# Patient Record
Sex: FEMALE | Race: OTHER | HISPANIC OR LATINO
[De-identification: names, ages, dates, MRNs, and addresses within clinical notes are randomized per-mention and may not be internally consistent; named-entity substitution may affect disease eponyms.]

---

## 2016-12-29 NOTE — ED PROVIDER NOTE - ENMT, MLM
Airway patent, Nasal mucosa clear. Mouth with normal mucosa. + enlarged parotids without erythema, induration nor TTP

## 2016-12-29 NOTE — H&P ADULT. - LYMPHATICS COMMENTS
multiple lymph nodes palpated on R preauricular area, L occipital area, B/L supraclavicular, and B/L ant/post cervical lymph nodes

## 2016-12-29 NOTE — H&P ADULT. - PROBLEM SELECTOR PLAN 2
Possibly pre-renal and related to hypercalcemic state. Will likely resolve with fluid hydration.  - Continue NS as above  - Appreciate renal consult. F/up UA, urine lytes with osm, P/C ratio  - If P/C ratio is out of proportion to UA dipstick protein, will consider UPEP and UIFE

## 2016-12-29 NOTE — H&P ADULT. - PROBLEM SELECTOR PLAN 4
Due to giving IV fluids, watching closely for development of pulmonary edema. Gave 20mg IV Lasix around 6pm due to bibasilar crackles on exam.  - Appreciate renal consult. Tomorrow, consider starting Lasix PO 20mg BID. Will diurese PRN for now.  - Strict ins/outs (goal net neutral), daily standing weights  - Consider cardiology consult for continuity given recent admission for CHF exacerbation, hold off for now Due to giving IV fluids, watching closely for development of pulmonary edema. Gave 20mg IV Lasix around 6pm due to bibasilar crackles on exam.  - Appreciate renal consult. Tomorrow, consider starting Lasix PO 20mg BID. Will diurese PRN for now.  - Strict ins/outs (goal net neutral), daily standing weights  - F/up CXR to r/o pulmonary edema and use as baseline  - Will consult cardiology for hx of CHF/CAD and for cardiac clearance for tomorrow's planned LN bx with ENT

## 2016-12-29 NOTE — ED ADULT NURSE NOTE - OBJECTIVE STATEMENT
Patient to ED complaining of abnormal lab values prior to Chemotherapy infusion for follicular lymphoma. Patient's  reports "she is very tired; we went to her hematologist where they were about to infuse her chemo, but they noticed that he calcium levels were high." Patient denies chest pain, SOB, dizziness, headache, blurred vision, nausea at the time. Placed on continuous cardiac monitoring.

## 2016-12-29 NOTE — H&P ADULT. - PROBLEM SELECTOR PLAN 5
w/ PPM, on Eliquis. Stable, regular rate/rhythm currently.  - Continue Eliquis 2.5mg BID w/ PPM, on Eliquis. Stable, regular rate/rhythm currently.  - Continue Eliquis 2.5mg BID    Attending addendum: hold AM dose pending LN bx by ENT

## 2016-12-29 NOTE — CONSULT NOTE ADULT - PROBLEM SELECTOR RECOMMENDATION 9
-ddx includes both lymphoma and gammopathy; agree with renal to eval for secondary causes other than heme/malignancy related  -appreciate renal reccs: would follow them with serial chem panel; IVF (be cautious regarding cardiac status; would investigate regarding her EF status)  -Zometa, possibly calcitonin - pls d/w renal  -already on decadron as outpt so may not be useful at this time

## 2016-12-29 NOTE — CONSULT NOTE ADULT - PROBLEM SELECTOR RECOMMENDATION 2
-awaiting bone marrow bx results  -if above inconclusive, would attempt repeat biopsy of LN - this MUST be an excisional bx or at minimum core bx  -will d/w Dr. Hopkins regarding chemo, but pt does not want to start tx as inpatient and we cannot start chemo without a finalized pathologic diagnosis  -pls check LDH, fibrinogen, uric acid, K, phos, and hemolysis labs daily given hypercalcemia

## 2016-12-29 NOTE — PATIENT PROFILE ADULT. - VISION (WITH CORRECTIVE LENSES IF THE PATIENT USUALLY WEARS THEM):
Normal vision: sees adequately in most situations; can see medication labels, newsprint/H/o bilateral corneal implant

## 2016-12-29 NOTE — H&P ADULT. - PROBLEM SELECTOR PLAN 7
Currently normotensive. Hold home Toprol in setting of ROMEO. Losartan not on pt's personal list of home meds.  - Consider restarting Toprol if ROMEO resolves Hold home metformin. HbA1c 6.5 last month.  - Start MISS w/ FS TID and at bedtime

## 2016-12-29 NOTE — CONSULT NOTE ADULT - SUBJECTIVE AND OBJECTIVE BOX
Hematology Oncology Consult Note (Dr. Hopkins)  Discussed with Dr. Hopkins and recommendations reviewed with the primary team.    The patient was seen and examined    TANYA FRAIRE is a 85y Female with history of IgM gammopathy admitted with HYPERCALCEMIA.  Pt also has recent FNA LN concerning for B-cell neoplasm not fully confirmed and has BM bx pending pathology report to confirm both above diagnoses.  Pt was tentatively planned for chemotherapy with Bendamustine/Rituximab per Dr. Hopkins depending on path findings.  Pt has been informed of all possibilities of diagnoses and is interested in continuing with any offered chemo.  Pt presents to ER for outpt abnl lab of Ca 13 despite outpt treatment with Decadron by Dr. Hopkins.  Pt currently only reports fatigue, lightheadedness, constipation, with chronic known nights sweats.  No n/v.  No change in appetite or weight.  No chest pain/sob/abd pain.  No headaches.      Pt has been seen by Nephrology  who recc eval for secondary hypercalcemia, treatment with IVF/zometa and maybe calcitonin.  She will require frequent chem panel q8 for close monitoring.  Nephrologist does feel hypercalcemia is most likely related to likely dx of lymphoma.      No diarrhea.  No melena or hematochezia.    No dysuria/hematuria.  No history of easy bruising/bleeding.  No gingival bleeding or epistaxis.  No leg pain or leg swelling.    ROS is otherwise negative.    PAST MEDICAL & SURGICAL HISTORY:  Follicular lymphoma  Neck mass  Afib  Other hyperlipidemia  Breast cancer  CAD (coronary artery disease)  Diabetes  HTN (hypertension)  No pertinent past medical history  H/O abdominal hysterectomy  H/O thyroidectomy  S/P CABG x 3      Allergies:  IV contrast    Medications:  aspirin  chewable 81milliGRAM(s) Oral daily  apixaban 2.5milliGRAM(s) Oral once    Social History:    FAMILY HISTORY:  No pertinent family history in first degree relatives      PHYSICAL EXAM:    T(F): 99, Max: 99 (12-29 @ 16:05)  HR: 79 (75 - 90)  BP: 143/65 (120/69 - 143/65)  RR: 18 (18 - 18)  SpO2: 100% (95% - 100%)  Wt(kg): --    Daily     Daily     Gen: well developed, well nourished, comfortable  HEENT: normocephalic/atraumatic, no conjunctival pallor, no scleral icterus, no oral thrush/mucosal bleeding/mucositis  Neck: supple, large b/l lymphadenpathy vs parotid enlargement with shoddy cervical LAD, no JVD  Cardiovascular: RR, nl S1S2, no murmurs/rubs/gallops  Respiratory: clear air entry b/l  Gastrointestinal: BS+, soft, NT/ND, no masses, no splenomegaly, no hepatomegaly, no evidence for ascites  Extremities: no clubbing/cyanosis, trace edema to shins b/l, no calf tenderness  Vascular:  DP/PT 2+ b/l  Neurological: CN 2-12 grossly intact, no focal deficits  Skin: no rash on visible skin  Lymph Nodes:  no axillary/groin LAD  Musculoskeletal:  full ROM  Psychiatric:  mood stable            Labs:               11.7   2.6   )-----------( 177      ( 29 Dec 2016 11:35 )             37.1     CBC Full  -  ( 29 Dec 2016 11:35 )  WBC Count : 2.6 K/uL  Hemoglobin : 11.7 g/dL  Hematocrit : 37.1 %  Platelet Count - Automated : 177 K/uL  Mean Cell Volume : 87.3 fL  Mean Cell Hemoglobin : 27.5 pg  Mean Cell Hemoglobin Concentration : 31.5 g/dL  Auto Neutrophil # : x  Auto Lymphocyte # : x  Auto Monocyte # : x  Auto Eosinophil # : x  Auto Basophil # : x  Auto Neutrophil % : 55.4 %  Auto Lymphocyte % : 16.9 %  Auto Monocyte % : 27.3 %  Auto Eosinophil % : 0.0 %  Auto Basophil % : 0.4 %        29 Dec 2016 11:35    140    |  102    |  16     ----------------------------<  113    3.7     |  28     |  1.11     Ca    12.3       29 Dec 2016 11:35  Corrected calcium 13.0  Phos  3.3       29 Dec 2016 11:35  Mg     1.8       29 Dec 2016 11:35    TPro  6.9    /  Alb  3.1    /  TBili  0.5    /  DBili  x      /  AST  68     /  ALT  53     /  AlkPhos  93     29 Dec 2016 11:35

## 2016-12-29 NOTE — CONSULT NOTE ADULT - NEGATIVE CARDIOVASCULAR SYMPTOMS
no chest pain/no paroxysmal nocturnal dyspnea/no palpitations/no peripheral edema/no orthopnea/no dyspnea on exertion

## 2016-12-29 NOTE — H&P ADULT. - PROBLEM SELECTOR PLAN 9
FEN: IV fluids, replete lytes PRN, DASH/diabetic diet.  VTE ppx: therapeutic on Eliquis.    Dispo: Admit to regional floor for further management.  FULL CODE

## 2016-12-29 NOTE — CONSULT NOTE ADULT - SUBJECTIVE AND OBJECTIVE BOX
Patient is a 85y old  Female who presents with a chief complaint of hypercalcemia        HPI:  86 y/o F w/ hx of IgM gammopathy recently admitted (11/7 to 11/14) for HFpEF exacerbation (and found to have CT neck showing lymphadenopathy and parotid mass) with recent FNA bx of LN concerning for B-cell lymphoma and pending BM bx, with other co-morbidities including HTN, DM2, right sided breast CA in remission (s/p mastectomy/chemotx/radiation), a-fib s/p PPM on Eliquis, s/p left hemithyroidectomy, CAD s/p CABG, severe MR/TR (seen on recent Echo 11/2016), presents c/o 3-4 weeks of gradual onset generalized weakness/low energy/lightheadedness, progressively worsening, in the setting of persistent new-onset hypercalcemia (not seen on previous admissions) as outpatient. She received Zometa in office on 12/24 without change in Na (last value was 13 yesterday 12/28 in Dr. Hopkins’s office). She endorses worsening of constipation, mild increased thirst with no significant increase in PO intake, chronic nighttime polyuria with no recent increase in urinary frequency, and denies change in mental status, abd pain, nausea/vomiting, recent bone fracture or skin necrosis. Has not taken any calcium supplements or new medications containing calcium. States last chemotherapy was 18 years ago. 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  Initial vitals in ED: 98.4F, HR 90, 120/69, RR 18, 96% on RA. Started on IV fluids. (29 Dec 2016 14:41)    PAST MEDICAL & SURGICAL HISTORY:  Follicular lymphoma  Neck mass  Afib  Other hyperlipidemia  Breast cancer  CAD (coronary artery disease)  Diabetes  HTN (hypertension)  No pertinent past medical history  H/O abdominal hysterectomy  H/O thyroidectomy  S/P CABG x 3     FAMILY HISTORY:  No pertinent family history in first degree relatives    Social:  Allergies    IV CONtRAST (Flushing (Skin); Rash)  No Known Drug Allergies    Intolerances    	  MEDICATIONS:  metoprolol succinate ER 50milliGRAM(s) Oral daily            atorvastatin 80milliGRAM(s) Oral at bedtime  insulin lispro (HumaLOG) corrective regimen sliding scale  SubCutaneous Before meals and at bedtime    sodium chloride 0.9%. 1000milliLiter(s) IV Continuous <Continuous>  aspirin  chewable 81milliGRAM(s) Oral daily            PHYSICAL EXAM:  T(C): 36.7, Max: 37.4 (12-29 @ 19:29)  HR: 82 (75 - 90)  BP: 128/68 (120/69 - 147/63)  RR: 18 (18 - 18)  SpO2: 94% (94% - 100%)  Wt(kg): --  I&O's Summary    Height (cm): 152.4 (12-29 @ 18:42)  Weight (kg): 45.5 (12-29 @ 10:51)  BMI (kg/m2): 19.6 (12-29 @ 18:42)  BSA (m2): 1.39 (12-29 @ 18:42)    Gen: NAD, AAOx3  Neck: no JVD  Cardiovascular: Normal S1 S2, No murmurs,    Respiratory: Lungs clear to auscultation	  Gastrointestinal:  Soft, Non-tender, + BS	   Ext: no edema  Neuro: no focal deficits.  Vascular: Peripheral pulses palpable 2+ bilaterally    TELEMETRY: 	    ECG:  	  RADIOLOGY:   DIAGNOSTIC TESTING:  [ ] Echocardiogram:  [ ]  Catheterization:  [ ] Stress Test:    OTHER: 	    LABS:	 	    CARDIAC MARKERS:                                  11.7   2.6   )-----------( 177      ( 29 Dec 2016 11:35 )             37.1     29 Dec 2016 11:35    140    |  102    |  16     ----------------------------<  113    3.7     |  28     |  1.11     Ca    12.3       29 Dec 2016 11:35  Phos  3.3       29 Dec 2016 11:35  Mg     1.8       29 Dec 2016 11:35    TPro  6.9    /  Alb  3.1    /  TBili  0.5    /  DBili  x      /  AST  68     /  ALT  53     /  AlkPhos  93     29 Dec 2016 11:35       ASSESSMENT/PLAN: Patient is a 85y old  Female who presents with a chief complaint of hypercalcemia        HPI:  85 F w/ hx CAD s/p CABG x3 20 years ago with recent LHC 10/16 (pLCX 50%. pLAD 20% ISR. SVG-RPDA patent, SVG-OM1 patent. LM nl. mid OM1 100%. D2 100%  left-left collateral. pRCA 100%. EDP 16mmHg)  afib with pauses s/p Medtronic Dual chamber PPM in november,  HFpEF, severe MR/TR being followed by Structural heart team, NIDDM, breast CA, MGUS,  admitted for generalized weakness and significant Hypercalcemia, patient with known Lymphadenopathy, cardiology called for optimization prior to LN bx.    patient currently denies any CP, no SOB, she walks with a cane can walk one block but limited by joint pain and has not been very mobile since last admission due to generalized fatigue, she denies orthopnea or PND, admits to LE swelling.          PAST MEDICAL & SURGICAL HISTORY:  Follicular lymphoma  Neck mass  Afib  Other hyperlipidemia  Breast cancer  CAD (coronary artery disease)  Diabetes  HTN (hypertension)  No pertinent past medical history  H/O abdominal hysterectomy  H/O thyroidectomy  S/P CABG x 3     FAMILY HISTORY:  No pertinent family history in first degree relatives    Social:  Allergies    IV CONtRAST (Flushing (Skin); Rash)  No Known Drug Allergies    Intolerances    	  MEDICATIONS:  metoprolol succinate ER 50milliGRAM(s) Oral daily            atorvastatin 80milliGRAM(s) Oral at bedtime  insulin lispro (HumaLOG) corrective regimen sliding scale  SubCutaneous Before meals and at bedtime    sodium chloride 0.9%. 1000milliLiter(s) IV Continuous <Continuous>  aspirin  chewable 81milliGRAM(s) Oral daily            PHYSICAL EXAM:  T(C): 36.7, Max: 37.4 (12-29 @ 19:29)  HR: 82 (75 - 90)  BP: 128/68 (120/69 - 147/63)  RR: 18 (18 - 18)  SpO2: 94% (94% - 100%)  Wt(kg): --  I&O's Summary    Height (cm): 152.4 (12-29 @ 18:42)  Weight (kg): 45.5 (12-29 @ 10:51)  BMI (kg/m2): 19.6 (12-29 @ 18:42)  BSA (m2): 1.39 (12-29 @ 18:42)    Gen: NAD,lethargic  Neck: no JVD  MM: dry mucus membranes  Cardiovascular: s1 s2, 3/6 systolic murmur at apex radiating axilla   Respiratory: Lungs clear to auscultation	  Gastrointestinal:  Soft, Non-tender, + BS	   Ext: +1 pitting edema of LLE  Neuro: no focal deficits.  Vascular: Peripheral pulses palpable 2+ bilaterally       ECG:  	Atrial paced vs LAE, 1st deg HB, LVH, TWI in anterolateral leads  RADIOLOGY:   CXR: Left chest wall cardiac conduction device with leads in the   right atrium and right ventricle is seen. Patient's chest with median   sternotomy. Numerous mediastinal clips are noted. Persistent   opacification of the right lung base, which may represent atelectasis   versus scarring. Mediastinal silhouette is stable. No pneumothorax is   seen.    DIAGNOSTIC TESTING:  [ ] Echocardiogram: LVEF 55-60%, Severe MR/TR, severe PHTN pulmonary artery systolic pressure is estimated to be over 75 mmHg  [ ]  Catheterization:(pLCX 50%. pLAD 20% ISR. SVG-RPDA patent, SVG-OM1 patent. LM nl. mid OM1 100%. D2 100%  left-left collateral. pRCA 100%. EDP 16mmHg      	     	                                     11.7   2.6   )-----------( 177      ( 29 Dec 2016 11:35 )             37.1     29 Dec 2016 11:35    140    |  102    |  16     ----------------------------<  113    3.7     |  28     |  1.11     Ca    12.3       29 Dec 2016 11:35  Phos  3.3       29 Dec 2016 11:35  Mg     1.8       29 Dec 2016 11:35    TPro  6.9    /  Alb  3.1    /  TBili  0.5    /  DBili  x      /  AST  68     /  ALT  53     /  AlkPhos  93     29 Dec 2016 11:35       ASSESSMENT/PLAN: 	    85 F w/ hx CAD s/p CABG x3 20 years ago with recent C 10/16 (pLCX 50%. pLAD 20% ISR. SVG-RPDA patent, SVG-OM1 patent. LM nl. mid OM1 100%. D2 100%  left-left collateral. pRCA 100%. EDP 16mmHg)  afib with pauses s/p Medtronic Dual chamber PPM in november,  HFpEF, severe MR/TR being followed by Structural heart team, NIDDM, breast CA, MGUS,  admitted for generalized weakness and significant Hypercalcemia, patient with known Lymphadenopathy, cardiology called for optimization prior to LN bx.    Restart Toprol XL please do not hold unless severely hypotensive, (patient has a PPM), continue Aspirin 81mg, may hold Eliquis tonight per ENT request but   restart ASAP given her [id number] is 7 with 11% yearly risk of CVA,    patient is cardiovascularly optimized for low risk surgery would prefer to be performed under Local anesthesia if possible as patient is elevated risk for general anesthesia, no further testing is required, OK with IVF for now monitor respiratory status, give Lasix 20mg IV PRN, after procedure recommend after load reduction given her severe MR may start captopril 12.5mg TID and titrate to tolerable BP approx 90s-100s.     Please Call EP or have anesthesia call cardiology regarding PPM settings when patient is in OR.     Will Discuss with attending. Patient is a 85y old  Female who presents with a chief complaint of hypercalcemia        HPI:  85 F w/ hx CAD s/p CABG x3 20 years ago with recent LHC 10/16 (pLCX 50%. pLAD 20% ISR. SVG-RPDA patent, SVG-OM1 patent. LM nl. mid OM1 100%. D2 100%  left-left collateral. pRCA 100%. EDP 16mmHg)  afib with pauses s/p Medtronic Dual chamber PPM in november,  HFpEF, severe MR/TR being followed by Structural heart team, NIDDM, breast CA, MGUS,  admitted for generalized weakness and significant Hypercalcemia, patient with known Lymphadenopathy, cardiology called for optimization prior to LN bx.    patient currently denies any CP, no SOB, she walks with a cane can walk one block but limited by joint pain and has not been very mobile since last admission due to generalized fatigue, she denies orthopnea or PND, admits to LE swelling.          PAST MEDICAL & SURGICAL HISTORY:  Follicular lymphoma  Neck mass  Afib  Other hyperlipidemia  Breast cancer  CAD (coronary artery disease)  Diabetes  HTN (hypertension)  No pertinent past medical history  H/O abdominal hysterectomy  H/O thyroidectomy  S/P CABG x 3     FAMILY HISTORY:  No pertinent family history in first degree relatives    Social:  Allergies    IV CONtRAST (Flushing (Skin); Rash)  No Known Drug Allergies    Intolerances    	  MEDICATIONS:  metoprolol succinate ER 50milliGRAM(s) Oral daily            atorvastatin 80milliGRAM(s) Oral at bedtime  insulin lispro (HumaLOG) corrective regimen sliding scale  SubCutaneous Before meals and at bedtime    sodium chloride 0.9%. 1000milliLiter(s) IV Continuous <Continuous>  aspirin  chewable 81milliGRAM(s) Oral daily            PHYSICAL EXAM:  T(C): 36.7, Max: 37.4 (12-29 @ 19:29)  HR: 82 (75 - 90)  BP: 128/68 (120/69 - 147/63)  RR: 18 (18 - 18)  SpO2: 94% (94% - 100%)  Wt(kg): --  I&O's Summary    Height (cm): 152.4 (12-29 @ 18:42)  Weight (kg): 45.5 (12-29 @ 10:51)  BMI (kg/m2): 19.6 (12-29 @ 18:42)  BSA (m2): 1.39 (12-29 @ 18:42)    Gen: NAD,lethargic  Neck: no JVD  MM: dry mucus membranes  Cardiovascular: s1 s2, 3/6 systolic murmur at apex radiating axilla   Respiratory: Lungs clear to auscultation	  Gastrointestinal:  Soft, Non-tender, + BS	   Ext: +1 pitting edema of LLE  Neuro: no focal deficits.  Vascular: Peripheral pulses palpable 2+ bilaterally       ECG:  	Atrial paced vs LAE, 1st deg HB, LVH, TWI in anterolateral leads  RADIOLOGY:   CXR: Left chest wall cardiac conduction device with leads in the   right atrium and right ventricle is seen. Patient's chest with median   sternotomy. Numerous mediastinal clips are noted. Persistent   opacification of the right lung base, which may represent atelectasis   versus scarring. Mediastinal silhouette is stable. No pneumothorax is   seen.    DIAGNOSTIC TESTING:  [x ] Echocardiogram: LVEF 55-60%, Severe MR/TR, severe PHTN pulmonary artery systolic pressure is estimated to be over 75 mmHg  [x ]  Catheterization:(pLCX 50%. pLAD 20% ISR. SVG-RPDA patent, SVG-OM1 patent. LM nl. mid OM1 100%. D2 100%  left-left collateral. pRCA 100%. EDP 16mmHg      	     	                                     11.7   2.6   )-----------( 177      ( 29 Dec 2016 11:35 )             37.1     29 Dec 2016 11:35    140    |  102    |  16     ----------------------------<  113    3.7     |  28     |  1.11     Ca    12.3       29 Dec 2016 11:35  Phos  3.3       29 Dec 2016 11:35  Mg     1.8       29 Dec 2016 11:35    TPro  6.9    /  Alb  3.1    /  TBili  0.5    /  DBili  x      /  AST  68     /  ALT  53     /  AlkPhos  93     29 Dec 2016 11:35       ASSESSMENT/PLAN: 	    85 F w/ hx CAD s/p CABG x3 20 years ago with recent C 10/16 (pLCX 50%. pLAD 20% ISR. SVG-RPDA patent, SVG-OM1 patent. LM nl. mid OM1 100%. D2 100%  left-left collateral. pRCA 100%. EDP 16mmHg)  afib with pauses s/p Medtronic Dual chamber PPM in november,  HFpEF, severe MR/TR being followed by Structural heart team, NIDDM, breast CA, MGUS,  admitted for generalized weakness and significant Hypercalcemia, patient with known Lymphadenopathy, cardiology called for optimization prior to LN bx.    Restart Toprol XL please do not hold unless severely hypotensive, (patient has a PPM), continue Aspirin 81mg, may hold Eliquis morning dose per ENT request but   restart ASAP given her [id number] is 7 with 11% yearly risk of CVA,    patient is cardiovascularly optimized for low risk surgery would prefer to be performed under Local anesthesia if possible as patient is elevated risk for general anesthesia, no further testing is required, OK with IVF for now monitor respiratory status, give Lasix 20mg IV PRN, after procedure recommend after load reduction given her severe MR may resume losartan if ok with nephro otherwise can start low dose hydralazine 10mg TID and titrate up to a tolerable BP.     Please Call EP or have anesthesia call cardiology regarding PPM settings when patient is in OR.     Will Discuss with attending. Patient is a 85y old  Female who presents with a chief complaint of hypercalcemia        HPI:  85 F w/ hx CAD s/p CABG x3 20 years ago with recent LHC 10/16 (pLCX 50%. pLAD 20% ISR. SVG-RPDA patent, SVG-OM1 patent. LM nl. mid OM1 100%. D2 100%  left-left collateral. pRCA 100%. EDP 16mmHg)  afib with pauses s/p Medtronic Dual chamber PPM in november,  HFpEF, severe MR/TR being followed by Structural heart team, NIDDM, breast CA, MGUS,  admitted for generalized weakness and significant Hypercalcemia, patient with known Lymphadenopathy, cardiology called for optimization prior to LN bx.    patient currently denies any CP, no SOB, she walks with a cane can walk one block but limited by joint pain and has not been very mobile since last admission due to generalized fatigue, she denies orthopnea or PND, admits to LE swelling.          PAST MEDICAL & SURGICAL HISTORY:  Follicular lymphoma  Neck mass  Afib  Other hyperlipidemia  Breast cancer  CAD (coronary artery disease)  Diabetes  HTN (hypertension)  No pertinent past medical history  H/O abdominal hysterectomy  H/O thyroidectomy  S/P CABG x 3     FAMILY HISTORY:  No pertinent family history in first degree relatives    Social:  Allergies    IV CONtRAST (Flushing (Skin); Rash)  No Known Drug Allergies    Intolerances    	  MEDICATIONS:  metoprolol succinate ER 50milliGRAM(s) Oral daily            atorvastatin 80milliGRAM(s) Oral at bedtime  insulin lispro (HumaLOG) corrective regimen sliding scale  SubCutaneous Before meals and at bedtime    sodium chloride 0.9%. 1000milliLiter(s) IV Continuous <Continuous>  aspirin  chewable 81milliGRAM(s) Oral daily            PHYSICAL EXAM:  T(C): 36.7, Max: 37.4 (12-29 @ 19:29)  HR: 82 (75 - 90)  BP: 128/68 (120/69 - 147/63)  RR: 18 (18 - 18)  SpO2: 94% (94% - 100%)  Wt(kg): --  I&O's Summary    Height (cm): 152.4 (12-29 @ 18:42)  Weight (kg): 45.5 (12-29 @ 10:51)  BMI (kg/m2): 19.6 (12-29 @ 18:42)  BSA (m2): 1.39 (12-29 @ 18:42)    Gen: NAD,lethargic  Neck: no JVD  MM: dry mucus membranes  Cardiovascular: s1 s2, 3/6 systolic murmur at apex radiating axilla   Respiratory: Lungs clear to auscultation	  Gastrointestinal:  Soft, Non-tender, + BS	   Ext: +1 pitting edema of LLE  Neuro: no focal deficits.  Vascular: Peripheral pulses palpable 2+ bilaterally       ECG:  	Atrial paced vs LAE, 1st deg HB, LVH, TWI in anterolateral leads  RADIOLOGY:   CXR: Left chest wall cardiac conduction device with leads in the   right atrium and right ventricle is seen. Patient's chest with median   sternotomy. Numerous mediastinal clips are noted. Persistent   opacification of the right lung base, which may represent atelectasis   versus scarring. Mediastinal silhouette is stable. No pneumothorax is   seen.    DIAGNOSTIC TESTING:  [x ] Echocardiogram: LVEF 55-60%, Severe MR/TR, severe PHTN pulmonary artery systolic pressure is estimated to be over 75 mmHg  [x ]  Catheterization:(pLCX 50%. pLAD 20% ISR. SVG-RPDA patent, SVG-OM1 patent. LM nl. mid OM1 100%. D2 100%  left-left collateral. pRCA 100%. EDP 16mmHg      	     	                                     11.7   2.6   )-----------( 177      ( 29 Dec 2016 11:35 )             37.1     29 Dec 2016 11:35    140    |  102    |  16     ----------------------------<  113    3.7     |  28     |  1.11     Ca    12.3       29 Dec 2016 11:35  Phos  3.3       29 Dec 2016 11:35  Mg     1.8       29 Dec 2016 11:35    TPro  6.9    /  Alb  3.1    /  TBili  0.5    /  DBili  x      /  AST  68     /  ALT  53     /  AlkPhos  93     29 Dec 2016 11:35       ASSESSMENT/PLAN: 	    85 F w/ hx CAD s/p CABG x3 20 years ago with recent C 10/16 (pLCX 50%. pLAD 20% ISR. SVG-RPDA patent, SVG-OM1 patent. LM nl. mid OM1 100%. D2 100%  left-left collateral. pRCA 100%. EDP 16mmHg)  afib with pauses s/p Medtronic Dual chamber PPM in november,  HFpEF, severe MR/TR being followed by Structural heart team, NIDDM, breast CA, MGUS,  admitted for generalized weakness and significant Hypercalcemia, patient with known Lymphadenopathy, cardiology called for optimization prior to LN bx.    Restart Toprol XL please do not hold unless severely hypotensive, (patient has a PPM), continue Aspirin 81mg, may hold Eliquis morning dose per ENT request but   restart ASAP given her [id number] is 7 with 11% yearly risk of CVA,    patient is cardiovascularly optimized for low risk surgery would prefer to be performed under Local anesthesia if possible as patient is elevated risk for general anesthesia,  no further testing is required, however needs correction of hypercalcemia prior to going to OR, replete K+ >4.0 and Mag >2.0, OK with IVF for now monitor respiratory status, give Lasix 20mg IV PRN, after procedure recommend after load reduction given her severe MR may resume losartan if ok with nephro otherwise can start low dose hydralazine 10mg TID and titrate up to a tolerable BP.     Please Call EP or have anesthesia call cardiology regarding PPM settings when patient is in OR.     Will Discuss with attending.

## 2016-12-29 NOTE — H&P ADULT. - ASSESSMENT
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

## 2016-12-29 NOTE — H&P ADULT. - PROBLEM SELECTOR PLAN 8
FEN: IV fluids, replete lytes PRN, DASH/diabetic diet.  VTE ppx: therapeutic on Eliquis.    Dispo: Admit to regional floor for further management.  FULL CODE Currently normotensive. Hold home Toprol in setting of ROMEO. Losartan not on pt's personal list of home meds.  - Consider restarting Toprol if ROMEO resolves Currently normotensive. Hold home Toprol in setting of ROMEO. Losartan not on pt's personal list of home meds.  - Consider restarting Toprol if ROMEO resolves    Attending Addendum: c/w toprol per cardiology recs; pt rxd losartan in 10/2016; will start in AM in of captopril TID recommended by cardiology (d/w cardiology fellow, was unaware that pt was rxd losartan) Currently normotensive. Hold home Toprol in setting of ROMEO. Losartan not on pt's personal list of home meds.  - Consider restarting Toprol if ROMEO resolves    Attending Addendum: c/w toprol per cardiology recs; pt rxd losartan in 10/2016; will start in AM instead of captopril TID recommended by cardiology (d/w cardiology fellow, was unaware that pt was rxd losartan)

## 2016-12-29 NOTE — H&P ADULT. - ATTENDING COMMENTS
pt seen and examined on 12/29/2016  reviewed h&p, ekg, cardiology, ent, heme/onc, nephro consults, labs, vs; discussed case w/ cardiology fellow  pt with above medical comorbidities including significant cardiac hx, a/f worsening hypercalcemia, currently on IVFs and prn IV lasix; pt w/ R parotid mass, recently underwent FNA bx of LN along w/ BM bx; pending  LN biopsy on 12/30/2016 by ENT.    PE  gen: thin, frail, elderly female, NAD  heent: dry tongue but no JVD; surgical pupils b/l ; +palpable nontender R parotid mass  cvs: s1s2   lungs: mild R basilar crackle, otherwise CTA   abd: soft/ nontender / nondistended  ext: +2 pitting lower ext edema b/l, no tenderness b/l     1. hypercalcemia: appreciated consult recs; on IVFs, IV lasix prn; hold off on starting PO lasix bid until assessing pt's fluid status after biopsy; start calcitonin (per renal recs) after biopsy  2. R parotid mass/ neck LAD: LN biopsy pending by ENT; pt received PM dose of eliquis on 12/29; will hold 6am dose , restart post procedure; cardiology and/or EP to be called to adjust PPM settings pt seen and examined on 12/29/2016  reviewed h&p, ekg, cardiology, ent, heme/onc, nephro consults, labs, vs; discussed case w/ cardiology fellow  pt with above medical comorbidities including significant cardiac hx, a/f worsening hypercalcemia, currently on IVFs and prn IV lasix; pt w/ R parotid mass, recently underwent FNA bx of LN along w/ BM bx; pending  LN biopsy on 12/30/2016 by ENT.    PE  gen: thin, frail, elderly female, NAD  heent: dry tongue but no JVD; surgical pupils b/l ; +palpable nontender R parotid mass  cvs: s1s2   lungs: mild R basilar crackle, otherwise CTA   abd: soft/ nontender / nondistended  ext: +2 pitting lower ext edema b/l, no tenderness b/l     1. hypercalcemia: appreciated consult recs; on IVFs, IV lasix prn; hold off on starting PO lasix bid until assessing pt's fluid status after biopsy; start calcitonin (per renal recs) after biopsy  2. R parotid mass/ neck LAD: LN biopsy pending by ENT; pt received PM dose of eliquis on 12/29; will hold 6am dose , restart post procedure; cardiology and/or EP to be called to adjust PPM settings  3. severe MR/ TR: monitor fluid status; c/w toprol xl; pt rxd losaratan per outside med sources in 10/2016, though per above list pt apparently not taking medication; discussed case w/ cardiology fellow, will start in AM in place of captopril recommended ; start at lower dose, titrate based on bp response. pt seen and examined on 12/29/2016  reviewed h&p, ekg, cardiology, ent, heme/onc, nephro consults, labs, vs; discussed case w/ cardiology fellow  pt with above medical comorbidities including significant cardiac hx, a/f worsening hypercalcemia, currently on IVFs and prn IV lasix; pt w/ R parotid mass, recently underwent FNA bx of LN along w/ BM bx; pending  LN biopsy on 12/30/2016 by ENT.    PE  gen: thin, frail, elderly female, NAD  heent: dry tongue but no JVD; surgical pupils b/l ; +palpable nontender R parotid mass  cvs: s1s2   lungs: mild R basilar crackle, otherwise CTA   abd: soft/ nontender / nondistended  ext: +2 pitting lower ext edema b/l, no tenderness b/l     1. hypercalcemia: appreciated consult recs; on IVFs, IV lasix prn; hold off on starting PO lasix bid until assessing pt's fluid status after biopsy; start calcitonin (per renal recs) after biopsy  2. R parotid mass/ neck LAD: LN biopsy pending by ENT; pt received PM dose of eliquis on 12/29; will hold 6am dose , restart post procedure; cardiology and/or EP to be called to adjust PPM settings  3. severe MR: monitor fluid status; c/w toprol xl; pt rxd losartan 100mg qd per outside med sources in 10/2016, though per above note pt apparently not taking medication; discussed case w/ cardiology fellow, will start in AM in place of captopril recommended ; start at lower dose, titrate based on bp response.  4. ROMEO: per renal; will monitor renal function; plan as above pt seen and examined on 12/29/2016  reviewed h&p, ekg, cardiology, ent, heme/onc, nephro consults, labs, vs; discussed case w/ cardiology fellow  pt with above medical comorbidities including significant cardiac hx, a/f worsening hypercalcemia, currently on IVFs and prn IV lasix; pt w/ R parotid mass, recently underwent FNA bx of LN along w/ BM bx; pending  LN biopsy on 12/30/2016 by ENT.    PE  gen: thin, frail, elderly female, NAD  heent: dry tongue but no JVD; surgical pupils b/l ; +palpable nontender R parotid mass  cvs: LUSB/LLSB murmur; RRR  lungs: mild R basilar crackle, otherwise CTA   abd: soft/ nontender / nondistended  ext: +2 pitting lower ext edema b/l, no tenderness b/l     1. hypercalcemia: appreciated consult recs; on IVFs, IV lasix prn; hold off on starting PO lasix bid until assessing pt's fluid status after biopsy; start calcitonin (per renal recs) after biopsy  2. R parotid mass/ neck LAD: LN biopsy pending by ENT; pt received PM dose of eliquis on 12/29; will hold 6am dose , restart post procedure; cardiology and/or EP to be called to adjust PPM settings  3. severe MR: monitor fluid status; c/w toprol xl; pt rxd losartan 100mg qd per outside med sources in 10/2016, though per above note pt apparently not taking medication; discussed case w/ cardiology fellow, will start in AM in place of captopril recommended ; start at lower dose, titrate based on bp response.  4. ROMEO: per renal; will monitor renal function; plan as above pt seen and examined on 12/29/2016  reviewed h&p, ekg, cardiology, ent, heme/onc, nephro consults, labs, vs; discussed case w/ cardiology fellow  pt with above medical comorbidities including significant cardiac hx, a/f worsening hypercalcemia, currently on IVFs and prn IV lasix; pt w/ R parotid mass, recently underwent FNA bx of LN along w/ BM bx; pending  LN biopsy on 12/30/2016 by ENT.    PE  gen: thin, frail, elderly female, NAD  heent: dry tongue but no JVD; surgical pupils b/l ; +palpable nontender R parotid mass  cvs: LUSB/LLSB murmur; RRR  lungs: mild R basilar crackle, otherwise CTA   abd: soft/ nontender / nondistended  ext: +2 pitting lower ext edema b/l, no tenderness b/l     1. hypercalcemia: appreciated consult recs; on IVFs, IV lasix prn; hold off on starting PO lasix bid until assessing pt's fluid status after biopsy; start calcitonin (per renal recs) after biopsy  2. R parotid mass/ neck LAD: LN biopsy pending by ENT; pt received PM dose of eliquis on 12/29; will hold 6am dose , restart post procedure; cardiology and/or EP to be called to adjust PPM settings  3. severe MR: monitor fluid status; c/w toprol xl; pt rxd losartan 100mg qd per outside med sources in 10/2016, though per above note pt apparently not taking medication; discussed case w/ cardiology fellow, will start in AM in place of captopril recommended ; start at lower dose, titrate based on bp response, hold if worsening renal function  4. ROMEO: per renal, though cr improved from 11/2016 admission; will monitor renal function; plan as above

## 2016-12-29 NOTE — ED ADULT TRIAGE NOTE - ARRIVAL INFO ADDITIONAL COMMENTS
Patient has lymphoma and was sent in by Dr. Vega. Denies any ELENA, visual changes, numbness or tingling, CP, SOB, dizziness, syncopal episodes, N/V/D, fever.

## 2016-12-29 NOTE — ED PROVIDER NOTE - PMH
Afib    Breast cancer    CAD (coronary artery disease)    Diabetes    HTN (hypertension)    Neck mass    Other hyperlipidemia

## 2016-12-29 NOTE — CONSULT NOTE ADULT - PROBLEM SELECTOR RECOMMENDATION 4
Patient currently is not in overt pulmonary edema  However, has evidence of elevated JVD on bedside evaluation   Would check a CXR    In light of avid NS administration, would suggest stepping up her home dose to Lasix PO 20mg BID to maintain a sustained diuresis. Can further step up subsequently based on serial clinical examination regarding her pulmonary/cardiac status.     Aim is to keep patient net neutral for now in terms of ins and outs and to facilitate a diuresis and calciuresis.     Regardless, need STRICT I&OS  Daily STANDING WEIGHTS on the floors     Consider cardiology consult for continuity follow up for her CHF history Patient currently is not in overt pulmonary edema  However, has evidence of elevated JVD on bedside evaluation   Would check a CXR PA/lateral to gauge for subclinical pulmonary edema     In light of avid NS administration, would suggest stepping up her home dose to Lasix PO 20mg BID to maintain a sustained diuresis. Can further step up subsequently based on serial clinical examination regarding her pulmonary/cardiac status.     Aim is to keep patient net neutral for now in terms of ins and outs and to facilitate a diuresis and calciuresis.     Regardless, need STRICT I&OS  Daily STANDING WEIGHTS on the floors (a known baseline weight prior to admission is 45.9kg; please attain daily weights to track this trend for volume status for ROMEO and CHF management)     Consider cardiology consult for continuity follow up for her CHF history Patient currently is not in overt pulmonary edema  However, has evidence of elevated JVD on bedside evaluation   Would check a CXR PA/lateral to gauge for subclinical pulmonary edema     In light of avid NS administration,   By tomorrow, would suggest starting a stepped up home dose to Lasix PO 20mg BID to maintain a sustained diuresis to ameliorate potential pulmonary edema     Can further step up subsequently based on serial clinical examination regarding her pulmonary/cardiac status.     Aim is to keep patient net neutral for now in terms of ins and outs and to facilitate a diuresis and calciuresis.     Regardless, need STRICT I&OS  Daily STANDING WEIGHTS on the floors (a known baseline weight prior to admission is 45.9kg; please attain daily weights to track this trend for volume status for ROMEO and CHF management)     Consider cardiology consult for continuity follow up for her CHF history

## 2016-12-29 NOTE — CONSULT NOTE ADULT - SUBJECTIVE AND OBJECTIVE BOX
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 ENT consulted for lymph node biopsy. Patient with minimal complaints at this time. Does endorses some neck masses.     PE:  NAD. comfortable  no increased WOB.   scattered LAD.      A/P: 85F w/ concern for new malignancy and neck lymphadenopathy with plans for excisional biopsy with Dr. Westfall   - pre op labs  - will require cardiac clearance  - EKG  - CXR  - NPO@midnight  - IVF@ midnight

## 2016-12-29 NOTE — H&P ADULT. - PROBLEM SELECTOR PLAN 3
Has IgM gammopathy, and on last admission, found to have CT neck showing lymphadenopathy and parotid mass. Had recent FNA bx of LN concerning for B-cell lymphoma and pending BM bx results.  - Appreciate heme/onc consult (Dr. Hopkins). Pt does not want to start tx as inpatient. Will hold off on chemo pending final pathologic diagnosis.  - Will check LDH, fibrinogen, uric acid, K, phos, and hemolysis labs daily given hypercalcemia  - F/up BM bx results Has IgM gammopathy, and on last admission, found to have CT neck showing lymphadenopathy and parotid mass. Had recent FNA bx of LN concerning for B-cell lymphoma and pending BM bx results.  - Appreciate heme/onc consult (Dr. Hopkins). Pt does not want to start tx as inpatient. Will hold off on chemo pending final pathologic diagnosis.  - Appreciate ENT consult. NPO after midnight for LN bx in AM. Will need cardiac clearance  - Will check LDH, fibrinogen, uric acid, K, phos, and hemolysis labs daily given hypercalcemia  - F/up BM bx results

## 2016-12-29 NOTE — H&P ADULT. - PROBLEM SELECTOR PLAN 6
Hold home metformin. HbA1c 6.5 last month.  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  - Continue ASA 81mg daily

## 2016-12-29 NOTE — CONSULT NOTE ADULT - PROBLEM SELECTOR RECOMMENDATION 9
Corrected calcium 13.0mg/dL  Based on history likely due to lymphoma and endogenous 1,25 Vit D production  Do not suspect exogenous ingestion of calcium on her history  Medications are reviewed at present    To verify, please check:  iPTH, 25 Vit D, 1,25 Vit D, phosphorus      If iPTH is low, check PTHrP subsequently     As for therapy, as patient reportedly received Zometa earlier last week, no role for other bisphosphonates at present  In light of her HFpEF, she may not tolerate such aggressive IV fluids. The IV bag was just hung up around 12:45PM at time of this evaluation.   In light of this, would opt to give calcitonin at 4units/kg (weigh the patient) q 12 hours intranasal for 2 days then discontinue (to prevent tachyphylaxis). The additional ability to control calcium may limit total normal saline administration volume    For now, c/w IV NS @ 200cc/hr   But will need frequent lung checks, see below in CHF problem    Follow up hematology for whether any other chemotherapy is indicated at present Corrected calcium 13.0mg/dL  Based on history likely due to lymphoma and endogenous 1,25 Vit D production  Do not suspect exogenous ingestion of calcium on her history  Medications are reviewed at present    To verify, please check:  iPTH, 25 Vit D, 1,25 Vit D, phosphorus      If iPTH is low, check PTHrP subsequently     As for therapy, as patient reportedly received Zometa earlier last week, no role for other bisphosphonates at present  In light of her HFpEF, she may not tolerate such aggressive IV fluids. The IV bag was just hung up around 12:45PM at time of this evaluation.   In light of this, would opt to give calcitonin at 4units/kg (weigh the patient) q 12 hours intranasal for 2 days then discontinue (to prevent tachyphylaxis). The additional ability to control calcium may limit total normal saline administration volume    For now, c/w IV NS @ 200cc/hr for the first liter  Then switch to IV NS @ 100cc/hr subsequently because of CHF history  But will need frequent lung checks, see below in CHF problem  May need additional IV diuretics such as IV Lasix 40mg at a time based on symptoms of pulmonary edema.     Follow up hematology for whether any chemotherapy or further steroids are needed at present.

## 2016-12-29 NOTE — ED ADULT NURSE NOTE - PMH
Afib    Breast cancer    CAD (coronary artery disease)    Diabetes    Follicular lymphoma    HTN (hypertension)    Neck mass    Other hyperlipidemia

## 2016-12-29 NOTE — ED PROVIDER NOTE - MEDICAL DECISION MAKING DETAILS
Pt afVSS, continues to feel weak, likely related to underlying Lymphoma and hypercalcemia. No acute ECG  changes. Ca remains elevated despite previous outpatient treatment. Discussed with Dr. Hopkins who requests admission to hospitalist and will consult Dr. Hartmann.

## 2016-12-29 NOTE — ED PROVIDER NOTE - OBJECTIVE STATEMENT
84yo woman with h/o monoclonal IgM, B-Cell lymphoma s/p chemotherapy 1 month prior noted to have persistent hypercalcemia. Pt given Zameta 1 wk ago, however level found to be 13 in the office yesterday. Pt notes feeling generalized weakness and dizziness, no Ha, CP, SOB nor abdominal pain. No recent F/C.

## 2016-12-29 NOTE — H&P ADULT. - PROBLEM SELECTOR PLAN 1
Corrected serum Ca of 13 despite receiving bisphosphonate and Decadron as outpatient. Not seen on previous admission. Possibly related to new, unconfirmed dx of possible B Cell Lymphoma.  - Appreciate renal consult. Pt now s/p 1L NS (at 200cc/hr), receiving 2nd liter now at 100cc/hr. Will trend BMP q8h  - F/up iPTH, 25-Vit D, 1-25 Vit D, phosphorus  - If iPTH is low, will check PTHrP Corrected serum Ca of 13 despite receiving bisphosphonate and Decadron as outpatient. Not seen on previous admission. Possibly related to new, unconfirmed dx of possible B Cell Lymphoma.  - Appreciate renal consult. Pt now s/p 1L NS (at 200cc/hr), receiving 2nd liter now at 100cc/hr. Will trend BMP q8h  - F/up iPTH, 25-Vit D, 1-25 Vit D, phosphorus  - If iPTH is low, will check PTHrP  - Check AM EKG daily to monitor for shortened QT in setting of hypercalcemia

## 2016-12-29 NOTE — H&P ADULT. - HISTORY OF PRESENT ILLNESS
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 Hopkins’s office). She She received Zometa in office on 12/24 without change in Na. Other co-morbidities include HTN, DM2, right sided breast CA in remission (s/p mastectomy/chemotx/radiation), a-fib s/p PPM on Eliquis, left hemithyroidectomy, severe MR/TR (seen on recent Echo 11/2016).  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 She received Zometa in office on 12/24 without change in Na (last value was 13 yesterday 12/28 in Dr. Hopkins’s office). She endorses worsening of constipation, mild increased thirst with no significant increase in PO intake, chronic nighttime polyuria with no recent increase in urinary frequency, and denies change in mental status, abd pain, nausea/vomiting, recent bone fracture or skin necrosis. Has not taken any calcium supplements or new medications containing calcium. States last chemotherapy was 18 years ago. 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 86 y/o F w/ hx of IgM gammopathy recently admitted (11/7 to 11/14) for HFpEF exacerbation (and found to have CT neck showing lymphadenopathy and parotid mass) with recent FNA bx of LN concerning for B-cell lymphoma and pending BM bx, with other co-morbidities including HTN, DM2, right sided breast CA in remission (s/p mastectomy/chemotx/radiation), a-fib s/p PPM on Eliquis, left hemithyroidectomy, severe MR/TR (seen on recent Echo 11/2016), presents today w/ complaint of to 3-4 weeks of gradual onset generalized weakness/low energy, progressively worsening, in the setting of persistent new-onset hypercalcemia (not seen on previous admissions) as outpatient. She received Zometa in office on 12/24 without change in Na (last value was 13 yesterday 12/28 in Dr. Hopkins’s office). She endorses worsening of constipation, mild increased thirst with no significant increase in PO intake, chronic nighttime polyuria with no recent increase in urinary frequency, and denies change in mental status, abd pain, nausea/vomiting, recent bone fracture or skin necrosis. Has not taken any calcium supplements or new medications containing calcium. States last chemotherapy was 18 years ago. 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 86 y/o F w/ hx of IgM gammopathy recently admitted (11/7 to 11/14) for HFpEF exacerbation (and found to have CT neck showing lymphadenopathy and parotid mass) with recent FNA bx of LN concerning for B-cell lymphoma and pending BM bx, with other co-morbidities including HTN, DM2, right sided breast CA in remission (s/p mastectomy/chemotx/radiation), a-fib s/p PPM on Eliquis, left hemithyroidectomy, severe MR/TR (seen on recent Echo 11/2016), presents today w/ complaint of to 3-4 weeks of gradual onset generalized weakness/low energy/lightheadedness, progressively worsening, in the setting of persistent new-onset hypercalcemia (not seen on previous admissions) as outpatient. She received Zometa in office on 12/24 without change in Na (last value was 13 yesterday 12/28 in Dr. Hopkins’s office). She endorses worsening of constipation, mild increased thirst with no significant increase in PO intake, chronic nighttime polyuria with no recent increase in urinary frequency, and denies change in mental status, abd pain, nausea/vomiting, recent bone fracture or skin necrosis. Has not taken any calcium supplements or new medications containing calcium. States last chemotherapy was 18 years ago. 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 84 y/o F w/ hx of IgM gammopathy recently admitted (11/7 to 11/14) for HFpEF exacerbation (and found to have CT neck showing lymphadenopathy and parotid mass) with recent FNA bx of LN concerning for B-cell lymphoma and pending BM bx, with other co-morbidities including HTN, DM2, right sided breast CA in remission (s/p mastectomy/chemotx/radiation), a-fib s/p PPM on Eliquis, left hemithyroidectomy, severe MR/TR (seen on recent Echo 11/2016), presents c/o 3-4 weeks of gradual onset generalized weakness/low energy/lightheadedness, progressively worsening, in the setting of persistent new-onset hypercalcemia (not seen on previous admissions) as outpatient. She received Zometa in office on 12/24 without change in Na (last value was 13 yesterday 12/28 in Dr. Hopkins’s office). She endorses worsening of constipation, mild increased thirst with no significant increase in PO intake, chronic nighttime polyuria with no recent increase in urinary frequency, and denies change in mental status, abd pain, nausea/vomiting, recent bone fracture or skin necrosis. Has not taken any calcium supplements or new medications containing calcium. States last chemotherapy was 18 years ago. 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 86 y/o F w/ hx of IgM gammopathy recently admitted (11/7 to 11/14) for HFpEF exacerbation (and found to have CT neck showing lymphadenopathy and parotid mass) with recent FNA bx of LN concerning for B-cell lymphoma and pending BM bx, with other co-morbidities including HTN, DM2, right sided breast CA in remission (s/p mastectomy/chemotx/radiation), a-fib s/p PPM on Eliquis, left hemithyroidectomy, severe MR/TR (seen on recent Echo 11/2016), presents c/o 3-4 weeks of gradual onset generalized weakness/low energy/lightheadedness, progressively worsening, in the setting of persistent new-onset hypercalcemia (not seen on previous admissions) as outpatient. She received Zometa in office on 12/24 without change in Na (last value was 13 yesterday 12/28 in Dr. Hopkins’s office). She endorses worsening of constipation, mild increased thirst with no significant increase in PO intake, chronic nighttime polyuria with no recent increase in urinary frequency, and denies change in mental status, abd pain, nausea/vomiting, recent bone fracture or skin necrosis. Has not taken any calcium supplements or new medications containing calcium. States last chemotherapy was 18 years ago. 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  Initial vitals in ED: 98.4F, HR 90, 120/69, RR 18, 96% on RA. Started on IV fluids. 84 y/o F w/ hx of IgM gammopathy recently admitted (11/7 to 11/14) for HFpEF exacerbation (and found to have CT neck showing lymphadenopathy and parotid mass) with recent FNA bx of LN concerning for B-cell lymphoma and pending BM bx, with other co-morbidities including HTN, DM2, right sided breast CA in remission (s/p mastectomy/chemotx/radiation), a-fib s/p PPM on Eliquis, s/p left hemithyroidectomy, CAD s/p CABG, severe MR/TR (seen on recent Echo 11/2016), presents c/o 3-4 weeks of gradual onset generalized weakness/low energy/lightheadedness, progressively worsening, in the setting of persistent new-onset hypercalcemia (not seen on previous admissions) as outpatient. She received Zometa in office on 12/24 without change in Na (last value was 13 yesterday 12/28 in Dr. Hopkins’s office). She endorses worsening of constipation, mild increased thirst with no significant increase in PO intake, chronic nighttime polyuria with no recent increase in urinary frequency, and denies change in mental status, abd pain, nausea/vomiting, recent bone fracture or skin necrosis. Has not taken any calcium supplements or new medications containing calcium. States last chemotherapy was 18 years ago. 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  Initial vitals in ED: 98.4F, HR 90, 120/69, RR 18, 96% on RA. Started on IV fluids.

## 2016-12-29 NOTE — PATIENT PROFILE ADULT. - ABILITY TO HEAR (WITH HEARING AID OR HEARING APPLIANCE IF NORMALLY USED):
Mildly to Moderately Impaired: difficulty hearing in some environments or speaker may need to increase volume or speak distinctly/Deaf in left ear as per pt

## 2016-12-29 NOTE — CONSULT NOTE ADULT - SUBJECTIVE AND OBJECTIVE BOX
Consult for nephrologist Dr. Hartmann    Patient is a 85F without previous known renal disease last baseline creatinine in 0.5 to 0.7 range, recent evaluation for monoclonal gammopathy with hematology in 10/2016 which has found preliminarilyy a B cell lymphoma and a monoclonal IgM paraprotein not yet on cytotoxic or immunotherapy, HTN,   DM2, Afib s/p PPM on eliquis, CAD s/p CABG 20 years prior, HFpEF (recent admission for CHF exacerbation on 11/7/2016), mitral regurgitation, HLD , breast CA s/p right partial mastectomy, left hemithyroidectomy.       She received Zometa in the office last week on 12/24 and Decadron for elevating calcium.     Patient reports having constipation and mild abdominal cramps. No flank pain, no hematuria, no change in her mood.  However, she admits to thirst and frequent urination at present and an overall malaise and intermittent headaches.   Does not report taking any medications other than what is prescribed such as over the counter Tums or Baking soda or any other OTC medications     Presently, reports no leg edema no dyspnea, no orthopnea, no PND, no chest tightness, no palpitations.     Currently sent to the hospital for hypercalcemia for admission and further therapies.     Discussed with patient, , and daughter that she will need aggressive IV fluids at present but will need monitoring inpatient right now in light of her cardiac history.       PAST MEDICAL & SURGICAL HISTORY:  Follicular lymphoma  Neck mass  Afib  Other hyperlipidemia  Breast cancer  CAD (coronary artery disease)  Diabetes  HTN (hypertension)  No pertinent past medical history  H/O abdominal hysterectomy  H/O thyroidectomy  S/P CABG x 3      MEDICATIONS  (STANDING):  sodium chloride 0.9%. 1000milliLiter(s) IV Continuous <Continuous>    HOme medications:  Amiodarone 200mg POQD, lipitor 80, digoxin 0.125mg PQoQD. eliquis 2.5mg POBID, Lasix 20mg POQD losartan 100mg POQD, metformin 500mg POBID, Toprol 50mg POQD, Protonix 40mg POQD, zoloft 25mg POQD  List is with the patient's      MEDICATIONS  (PRN):      Allergies    IV CONtRAST (Flushing (Skin); Rash)  No Known Drug Allergies    Intolerances        SOCIAL HISTORY:    FAMILY HISTORY:  No pertinent family history in first degree relatives      T(C): , Max: 36.9 (12-29 @ 10:51)  T(F): , Max: 98.4 (12-29 @ 10:51)  HR: 76  BP: 120/69  BP(mean): 123  RR: 18  SpO2: 95%  Wt(kg): --      Weight (kg): 45.5 (12-29 @ 10:51)      LABS:                        11.7   2.6   )-----------( 177      ( 29 Dec 2016 11:35 )             37.1     29 Dec 2016 11:35    140    |  102    |  16     ----------------------------<  113    3.7     |  28     |  1.11     Ca    12.3       29 Dec 2016 11:35  Phos  3.3       29 Dec 2016 11:35  Mg     1.8       29 Dec 2016 11:35    TPro  6.9    /  Alb  3.1    /  TBili  0.5    /  DBili  x      /  AST  68     /  ALT  53     /  AlkPhos  93     29 Dec 2016 11:35                RADIOLOGY & ADDITIONAL STUDIES: Consult for nephrologist Dr. Hartmann    Patient is a 85F without previous known renal disease last baseline creatinine in 0.5 to 0.7 range, recent evaluation for monoclonal gammopathy with hematology in 10/2016 which has found preliminarilyy a B cell lymphoma and a monoclonal IgM paraprotein not yet on cytotoxic or immunotherapy, HTN,   DM2, Afib s/p PPM on eliquis, CAD s/p CABG 20 years prior, HFpEF (recent admission for CHF exacerbation on 11/7/2016), mitral regurgitation, HLD , breast CA s/p right partial mastectomy, left hemithyroidectomy.     In outpatient records on 12/28/2016, her measured weight was 45.9kg       She received Zometa in the office last week on 12/24 and Decadron for elevating calcium.     Patient reports having constipation and mild abdominal cramps. No flank pain, no hematuria, no change in her mood.  However, she admits to thirst and frequent urination at present and an overall malaise and intermittent headaches.   Does not report taking any medications other than what is prescribed such as over the counter Tums or Baking soda or any other OTC medications     Presently, reports no leg edema no dyspnea, no orthopnea, no PND, no chest tightness, no palpitations.     Currently sent to the hospital for hypercalcemia for admission and further therapies.     Discussed with patient, , and daughter that she will need aggressive IV fluids at present but will need monitoring inpatient right now in light of her cardiac history.       PAST MEDICAL & SURGICAL HISTORY:  Follicular lymphoma  Neck mass  Afib  Other hyperlipidemia  Breast cancer  CAD (coronary artery disease)  Diabetes  HTN (hypertension)  No pertinent past medical history  H/O abdominal hysterectomy  H/O thyroidectomy  S/P CABG x 3      MEDICATIONS  (STANDING):  sodium chloride 0.9%. 1000milliLiter(s) IV Continuous <Continuous>    HOme medications:  Amiodarone 200mg POQD, lipitor 80, digoxin 0.125mg PQoQD. eliquis 2.5mg POBID, Lasix 20mg POQD losartan 100mg POQD, metformin 500mg POBID, Toprol 50mg POQD, Protonix 40mg POQD, zoloft 25mg POQD  List is with the patient's      MEDICATIONS  (PRN):      Allergies    IV CONtRAST (Flushing (Skin); Rash)  No Known Drug Allergies    Intolerances        SOCIAL HISTORY:    FAMILY HISTORY:  No pertinent family history in first degree relatives      T(C): , Max: 36.9 (12-29 @ 10:51)  T(F): , Max: 98.4 (12-29 @ 10:51)  HR: 76  BP: 120/69  BP(mean): 123  RR: 18  SpO2: 95%  Wt(kg): --      Weight (kg): 45.5 (12-29 @ 10:51)      LABS:                        11.7   2.6   )-----------( 177      ( 29 Dec 2016 11:35 )             37.1     29 Dec 2016 11:35    140    |  102    |  16     ----------------------------<  113    3.7     |  28     |  1.11     Ca    12.3       29 Dec 2016 11:35  Phos  3.3       29 Dec 2016 11:35  Mg     1.8       29 Dec 2016 11:35    TPro  6.9    /  Alb  3.1    /  TBili  0.5    /  DBili  x      /  AST  68     /  ALT  53     /  AlkPhos  93     29 Dec 2016 11:35                RADIOLOGY & ADDITIONAL STUDIES:

## 2016-12-30 NOTE — PROGRESS NOTE ADULT - PROBLEM SELECTOR PLAN 9
Has been normotensive in the hospital  - c/w cardiac medications as above, no addition antiHTN at this time

## 2016-12-30 NOTE — PROGRESS NOTE ADULT - SUBJECTIVE AND OBJECTIVE BOX
Transfer Note:  Hospital Course:  84 y/o F w/ hx of IgM gammopathy recently admitted ( to ) for HFpEF exacerbation (and found to have CT neck showing lymphadenopathy and parotid mass) with recent FNA bx of LN concerning for B-cell lymphoma and pending BM bx, with other co-morbidities including HTN, DM2, right sided breast CA in remission (s/p mastectomy/chemotx/radiation), a-fib s/p PPM on Eliquis, s/p left hemithyroidectomy, CAD s/p CABG, severe MR/TR (seen on recent Echo 2016), presents c/o 3-4 weeks of gradual onset generalized weakness/low energy/lightheadedness, progressively worsening, in the setting of persistent new-onset hypercalcemia (not seen on previous admissions) as outpatient. On admission corrected Ca was 13 despite receiving bisphosphanate and decadron as outpatient. Hypercalcemia thought to be due to underlying malignancy. Renal consulted and patient started on IVF. ENT consulted for biopsy of cervical lymph nodes which was done this morning. Biopsy was uncomplicated, however on extubation patient was taking shallow breaths and was started on BiPAP. Concern for pulmonary edema confirmed on CXR and ultrasound in the setting of known heart failure and IVF. Patient given lasix while repleting low potassium levels. ICU consulted for dyspnea and monitoring of her respiratory status. Plan continue diuresis and transition off of BiPAP so that patient can take PO potassium. Renal and Heme/Onc to continue to follow.    VITAL SIGNS:  T(F): 98.9  HR: 82  BP: 147/65  RR: 16  SpO2: 100%  Wt(kg): --    PHYSICAL EXAM:    Constitutional: tachypneic on BiPAP  Eyes: PERRL, EOMI  ENMT: on BiPAP  Neck: JVD to mid neck, supple  Respiratory: diffuse rales, tachypnea, increased WOB  Cardiovascular: RRR, +systolic murmur  Gastrointestinal: soft, NTND, normal BS  Extremities: 1+ LE edema  Vascular: pulses palpable in all four extremities  Neurological: A&Ox3, moves all extremities    MEDICATIONS  (STANDING):  atorvastatin 80milliGRAM(s) Oral at bedtime  aspirin  chewable 81milliGRAM(s) Oral daily  amiodarone    Tablet 200milliGRAM(s) Oral daily  pantoprazole    Tablet 40milliGRAM(s) Oral before breakfast  insulin lispro (HumaLOG) corrective regimen sliding scale  SubCutaneous Before meals and at bedtime  metoprolol succinate ER 50milliGRAM(s) Oral daily  losartan 25milliGRAM(s) Oral daily  furosemide   Injectable 20milliGRAM(s) IV Push daily  magnesium oxide 800milliGRAM(s) Oral once  potassium chloride    Tablet ER 40milliEquivalent(s) Oral once  calcitonin Injectable 190International Unit(s) IntraMuscular every 12 hours  potassium chloride  10 mEq/100 mL IVPB 10milliEquivalent(s) IV Intermittent every 1 hour  potassium chloride    Tablet ER 40milliEquivalent(s) Oral every 4 hours  spironolactone 25milliGRAM(s) Oral once    MEDICATIONS  (PRN):      Allergies    IV CONtRAST (Flushing (Skin); Rash)  No Known Drug Allergies    Intolerances        LABS:                        10.6   2.1   )-----------( 167      ( 30 Dec 2016 07:06 )             33.5     30 Dec 2016 11:22    141    |  103    |  13     ----------------------------<  116    2.8     |  26     |  0.98     Ca    10.1       30 Dec 2016 11:22  Phos  2.5       30 Dec 2016 11:22  Mg     1.3       30 Dec 2016 11:22    TPro  x      /  Alb  2.9    /  TBili  x      /  DBili  x      /  AST  x      /  ALT  x      /  AlkPhos  x      30 Dec 2016 11:22    PT/INR - ( 30 Dec 2016 07:10 )   PT: 13.3 sec;   INR: 1.20          PTT - ( 30 Dec 2016 07:10 )  PTT:27.2 sec  Urinalysis Basic - ( 30 Dec 2016 08:12 )    Color: Yellow / Appearance: Clear / S.020 / pH: x  Gluc: x / Ketone: NEGATIVE  / Bili: NEGATIVE / Urobili: 0.2 E.U./dL   Blood: x / Protein: 100 mg/dL / Nitrite: NEGATIVE   Leuk Esterase: NEGATIVE / RBC: < 5 /HPF / WBC < 5 /HPF   Sq Epi: x / Non Sq Epi: Few /HPF / Bacteria: Present /HPF        RADIOLOGY & ADDITIONAL TESTS:

## 2016-12-30 NOTE — PROGRESS NOTE ADULT - PROBLEM SELECTOR PROBLEM 6
Coronary artery disease involving native heart, angina presence unspecified, unspecified vessel or lesion type Chronic atrial fibrillation

## 2016-12-30 NOTE — PROGRESS NOTE ADULT - PROBLEM SELECTOR PLAN 3
Concern for B-cell lymphoma on outpatient FNA.   - s/p excisional biopsy by ENT Dr. Westfall today. She will f/u with Dr. Westfall  - f/u biopsy results

## 2016-12-30 NOTE — PROGRESS NOTE ADULT - PROBLEM SELECTOR PLAN 2
replete aggressively IV and PO -- make sure improved prior to redosing lasix  replete magnesium as well

## 2016-12-30 NOTE — PROGRESS NOTE ADULT - SUBJECTIVE AND OBJECTIVE BOX
ACCEPTANCE OF TRANSFER TO ICU STEPDOWN UNIT  Ms. Carrillo is a 85F with an extensive medical history* who presented to [facility] for evaluation of 3-4 weeks of gradual onset generalized weakness/low energy/lightheadedness in the setting of persistent newly diagnosed hypercalcemia. On admission, the patient's corrected Ca was 13; hypercalcemia thought to be due to underlying malignancy (recent FNA concerning for B cell lymphoma, CT showing cervical lymphadenopathy and parotid mass). Renal was consulted; patient started on IVF. ENT consulted for biopsy of cervical lymph nodes, which patient went to OR for this morning. Extubation complicated by patient inadequately maintaining oxygenation; required BiPAP for respiratory assistance. CXR demonstrated pulmonary edema; diuresis initiated with furosemide and single dose of aldactone, as patient's potassium level was 2.7 this morning. Upon arrival on the floor, she was successfully transitioned to 2L O2 via NC. She is accepted for observation on the ICU stepdown unit.     *: patient's medical history includes the following: IgM gammopathy, HFpEF with recent admission for CHF exacerbation, recent CT neck showing LAD and parotid mass concerning for B-cell lymphoma, as well as other co-morbidities including HTN, DM2, right breast CA in remission (s/p mastectomy/chemotx/radiation), atrial fibrillation (s/p PPM, on Eliquis), s/p left hemithyroidectomy, CAD (s/p CABG), and severe MR/TR (seen on recent Echo 2016)    INTERVAL HPI/OVERNIGHT EVENTS:  TEJINDER since transition to the floor. At present, the patient denies any shortness of breath, cough, chest pain, N/V/D, and/or urinary symptoms.    VITAL SIGNS:  T(F): 98.9  HR: 82  BP: 147/65  RR: 16  SpO2: 100%  Wt(kg): --    PHYSICAL EXAM:  GENERAL: Well-developed and well-nourished, resting comfortably in no apparent distress.  HEENT: Head is normocephalic and atraumatic, with extraocular muscle movements intact bilaterally. Pupils are equal, round, and reactive to light and accommodation. Oral mucosa moist and without lesions. Neck is supple, without appreciable lymphadenopathy or thyromegaly.  PULMONARY/THORAX: Inspiratory crackles at the L lung base.  CARDIOVASCULAR: Regular rate and rhythm without murmurs, rubs, or gallops  ABDOMEN: Soft, nontender, and nondistended.  Normoactive bowel sounds.  No hepatosplenomegaly was noted.  EXTREMITIES: Without cyanosis, clubbing, overt lesions, or edema.  NEUROLOGIC: A&Ox3; CNs II-XII are grossly intact.  DERMATOLOGIC: No cutaneous lesions noted on exposed skin.    MEDICATIONS  (STANDING):  atorvastatin 80milliGRAM(s) Oral at bedtime  aspirin  chewable 81milliGRAM(s) Oral daily  amiodarone    Tablet 200milliGRAM(s) Oral daily  pantoprazole    Tablet 40milliGRAM(s) Oral before breakfast  insulin lispro (HumaLOG) corrective regimen sliding scale  SubCutaneous Before meals and at bedtime  metoprolol succinate ER 50milliGRAM(s) Oral daily  losartan 25milliGRAM(s) Oral daily  potassium chloride    Tablet ER 40milliEquivalent(s) Oral once  calcitonin Injectable 190International Unit(s) IntraMuscular every 12 hours  potassium chloride    Tablet ER 40milliEquivalent(s) Oral every 4 hours    MEDICATIONS  (PRN):      Allergies    IV CONtRAST (Flushing (Skin); Rash)  No Known Drug Allergies    Intolerances        LABS:                        10.6   2.1   )-----------( 167      ( 30 Dec 2016 07:06 )             33.5     30 Dec 2016 11:22    141    |  103    |  13     ----------------------------<  116    2.8     |  26     |  0.98     Ca    10.1       30 Dec 2016 11:22  Phos  2.5       30 Dec 2016 11:22  Mg     1.3       30 Dec 2016 11:22    TPro  x      /  Alb  2.9    /  TBili  x      /  DBili  x      /  AST  x      /  ALT  x      /  AlkPhos  x      30 Dec 2016 11:22    PT/INR - ( 30 Dec 2016 07:10 )   PT: 13.3 sec;   INR: 1.20          PTT - ( 30 Dec 2016 07:10 )  PTT:27.2 sec  Urinalysis Basic - ( 30 Dec 2016 08:12 )    Color: Yellow / Appearance: Clear / S.020 / pH: x  Gluc: x / Ketone: NEGATIVE  / Bili: NEGATIVE / Urobili: 0.2 E.U./dL   Blood: x / Protein: 100 mg/dL / Nitrite: NEGATIVE   Leuk Esterase: NEGATIVE / RBC: < 5 /HPF / WBC < 5 /HPF   Sq Epi: x / Non Sq Epi: Few /HPF / Bacteria: Present /HPF

## 2016-12-30 NOTE — CONSULT NOTE ADULT - ATTENDING COMMENTS
monitor  f/u labs
hypercalcemia and ROMEO -  likely related to lymphoma -- no severe sx (CNS,cardiac)   ROMEO may be due to volume depletion  agree with IVF -- cautiously with cardiac hx (change to 100 cc/hr after initial liter)-- may need diuretics as well  s/p Zometa, can add calcitonin   follow uo, chem q 8  renal sono

## 2016-12-30 NOTE — PROGRESS NOTE ADULT - PROBLEM SELECTOR PLAN 2
Likely secondary to malignancy. Currently asymptomatic with normal mentation and no complaint of pain anywhere.  - recheck BMP at 8PM to trend K and Ca  - Hypokalemia likely secondary to diuresis: giving 40mEq KCl PO q3hr for three doses, with 10mEq KCl IV for two runs. BMP as written above.  - follow up nephrology recommendations re: calcitonin

## 2016-12-30 NOTE — PROGRESS NOTE ADULT - PROBLEM SELECTOR PLAN 1
Patient became SOB and tachypneic after extubation; in setting of aggressive IVF for hypercalcemia, plus history of CHF and diagnostic exam/physical exam findings, likely due to pulmonary fluid overload plus decreased respiratory drive from operative sedation.  - continue diuresis with spironolactone 25mg; if repeat BMP at 8PM shows appropriate repletion of K+, give furosemide at this time.  - maintain strict I+Os  - monitor respiratory status; HFNC at bedside if needed

## 2016-12-30 NOTE — PROGRESS NOTE ADULT - PROBLEM SELECTOR PROBLEM 7
Coronary artery disease involving native heart, angina presence unspecified, unspecified vessel or lesion type

## 2016-12-30 NOTE — PROGRESS NOTE ADULT - PROBLEM SELECTOR PROBLEM 8
Essential hypertension Type 2 diabetes mellitus with complication, unspecified long term insulin use status

## 2016-12-30 NOTE — PROGRESS NOTE ADULT - SUBJECTIVE AND OBJECTIVE BOX
Heme/Onc Progress Note (Dr. Hopkins)  Discussed with Dr. Hopkins and plan reviewed with the primary team.    The patient was seen and examined.      The patient is a 85y Female with history of breast ca possibly treated by Dr. 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  Pt is s/p right neck LN bx and post-op developed respiratory distress and is being evaluated by the ICU team at this time.  She is currently on Bi-Pap and will be continuously monitored for need with intubation.      Pt is on Bi-Pap and unable to provide her own info.    Allergies    IV CONtRAST (Flushing (Skin); Rash)  No Known Drug Allergies    Intolerances    Medications:  MEDICATIONS  (STANDING):  atorvastatin 80milliGRAM(s) Oral at bedtime  aspirin  chewable 81milliGRAM(s) Oral daily  amiodarone    Tablet 200milliGRAM(s) Oral daily  pantoprazole    Tablet 40milliGRAM(s) Oral before breakfast  insulin lispro (HumaLOG) corrective regimen sliding scale  SubCutaneous Before meals and at bedtime  metoprolol succinate ER 50milliGRAM(s) Oral daily  losartan 25milliGRAM(s) Oral daily  furosemide   Injectable 20milliGRAM(s) IV Push daily  magnesium oxide 800milliGRAM(s) Oral once  potassium chloride    Tablet ER 40milliEquivalent(s) Oral once  calcitonin Injectable 190International Unit(s) IntraMuscular every 12 hours  potassium chloride  10 mEq/100 mL IVPB 10milliEquivalent(s) IV Intermittent every 1 hour  potassium chloride    Tablet ER 40milliEquivalent(s) Oral every 4 hours  spironolactone 25milliGRAM(s) Oral once    MEDICATIONS  (PRN):    aspirin  chewable 81milliGRAM(s) Oral daily    PHYSICAL EXAM:    T(F): 97.6, Max: 99.4 (12-29 @ 19:29)  HR: 72 (72 - 82)  BP: 134/56 (119/57 - 176/68)  RR: 22 (16 - 28)  SpO2: 99% (93% - 100%)  Wt(kg): --    Daily Height in cm: 152.4 (30 Dec 2016 07:02)    Daily Weight in k.9 (30 Dec 2016 11:16)    Gen: well developed, well nourished, comfortable  HEENT: normocephalic/atraumatic, no conjunctival pallor, no scleral icterus, no oral thrush/mucosal bleeding/mucositis  Neck: supple, large b/l lymphadenpathy vs parotid enlargement with shoddy cervical LAD, no JVD  Cardiovascular: RR, nl S1S2, no murmurs/rubs/gallops  Respiratory: clear air entry b/l  Gastrointestinal: BS+, soft, NT/ND, no masses, no splenomegaly, no hepatomegaly, no evidence for ascites  Extremities: no clubbing/cyanosis, trace edema to shins b/l, no calf tenderness  Vascular:  DP/PT 2+ b/l  Neurological: CN 2-12 grossly intact, no focal deficits  Skin: no rash on visible skin  Lymph Nodes:  no axillary/groin LAD  Musculoskeletal:  full ROM  Psychiatric:  mood stable    Labs:                          10.6   2.1   )-----------( 167      ( 30 Dec 2016 07:06 )             33.5     CBC Full  -  ( 30 Dec 2016 07:06 )  WBC Count : 2.1 K/uL  Hemoglobin : 10.6 g/dL  Hematocrit : 33.5 %  Platelet Count - Automated : 167 K/uL  Mean Cell Volume : 88.2 fL  Mean Cell Hemoglobin : 27.9 pg  Mean Cell Hemoglobin Concentration : 31.6 g/dL  Auto Neutrophil # : x  Auto Lymphocyte # : x  Auto Monocyte # : x  Auto Eosinophil # : x  Auto Basophil # : x  Auto Neutrophil % : 55.0 %  Auto Lymphocyte % : 14.0 %  Auto Monocyte % : 18.0 %  Auto Eosinophil % : 1.0 %  Auto Basophil % : 1.0 %    PT/INR - ( 30 Dec 2016 07:10 )   PT: 13.3 sec;   INR: 1.20          PTT - ( 30 Dec 2016 07:10 )  PTT:27.2 sec    30 Dec 2016 11:22    141    |  103    |  13     ----------------------------<  116    2.8     |  26     |  0.98     Ca    10.1       30 Dec 2016 11:22  Phos  2.1       30 Dec 2016 07:09  Mg     1.5       30 Dec 2016 07:09    TPro  x      /  Alb  2.9    /  TBili  x      /  DBili  x      /  AST  x      /  ALT  x      /  AlkPhos  x      30 Dec 2016 11:22      Urinalysis Basic - ( 30 Dec 2016 08:12 )    Color: Yellow / Appearance: Clear / S.020 / pH: x  Gluc: x / Ketone: NEGATIVE  / Bili: NEGATIVE / Urobili: 0.2 E.U./dL   Blood: x / Protein: 100 mg/dL / Nitrite: NEGATIVE   Leuk Esterase: NEGATIVE / RBC: < 5 /HPF / WBC < 5 /HPF   Sq Epi: x / Non Sq Epi: Few /HPF / Bacteria: Present /HPF        Other Labs:    Cultures:    Pathology:    Imaging Studies:

## 2016-12-30 NOTE — CONSULT NOTE ADULT - SUBJECTIVE AND OBJECTIVE BOX
TANYA FRAIRE      Patient is a 85y old  Female who presents with a chief complaint of hypercalcemia (29 Dec 2016 14:41)      HPI:  84 y/o F w/ hx of IgM gammopathy recently admitted ( to ) for HFpEF exacerbation (and found to have CT neck showing lymphadenopathy and parotid mass) with recent FNA bx of LN concerning for B-cell lymphoma and pending BM bx, with other co-morbidities including HTN, DM2, right sided breast CA in remission (s/p mastectomy/chemotx/radiation), a-fib s/p PPM on Eliquis, s/p left hemithyroidectomy, CAD s/p CABG, severe MR/TR (seen on recent Echo 2016), presents c/o 3-4 weeks of gradual onset generalized weakness/low energy/lightheadedness, progressively worsening, in the setting of persistent new-onset hypercalcemia (not seen on previous admissions) as outpatient. She received Zometa in office on  without change in Na (last value was 13 yesterday  in Dr. Hopkins’s office). She endorses worsening of constipation, mild increased thirst with no significant increase in PO intake, chronic nighttime polyuria with no recent increase in urinary frequency, and denies change in mental status, abd pain, nausea/vomiting, recent bone fracture or skin necrosis. Has not taken any calcium supplements or new medications containing calcium. States last chemotherapy was 18 years ago. 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  Asked by cardiolgy to evaluate and follow.  Patient comfortable      HEALTH ISSUES - PROBLEM Dx:  Dyspnea, unspecified type: Dyspnea, unspecified type  Essential hypertension: Essential hypertension  Type 2 diabetes mellitus with complication, unspecified long term insulin use status: Type 2 diabetes mellitus with complication, unspecified long term insulin use status  Coronary artery disease involving native heart, angina presence unspecified, unspecified vessel or lesion type: Coronary artery disease involving native heart, angina presence unspecified, unspecified vessel or lesion type  Chronic atrial fibrillation: Chronic atrial fibrillation  Lymphadenopathy: Lymphadenopathy  Acute pulmonary edema: Acute pulmonary edema  Hypokalemia: Hypokalemia  Respiratory distress: Respiratory distress  CAD (coronary artery disease): CAD (coronary artery disease)  Need for prophylactic measure: Need for prophylactic measure  Diabetes: Diabetes  Afib: Afib  IgM monoclonal gammopathy of uncertain significance: IgM monoclonal gammopathy of uncertain significance  Gammopathy: Gammopathy  B-cell lymphoma, unspecified B-cell lymphoma type, unspecified body region: B-cell lymphoma, unspecified B-cell lymphoma type, unspecified body region  Heart failure with preserved ejection fraction: Heart failure with preserved ejection fraction  Hypertension: Hypertension  Acute kidney injury: Acute kidney injury  Hypercalcemia: Hypercalcemia            MEDICATIONS  (STANDING):  atorvastatin 80milliGRAM(s) Oral at bedtime  aspirin  chewable 81milliGRAM(s) Oral daily  amiodarone    Tablet 200milliGRAM(s) Oral daily  pantoprazole    Tablet 40milliGRAM(s) Oral before breakfast  insulin lispro (HumaLOG) corrective regimen sliding scale  SubCutaneous Before meals and at bedtime  metoprolol succinate ER 50milliGRAM(s) Oral daily  losartan 25milliGRAM(s) Oral daily  calcitonin Injectable 190International Unit(s) IntraMuscular every 12 hours  potassium chloride    Tablet ER 20milliEquivalent(s) Oral once    MEDICATIONS  (PRN):          PAST MEDICAL & SURGICAL HISTORY:  Follicular lymphoma  Neck mass  Afib  Other hyperlipidemia  Breast cancer  CAD (coronary artery disease)  Diabetes  HTN (hypertension)  No pertinent past medical history  H/O abdominal hysterectomy  H/O thyroidectomy  S/P CABG x 3      REVIEW OF SYSTEMS:  [x] As per HPI  CONSTITUTIONAL: fatigue  RESPIRATORY: No cough, wheezing, chills or hemoptysis; No Shortness of Breath  CARDIOVASCULAR: CAD, CHF  GASTROINTESTINAL: No abdominal or epigastric pain. No nausea, vomiting, or hematemesis; No diarrhea or constipation. No melena or hematochezia.  MUSCULOSKELETAL: No joint pain or swelling; No muscle, back, or extremity pain weakness  Psych        Awake  alert  [x] All others negative	  [ ] Unable to obtain      Vital Signs Last 24 Hrs  T(C): 38.3, Max: 38.3 (12-30 @ 22:00)  T(F): 100.9, Max: 100.9 (12-30 @ 22:00)  HR: 77 (72 - 82)  BP: 141/65 (119/57 - 176/68)  BP(mean): 94 (94 - 94)  RR: 20 (15 - 29)  SpO2: 97% (93% - 100%)    PHYSICAL EXAM:      Constitutional:weak    Eyes:    ENMT:    Neck:as per ENT    Breasts:s/p r breast surgery    Back:    Respiratory: clear    Cardiovascular:s1s2 2/6 mu    Gastrointestinal:soft BS present    Genitourinary:    Rectal:    Extremities:WNL    Vascular:    Neurological:awake    Skin:    Lymph Nodes:s/p biopsy    Musculoskeletal:weakness    Psychiatric:                              10.6   2.1   )-----------( 167      ( 30 Dec 2016 07:06 )             33.5     30 Dec 2016 20:25    138    |  100    |  14     ----------------------------<  116    3.9     |  30     |  1.06     Ca    10.4       30 Dec 2016 20:25  Phos  2.5       30 Dec 2016 11:22  Mg     1.3       30 Dec 2016 11:22    TPro  x      /  Alb  2.9    /  TBili  x      /  DBili  x      /  AST  x      /  ALT  x      /  AlkPhos  x      30 Dec 2016 11:22    CARDIAC MARKERS ( 30 Dec 2016 11:22 )  0.052 ng/mL / x     / 66 U/L / x     / 2.8 ng/mL      PT/INR - ( 30 Dec 2016 07:10 )   PT: 13.3 sec;   INR: 1.20          PTT - ( 30 Dec 2016 07:10 )  PTT:27.2 sec  CAPILLARY BLOOD GLUCOSE  77 (30 Dec 2016 16:35)  106 (30 Dec 2016 10:58)  88 (30 Dec 2016 07:10)  88 (30 Dec 2016 07:02)    Urinalysis Basic - ( 30 Dec 2016 08:12 )    Color: Yellow / Appearance: Clear / S.020 / pH: x  Gluc: x / Ketone: NEGATIVE  / Bili: NEGATIVE / Urobili: 0.2 E.U./dL   Blood: x / Protein: 100 mg/dL / Nitrite: NEGATIVE   Leuk Esterase: NEGATIVE / RBC: < 5 /HPF / WBC < 5 /HPF   Sq Epi: x / Non Sq Epi: Few /HPF / Bacteria: Present /HPF    ast Wasted in ml: 0       INTERPRETATION:  A thin section axial acquisition was performed from the   skull base to the thoracic inlet following intravenous contrast   administration.  The data was reformatted in the sagittal, coronal and   axial planes.    Clinical information: Right neck mass, history of breast cancer, A. fib,   hypertension, coronary artery disease.    The current study is interpreted in conjunction with noncontrast exam of   2016.    A marker was placed over the site of clinical concern overlies the right   parotid tail. There is a dominant enhancing focus measuring approximately   2 cm in maximal dimension within the right parotid tail, present in   association with multiple smaller nodules throughout the parotid glands   bilaterally. There are also multiple mildly enlarged, prominently   enhancing lymph nodes inferior to the right parotid tail and along the   lateral margin of the right submandibular gland. As on the noncontrast   CT, a very small focus of soft tissue is identified in the expected   location of the left submandibular gland. This demonstrates a similar   pattern of multinodular enhancement. Findings are again felt to likely   reflect diffuse sialoadenitis, though fine-needle aspiration biopsy of   the right parotid tail lesion is recommended to exclude complicating   malignancy.    There are again noted multiple mildly to moderately enlarged lymph nodes   throughout the cervical ryder chains. The largest are in the right   axilla, supraclavicular fossa and level 4 measuring up to 2 cm in maximal   dimension. These nodes demonstrate homogeneous contrast enhancement that   is less intense than the periparotid and level 1 nodes described above.   None demonstrate central necrosis or evidence for pericapsular extension.    Again noted are findings compatible with thyroid goiter with the enlarged   left lobe measuring 5 cm in maximal dimension and demonstrating   heterogeneous contrast enhancement without invasive features. Please   refer to report of chest CT dated 2016 for description of   intrathoracic findings.    Evaluation of the orbits demonstrates prominence and tortuosity of the   superior ophthalmic veins bilaterally. There is homogeneous enhancement   of the soft tissue nodule in the inferior anterior right orbit. This   lesion is well-defined and may reflect an orbital venous malformation.    Limited evaluation of the intracranial contents demonstrates no large   mass. Right posterior temporal/parietal craniotomy is again evident.    IMPRESSION:    Findings are again felt to be compatible with diffuse multinodular   sialoadenitis, possibly representing Sjogren's disease, with additional   considerations including granulomatous or lymphoproliferative processes.   There is a dominant mass in the right parotid tail which appears to   correspond to the clinically palpable lesion. Correlation with tissue   sampling is recommended for further characterization of this lesion.   Enlargement of homogeneous appearing cervical lymph nodes is again   evident, most prominently on the right side in levels 4, 5 and within the   axilla.     CHEST 1 VIEW PORT IMMEDIATE                           PROCEDURE DATE:  2016                 INTERPRETATION:    CLINICAL INDICATION: Shortness of breath    EXAM: Portable AP radiograph of the chest.    COMPARISON: 2016    FINDINGS:      There is persistent right basilar subsegmental atelectasis with elevation   of the right hemidiaphragm. There is mild persistent pulmonary edema. No   large pleural effusion or evidence of pneumothorax. Left chest wall dual   lead cardiac pacemaker, sternotomy wires and CABG clips are again seen.   The bones are osteopenic without acute finding. There is pronounced   dextrocurvature of the thoracolumbar spine.    IMPRESSION:     No interval change compared to 2016   CHEST 1 VIEW PORT IMMEDIATE                           PROCEDURE DATE:  2016                 INTERPRETATION:    CLINICAL INDICATION: Shortness of breath    EXAM: Portable AP radiograph of the chest.    COMPARISON: 2016    FINDINGS:      There is persistent right basilar subsegmental atelectasis with elevation   of the right hemidiaphragm. There is mild persistent pulmonary edema. No   large pleural effusion or evidence of pneumothorax. Left chest wall dual   lead cardiac pacemaker, sternotomy wires and CABG clips are again seen.   The bones are osteopenic without acute finding. There is pronounced   dextrocurvature of the thoracolumbar spine.    IMPRESSION:     No interval change compared to 2016    78 BPM    Atrial Rate 78 BPM    P-R Interval 270 ms    QRS Duration 96 ms    Q-T Interval 354 ms    QTC Calculation(Bezet) 403 ms    P Axis 90 degrees    R Axis -26 degrees    T Axis -10 degrees    Diagnosis Line Sinus rhythm with 1st degree AV block  Possible Left atrial enlargement  Left ventricular hypertrophy  T wave abnormality, consider anterolateral ischemia  Abnormal ECG    Weight: 50.0 kg  Systolic Pressure: 152 mmHg  Diastolic Pressure: 68 mmHg  BSA: 1.4 m^2  Interpretation Summary  There is mild concentric left ventricular hypertrophy. Abnormal   (paradoxical)   septal motion consistent with elevated RV end-diastolic pressure  The   left   ventricular ejection fraction is normal. The left ventricular ejection   fraction is 55%. The left atrium is mildly dilated. The right atrium is   dilated.The right ventricle is dilated. There is a pacemaker wire in the   right   heart.There is moderate aortic valve thickening. There is mild aortic   regurgitation.  There is mild mitral annular calcification. There is   moderate   to severe mitral regurgitation.  There is severe tricuspid   regurgitation. No   pulmonic regurgitation noted.There is severe pulmonary hypertension. The   pulmonary artery systolic pressure is estimated bandar over 75 mmHg.  The   IVC   is dilated (>2.1 cm) with an abnormal inspiratory collapse (<50%)   consistent   with elevated right atrial pressure.  No aortic root dilatation.Right   pleural   effusion noted. There is no pericardial effusion.  Procedure Details  A complete two-dimensional transthoracic echocardiogram was performed (2D,  M-mode, spectral and color flow doppler).  Study Quality: Good.  Left Ventricle  There is mild concentric left ventricular hypertrophy.  Abnormal (paradoxical) septal motion consistent with elevated RV  end-diastolic pressure  The left ventricular ejection fraction is normal.  The left ventricular ejection fraction is 55%.  Left Atrium  The left atrium is mildly dilated.  The left atrial volume index is 38 cc/m2 (normal <34cc/m2)  Right Atrium  The right atrium is dilated.  Right Ventricle  The right ventricle is dilated.  There is a pacemaker wire in the right heart.  Aortic Valve  There is moderate aortic valve thickening.  There is mild aortic regurgitation.  Mitral Valve  There is mild mitral annular calcification.  There is moderate to severe mitral regurgitation.  Tricuspid Valve  There is severe tricuspid regurgitation.  There is severe pulmonary hypertension.  The pulmonary artery systolic pressure is estimated to be over 75 mmHg.  Pulmonic Valve  No pulmonic regurgitation noted.  Arteries and Venous System  No aortic root dilatation.  The IVC is dilated (>2.1 cm) with an abnormal inspiratory collapse (<50%)  consistent with elevated right atrial pressure.  Pericardium / Pleura  There is no pericardial effusion.  Right pleural effusion noted.  Doppler Measurements & Calculations  MV E point: 126.0 cm/sec  MV A point: 40.8 cm/sec  MV E/A: 3.1  MV area (1 diam): 7.5 cm^2  MV Flow area(1diam): 7.5 cm^2  MV dec time: 0.1 sec  Ao V2 max: 152 cm/sec  Ao max P.2 mmHg  Ao max PG (full): 5.2 mmHg  Ao V2 mean: 117.1 cm/sec  Ao mean P.9 mmHg  Ao mean PG (full): 3.6 mmHg  Ao V2 VTI: 30.6 cm  AIMEE(I,A): 2.3 cm^2  AIMEE(I,D): 2.3 cm^2  AIMEE(V,A): 2.1 cm^2  AIMEE(V,D): 2.1 cm^2  LV max P.1 mmHg  LV mean P.3 mmHg  LV V1 max: 101 cm/sec  LV V1 mean: 71.7 cm/sec  LV V1 VTI: 21.6 cm  MR max justen: 523 cm/sec  MR max P.4 mmHg  MR mean justen: 439.3 cm/sec  MR mean P.7 mmHg  MR VTI: 179.9 cm  SV(Ao): 203.4 ml  SI(Ao): 140.6 ml/m^2  SV(LVOT): 69.0 ml  SI(LVOT): 47.7 ml/m^2  TR Max justen: 393.1 cm/sec  MMode 2D Measurements & Calculations  IVSd: 1.3 cm  LVIDd: 4.6 cm  LVIDs: 2.9 cm  LVPWd: 1.3 cm  IVS/LVPW: 1.0  FS: 36.5 %  EDV(Teich): 98.8 ml  ESV(Teich): 33.3 ml  LV mass(C)d: 240.8 grams  LV mass(C)dI: 166.5 grams/m^2  SI(cubed): 51.1 ml/m^2  MV Diam: 3.1 cm  Ao root diam: 2.9 cm  Ao root area: 6.7 cm^2  LA dimension: 4.6 cm  LA/Ao: 1.6  LVOT diam: 2.0 cm  LVOT area: 3.2 cm^2  LVOT area (M): 3.2 cm^2  EDV(MOD-sp2): 66 ml  Interpreting Physician:Denisse Muniz MD electronically signed on 2016   12:58:32                "Thank you for the opportunity to participate in the care of this   patient."            ASSESSMENT/PLAN/RECOMMENDATIONS

## 2016-12-30 NOTE — DIETITIAN INITIAL EVALUATION ADULT. - PROBLEM SELECTOR PLAN 1
Corrected serum Ca of 13 despite receiving bisphosphonate and Decadron as outpatient. Not seen on previous admission. Possibly related to new, unconfirmed dx of possible B Cell Lymphoma.  - Appreciate renal consult. Pt now s/p 1L NS (at 200cc/hr), receiving 2nd liter now at 100cc/hr. Will trend BMP q8h  - F/up iPTH, 25-Vit D, 1-25 Vit D, phosphorus  - If iPTH is low, will check PTHrP  - Check AM EKG daily to monitor for shortened QT in setting of hypercalcemia

## 2016-12-30 NOTE — PROGRESS NOTE ADULT - PROBLEM SELECTOR PLAN 3
Concern for B-cell lymphoma on outpatient FNA.   - s/p excisional biopsy by ENT Dr. Westfall today. She will f/u with Dr. Westfall  - f/u biopsy results  - f

## 2016-12-30 NOTE — PROGRESS NOTE ADULT - ASSESSMENT
85F PMH of IgM gammopathy (FNA concerning for B-cell Lymphoma), HTN, breast Ca in remission, a fib w/ PPM on Eliquis, CAD s/p CABG (admitted last month for CHF exacerbation) with severe MR/TR, s/p thyroidectomy presenting with persistent hypercalcemia in the outpatient setting, likely secondary to malignancy.

## 2016-12-30 NOTE — CONSULT NOTE ADULT - PROBLEM SELECTOR RECOMMENDATION 9
Pt in respiratory distress s/p extubation from Rt neck LN excisional biopsy. S/P extubation, patient found to be tachypneic to the 30's with ABG consistent with a respiratory acidosis, chest xray consistent with bilateral pulmonary vascular congestion. Pt s/p 20mg Lasix IV with corresponding 600-700 cc UO. With significant symptomatic improvement after having been placed on Bipap/Lasix administration.  -Given improvement in respiratory status, will recommend HFNC for increased work in breathing for close monitoring of respiratory status  -Will administer Aldactone 25mg once (given hypokalemia)  -Pt found to have a K of 2.8 likely 2/2 diuresis, will administer Potassium 40 meq PO x3 (Q4h) as well as KCL 10/100 x3 following which will check repeat BMP to assess for Potassium repletion  -Monitor respiratory status. Pt can be administered additional Lasix IV on a PRN basis (following Potassium repletion)  -Given history of Diastolic CHF, continue Toprol XL/Aspirin/Losartan at current dosage  -Transfer to [facility] for further management

## 2016-12-30 NOTE — PROGRESS NOTE ADULT - PROBLEM SELECTOR PLAN 4
Recent admission last month for CHF exacerbation. HFpEF (55%), severe MR and TR  - continue with diuresis as above  - c/w home Metoprolol ER 50mg qd, patient with PPM  - c/w Losartan 25mg qd

## 2016-12-30 NOTE — PROGRESS NOTE ADULT - PROBLEM SELECTOR PROBLEM 7
Type 2 diabetes mellitus with complication, unspecified long term insulin use status Coronary artery disease involving native heart, angina presence unspecified, unspecified vessel or lesion type

## 2016-12-30 NOTE — DIETITIAN INITIAL EVALUATION ADULT. - PROBLEM SELECTOR PLAN 8
Currently normotensive. Hold home Toprol in setting of ROMEO. Losartan not on pt's personal list of home meds.  - Consider restarting Toprol if ROMEO resolves    Attending Addendum: c/w toprol per cardiology recs; pt rxd losartan in 10/2016; will start in AM instead of captopril TID recommended by cardiology (d/w cardiology fellow, was unaware that pt was rxd losartan)

## 2016-12-30 NOTE — PROGRESS NOTE ADULT - SUBJECTIVE AND OBJECTIVE BOX
ENT Post Op Check    Interval: tolerating liquids. has been OOB. mild soreness over incision site. no difficulty breathing. Tolerated liquids currently.     PE:  NAD. comfortable. no increased WOB. no stridor  neck soft and flat. no ecchymosis. no e/o hematoma. incision c/d/i    A/P: 85F s/p right lymph node excisional biopsy  - pain control prn  - can use cepacol q3h prn for sore throats  - anti emetics prn  - may resume regular diet   - care per Medicine  - care per oncology  - page with questions. [phone]

## 2016-12-30 NOTE — CONSULT NOTE ADULT - PROBLEM SELECTOR RECOMMENDATION 6
Pt currently being worked up for possible B-cell lymphoma, still awaiting cytopathology. Currently s/p excisional LN biopsy.  -Will follow up Heme/Onc recs

## 2016-12-30 NOTE — PROGRESS NOTE ADULT - PROBLEM SELECTOR PLAN 4
Recent admission last month for CHF exacerbation. HFpEF (55%), severe MR&TR  - continue with diuresis as above  - c/w home Metoprolol ER 50mg qd, patient with PPM  - c/w Losartan 25mg qd

## 2016-12-30 NOTE — PROGRESS NOTE ADULT - PROBLEM SELECTOR PLAN 2
Hypercalcemia is likely secondary to malignancy. Has been persistently elevated in the outpatient setting. Patient is asymptomatic with unchanged EKG.  - D/c IV fluids as patient is in pulmonary edema with CHF. Restart as tolerated and as needed for continued hypercalcemia.  - recheck BMP later in day to trend K and Ca  - Hypokalemia likely secondary to diuresis, replete K PO and IV as needed.  - c/w Calcitonin IM per renal recs, may no longer need it  - f/u renal recs

## 2016-12-30 NOTE — PROGRESS NOTE ADULT - PROBLEM SELECTOR PLAN 1
-corrected Ca 10.98, currently on IVF and lasix (for volume control), K now 2.9 (pls replete)  -possibly related to lymphoma, but with tumor markers CA 27.29 64 and CEA 4 and hx of breast ca cannot exclude breast ca related hypercalcemia - pt was previously seen by Dr. Galaviz who will be covering on weekend and possibly assume care

## 2016-12-30 NOTE — DIETITIAN INITIAL EVALUATION ADULT. - OTHER INFO
pt with hx of DM, HTN, CHF, IgM gammopathy now with concern for lymphoma is now admitted for weakness in the setting of hypercalcemia. per RN pt currently NPO for the OR today. Per RN pt had been eating prior to being NPO however is unsure exactly how much (noted prior order for DASH,CNSCHO). RN denies issues chewing/swallowing or GI distress @ this time. NKFA per EMR. Skin: pt admitted with trace pedal edema. High Nutrition Risk.

## 2016-12-30 NOTE — PROGRESS NOTE ADULT - PROBLEM SELECTOR PLAN 10
SCDs, on AC for a fib  F: will adjust fluids based on calcium levels and respiratory status  E: replete lytes as needed, repeat BMP in evening to monitor K  N: DASH diet

## 2016-12-30 NOTE — CONSULT NOTE ADULT - PROBLEM SELECTOR RECOMMENDATION 2
Pt found to have hypercalcemia on an outpatient basis likely 2/2 underlying malignant process, s/p Zometa administration on an outpatient basis. Initial work up for hypercalcemia sent. Pt currently s/p 2.8L Fluid resuscitation with improvement in Corrected Ca from 13.2 to 11.2.  -Will hold off on additional fluid for now given patient's respiratory status  -Will continue to follow renal recs  -Transfer to [facility] for additional management

## 2016-12-30 NOTE — CONSULT NOTE ADULT - PROBLEM SELECTOR PROBLEM 2
B-cell lymphoma, unspecified B-cell lymphoma type, unspecified body region
Hypercalcemia
Acute kidney injury

## 2016-12-30 NOTE — PROGRESS NOTE ADULT - PROBLEM SELECTOR PLAN 10
SCDs, on apixaban for atrial fibrillatio  F: will adjust fluids based on calcium levels and respiratory status - caution with overdiuresis given history of CHF  E: replete lytes as needed, repeat BMP in evening to monitor K  N: DASH diet    Dispo: pending clinical improvement  FULL CODE

## 2016-12-30 NOTE — PROGRESS NOTE ADULT - PROBLEM SELECTOR PLAN 6
Known atrial fibrillation on apixaban 2.5mg BID at home  - AC held for procedure this morning, restart ASAP as pt has CHADSVASC of 7  - c/w amiodarone 200mg qd

## 2016-12-30 NOTE — CONSULT NOTE ADULT - SUBJECTIVE AND OBJECTIVE BOX
ICU CONSULT    85 year old Female with a PMH of IgM gammopathy with recent admission for dCHF exacerbation (and found to have CT neck showing lymphadenopathy and parotid mass) with recent FNA bx of LN concerning for B-cell lymphoma and pending BM bx, with other co-morbidities including HTN, DM2, right sided breast CA in remission (s/p mastectomy/chemotx/radiation), a-fib s/p PPM on Eliquis, s/p left hemithyroidectomy, CAD s/p CABG, severe MR/TR (seen on recent Echo 2016), presents c/o 3-4 weeks of gradual onset generalized weakness/low energy/lightheadedness, progressively worsening, in the setting of persistent new-onset hypercalcemia (not seen on previous admissions) as outpatient. She received Zometa in office on  without change in Na (last value was 13 yesterday  in Dr. Hopkins’s office). She endorses worsening of constipation, mild increased thirst with no significant increase in PO intake, chronic nighttime polyuria with no recent increase in urinary frequency, and denies change in mental status, abd pain, nausea/vomiting, recent bone fracture or skin necrosis. Has not taken any calcium supplements or new medications containing calcium. States last chemotherapy was 18 years ago. 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  Initial vitals in ED: 98.4F, HR 90, 120/69, RR 18, 96% on RA. Started on IV fluids.        PMHx -   PSx -   Meds -   Allergies -   FHx -   Sx -       PHYSICAL EXAM   Vital Signs Last 24 Hrs  T(C): 36.4, Max: 37.4 (12- @ 19:29)  T(F): 97.6, Max: 99.4 (12- @ 19:29)  HR: 76 (75 - 82)  BP: 145/58 (119/57 - 176/68)  BP(mean): --  RR: 24 (16 - 28)  SpO2: 99% (93% - 100%)      General -   HEENT -   CV -   Resp -   Abdomen -   Extremities -   Skin -       LABS                        10.6   2.1   )-----------( 167      ( 30 Dec 2016 07:06 )             33.5     30 Dec 2016 11:22    141    |  103    |  13     ----------------------------<  116    2.8     |  26     |  0.98     Ca    10.1       30 Dec 2016 11:22  Phos  2.1       30 Dec 2016 07:09  Mg     1.5       30 Dec 2016 07:09    TPro  x      /  Alb  2.7    /  TBili  x      /  DBili  x      /  AST  x      /  ALT  x      /  AlkPhos  x      30 Dec 2016 07:09    PT/INR - ( 30 Dec 2016 07:10 )   PT: 13.3 sec;   INR: 1.20          PTT - ( 30 Dec 2016 07:10 )  PTT:27.2 sec    Urinalysis Basic - ( 30 Dec 2016 08:12 )    Color: Yellow / Appearance: Clear / S.020 / pH: x  Gluc: x / Ketone: NEGATIVE  / Bili: NEGATIVE / Urobili: 0.2 E.U./dL   Blood: x / Protein: 100 mg/dL / Nitrite: NEGATIVE   Leuk Esterase: NEGATIVE / RBC: < 5 /HPF / WBC < 5 /HPF   Sq Epi: x / Non Sq Epi: Few /HPF / Bacteria: Present /HPF            IMAGING   CXR -   EKG - ICU CONSULT    85 year old Female with a PMH of IgM gammopathy with recent admission for dCHF (EF 55% with pHTN/Severe MR/TR) exacerbation, 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 on 2016 with complaints of gradual onset progressive generalized weakness/decreased energy for 3-4 weeks, found to have Hypercalcemia as an outpatient and was s/p Zometa on . Pt was subsequently admitted for work-up/management of hypercalcemia. During the course of admission patient was started on IVF for management of Hypercalcemia with plans for Lasix use on a PRN basis. Pt received Lasix 20mg IV overnight. Subsequently patient was planned for an excisional LN biopsy this AM (post cardiac clearance). Pt was intubated during the procedure and received Fentanyl, NS 800cc (Pt has received about 2.8L of fluid since admission). S/p extubation, patient was found to have increased work of breathing with a RR in mid 30's and patient was subsequently placed on Bipap. ABG/Stat chest Xray was performed/Lasix 20mg IV was administered following which ICU was consulted for further evaluation. Pt currently has complaints of a dry mouth. Pertinent ROS is negative.  PMHx - IGM gammopathy, dCHF, Severe MR/TR, Rt parotid mass, Afib on eliquis, CAD s/p CABG  PSx - Hemithyroidectomy/CABG/Appendectomy  Meds - Amiodarone, Aspirin, Toprol XL, Lasix, Losartan, Protonix, Lipitor, Calcitonin  Allergies - IV contrast      PHYSICAL EXAM   Vital Signs Last 24 Hrs  T(C): 36.4, Max: 37.4 (12-29 @ 19:29)  T(F): 97.6, Max: 99.4 (12- @ 19:29)  HR: 76 (75 - 82)  BP: 145/58 (119/57 - 176/68)  BP(mean): --  RR: 24 (16 - 28) on Bipap, 34 off of Bipap  SpO2: 99% (93% - 100%)      General - Appears to be in respiratory distress  HEENT - Dry MM, PERRLA, no appreciable thyromegaly, excisional LN biopsy scar in right anterior cervical LN  CV - RRR, S1/S2, pan-systolic murmur in mitral region  Resp - Appreciable bilateral lower lobe crackles  Abdomen - Soft, non-tender, non-distended, BS +  Extremities - WWP, 1+ LE edema bilaterally, no clubbing, cyanosis  Skin - Moist, no appreciable skin lesions      LABS                        10.6   2.1   )-----------( 167      ( 30 Dec 2016 07:06 )             33.5     30 Dec 2016 11:22    141    |  103    |  13     ----------------------------<  116    2.8     |  26     |  0.98     Ca    10.1       30 Dec 2016 11:22  Phos  2.1       30 Dec 2016 07:09  Mg     1.5       30 Dec 2016 07:09    TPro  x      /  Alb  2.7    /  TBili  x      /  DBili  x      /  AST  x      /  ALT  x      /  AlkPhos  x      30 Dec 2016 07:09    PT/INR - ( 30 Dec 2016 07:10 )   PT: 13.3 sec;   INR: 1.20          PTT - ( 30 Dec 2016 07:10 )  PTT:27.2 sec    Urinalysis Basic - ( 30 Dec 2016 08:12 )    Color: Yellow / Appearance: Clear / S.020 / pH: x  Gluc: x / Ketone: NEGATIVE  / Bili: NEGATIVE / Urobili: 0.2 E.U./dL   Blood: x / Protein: 100 mg/dL / Nitrite: NEGATIVE   Leuk Esterase: NEGATIVE / RBC: < 5 /HPF / WBC < 5 /HPF   Sq Epi: x / Non Sq Epi: Few /HPF / Bacteria: Present /HPF            IMAGING   CXR - Bilateral pulmonary vascular congestion  EKG - ICU CONSULT    85 year old Female with a PMH of IgM gammopathy with recent admission for dCHF (EF 55% with pHTN/Severe MR/TR) exacerbation, 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 Pt was subsequently admitted for work-up/management of hypercalcemia. During the course of admission patient was started on IVF for management of Hypercalcemia with plans for Lasix use on a PRN basis. Pt received Lasix 20mg IV overnight. Subsequently patient was planned for an excisional LN biopsy this AM (post cardiac clearance). Pt was intubated during the procedure and received Fentanyl, NS 800cc (Pt has received about 2.8L of fluid since admission). S/p extubation, patient was found to have increased work of breathing with a RR in mid 30's and patient was subsequently placed on Bipap. ABG/Stat chest Xray was performed/Lasix 20mg IV was administered following which ICU was consulted for further evaluation. Pt currently has complaints of a dry mouth. Pertinent ROS is negative. No HA, dizzyness, CP, N/V/D/ abd pain. No joint pain. No chills or dysuria. No sore throat, no rash, no anxiety.  PMHx - IGM gammopathy, dCHF, Severe MR/TR, Rt parotid mass, Afib on eliquis, CAD s/p CABG  PSx - Hemithyroidectomy/CABG/Appendectomy  Meds - Amiodarone, Aspirin, Toprol XL, Lasix, Losartan, Protonix, Lipitor, Calcitonin  Allergies - IV contrast      PHYSICAL EXAM   Vital Signs Last 24 Hrs  T(C): 36.4, Max: 37.4 (12- @ 19:29)  T(F): 97.6, Max: 99.4 (12- @ 19:29)  HR: 76 (75 - 82)  BP: 145/58 (119/57 - 176/68)  BP(mean): --  RR: 24 (16 - 28) on Bipap, 34 off of Bipap  SpO2: 99% (93% - 100%)      General - Appears to be in respiratory distress  HEENT - Dry MM, PERRLA, no appreciable thyromegaly, excisional LN biopsy scar in right anterior cervical LN  CV - RRR, S1/S2, pan-systolic murmur in mitral region  Resp - Appreciable bilateral lower lobe crackles  Abdomen - Soft, non-tender, non-distended, BS +  Extremities - WWP, 1+ LE edema bilaterally, no clubbing, cyanosis  Skin - Moist, no appreciable skin lesions      LABS                        10.6   2.1   )-----------( 167      ( 30 Dec 2016 07:06 )             33.5     30 Dec 2016 11:22    141    |  103    |  13     ----------------------------<  116    2.8     |  26     |  0.98     Ca    10.1       30 Dec 2016 11:22  Phos  2.1       30 Dec 2016 07:09  Mg     1.5       30 Dec 2016 07:09    TPro  x      /  Alb  2.7    /  TBili  x      /  DBili  x      /  AST  x      /  ALT  x      /  AlkPhos  x      30 Dec 2016 07:09    PT/INR - ( 30 Dec 2016 07:10 )   PT: 13.3 sec;   INR: 1.20          PTT - ( 30 Dec 2016 07:10 )  PTT:27.2 sec    Urinalysis Basic - ( 30 Dec 2016 08:12 )    Color: Yellow / Appearance: Clear / S.020 / pH: x  Gluc: x / Ketone: NEGATIVE  / Bili: NEGATIVE / Urobili: 0.2 E.U./dL   Blood: x / Protein: 100 mg/dL / Nitrite: NEGATIVE   Leuk Esterase: NEGATIVE / RBC: < 5 /HPF / WBC < 5 /HPF   Sq Epi: x / Non Sq Epi: Few /HPF / Bacteria: Present /HPF            IMAGING   CXR - Bilateral pulmonary vascular congestion  EKG -

## 2016-12-30 NOTE — PROGRESS NOTE ADULT - PROBLEM SELECTOR PLAN 1
Patient became SOB and tachypneic after extubation. Has been getting substantial amount of fluids for hypercalcemia with hx of heart failure. B-lines on chest u/s and fluffy infiltrate on CXR, crackles on exam.  - d/c lasix as patient is hypokalemic  - continue diuresis with Aldactone 25mg   - strict I+Os  - alexander in place  - monitor respiratory status --> off BiPAP now, will likely transition to High Flow NC

## 2016-12-30 NOTE — PROGRESS NOTE ADULT - PROBLEM SELECTOR PLAN 6
Known atrial fibrillation on apixaban 2.5mg BID at home  - AC held for procedure this morning, restart ASAP as pt has CHADSVASC of 7  - continue amiodarone 200mg qd

## 2016-12-30 NOTE — CONSULT NOTE ADULT - PROBLEM SELECTOR RECOMMENDATION 3
-will d/w renal; f/u bone marrow bx
Continue Amiodarone. Will recommend restarting Eliquis (after touching base with ENT)
Currently below previous known baseline BP and patient is encountering ROMEO  Hold her antihypertensive Losartan  Would opt to hold Toprol if cardiology has no objections  (the Toprol may require cardiology follow up in light of her AFib history)

## 2016-12-30 NOTE — DIETITIAN INITIAL EVALUATION ADULT. - PROBLEM SELECTOR PLAN 3
Has IgM gammopathy, and on last admission, found to have CT neck showing lymphadenopathy and parotid mass. Had recent FNA bx of LN concerning for B-cell lymphoma and pending BM bx results.  - Appreciate heme/onc consult (Dr. Hopkins). Pt does not want to start tx as inpatient. Will hold off on chemo pending final pathologic diagnosis.  - Appreciate ENT consult. NPO after midnight for LN bx in AM. Will need cardiac clearance  - Will check LDH, fibrinogen, uric acid, K, phos, and hemolysis labs daily given hypercalcemia  - F/up BM bx results

## 2016-12-30 NOTE — PROGRESS NOTE ADULT - PROBLEM SELECTOR PLAN 1
likely due to lymphoma   improved with IVF -- corrected calcium still slt high   would dc IVF in light of CHF, lasix may help calcium  can add calcitonin if calcium rises again  s/p Zometa prior to admit and should start having effect soon  check PTH and 25 and 1,25 vitamin D to check for other causes high calcium

## 2016-12-30 NOTE — DIETITIAN INITIAL EVALUATION ADULT. - PROBLEM SELECTOR PLAN 4
Due to giving IV fluids, watching closely for development of pulmonary edema. Gave 20mg IV Lasix around 6pm due to bibasilar crackles on exam.  - Appreciate renal consult. Tomorrow, consider starting Lasix PO 20mg BID. Will diurese PRN for now.  - Strict ins/outs (goal net neutral), daily standing weights  - F/up CXR to r/o pulmonary edema and use as baseline  - Will consult cardiology for hx of CHF/CAD and for cardiac clearance for tomorrow's planned LN bx with ENT

## 2016-12-30 NOTE — DIETITIAN INITIAL EVALUATION ADULT. - ENERGY NEEDS
Height: 5 feet 0 inches, Weight (Current): 103.6 pounds,  pounds +/-10%, %%, BMI 20.2    current body wt used for energy calculations as pt falls within % IBW  EER based on CA hx, fluids per team 2/2 CHF.

## 2016-12-31 NOTE — PROGRESS NOTE ADULT - SUBJECTIVE AND OBJECTIVE BOX
HOSPITAL COURSE  Ms. Carrillo is a 85F with an extensive medical history* who presented to [facility] for evaluation of 3-4 weeks of gradual onset generalized weakness/low energy/lightheadedness in the setting of persistent newly diagnosed hypercalcemia. On admission, the patient's corrected Ca was 13; hypercalcemia thought to be due to underlying malignancy (recent FNA concerning for B cell lymphoma, CT showing cervical lymphadenopathy and parotid mass). Renal was consulted; patient started on IVF. ENT consulted for biopsy of cervical lymph nodes, which patient went to OR for this morning. Extubation complicated by patient inadequately maintaining oxygenation; required BiPAP for respiratory assistance. CXR demonstrated pulmonary edema; diuresis initiated with furosemide and single dose of aldactone, as patient's potassium level was 2.7 this morning. Upon arrival on the floor, she was successfully transitioned to 2L O2 via NC. Overnight, she did well and had no acute complaints: At present, the patient denies any shortness of breath, cough, chest pain, N/V/D, and/or urinary symptoms    *: patient's medical history includes the following: IgM gammopathy, HFpEF with recent admission for CHF exacerbation, recent CT neck showing LAD and parotid mass concerning for B-cell lymphoma, as well as other co-morbidities including HTN, DM2, right breast CA in remission (s/p mastectomy/chemotx/radiation), atrial fibrillation (s/p PPM, on Eliquis), s/p left hemithyroidectomy, CAD (s/p CABG), and severe MR/TR (seen on recent Echo 2016)    INTERVAL HPI/OVERNIGHT EVENTS:  TEJINDER overnight, POD #1 for cervical LN biopsy. At present, the patient denies any shortness of breath, cough, chest pain, N/V/D, and/or urinary symptoms.    VITAL SIGNS:  T(F): 99  HR: 79  BP: 130/59  RR: 17  SpO2: 97%  Wt(kg): --    PHYSICAL EXAM:  GENERAL: Well-developed and well-nourished, resting comfortably in no apparent distress.  HEENT: Head is normocephalic and atraumatic, with extraocular muscle movements intact bilaterally. Pupils are equal, round, and reactive to light and accommodation. Oral mucosa moist and without lesions. Neck is supple, without appreciable lymphadenopathy or thyromegaly. Bandaging over cervical incision is clean, dry, and intact.  PULMONARY/THORAX: Inspiratory crackles at the L lung base.  CARDIOVASCULAR: Regular rate and rhythm without murmurs, rubs, or gallops  ABDOMEN: Soft, nontender, and nondistended.  Normoactive bowel sounds.  No hepatosplenomegaly was noted.  EXTREMITIES: Without cyanosis, clubbing, overt lesions, or edema.  NEUROLOGIC: A&Ox3; CNs II-XII are grossly intact.  DERMATOLOGIC: No cutaneous lesions noted on exposed skin.    MEDICATIONS  (STANDING):  atorvastatin 80milliGRAM(s) Oral at bedtime  aspirin  chewable 81milliGRAM(s) Oral daily  amiodarone    Tablet 200milliGRAM(s) Oral daily  pantoprazole    Tablet 40milliGRAM(s) Oral before breakfast  insulin lispro (HumaLOG) corrective regimen sliding scale  SubCutaneous Before meals and at bedtime  metoprolol succinate ER 50milliGRAM(s) Oral daily  losartan 25milliGRAM(s) Oral daily  calcitonin Injectable 190International Unit(s) IntraMuscular every 12 hours    MEDICATIONS  (PRN):  acetaminophen   Tablet. 650milliGRAM(s) Oral every 6 hours PRN Moderate Pain (4 - 6)      Allergies    IV CONtRAST (Flushing (Skin); Rash)  No Known Drug Allergies    Intolerances        LABS:                        11.7   3.4   )-----------( 178      ( 31 Dec 2016 07:16 )             37.1     31 Dec 2016 07:16    136    |  98     |  14     ----------------------------<  85     3.9     |  28     |  1.13     Ca    10.7       31 Dec 2016 07:16  Phos  2.5       31 Dec 2016 07:16  Mg     1.8       31 Dec 2016 07:16    TPro  6.6    /  Alb  3.0    /  TBili  0.7    /  DBili  x      /  AST  88     /  ALT  51     /  AlkPhos  90     31 Dec 2016 07:16    PT/INR - ( 31 Dec 2016 07:16 )   PT: 11.4 sec;   INR: 1.03          PTT - ( 31 Dec 2016 07:16 )  PTT:27.1 sec  Urinalysis Basic - ( 30 Dec 2016 08:12 )    Color: Yellow / Appearance: Clear / S.020 / pH: x  Gluc: x / Ketone: NEGATIVE  / Bili: NEGATIVE / Urobili: 0.2 E.U./dL   Blood: x / Protein: 100 mg/dL / Nitrite: NEGATIVE   Leuk Esterase: NEGATIVE / RBC: < 5 /HPF / WBC < 5 /HPF   Sq Epi: x / Non Sq Epi: Few /HPF / Bacteria: Present /HPF TRANSFER OF CARE TO REGIONAL MEDICAL FLOOR  Ms. Carrillo is a 85F with an extensive medical history* who presented to [facility] for evaluation of 3-4 weeks of gradual onset generalized weakness/low energy/lightheadedness in the setting of persistent newly diagnosed hypercalcemia. On admission, the patient's corrected Ca was 13; hypercalcemia thought to be due to underlying malignancy (recent FNA concerning for B cell lymphoma, CT showing cervical lymphadenopathy and parotid mass). Renal was consulted; patient started on IVF. ENT consulted for biopsy of cervical lymph nodes, which patient went to OR for yesterday. Extubation complicated by patient inadequately maintaining oxygenation; required BiPAP for respiratory assistance. CXR demonstrated pulmonary edema; diuresis initiated with furosemide and single dose of aldactone, as patient's potassium level was 2.8. Upon arrival on the floor, she was successfully transitioned to 2L O2 via NC. Overnight, she did well and had no acute complaints; UOP 1500cc overnight with serum K+ normal and Ca+2 only marginally elevated. Patient is stable for continuing care on regional medical floors.    *: patient's medical history includes the following: IgM gammopathy, HFpEF with recent admission for CHF exacerbation, recent CT neck showing LAD and parotid mass concerning for B-cell lymphoma, as well as other co-morbidities including HTN, DM2, right breast CA in remission (s/p mastectomy/chemotx/radiation), atrial fibrillation (s/p PPM, on Eliquis), s/p left hemithyroidectomy, CAD (s/p CABG), and severe MR/TR (seen on recent Echo 2016)    INTERVAL HPI/OVERNIGHT EVENTS:  TEJINDER overnight, POD #1 for cervical LN biopsy. At present, the patient denies any shortness of breath, cough, chest pain, N/V/D, and/or urinary symptoms.    VITAL SIGNS:  T(F): 99  HR: 79  BP: 130/59  RR: 17  SpO2: 97%  Wt(kg): --    PHYSICAL EXAM:  GENERAL: Well-developed and well-nourished, resting comfortably in no apparent distress.  HEENT: Head is normocephalic and atraumatic, with extraocular muscle movements intact bilaterally. Pupils are equal, round, and reactive to light and accommodation. Oral mucosa moist and without lesions. Neck is supple, without appreciable lymphadenopathy or thyromegaly. Bandaging over cervical incision is clean, dry, and intact.  PULMONARY/THORAX: Inspiratory crackles at the L lung base.  CARDIOVASCULAR: Regular rate and rhythm without murmurs, rubs, or gallops  ABDOMEN: Soft, nontender, and nondistended.  Normoactive bowel sounds.  No hepatosplenomegaly was noted.  EXTREMITIES: Without cyanosis, clubbing, overt lesions, or edema.  NEUROLOGIC: A&Ox3; CNs II-XII are grossly intact.  DERMATOLOGIC: No cutaneous lesions noted on exposed skin.    MEDICATIONS  (STANDING):  atorvastatin 80milliGRAM(s) Oral at bedtime  aspirin  chewable 81milliGRAM(s) Oral daily  amiodarone    Tablet 200milliGRAM(s) Oral daily  pantoprazole    Tablet 40milliGRAM(s) Oral before breakfast  insulin lispro (HumaLOG) corrective regimen sliding scale  SubCutaneous Before meals and at bedtime  metoprolol succinate ER 50milliGRAM(s) Oral daily  losartan 25milliGRAM(s) Oral daily  calcitonin Injectable 190International Unit(s) IntraMuscular every 12 hours    MEDICATIONS  (PRN):  acetaminophen   Tablet. 650milliGRAM(s) Oral every 6 hours PRN Moderate Pain (4 - 6)      Allergies    IV CONtRAST (Flushing (Skin); Rash)  No Known Drug Allergies    Intolerances        LABS:                        11.7   3.4   )-----------( 178      ( 31 Dec 2016 07:16 )             37.1     31 Dec 2016 07:16    136    |  98     |  14     ----------------------------<  85     3.9     |  28     |  1.13     Ca    10.7       31 Dec 2016 07:16  Phos  2.5       31 Dec 2016 07:16  Mg     1.8       31 Dec 2016 07:16    TPro  6.6    /  Alb  3.0    /  TBili  0.7    /  DBili  x      /  AST  88     /  ALT  51     /  AlkPhos  90     31 Dec 2016 07:16    PT/INR - ( 31 Dec 2016 07:16 )   PT: 11.4 sec;   INR: 1.03          PTT - ( 31 Dec 2016 07:16 )  PTT:27.1 sec  Urinalysis Basic - ( 30 Dec 2016 08:12 )    Color: Yellow / Appearance: Clear / S.020 / pH: x  Gluc: x / Ketone: NEGATIVE  / Bili: NEGATIVE / Urobili: 0.2 E.U./dL   Blood: x / Protein: 100 mg/dL / Nitrite: NEGATIVE   Leuk Esterase: NEGATIVE / RBC: < 5 /HPF / WBC < 5 /HPF   Sq Epi: x / Non Sq Epi: Few /HPF / Bacteria: Present /HPF

## 2016-12-31 NOTE — PROGRESS NOTE ADULT - PROBLEM SELECTOR PLAN 9
Has been normotensive in the hospital  - c/w cardiac medications as above, no addition antiHTN at this time Has been normotensive in the hospital  - c/w cardiac medications as above, no addition antiHTN at this time    #MILD TROPONINEMIA. detected mild troponinemia following procedure; patient asymptomatic without SOB, diaphoresis, palpitations, and/or chest pain. in setting of tachypnea/difficult extubation yesterday, likely demand ischemia.  - trend to peak

## 2016-12-31 NOTE — PROGRESS NOTE ADULT - SUBJECTIVE AND OBJECTIVE BOX
INTERVAL HPI: no cp sob palp  	  MEDICATIONS:  amiodarone    Tablet 200milliGRAM(s) Oral daily  metoprolol succinate ER 50milliGRAM(s) Oral daily  losartan 25milliGRAM(s) Oral daily        acetaminophen   Tablet. 650milliGRAM(s) Oral every 6 hours PRN    pantoprazole    Tablet 40milliGRAM(s) Oral before breakfast    atorvastatin 80milliGRAM(s) Oral at bedtime  insulin lispro (HumaLOG) corrective regimen sliding scale  SubCutaneous Before meals and at bedtime  calcitonin Injectable 190International Unit(s) IntraMuscular every 12 hours    aspirin  chewable 81milliGRAM(s) Oral daily      REVIEW OF SYSTEMS:  [x] As per HPI  CONSTITUTIONAL: No fever, weight loss, or fatigue  RESPIRATORY: No cough, wheezing, chills or hemoptysis; No Shortness of Breath  CARDIOVASCULAR: No chest pain, palpitations, dizziness, or leg swelling  GASTROINTESTINAL: No abdominal or epigastric pain. No nausea, vomiting, or hematemesis; No diarrhea or constipation. No melena or hematochezia.  MUSCULOSKELETAL: No joint pain or swelling; No muscle, back, or extremity pain  [x] All others negative	  [ ] Unable to obtain    PHYSICAL EXAM:  T(C): 37.2, Max: 38.3 (12-30 @ 22:00)  HR: 80 (72 - 82)  BP: 98/47 (98/47 - 150/62)  RR: 16 (15 - 29)  SpO2: 96% (95% - 100%)  Wt(kg): --  I&O's Summary        Appearance: Normal	  HEENT:   Normal oral mucosa  Cardiovascular: Normal S1 S2, No JVD, No murmurs, No edema  Respiratory: Lungs clear to auscultation	  Gastrointestinal:  Soft, Non-tender, + BS	  Extremities: Normal range of motion, No clubbing, cyanosis or edema  Vascular: Peripheral pulses palpable 2+ bilaterally    TELEMETRY: 	  no events   ECG:    	  RADIOLOGY:   CXR:  CT:  US:    CARDIAC TESTING:  Echocardiogram:  Catheterization:  Stress Test:      LABS:	 	    CARDIAC MARKERS:  Troponin I, Serum: 0.067 ng/mL (12-31 @ 07:16)  Troponin I, Serum: 0.061 ng/mL (12-30 @ 20:25)  Troponin I, Serum: 0.052 ng/mL (12-30 @ 11:22)                                  11.7   3.4   )-----------( 178      ( 31 Dec 2016 07:16 )             37.1     31 Dec 2016 07:16    136    |  98     |  14     ----------------------------<  85     3.9     |  28     |  1.13     Ca    10.7       31 Dec 2016 07:16  Phos  2.5       31 Dec 2016 07:16  Mg     1.8       31 Dec 2016 07:16    TPro  6.6    /  Alb  3.0    /  TBili  0.7    /  DBili  x      /  AST  88     /  ALT  51     /  AlkPhos  90     31 Dec 2016 07:16    proBNP:   Lipid Profile:   HgA1c:   TSH:     ASSESSMENT/PLAN: 	    ·  Problem: Heart failure with preserved ejection fraction.  Plan: Recent admission last month for CHF exacerbation. HFpEF (55%), severe MR&TR  - continue with diuresis as above  - c/w home Metoprolol ER 50mg qd, patient with PPM  - c/w Losartan 25mg qd.     Problem/Plan  Problem: Chronic atrial fibrillation. Plan: Known atrial fibrillation on apixaban 2.5mg BID at home  - AC held for procedure this morning, restart ASAP as pt has CHADSVASC of 7  - continue amiodarone 200mg qd.    Problem/Plan   ·  Problem: Coronary artery disease involving native heart, angina presence unspecified, unspecified vessel or lesion type.  Plan: Known CAD s/p CABG, no chest pain at this time  - continue ASA 81mg, Lipitor 80mg qhs.     Problem/Plan   ·  Problem: Essential hypertension.  Plan: Has been normotensive in the hospital  - c/w cardiac medications as above, no addition antiHTN at this time.

## 2016-12-31 NOTE — PROGRESS NOTE ADULT - PROBLEM SELECTOR PLAN 1
Corrected calcium this morning is 11.5mg/dL  The workup does highlight the etiology is from 1,25 Vit D overproduction.   As PTH is low, would complete workup with PTHrP levels     f/u on the 24h our urine calcium; anticipate high urine calcium level    As for management:  - Would resume NS @ 100cc/hr   This is for restoring intravascular volume as well as for hypercalcemia  The key to proper fluid management would be to allow her to be net positive by 500cc to 750cc only throughout the 24 hours.  This would entail checking urine outputs as frequently as allowed by nursing protocol and then dosing IV Lasix 20mg x 1 at a time based on the urine outputs and toggling up or down on the IV NS rate to achieve the net goal.     For now, start the IV NS @ 100cc/hr and housestaff to coordinate with renal fellow throughout the day    c/w calcitonin for the four doses listed

## 2016-12-31 NOTE — PROGRESS NOTE ADULT - PROBLEM SELECTOR PLAN 3
Concern for B-cell lymphoma on outpatient FNA.   - POD#1 from cervical lymph node excisional biopsy by Dr. Westfall, doing well.  - f/u biopsy results

## 2016-12-31 NOTE — PROGRESS NOTE ADULT - PROBLEM SELECTOR PLAN 2
Downtrending, with Ca+2 of 10.7 this morning. Likely secondary to malignancy. Currently asymptomatic with normal mentation and no complaint of pain anywhere.  - recheck BMP at 8PM to trend K and Ca  - follow up nephrology recommendations re: calcitonin

## 2016-12-31 NOTE — PROGRESS NOTE ADULT - PROBLEM SELECTOR PLAN 6
Known atrial fibrillation on apixaban 2.5mg BID at home. CHADSVASC of 7  - AC held for procedure; restarted without incident  - continue amiodarone 200mg qd

## 2016-12-31 NOTE — PROGRESS NOTE ADULT - PROBLEM SELECTOR PLAN 2
Patient still has persistent elevated ROMEO.  Based on reviewing her craetinine trends and proteinuria, may actually reflect CKD from an underlying paraprotein disease, whether amyloid, lymphoma infiltrative disease, immunoglobulin deposition disease, or other related etiology     As hematology plans for treatment of primary disease, will defer any immediate need for renal biopsy     Would resume IV fluids as above

## 2016-12-31 NOTE — PROGRESS NOTE ADULT - ASSESSMENT
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

## 2016-12-31 NOTE — PROGRESS NOTE ADULT - SUBJECTIVE AND OBJECTIVE BOX
Patient seen and examined at bedside.   Patient reports feeling slightly lightheaded since yesterday but not severe as she has had chronic symptoms similar to this.   Furthermore, the patient has maintained a vigorous diuresis of 3000cc in the past 24 hours.     NO dyspnea, no orthopnea, no leg edema at present.     No nausea/vomiting noted  Still has constipation but remarks this is also chronic.     atorvastatin 80milliGRAM(s) at bedtime  aspirin  chewable 81milliGRAM(s) daily  amiodarone    Tablet 200milliGRAM(s) daily  pantoprazole    Tablet 40milliGRAM(s) before breakfast  insulin lispro (HumaLOG) corrective regimen sliding scale  Before meals and at bedtime  metoprolol succinate ER 50milliGRAM(s) daily  losartan 25milliGRAM(s) daily  calcitonin Injectable 190International Unit(s) every 12 hours  acetaminophen   Tablet. 650milliGRAM(s) every 6 hours PRN      Allergies    IV CONtRAST (Flushing (Skin); Rash)  No Known Drug Allergies    Intolerances        T(C): , Max: 38.3 ( @ 22:00)  T(F): , Max: 100.9 ( @ 22:00)  HR: 80  BP: 98/47  BP(mean): 85  RR: 16  SpO2: 96%  Wt(kg): --  I & Os for 24h ending  @ 07:00  =============================================  IN:    Solution: 200 ml    Total IN: 200 ml  ---------------------------------------------  OUT:    Intermittent Catheterization - Urethral: 3000 ml    Total OUT: 3000 ml  ---------------------------------------------  Total NET: -2800 ml    I & Os for current day (as of  @ 10:31)  =============================================  IN:    Oral Fluid: 120 ml    Total IN: 120 ml  ---------------------------------------------  OUT:    Total OUT: 0 ml  ---------------------------------------------  Total NET: 120 ml          LABS:                        11.7   3.4   )-----------( 178      ( 31 Dec 2016 07:16 )             37.1     31 Dec 2016 07:16    136    |  98     |  14     ----------------------------<  85     3.9     |  28     |  1.13     Ca    10.7       31 Dec 2016 07:16  Phos  2.5       31 Dec 2016 07:16  Mg     1.8       31 Dec 2016 07:16    TPro  6.6    /  Alb  3.0    /  TBili  0.7    /  DBili  x      /  AST  88     /  ALT  51     /  AlkPhos  90     31 Dec 2016 07:16      PT/INR - ( 31 Dec 2016 07:16 )   PT: 11.4 sec;   INR: 1.03          PTT - ( 31 Dec 2016 07:16 )  PTT:27.1 sec  Urinalysis Basic - ( 30 Dec 2016 08:12 )    Color: Yellow / Appearance: Clear / S.020 / pH: x  Gluc: x / Ketone: NEGATIVE  / Bili: NEGATIVE / Urobili: 0.2 E.U./dL   Blood: x / Protein: 100 mg/dL / Nitrite: NEGATIVE   Leuk Esterase: NEGATIVE / RBC: < 5 /HPF / WBC < 5 /HPF   Sq Epi: x / Non Sq Epi: Few /HPF / Bacteria: Present /HPF      Creatinine, Random Urine: 48.0 mg/dL ( @ 08:12)  Protein/Creatinine Ratio Calculation: 3.5 Ratio ( @ 08:12)        RADIOLOGY & ADDITIONAL STUDIES:

## 2016-12-31 NOTE — PROGRESS NOTE ADULT - SUBJECTIVE AND OBJECTIVE BOX
ENT [facility] DAILY PROGRESS NOTE    Overnight events/Interval HPI: 85y Female s/p R neck lymphadectomy. Post op course complicated somewhat by requiring CPAP due to pulmonary edema. Pt breathing comfortably this AM, neck pain controlled, taking PO.           Allergies    IV CONtRAST (Flushing (Skin); Rash)  No Known Drug Allergies    Intolerances        MEDICATIONS:  Antiinfectives:     IV fluids:    Hematologic/Anticoagulation:  aspirin  chewable 81milliGRAM(s) Oral daily    Pain medications/Neuro:  acetaminophen   Tablet. 650milliGRAM(s) Oral every 6 hours PRN    Endocrine Medications:   atorvastatin 80milliGRAM(s) Oral at bedtime  insulin lispro (HumaLOG) corrective regimen sliding scale  SubCutaneous Before meals and at bedtime  calcitonin Injectable 190International Unit(s) IntraMuscular every 12 hours    All other standing medications:   amiodarone    Tablet 200milliGRAM(s) Oral daily  pantoprazole    Tablet 40milliGRAM(s) Oral before breakfast  metoprolol succinate ER 50milliGRAM(s) Oral daily  losartan 25milliGRAM(s) Oral daily    All other PRN medications:      Vital Signs Last 24 Hrs  T(C): 37.1, Max: 38.3 (12-30 @ 22:00)  T(F): 98.7, Max: 100.9 (12-30 @ 22:00)  HR: 80 (75 - 82)  BP: 98/47 (98/47 - 147/65)  BP(mean): 85 (80 - 94)  RR: 16 (15 - 29)  SpO2: 96% (95% - 100%)    I & Os for 24h ending 12-31 @ 07:00  =============================================  IN:    Solution: 200 ml    Total IN: 200 ml  ---------------------------------------------  OUT:    Intermittent Catheterization - Urethral: 3000 ml    Total OUT: 3000 ml  ---------------------------------------------  Total NET: -2800 ml    I & Os for current day (as of 12-31 @ 12:40)  =============================================  IN:    Oral Fluid: 270 ml    Total IN: 270 ml  ---------------------------------------------  OUT:    Total OUT: 0 ml  ---------------------------------------------  Total NET: 270 ml        PE:  NAD. comfortable. no increased WOB. no stridor  neck soft and flat. no ecchymosis. no e/o hematoma. incision c/d/i  LABS:  CBC-      Coagulation Studies-  PT/INR - ( 31 Dec 2016 07:16 )   PT: 11.4 sec;   INR: 1.03          PTT - ( 31 Dec 2016 07:16 )  PTT:27.1 sec  Endocrine Panel-  Calcium, Total Serum: 10.7 mg/dL (12-31 @ 07:16)  Calcium, Total Serum: 10.4 mg/dL (12-30 @ 20:25)              RADIOLOGY & ADDITIONAL STUDIES:      Assessment/Plan:  A/P: 85F s/p right lymph node excisional biopsy  - pain control prn  - can use cepacol q3h prn for sore throats  - anti emetics prn  - may resume regular diet   - care per Medicine  - care per oncology  - page with questions. [phone]          PPX: SCDs, DVT ppx with SUBQ hep.    Dispo planning:   -Home care is/is not needed

## 2016-12-31 NOTE — PROGRESS NOTE ADULT - SUBJECTIVE AND OBJECTIVE BOX
Patient is a 85y old  Female who presents with a chief complaint of hypercalcemia (29 Dec 2016 14:41)      HPI:  84 y/o F w/ hx of IgM gammopathy recently admitted ( to ) for HFpEF exacerbation (and found to have CT neck showing lymphadenopathy and parotid mass) with recent FNA bx of LN concerning for B-cell lymphoma and pending BM bx, with other co-morbidities including HTN, DM2, right sided breast CA in remission (s/p mastectomy/chemotx/radiation), a-fib s/p PPM on Eliquis, s/p left hemithyroidectomy, CAD s/p CABG, severe MR/TR (seen on recent Echo 2016), presents c/o 3-4 weeks of gradual onset generalized weakness/low energy/lightheadedness, progressively worsening, in the setting of persistent new-onset hypercalcemia (not seen on previous admissions) as outpatient. She received Zometa in office on  without change in Na (last value was 13 yesterday  in Dr. Hopkins’s office). She endorses worsening of constipation, mild increased thirst with no significant increase in PO intake, chronic nighttime polyuria with no recent increase in urinary frequency, and denies change in mental status, abd pain, nausea/vomiting, recent bone fracture or skin necrosis. Has not taken any calcium supplements or new medications containing calcium. States last chemotherapy was 18 years ago. No hx of smoking, pt is retired    INTERVAL HPI/OVERNIGHT EVENTS:::cv stable, comfortable    HEALTH ISSUES - PROBLEM Dx:  Dyspnea, unspecified type: Dyspnea, unspecified type  Essential hypertension: Essential hypertension  Type 2 diabetes mellitus with complication, unspecified long term insulin use status: Type 2 diabetes mellitus with complication, unspecified long term insulin use status  Coronary artery disease involving native heart, angina presence unspecified, unspecified vessel or lesion type: Coronary artery disease involving native heart, angina presence unspecified, unspecified vessel or lesion type  Chronic atrial fibrillation: Chronic atrial fibrillation  Lymphadenopathy: Lymphadenopathy  Acute pulmonary edema: Acute pulmonary edema  Hypokalemia: Hypokalemia  Respiratory distress: Respiratory distress  CAD (coronary artery disease): CAD (coronary artery disease)  Need for prophylactic measure: Need for prophylactic measure  Diabetes: Diabetes  Afib: Afib  IgM monoclonal gammopathy of uncertain significance: IgM monoclonal gammopathy of uncertain significance  Gammopathy: Gammopathy  B-cell lymphoma, unspecified B-cell lymphoma type, unspecified body region: B-cell lymphoma, unspecified B-cell lymphoma type, unspecified body region  Heart failure with preserved ejection fraction: Heart failure with preserved ejection fraction  Hypertension: Hypertension  Acute kidney injury: Acute kidney injury  Hypercalcemia: Hypercalcemia          PAST MEDICAL & SURGICAL HISTORY:  Follicular lymphoma  Neck mass  Afib  Other hyperlipidemia  Breast cancer  CAD (coronary artery disease)  Diabetes  HTN (hypertension)  No pertinent past medical history  H/O abdominal hysterectomy  H/O thyroidectomy  S/P CABG x 3          Consultant NOTE  REVIEWED  ( ++++  )    REVIEW OF SYSTEMS:  [x] As per HPI  CONSTITUTIONAL: weak  RESPIRATORY: No cough, wheezing, chills or hemoptysis; No Shortness of Breath  CARDIOVASCULAR: No chest pain, palpitations, dizziness,   GASTROINTESTINAL: No abdominal or epigastric pain. No nausea, vomiting, or hematemesis; No diarrhea or constipation. No melena or hematochezia.  MUSCULOSKELETAL: No joint pain or swelling; No muscle, back, or extremity pain  weakness  PSYCH    awake      [x] All others negative	  [ ] Unable to obtain          Vital Signs Last 24 Hrs  T(C): 37.2, Max: 38.3 ( @ 22:00)  T(F): 99, Max: 100.9 ( @ 22:00)  HR: 80 (72 - 82)  BP: 98/47 (98/47 - 147/65)  BP(mean): 85 (80 - 94)  RR: 16 (15 - 29)  SpO2: 96% (95% - 100%)      I & Os for 24h ending  @ 07:00  =============================================  IN: 200 ml / OUT: 3000 ml / NET: -2800 ml    I & Os for current day (as of  @ 11:19)  =============================================  IN: 120 ml / OUT: 0 ml / NET: 120 ml    PHYSICAL EXAMINATION:                                    (   +++ )  NO CHANGE  Appearance: Normal	  HEENT:   Normal oral mucosa, PERRL, EOMI	  Neck: Supple,  Cardiovascular: Normal S1 S2,,   Respiratory: Lungs clear to auscultation/Decreased Breath Sounds/No Rales, Rhonchi, Wheezing	  Gastrointestinal:  Soft, Non-tender, + BS	  Skin: No rashes, No ecchymoses, No cyanosis  Extremities: Normal range of motion, No clubbing,   Vascular: Peripheral pulses   Neurologic: Non-focal  Psychiatry: A & O x 3, Mood & affect appropriate    atorvastatin 80milliGRAM(s) Oral at bedtime  aspirin  chewable 81milliGRAM(s) Oral daily  amiodarone    Tablet 200milliGRAM(s) Oral daily  pantoprazole    Tablet 40milliGRAM(s) Oral before breakfast  insulin lispro (HumaLOG) corrective regimen sliding scale  SubCutaneous Before meals and at bedtime  metoprolol succinate ER 50milliGRAM(s) Oral daily  losartan 25milliGRAM(s) Oral daily  calcitonin Injectable 190International Unit(s) IntraMuscular every 12 hours  acetaminophen   Tablet. 650milliGRAM(s) Oral every 6 hours PRN        PT/INR - ( 31 Dec 2016 07:16 )   PT: 11.4 sec;   INR: 1.03          PTT - ( 31 Dec 2016 07:16 )  PTT:27.1 sec    CARDIAC MARKERS ( 31 Dec 2016 07:16 )  0.067 ng/mL / x     / 68 U/L / x     / 2.2 ng/mL  CARDIAC MARKERS ( 30 Dec 2016 20:25 )  0.061 ng/mL / x     / 86 U/L / x     / 3.0 ng/mL  CARDIAC MARKERS ( 30 Dec 2016 11:22 )  0.052 ng/mL / x     / 66 U/L / x     / 2.8 ng/mL                            11.7   3.4   )-----------( 178      ( 31 Dec 2016 07:16 )             37.1     31 Dec 2016 07:16    136    |  98     |  14     ----------------------------<  85     3.9     |  28     |  1.13     Ca    10.7       31 Dec 2016 07:16  Phos  2.5       31 Dec 2016 07:16  Mg     1.8       31 Dec 2016 07:16    TPro  6.6    /  Alb  3.0    /  TBili  0.7    /  DBili  x      /  AST  88     /  ALT  51     /  AlkPhos  90     31 Dec 2016 07:16      CAPILLARY BLOOD GLUCOSE  84 (31 Dec 2016 06:39)  77 (30 Dec 2016 16:35)    Urinalysis Basic - ( 30 Dec 2016 08:12 )    Color: Yellow / Appearance: Clear / S.020 / pH: x  Gluc: x / Ketone: NEGATIVE  / Bili: NEGATIVE / Urobili: 0.2 E.U./dL   Blood: x / Protein: 100 mg/dL / Nitrite: NEGATIVE   Leuk Esterase: NEGATIVE / RBC: < 5 /HPF / WBC < 5 /HPF   Sq Epi: x / Non Sq Epi: Few /HPF / Bacteria: Present /HPF

## 2016-12-31 NOTE — PROGRESS NOTE ADULT - SUBJECTIVE AND OBJECTIVE BOX
HEALTH ISSUES - PROBLEM Dx:  Dyspnea, unspecified type: Dyspnea, unspecified type  Essential hypertension: Essential hypertension  Type 2 diabetes mellitus with complication, unspecified long term insulin use status: Type 2 diabetes mellitus with complication, unspecified long term insulin use status  Coronary artery disease involving native heart, angina presence unspecified, unspecified vessel or lesion type: Coronary artery disease involving native heart, angina presence unspecified, unspecified vessel or lesion type  Chronic atrial fibrillation: Chronic atrial fibrillation  Lymphadenopathy: Lymphadenopathy  Acute pulmonary edema: Acute pulmonary edema  Hypokalemia: Hypokalemia  Respiratory distress: Respiratory distress  CAD (coronary artery disease): CAD (coronary artery disease)  Need for prophylactic measure: Need for prophylactic measure  Diabetes: Diabetes  Afib: Afib  IgM monoclonal gammopathy of uncertain significance: IgM monoclonal gammopathy of uncertain significance  Gammopathy: Gammopathy  B-cell lymphoma, unspecified B-cell lymphoma type, unspecified body region: B-cell lymphoma, unspecified B-cell lymphoma type, unspecified body region  Heart failure with preserved ejection fraction: Heart failure with preserved ejection fraction  Hypertension: Hypertension  Acute kidney injury: Acute kidney injury  Hypercalcemia: Hypercalcemia          CHEMOTHERAPY REGIMEN:        Day:                          Diet:  Protocol:                                    IVF:      MEDICATIONS  (STANDING):  atorvastatin 80milliGRAM(s) Oral at bedtime  aspirin  chewable 81milliGRAM(s) Oral daily  amiodarone    Tablet 200milliGRAM(s) Oral daily  pantoprazole    Tablet 40milliGRAM(s) Oral before breakfast  insulin lispro (HumaLOG) corrective regimen sliding scale  SubCutaneous Before meals and at bedtime  metoprolol succinate ER 50milliGRAM(s) Oral daily  losartan 25milliGRAM(s) Oral daily  calcitonin Injectable 190International Unit(s) IntraMuscular every 12 hours    MEDICATIONS  (PRN):  acetaminophen   Tablet. 650milliGRAM(s) Oral every 6 hours PRN Moderate Pain (4 - 6)      Allergies    IV CONtRAST (Flushing (Skin); Rash)  No Known Drug Allergies    Intolerances        DVT Prophylaxis: [ ] YES [ ] NO      Antibiotics: [ ] YES [ ] NO    Pain Scale (1-10):       Location:    Vital Signs Last 24 Hrs  T(C): 37.1, Max: 38.3 (12-30 @ 22:00)  T(F): 98.7, Max: 100.9 (12-30 @ 22:00)  HR: 80 (72 - 82)  BP: 98/47 (98/47 - 147/65)  BP(mean): 85 (80 - 94)  RR: 16 (15 - 29)  SpO2: 96% (95% - 100%)    Drug Dosing Weight  Height (cm): 152.4 (30 Dec 2016 07:02)  Weight (kg): 47 (30 Dec 2016 07:02)  BMI (kg/m2): 20.2 (30 Dec 2016 07:02)  BSA (m2): 1.41 (30 Dec 2016 07:02)     Physical Exam:  · Constitutional	detailed exam  · Constitutional Details	well-developed; well-groomed  · Eyes	EOMI; PERRL; no drainage or redness  · ENMT Comments	dry mucous membranes  · Respiratory	detailed exam  · Respiratory Comments	normal breath sounds at the lung bases bilaterally  · Cardiovascular	Regular rate & rhythm, normal S1, S2; no murmurs, gallops or rubs; no S3, S4  · Abd-Soft non tender  ·Ext-no edema, clubbing or cyanosis    URINARY CATHETER: [ ] YES [ ] NO     LABS:  CBC Full  -  ( 31 Dec 2016 07:16 )  WBC Count : 3.4 K/uL  Hemoglobin : 11.7 g/dL  Hematocrit : 37.1 %  Platelet Count - Automated : 178 K/uL  Mean Cell Volume : 87.9 fL  Mean Cell Hemoglobin : 27.7 pg  Mean Cell Hemoglobin Concentration : 31.5 g/dL  Auto Neutrophil # : x  Auto Lymphocyte # : x  Auto Monocyte # : x  Auto Eosinophil # : x  Auto Basophil # : x  Auto Neutrophil % : 59.0 %  Auto Lymphocyte % : 13.0 %  Auto Monocyte % : 18.0 %  Auto Eosinophil % : 1.0 %  Auto Basophil % : x    31 Dec 2016 07:16    136    |  98     |  14     ----------------------------<  85     3.9     |  28     |  1.13     Ca    10.7       31 Dec 2016 07:16  Phos  2.5       31 Dec 2016 07:16  Mg     1.8       31 Dec 2016 07:16    TPro  6.6    /  Alb  3.0    /  TBili  0.7    /  DBili  x      /  AST  88     /  ALT  51     /  AlkPhos  90     31 Dec 2016 07:16    PT/INR - ( 31 Dec 2016 07:16 )   PT: 11.4 sec;   INR: 1.03          PTT - ( 31 Dec 2016 07:16 )  PTT:27.1 sec  Urinalysis Basic - ( 30 Dec 2016 08:12 )    Color: Yellow / Appearance: Clear / S.020 / pH: x  Gluc: x / Ketone: NEGATIVE  / Bili: NEGATIVE / Urobili: 0.2 E.U./dL   Blood: x / Protein: 100 mg/dL / Nitrite: NEGATIVE   Leuk Esterase: NEGATIVE / RBC: < 5 /HPF / WBC < 5 /HPF   Sq Epi: x / Non Sq Epi: Few /HPF / Bacteria: Present /HPF        CULTURES:    RADIOLOGY & ADDITIONAL STUDIES:

## 2016-12-31 NOTE — PROGRESS NOTE ADULT - PROBLEM SELECTOR PLAN 4
Currently appears hypovolemic on examination.   JVD likely a manifestation of her TR/PHT/MR and not a true manifestation of volume status.

## 2016-12-31 NOTE — PROGRESS NOTE ADULT - PROBLEM SELECTOR PLAN 10
SCDs, on apixaban for atrial fibrillation  F: will adjust fluids based on calcium levels and respiratory status - caution with overdiuresis given history of CHF  E: replete lytes as needed  N: DASH diet    Dispo: pending clinical improvement  FULL CODE

## 2016-12-31 NOTE — PROGRESS NOTE ADULT - PROBLEM SELECTOR PLAN 3
Currently not hypertensive and has lower blood pressures concurrent with the patient's diuresis suggests decreased intravascular volumes    Currently off of Lasix.     Would DISCONTINUE the losartan for now. Risk of hypotension and kidney injury in the short term outweigh any long term cardiac/renal benefits at present

## 2016-12-31 NOTE — PROGRESS NOTE ADULT - PROBLEM SELECTOR PLAN 1
Resolved. Patient became SOB and tachypneic after extubation; in setting of aggressive IVF for hypercalcemia, plus history of CHF and diagnostic exam/physical exam findings, likely due to pulmonary fluid overload plus decreased respiratory drive from operative sedation. At present, patient breathing comfortably and in NAD.  - continue diuresis with furosemide, as K+ normal on laboratory studies this morning.  - maintain strict I+Os  - monitor respiratory status; HFNC at bedside if needed

## 2017-01-01 NOTE — PROGRESS NOTE ADULT - ASSESSMENT
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 Admitted to [facility] for monitoring after respiratory distress post-LN biopsy 2/2 general anesthesia/ difficulty weaning after intubation.

## 2017-01-01 NOTE — PROGRESS NOTE ADULT - ASSESSMENT
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 Admitted to [facility] for monitoring after respiratory distress post-LN biopsy 2/2 general anesthesia/ difficulty weaning after intubation.

## 2017-01-01 NOTE — PROGRESS NOTE ADULT - SUBJECTIVE AND OBJECTIVE BOX
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 She received Zometa in office on 12/24 without change in Na (last value was 13 yesterday 12/28 in Dr. Hopkins’s office). She endorses worsening of constipation, mild increased thirst with no significant increase in PO intake, chronic nighttime polyuria with no recent increase in urinary frequency, and denies change in mental status, abd pain, nausea/vomiting, recent bone fracture or skin necrosis. Has not taken any calcium supplements or new medications containing calcium. States last chemotherapy was 18 years ago. No hx of smoking, pt is retired     INTERVAL HPI/OVERNIGHT EVENTS:::transfer to [facility]    HEALTH ISSUES - PROBLEM Dx:  Dyspnea, unspecified type: Dyspnea, unspecified type  Essential hypertension: Essential hypertension  Type 2 diabetes mellitus with complication, unspecified long term insulin use status: Type 2 diabetes mellitus with complication, unspecified long term insulin use status  Coronary artery disease involving native heart, angina presence unspecified, unspecified vessel or lesion type: Coronary artery disease involving native heart, angina presence unspecified, unspecified vessel or lesion type  Chronic atrial fibrillation: Chronic atrial fibrillation  Lymphadenopathy: Lymphadenopathy  Acute pulmonary edema: Acute pulmonary edema  Hypokalemia: Hypokalemia  Respiratory distress: Respiratory distress  CAD (coronary artery disease): CAD (coronary artery disease)  Need for prophylactic measure: Need for prophylactic measure  Diabetes: Diabetes  Afib: Afib  IgM monoclonal gammopathy of uncertain significance: IgM monoclonal gammopathy of uncertain significance  Gammopathy: Gammopathy  B-cell lymphoma, unspecified B-cell lymphoma type, unspecified body region: B-cell lymphoma, unspecified B-cell lymphoma type, unspecified body region  Heart failure with preserved ejection fraction: Heart failure with preserved ejection fraction  Hypertension: Hypertension  Acute kidney injury: Acute kidney injury  Hypercalcemia: Hypercalcemia          PAST MEDICAL & SURGICAL HISTORY:  Follicular lymphoma  Neck mass  Afib  Other hyperlipidemia  Breast cancer  CAD (coronary artery disease)  Diabetes  HTN (hypertension)  No pertinent past medical history  H/O abdominal hysterectomy  H/O thyroidectomy  S/P CABG x 3          Consultant NOTE  REVIEWED  (  +++ )    REVIEW OF SYSTEMS:  [x] As per HPI  CONSTITUTIONAL weakness  RESPIRATORY: No cough, wheezing, chills or hemoptysis; No Shortness of Breath  CARDIOVASCULAR: No chest pain, palpitations,  GASTROINTESTINAL: No abdominal or epigastric pain. No nausea, vomiting, or hematemesis; No diarrhea or constipation. No melena or hematochezia.  MUSCULOSKELETAL: No joint pain or swelling; No muscle, back, or extremity pain weakness  PSYCH    awake, alert       [x] All others negative	  [ ] Unable to obtain          Vital Signs Last 24 Hrs  T(C): 36.6, Max: 37.8 (01-01 @ 14:19)  T(F): 97.9, Max: 100.1 (01-01 @ 14:19)  HR: 80 (75 - 88)  BP: 110/60 (101/50 - 134/74)  BP(mean): --  RR: 16 (14 - 20)  SpO2: 96% (93% - 98%)      I & Os for 24h ending 01-01 @ 07:00  =============================================  IN: 660 ml / OUT: 2080 ml / NET: -1420 ml    I & Os for current day (as of 01-01 @ 17:28)  =============================================  IN: 270 ml / OUT: 750 ml / NET: -480 ml    PHYSICAL EXAMINATION:                                    (  +++  )  NO CHANGE  Appearance: Normal	  HEENT:   Normal oral mucosa, PERRL, EOMI	  Neck: Supple, + JVD/ - JVD; Carotid Bruit   Cardiovascular: Normal S1 S2, No  s/p CABG  Respiratory: Lungs clear to auscultation/Decreased Breath Sounds/No Rales, Rhonchi, Wheezing	  Gastrointestinal:  Soft, Non-tender, + BS	  Skin: No rashes, No ecchymoses, No cyanosis  Extremities: Normal range of motion,  Vascular: Peripheral pulses palpable   Neurologic: Non-focal  Psychiatry: A & O x 3, Mood & affect appropriate    atorvastatin 80milliGRAM(s) Oral at bedtime  aspirin  chewable 81milliGRAM(s) Oral daily  amiodarone    Tablet 200milliGRAM(s) Oral daily  pantoprazole    Tablet 40milliGRAM(s) Oral before breakfast  insulin lispro (HumaLOG) corrective regimen sliding scale  SubCutaneous Before meals and at bedtime  metoprolol succinate ER 50milliGRAM(s) Oral daily  acetaminophen   Tablet. 650milliGRAM(s) Oral every 6 hours PRN  bisacodyl Suppository 10milliGRAM(s) Rectal daily PRN  apixaban 2.5milliGRAM(s) Oral two times a day        PT/INR - ( 31 Dec 2016 07:16 )   PT: 11.4 sec;   INR: 1.03          PTT - ( 31 Dec 2016 07:16 )  PTT:27.1 sec    CARDIAC MARKERS ( 31 Dec 2016 16:51 )  0.050 ng/mL / x     / x     / x     / x      CARDIAC MARKERS ( 31 Dec 2016 07:16 )  0.067 ng/mL / x     / 68 U/L / x     / 2.2 ng/mL  CARDIAC MARKERS ( 30 Dec 2016 20:25 )  0.061 ng/mL / x     / 86 U/L / x     / 3.0 ng/mL                            11.9   3.5   )-----------( 164      ( 01 Jan 2017 10:13 )             37.5     01 Jan 2017 06:44    136    |  100    |  18     ----------------------------<  97     4.1     |  26     |  1.11     Ca    10.2       01 Jan 2017 06:44  Phos  2.5       31 Dec 2016 07:16  Mg     1.9       01 Jan 2017 06:44    TPro  x      /  Alb  2.8    /  TBili  x      /  DBili  x      /  AST  x      /  ALT  x      /  AlkPhos  x      01 Jan 2017 06:44      CAPILLARY BLOOD GLUCOSE  114 (01 Jan 2017 16:30)  124 (01 Jan 2017 11:00)  120 (31 Dec 2016 21:00)

## 2017-01-01 NOTE — PROGRESS NOTE ADULT - PROBLEM SELECTOR PLAN 4
Recent admission last month for CHF exacerbation. HFpEF (55%), severe MR&TR  - continue with diuresis as above  - c/w home Metoprolol ER 50mg qd, patient with PPM  - hold Losartan 25mg qd 2/2 ROMEO and hypotension

## 2017-01-01 NOTE — PROGRESS NOTE ADULT - PROBLEM SELECTOR PLAN 1
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   As PTH is low, would complete workup with PTHrP levels     As for management:  Keep off IV fluids at present  Discontinue IM calcitonin    Expectant monitoring  Will need chemotherapy by heme once final diagnosis is known to treat underlying lymphoma.    If hypercalcemia rises again, would resume her home dose of Lasix

## 2017-01-01 NOTE — PROGRESS NOTE ADULT - PROBLEM SELECTOR PLAN 2
Dyspnea now resolved, Resolved. Most likely 2/2 aggressive hydration along with sedation during procedure. Now patient much more comfortable, will continue to monitor respiratory status.

## 2017-01-01 NOTE — PROGRESS NOTE ADULT - PROBLEM SELECTOR PLAN 1
Downtrending, with Ca+2 of 10.7 this morning. Likely secondary to malignancy. Currently asymptomatic with normal mentation and no complaint of pain anywhere.  - f/u BMP trend K and Ca  - f/u [facility]  - no IV fluids for now  - d/c calcitonin  - if hypercalcemic again, would resume her home dose of Lasix.   - follow up nephrology recommendations Currently downtrending, likely 2/2 malignancy. patient is asymptomatic, no bone pain,no muscle spasms, will continue to monitor for symptoms. No need for calcitonin at this time. No IV fluids now in setting of normalizing Ca and now resolved pulmonary edema.  Daily BMPs, f/u [facility]    - if hypercalcemic again, would resume her home dose of Lasix.   - follow up nephrology recommendations

## 2017-01-01 NOTE — PROGRESS NOTE ADULT - PROBLEM SELECTOR PLAN 10
SCDs, on apixaban for atrial fibrillation  F: will adjust fluids based on calcium levels and respiratory status - caution with overdiuresis given history of CHF  E: replete lytes as needed  N: DASH diet    Dispo: step down to [facility]  FULL CODE

## 2017-01-01 NOTE — PROGRESS NOTE ADULT - SUBJECTIVE AND OBJECTIVE BOX
ENT [facility] DAILY PROGRESS NOTE    Overnight events/Interval HPI: TEJINDER. pain controlled. no voice changes or dyspnea.        Allergies    IV CONtRAST (Flushing (Skin); Rash)  No Known Drug Allergies    Intolerances        MEDICATIONS:  Antiinfectives:     IV fluids:    Hematologic/Anticoagulation:  aspirin  chewable 81milliGRAM(s) Oral daily    Pain medications/Neuro:  acetaminophen   Tablet. 650milliGRAM(s) Oral every 6 hours PRN    Endocrine Medications:   atorvastatin 80milliGRAM(s) Oral at bedtime  insulin lispro (HumaLOG) corrective regimen sliding scale  SubCutaneous Before meals and at bedtime    All other standing medications:   amiodarone    Tablet 200milliGRAM(s) Oral daily  pantoprazole    Tablet 40milliGRAM(s) Oral before breakfast  metoprolol succinate ER 50milliGRAM(s) Oral daily  losartan 25milliGRAM(s) Oral daily    All other PRN medications:  bisacodyl Suppository 10milliGRAM(s) Rectal daily PRN      Vital Signs Last 24 Hrs  T(C): 37.7, Max: 37.7 (12-31 @ 21:00)  T(F): 99.8, Max: 99.8 (12-31 @ 21:00)  HR: 79 (75 - 80)  BP: 108/56 (108/56 - 134/74)  BP(mean): --  RR: 14 (14 - 20)  SpO2: 97% (93% - 99%)    I & Os for 24h ending 01-01 @ 07:00  =============================================  IN:    Oral Fluid: 660 ml    Total IN: 660 ml  ---------------------------------------------  OUT:    Intermittent Catheterization - Urethral: 1100 ml    Voided: 980 ml    Total OUT: 2080 ml  ---------------------------------------------  Total NET: -1420 ml    I & Os for current day (as of 01-01 @ 09:53)  =============================================  IN:    Oral Fluid: 150 ml    Total IN: 150 ml  ---------------------------------------------  OUT:    Intermittent Catheterization - Urethral: 550 ml    Total OUT: 550 ml  ---------------------------------------------  Total NET: -400 ml        PHYSICAL EXAM:    PE:  NAD. comfortable  . no increased WOB. no stridor  neck soft and flat. no ecchymosis. no e/o hematoma. incision c/d/i  LABS:  CBC-      Coagulation Studies-  PT/INR - ( 31 Dec 2016 07:16 )   PT: 11.4 sec;   INR: 1.03          PTT - ( 31 Dec 2016 07:16 )  PTT:27.1 sec  Endocrine Panel-  Calcium, Total Serum: 10.2 mg/dL (01-01 @ 06:44)  Calcium, Total Serum: 10.4 mg/dL (12-31 @ 16:51)              RADIOLOGY & ADDITIONAL STUDIES:       Assessment/Plan:  A/P: 85F s/p right lymph node excisional biopsy  - pain control prn  - can use cepacol q3h prn for sore throats  - anti emetics prn  - may resume regular diet   - NO SURGICAL CONTRAINDICATION TO RESTARTING ELIQUIS  - care per Medicine  - care per oncology  - will sign off.  please reconsult PRN. [phone]

## 2017-01-01 NOTE — PROGRESS NOTE ADULT - PROBLEM SELECTOR PLAN 4
Currently appears not overtly hypervolemic on examination.   JVD likely a manifestation of her TR/PHT/MR and may not be a true manifestation of increased total body volume.

## 2017-01-01 NOTE — PROGRESS NOTE ADULT - PROBLEM SELECTOR PLAN 3
Concern for B-cell lymphoma on outpatient FNA.   - POD#2 from cervical lymph node excisional biopsy by Dr. Westfall, doing well.  - f/u biopsy results, pathology pending

## 2017-01-01 NOTE — PROGRESS NOTE ADULT - PROBLEM SELECTOR PLAN 10
SCDs, on apixaban for atrial fibrillation  F: will adjust fluids based on calcium levels and respiratory status - caution with overdiuresis given history of CHF  E: replete lytes as needed  N: DASH diet    Dispo: step down to [facility]  FULL CODE SCDs, on apixaban for atrial fibrillation  F: will adjust fluids based on calcium levels and respiratory status - caution with overdiuresis given history of CHF  E: replete lytes as needed  N: DASH diet    Dispo: now on RMF.   FULL CODE

## 2017-01-01 NOTE — PROVIDER CONTACT NOTE (FALL NOTIFICATION) - BACKGROUND
Mrs. Carrillo was admitted for hypercalcemia on December 29, 2016. She is alert and oriented X3. walks with slow steady gait and able to make her needs known.

## 2017-01-01 NOTE — PROGRESS NOTE ADULT - ASSESSMENT
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 Admitted to [facility] for monitoring after respiratory distress post-LN biopsy 2/2 general anesthesia/ difficulty weaning after intubation.

## 2017-01-01 NOTE — PROGRESS NOTE ADULT - PROBLEM SELECTOR PLAN 9
Has been normotensive in the hospital  - c/w cardiac medications as above, no addition antiHTN at this time    #MILD TROPONINEMIA. detected mild troponinemia following procedure; patient asymptomatic without SOB, diaphoresis, palpitations, and/or chest pain. in setting of tachypnea/difficult extubation yesterday, likely demand ischemia.  - trend to peak

## 2017-01-01 NOTE — PROGRESS NOTE ADULT - PROBLEM SELECTOR PLAN 4
Recent admission last month for CHF exacerbation. HFpEF (55%), severe MR&TR  - continue with diuresis as above  - c/w home Metoprolol ER 50mg qd, patient with PPM  - c/w Losartan 25mg qd Recent admission last month for CHF exacerbation. HFpEF (55%), severe MR&TR  - continue with diuresis as above  - c/w home Metoprolol ER 50mg qd, patient with PPM  - hold Losartan 25mg qd 2/2 ROMEO and hypotension

## 2017-01-01 NOTE — PROGRESS NOTE ADULT - SUBJECTIVE AND OBJECTIVE BOX
INTERVAL HPI: post procedure s complications no cp sob palp  	  MEDICATIONS:  amiodarone    Tablet 200milliGRAM(s) Oral daily  metoprolol succinate ER 50milliGRAM(s) Oral daily        acetaminophen   Tablet. 650milliGRAM(s) Oral every 6 hours PRN    pantoprazole    Tablet 40milliGRAM(s) Oral before breakfast  bisacodyl Suppository 10milliGRAM(s) Rectal daily PRN    atorvastatin 80milliGRAM(s) Oral at bedtime  insulin lispro (HumaLOG) corrective regimen sliding scale  SubCutaneous Before meals and at bedtime    aspirin  chewable 81milliGRAM(s) Oral daily      REVIEW OF SYSTEMS:  [x] As per HPI  CONSTITUTIONAL: No fever, weight loss, or fatigue  RESPIRATORY: No cough, wheezing, chills or hemoptysis; No Shortness of Breath  CARDIOVASCULAR: No chest pain, palpitations, dizziness, or leg swelling  GASTROINTESTINAL: No abdominal or epigastric pain. No nausea, vomiting, or hematemesis; No diarrhea or constipation. No melena or hematochezia.  MUSCULOSKELETAL: No joint pain or swelling; No muscle, back, or extremity pain  [x] All others negative	  [ ] Unable to obtain    PHYSICAL EXAM:  T(C): 37.3, Max: 37.7 (12-31 @ 21:00)  HR: 79 (75 - 80)  BP: 108/56 (108/56 - 134/74)  RR: 14 (14 - 20)  SpO2: 97% (93% - 99%)  Wt(kg): --  I&O's Summary        Appearance: Normal	  HEENT:   Normal oral mucosa  Cardiovascular: Normal S1 S2, No JVD, No murmurs, No edema  Respiratory: Lungs clear to auscultation	  Gastrointestinal:  Soft, Non-tender, + BS	  Extremities: Normal range of motion, No clubbing, cyanosis or edema  Vascular: Peripheral pulses palpable 2+ bilaterally    TELEMETRY: 	  no events  ECG:  Diagnosis Line Electronic atrial pacemaker  T wave abnormality, consider anterior ischemia  	  RADIOLOGY:   CXR:No interval change compared to 12/29/2016  CT:  US:    CARDIAC TESTING:  Echocardiogram:  Catheterization:  Stress Test:      LABS:	 	    CARDIAC MARKERS:  Troponin I, Serum: 0.050 ng/mL (12-31 @ 16:51)                                  11.9   3.5   )-----------( 164      ( 01 Jan 2017 10:13 )             37.5     01 Jan 2017 06:44    136    |  100    |  18     ----------------------------<  97     4.1     |  26     |  1.11     Ca    10.2       01 Jan 2017 06:44  Phos  2.5       31 Dec 2016 07:16  Mg     1.9       01 Jan 2017 06:44    TPro  x      /  Alb  2.8    /  TBili  x      /  DBili  x      /  AST  x      /  ALT  x      /  AlkPhos  x      01 Jan 2017 06:44    proBNP:   Lipid Profile:   HgA1c:   TSH:     ASSESSMENT/PLAN: 	  ·  Problem: Heart failure with preserved ejection fraction.  Plan: Recent admission last month for CHF exacerbation. HFpEF (55%), severe MR&TR  - continue with diuresis as above  - c/w home Metoprolol ER 50mg qd, patient with PPM  - c/w Losartan 25mg qd.     Problem/Plan  Problem: Chronic atrial fibrillation. Plan: Known atrial fibrillation on apixaban 2.5mg BID at home  - AC resumed  pt has CHADSVASC of 7  - continue amiodarone 200mg qd.    Problem/Plan   ·  Problem: Coronary artery disease involving native heart, angina presence unspecified, unspecified vessel or lesion type.  Plan: Known CAD s/p CABG, no chest pain at this time  - continue ASA 81mg, Lipitor 80mg qhs.   -troponin leak post procedure, no symproms, no needto trend.     Problem/Plan   ·  Problem: Essential hypertension.  Plan: Has been normotensive in the hospital  - c/w cardiac medications as above, no addition antiHTN at this time.

## 2017-01-01 NOTE — PROGRESS NOTE ADULT - PROBLEM SELECTOR PLAN 1
Resolved. Patient became SOB and tachypneic after extubation; in setting of aggressive IVF for hypercalcemia, plus history of CHF and diagnostic exam/physical exam findings, likely due to pulmonary fluid overload plus decreased respiratory drive from operative sedation. At present, patient breathing comfortably and in NAD.

## 2017-01-01 NOTE — PROGRESS NOTE ADULT - SUBJECTIVE AND OBJECTIVE BOX
TRANSFER TO [facility]: SEE PREVIOUS NOTE for hospital course    INTERVAL HPI/OVERNIGHT EVENTS: POD #2 for cervical LN biopsy. Patient seen and examined at bedside this morning denies shortness of breath, cough, chest pain, N/V/D, and/or urinary symptoms.    VITAL SIGNS:  T(F): 99.8  HR: 79  BP: 108/56  RR: 14  SpO2: 97%  Wt(kg): --    PHYSICAL EXAM:    Constitutional: well-appearing, well-developed, NAD  Eyes: PERRL  ENMT: MMM  Neck: supple  Respiratory: mild crackles L base  Cardiovascular: RRR, normal S1/S2  Gastrointestinal: soft, NTND, BS+  Extremities: WWP, no edema, cyanosis  Vascular: DP pulses 2+ b/l  Neurological: AOx3, responds to all commands, CNII-XII grossly intact  Musculoskeletal: moving all extremities    MEDICATIONS  (STANDING):  atorvastatin 80milliGRAM(s) Oral at bedtime  aspirin  chewable 81milliGRAM(s) Oral daily  amiodarone    Tablet 200milliGRAM(s) Oral daily  pantoprazole    Tablet 40milliGRAM(s) Oral before breakfast  insulin lispro (HumaLOG) corrective regimen sliding scale  SubCutaneous Before meals and at bedtime  metoprolol succinate ER 50milliGRAM(s) Oral daily  losartan 25milliGRAM(s) Oral daily    MEDICATIONS  (PRN):  acetaminophen   Tablet. 650milliGRAM(s) Oral every 6 hours PRN Moderate Pain (4 - 6)  bisacodyl Suppository 10milliGRAM(s) Rectal daily PRN Constipation      Allergies    IV CONtRAST (Flushing (Skin); Rash)  No Known Drug Allergies    Intolerances        LABS:                        11.7   3.4   )-----------( 167      ( 01 Jan 2017 06:44 )             36.7     01 Jan 2017 06:44    136    |  100    |  18     ----------------------------<  97     4.1     |  26     |  1.11     Ca    10.2       01 Jan 2017 06:44  Phos  2.5       31 Dec 2016 07:16  Mg     1.9       01 Jan 2017 06:44    TPro  x      /  Alb  2.8    /  TBili  x      /  DBili  x      /  AST  x      /  ALT  x      /  AlkPhos  x      01 Jan 2017 06:44    PT/INR - ( 31 Dec 2016 07:16 )   PT: 11.4 sec;   INR: 1.03          PTT - ( 31 Dec 2016 07:16 )  PTT:27.1 sec      RADIOLOGY & ADDITIONAL TESTS: TRANSFER TO [facility]: SEE PREVIOUS NOTE for hospital course    INTERVAL HPI/OVERNIGHT EVENTS: POD #2 for cervical LN biopsy. Patient seen and examined at bedside this morning denies shortness of breath, cough, chest pain, N/V/D, and/or urinary symptoms, denies myalgia, numbness/tingling in extremities.    VITAL SIGNS:  T(F): 99.8  HR: 79  BP: 108/56  RR: 14  SpO2: 97%  Wt(kg): --    PHYSICAL EXAM:    Constitutional: well-appearing, well-developed, NAD  Eyes: PERRL  ENMT: MMM  Neck: supple  Respiratory: mild crackles L base  Cardiovascular: RRR, normal S1/S2  Gastrointestinal: soft, NTND, BS+  Extremities: WWP, no edema, cyanosis  Vascular: DP pulses 2+ b/l  Neurological: AOx3, responds to all commands, CNII-XII grossly intact  Musculoskeletal: moving all extremities    MEDICATIONS  (STANDING):  atorvastatin 80milliGRAM(s) Oral at bedtime  aspirin  chewable 81milliGRAM(s) Oral daily  amiodarone    Tablet 200milliGRAM(s) Oral daily  pantoprazole    Tablet 40milliGRAM(s) Oral before breakfast  insulin lispro (HumaLOG) corrective regimen sliding scale  SubCutaneous Before meals and at bedtime  metoprolol succinate ER 50milliGRAM(s) Oral daily  losartan 25milliGRAM(s) Oral daily    MEDICATIONS  (PRN):  acetaminophen   Tablet. 650milliGRAM(s) Oral every 6 hours PRN Moderate Pain (4 - 6)  bisacodyl Suppository 10milliGRAM(s) Rectal daily PRN Constipation      Allergies    IV CONtRAST (Flushing (Skin); Rash)  No Known Drug Allergies    Intolerances        LABS:                        11.7   3.4   )-----------( 167      ( 01 Jan 2017 06:44 )             36.7     01 Jan 2017 06:44    136    |  100    |  18     ----------------------------<  97     4.1     |  26     |  1.11     Ca    10.2       01 Jan 2017 06:44  Phos  2.5       31 Dec 2016 07:16  Mg     1.9       01 Jan 2017 06:44    TPro  x      /  Alb  2.8    /  TBili  x      /  DBili  x      /  AST  x      /  ALT  x      /  AlkPhos  x      01 Jan 2017 06:44    PT/INR - ( 31 Dec 2016 07:16 )   PT: 11.4 sec;   INR: 1.03          PTT - ( 31 Dec 2016 07:16 )  PTT:27.1 sec      RADIOLOGY & ADDITIONAL TESTS: TRANSFER TO [facility]: SEE PREVIOUS NOTE for hospital course    INTERVAL HPI/OVERNIGHT EVENTS: POD #2 for cervical LN biopsy. Patient seen and examined at bedside this morning denies shortness of breath, cough, chest pain, N/V/D, and/or urinary symptoms, denies myalgia, numbness/tingling in extremities.    VITAL SIGNS:  T(F): 99.8  HR: 79  BP: 108/56  RR: 14  SpO2: 97%  Wt(kg): --    PHYSICAL EXAM:    Constitutional: well-appearing, well-developed, NAD  Eyes: PERRL  ENMT: MMM  Neck: supple, JVD  Respiratory: mild crackles L base  Cardiovascular: RRR, normal S1/S2  Gastrointestinal: soft, NTND, BS+  Extremities: WWP, no edema, cyanosis  Vascular: DP pulses 2+ b/l  Neurological: AOx3, responds to all commands, CNII-XII grossly intact  Musculoskeletal: moving all extremities    MEDICATIONS  (STANDING):  atorvastatin 80milliGRAM(s) Oral at bedtime  aspirin  chewable 81milliGRAM(s) Oral daily  amiodarone    Tablet 200milliGRAM(s) Oral daily  pantoprazole    Tablet 40milliGRAM(s) Oral before breakfast  insulin lispro (HumaLOG) corrective regimen sliding scale  SubCutaneous Before meals and at bedtime  metoprolol succinate ER 50milliGRAM(s) Oral daily  losartan 25milliGRAM(s) Oral daily    MEDICATIONS  (PRN):  acetaminophen   Tablet. 650milliGRAM(s) Oral every 6 hours PRN Moderate Pain (4 - 6)  bisacodyl Suppository 10milliGRAM(s) Rectal daily PRN Constipation      Allergies    IV CONtRAST (Flushing (Skin); Rash)  No Known Drug Allergies    Intolerances        LABS:                        11.7   3.4   )-----------( 167      ( 01 Jan 2017 06:44 )             36.7     01 Jan 2017 06:44    136    |  100    |  18     ----------------------------<  97     4.1     |  26     |  1.11     Ca    10.2       01 Jan 2017 06:44  Phos  2.5       31 Dec 2016 07:16  Mg     1.9       01 Jan 2017 06:44    TPro  x      /  Alb  2.8    /  TBili  x      /  DBili  x      /  AST  x      /  ALT  x      /  AlkPhos  x      01 Jan 2017 06:44    PT/INR - ( 31 Dec 2016 07:16 )   PT: 11.4 sec;   INR: 1.03          PTT - ( 31 Dec 2016 07:16 )  PTT:27.1 sec      RADIOLOGY & ADDITIONAL TESTS:

## 2017-01-01 NOTE — PROVIDER CONTACT NOTE (FALL NOTIFICATION) - ACTION/TREATMENT ORDERED:
No treatment at this time. Nurse will continue to monitor patient for any signs or symptoms of pain, bruising or distress.

## 2017-01-01 NOTE — PROGRESS NOTE ADULT - SUBJECTIVE AND OBJECTIVE BOX
Transfer Accept Note to [facility]  The patient is a 85F with a PMHx which includes IgM gammopathy, HFpEF with recent admission for CHF exacerbation, recent CT neck showing LAD and parotid mass concerning for B-cell lymphoma, as well as other co-morbidities including HTN, DM2, right breast CA in remission (s/p mastectomy/chemotx/radiation), atrial fibrillation (s/p PPM, on Eliquis), s/p left hemithyroidectomy, CAD (s/p CABG), and severe MR/TR (seen on recent Echo 11/2016) who presented to [facility] for weakness, found to have hypercalcemia of 13, likely 2/2 to malignancy in setting of lymphadenopathy. She underwent a cervical lymph node biopsy which  itself was uncomplicated, except that she became very short of breath post-procedure and was transferred to the ICU stepdown unit for further monitoring. CXR showed pulmonary edema. She was found to be hypokalemia to 2.8, which was corrected with aldactone. Her course in the stepdown unit was without complication. She is now stable for continuing care on regional medical floors.      INTERVAL HPI/OVERNIGHT EVENTS:    VITAL SIGNS:  T(F): 97.9  HR: 80  BP: 110/60  RR: 16  SpO2: 96%  Wt(kg): --    PHYSICAL EXAM:    Constitutional: Frail, NAD,   Eyes: PERRLA, EOMI  ENMT: MMM, no sinus tenderness, but rhinorrhea present  Neck: diffuse cervical lymphadenopathy, large mass on R side 4x4   Respiratory: decreased breath sounds bilaterally, but good air movement otherwise, without wheezes, rhonci  Cardiovascular: 2/6 early systolic murmur that worsens with inspiration over the ULSB   Gastrointestinal: NTND NBSx4  Extremities: WWP  Vascular: 2+ distal pulses  Neurological: AAOx3    MEDICATIONS  (STANDING):  atorvastatin 80milliGRAM(s) Oral at bedtime  aspirin  chewable 81milliGRAM(s) Oral daily  amiodarone    Tablet 200milliGRAM(s) Oral daily  pantoprazole    Tablet 40milliGRAM(s) Oral before breakfast  insulin lispro (HumaLOG) corrective regimen sliding scale  SubCutaneous Before meals and at bedtime  metoprolol succinate ER 50milliGRAM(s) Oral daily  apixaban 2.5milliGRAM(s) Oral two times a day    MEDICATIONS  (PRN):  acetaminophen   Tablet. 650milliGRAM(s) Oral every 6 hours PRN Moderate Pain (4 - 6)  bisacodyl Suppository 10milliGRAM(s) Rectal daily PRN Constipation      Allergies    IV CONtRAST (Flushing (Skin); Rash)  No Known Drug Allergies    Intolerances        LABS:                        11.9   3.5   )-----------( 164      ( 01 Jan 2017 10:13 )             37.5     01 Jan 2017 06:44    136    |  100    |  18     ----------------------------<  97     4.1     |  26     |  1.11     Ca    10.2       01 Jan 2017 06:44  Phos  2.5       31 Dec 2016 07:16  Mg     1.9       01 Jan 2017 06:44    TPro  x      /  Alb  2.8    /  TBili  x      /  DBili  x      /  AST  x      /  ALT  x      /  AlkPhos  x      01 Jan 2017 06:44    PT/INR - ( 31 Dec 2016 07:16 )   PT: 11.4 sec;   INR: 1.03          PTT - ( 31 Dec 2016 07:16 )  PTT:27.1 sec      RADIOLOGY & ADDITIONAL TESTS:

## 2017-01-01 NOTE — PROVIDER CONTACT NOTE (FALL NOTIFICATION) - ASSESSMENT
Upon enter the room Mrs. Carrillo  was found assisting her off the bathroom floor. She was then escorted back to her bed. On physical assessment Mrs. Carrillo stated that she hit her left shoulder on the floor, notable injuries found.

## 2017-01-01 NOTE — PROGRESS NOTE ADULT - SUBJECTIVE AND OBJECTIVE BOX
Patient seen with resident team, agree with resident notation.  Status post right neck lymph node biopsy with concern for lymphoma.  Doing well from post surgical standpoint. Pathology pending.

## 2017-01-01 NOTE — PROGRESS NOTE ADULT - PROBLEM SELECTOR PLAN 2
Downtrending, with Ca+2 of 10.7 this morning. Likely secondary to malignancy. Currently asymptomatic with normal mentation and no complaint of pain anywhere.  - f/u BMP trend K and Ca  - follow up nephrology recommendations re: calcitonin Downtrending, with Ca+2 of 10.7 this morning. Likely secondary to malignancy. Currently asymptomatic with normal mentation and no complaint of pain anywhere.  - f/u BMP trend K and Ca  - f/u PTrH  - no IV fluids for now  - d/c calcitonin  - if hypercalcemic again, would resume her home dose of Lasix.   - follow up nephrology recommendations

## 2017-01-01 NOTE — PROGRESS NOTE ADULT - SUBJECTIVE AND OBJECTIVE BOX
Patient seen and examined at bedside.   Ambulating to restroom  No IV fluids started yesterday (which at time of this assessment is appropriate)  No dyspnea, no orhtopnea, no leg swelling  urinating plentiful urine at present     No cramping at persent noted.     atorvastatin 80milliGRAM(s) at bedtime  aspirin  chewable 81milliGRAM(s) daily  amiodarone    Tablet 200milliGRAM(s) daily  pantoprazole    Tablet 40milliGRAM(s) before breakfast  insulin lispro (HumaLOG) corrective regimen sliding scale  Before meals and at bedtime  metoprolol succinate ER 50milliGRAM(s) daily  losartan 25milliGRAM(s) daily  acetaminophen   Tablet. 650milliGRAM(s) every 6 hours PRN  bisacodyl Suppository 10milliGRAM(s) daily PRN      Allergies    IV CONtRAST (Flushing (Skin); Rash)  No Known Drug Allergies    Intolerances        T(C): , Max: 37.7 (12-31 @ 21:00)  T(F): , Max: 99.8 (12-31 @ 21:00)  HR: 79  BP: 108/56  BP(mean): --  RR: 14  SpO2: 97%  Wt(kg): --  I & Os for 24h ending 01-01 @ 07:00  =============================================  IN:    Oral Fluid: 660 ml    Total IN: 660 ml  ---------------------------------------------  OUT:    Intermittent Catheterization - Urethral: 1100 ml    Voided: 980 ml    Total OUT: 2080 ml  ---------------------------------------------  Total NET: -1420 ml    I & Os for current day (as of 01-01 @ 11:26)  =============================================  IN:    Oral Fluid: 150 ml    Total IN: 150 ml  ---------------------------------------------  OUT:    Intermittent Catheterization - Urethral: 550 ml    Total OUT: 550 ml  ---------------------------------------------  Total NET: -400 ml          LABS:                        11.9   3.5   )-----------( 164      ( 01 Jan 2017 10:13 )             37.5     01 Jan 2017 06:44    136    |  100    |  18     ----------------------------<  97     4.1     |  26     |  1.11     Ca    10.2       01 Jan 2017 06:44  Phos  2.5       31 Dec 2016 07:16  Mg     1.9       01 Jan 2017 06:44    TPro  x      /  Alb  2.8    /  TBili  x      /  DBili  x      /  AST  x      /  ALT  x      /  AlkPhos  x      01 Jan 2017 06:44      PT/INR - ( 31 Dec 2016 07:16 )   PT: 11.4 sec;   INR: 1.03          PTT - ( 31 Dec 2016 07:16 )  PTT:27.1 sec          RADIOLOGY & ADDITIONAL STUDIES:

## 2017-01-01 NOTE — PROVIDER CONTACT NOTE (FALL NOTIFICATION) - SITUATION
Mrs. Carrillo was escorted to the bathroom and instructed to call for help, she failed to call and when asked she stated "I was trying to go wash my hands".

## 2017-01-02 NOTE — PHYSICAL THERAPY INITIAL EVALUATION ADULT - PERTINENT HX OF CURRENT PROBLEM, REHAB EVAL
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

## 2017-01-02 NOTE — PROGRESS NOTE ADULT - PROBLEM SELECTOR PLAN 3
Blood pressure is borderline low. Patient is off losartan and lasix.   continue with metoprolol 50mg qdaily. Monitor blood pressure closely and avoid hypotension.

## 2017-01-02 NOTE — PROGRESS NOTE ADULT - PROBLEM SELECTOR PLAN 3
Concern for B-cell lymphoma on outpatient FNA.   - POD#2 from cervical lymph node excisional biopsy by Dr. Westfall, doing well.  - f/u biopsy results, pathology pending Concern for B-cell lymphoma on outpatient FNA.   - POD#3 from cervical lymph node excisional biopsy by Dr. Westfall, doing well.  - f/u biopsy results, pathology pending

## 2017-01-02 NOTE — PROGRESS NOTE ADULT - PROBLEM SELECTOR PLAN 2
Dyspnea now resolved, Resolved. Most likely 2/2 aggressive hydration along with sedation during procedure. Now patient much more comfortable, will continue to monitor respiratory status. Most likely 2/2 aggressive hydration along with sedation during procedure. Now patient much more comfortable, will continue to monitor respiratory status.  - d/c nasal cannula

## 2017-01-02 NOTE — CONSULT NOTE ADULT - SUBJECTIVE AND OBJECTIVE BOX
HPI:  86 y/o F w/ hx of IgM gammopathy recently admitted (11/7 to 11/14) for HFpEF exacerbation (and found to have CT neck showing lymphadenopathy and parotid mass) with recent FNA bx of LN concerning for B-cell lymphoma and pending BM bx, with other co-morbidities including HTN, DM2, right sided breast CA in remission (s/p mastectomy/chemotx/radiation), a-fib s/p PPM on Eliquis, s/p left hemithyroidectomy, CAD s/p CABG, severe MR/TR (seen on recent Echo 11/2016), presents c/o 3-4 weeks of gradual onset generalized weakness/low energy/lightheadedness, progressively worsening, in the setting of persistent new-onset hypercalcemia (not seen on previous admissions) as outpatient. She received Zometa in office on 12/24 without change in Na (last value was 13 yesterday 12/28 in Dr. Hopkins’s office). She endorses worsening of constipation, mild increased thirst with no significant increase in PO intake, chronic nighttime polyuria with no recent increase in urinary frequency, and denies change in mental status, abd pain, nausea/vomiting, recent bone fracture or skin necrosis. Has not taken any calcium supplements or new medications containing calcium. States last chemotherapy was 18 years ago. 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  Initial vitals in ED: 98.4F, HR 90, 120/69, RR 18, 96% on RA. Started on IV fluids. (29 Dec 2016 14:41)    FAMILY HISTORY:  No pertinent family history in first degree relatives    MEDICATIONS  (STANDING):  atorvastatin 80milliGRAM(s) Oral at bedtime  aspirin  chewable 81milliGRAM(s) Oral daily  amiodarone    Tablet 200milliGRAM(s) Oral daily  pantoprazole    Tablet 40milliGRAM(s) Oral before breakfast  insulin lispro (HumaLOG) corrective regimen sliding scale  SubCutaneous Before meals and at bedtime  metoprolol succinate ER 50milliGRAM(s) Oral daily  apixaban 2.5milliGRAM(s) Oral two times a day    MEDICATIONS  (PRN):  acetaminophen   Tablet. 650milliGRAM(s) Oral every 6 hours PRN Moderate Pain (4 - 6)  bisacodyl Suppository 10milliGRAM(s) Rectal daily PRN Constipation    Vital Signs Last 24 Hrs  T(C): 37, Max: 37.8 (01-01 @ 14:19)  T(F): 98.6, Max: 100.1 (01-01 @ 14:19)  HR: 86 (75 - 88)  BP: 110/67 (101/50 - 134/74)  BP(mean): --  RR: 18 (14 - 20)  SpO2: 98% (93% - 98%)PHYSICAL EXAM:    Constitutional:    Neck:    Breasts:    Respiratory:    Cardiovascular:    Gastrointestinal:    Extremities:    Neurological:    Skin:    Lymph Nodes:      Labs:  CBC Full  -  ( 01 Jan 2017 10:13 )  WBC Count : 3.5 K/uL  Hemoglobin : 11.9 g/dL  Hematocrit : 37.5 %  Platelet Count - Automated : 164 K/uL  Mean Cell Volume : 87.4 fL  Mean Cell Hemoglobin : 27.7 pg  Mean Cell Hemoglobin Concentration : 31.7 g/dL  Auto Neutrophil # : x  Auto Lymphocyte # : x  Auto Monocyte # : x  Auto Eosinophil # : x  Auto Basophil # : x  Auto Neutrophil % : x  Auto Lymphocyte % : x  Auto Monocyte % : x  Auto Eosinophil % : x  Auto Basophil % : x    01 Jan 2017 06:44    136    |  100    |  18     ----------------------------<  97     4.1     |  26     |  1.11     Ca    10.2       01 Jan 2017 06:44  Phos  2.5       31 Dec 2016 07:16  Mg     1.9       01 Jan 2017 06:44    TPro  x      /  Alb  2.8    /  TBili  x      /  DBili  x      /  AST  x      /  ALT  x      /  AlkPhos  x      01 Jan 2017 06:44      Radiology:  HEALTH ISSUES - R/O PROBLEM Dx:    Assessmant:  1) patient known to me from previous admission with right cervical adenopathy lymphoma exact type not known  2)h/o breast ca right side appears to be in remission  3)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

## 2017-01-02 NOTE — PROGRESS NOTE ADULT - PROBLEM SELECTOR PLAN 1
Currently downtrending, likely 2/2 malignancy. patient is asymptomatic, no bone pain,no muscle spasms, will continue to monitor for symptoms. No need for calcitonin at this time. No IV fluids now in setting of normalizing Ca and now resolved pulmonary edema.  Daily BMPs, f/u [facility]    - if hypercalcemic again, would resume her home dose of Lasix.   - follow up nephrology recommendations Currently downtrending, likely 2/2 malignancy. patient is asymptomatic, no bone pain,no muscle spasms, will continue to monitor for symptoms.  - Daily BMPs, Correct Ca today is 11.2 (mildly elevated)  - f/u PTHrP  - holding home lasix as she is dry on exam.   - if hypercalcemic again, would resume her home dose of Lasix.   - follow up nephrology recommendations

## 2017-01-02 NOTE — PROGRESS NOTE ADULT - ASSESSMENT
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 Admitted to [facility] for monitoring after respiratory distress post-LN biopsy 2/2 general anesthesia/ difficulty weaning after intubation. 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

## 2017-01-02 NOTE — PROGRESS NOTE ADULT - SUBJECTIVE AND OBJECTIVE BOX
INTERVAL HPI/OVERNIGHT EVENTS:    VITAL SIGNS:  T(F): 99.1  HR: 80  BP: 101/58  RR: 16  SpO2: 98%  Wt(kg): --    PHYSICAL EXAM:    Constitutional:  Eyes:  ENMT:  Neck:  Respiratory:  Cardiovascular:  Gastrointestinal:  Extremities:  Vascular:  Neurological:  Musculoskeletal:    MEDICATIONS  (STANDING):  atorvastatin 80milliGRAM(s) Oral at bedtime  aspirin  chewable 81milliGRAM(s) Oral daily  amiodarone    Tablet 200milliGRAM(s) Oral daily  pantoprazole    Tablet 40milliGRAM(s) Oral before breakfast  insulin lispro (HumaLOG) corrective regimen sliding scale  SubCutaneous Before meals and at bedtime  metoprolol succinate ER 50milliGRAM(s) Oral daily  apixaban 2.5milliGRAM(s) Oral two times a day  polyethylene glycol 3350 17Gram(s) Oral daily    MEDICATIONS  (PRN):  acetaminophen   Tablet. 650milliGRAM(s) Oral every 6 hours PRN Moderate Pain (4 - 6)  bisacodyl Suppository 10milliGRAM(s) Rectal daily PRN Constipation      Allergies    IV CONtRAST (Flushing (Skin); Rash)  No Known Drug Allergies    Intolerances        LABS:                        11.0   3.1   )-----------( 164      ( 02 Jan 2017 06:35 )             34.5     02 Jan 2017 06:35    134    |  100    |  21     ----------------------------<  91     3.7     |  25     |  1.17     Ca    10.2       02 Jan 2017 06:35  Mg     1.5       02 Jan 2017 06:35    TPro  6.0    /  Alb  2.7    /  TBili  0.5    /  DBili  x      /  AST  80     /  ALT  50     /  AlkPhos  81     02 Jan 2017 06:35          RADIOLOGY & ADDITIONAL TESTS: INTERVAL HPI/OVERNIGHT EVENTS:  TEJINDER overnight. Patient continues to feel weak and tired, but improving. Complaining of constipation. Neck biopsy site without pain.     VITAL SIGNS:  T(F): 99.1  HR: 80  BP: 101/58  RR: 16  SpO2: 98%  Wt(kg): --    PHYSICAL EXAM:    Constitutional: appears tired, NAD  Eyes: PERRL, EOMI  ENMT: dry MM  Neck: biopsy site is clean, dry, intact. Supple.  Respiratory: CTAB  Cardiovascular: RRR, systolic murmur  Gastrointestinal: soft, non-tender, distended  Extremities: no LE edema  Vascular: pulses palpable in all 4 extremities  Neurological: A&Ox3, moves all extremities      MEDICATIONS  (STANDING):  atorvastatin 80milliGRAM(s) Oral at bedtime  aspirin  chewable 81milliGRAM(s) Oral daily  amiodarone    Tablet 200milliGRAM(s) Oral daily  pantoprazole    Tablet 40milliGRAM(s) Oral before breakfast  insulin lispro (HumaLOG) corrective regimen sliding scale  SubCutaneous Before meals and at bedtime  metoprolol succinate ER 50milliGRAM(s) Oral daily  apixaban 2.5milliGRAM(s) Oral two times a day  polyethylene glycol 3350 17Gram(s) Oral daily    MEDICATIONS  (PRN):  acetaminophen   Tablet. 650milliGRAM(s) Oral every 6 hours PRN Moderate Pain (4 - 6)  bisacodyl Suppository 10milliGRAM(s) Rectal daily PRN Constipation      Allergies    IV CONtRAST (Flushing (Skin); Rash)  No Known Drug Allergies    Intolerances        LABS:                        11.0   3.1   )-----------( 164      ( 02 Jan 2017 06:35 )             34.5     02 Jan 2017 06:35    134    |  100    |  21     ----------------------------<  91     3.7     |  25     |  1.17     Ca    10.2       02 Jan 2017 06:35  Mg     1.5       02 Jan 2017 06:35    TPro  6.0    /  Alb  2.7    /  TBili  0.5    /  DBili  x      /  AST  80     /  ALT  50     /  AlkPhos  81     02 Jan 2017 06:35          RADIOLOGY & ADDITIONAL TESTS:

## 2017-01-02 NOTE — PROGRESS NOTE ADULT - PROBLEM SELECTOR PLAN 2
Patient has acute on chronic kidney disease possibly from underlying paraprotein disease which could be due to amyloid vs lymphoma infiltrative disease vs immunoglobulin deposition disease or other related etiology   As hematology plans for treatment of primary disease, will defer any immediate need for renal biopsy   Monitor renal function   Avoid nephrotoxic medications, NSAIDs or IV contrast

## 2017-01-02 NOTE — PROGRESS NOTE ADULT - SUBJECTIVE AND OBJECTIVE BOX
INTERVAL HPI:  	  MEDICATIONS:  amiodarone    Tablet 200milliGRAM(s) Oral daily  metoprolol succinate ER 50milliGRAM(s) Oral daily        acetaminophen   Tablet. 650milliGRAM(s) Oral every 6 hours PRN    pantoprazole    Tablet 40milliGRAM(s) Oral before breakfast  bisacodyl Suppository 10milliGRAM(s) Rectal daily PRN  polyethylene glycol 3350 17Gram(s) Oral daily    atorvastatin 80milliGRAM(s) Oral at bedtime  insulin lispro (HumaLOG) corrective regimen sliding scale  SubCutaneous Before meals and at bedtime    aspirin  chewable 81milliGRAM(s) Oral daily  apixaban 2.5milliGRAM(s) Oral two times a day      REVIEW OF SYSTEMS:  [x] As per HPI  CONSTITUTIONAL: No fever, weight loss, or fatigue  RESPIRATORY: No cough, wheezing, chills or hemoptysis; No Shortness of Breath  CARDIOVASCULAR: No chest pain, palpitations, dizziness, or leg swelling  GASTROINTESTINAL: No abdominal or epigastric pain. No nausea, vomiting, or hematemesis; No diarrhea or constipation. No melena or hematochezia.  MUSCULOSKELETAL: No joint pain or swelling; No muscle, back, or extremity pain  [x] All others negative	  [ ] Unable to obtain    PHYSICAL EXAM:  T(C): 37.3, Max: 37.8 (01-01 @ 14:19)  HR: 80 (75 - 88)  BP: 101/58 (101/50 - 121/59)  RR: 16 (14 - 18)  SpO2: 98% (95% - 98%)  Wt(kg): --  I&O's Summary        Appearance: Normal	  HEENT:   Normal oral mucosa  Cardiovascular: Normal S1 S2, No JVD, No murmurs, No edema  Respiratory: Lungs clear to auscultation	  Gastrointestinal:  Soft, Non-tender, + BS	  Extremities: Normal range of motion, No clubbing, cyanosis or edema  Vascular: Peripheral pulses palpable 2+ bilaterally    TELEMETRY: 	    ECG:    	  RADIOLOGY:   CXR:  CT:  US:    CARDIAC TESTING:  Echocardiogram:  Catheterization:  Stress Test:      LABS:	 	    CARDIAC MARKERS:                                  11.0   3.1   )-----------( 164      ( 02 Jan 2017 06:35 )             34.5     02 Jan 2017 06:35    134    |  100    |  21     ----------------------------<  91     3.7     |  25     |  1.17     Ca    10.2       02 Jan 2017 06:35  Mg     1.5       02 Jan 2017 06:35    TPro  6.0    /  Alb  2.7    /  TBili  0.5    /  DBili  x      /  AST  80     /  ALT  50     /  AlkPhos  81     02 Jan 2017 06:35    proBNP:   Lipid Profile:   HgA1c:   TSH:     ASSESSMENT/PLAN: 	  ·  Problem: Heart failure with preserved ejection fraction.  Plan: Recent admission last month for CHF exacerbation. HFpEF (55%), severe MR&TR  - continue with diuresis as above  - c/w home Metoprolol ER 50mg qd, patient with PPM  - c/w Losartan 25mg qd.     Problem/Plan  Problem: Chronic atrial fibrillation. Plan: Known atrial fibrillation on apixaban 2.5mg BID at home  - AC resumed  pt has CHADSVASC of 7  - continue amiodarone 200mg qd.    Problem/Plan   ·  Problem: Coronary artery disease involving native heart, angina presence unspecified, unspecified vessel or lesion type.  Plan: Known CAD s/p CABG, no chest pain at this time  - continue ASA 81mg, Lipitor 80mg qhs.   -troponin leak post procedure, no symproms, no needto trend.     Problem/Plan   ·  Problem: Essential hypertension.  Plan: Has been normotensive in the hospital  - c/w cardiac medications as above, no addition antiHTN at this time.

## 2017-01-02 NOTE — PROGRESS NOTE ADULT - SUBJECTIVE AND OBJECTIVE BOX
Patient seen and examined at bedside. Patient states she continues to have generalized fatigue and lack of energy. She denies any shortness of breath, nausea, vomiting, or changes in appetite.     atorvastatin 80milliGRAM(s) at bedtime  aspirin  chewable 81milliGRAM(s) daily  amiodarone    Tablet 200milliGRAM(s) daily  pantoprazole    Tablet 40milliGRAM(s) before breakfast  insulin lispro (HumaLOG) corrective regimen sliding scale  Before meals and at bedtime  metoprolol succinate ER 50milliGRAM(s) daily  acetaminophen   Tablet. 650milliGRAM(s) every 6 hours PRN  bisacodyl Suppository 10milliGRAM(s) daily PRN  apixaban 2.5milliGRAM(s) two times a day    Allergies    IV CONtRAST (Flushing (Skin); Rash)  No Known Drug Allergies    Intolerances    T(C): , Max: 37.8 (01-01 @ 14:19)  T(F): , Max: 100.1 (01-01 @ 14:19)  HR: 80  BP: 101/58  RR: 16  SpO2: 98%    I & Os for current day (as of 01-02 @ 09:48)  =============================================  IN:    Oral Fluid: 270 ml    Total IN: 270 ml  ---------------------------------------------  OUT:    Intermittent Catheterization - Urethral: 1900 ml    Total OUT: 1900 ml  ---------------------------------------------  Total NET: -1630 ml    LABS:                        11.0   3.1   )-----------( 164      ( 02 Jan 2017 06:35 )             34.5     02 Jan 2017 06:35    134    |  100    |  21     ----------------------------<  91     3.7     |  25     |  1.17     Ca    10.2       02 Jan 2017 06:35  Mg     1.5       02 Jan 2017 06:35    TPro  6.0    /  Alb  2.7    /  TBili  0.5    /  DBili  x      /  AST  80     /  ALT  50     /  AlkPhos  81     02 Jan 2017 06:35

## 2017-01-02 NOTE — PROGRESS NOTE ADULT - PROBLEM SELECTOR PLAN 10
SCDs, on apixaban for atrial fibrillation  F: will adjust fluids based on calcium levels and respiratory status - caution with overdiuresis given history of CHF  E: replete lytes as needed  N: DASH diet    Dispo: now on RMF.   FULL CODE SCDs, on apixaban for atrial fibrillation  F: no fluids at this time  E: replete lytes as needed  N: DASH diet    Dispo: home w/ home PT  FULL CODE

## 2017-01-02 NOTE — PHYSICAL THERAPY INITIAL EVALUATION ADULT - CRITERIA FOR SKILLED THERAPEUTIC INTERVENTIONS
impairments found/functional limitations in following categories/anticipated discharge recommendation

## 2017-01-02 NOTE — PROGRESS NOTE ADULT - PROBLEM SELECTOR PLAN 1
Calcium level stable at 10.2. 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   As PTH is low, would complete workup with PTHrP levels     P - off IV fluids at present  Discontinue IM calcitonin  Monitor electrolytes  Hematology/oncology following and patient will need chemotherapy once final diagnosis is known   If hypercalcemia rises again, would resume her home dose of Lasix

## 2017-01-02 NOTE — PROGRESS NOTE ADULT - PROBLEM SELECTOR PLAN 4
Recent admission last month for CHF exacerbation. HFpEF (55%), severe MR&TR  - continue with diuresis as above  - c/w home Metoprolol ER 50mg qd, patient with PPM  - hold Losartan 25mg qd 2/2 ROMEO and hypotension Recent admission last month for CHF exacerbation. HFpEF (55%), severe MR&TR  - holding home Lasix in setting of hypokalemia and clinically dry  - c/w home Metoprolol ER 50mg qd, patient with PPM  - hold Losartan 25mg qd 2/2 ROMEO and hypotension

## 2017-01-02 NOTE — PROGRESS NOTE ADULT - SUBJECTIVE AND OBJECTIVE BOX
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 She received Zometa in office on 12/24 without change in Na (last value was 13 yesterday 12/28 in Dr. Hopkins’s office). She endorses worsening of constipation, mild increased thirst with no significant increase in PO intake, chronic nighttime polyuria with no recent increase in urinary frequency, and denies change in mental status, abd pain, nausea/vomiting, recent bone fracture or skin necrosis. Has not taken any calcium supplements or new medications containing calcium. States last chemotherapy was 18 years ago.     INTERVAL HPI/OVERNIGHT EVENTS:::on floor, comfortable    HEALTH ISSUES - PROBLEM Dx:  Dyspnea, unspecified type: Dyspnea, unspecified type  Essential hypertension: Essential hypertension  Type 2 diabetes mellitus with complication, unspecified long term insulin use status: Type 2 diabetes mellitus with complication, unspecified long term insulin use status  Coronary artery disease involving native heart, angina presence unspecified, unspecified vessel or lesion type: Coronary artery disease involving native heart, angina presence unspecified, unspecified vessel or lesion type  Chronic atrial fibrillation: Chronic atrial fibrillation  Lymphadenopathy: Lymphadenopathy  Acute pulmonary edema: Acute pulmonary edema  Hypokalemia: Hypokalemia  Respiratory distress: Respiratory distress  CAD (coronary artery disease): CAD (coronary artery disease)  Need for prophylactic measure: Need for prophylactic measure  Diabetes: Diabetes  Afib: Afib  IgM monoclonal gammopathy of uncertain significance: IgM monoclonal gammopathy of uncertain significance  Gammopathy: Gammopathy  B-cell lymphoma, unspecified B-cell lymphoma type, unspecified body region: B-cell lymphoma, unspecified B-cell lymphoma type, unspecified body region  Heart failure with preserved ejection fraction: Heart failure with preserved ejection fraction  Hypertension: Hypertension  Acute kidney injury: Acute kidney injury  Hypercalcemia: Hypercalcemia          PAST MEDICAL & SURGICAL HISTORY:  Follicular lymphoma  Neck mass  Afib  Other hyperlipidemia  Breast cancer  CAD (coronary artery disease)  Diabetes  HTN (hypertension)  No pertinent past medical history  H/O abdominal hysterectomy  H/O thyroidectomy  S/P CABG x 3          Consultant NOTE  REVIEWED  ( ++++  )    REVIEW OF SYSTEMS:  [x] As per HPI  CONSTITUTIONAL: weak  RESPIRATORY: comfortable  CARDIOVASCULAR: No chest pain, palpitations, dizziness  GASTROINTESTINAL: No abdominal or epigastric pain. No nausea, vomiting, or hematemesis; No diarrhea or constipation. No melena or hematochezia.  MUSCULOSKELETAL: No joint pain or swelling;  PSYCH    awake, alert       [x] All others negative	  [ ] Unable to obtain          Vital Signs Last 24 Hrs  T(C): 37.3, Max: 37.8 (01-01 @ 14:19)  T(F): 99.1, Max: 100.1 (01-01 @ 14:19)  HR: 80 (78 - 88)  BP: 101/58 (101/58 - 121/59)  BP(mean): --  RR: 16 (14 - 18)  SpO2: 98% (95% - 98%)        I & Os for current day (as of 01-02 @ 13:12)  =============================================  IN: 270 ml / OUT: 1900 ml / NET: -1630 ml    PHYSICAL EXAMINATION:                                    (  ++++  )  NO CHANGE  Appearance: Normal	  HEENT:   Normal oral mucosa, PERRL, EOMI	  Neck: Supple,  Cardiovascular:ireg s1 s2   mur  Respiratory: Lungs clear to auscultation  Gastrointestinal:  Soft, Non-tender, + BS	  Skin: No rashes, No ecchymoses, No cyanosis  Extremities: Normal range of motion, No clubbing, cyanosis or edema  Vascular: Peripheral pulses   Neurologic: Non-focal  Psychiatry: A & O x 3, Mood & affect appropriate    atorvastatin 80milliGRAM(s) Oral at bedtime  aspirin  chewable 81milliGRAM(s) Oral daily  amiodarone    Tablet 200milliGRAM(s) Oral daily  pantoprazole    Tablet 40milliGRAM(s) Oral before breakfast  insulin lispro (HumaLOG) corrective regimen sliding scale  SubCutaneous Before meals and at bedtime  metoprolol succinate ER 50milliGRAM(s) Oral daily  acetaminophen   Tablet. 650milliGRAM(s) Oral every 6 hours PRN  bisacodyl Suppository 10milliGRAM(s) Rectal daily PRN  apixaban 2.5milliGRAM(s) Oral two times a day  polyethylene glycol 3350 17Gram(s) Oral daily            CARDIAC MARKERS ( 31 Dec 2016 16:51 )  0.050 ng/mL / x     / x     / x     / x                                11.0   3.1   )-----------( 164      ( 02 Jan 2017 06:35 )             34.5     02 Jan 2017 06:35    134    |  100    |  21     ----------------------------<  91     3.7     |  25     |  1.17     Ca    10.2       02 Jan 2017 06:35  Mg     1.5       02 Jan 2017 06:35    TPro  6.0    /  Alb  2.7    /  TBili  0.5    /  DBili  x      /  AST  80     /  ALT  50     /  AlkPhos  81     02 Jan 2017 06:35      CAPILLARY BLOOD GLUCOSE  103 (02 Jan 2017 12:32)  89 (02 Jan 2017 08:26)  108 (01 Jan 2017 21:09)  144 (01 Jan 2017 18:07)  114 (01 Jan 2017 16:30)  Hematopathology Report (11.25.16 @ 10:27)    Hematopathology Report:   ACCESSION No:  [id number]    TANYA FRAIRE                      1        Cytopathology Addendum Report          Addendum  Interpretation: The low cell viability hinders accurate  interpretation; however in the viable cell population the  analysis reveals an atypical B-cell population with possible  lambda expression.  The T cells show no loss of pan T cell  antigens.    The findings are atypical and although not definitively  diagnostic, are concerning for a lymphoproliferative disorder.    Further workup is recommended.    B-Cell Associated:  FMC7                 2 %  CD19                 9 %  CD20                           5 %  CD10                           1 %  CD11c                     10 %  CD23                 1 %  KAPPA                 1 %  LAMBDA               4 %    Activation:  CD38                      52 %    [place]:  CD45                 98 %    T- Cell Associated:  CD2                  55 %  CD3                  56 %  CD4                  40 %  CD5       53 %  CD7                  44 %  CD8                  15 %  CD56                 3 %  CD57                 10 %    NOTE:  The technical component was performed at [facility], a  [facility], [place], [place].    Trinidad York M.D.  (Electronic Signature)  Reported on: 12/02/16

## 2017-01-02 NOTE — PROGRESS NOTE ADULT - PROBLEM SELECTOR PLAN 6
Known atrial fibrillation on apixaban 2.5mg BID at home. CHADSVASC of 7  - AC held for procedure; restarted without incident  - continue amiodarone 200mg qd Known atrial fibrillation on apixaban 2.5mg BID at home. CHADSVASC of 7  - c/w home Eliquis  - continue amiodarone 200mg qd

## 2017-01-03 NOTE — PROGRESS NOTE ADULT - PROBLEM SELECTOR PLAN 1
Calcium level elevated at 10.8. 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 As PTH is low, would complete workup with PTHrP levels     P - off IV fluids at present  please give pamidronate 50mg once stat   Monitor electrolytes  Hematology/oncology following and patient will need chemotherapy once final diagnosis is known Calcium level elevated at 10.8. 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 As PTH is low, would complete workup with PTHrP levels     P - off IV fluids at present  please give pamidronate 60mg once stat   Monitor electrolytes  Hematology/oncology following and patient will need chemotherapy once final diagnosis is known

## 2017-01-03 NOTE — CONSULT NOTE ADULT - CONSULT REQUESTED DATE/TIME
03-Jan-2017 07:50
29-Dec-2016 12:39
29-Dec-2016 16:49
29-Dec-2016 19:41
29-Dec-2016 22:49
30-Dec-2016 11:53
30-Dec-2016

## 2017-01-03 NOTE — CONSULT NOTE ADULT - SUBJECTIVE AND OBJECTIVE BOX
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 She received Zometa in office on 12/24 without change in Na (last value was 13 yesterday 12/28 in Dr. Hopkins’s office). She endorses worsening of constipation, mild increased thirst with no significant increase in PO intake, chronic nighttime polyuria with no recent increase in urinary frequency, and denies change in mental status, abd pain, nausea/vomiting, recent bone fracture or skin necrosis. Has not taken any calcium supplements or new medications containing calcium. States last chemotherapy was 18 years ago. 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  Initial vitals in ED: 98.4F, HR 90, 120/69, RR 18, 96% on RA. Started on IV fluids. (29 Dec 2016 14:41)      PAST MEDICAL & SURGICAL HISTORY:  Follicular lymphoma  Neck mass  Afib  Other hyperlipidemia  Breast cancer  CAD (coronary artery disease)  Diabetes  HTN (hypertension)  No pertinent past medical history  H/O abdominal hysterectomy  H/O thyroidectomy  S/P CABG x 3      MEDICATIONS  (STANDING):  atorvastatin 80milliGRAM(s) Oral at bedtime  aspirin  chewable 81milliGRAM(s) Oral daily  amiodarone    Tablet 200milliGRAM(s) Oral daily  pantoprazole    Tablet 40milliGRAM(s) Oral before breakfast  insulin lispro (HumaLOG) corrective regimen sliding scale  SubCutaneous Before meals and at bedtime  metoprolol succinate ER 50milliGRAM(s) Oral daily  apixaban 2.5milliGRAM(s) Oral two times a day  polyethylene glycol 3350 17Gram(s) Oral daily    MEDICATIONS  (PRN):  acetaminophen   Tablet. 650milliGRAM(s) Oral every 6 hours PRN Moderate Pain (4 - 6)  bisacodyl Suppository 10milliGRAM(s) Rectal daily PRN Constipation      Social History: lives with spouse in an elevator accessible apartment building, no home care services    Functional Level Prior to Admission: reports ADL independent, walks with a cane    FAMILY HISTORY:  No pertinent family history in first degree relatives      CBC Full  -  ( 03 Jan 2017 06:58 )  WBC Count : 2.4 K/uL  Hemoglobin : 11.0 g/dL  Hematocrit : 34.3 %  Platelet Count - Automated : 161 K/uL  Mean Cell Volume : 86.8 fL  Mean Cell Hemoglobin : 27.8 pg  Mean Cell Hemoglobin Concentration : 32.1 g/dL  Auto Neutrophil # : x  Auto Lymphocyte # : x  Auto Monocyte # : x  Auto Eosinophil # : x  Auto Basophil # : x  Auto Neutrophil % : x  Auto Lymphocyte % : x  Auto Monocyte % : x  Auto Eosinophil % : x  Auto Basophil % : x      03 Jan 2017 06:58    134    |  102    |  20     ----------------------------<  106    3.8     |  24     |  1.08     Ca    10.8       03 Jan 2017 06:58  Phos  1.9       03 Jan 2017 06:58  Mg     1.6       03 Jan 2017 06:58    TPro  6.0    /  Alb  2.7    /  TBili  0.5    /  DBili  x      /  AST  80     /  ALT  50     /  AlkPhos  81     02 Jan 2017 06:35          Radiology:    EXAM:  XR CHEST 1 VIEW PORT IMMEDIATE                           PROCEDURE DATE:  12/30/2016                 INTERPRETATION:    CLINICAL INDICATION: Shortness of breath    EXAM: Portable AP radiograph of the chest.    COMPARISON: 12/29/2016    FINDINGS:      There is persistent right basilar subsegmental atelectasis with elevation   of the right hemidiaphragm. There is mild persistent pulmonary edema. No   large pleural effusion or evidence of pneumothorax. Left chest wall dual   lead cardiac pacemaker, sternotomy wires and CABG clips are again seen.   The bones are osteopenic without acute finding. There is pronounced   dextrocurvature of the thoracolumbar spine.    IMPRESSION:     No interval change compared to 12/29/2016      Vital Signs Last 24 Hrs  T(C): 37.1, Max: 37.3 (01-02 @ 09:05)  T(F): 98.7, Max: 99.1 (01-02 @ 09:05)  HR: 81 (80 - 84)  BP: 110/57 (101/58 - 125/55)  BP(mean): --  RR: 17 (16 - 17)  SpO2: 95% (93% - 98%)    REVIEW OF SYSTEMS:    CONSTITUTIONAL:  fatigue  EYES: No eye pain, visual disturbances, or discharge  ENMT:  No difficulty hearing, tinnitus, vertigo; No sinus or throat pain  NECK: No pain or stiffness  BREASTS: No pain, masses, or nipple discharge  RESPIRATORY: No cough, wheezing, chills or hemoptysis; No shortness of breath  CARDIOVASCULAR: No chest pain, palpitations, dizziness, or leg swelling  GASTROINTESTINAL: No abdominal or epigastric pain. No nausea, vomiting, or hematemesis; No diarrhea or constipation. No melena or hematochezia.  GENITOURINARY: No dysuria, frequency, hematuria, or incontinence  NEUROLOGICAL: No headaches, memory loss, loss of strength, numbness, or tremors  SKIN: No itching, burning, rashes, or lesions   LYMPH NODES: No enlarged glands  ENDOCRINE: No heat or cold intolerance; No hair loss  MUSCULOSKELETAL: No joint pain or swelling; No muscle, back, or extremity pain  PSYCHIATRIC: No depression, anxiety, mood swings, or difficulty sleeping  HEME/LYMPH: No easy bruising, or bleeding gums  ALLERGY AND IMMUNOLOGIC: No hives or eczema      Physical Exam: cachectic  woman lying in bed c/o fatigue    HEENT: normocephalic/ atraumatic, anicteric    Neck: supple, negative JVD, negative carotid bruits, palpable right preauricular/bilateral suprclavicular/ nodes    Chest: CTA bilaterally, neg wheeze, rhonchi, rales, egophany    Cardiovascular: regular rate and rhythm, III/VI holosystolic  murmur    Abdomen: soft, non tender, negative rebound/guarding, non distended, normal bowel sounds, neg hepatosplenomegaly    Extremities: WWP, neg cyanosis/clubbing/edema, negative calf tenderness to palpation, negative Kunal's sign    Neurologic Exam:    Alert and oriented to person, place, date/year, speech fluent w/o dysarthria, repetition intact, comprehension intact,     Cranial Nerves:      II:                pupils equal, round and reactive to light, visual fields intact    III/ IV/VI:      extraocular movements intact, neg nystagmus   V:               facial sensation intact, V1-3 normal   VII:             face symmetric, no droop, normal eye closure and smile   VIII:            hearing intact to finger rub bilaterally   IX/ X:          soft palate rise symmetrical   XI:              head turning, shoulder shrug normal   XII:             tongue midline     Motor Exam:     Bilateral UE: non focal                      negative pronator drift      Bilateral LE:   non focal proximally and distally     Sensory: intact to LT/PP in all UE/LE dermatomes     DTR:     = biceps/     triceps/     brachioradialis                = patella/   medial hamstring/    ankle                neg clonus                neg Babinski               Finger to Nose: wnl    Heel to Shin:      wnl    Rapid Alternating movements:  wnl    Joint Position Sense:  intact    Gait:  NT        PM&R Impression:    1) deconditioned  2) no focal neuro deficits      Recommendations:    1) Physical therapy focusing on therapeutic exercises, bed mobility/transfer out of bed evaluation, progressive ambulation with assistive devices.    2) Disposition Plan: anticipate d/c home

## 2017-01-03 NOTE — PROGRESS NOTE ADULT - PROBLEM SELECTOR PROBLEM 10
Need for prophylactic measure

## 2017-01-03 NOTE — PROGRESS NOTE ADULT - PROBLEM SELECTOR PLAN 4
Currently appears not overtly hypervolemic on examination. however was started back on furosemide 20mg qdaily by primary team

## 2017-01-03 NOTE — ADVANCED PRACTICE NURSE CONSULT - ASSESSMENT
Resolving DTIs noted to sacrum, 4cm x 5 cm, right lower buttock, 1 cm x 1 cm, left lower buttock, 2 cm x 1 cm and left upper buttock 1 cm x 0.5 cm. Patient able to turn in bed independently and get out of bed to chair with assist. Patient stated she has not been getting out of bed because she feel weak. Patient's spouse at bedside.  Instructed patient/spouse on importance of turning and repositioning while in bed and getting out of bed to chair with assistance to offload pressure from sacrum and buttocks. . Assisted patient to chair and placed call bell within patient's reach with instructions to use call bell for assistance back to bed. Patient/spouse verbalized understanding.   RNMara, present during assessment.

## 2017-01-03 NOTE — PROGRESS NOTE ADULT - PROBLEM SELECTOR PLAN 5
Likely secondary to hypercalcemia per renal. Expected to resolve s/p IVF  - f/u repeat BMP later in day  - continue to monitor and f/u renal recs
Likely secondary to hypercalcemia per renal. Expected to resolve s/p IVF  - f/u serial BMPs  - continue to monitor and f/u renal recs
ROMEO continues to improve  - f/u serial BMPs  - continue to monitor and f/u renal recs
Likely secondary to hypercalcemia per renal. Expected to resolve s/p IVF  - f/u repeat BMP later in day  - continue to monitor and f/u renal recs

## 2017-01-03 NOTE — PROGRESS NOTE ADULT - PROBLEM SELECTOR PLAN 10
SCDs, on apixaban for atrial fibrillation  F: no fluids at this time  E: replete lytes as needed  N: DASH diet    Dispo: Will discharge to [facility]  FULL CODE

## 2017-01-03 NOTE — PROGRESS NOTE ADULT - RS GEN PE MLT RESP DETAILS PC
breath sounds equal/clear to auscultation bilaterally/airway patent/respirations non-labored/normal/no chest wall tenderness diminished breath sounds, R/no chest wall tenderness/clear to auscultation bilaterally/airway patent/breath sounds equal/respirations non-labored/normal

## 2017-01-03 NOTE — PROGRESS NOTE ADULT - SUBJECTIVE AND OBJECTIVE BOX
INTERVAL HPI: no complaints   	  MEDICATIONS:  amiodarone    Tablet 200milliGRAM(s) Oral daily  metoprolol succinate ER 50milliGRAM(s) Oral daily        acetaminophen   Tablet. 650milliGRAM(s) Oral every 6 hours PRN    pantoprazole    Tablet 40milliGRAM(s) Oral before breakfast  bisacodyl Suppository 10milliGRAM(s) Rectal daily PRN  polyethylene glycol 3350 17Gram(s) Oral daily    atorvastatin 80milliGRAM(s) Oral at bedtime  insulin lispro (HumaLOG) corrective regimen sliding scale  SubCutaneous Before meals and at bedtime    aspirin  chewable 81milliGRAM(s) Oral daily  apixaban 2.5milliGRAM(s) Oral two times a day      REVIEW OF SYSTEMS:  [x] As per HPI  CONSTITUTIONAL: No fever, weight loss, or fatigue  RESPIRATORY: No cough, wheezing, chills or hemoptysis; No Shortness of Breath  CARDIOVASCULAR: No chest pain, palpitations, dizziness, or leg swelling  GASTROINTESTINAL: No abdominal or epigastric pain. No nausea, vomiting, or hematemesis; No diarrhea or constipation. No melena or hematochezia.  MUSCULOSKELETAL: No joint pain or swelling; No muscle, back, or extremity pain  [x] All others negative	  [ ] Unable to obtain    PHYSICAL EXAM:  T(C): 37.1, Max: 37.1 (01-02 @ 22:35)  HR: 75 (75 - 84)  BP: 106/62 (106/61 - 125/55)  RR: 18 (17 - 18)  SpO2: 94% (93% - 98%)  Wt(kg): --  I&O's Summary        Appearance: Normal	  HEENT:   Normal oral mucosa  Cardiovascular: Normal S1 S2, No JVD, No murmurs, No edema  Respiratory: Lungs clear to auscultation	  Gastrointestinal:  Soft, Non-tender, + BS	  Extremities: Normal range of motion, No clubbing, cyanosis or edema  Vascular: Peripheral pulses palpable 2+ bilaterally    TELEMETRY: 	    ECG:    	  RADIOLOGY:   CXR:  CT:  US:    CARDIAC TESTING:  Echocardiogram:  Catheterization:  Stress Test:      LABS:	 	    CARDIAC MARKERS:                                  11.0   2.4   )-----------( 161      ( 03 Jan 2017 06:58 )             34.3     03 Jan 2017 06:58    134    |  102    |  20     ----------------------------<  106    3.8     |  24     |  1.08     Ca    10.8       03 Jan 2017 06:58  Phos  1.9       03 Jan 2017 06:58  Mg     1.6       03 Jan 2017 06:58    TPro  6.0    /  Alb  2.7    /  TBili  0.5    /  DBili  x      /  AST  80     /  ALT  50     /  AlkPhos  81     02 Jan 2017 06:35    proBNP:   Lipid Profile:   HgA1c:   TSH:     ASSESSMENT/PLAN: 	    ·  Problem: Heart failure with preserved ejection fraction.  Plan: Recent admission last month for CHF exacerbation. HFpEF (55%), severe MR&TR  - continue with diuresis as above  - c/w home Metoprolol ER 50mg qd, patient with PPM  - c/w Losartan 25mg qd.     Problem/Plan  Problem: Chronic atrial fibrillation. Plan: Known atrial fibrillation on apixaban 2.5mg BID at home  - AC resumed  pt has CHADSVASC of 7  - continue amiodarone 200mg qd.    Problem/Plan   ·  Problem: Coronary artery disease involving native heart, angina presence unspecified, unspecified vessel or lesion type.  Plan: Known CAD s/p CABG, no chest pain at this time  - continue ASA 81mg, Lipitor 80mg qhs.   -troponin leak post procedure, no symproms, no needto trend.     Problem/Plan   ·  Problem: Essential hypertension.  Plan: Has been normotensive in the hospital  - c/w cardiac medications as above, no addition antiHTN at this time.

## 2017-01-03 NOTE — CONSULT NOTE ADULT - ASSESSMENT
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
CV stable  AF  Hem-stable  Lymphoma?? LN biopsy p  monitor  CKD  Hypercalcemia--monitor
Admitted for:  85F PMH of IgM gammopathy (FNA concerning for B-cell Lymphoma), HTN, breast Ca in remission, a fib w/ PPM on Eliquis, CAD s/p CABG (admitted last month for CHF exacerbation) with severe MR/TR, s/p thyroidectomy presenting with persistent hypercalcemia in the outpatient setting, likely 2/2 malignancy.   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 Admitted to [facility] for monitoring after respiratory distress post-LN biopsy 2/2 general anesthesia/ difficulty weaning after intubation.    Problem/Plan - 1:  ·  Problem: Hypercalcemia.  Plan: Currently downtrending, likely 2/2 malignancy. patient is asymptomatic, no bone pain,no muscle spasms, will continue to monitor for symptoms.  - Daily BMPs, Correct Ca today is 11.2 (mildly elevated)  - f/u PTHrP  - holding home lasix as she is dry on exam.   - if hypercalcemic again, would resume her home dose of Lasix.   - follow up nephrology recommendationsCurrently downtrending, likely 2/2 malignancy. patient is asymptomatic, no bone pain,no muscle spasms, will continue to monitor for symptoms. No need for calcitonin at this time. No IV fluids now in setting of normalizing Ca and now resolved pulmonary edema.  Daily BMPs, f/u [facility]    - if hypercalcemic again, would resume her home dose of Lasix.   - follow up nephrology recommendations.     Problem/Plan - 2:  ·  Problem: Dyspnea, unspecified type.  Plan: Most likely 2/2 aggressive hydration along with sedation during procedure. Now patient much more comfortable, will continue to monitor respiratory status.  - d/c nasal cannulaDyspnea now resolved, Resolved. Most likely 2/2 aggressive hydration along with sedation during procedure. Now patient much more comfortable, will continue to monitor respiratory status..     Problem/Plan - 3:  ·  Problem: Lymphadenopathy.  Plan: Concern for B-cell lymphoma on outpatient FNA.   - POD#3 from cervical lymph node excisional biopsy by Dr. Westfall, doing well.  - f/u biopsy results, pathology pendingConcern for B-cell lymphoma on outpatient FNA.   - POD#2 from cervical lymph node excisional biopsy by Dr. Westfall, doing well.  - f/u biopsy results, pathology pending  Problem/Plan - 4:  ·  Problem: Heart failure with preserved ejection fraction.  Plan: Recent admission last month for CHF exacerbation. HFpEF (55%), severe MR&TR  - holding home Lasix in setting of hypokalemia and clinically dry  - c/w home Metoprolol ER 50mg qd, patient with PPM  - hold Losartan 25mg qd 2/2 ROMEO and hypotensionRecent admission last month for CHF exacerbation. HFpEF (55%), severe MR&TR  - continue with diuresis as above  - c/w home Metoprolol ER 50mg qd, patient with PPM  - hold Losartan 25mg qd 2/2 ROMEO and hypotension.     Problem/Plan - 5:  ·  Problem: Acute kidney injury.  Plan: Likely secondary to hypercalcemia per renal. Expected to resolve s/p IVF  - f/u serial BMPs  - continue to monitor and f/u renal recs.
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
Patient is a 85F without previous known renal disease last baseline creatinine in 0.5 to 0.7 range, recent evaluation for monoclonal gammopathy with hematology in 10/2016 which has found preliminarilyy a B cell lymphoma and a monoclonal IgM paraprotein not yet on cytotoxic or immunotherapy, HTN,   DM2, Afib s/p PPM on eliquis, CAD s/p CABG 20 years prior, HFpEF (recent admission for CHF exacerbation on 11/7/2016), mitral regurgitation, HLD , breast CA s/p right partial mastectomy, left hemithyroidectomy who presents with hypercalcemia on outpatient labs.

## 2017-01-03 NOTE — ADVANCED PRACTICE NURSE CONSULT - RECOMMEDATIONS
Apply foam dressing every 3 days and prn if soiled or wet.   Discussed assessment and recommendations with Mara and house staff.

## 2017-01-03 NOTE — ADVANCED PRACTICE NURSE CONSULT - REASON FOR CONSULT
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 Patient presented c/o 3-4 weeks of gradual onset generalized weakness/low energy/lightheadedness, progressively worsening, in the setting of persistent new-onset hypercalcemia (not seen on previous admissions) as outpatient.

## 2017-01-03 NOTE — PROGRESS NOTE ADULT - PROBLEM SELECTOR PLAN 4
Recent admission last month for CHF exacerbation. HFpEF (55%), severe MR&TR  - Lasix 20mg qd  - c/w home Metoprolol ER 50mg qd, patient with PPM  - hold Losartan 25mg qd 2/2 ROMEO and hypotension

## 2017-01-03 NOTE — PROGRESS NOTE ADULT - SUBJECTIVE AND OBJECTIVE BOX
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 She received Zometa in office on 12/24 without change in Na (last value was 13 yesterday 12/28 in Dr. Hopkins’s office). She endorses worsening of constipation, mild increased thirst with no significant increase in PO intake, chronic nighttime polyuria with no recent increase in urinary frequency, and denies change in mental status, abd pain, nausea/vomiting, recent bone fracture or skin necrosis. Has not taken any calcium supplements or new medications containing calcium. States last chemotherapy was 18 years ago. 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  Initial vitals in ED: 98.4F, HR 90, 120/69, RR 18, 96% on RA. Started on IV fluids. (29 Dec 2016 14:41)    INTERVAL HPI/OVERNIGHT EVENTS:::    HEALTH ISSUES - PROBLEM Dx:  Dyspnea, unspecified type: Dyspnea, unspecified type  Essential hypertension: Essential hypertension  Type 2 diabetes mellitus with complication, unspecified long term insulin use status: Type 2 diabetes mellitus with complication, unspecified long term insulin use status  Coronary artery disease involving native heart, angina presence unspecified, unspecified vessel or lesion type: Coronary artery disease involving native heart, angina presence unspecified, unspecified vessel or lesion type  Chronic atrial fibrillation: Chronic atrial fibrillation  Lymphadenopathy: Lymphadenopathy  Acute pulmonary edema: Acute pulmonary edema  Hypokalemia: Hypokalemia  Respiratory distress: Respiratory distress  CAD (coronary artery disease): CAD (coronary artery disease)  Need for prophylactic measure: Need for prophylactic measure  Diabetes: Diabetes  Afib: Afib  IgM monoclonal gammopathy of uncertain significance: IgM monoclonal gammopathy of uncertain significance  Gammopathy: Gammopathy  B-cell lymphoma, unspecified B-cell lymphoma type, unspecified body region: B-cell lymphoma, unspecified B-cell lymphoma type, unspecified body region  Heart failure with preserved ejection fraction: Heart failure with preserved ejection fraction  Hypertension: Hypertension  Acute kidney injury: Acute kidney injury  Hypercalcemia: Hypercalcemia          PAST MEDICAL & SURGICAL HISTORY:  Follicular lymphoma  Neck mass  Afib  Other hyperlipidemia  Breast cancer  CAD (coronary artery disease)  Diabetes  HTN (hypertension)  No pertinent past medical history  H/O abdominal hysterectomy  H/O thyroidectomy  S/P CABG x 3          Consultant NOTE  REVIEWED  (+++ )    REVIEW OF SYSTEMS:  [x] As per HPI  CONSTITUTIONAL: No fever,  RESPIRATORY: No cough, wheezing, chills or hemoptysis; No Shortness of Breath  CARDIOVASCULAR: No chest pain, palpitations, dizziness, or leg swelling  GASTROINTESTINAL: No abdominal or epigastric pain. No nausea, vomiting, or hematemesis; No diarrhea or constipation. No melena or hematochezia.  MUSCULOSKELETAL: No joint pain or swelling; No muscle, back, or extremity pain  PSYCH    awake, alert       [x] All others negative	  [ ] Unable to obtain          Vital Signs Last 24 Hrs  T(C): 37.1, Max: 37.1 (01-02 @ 22:35)  T(F): 98.7, Max: 98.7 (01-02 @ 22:35)  HR: 75 (75 - 84)  BP: 106/62 (106/61 - 125/55)  BP(mean): --  RR: 18 (17 - 18)  SpO2: 94% (93% - 98%)        PHYSICAL EXAMINATION:                                    (  +++  )  NO CHANGE  Appearance: Normal	  HEENT:   Normal oral mucosa, PERRL, EOMI	  Neck: Supple,   Cardiovascular: Normal S1 S2, No JVD,    Respiratory: Lungs clear to auscultation    Gastrointestinal:  Soft, Non-tender, + BS	  Skin: No rashes, No ecchymoses, No cyanosis  Extremities: negative  Vascular:   Neurologic: Non-focal  Psychiatry: A & O x 3,    atorvastatin 80milliGRAM(s) Oral at bedtime  aspirin  chewable 81milliGRAM(s) Oral daily  amiodarone    Tablet 200milliGRAM(s) Oral daily  pantoprazole    Tablet 40milliGRAM(s) Oral before breakfast  insulin lispro (HumaLOG) corrective regimen sliding scale  SubCutaneous Before meals and at bedtime  metoprolol succinate ER 50milliGRAM(s) Oral daily  acetaminophen   Tablet. 650milliGRAM(s) Oral every 6 hours PRN  bisacodyl Suppository 10milliGRAM(s) Rectal daily PRN  apixaban 2.5milliGRAM(s) Oral two times a day  polyethylene glycol 3350 17Gram(s) Oral daily                                      11.0   2.4   )-----------( 161      ( 03 Jan 2017 06:58 )             34.3     03 Jan 2017 06:58    134    |  102    |  20     ----------------------------<  106    3.8     |  24     |  1.08     Ca    10.8       03 Jan 2017 06:58  Phos  1.9       03 Jan 2017 06:58  Mg     1.6       03 Jan 2017 06:58    TPro  6.0    /  Alb  2.7    /  TBili  0.5    /  DBili  x      /  AST  80     /  ALT  50     /  AlkPhos  81     02 Jan 2017 06:35      CAPILLARY BLOOD GLUCOSE  103 (03 Jan 2017 07:45)  111 (02 Jan 2017 21:18)  105 (02 Jan 2017 16:17)  103 (02 Jan 2017 12:32)  Phosphorus Level, Serum (01.03.17 @ 06:58)    Phosphorus Level, Serum: 1.9 mg/dL

## 2017-01-03 NOTE — CONSULT NOTE ADULT - CONSULT REASON
Med evaluation
Hypercalcemia; ROMEO
Lymph node biopsy
PM&R evaluation
Respiratory distress
preoperative evaluation, hx of CHF
recent dx IgM monoclonal gammopathy now with hypercalcemia

## 2017-01-03 NOTE — PROGRESS NOTE ADULT - PROBLEM SELECTOR PLAN 7
Known CAD s/p CABG, no chest pain at this time  - continue ASA 81mg, Lipitor 80mg qhs
Known CAD s/p CABG, no chest pain at this time  - c/w ASA 81mg  - c/w lipitor 80mg qhs

## 2017-01-03 NOTE — PROGRESS NOTE ADULT - PROBLEM SELECTOR PLAN 1
11/7 today which is increased from yesterday, likely 2/2 malignancy. patient is asymptomatic, no bone pain, no muscle spasms, will continue to monitor for symptoms.  - f/u PTHrP  - Lasix 20mg PO qd  - Will give dose a pamidronate 50mg today per nephrology  - follow up nephrology recommendations

## 2017-01-03 NOTE — PROGRESS NOTE ADULT - SUBJECTIVE AND OBJECTIVE BOX
HPI:  86 y/o F w/ hx of IgM gammopathy recently admitted (11/7 to 11/14) for HFpEF exacerbation (and found to have CT neck showing lymphadenopathy and parotid mass) with recent FNA bx of LN concerning for B-cell lymphoma and pending BM bx, with other co-morbidities including HTN, DM2, right sided breast CA in remission (s/p mastectomy/chemotx/radiation), a-fib s/p PPM on Eliquis, s/p left hemithyroidectomy, CAD s/p CABG, severe MR/TR (seen on recent Echo 11/2016), presents c/o 3-4 weeks of gradual onset generalized weakness/low energy/lightheadedness, progressively worsening, in the setting of persistent new-onset hypercalcemia (not seen on previous admissions) as outpatient. She received Zometa in office on 12/24 without change in Na (last value was 13 yesterday 12/28 in Dr. Hopkins’s office). She endorses worsening of constipation, mild increased thirst with no significant increase in PO intake, chronic nighttime polyuria with no recent increase in urinary frequency, and denies change in mental status, abd pain, nausea/vomiting, recent bone fracture or skin necrosis. Has not taken any calcium supplements or new medications containing calcium. States last chemotherapy was 18 years ago. 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  Initial vitals in ED: 98.4F, HR 90, 120/69, RR 18, 96% on RA. Started on IV fluids. (29 Dec 2016 14:41)    FAMILY HISTORY:  No pertinent family history in first degree relatives    MEDICATIONS  (STANDING):  atorvastatin 80milliGRAM(s) Oral at bedtime  aspirin  chewable 81milliGRAM(s) Oral daily  amiodarone    Tablet 200milliGRAM(s) Oral daily  pantoprazole    Tablet 40milliGRAM(s) Oral before breakfast  insulin lispro (HumaLOG) corrective regimen sliding scale  SubCutaneous Before meals and at bedtime  metoprolol succinate ER 50milliGRAM(s) Oral daily  apixaban 2.5milliGRAM(s) Oral two times a day  polyethylene glycol 3350 17Gram(s) Oral daily    MEDICATIONS  (PRN):  acetaminophen   Tablet. 650milliGRAM(s) Oral every 6 hours PRN Moderate Pain (4 - 6)  bisacodyl Suppository 10milliGRAM(s) Rectal daily PRN Constipation    Vital Signs Last 24 Hrs  T(C): 37.1, Max: 37.1 (01-02 @ 22:35)  T(F): 98.7, Max: 98.7 (01-02 @ 22:35)  HR: 75 (75 - 84)  BP: 106/62 (106/61 - 125/55)  BP(mean): --  RR: 18 (17 - 18)  SpO2: 94% (93% - 98%)    Physical exam:    Overall impression  Lymphadenopathy cervical adenopathy  Liver normal  spleen nonpalpable    Labs:  CBC Full  -  ( 03 Jan 2017 06:58 )  WBC Count : 2.4 K/uL  Hemoglobin : 11.0 g/dL  Hematocrit : 34.3 %  Platelet Count - Automated : 161 K/uL  Mean Cell Volume : 86.8 fL  Mean Cell Hemoglobin : 27.8 pg  Mean Cell Hemoglobin Concentration : 32.1 g/dL  Auto Neutrophil # : x  Auto Lymphocyte # : x  Auto Monocyte # : x  Auto Eosinophil # : x  Auto Basophil # : x  Auto Neutrophil % : x  Auto Lymphocyte % : x  Auto Monocyte % : x  Auto Eosinophil % : x  Auto Basophil % : x    03 Jan 2017 06:58    134    |  102    |  20     ----------------------------<  106    3.8     |  24     |  1.08     Ca    10.8       03 Jan 2017 06:58  Phos  1.9       03 Jan 2017 06:58  Mg     1.6       03 Jan 2017 06:58    TPro  6.0    /  Alb  2.7    /  TBili  0.5    /  DBili  x      /  AST  80     /  ALT  50     /  AlkPhos  81     02 Jan 2017 06:35      Radiology:  HEALTH ISSUES - R/O PROBLEM Dx:      Assessmant / Problems  1) diffuse adenopathy ,FNA lymphoma unspecified  2) Breast ca in the past , elevated Ca 27-29  3)MGUS monoclonal gammopathy undetermined significance  4) hypercalcemia s/p Zometa Ca10.8    Plan:  1) await path report lymph node biopsy  2)repeat Ca 27-29  3)monitor Ca    Thank you  Neha Galaviz MD

## 2017-01-03 NOTE — PROGRESS NOTE ADULT - SUBJECTIVE AND OBJECTIVE BOX
INTERVAL HPI/OVERNIGHT EVENTS:  TEJINDER overnight. Patient feeling about the same this morning, but better than on admission. Feels ready to be discharged. ROS negative.    VITAL SIGNS:  T(F): 97.9  HR: 84  BP: 115/68  RR: 18  SpO2: 95%  Wt(kg): --    PHYSICAL EXAM:    Constitutional: appears well, NAD  Eyes: EOMI, PERRL  ENMT: MMM  Neck: supple, no JVD  Respiratory: Crackles at the bases b/l  Cardiovascular: RRR, systolic murmur  Gastrointestinal: soft, NTND, normal BS  Extremities: trace edema in b/l lower extremeties  Vascular: pulses intact in all 4 extremities  Neurological: A&Ox3, grossly intact, walking with walker    MEDICATIONS  (STANDING):  atorvastatin 80milliGRAM(s) Oral at bedtime  aspirin  chewable 81milliGRAM(s) Oral daily  amiodarone    Tablet 200milliGRAM(s) Oral daily  pantoprazole    Tablet 40milliGRAM(s) Oral before breakfast  insulin lispro (HumaLOG) corrective regimen sliding scale  SubCutaneous Before meals and at bedtime  metoprolol succinate ER 50milliGRAM(s) Oral daily  apixaban 2.5milliGRAM(s) Oral two times a day  polyethylene glycol 3350 17Gram(s) Oral daily  furosemide    Tablet 20milliGRAM(s) Oral daily  pamidronate IVPB 50milliGRAM(s) IV Intermittent once    MEDICATIONS  (PRN):  acetaminophen   Tablet. 650milliGRAM(s) Oral every 6 hours PRN Moderate Pain (4 - 6)  bisacodyl Suppository 10milliGRAM(s) Rectal daily PRN Constipation      Allergies    IV CONtRAST (Flushing (Skin); Rash)  No Known Drug Allergies    Intolerances        LABS:                        11.0   2.4   )-----------( 161      ( 03 Jan 2017 06:58 )             34.3     03 Jan 2017 06:58    134    |  102    |  20     ----------------------------<  106    3.8     |  24     |  1.08     Ca    10.8       03 Jan 2017 06:58  Phos  1.9       03 Jan 2017 06:58  Mg     1.6       03 Jan 2017 06:58    TPro  6.0    /  Alb  2.7    /  TBili  0.5    /  DBili  x      /  AST  80     /  ALT  50     /  AlkPhos  81     02 Jan 2017 06:35          RADIOLOGY & ADDITIONAL TESTS:

## 2017-01-03 NOTE — PROGRESS NOTE ADULT - PROBLEM SELECTOR PLAN 3
Concern for B-cell lymphoma on outpatient FNA.   - POD#4 from cervical lymph node excisional biopsy by Dr. Westfall, doing well.  - f/u biopsy results, pathology pending

## 2017-01-03 NOTE — PROGRESS NOTE ADULT - PROBLEM SELECTOR PLAN 8
Glucose well controlled.  - c/w ISS
Fingersticks have been well controlled, actually in the lower range  - c/w ISS

## 2017-01-04 ENCOUNTER — TRANSCRIPTION ENCOUNTER (OUTPATIENT)
Age: 82
End: 2017-01-04

## 2017-01-04 NOTE — DISCHARGE NOTE ADULT - NS MD DC FALL RISK RISK
For information on Fall & Injury Prevention, visit [email or web address]

## 2017-01-04 NOTE — PROGRESS NOTE ADULT - SUBJECTIVE AND OBJECTIVE BOX
Patient seen and examined at bedside. Patient complains of generalized fatigue and feeling tired. Patient denies any shortness of breath.     atorvastatin 80milliGRAM(s) at bedtime  aspirin  chewable 81milliGRAM(s) daily  amiodarone    Tablet 200milliGRAM(s) daily  pantoprazole    Tablet 40milliGRAM(s) before breakfast  insulin lispro (HumaLOG) corrective regimen sliding scale  Before meals and at bedtime  metoprolol succinate ER 50milliGRAM(s) daily  acetaminophen   Tablet. 650milliGRAM(s) every 6 hours PRN  bisacodyl Suppository 10milliGRAM(s) daily PRN  apixaban 2.5milliGRAM(s) two times a day  polyethylene glycol 3350 17Gram(s) daily  furosemide    Tablet 20milliGRAM(s) daily    Allergies    IV CONtRAST (Flushing (Skin); Rash)  No Known Drug Allergies    Intolerances    T(C): , Max: 37.2 (01-04 @ 04:54)  T(F): , Max: 98.9 (01-04 @ 04:54)  HR: 82  BP: 121/63  RR: 20  SpO2: 95%    LABS:                        10.5   2.3   )-----------( 146      ( 04 Jan 2017 07:14 )             32.8     04 Jan 2017 07:14    136    |  103    |  24     ----------------------------<  95     3.6     |  23     |  0.98     Ca    10.2       04 Jan 2017 07:14  Phos  1.9       03 Jan 2017 06:58  Mg     1.4       04 Jan 2017 07:14

## 2017-01-04 NOTE — PROGRESS NOTE ADULT - SUBJECTIVE AND OBJECTIVE BOX
HPI:  84 y/o F w/ hx of IgM gammopathy recently admitted (11/7 to 11/14) for HFpEF exacerbation (and found to have CT neck showing lymphadenopathy and parotid mass) with recent FNA bx of LN concerning for B-cell lymphoma and pending BM bx, with other co-morbidities including HTN, DM2, right sided breast CA in remission (s/p mastectomy/chemotx/radiation), a-fib s/p PPM on Eliquis, s/p left hemithyroidectomy, CAD s/p CABG, severe MR/TR (seen on recent Echo 11/2016), presents c/o 3-4 weeks of gradual onset generalized weakness/low energy/lightheadedness, progressively worsening, in the setting of persistent new-onset hypercalcemia (not seen on previous admissions) as outpatient. She received Zometa in office on 12/24 without change in Na (last value was 13 yesterday 12/28 in Dr. Hopkins’s office). She endorses worsening of constipation, mild increased thirst with no significant increase in PO intake, chronic nighttime polyuria with no recent increase in urinary frequency, and denies change in mental status, abd pain, nausea/vomiting, recent bone fracture or skin necrosis. Has not taken any calcium supplements or new medications containing calcium. States last chemotherapy was 18 years ago. 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  Initial vitals in ED: 98.4F, HR 90, 120/69, RR 18, 96% on RA. Started on IV fluids. (29 Dec 2016 14:41)    FAMILY HISTORY:  No pertinent family history in first degree relatives    MEDICATIONS  (STANDING):  atorvastatin 80milliGRAM(s) Oral at bedtime  aspirin  chewable 81milliGRAM(s) Oral daily  amiodarone    Tablet 200milliGRAM(s) Oral daily  pantoprazole    Tablet 40milliGRAM(s) Oral before breakfast  insulin lispro (HumaLOG) corrective regimen sliding scale  SubCutaneous Before meals and at bedtime  metoprolol succinate ER 50milliGRAM(s) Oral daily  apixaban 2.5milliGRAM(s) Oral two times a day  polyethylene glycol 3350 17Gram(s) Oral daily  furosemide    Tablet 20milliGRAM(s) Oral daily    MEDICATIONS  (PRN):  acetaminophen   Tablet. 650milliGRAM(s) Oral every 6 hours PRN Moderate Pain (4 - 6)  bisacodyl Suppository 10milliGRAM(s) Rectal daily PRN Constipation    Vital Signs Last 24 Hrs  T(C): 36.6, Max: 37.2 (01-04 @ 04:54)  T(F): 97.9, Max: 98.9 (01-04 @ 04:54)  HR: 82 (75 - 84)  BP: 121/63 (115/68 - 125/61)  BP(mean): --  RR: 20 (17 - 20)  SpO2: 95% (95% - 95%)    Physical exam:    Overall impression  Lymphadenopathy cervical and supraclavicular  Liver normal size  spleen nonpalpable    Labs:  CBC Full  -  ( 04 Jan 2017 07:14 )  WBC Count : 2.3 K/uL  Hemoglobin : 10.5 g/dL  Hematocrit : 32.8 %  Platelet Count - Automated : 146 K/uL  Mean Cell Volume : 85.9 fL  Mean Cell Hemoglobin : 27.5 pg  Mean Cell Hemoglobin Concentration : 32.0 g/dL  Auto Neutrophil # : x  Auto Lymphocyte # : x  Auto Monocyte # : x  Auto Eosinophil # : x  Auto Basophil # : x  Auto Neutrophil % : x  Auto Lymphocyte % : x  Auto Monocyte % : x  Auto Eosinophil % : x  Auto Basophil % : x    04 Jan 2017 07:14    136    |  103    |  24     ----------------------------<  95     3.6     |  23     |  0.98     Ca    10.2       04 Jan 2017 07:14  Phos  1.9       03 Jan 2017 06:58  Mg     1.4       04 Jan 2017 07:14        Radiology:  HEALTH ISSUES - R/O PROBLEM Dx:      Assessmant / Problems  1)right sided breast cancer with rising Ca27-29 ( was 64 in Nov 2016 is 84 now) progression   2)Lymphoma exact subtype not known at this point , as of today no final report  3) Hypercalcemia mostly controlled ( Ca 10.2 today)    Plan:  PET CT  continue fluids and Lasix    Thank you  Neha Galaviz MD

## 2017-01-04 NOTE — PROGRESS NOTE ADULT - PROBLEM SELECTOR PLAN 4
Currently appears not overtly hypervolemic on examination.   P - continue with furosemide 20mg qdaily   monitor respiratory status

## 2017-01-04 NOTE — DISCHARGE NOTE ADULT - PATIENT PORTAL LINK FT
“You can access the [facility] Patient Portal, offered by [facility], by registering with the following website: [email or web address]”

## 2017-01-04 NOTE — DISCHARGE NOTE ADULT - VISION (WITH CORRECTIVE LENSES IF THE PATIENT USUALLY WEARS THEM):
H/o bilateral corneal implant/Normal vision: sees adequately in most situations; can see medication labels, newsprint

## 2017-01-04 NOTE — PROGRESS NOTE ADULT - SUBJECTIVE AND OBJECTIVE BOX
Physical Medicine and Rehabilitation Progress Note:    Patient is a 85y old  Female who presents with a chief complaint of hypercalcemia (04 Jan 2017 14:45)      HPI:  84 y/o F w/ hx of IgM gammopathy recently admitted (11/7 to 11/14) for HFpEF exacerbation (and found to have CT neck showing lymphadenopathy and parotid mass) with recent FNA bx of LN concerning for B-cell lymphoma and pending BM bx, with other co-morbidities including HTN, DM2, right sided breast CA in remission (s/p mastectomy/chemotx/radiation), a-fib s/p PPM on Eliquis, s/p left hemithyroidectomy, CAD s/p CABG, severe MR/TR (seen on recent Echo 11/2016), presents c/o 3-4 weeks of gradual onset generalized weakness/low energy/lightheadedness, progressively worsening, in the setting of persistent new-onset hypercalcemia (not seen on previous admissions) as outpatient. She received Zometa in office on 12/24 without change in Na (last value was 13 yesterday 12/28 in Dr. Hopkins’s office). She endorses worsening of constipation, mild increased thirst with no significant increase in PO intake, chronic nighttime polyuria with no recent increase in urinary frequency, and denies change in mental status, abd pain, nausea/vomiting, recent bone fracture or skin necrosis. Has not taken any calcium supplements or new medications containing calcium. States last chemotherapy was 18 years ago. 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  Initial vitals in ED: 98.4F, HR 90, 120/69, RR 18, 96% on RA. Started on IV fluids. (29 Dec 2016 14:41)                            10.5   2.3   )-----------( 146      ( 04 Jan 2017 07:14 )             32.8       04 Jan 2017 07:14    136    |  103    |  24     ----------------------------<  95     3.6     |  23     |  0.98     Ca    10.2       04 Jan 2017 07:14  Phos  1.9       03 Jan 2017 06:58  Mg     1.4       04 Jan 2017 07:14      Vital Signs Last 24 Hrs  T(C): 36.6, Max: 37.2 (01-04 @ 04:54)  T(F): 97.9, Max: 98.9 (01-04 @ 04:54)  HR: 82 (75 - 82)  BP: 121/63 (121/63 - 125/61)  BP(mean): --  RR: 20 (17 - 20)  SpO2: 95% (95% - 95%)    MEDICATIONS  (STANDING):  atorvastatin 80milliGRAM(s) Oral at bedtime  aspirin  chewable 81milliGRAM(s) Oral daily  amiodarone    Tablet 200milliGRAM(s) Oral daily  pantoprazole    Tablet 40milliGRAM(s) Oral before breakfast  insulin lispro (HumaLOG) corrective regimen sliding scale  SubCutaneous Before meals and at bedtime  metoprolol succinate ER 50milliGRAM(s) Oral daily  apixaban 2.5milliGRAM(s) Oral two times a day  polyethylene glycol 3350 17Gram(s) Oral daily  furosemide    Tablet 20milliGRAM(s) Oral daily    MEDICATIONS  (PRN):  acetaminophen   Tablet. 650milliGRAM(s) Oral every 6 hours PRN Moderate Pain (4 - 6)  bisacodyl Suppository 10milliGRAM(s) Rectal daily PRN Constipation        Currently Undergoing Physical Therapy at bedside.    Functional Status Assessment:    Previous Level of Function:   · Ambulation Skills	needs device	  · Transfer Skills	needs device	  · ADL Skills	needs device	  · Work/Leisure Activity	needs device	  · Additional Comments	Pt reports that she was using a straight cane for all functional mobility within the home.	    Cognitive Status Examination:   · Orientation	oriented to person, place, time and situation	  · Level of Consciousness	alert	  · Follows Commands and Answers Questions	100% of the time	  · Personal Safety and Judgment	impaired	    Range of Motion Exam:   · Active Range of Motion Examination	bilateral upper extremity Active ROM was WFL (within functional limits); bilateral  lower extremity Active ROM was WFL (within functional limits)	    Manual Muscle Testing:   · Manual Muscle Testing Results	grossly assessed due to, functional mobility testing; >/=3/5 b/l UE and LE	    Bed Mobility: Rolling/Turning:   · Level of [place]	supervision	  · Assistive Device	bed rails	  Bed Mobility: Scooting/Bridging:   · Level of [place]	supervision	  · Assistive Device	bed rails	    Bed Mobility: Sit to Supine:   · Level of [place]	supervision	  · Assistive Device	bed rails	    Bed Mobility: Supine to Sit:   · Level of [place]	supervision	  · Assistive Device	bed rails	    Bed Mobility Analysis:   · Bed Mobility Limitations	decreased ability to use arms for pushing/pulling; decreased ability to use legs for bridging/pushing	  · Impairments Contributing to Impaired Bed Mobility	impaired balance; decreased strength; decreased activity tolerance/endurance	    Transfer: Sit to Stand:   · Level of [place]	supervision	  · Assistive Device	rolling walker	    Transfer: Stand to Sit:   · Level of [place]	supervision	  · Assistive Device	rolling walker	    Sit/Stand Transfer Safety Analysis:   · Transfer Safety Concerns Noted	decreased sequencing ability	  · Impairments Contributing to Impaired Transfers	impaired balance; decreased strength; decreased activity tolerance/endurance	    Gait Skills:   · Level of [place]	supervision	  · Assistive Device	rolling walker	  · Gait Distance	50 feet	  Gait Analysis:   · Gait Pattern Used	3-point gait	  · Gait Deviations Noted	decreased raimundo; crouch; decreased step length; decreased stride length	  · Impairments Contributing to Gait Deviations	impaired balance; decreased strength	    Balance Skills Assessment:   · Sitting Balance: Static	good balance	  · Sitting Balance: Dynamic	fair balance	  · Sit-to-Stand Balance	fair balance	  · Standing Balance: Static	fair balance	  · Standing Balance: Dynamic	fair balance	  Sensory Examination:  Grossly Intact:   · Gross Sensory Examination	Grossly Intact	      Clinical Impressions:   · Criteria for Skilled Therapeutic Interventions	impairments found; functional limitations in following categories; anticipated discharge recommendation	  · Impairments Found (describe specific impairments)	aerobic capacity/endurance; muscle strength; posture; gait, locomotion, and balance	  · Rehab Potential	good, to achieve stated therapy goals	  · Therapy Frequency	3-5x/week	    PM&R Impression: as above    Disposition Plan Recommendations: d/c home with home physical therapy

## 2017-01-04 NOTE — DISCHARGE NOTE ADULT - MEDICATION SUMMARY - MEDICATIONS TO TAKE
I will START or STAY ON the medications listed below when I get home from the hospital:    aspirin 81 mg oral tablet  -- 1 tab(s) by mouth once a day  -- Indication: For CAD (coronary artery disease)    amiodarone 200 mg oral tablet  -- 1 tab(s) by mouth once a day  -- Indication: For Atrial Fibrillation    apixaban 2.5 mg oral tablet  -- 1 tab(s) by mouth 2 times a day  -- Indication: For Atrial Fibrillation    metFORMIN 500 mg oral tablet  -- 1 tab(s) by mouth 2 times a day  -- Indication: For Diabetes    atorvastatin 80 mg oral tablet  -- 1 tab(s) by mouth once a day (at bedtime)  -- Indication: For CAD (coronary artery disease)    Toprol-XL 50 mg oral tablet, extended release  -- 1 tab(s) by mouth once a day  -- Indication: For CAD (coronary artery disease)    Lasix 20 mg oral tablet  -- 1 tab(s) by mouth once a day  -- Indication: For CHF    pantoprazole 40 mg oral delayed release tablet  -- 1 tab(s) by mouth once a day (before a meal)  -- Indication: For GERD

## 2017-01-04 NOTE — PROGRESS NOTE ADULT - I WAS PHYSICALLY PRESENT FOR THE KEY PORTIONS OF THE EVALUATION AND MANAGEMENT (E/M) SERVICE PROVIDED.  I AGREE WITH THE ABOVE HISTORY, PHYSICAL, AND PLAN WHICH I HAVE REVIEWED AND EDITED WHERE APPROPRIATE
Statement Selected

## 2017-01-04 NOTE — PROGRESS NOTE ADULT - SUBJECTIVE AND OBJECTIVE BOX
Patient is a 85y old  Female who presents with a chief complaint of hypercalcemia (29 Dec 2016 14:41)      HPI:  84 y/o F w/ hx of IgM gammopathy recently admitted (11/7 to 11/14) for HFpEF exacerbation (and found to have CT neck showing lymphadenopathy and parotid mass) with recent FNA bx of LN concerning for B-cell lymphoma and pending BM bx, with other co-morbidities including HTN, DM2, right sided breast CA in remission (s/p mastectomy/chemotx/radiation), a-fib s/p PPM on Eliquis, s/p left hemithyroidectomy, CAD s/p CABG, severe MR/TR (seen on recent Echo 11/2016), presents c/o 3-4 weeks of gradual onset generalized weakness/low energy/lightheadedness, progressively worsening, in the setting of persistent new-onset hypercalcemia (not seen on previous admissions) as outpatient. She received Zometa in office on 12/24 without change in Na (last value was 13 yesterday 12/28 in Dr. Hopkins’s office). She endorses worsening of constipation, mild increased thirst with no significant increase in PO intake, chronic nighttime polyuria with no recent increase in urinary frequency, and denies change in mental status, abd pain, nausea/vomiting, recent bone fracture or skin necrosis. Has not taken any calcium supplements or new medications containing calcium. States last chemotherapy was 18 years ago. 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  Initial vitals in ED: 98.4F, HR 90, 120/69, RR 18, 96% on RA. Started on IV fluids. (29 Dec 2016 14:41)    INTERVAL HPI/OVERNIGHT EVENTS:::comfortable, doing well.    HEALTH ISSUES - PROBLEM Dx:  Dyspnea, unspecified type: Dyspnea, unspecified type  Essential hypertension: Essential hypertension  Type 2 diabetes mellitus with complication, unspecified long term insulin use status: Type 2 diabetes mellitus with complication, unspecified long term insulin use status  Coronary artery disease involving native heart, angina presence unspecified, unspecified vessel or lesion type: Coronary artery disease involving native heart, angina presence unspecified, unspecified vessel or lesion type  Chronic atrial fibrillation: Chronic atrial fibrillation  Lymphadenopathy: Lymphadenopathy  Acute pulmonary edema: Acute pulmonary edema  Hypokalemia: Hypokalemia  Respiratory distress: Respiratory distress  CAD (coronary artery disease): CAD (coronary artery disease)  Need for prophylactic measure: Need for prophylactic measure  Diabetes: Diabetes  Afib: Afib  IgM monoclonal gammopathy of uncertain significance: IgM monoclonal gammopathy of uncertain significance  Gammopathy: Gammopathy  B-cell lymphoma, unspecified B-cell lymphoma type, unspecified body region: B-cell lymphoma, unspecified B-cell lymphoma type, unspecified body region  Heart failure with preserved ejection fraction: Heart failure with preserved ejection fraction  Hypertension: Hypertension  Acute kidney injury: Acute kidney injury  Hypercalcemia: Hypercalcemia          PAST MEDICAL & SURGICAL HISTORY:  Follicular lymphoma  Neck mass  Afib  Other hyperlipidemia  Breast cancer  CAD (coronary artery disease)  Diabetes  HTN (hypertension)  No pertinent past medical history  H/O abdominal hysterectomy  H/O thyroidectomy  S/P CABG x 3          Consultant NOTE  REVIEWED  (   )    REVIEW OF SYSTEMS:  [x] As per HPI  CONSTITUTIONAL: fatique  RESPIRATORY: No cough, wheezing, chills or hemoptysis; No Shortness of Breath  CARDIOVASCULAR: No chest pain, palpitations, dizziness, or leg swelling  GASTROINTESTINAL: No abdominal or epigastric pain. No nausea, vomiting, or hematemesis; No diarrhea or constipation. No melena or hematochezia.  MUSCULOSKELETAL: No joint pain or swelling; No muscle, back, or extremity pain  weakness  PSYCH    awake, alert       [x] All others negative	  [ ] Unable to obtain          Vital Signs Last 24 Hrs  T(C): 36.6, Max: 37.2 (01-04 @ 04:54)  T(F): 97.9, Max: 98.9 (01-04 @ 04:54)  HR: 82 (75 - 84)  BP: 121/63 (115/68 - 125/61)  BP(mean): --  RR: 20 (17 - 20)  SpO2: 95% (95% - 95%)        PHYSICAL EXAMINATION:                                    (  +++ )  NO CHANGE  Appearance: Normal	  HEENT:   Normal oral mucosa, PERRL, EOMI	  Neck: Supple, - JVD;- Carotid Bruit   Cardiovascular: Normal S1 S2, No JVD,   Respiratory: Lungs clear to auscultation/Decreased Breath Sounds/No Rales, Rhonchi, Wheezing	  Gastrointestinal:  Soft, Non-tender, + BS	  Skin: No rashes, No ecchymoses, No cyanosis  Extremities: Normal range of motion, No clubbing, cyanosis or edema  Vascular: Peripheral pulses palpable  Neurologic: Non-focal  Psychiatry: A & O x 3, Mood & affect appropriate    atorvastatin 80milliGRAM(s) Oral at bedtime  aspirin  chewable 81milliGRAM(s) Oral daily  amiodarone    Tablet 200milliGRAM(s) Oral daily  pantoprazole    Tablet 40milliGRAM(s) Oral before breakfast  insulin lispro (HumaLOG) corrective regimen sliding scale  SubCutaneous Before meals and at bedtime  metoprolol succinate ER 50milliGRAM(s) Oral daily  acetaminophen   Tablet. 650milliGRAM(s) Oral every 6 hours PRN  bisacodyl Suppository 10milliGRAM(s) Rectal daily PRN  apixaban 2.5milliGRAM(s) Oral two times a day  polyethylene glycol 3350 17Gram(s) Oral daily  furosemide    Tablet 20milliGRAM(s) Oral daily                                      10.5   2.3   )-----------( 146      ( 04 Jan 2017 07:14 )             32.8     04 Jan 2017 07:14    136    |  103    |  24     ----------------------------<  95     3.6     |  23     |  0.98     Ca    10.2       04 Jan 2017 07:14  Phos  1.9       03 Jan 2017 06:58  Mg     1.4       04 Jan 2017 07:14        CAPILLARY BLOOD GLUCOSE  99 (04 Jan 2017 07:48)  126 (03 Jan 2017 20:58)  117 (03 Jan 2017 17:08)  Cytopathology - Non Gyn Report (11.25.16 @ 10:27)    Cytopathology - Non Gyn Report:   ACCESSION No:  [id number]    TANYA FRAIRE                      3        Cytopathology Addendum Report          Addendum  Interpretation: The low cell viability hinders accurate  interpretation; however in the viable cell population the  analysis reveals an atypical B-cell population with possible  lambda expression.  The T cells show no loss of pan T cell  antigens.    The findings are atypical and although not definitively  diagnostic, are concerning for a lymphoproliferative disorder.    Further workup is recommended.    B-Cell Associated:  FMC7                 2 %  CD19                 9 %  CD20                           5 %  CD10                           1 %  CD11c                     10 %  CD23                 1 %  KAPPA                 1 %  LAMBDA               4 %    Activation:  CD38                      52 %    [place]:  CD45                 98 %    T- Cell Associated:  CD2                  55 %  CD3                  56 %  CD4                  40 %  CD5       53 %  CD7                  44 %  CD8                  15 %  CD56                 3 %  CD57                 10 %    NOTE:  The technical component was performed at [facility], [facility]  [facility], [place], [place].    Trinidad York M.D.  (Electronic Signature)  Reported on: 12/02/16                TANYA FRAIRE                      3        Cytopathology Report            Final Diagnosis  LYMPH NODE, RIGHT NECK, FNA:    ATYPICAL LYMPHOID-PLASMACYTIC CELL POPULATION,SUGGESTIVE OF  PLASMABLASTIC NEOPLASM (see note).    Stains for AFB, and GMS for fungus, negative.    Note: Smears show a heterogeneous lymphoid and plasmacytic cell  population, some cells multinucleated, some markedly atypical  with high n/c ratio and enlarged irregular nuclei.  Cell block shows similar material, but is limited, with focal  crush artifact. No epithelial elements are identified.  On immunohistochemistry, panKeratin is negative, and most of the  larger atypical cells are positiveonly for CD79a and MUM1 and  proliferation marker Ki67. Rare large cells stain for CD20 and  PAX5,  stains bland plasma cells and scattered larger cells,  CD3 and CD5 stain small T lymphocytes, CD30, CD10, BCL-6 are  negative, KALEY-1 stains rare cells. In situ hybridization studies  show both Kappa/Lambda in approximately equal distribution, DAINA  (ANJALI) is negative.    Findings are not diagnostic, but suggest a plasmablastic  neoplasm. For thorough elucidation, a formal lymph node biopsy is  recommended, if clinically indicated.    For QA purposes, this case was reviewed with a second senior  pathologist in the department (SARAH).    Delmis Kemp, CT([facility])  Anna Delgado M.D.  (Electronic Signature)  Reported on: 12/02/16  ________________________________________________________________      Specimen Description  LYMPH NODE,NECK,RIGHT, FNA      Statement of Adequacy  Satisfactory for interpretation.                TANYA FRAIRE                      3        Cytopathology Report            Clinical Information  Patient with history of breast cancer, right cervical  lymphadenopathy      Gross Description  Pass#1&2,CATALINO. Adeqaucy reported to Dr. Rogel by OBINNA 11/25/2016  2 DQ,2 Pap,1 cell block, flow cytometry

## 2017-01-04 NOTE — PROGRESS NOTE ADULT - PROBLEM SELECTOR PLAN 2
Patient has acute on chronic kidney disease possibly from underlying paraprotein disease which could be due to amyloid vs lymphoma infiltrative disease vs immunoglobulin deposition disease or other related etiology. Renal function is improving with creatinine now at 0.98.  As hematology plans for treatment of primary disease, will defer any immediate need for renal biopsy   Monitor renal function   Avoid nephrotoxic medications, NSAIDs or IV contrast Patient has acute on chronic kidney disease stage III possibly from underlying paraprotein disease which could be due to amyloid vs lymphoma infiltrative disease vs immunoglobulin deposition disease or other related etiology. Renal function is improving with creatinine now at 0.98.  As hematology plans for treatment of primary disease, will defer any immediate need for renal biopsy   Monitor renal function   Avoid nephrotoxic medications, NSAIDs or IV contrast

## 2017-01-04 NOTE — DISCHARGE NOTE ADULT - HOSPITAL COURSE
85F PMHx MGUS, HFpEF, CAD s/p CABG, severe MR/TR, afib s/p PPM, DM2, Breast CA presented with hypercalcemia in the setting of a recent FNA biopsy concerning for lymphoma. She was started on fluids, lasix, and bisphosphanates and the calcium level decreased. She went for excisional biopsy of the lymph nodes with preliminary reports concerning for lymphoma, but final diagnosis pending further stains and pathologic analysis. She was intubated for the biopsy and after extubation she was SOB and was found to be in pulmonary edema secondary to the IV fluids given for hypercalcemia. She was diuresed and her SOB and pulmonary edema resolved. She is being discharged in stable hemodynamic condition [facility] plans for follow up with Mary Kate Langston Shani.

## 2017-01-04 NOTE — PROGRESS NOTE ADULT - PROBLEM SELECTOR PLAN 3
blood pressure is stable     P- continue furosemide 20 mg qdaily   continue with metoprolol 50mg qdaily. '  Monitor blood pressure closely and avoid hypotension.

## 2017-01-04 NOTE — PROGRESS NOTE ADULT - ATTENDING COMMENTS
Agrees to rehab [facility]
D/C plan
d/c home
I will be away till 1/13/17 Dr. Neha Galaviz is covering 
Path p  CHF  resolved  OOB  PT  d/c paln
case discussed with the resident plan outlined
stable from renal viewpoint  If dcd needs f/u lytes within week
calcium rising again -- appears related to high 1,25 vit D - likely due to lymphoma  consider redose bisphosphonate as is >week post last dose-- favor pamidronate 60 mg IV to zometa with risk ATN from Zometa  rx of lymphoma should help as well  agree with low dose lasix for CHF

## 2017-01-04 NOTE — DISCHARGE NOTE ADULT - PLAN OF CARE
follow up You were admitted to the hospital because you were found to have high calcium levels as an outpatient. It was thought that it was likely related to the large lymph nodes in your neck. A biopsy of the lymph nodes was done and we are awaiting the results. You will follow up with Dr. Hopkins on 11/17/16 at 2PM. Dr. Hartmann follow up is 1/6/2017 at 11:20AM Your atrial fibrillation was well controlled on this admission. You should follow up with your doctor and continue taking your medications as prescribed. Your coronary artery disease is currently being well controlled on your medications. Please follow up with your doctor. Your diabetes is well controlled. Continue your medications and follow up.

## 2017-01-04 NOTE — DISCHARGE NOTE ADULT - CARE PROVIDER_API CALL
Cisco Shanks), Internal Medicine  [address] [address]  Phone: [phone]  Fax: [phone]    Luis Hartmann (MD), Internal Medicine; Nephrology  [address] [address]  Phone: [phone]  Fax: [phone]    Maida Hopkins), Hematology; Internal Medicine  [address] [address]  Phone: [phone]  Fax: [phone]

## 2017-01-04 NOTE — PROGRESS NOTE ADULT - SUBJECTIVE AND OBJECTIVE BOX
INTERVAL HPI/OVERNIGHT EVENTS:    VITAL SIGNS:  T(F): 98.9  HR: 75  BP: 125/61  RR: 17  SpO2: 95%  Wt(kg): --    PHYSICAL EXAM:    Constitutional:  Eyes:  ENMT:  Neck:  Respiratory:  Cardiovascular:  Gastrointestinal:  Extremities:  Vascular:  Neurological:  Musculoskeletal:    MEDICATIONS  (STANDING):  atorvastatin 80milliGRAM(s) Oral at bedtime  aspirin  chewable 81milliGRAM(s) Oral daily  amiodarone    Tablet 200milliGRAM(s) Oral daily  pantoprazole    Tablet 40milliGRAM(s) Oral before breakfast  insulin lispro (HumaLOG) corrective regimen sliding scale  SubCutaneous Before meals and at bedtime  metoprolol succinate ER 50milliGRAM(s) Oral daily  apixaban 2.5milliGRAM(s) Oral two times a day  polyethylene glycol 3350 17Gram(s) Oral daily  furosemide    Tablet 20milliGRAM(s) Oral daily    MEDICATIONS  (PRN):  acetaminophen   Tablet. 650milliGRAM(s) Oral every 6 hours PRN Moderate Pain (4 - 6)  bisacodyl Suppository 10milliGRAM(s) Rectal daily PRN Constipation      Allergies    IV CONtRAST (Flushing (Skin); Rash)  No Known Drug Allergies    Intolerances        LABS:                        11.0   2.4   )-----------( 161      ( 03 Jan 2017 06:58 )             34.3     03 Jan 2017 06:58    134    |  102    |  20     ----------------------------<  106    3.8     |  24     |  1.08     Ca    10.8       03 Jan 2017 06:58  Phos  1.9       03 Jan 2017 06:58  Mg     1.6       03 Jan 2017 06:58            RADIOLOGY & ADDITIONAL TESTS:

## 2017-01-04 NOTE — DISCHARGE NOTE ADULT - SECONDARY DIAGNOSIS.
Paroxysmal atrial fibrillation Coronary artery disease involving native coronary artery of native heart without angina pectoris Type 2 diabetes mellitus without complication, without long-term current use of insulin

## 2017-01-04 NOTE — DISCHARGE NOTE ADULT - CARE PROVIDERS DIRECT ADDRESSES
,DirectAddress_Unknown,[email or web address],DirectAddress_Unknown

## 2017-01-04 NOTE — PROGRESS NOTE ADULT - PROBLEM SELECTOR PLAN 1
Calcium level stable at 10.2 after receiving pamidronate. The workup does highlight the etiology is from 1,25 Vit D overproduction possibly caused by underlying lymphoma.     P - off IV fluids at present  patient received dose of pamidronate 1/3/17   Monitor electrolytes  Hematology/oncology following and patient will need chemotherapy once final diagnosis is known

## 2017-01-04 NOTE — DISCHARGE NOTE ADULT - CARE PLAN
Principal Discharge DX:	Hypercalcemia  Goal:	follow up  Instructions for follow-up, activity and diet:	You were admitted to the hospital because you were found to have high calcium levels as an outpatient. It was thought that it was likely related to the large lymph nodes in your neck. A biopsy of the lymph nodes was done and we are awaiting the results. You will follow up with Dr. Hopkins on 11/17/16 at 2PM. Dr. Hartmann follow up is 1/6/2017 at 11:20AM  Secondary Diagnosis:	Paroxysmal atrial fibrillation  Instructions for follow-up, activity and diet:	Your atrial fibrillation was well controlled on this admission. You should follow up with your doctor and continue taking your medications as prescribed.  Secondary Diagnosis:	Coronary artery disease involving native coronary artery of native heart without angina pectoris  Instructions for follow-up, activity and diet:	Your coronary artery disease is currently being well controlled on your medications. Please follow up with your doctor.  Secondary Diagnosis:	Type 2 diabetes mellitus without complication, without long-term current use of insulin  Instructions for follow-up, activity and diet:	Your diabetes is well controlled. Continue your medications and follow up.

## 2017-01-05 ENCOUNTER — APPOINTMENT (OUTPATIENT)
Dept: HEART AND VASCULAR | Facility: CLINIC | Age: 82
End: 2017-01-05

## 2017-01-05 VITALS
BODY MASS INDEX: 21 KG/M2 | SYSTOLIC BLOOD PRESSURE: 116 MMHG | DIASTOLIC BLOOD PRESSURE: 54 MMHG | WEIGHT: 96 LBS | HEART RATE: 88 BPM | HEIGHT: 56.5 IN

## 2017-01-05 VITALS
DIASTOLIC BLOOD PRESSURE: 52 MMHG | WEIGHT: 101 LBS | SYSTOLIC BLOOD PRESSURE: 112 MMHG | BODY MASS INDEX: 22.09 KG/M2 | HEIGHT: 56.5 IN | HEART RATE: 86 BPM | OXYGEN SATURATION: 95 %

## 2017-01-06 ENCOUNTER — APPOINTMENT (OUTPATIENT)
Dept: NEPHROLOGY | Facility: CLINIC | Age: 82
End: 2017-01-06

## 2017-01-06 VITALS — SYSTOLIC BLOOD PRESSURE: 110 MMHG | HEART RATE: 80 BPM | DIASTOLIC BLOOD PRESSURE: 50 MMHG

## 2017-01-06 DIAGNOSIS — I50.9 HEART FAILURE, UNSPECIFIED: ICD-10-CM

## 2017-01-06 DIAGNOSIS — E83.52 HYPERCALCEMIA: ICD-10-CM

## 2017-01-06 RX ORDER — LACTULOSE 10 G/15ML
20 SOLUTION ORAL DAILY
Qty: 1 | Refills: 5 | Status: ACTIVE | COMMUNITY
Start: 2017-01-06 | End: 1900-01-01

## 2017-01-09 PROBLEM — I50.9 CHF (CONGESTIVE HEART FAILURE): Status: ACTIVE | Noted: 2017-01-09

## 2017-01-10 ENCOUNTER — INPATIENT (INPATIENT)
Facility: HOSPITAL | Age: 82
LOS: 9 days | Discharge: HOME HEALTH | DRG: 840 | End: 2017-01-20
Attending: INTERNAL MEDICINE | Admitting: INTERNAL MEDICINE
Payer: MEDICARE

## 2017-01-10 VITALS
RESPIRATION RATE: 16 BRPM | SYSTOLIC BLOOD PRESSURE: 133 MMHG | WEIGHT: 95.02 LBS | HEIGHT: 60 IN | OXYGEN SATURATION: 98 % | DIASTOLIC BLOOD PRESSURE: 52 MMHG | TEMPERATURE: 98 F | HEART RATE: 98 BPM

## 2017-01-10 DIAGNOSIS — E11.9 TYPE 2 DIABETES MELLITUS WITHOUT COMPLICATIONS: ICD-10-CM

## 2017-01-10 DIAGNOSIS — E89.0 POSTPROCEDURAL HYPOTHYROIDISM: Chronic | ICD-10-CM

## 2017-01-10 DIAGNOSIS — I48.0 PAROXYSMAL ATRIAL FIBRILLATION: ICD-10-CM

## 2017-01-10 DIAGNOSIS — I25.10 ATHEROSCLEROTIC HEART DISEASE OF NATIVE CORONARY ARTERY WITHOUT ANGINA PECTORIS: ICD-10-CM

## 2017-01-10 DIAGNOSIS — D47.2 MONOCLONAL GAMMOPATHY: ICD-10-CM

## 2017-01-10 DIAGNOSIS — Z95.1 PRESENCE OF AORTOCORONARY BYPASS GRAFT: Chronic | ICD-10-CM

## 2017-01-10 DIAGNOSIS — E83.52 HYPERCALCEMIA: ICD-10-CM

## 2017-01-10 DIAGNOSIS — Z90.710 ACQUIRED ABSENCE OF BOTH CERVIX AND UTERUS: Chronic | ICD-10-CM

## 2017-01-10 DIAGNOSIS — I50.30 UNSPECIFIED DIASTOLIC (CONGESTIVE) HEART FAILURE: ICD-10-CM

## 2017-01-10 DIAGNOSIS — Z41.8 ENCOUNTER FOR OTHER PROCEDURES FOR PURPOSES OTHER THAN REMEDYING HEALTH STATE: ICD-10-CM

## 2017-01-10 DIAGNOSIS — M79.601 PAIN IN RIGHT ARM: ICD-10-CM

## 2017-01-10 DIAGNOSIS — I10 ESSENTIAL (PRIMARY) HYPERTENSION: ICD-10-CM

## 2017-01-10 DIAGNOSIS — C85.90 NON-HODGKIN LYMPHOMA, UNSPECIFIED, UNSPECIFIED SITE: ICD-10-CM

## 2017-01-10 DIAGNOSIS — I50.9 HEART FAILURE, UNSPECIFIED: ICD-10-CM

## 2017-01-10 LAB
24R-OH-CALCIDIOL SERPL-MCNC: 22.1 NG/ML — LOW (ref 30–100)
ALBUMIN SERPL ELPH-MCNC: 2.6 G/DL — LOW (ref 3.4–5)
ALBUMIN SERPL ELPH-MCNC: 2.7 G/DL — LOW (ref 3.4–5)
ALBUMIN SERPL ELPH-MCNC: 3.5 G/DL
ALP BLD-CCNC: 100 U/L
ALP SERPL-CCNC: 92 U/L — SIGNIFICANT CHANGE UP (ref 40–120)
ALP SERPL-CCNC: 96 U/L — SIGNIFICANT CHANGE UP (ref 40–120)
ALT FLD-CCNC: 60 U/L — HIGH (ref 12–42)
ALT FLD-CCNC: 63 U/L — HIGH (ref 12–42)
ALT SERPL-CCNC: 48 U/L
ANION GAP SERPL CALC-SCNC: 10 MMOL/L — SIGNIFICANT CHANGE UP (ref 9–16)
ANION GAP SERPL CALC-SCNC: 11 MMOL/L — SIGNIFICANT CHANGE UP (ref 9–16)
ANION GAP SERPL CALC-SCNC: 19 MMOL/L
APPEARANCE UR: CLEAR — SIGNIFICANT CHANGE UP
APTT BLD: 26.6 SEC — LOW (ref 27.5–37.4)
AST SERPL-CCNC: 90 U/L
AST SERPL-CCNC: 92 U/L — HIGH (ref 15–37)
AST SERPL-CCNC: 93 U/L — HIGH (ref 15–37)
BASOPHILS # BLD AUTO: 0.01 K/UL
BASOPHILS NFR BLD AUTO: 0.3 %
BASOPHILS NFR BLD AUTO: 0.7 % — SIGNIFICANT CHANGE UP (ref 0–2)
BILIRUB SERPL-MCNC: 0.5 MG/DL
BILIRUB SERPL-MCNC: 0.5 MG/DL — SIGNIFICANT CHANGE UP (ref 0.2–1.2)
BILIRUB SERPL-MCNC: 0.5 MG/DL — SIGNIFICANT CHANGE UP (ref 0.2–1.2)
BILIRUB UR-MCNC: NEGATIVE — SIGNIFICANT CHANGE UP
BUN SERPL-MCNC: 25 MG/DL
BUN SERPL-MCNC: 26 MG/DL — HIGH (ref 7–23)
BUN SERPL-MCNC: 28 MG/DL — HIGH (ref 7–23)
CA-I BLD-SCNC: 1.84 MMOL/L — HIGH (ref 1.05–1.34)
CALCIUM SERPL-MCNC: 12.1 MG/DL — HIGH (ref 8.5–10.5)
CALCIUM SERPL-MCNC: 12.4 MG/DL — HIGH (ref 8.5–10.5)
CALCIUM SERPL-MCNC: 13.2 MG/DL
CHLORIDE SERPL-SCNC: 100 MMOL/L — SIGNIFICANT CHANGE UP (ref 96–108)
CHLORIDE SERPL-SCNC: 101 MMOL/L — SIGNIFICANT CHANGE UP (ref 96–108)
CHLORIDE SERPL-SCNC: 96 MMOL/L
CO2 SERPL-SCNC: 21 MMOL/L
CO2 SERPL-SCNC: 25 MMOL/L — SIGNIFICANT CHANGE UP (ref 22–31)
CO2 SERPL-SCNC: 27 MMOL/L — SIGNIFICANT CHANGE UP (ref 22–31)
COLOR SPEC: YELLOW — SIGNIFICANT CHANGE UP
CREAT SERPL-MCNC: 1.06 MG/DL — SIGNIFICANT CHANGE UP (ref 0.5–1.3)
CREAT SERPL-MCNC: 1.09 MG/DL — SIGNIFICANT CHANGE UP (ref 0.5–1.3)
CREAT SERPL-MCNC: 1.13 MG/DL
DIFF PNL FLD: (no result)
EOSINOPHIL # BLD AUTO: 0 K/UL
EOSINOPHIL NFR BLD AUTO: 0 %
EOSINOPHIL NFR BLD AUTO: 0.3 % — SIGNIFICANT CHANGE UP (ref 0–6)
GLUCOSE SERPL-MCNC: 108 MG/DL
GLUCOSE SERPL-MCNC: 76 MG/DL — SIGNIFICANT CHANGE UP (ref 70–99)
GLUCOSE SERPL-MCNC: 91 MG/DL — SIGNIFICANT CHANGE UP (ref 70–99)
GLUCOSE UR QL: NEGATIVE — SIGNIFICANT CHANGE UP
HCT VFR BLD CALC: 30.4 % — LOW (ref 34.5–45)
HCT VFR BLD CALC: 31.9 % — LOW (ref 34.5–45)
HCT VFR BLD CALC: 37.4 %
HGB BLD-MCNC: 10.4 G/DL — LOW (ref 11.5–15.5)
HGB BLD-MCNC: 12.1 G/DL
HGB BLD-MCNC: 9.9 G/DL — LOW (ref 11.5–15.5)
IMM GRANULOCYTES NFR BLD AUTO: 1.2 %
INR BLD: 1.24 — HIGH (ref 0.88–1.16)
KETONES UR-MCNC: NEGATIVE — SIGNIFICANT CHANGE UP
LEUKOCYTE ESTERASE UR-ACNC: NEGATIVE — SIGNIFICANT CHANGE UP
LYMPHOCYTES # BLD AUTO: 0.94 K/UL
LYMPHOCYTES # BLD AUTO: 19.5 % — SIGNIFICANT CHANGE UP (ref 13–44)
LYMPHOCYTES NFR BLD AUTO: 29.2 %
MAGNESIUM SERPL-MCNC: 1.4 MG/DL
MAGNESIUM SERPL-MCNC: 1.5 MG/DL — LOW (ref 1.6–2.4)
MAN DIFF?: NORMAL
MCHC RBC-ENTMCNC: 27.8 PG — SIGNIFICANT CHANGE UP (ref 27–34)
MCHC RBC-ENTMCNC: 27.9 PG — SIGNIFICANT CHANGE UP (ref 27–34)
MCHC RBC-ENTMCNC: 28.2 PG
MCHC RBC-ENTMCNC: 32.4 GM/DL
MCHC RBC-ENTMCNC: 32.6 G/DL — SIGNIFICANT CHANGE UP (ref 32–36)
MCHC RBC-ENTMCNC: 32.6 G/DL — SIGNIFICANT CHANGE UP (ref 32–36)
MCV RBC AUTO: 85.3 FL — SIGNIFICANT CHANGE UP (ref 80–100)
MCV RBC AUTO: 85.6 FL — SIGNIFICANT CHANGE UP (ref 80–100)
MCV RBC AUTO: 87.2 FL
MONOCYTES # BLD AUTO: 0.45 K/UL
MONOCYTES NFR BLD AUTO: 14 %
MONOCYTES NFR BLD AUTO: 18.2 % — HIGH (ref 2–14)
NEUTROPHILS # BLD AUTO: 1.78 K/UL
NEUTROPHILS NFR BLD AUTO: 55.3 %
NEUTROPHILS NFR BLD AUTO: 61.3 % — SIGNIFICANT CHANGE UP (ref 43–77)
NITRITE UR-MCNC: NEGATIVE — SIGNIFICANT CHANGE UP
PH UR: 6 — SIGNIFICANT CHANGE UP (ref 4–8)
PHOSPHATE SERPL-MCNC: 2.9 MG/DL — SIGNIFICANT CHANGE UP (ref 2.5–4.5)
PHOSPHATE SERPL-MCNC: 3 MG/DL
PLATELET # BLD AUTO: 151 K/UL — SIGNIFICANT CHANGE UP (ref 150–400)
PLATELET # BLD AUTO: 158 K/UL — SIGNIFICANT CHANGE UP (ref 150–400)
PLATELET # BLD AUTO: 172 K/UL
POTASSIUM SERPL-MCNC: 3.5 MMOL/L — SIGNIFICANT CHANGE UP (ref 3.5–5.3)
POTASSIUM SERPL-MCNC: 3.7 MMOL/L — SIGNIFICANT CHANGE UP (ref 3.5–5.3)
POTASSIUM SERPL-SCNC: 3.5 MMOL/L — SIGNIFICANT CHANGE UP (ref 3.5–5.3)
POTASSIUM SERPL-SCNC: 3.7 MMOL/L — SIGNIFICANT CHANGE UP (ref 3.5–5.3)
POTASSIUM SERPL-SCNC: 3.9 MMOL/L
PROT SERPL-MCNC: 6 G/DL — LOW (ref 6.4–8.2)
PROT SERPL-MCNC: 6.4 G/DL — SIGNIFICANT CHANGE UP (ref 6.4–8.2)
PROT SERPL-MCNC: 6.6 G/DL
PROT UR-MCNC: 100 MG/DL
PROTHROM AB SERPL-ACNC: 13.8 SEC — HIGH (ref 10–13.1)
RBC # BLD: 3.55 M/UL — LOW (ref 3.8–5.2)
RBC # BLD: 3.74 M/UL — LOW (ref 3.8–5.2)
RBC # BLD: 4.29 M/UL
RBC # FLD: 16.1 % — SIGNIFICANT CHANGE UP (ref 10.3–16.9)
RBC # FLD: 16.1 % — SIGNIFICANT CHANGE UP (ref 10.3–16.9)
RBC # FLD: 16.6 %
SODIUM SERPL-SCNC: 136 MMOL/L
SODIUM SERPL-SCNC: 137 MMOL/L — SIGNIFICANT CHANGE UP (ref 135–145)
SODIUM SERPL-SCNC: 137 MMOL/L — SIGNIFICANT CHANGE UP (ref 135–145)
SP GR SPEC: >=1.03 — SIGNIFICANT CHANGE UP (ref 1–1.03)
URATE SERPL-MCNC: 2.9 MG/DL
UROBILINOGEN FLD QL: 0.2 E.U./DL — SIGNIFICANT CHANGE UP
VIT D25+D1,25 OH+D1,25 PNL SERPL-MCNC: 120 PG/ML — HIGH (ref 19.9–79.3)
WBC # BLD: 2.3 K/UL — LOW (ref 3.8–10.5)
WBC # BLD: 3.1 K/UL — LOW (ref 3.8–10.5)
WBC # FLD AUTO: 2.3 K/UL — LOW (ref 3.8–10.5)
WBC # FLD AUTO: 3.1 K/UL — LOW (ref 3.8–10.5)
WBC # FLD AUTO: 3.22 K/UL

## 2017-01-10 PROCEDURE — 99223 1ST HOSP IP/OBS HIGH 75: CPT

## 2017-01-10 PROCEDURE — 71010: CPT | Mod: 26

## 2017-01-10 PROCEDURE — 73030 X-RAY EXAM OF SHOULDER: CPT | Mod: 26,RT

## 2017-01-10 PROCEDURE — 99285 EMERGENCY DEPT VISIT HI MDM: CPT | Mod: 25

## 2017-01-10 PROCEDURE — 93010 ELECTROCARDIOGRAM REPORT: CPT

## 2017-01-10 PROCEDURE — 99233 SBSQ HOSP IP/OBS HIGH 50: CPT | Mod: GC

## 2017-01-10 PROCEDURE — 71010: CPT | Mod: 26,77

## 2017-01-10 RX ORDER — INSULIN LISPRO 100/ML
VIAL (ML) SUBCUTANEOUS
Qty: 0 | Refills: 0 | Status: DISCONTINUED | OUTPATIENT
Start: 2017-01-10 | End: 2017-01-20

## 2017-01-10 RX ORDER — APIXABAN 2.5 MG/1
2.5 TABLET, FILM COATED ORAL
Qty: 0 | Refills: 0 | Status: DISCONTINUED | OUTPATIENT
Start: 2017-01-10 | End: 2017-01-13

## 2017-01-10 RX ORDER — ASPIRIN/CALCIUM CARB/MAGNESIUM 324 MG
81 TABLET ORAL DAILY
Qty: 0 | Refills: 0 | Status: DISCONTINUED | OUTPATIENT
Start: 2017-01-10 | End: 2017-01-13

## 2017-01-10 RX ORDER — FUROSEMIDE 40 MG
40 TABLET ORAL ONCE
Qty: 0 | Refills: 0 | Status: COMPLETED | OUTPATIENT
Start: 2017-01-10 | End: 2017-01-10

## 2017-01-10 RX ORDER — ENOXAPARIN SODIUM 100 MG/ML
30 INJECTION SUBCUTANEOUS EVERY 24 HOURS
Qty: 0 | Refills: 0 | Status: DISCONTINUED | OUTPATIENT
Start: 2017-01-10 | End: 2017-01-10

## 2017-01-10 RX ORDER — CALCITONIN SALMON 200 [IU]/ML
170 INJECTION, SOLUTION INTRAMUSCULAR EVERY 12 HOURS
Qty: 0 | Refills: 0 | Status: DISCONTINUED | OUTPATIENT
Start: 2017-01-10 | End: 2017-01-10

## 2017-01-10 RX ORDER — METOPROLOL TARTRATE 50 MG
50 TABLET ORAL DAILY
Qty: 0 | Refills: 0 | Status: DISCONTINUED | OUTPATIENT
Start: 2017-01-11 | End: 2017-01-20

## 2017-01-10 RX ORDER — POTASSIUM CHLORIDE 20 MEQ
40 PACKET (EA) ORAL ONCE
Qty: 0 | Refills: 0 | Status: COMPLETED | OUTPATIENT
Start: 2017-01-10 | End: 2017-01-10

## 2017-01-10 RX ORDER — AMIODARONE HYDROCHLORIDE 400 MG/1
200 TABLET ORAL DAILY
Qty: 0 | Refills: 0 | Status: DISCONTINUED | OUTPATIENT
Start: 2017-01-11 | End: 2017-01-20

## 2017-01-10 RX ORDER — ALLOPURINOL 300 MG
300 TABLET ORAL DAILY
Qty: 0 | Refills: 0 | Status: DISCONTINUED | OUTPATIENT
Start: 2017-01-10 | End: 2017-01-13

## 2017-01-10 RX ORDER — MAGNESIUM SULFATE 500 MG/ML
2 VIAL (ML) INJECTION ONCE
Qty: 0 | Refills: 0 | Status: COMPLETED | OUTPATIENT
Start: 2017-01-10 | End: 2017-01-10

## 2017-01-10 RX ORDER — CALCITONIN SALMON 200 [IU]/ML
170 INJECTION, SOLUTION INTRAMUSCULAR ONCE
Qty: 0 | Refills: 0 | Status: COMPLETED | OUTPATIENT
Start: 2017-01-10 | End: 2017-01-10

## 2017-01-10 RX ORDER — SODIUM CHLORIDE 9 MG/ML
1000 INJECTION INTRAMUSCULAR; INTRAVENOUS; SUBCUTANEOUS
Qty: 0 | Refills: 0 | Status: DISCONTINUED | OUTPATIENT
Start: 2017-01-10 | End: 2017-01-10

## 2017-01-10 RX ORDER — ZOLEDRONIC ACID 5 MG/100ML
3.5 INJECTION, SOLUTION INTRAVENOUS ONCE
Qty: 0 | Refills: 0 | Status: DISCONTINUED | OUTPATIENT
Start: 2017-01-10 | End: 2017-01-10

## 2017-01-10 RX ORDER — ATORVASTATIN CALCIUM 80 MG/1
80 TABLET, FILM COATED ORAL AT BEDTIME
Qty: 0 | Refills: 0 | Status: DISCONTINUED | OUTPATIENT
Start: 2017-01-10 | End: 2017-01-11

## 2017-01-10 RX ORDER — FUROSEMIDE 40 MG
20 TABLET ORAL ONCE
Qty: 0 | Refills: 0 | Status: COMPLETED | OUTPATIENT
Start: 2017-01-10 | End: 2017-01-10

## 2017-01-10 RX ORDER — SODIUM CHLORIDE 9 MG/ML
1000 INJECTION INTRAMUSCULAR; INTRAVENOUS; SUBCUTANEOUS ONCE
Qty: 0 | Refills: 0 | Status: DISCONTINUED | OUTPATIENT
Start: 2017-01-10 | End: 2017-01-10

## 2017-01-10 RX ORDER — MAGNESIUM OXIDE 400 MG ORAL TABLET 241.3 MG
800 TABLET ORAL ONCE
Qty: 0 | Refills: 0 | Status: DISCONTINUED | OUTPATIENT
Start: 2017-01-10 | End: 2017-01-10

## 2017-01-10 RX ORDER — SODIUM CHLORIDE 9 MG/ML
250 INJECTION INTRAMUSCULAR; INTRAVENOUS; SUBCUTANEOUS ONCE
Qty: 0 | Refills: 0 | Status: COMPLETED | OUTPATIENT
Start: 2017-01-10 | End: 2017-01-10

## 2017-01-10 RX ORDER — FUROSEMIDE 40 MG
60 TABLET ORAL
Qty: 0 | Refills: 0 | Status: DISCONTINUED | OUTPATIENT
Start: 2017-01-11 | End: 2017-01-11

## 2017-01-10 RX ORDER — PANTOPRAZOLE SODIUM 20 MG/1
40 TABLET, DELAYED RELEASE ORAL
Qty: 0 | Refills: 0 | Status: DISCONTINUED | OUTPATIENT
Start: 2017-01-11 | End: 2017-01-20

## 2017-01-10 RX ORDER — FUROSEMIDE 40 MG
60 TABLET ORAL
Qty: 0 | Refills: 0 | Status: DISCONTINUED | OUTPATIENT
Start: 2017-01-10 | End: 2017-01-10

## 2017-01-10 RX ORDER — POTASSIUM CHLORIDE 20 MEQ
60 PACKET (EA) ORAL ONCE
Qty: 0 | Refills: 0 | Status: DISCONTINUED | OUTPATIENT
Start: 2017-01-10 | End: 2017-01-10

## 2017-01-10 RX ORDER — ZOLEDRONIC ACID 5 MG/100ML
4 INJECTION, SOLUTION INTRAVENOUS ONCE
Qty: 0 | Refills: 0 | Status: COMPLETED | OUTPATIENT
Start: 2017-01-10 | End: 2017-01-11

## 2017-01-10 RX ORDER — SODIUM CHLORIDE 9 MG/ML
1000 INJECTION, SOLUTION INTRAVENOUS
Qty: 0 | Refills: 0 | Status: DISCONTINUED | OUTPATIENT
Start: 2017-01-10 | End: 2017-01-11

## 2017-01-10 RX ADMIN — SODIUM CHLORIDE 50 MILLILITER(S): 9 INJECTION INTRAMUSCULAR; INTRAVENOUS; SUBCUTANEOUS at 18:41

## 2017-01-10 RX ADMIN — Medication 81 MILLIGRAM(S): at 14:55

## 2017-01-10 RX ADMIN — Medication 104 MILLIGRAM(S): at 19:40

## 2017-01-10 RX ADMIN — CALCITONIN SALMON 170 INTERNATIONAL UNIT(S): 200 INJECTION, SOLUTION INTRAMUSCULAR at 19:12

## 2017-01-10 RX ADMIN — Medication 50 GRAM(S): at 14:55

## 2017-01-10 RX ADMIN — Medication 40 MILLIGRAM(S): at 20:11

## 2017-01-10 RX ADMIN — Medication 40 MILLIEQUIVALENT(S): at 14:55

## 2017-01-10 RX ADMIN — SODIUM CHLORIDE 500 MILLILITER(S): 9 INJECTION INTRAMUSCULAR; INTRAVENOUS; SUBCUTANEOUS at 10:53

## 2017-01-10 RX ADMIN — Medication 20 MILLIGRAM(S): at 14:54

## 2017-01-10 RX ADMIN — APIXABAN 2.5 MILLIGRAM(S): 2.5 TABLET, FILM COATED ORAL at 22:16

## 2017-01-10 RX ADMIN — ATORVASTATIN CALCIUM 80 MILLIGRAM(S): 80 TABLET, FILM COATED ORAL at 21:26

## 2017-01-10 RX ADMIN — SODIUM CHLORIDE 80 MILLILITER(S): 9 INJECTION INTRAMUSCULAR; INTRAVENOUS; SUBCUTANEOUS at 15:05

## 2017-01-10 NOTE — H&P ADULT. - PROBLEM SELECTOR PLAN 2
Patient experienced what was likely a mechanical fall 4 days ago on outstretched right arm, resulting in right shoulder pain upon movement but not at rest. On last admission, she had excisional biopsy of lymph nodes (with ENT) with prelim reports concerning for lymphoma but final diagnosis pending. On last admission, she had excisional biopsy of lymph nodes (with ENT) with prelim reports concerning for lymphoma but final diagnosis pending.  - Allopurinol 300mg daily to prevent tumor lysis syndrome  - F/up heme-onc recs. Possibly to start chemotx on this admission

## 2017-01-10 NOTE — PATIENT PROFILE ADULT. - ABILITY TO HEAR (WITH HEARING AID OR HEARING APPLIANCE IF NORMALLY USED):
Mildly to Moderately Impaired: difficulty hearing in some environments or speaker may need to increase volume or speak distinctly/Decreased hearing left ear

## 2017-01-10 NOTE — PROGRESS NOTE ADULT - PROBLEM SELECTOR PLAN 9
FEN: Continue IV fluids, replete lytes PRN, DASH/diabetic diet.  VTE ppx: therapeutic on Eliquis.    FULL CODE

## 2017-01-10 NOTE — ED PROVIDER NOTE - PROGRESS NOTE DETAILS
D/w Dr. Hartmann who recommends 250cc NS bolus followed by 80cc/hr with Zometa 3.5 for hyperCa treatment for now. Slow IVFs as pt with h/o pulm edema from fluid overload at last admission.

## 2017-01-10 NOTE — PROGRESS NOTE ADULT - PROBLEM SELECTOR PLAN 1
- Recently admitted 2 weeks ago for hypercalcemia seen as outpatient as well. On that admission, hypercalcemia was tx with IV fluids, Lasix, and bisphosphonate. Calcium reportedly 13 yesterday at Dr. Hartmann's office. Patient with chronic weakness, constipation, but no abd pain, flank pain, change in mental status.  - Appreciate renal (Dr. Hartmann) consult.   - patient currently being hydrated at 1/2NS 50cc/hr with concurrent lasix 60mg IV BID and lasix prn as needed based on volume status  - 1 dose of calcitonin  IU given  - Appreciate heme/onc (Dr. Galaviz) consult.   - Monitor BMP (ordered Q6hrs given fluids and lasix to replete lytes)

## 2017-01-10 NOTE — CONSULT NOTE ADULT - SUBJECTIVE AND OBJECTIVE BOX
ICU CONSULT    85 year old female with history of MGUS, CAD s/p CABG, DM, HTN, severe MR/TR, Afib s/p PPM (on Eliquis) presented for hypercalcemia, currently requiring IV hydration with history of fluid overload on prior admission after IV hydration. Patient was recently admitted for hypercalcemia, which was treated with bisphosphonates and hydration. During that admission, patient was intubated for lymph node biopsy but developed SOB after extubation due to pulmonary edema, which resolved with diuresis. Patient states that her weakness and difficulty walking initially improved after discharge, but has worsened the past few days.       PAST MEDICAL HISTORY:  Scoliosis  Follicular lymphoma  Neck mass  Afib  Hyperlipidemia  Breast cancer  CAD (coronary artery disease)  Diabetes  HTN (hypertension)    PAST SURGICAL HISTORY  H/O abdominal hysterectomy  H/O thyroidectomy  S/P CABG x 3    PSx -   Meds -   Allergies -   FHx -   Sx -       PHYSICAL EXAM   Vital Signs Last 24 Hrs  T(C): 36.7, Max: 37.2 (01-10 @ 12:25)  T(F): 98, Max: 98.9 (01-10 @ 12:25)  HR: 77 (77 - 98)  BP: 129/56 (120/57 - 133/52)  BP(mean): --  RR: 16 (16 - 16)  SpO2: 92% (92% - 98%)      General -   HEENT -   CV -   Resp -   Abdomen -   Extremities -   Skin -       LABS                        10.4   3.1   )-----------( 158      ( 10 Ronny 2017 10:26 )             31.9     10 Ronny 2017 10:26    137    |  100    |  28     ----------------------------<  91     3.5     |  27     |  1.09     Ca    12.1       10 Ronny 2017 10:26  Phos  2.9       10 Ronny 2017 10:26  Mg     1.5       10 Ronny 2017 10:26    TPro  6.4    /  Alb  2.7    /  TBili  0.5    /  DBili  x      /  AST  93     /  ALT  63     /  AlkPhos  96     10 Ronny 2017 10:26    PT/INR - ( 10 Ronny 2017 10:26 )   PT: 13.8 sec;   INR: 1.24          PTT - ( 10 Ronny 2017 10:26 )  PTT:26.6 sec    Urinalysis Basic - ( 10 Ronny 2017 10:54 )    Color: Yellow / Appearance: Clear / SG: >=1.030 / pH: x  Gluc: x / Ketone: NEGATIVE  / Bili: NEGATIVE / Urobili: 0.2 E.U./dL   Blood: x / Protein: 100 mg/dL / Nitrite: NEGATIVE   Leuk Esterase: NEGATIVE / RBC: < 5 /HPF / WBC 5-10 /HPF   Sq Epi: x / Non Sq Epi: Few /HPF / Bacteria: Present /HPF            IMAGING   CXR -   EKG - ICU CONSULT    85 year old female with history of MGUS, CAD s/p CABG, DM, HTN, severe MR/TR, Afib s/p PPM (on Eliquis) presented for hypercalcemia, currently requiring IV hydration with history of fluid overload on prior admission after IV hydration. Patient was recently admitted for hypercalcemia, which was treated with bisphosphonates and hydration. During that admission, patient was intubated for lymph node biopsy but developed SOB after extubation due to pulmonary edema, which resolved with diuresis. Patient states that her weakness and difficulty walking initially improved after discharge, but has worsened the past few days. She fell 4 days ago on an outstretched right arm after losing her balance, denies LOC, and has had right arm/shoulder pain since. Complains of constipation, last BM 3 days ago, denies any diarrhea or BRBPR. Has decreased appetite, but states she has been drinking water at home. Denies any chest pain, SOB, headache, nausea, vomiting, fever, chills, palpitations, abdominal pain, dysuria.       PAST MEDICAL HISTORY:  Scoliosis  Follicular lymphoma  Neck mass  Afib  Hyperlipidemia  Breast cancer  CAD (coronary artery disease)  Diabetes  HTN (hypertension)    PAST SURGICAL HISTORY  H/O abdominal hysterectomy  H/O thyroidectomy  S/P CABG x 3    MEDICATIONS  (STANDING):  sodium chloride 0.9%. 1000milliLiter(s) IV Continuous <Continuous>  insulin lispro (HumaLOG) corrective regimen sliding scale  SubCutaneous Before meals and at bedtime  apixaban 2.5milliGRAM(s) Oral two times a day  atorvastatin 80milliGRAM(s) Oral at bedtime  aspirin enteric coated 81milliGRAM(s) Oral daily  allopurinol 300milliGRAM(s) Oral daily      Allergies  IV CONtRAST (Flushing (Skin); Rash)  No Known Drug Allergies    FAMILY HISTORY:  No pertinent family history in first degree relatives    ROS:   Gen: no fevers or chills, + fatigue, + weakness  HEENT: no vision changes, no rhinorrhea, no cough  Resp: no SOB, no dyspnea  Cardiac: no chest pain, no palpitations  GI: no nausea/vomiting, no diarrhea, no melena, no BRBPR, + constipation  : no flank pain, no dysuria  Neuro: no headache, no syncope, no vertigo, no LOC      PHYSICAL EXAM   Vital Signs Last 24 Hrs  T(C): 36.7, Max: 37.2 (01-10 @ 12:25)  T(F): 98, Max: 98.9 (01-10 @ 12:25)  HR: 77 (77 - 98)  BP: 129/56 (120/57 - 133/52)  BP(mean): --  RR: 16 (16 - 16)  SpO2: 92% (92% - 98%)      General - AAO x3, laying in bed in NAD  HEENT - PERRL, EOMI  CV -   Resp -   Abdomen -   Extremities -   Skin -       LABS                        10.4   3.1   )-----------( 158      ( 10 Ronny 2017 10:26 )             31.9     10 Ronny 2017 10:26    137    |  100    |  28     ----------------------------<  91     3.5     |  27     |  1.09     Ca    12.1       10 Ronny 2017 10:26  Phos  2.9       10 Ronny 2017 10:26  Mg     1.5       10 Ronny 2017 10:26    TPro  6.4    /  Alb  2.7    /  TBili  0.5    /  DBili  x      /  AST  93     /  ALT  63     /  AlkPhos  96     10 Ronny 2017 10:26    PT/INR - ( 10 Ronny 2017 10:26 )   PT: 13.8 sec;   INR: 1.24          PTT - ( 10 Ronny 2017 10:26 )  PTT:26.6 sec    Urinalysis Basic - ( 10 Ronny 2017 10:54 )    Color: Yellow / Appearance: Clear / SG: >=1.030 / pH: x  Gluc: x / Ketone: NEGATIVE  / Bili: NEGATIVE / Urobili: 0.2 E.U./dL   Blood: x / Protein: 100 mg/dL / Nitrite: NEGATIVE   Leuk Esterase: NEGATIVE / RBC: < 5 /HPF / WBC 5-10 /HPF   Sq Epi: x / Non Sq Epi: Few /HPF / Bacteria: Present /HPF            IMAGING   CXR -   EKG - ICU CONSULT    85 year old female with history of MGUS, CAD s/p CABG, DM, HTN, severe MR/TR, Afib s/p PPM (on Eliquis) presented for hypercalcemia, currently requiring IV hydration with history of fluid overload on prior admission after IV hydration. Patient was recently admitted for hypercalcemia, which was treated with bisphosphonates and hydration. During that admission, patient was intubated for lymph node biopsy but developed SOB after extubation due to pulmonary edema, which resolved with diuresis. Patient states that her weakness and difficulty walking initially improved after discharge, but has worsened the past few days. She fell 4 days ago on an outstretched right arm after losing her balance, denies LOC, and has had right arm/shoulder pain since. Complains of constipation, last BM 3 days ago, denies any diarrhea or BRBPR. Has decreased appetite, but states she has been drinking water at home. Denies any chest pain, SOB, headache, nausea, vomiting, fever, chills, palpitations, abdominal pain, dysuria.       PAST MEDICAL HISTORY:  Scoliosis  Follicular lymphoma  Neck mass  Afib  Hyperlipidemia  Breast cancer  CAD (coronary artery disease)  Diabetes  HTN (hypertension)    PAST SURGICAL HISTORY  H/O abdominal hysterectomy  H/O thyroidectomy  S/P CABG x 3    MEDICATIONS  (STANDING):  sodium chloride 0.9%. 1000milliLiter(s) IV Continuous <Continuous>  insulin lispro (HumaLOG) corrective regimen sliding scale  SubCutaneous Before meals and at bedtime  apixaban 2.5milliGRAM(s) Oral two times a day  atorvastatin 80milliGRAM(s) Oral at bedtime  aspirin enteric coated 81milliGRAM(s) Oral daily  allopurinol 300milliGRAM(s) Oral daily      Allergies  IV CONtRAST (Flushing (Skin); Rash)  No Known Drug Allergies    FAMILY HISTORY:  No pertinent family history in first degree relatives    ROS:   Gen: no fevers or chills, + fatigue, + weakness  HEENT: no vision changes, no rhinorrhea, no cough  Resp: no SOB, no dyspnea  Cardiac: no chest pain, no palpitations  GI: no nausea/vomiting, no diarrhea, no melena, no BRBPR, + constipation  : no flank pain, no dysuria  Neuro: no headache, no syncope, no vertigo, no LOC      PHYSICAL EXAM   Vital Signs Last 24 Hrs  T(C): 36.7, Max: 37.2 (01-10 @ 12:25)  T(F): 98, Max: 98.9 (01-10 @ 12:25)  HR: 77 (77 - 98)  BP: 129/56 (120/57 - 133/52)  BP(mean): --  RR: 16 (16 - 16)  SpO2: 92% (92% - 98%)      General - AAO x3, laying in bed in NAD  HEENT - PERRL, EOMI, dry mucous membranes  CV - RRR, normal S1S2, grade III/VI holosystolic murmur  Resp - good air entry, mild bilateral crackles louder at bases, no wheezes  Abdomen - soft, nontender, nondistended, + BS  Extremities - thin upper extremities, trace LE pitting edema bilaterally  Lymph nodes - no appreciable lymphadenopathy  Neuro - moving all 4 extremities, strength upper extremities equal bilaterally, CN II-XII intact      LABS                        10.4   3.1   )-----------( 158      ( 10 Ronny 2017 10:26 )             31.9     10 Ronny 2017 10:26    137    |  100    |  28     ----------------------------<  91     3.5     |  27     |  1.09     Ca    12.1       10 Ronny 2017 10:26  Phos  2.9       10 Ronny 2017 10:26  Mg     1.5       10 Ronny 2017 10:26    TPro  6.4    /  Alb  2.7    /  TBili  0.5    /  DBili  x      /  AST  93     /  ALT  63     /  AlkPhos  96     10 Ronny 2017 10:26    PT/INR - ( 10 Ronny 2017 10:26 )   PT: 13.8 sec;   INR: 1.24          PTT - ( 10 Ronny 2017 10:26 )  PTT:26.6 sec    Urinalysis Basic - ( 10 Ronny 2017 10:54 )    Color: Yellow / Appearance: Clear / SG: >=1.030 / pH: x  Gluc: x / Ketone: NEGATIVE  / Bili: NEGATIVE / Urobili: 0.2 E.U./dL   Blood: x / Protein: 100 mg/dL / Nitrite: NEGATIVE   Leuk Esterase: NEGATIVE / RBC: < 5 /HPF / WBC 5-10 /HPF   Sq Epi: x / Non Sq Epi: Few /HPF / Bacteria: Present /HPF            IMAGING   CXR - 1/10/17 - Slightly increased pulmonary vascular prominence.  X-ray R shoulder - 1/10/17 - No acute fracture

## 2017-01-10 NOTE — H&P ADULT. - PROBLEM SELECTOR PLAN 7
Hb 6.5 in 11/2016. Hold home metformin.  - MISS, FS QID, add insulin as necessary depending on 24hr reqs

## 2017-01-10 NOTE — H&P ADULT. - ASSESSMENT
86 y/o F w/ pmhx of MGUS, HFpEF, CAD s/p CABG, severe MR/TR, a-fib s/p PPM on Eliquis, DM2, right-sided breast CA (s/p mastectomy/chemotx/radiation), presents with chronic weakness/difficulty walking in setting of hypercalcemia, also with right arm pain s/p mechanical fall.

## 2017-01-10 NOTE — PROGRESS NOTE ADULT - ASSESSMENT
Patient is an 86yo F, [place] Speaking, with PMHx of MGUS, HFpEF, CAD s/p CABG (1994 @ [facility]), HTN, HLD, severe MR/TR, a-fib s/p PPM on Eliquis, DM2, right-sided breast CA (s/p mastectomy/chemotx/radiation 1997) who is presented to [facility] ED on 1/10/17 for hypercalcemia (13) on outpatient labs at Dr. Hartmann's office. On arrival to ED, patient was given 250cc bolus with NS @ 80cc/hr this AM, then decreased to 50cc/hr. Patient was initially admitted to medicine, but on re-exam, patient now found to be fluid overloaded, not SOB and O2 sat 90s on RA. Patient is now admitted to [facility] for treatment of hypercalcemia with IVF and concurrent management of fluid status.

## 2017-01-10 NOTE — PROGRESS NOTE ADULT - PROBLEM SELECTOR PLAN 3
- On last admission, she had excisional biopsy of lymph nodes (with ENT) with prelim reports concerning for lymphoma but final diagnosis pending.  - Allopurinol 300mg daily to prevent tumor lysis syndrome  - F/up heme-onc recs. Possibly to start chemotx on this admission  - consider palliative consult, discuss code status with patient and family in AM

## 2017-01-10 NOTE — CONSULT NOTE ADULT - ATTENDING COMMENTS
recurrent severe hypercalcemia -- agree with IVF but needs concomitant diuretics as s/p pulmonary edema last admit with IVF and has severe MR -- watch pulm exam carefully, I/O --consider monitored bed  bisphosphonate, calcitonin  chemo per heme-- d/w Dr Galaviz  agree with allopurinol as high risk for tumor lysis

## 2017-01-10 NOTE — H&P ADULT. - MUSCULOSKELETAL COMMENTS
right shoulder and arm pain Right UE: Full passive range of motion, active flexion of forearm limited by pain. Pain to external rotation and full flexion of arm.

## 2017-01-10 NOTE — H&P ADULT. - PROBLEM SELECTOR PLAN 9
FEN: Continue IV fluids, replete lytes PRN, DASH/diabetic diet.  VTE ppx: therapeutic on Eliquis.    Dispo: Admit to medical floor.  FULL CODE

## 2017-01-10 NOTE — ED ADULT NURSE NOTE - PMH
Afib    Breast cancer    CAD (coronary artery disease)    Diabetes    Follicular lymphoma    HTN (hypertension)    Neck mass    Other hyperlipidemia    Scoliosis

## 2017-01-10 NOTE — PROGRESS NOTE ADULT - SUBJECTIVE AND OBJECTIVE BOX
HPI:  87 F PMHx significant for HTN, HOTR, Iron Deficiency Anemia, HLD, AS, CAD with stents placed 2015 on asa/plavix presenting with 1-2 days of non productive cough with associated dyspnea. Patient denies any associated fevers chills, and when asked about related symptoms she is most bothered by her altered voice; a consequence of a stroke she had ~3 months ago. Patient otherwise denies any headache, chest pain, abdominal or extremity pain, no changes in bowel or bladder habits.     ED Vitals: 97.3 / 77 / 100/54 / 20 / 95%  ED Administration: Ceftriaxone, Azithromycin, Solumedrol 125, 1L NS, Duoneb x 2 (03 Jan 2017 21:05)    FAMILY HISTORY:  No pertinent family history in first degree relatives    MEDICATIONS  (STANDING):  aspirin enteric coated 81milliGRAM(s) Oral daily  clopidogrel Tablet 75milliGRAM(s) Oral daily  metoprolol succinate ER 25milliGRAM(s) Oral daily  levothyroxine 75MICROGram(s) Oral daily  heparin  Injectable 5000Unit(s) SubCutaneous every 12 hours  ALBUTerol/ipratropium for Nebulization 3milliLiter(s) Nebulizer every 4 hours  insulin lispro (HumaLOG) corrective regimen sliding scale  SubCutaneous Before meals and at bedtime  polyethylene glycol 3350 17Gram(s) Oral daily  predniSONE   Tablet 60milliGRAM(s) Oral daily  sodium ferric gluconate complex IVPB 25milliGRAM(s) IV Intermittent once  sodium ferric gluconate complex IVPB 100milliGRAM(s) IV Intermittent daily  cyanocobalamin Injectable 1000MICROGram(s) SubCutaneous daily  epoetin barb Injectable 96440Hupu(s) SubCutaneous every other day    MEDICATIONS  (PRN):    Vital Signs Last 24 Hrs  T(C): 36.8, Max: 36.8 (01-10 @ 11:44)  T(F): 98.2, Max: 98.2 (01-10 @ 11:44)  HR: 79 (71 - 82)  BP: 111/75 (111/75 - 132/64)  BP(mean): --  RR: 18 (16 - 20)  SpO2: 97% (96% - 98%)PHYSICAL EXAM:    Constitutional:    Neck:    Breasts:    Respiratory:    Cardiovascular:    Gastrointestinal:    Extremities:    Neurological:    Skin:    Lymph Nodes:      Labs:  CBC Full  -  ( 10 Ronny 2017 15:38 )  WBC Count : 7.6 K/uL  Hemoglobin : 8.1 g/dL  Hematocrit : 27.4 %  Platelet Count - Automated : 257 K/uL  Mean Cell Volume : 73.3 fL  Mean Cell Hemoglobin : 21.7 pg  Mean Cell Hemoglobin Concentration : 29.6 g/dL  Auto Neutrophil # : x  Auto Lymphocyte # : x  Auto Monocyte # : x  Auto Eosinophil # : x  Auto Basophil # : x  Auto Neutrophil % : x  Auto Lymphocyte % : x  Auto Monocyte % : x  Auto Eosinophil % : x  Auto Basophil % : x    10 Ronny 2017 07:22    142    |  106    |  33     ----------------------------<  90     4.0     |  28     |  1.02     Ca    8.4        10 Ronny 2017 07:22  Mg     2.5       10 Ronny 2017 07:22        Radiology:  HEALTH ISSUES - R/O PROBLEM Dx:    Assessmant:  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

## 2017-01-10 NOTE — ED PROVIDER NOTE - OBJECTIVE STATEMENT
85F PMHx MGUS, HFpEF, CAD s/p CABG, severe MR/TR, afib s/p PPM, DM2, Breast CA, recent admission for hypercalcemia treatment (during which she was dx with lymphoma- not yet on chemo) who was sent to ER by Dr. Hratmann for elevated Ca (13) on outpt labs yesterday. Pt also c/o generalized weakness, to weak to walk on her own. No SOB, no CP, no f/c. She has been compliant with her meds.

## 2017-01-10 NOTE — CONSULT NOTE ADULT - RS GEN PE MLT RESP DETAILS PC
good air movement/normal/respirations non-labored/breath sounds equal/Bibasilar crackles/airway patent

## 2017-01-10 NOTE — PROGRESS NOTE ADULT - PROBLEM SELECTOR PLAN 4
- Patient experienced what was likely a mechanical fall 4 days ago on outstretched right arm, resulting in right shoulder pain upon movement but not at rest. No signs of bleed or weakness. Possibly with fracture.  - Right shoulder x-ray with axillary views negative for fracture

## 2017-01-10 NOTE — ED PROVIDER NOTE - CONSTITUTIONAL, MLM
normal... thin, chronically ill appearing, nontoxic, afebrile, awake, alert, oriented to person, place, time/situation, appears very weak.

## 2017-01-10 NOTE — CONSULT NOTE ADULT - PROBLEM SELECTOR RECOMMENDATION 9
Patient is an 85 year old female whom presented to the hospital for management of hypercalcemia. Patient was recently admitted to the hospital for similar reason. Patient's hypercalcemia is thought to be due to patients underlying large cell lymphoma which is causing a vitamin D 1,25 overproduction.     P - Please start patient on NS at 80 cc/hr   Patient has underlying heart failure and MR/TR and can easily develop pulmonary edema.   Patient received one dose of Furosemide 20mg IV in the ER   If patient becomes short of breath please give her another dose of furosemide 20mg IV and may have to consider decreasing IV fluid rate  Please Monitor STRICT I/Os for patient   please give one dose of Zometa 3.5 mg  Monitor electrolytes closely  Please have hematology follow up on patient for initiation of chemotherapy for treatment of underlying lymphoma as it is the likely cause of hypercalcemia in this patient

## 2017-01-10 NOTE — CONSULT NOTE ADULT - SUBJECTIVE AND OBJECTIVE BOX
Patient is a 85y Female was sent to the hospital for hypercalcemia. Patient has a history of MGUS, congestive heart failure with preserved EF, coronary artery disease s/p CABG, severe MR/TR, atrial fibrillation s/p PPM and on eliquis, diabetes mellitus, right sided breast CA s/p mastectomy/chemo/radiation, and large cell lymphoma which is the likely cause of hypercalcemia.  Patient complains of generalized weakness and fatigue. Patient also complains for decreased appetite, but denies any nausea or vomiting. Patient also states she had a fall 4 days ago. She complains of right shoulder pain on active motion. Patient denies any nausea, vomiting, cough, shortness of breath, headaches, or changes in vision. Patient was recently admitted to the hospital for hypercalcemia which is thought to be due to underlying malignancy. Patient has large cell lymphoma for which staging has not yet been completed and patient has not been started on chemotherapy.     PAST MEDICAL & SURGICAL HISTORY:  Scoliosis  Follicular lymphoma  Neck mass  Afib  Other hyperlipidemia  Breast cancer  CAD (coronary artery disease)  Diabetes  HTN (hypertension)  No pertinent past medical history  H/O abdominal hysterectomy  H/O thyroidectomy  S/P CABG x 3    MEDICATIONS  (STANDING):  sodium chloride 0.9%. 1000milliLiter(s) IV Continuous <Continuous>  insulin lispro (HumaLOG) corrective regimen sliding scale  SubCutaneous Before meals and at bedtime  apixaban 2.5milliGRAM(s) Oral two times a day  atorvastatin 80milliGRAM(s) Oral at bedtime  aspirin enteric coated 81milliGRAM(s) Oral daily  calcitonin Injectable 170International Unit(s) IntraMuscular every 12 hours    MEDICATIONS  (PRN):    Allergies    IV CONtRAST (Flushing (Skin); Rash)  No Known Drug Allergies    Intolerances    SOCIAL HISTORY: denies smoking     FAMILY HISTORY:  No pertinent family history in first degree relatives    T(C): , Max: 37.2 (01-10 @ 12:25)  T(F): , Max: 98.9 (01-10 @ 12:25)  HR: 77  BP: 129/56  RR: 16  SpO2: 92%    Height (cm): 152.4 (01-10 @ 09:58)  Weight (kg): 43.1 (01-10 @ 09:58)  BMI (kg/m2): 18.6 (01-10 @ 09:58)  BSA (m2): 1.36 (01-10 @ 09:58)    LABS:                        10.4   3.1   )-----------( 158      ( 10 Rnony 2017 10:26 )             31.9     10 Ronny 2017 10:26    137    |  100    |  28     ----------------------------<  91     3.5     |  27     |  1.09     Ca    12.1       10 Ronny 2017 10:26  Phos  2.9       10 Ronny 2017 10:26  Mg     1.5       10 Ronny 2017 10:26    TPro  6.4    /  Alb  2.7    /  TBili  0.5    /  DBili  x      /  AST  93     /  ALT  63     /  AlkPhos  96     10 Ronny 2017 10:26    PT/INR - ( 10 Ronny 2017 10:26 )   PT: 13.8 sec;   INR: 1.24       PTT - ( 10 Ronny 2017 10:26 )  PTT:26.6 sec  Urinalysis Basic - ( 10 Ronny 2017 10:54 )    Color: Yellow / Appearance: Clear / SG: >=1.030 / pH: x  Gluc: x / Ketone: NEGATIVE  / Bili: NEGATIVE / Urobili: 0.2 E.U./dL   Blood: x / Protein: 100 mg/dL / Nitrite: NEGATIVE   Leuk Esterase: NEGATIVE / RBC: < 5 /HPF / WBC 5-10 /HPF   Sq Epi: x / Non Sq Epi: Few /HPF / Bacteria: Present /HPF

## 2017-01-10 NOTE — CONSULT NOTE ADULT - PROBLEM SELECTOR RECOMMENDATION 3
Patient has underlying heart failure with mitral and tricuspid valve regurgitation. Please monitor patient's volume status closely while she is on IV fluids for hypercalcemia. Patient may require additional lasix if she develops pulmonary edema.

## 2017-01-10 NOTE — H&P ADULT. - PROBLEM SELECTOR PLAN 3
Patient experienced what was likely a mechanical fall 4 days ago on outstretched right arm, resulting in right shoulder pain upon movement but not at rest. No signs of bleed or weakness. Possibly with fracture.  - F/up right shoulder x-ray with axillary views Patient experienced what was likely a mechanical fall 4 days ago on outstretched right arm, resulting in right shoulder pain upon movement but not at rest. No signs of bleed or weakness. Possibly with fracture.  - Right shoulder x-ray with axillary views negative for fracture

## 2017-01-10 NOTE — CONSULT NOTE ADULT - PROBLEM SELECTOR RECOMMENDATION 2
Patient is being followed by hematology/oncology. Patient has large cell lyphoma. Patient needs chemotherapy for underlying lymphoma.

## 2017-01-10 NOTE — H&P ADULT. - HISTORY OF PRESENT ILLNESS
86 y/o F w/ pmhx of MGUS, HFpEF, CAD s/p CABG, severe MR/TR, a-fib s/p PPM on Eliquis, DM2, right-sided breast CA (s/p mastectomy/chemotx/radiation), recently admitted 12/29/16 to 1/4/17 for hypercalcemia treated with IV fluids/Lasix/bisphosphonates, presents again today for hypercalcemia on outpatient labs. She had an outpatient Ca level of 13 at Dr. Hartmann’s office yesterday. Since discharge last week, she reports generalized weakness and difficulty walking due to lack of balance which worsened gradually over the past week. She also experienced a fall (says she lost her balance) on outstretched right arm 4 days ago, with no LOC/head impact. Since then she complains of right shoulder pain when moving her arm but denies pain at rest. Also endorses chronic constipation and lack of appetite, but denies abdominal/kidney pain, nausea, vomiting, fever/chills, chest pain/sob, recent change in meds, dysuria, headache, or vision change. On last admission, she had excisional biopsy of lymph nodes (with ENT) with prelim reports concerning for lymphoma but final diagnosis pending.  Initial vitals in ED: 98.1F, HR 98, 133/52, RR 16, 98% O2 on RA.   ED administration: 250cc bolus of NS followed by NS at 80cc/hr and Lasix 20mg IV x 1.

## 2017-01-10 NOTE — PATIENT PROFILE ADULT. - VISION (WITH CORRECTIVE LENSES IF THE PATIENT USUALLY WEARS THEM):
glasses/Partially impaired: cannot see medication labels or newsprint, but can see obstacles in path, and the surrounding layout; can count fingers at arm's length

## 2017-01-10 NOTE — H&P ADULT. - PROBLEM SELECTOR PLAN 1
Recently admitted 2 weeks ago for hypercalcemia seen as outpatient as well. On that admission, hypercalcemia was tx with IV fluids, Lasix, and bisphosphonate. Calcium reportedly 13 yesterday at Dr. Hartmann's office. Patient with chronic weakness, constipation, but no abd pain, flank pain, change in mental status.  - Appreciate renal (Dr. Hartmann) consult. Continue slow maintenance fluids (given HFpEF) with Lasix PRN and frequent lung checks.   - 1 dose of calcitonin  IU today.  - Appreciate heme/onc (Dr. Galaviz) consult. Plan to start Zometa tomorrow  - Monitor BMP Recently admitted 2 weeks ago for hypercalcemia seen as outpatient as well. On that admission, hypercalcemia was tx with IV fluids, Lasix, and bisphosphonate. Calcium reportedly 13 yesterday at Dr. Hartmann's office. Patient with chronic weakness, constipation, but no abd pain, flank pain, change in mental status.  - Appreciate renal (Dr. Hartmann) consult. Due to HFpEF, will continue slow maintenance fluids with Lasix PRN and frequent lung checks. Received 2 doses of Lasix 20mg IV so far today.  - 1 dose of calcitonin  IU today.  - Appreciate heme/onc (Dr. Galaviz) consult. Plan to start Zometa tomorrow.  - Monitor BMP

## 2017-01-10 NOTE — H&P ADULT. - PROBLEM SELECTOR PLAN 5
Currently stable, no shortness of breath or LE edema. With severe MR/TR, receiving fluids for hypercalcemia.  - Repeat CXR to r/o pulm edema  - Strict I/Os, daily weights  - Continue home Toprol  - Continue Lasix IV PRN  - Consider transfer to cardiac floor

## 2017-01-10 NOTE — H&P ADULT. - PROBLEM SELECTOR PLAN 4
Has PPM placed, and takes apixaban for anticoagulation. Currently regular rate/rhythm.  - Continue apixaban 2.5mg BID  - Continue amiodarone 200mg daily  - Continue Toprol 50mg daily for rate control

## 2017-01-10 NOTE — H&P ADULT. - PROBLEM SELECTOR PLAN 8
Currently normotensive. Takes Toprol at home. Patient does not list losartan on her list of home meds.  - Continue home Toprol

## 2017-01-10 NOTE — CONSULT NOTE ADULT - ASSESSMENT
85 year old female with history of MGUS (on chemo), CAD s/p CABG, HTN, DM, severe MR/TR, Afib s/p PPM, presenting with hypercalcemia and dehydration

## 2017-01-10 NOTE — PROGRESS NOTE ADULT - PROBLEM SELECTOR PLAN 5
- Has PPM placed, and takes apixaban for anticoagulation. Currently NSR @ 81bpm paced with Q wave in AVR  - Continue apixaban 2.5mg BID  - Continue amiodarone 200mg daily  - Continue Toprol 50mg daily for rate control

## 2017-01-10 NOTE — ED ADULT NURSE NOTE - OBJECTIVE STATEMENT
Pt c/o R arm pain status post fall 4 days ago and generalized weakness. Intermittent dizziness while getting up. Denies SOB, N/V, changes in vison. Face symmetrical.

## 2017-01-10 NOTE — ED PROVIDER NOTE - MEDICAL DECISION MAKING DETAILS
85F with above PMHX including recently dx lymphoma which is causing hyperca and weakness. pt requires admission for hyperCa treatment and also likely initiation of treatment/chemo for newly dx lymphoma. VSS, no neuro deficits.

## 2017-01-11 ENCOUNTER — APPOINTMENT (OUTPATIENT)
Dept: OTOLARYNGOLOGY | Facility: CLINIC | Age: 82
End: 2017-01-11

## 2017-01-11 DIAGNOSIS — R14.0 ABDOMINAL DISTENSION (GASEOUS): ICD-10-CM

## 2017-01-11 DIAGNOSIS — R13.10 DYSPHAGIA, UNSPECIFIED: ICD-10-CM

## 2017-01-11 DIAGNOSIS — I50.23 ACUTE ON CHRONIC SYSTOLIC (CONGESTIVE) HEART FAILURE: ICD-10-CM

## 2017-01-11 DIAGNOSIS — E11.9 TYPE 2 DIABETES MELLITUS WITHOUT COMPLICATIONS: ICD-10-CM

## 2017-01-11 DIAGNOSIS — R50.9 FEVER, UNSPECIFIED: ICD-10-CM

## 2017-01-11 LAB
ALBUMIN SERPL ELPH-MCNC: 2.8 G/DL — LOW (ref 3.4–5)
ALBUMIN SERPL ELPH-MCNC: 2.8 G/DL — LOW (ref 3.4–5)
ALP SERPL-CCNC: 105 U/L — SIGNIFICANT CHANGE UP (ref 40–120)
ALT FLD-CCNC: 66 U/L — HIGH (ref 12–42)
ANION GAP SERPL CALC-SCNC: 11 MMOL/L — SIGNIFICANT CHANGE UP (ref 9–16)
ANION GAP SERPL CALC-SCNC: 9 MMOL/L — SIGNIFICANT CHANGE UP (ref 9–16)
APPEARANCE UR: CLEAR — SIGNIFICANT CHANGE UP
AST SERPL-CCNC: 116 U/L — HIGH (ref 15–37)
BACTERIA # UR AUTO: PRESENT /HPF
BASOPHILS NFR BLD AUTO: 0.3 % — SIGNIFICANT CHANGE UP (ref 0–2)
BILIRUB SERPL-MCNC: 0.6 MG/DL — SIGNIFICANT CHANGE UP (ref 0.2–1.2)
BILIRUB UR-MCNC: NEGATIVE — SIGNIFICANT CHANGE UP
BUN SERPL-MCNC: 24 MG/DL — HIGH (ref 7–23)
BUN SERPL-MCNC: 29 MG/DL — HIGH (ref 7–23)
CALCIUM SERPL-MCNC: 10.7 MG/DL — HIGH (ref 8.5–10.5)
CALCIUM SERPL-MCNC: 11.7 MG/DL — HIGH (ref 8.5–10.5)
CEA SERPL-MCNC: 4.8 NG/ML — HIGH (ref 0–3.8)
CHLORIDE SERPL-SCNC: 93 MMOL/L — LOW (ref 96–108)
CHLORIDE SERPL-SCNC: 96 MMOL/L — SIGNIFICANT CHANGE UP (ref 96–108)
CO2 SERPL-SCNC: 31 MMOL/L — SIGNIFICANT CHANGE UP (ref 22–31)
CO2 SERPL-SCNC: 31 MMOL/L — SIGNIFICANT CHANGE UP (ref 22–31)
COLOR SPEC: YELLOW — SIGNIFICANT CHANGE UP
CREAT SERPL-MCNC: 1.09 MG/DL — SIGNIFICANT CHANGE UP (ref 0.5–1.3)
CREAT SERPL-MCNC: 1.16 MG/DL — SIGNIFICANT CHANGE UP (ref 0.5–1.3)
DIFF PNL FLD: (no result)
EOSINOPHIL NFR BLD AUTO: 0 % — SIGNIFICANT CHANGE UP (ref 0–6)
GLUCOSE SERPL-MCNC: 100 MG/DL — HIGH (ref 70–99)
GLUCOSE SERPL-MCNC: 112 MG/DL — HIGH (ref 70–99)
GLUCOSE UR QL: NEGATIVE — SIGNIFICANT CHANGE UP
HBV SURFACE AB SER-ACNC: SIGNIFICANT CHANGE UP
HCT VFR BLD CALC: 32.8 % — LOW (ref 34.5–45)
HCV AB S/CO SERPL IA: 0.16 S/CO — SIGNIFICANT CHANGE UP
HCV AB SERPL-IMP: SIGNIFICANT CHANGE UP
HGB BLD-MCNC: 10.7 G/DL — LOW (ref 11.5–15.5)
HIV 1+2 AB+HIV1 P24 AG SERPL QL IA: SIGNIFICANT CHANGE UP
KETONES UR-MCNC: NEGATIVE — SIGNIFICANT CHANGE UP
LEUKOCYTE ESTERASE UR-ACNC: (no result)
LYMPHOCYTES # BLD AUTO: 13.5 % — SIGNIFICANT CHANGE UP (ref 13–44)
MAGNESIUM SERPL-MCNC: 1.9 MG/DL — SIGNIFICANT CHANGE UP (ref 1.6–2.4)
MCHC RBC-ENTMCNC: 27.5 PG — SIGNIFICANT CHANGE UP (ref 27–34)
MCHC RBC-ENTMCNC: 32.6 G/DL — SIGNIFICANT CHANGE UP (ref 32–36)
MCV RBC AUTO: 84.3 FL — SIGNIFICANT CHANGE UP (ref 80–100)
MONOCYTES NFR BLD AUTO: 14.1 % — HIGH (ref 2–14)
NEUTROPHILS NFR BLD AUTO: 72.1 % — SIGNIFICANT CHANGE UP (ref 43–77)
NITRITE UR-MCNC: NEGATIVE — SIGNIFICANT CHANGE UP
PH UR: 7 — SIGNIFICANT CHANGE UP (ref 4–8)
PLATELET # BLD AUTO: 182 K/UL — SIGNIFICANT CHANGE UP (ref 150–400)
POTASSIUM SERPL-MCNC: 3.1 MMOL/L — LOW (ref 3.5–5.3)
POTASSIUM SERPL-MCNC: 3.7 MMOL/L — SIGNIFICANT CHANGE UP (ref 3.5–5.3)
POTASSIUM SERPL-SCNC: 3.1 MMOL/L — LOW (ref 3.5–5.3)
POTASSIUM SERPL-SCNC: 3.7 MMOL/L — SIGNIFICANT CHANGE UP (ref 3.5–5.3)
PROT SERPL-MCNC: 6.6 G/DL — SIGNIFICANT CHANGE UP (ref 6.4–8.2)
PROT UR-MCNC: NEGATIVE MG/DL — SIGNIFICANT CHANGE UP
RAPID RVP RESULT: SIGNIFICANT CHANGE UP
RBC # BLD: 3.89 M/UL — SIGNIFICANT CHANGE UP (ref 3.8–5.2)
RBC # FLD: 15.7 % — SIGNIFICANT CHANGE UP (ref 10.3–16.9)
RBC CASTS # UR COMP ASSIST: < 5 /HPF — SIGNIFICANT CHANGE UP
SODIUM SERPL-SCNC: 135 MMOL/L — SIGNIFICANT CHANGE UP (ref 135–145)
SODIUM SERPL-SCNC: 136 MMOL/L — SIGNIFICANT CHANGE UP (ref 135–145)
SP GR SPEC: 1.01 — SIGNIFICANT CHANGE UP (ref 1–1.03)
UROBILINOGEN FLD QL: 0.2 E.U./DL — SIGNIFICANT CHANGE UP
WBC # BLD: 3.1 K/UL — LOW (ref 3.8–10.5)
WBC # FLD AUTO: 3.1 K/UL — LOW (ref 3.8–10.5)
WBC UR QL: < 5 /HPF — SIGNIFICANT CHANGE UP

## 2017-01-11 PROCEDURE — 99233 SBSQ HOSP IP/OBS HIGH 50: CPT

## 2017-01-11 PROCEDURE — 93010 ELECTROCARDIOGRAM REPORT: CPT

## 2017-01-11 PROCEDURE — 71010: CPT | Mod: 26

## 2017-01-11 PROCEDURE — 74000: CPT | Mod: 26

## 2017-01-11 PROCEDURE — 70490 CT SOFT TISSUE NECK W/O DYE: CPT | Mod: 26

## 2017-01-11 PROCEDURE — 93306 TTE W/DOPPLER COMPLETE: CPT | Mod: 26

## 2017-01-11 RX ORDER — ACETAMINOPHEN 500 MG
650 TABLET ORAL ONCE
Qty: 0 | Refills: 0 | Status: COMPLETED | OUTPATIENT
Start: 2017-01-11 | End: 2017-01-11

## 2017-01-11 RX ORDER — SENNA PLUS 8.6 MG/1
2 TABLET ORAL DAILY
Qty: 0 | Refills: 0 | Status: DISCONTINUED | OUTPATIENT
Start: 2017-01-11 | End: 2017-01-11

## 2017-01-11 RX ORDER — DOCUSATE SODIUM 100 MG
100 CAPSULE ORAL THREE TIMES A DAY
Qty: 0 | Refills: 0 | Status: DISCONTINUED | OUTPATIENT
Start: 2017-01-11 | End: 2017-01-11

## 2017-01-11 RX ORDER — DOCUSATE SODIUM 100 MG
100 CAPSULE ORAL THREE TIMES A DAY
Qty: 0 | Refills: 0 | Status: DISCONTINUED | OUTPATIENT
Start: 2017-01-11 | End: 2017-01-20

## 2017-01-11 RX ORDER — POTASSIUM CHLORIDE 20 MEQ
40 PACKET (EA) ORAL EVERY 4 HOURS
Qty: 0 | Refills: 0 | Status: DISCONTINUED | OUTPATIENT
Start: 2017-01-11 | End: 2017-01-11

## 2017-01-11 RX ORDER — SENNA PLUS 8.6 MG/1
2 TABLET ORAL AT BEDTIME
Qty: 0 | Refills: 0 | Status: DISCONTINUED | OUTPATIENT
Start: 2017-01-11 | End: 2017-01-20

## 2017-01-11 RX ORDER — POTASSIUM CHLORIDE 20 MEQ
40 PACKET (EA) ORAL EVERY 4 HOURS
Qty: 0 | Refills: 0 | Status: COMPLETED | OUTPATIENT
Start: 2017-01-11 | End: 2017-01-11

## 2017-01-11 RX ORDER — MAGNESIUM OXIDE 400 MG ORAL TABLET 241.3 MG
400 TABLET ORAL ONCE
Qty: 0 | Refills: 0 | Status: COMPLETED | OUTPATIENT
Start: 2017-01-11 | End: 2017-01-11

## 2017-01-11 RX ADMIN — Medication 100 MILLIGRAM(S): at 13:14

## 2017-01-11 RX ADMIN — Medication 300 MILLIGRAM(S): at 13:14

## 2017-01-11 RX ADMIN — Medication 650 MILLIGRAM(S): at 10:32

## 2017-01-11 RX ADMIN — Medication 40 MILLIEQUIVALENT(S): at 13:14

## 2017-01-11 RX ADMIN — PANTOPRAZOLE SODIUM 40 MILLIGRAM(S): 20 TABLET, DELAYED RELEASE ORAL at 06:33

## 2017-01-11 RX ADMIN — Medication 81 MILLIGRAM(S): at 13:14

## 2017-01-11 RX ADMIN — AMIODARONE HYDROCHLORIDE 200 MILLIGRAM(S): 400 TABLET ORAL at 06:36

## 2017-01-11 RX ADMIN — MAGNESIUM OXIDE 400 MG ORAL TABLET 400 MILLIGRAM(S): 241.3 TABLET ORAL at 10:32

## 2017-01-11 RX ADMIN — Medication 100 MILLIGRAM(S): at 22:31

## 2017-01-11 RX ADMIN — APIXABAN 2.5 MILLIGRAM(S): 2.5 TABLET, FILM COATED ORAL at 17:45

## 2017-01-11 RX ADMIN — SENNA PLUS 2 TABLET(S): 8.6 TABLET ORAL at 22:30

## 2017-01-11 RX ADMIN — Medication 40 MILLIEQUIVALENT(S): at 17:45

## 2017-01-11 RX ADMIN — APIXABAN 2.5 MILLIGRAM(S): 2.5 TABLET, FILM COATED ORAL at 06:33

## 2017-01-11 RX ADMIN — Medication 50 MILLIGRAM(S): at 06:36

## 2017-01-11 RX ADMIN — ZOLEDRONIC ACID 400 MILLIGRAM(S): 5 INJECTION, SOLUTION INTRAVENOUS at 01:07

## 2017-01-11 RX ADMIN — Medication 60 MILLIGRAM(S): at 06:32

## 2017-01-11 RX ADMIN — Medication 650 MILLIGRAM(S): at 11:00

## 2017-01-11 NOTE — PROGRESS NOTE ADULT - SUBJECTIVE AND OBJECTIVE BOX
Patient seen and examined at bedside. Patient states that she is not feeling well and complains that she feels tired and weak. Patient denies any nausea or vomiting but states she has a poor appetite.     insulin lispro (HumaLOG) corrective regimen sliding scale  Before meals and at bedtime  amiodarone    Tablet 200milliGRAM(s) daily  apixaban 2.5milliGRAM(s) two times a day  atorvastatin 80milliGRAM(s) at bedtime  metoprolol succinate ER 50milliGRAM(s) daily  pantoprazole    Tablet 40milliGRAM(s) before breakfast  aspirin enteric coated 81milliGRAM(s) daily  allopurinol 300milliGRAM(s) daily  furosemide   Injectable 60milliGRAM(s) two times a day  docusate sodium 100milliGRAM(s) three times a day  senna 2Tablet(s) at bedtime  potassium chloride   Powder 40milliEquivalent(s) every 4 hours    Allergies    IV CONtRAST (Flushing (Skin); Rash)  No Known Drug Allergies    Intolerances    T(C): , Max: 38.1 (01-11 @ 09:46)  T(F): , Max: 100.6 (01-11 @ 09:46)  HR: 86  BP: 104/51  RR: 16  SpO2: 96%    I & Os for 24h ending 01-11 @ 07:00  =============================================  IN:    sodium chloride 0.45%: 350 ml    Oral Fluid: 100 ml    Total IN: 450 ml  ---------------------------------------------  OUT:    Voided: 1600 ml    Total OUT: 1600 ml  ---------------------------------------------  Total NET: -1150 ml    I & Os for current day (as of 01-11 @ 11:04)  =============================================  IN:    sodium chloride 0.45%: 50 ml    Total IN: 50 ml  ---------------------------------------------  OUT:    Total OUT: 0 ml  ---------------------------------------------  Total NET: 50 ml    LABS:                        10.7   3.1   )-----------( 182      ( 11 Jan 2017 03:30 )             32.8     11 Jan 2017 03:30    136    |  96     |  24     ----------------------------<  112    3.1     |  31     |  1.09     Ca    11.7       11 Jan 2017 03:30  Phos  2.9       10 Ronny 2017 10:26  Mg     1.9       11 Jan 2017 03:30    TPro  6.0    /  Alb  2.6    /  TBili  0.5    /  DBili  x      /  AST  92     /  ALT  60     /  AlkPhos  92     10 Ronny 2017 18:49    PT/INR - ( 10 Ronny 2017 10:26 )   PT: 13.8 sec;   INR: 1.24       PTT - ( 10 Ronny 2017 10:26 )  PTT:26.6 sec  Urinalysis Basic - ( 10 Ronny 2017 10:54 )    Color: Yellow / Appearance: Clear / SG: >=1.030 / pH: x  Gluc: x / Ketone: NEGATIVE  / Bili: NEGATIVE / Urobili: 0.2 E.U./dL   Blood: x / Protein: 100 mg/dL / Nitrite: NEGATIVE   Leuk Esterase: NEGATIVE / RBC: < 5 /HPF / WBC 5-10 /HPF   Sq Epi: x / Non Sq Epi: Few /HPF / Bacteria: Present /HPF

## 2017-01-11 NOTE — CONSULT NOTE ADULT - SUBJECTIVE AND OBJECTIVE BOX
MEDICATIONS  (STANDING):  insulin lispro (HumaLOG) corrective regimen sliding scale  SubCutaneous Before meals and at bedtime  amiodarone    Tablet 200milliGRAM(s) Oral daily  apixaban 2.5milliGRAM(s) Oral two times a day  atorvastatin 80milliGRAM(s) Oral at bedtime  metoprolol succinate ER 50milliGRAM(s) Oral daily  pantoprazole    Tablet 40milliGRAM(s) Oral before breakfast  aspirin enteric coated 81milliGRAM(s) Oral daily  allopurinol 300milliGRAM(s) Oral daily  furosemide   Injectable 60milliGRAM(s) IV Push two times a day  docusate sodium 100milliGRAM(s) Oral three times a day  senna 2Tablet(s) Oral at bedtime  potassium chloride   Powder 40milliEquivalent(s) Oral every 4 hours    MEDICATIONS  (PRN):      Vital Signs Last 24 Hrs  T(C): 38.1, Max: 38.1 (01-11 @ 09:46)  T(F): 100.6, Max: 100.6 (01-11 @ 09:46)  HR: 86 (77 - 86)  BP: 104/51 (104/51 - 146/68)  BP(mean): --  RR: 16 (16 - 16)  SpO2: 96% (92% - 98%)    PHYSICAL EXAM:      Constitutional:  Eyes:  ENMT:  Neck:  Breasts:  Back:  Respiratory:  Cardiovascular:  Gastrointestinal:  Genitourinary:  Rectal:  Extremities:  Vascular:  Neurological:  Skin:  Lymph Nodes:  Musculoskeletal:  Psychiatric:                          10.7   3.1   )-----------( 182      ( 11 Jan 2017 03:30 )             32.8   11 Jan 2017 03:30    136    |  96     |  24     ----------------------------<  112    3.1     |  31     |  1.09     Ca    11.7       11 Jan 2017 03:30  Phos  2.9       10 Ronny 2017 10:26  Mg     1.9       11 Jan 2017 03:30    TPro  6.0    /  Alb  2.6    /  TBili  0.5    /  DBili  x      /  AST  92     /  ALT  60     /  AlkPhos  92     10 Ronny 2017 18:49    HIV-1/2 Combo Result: Nonreact:     Urinalysis Basic - ( 10 Ronny 2017 10:54 )    Color: Yellow / Appearance: Clear / SG: >=1.030 / pH: x  Gluc: x / Ketone: NEGATIVE  / Bili: NEGATIVE / Urobili: 0.2 E.U./dL   Blood: x / Protein: 100 mg/dL / Nitrite: NEGATIVE   Leuk Esterase: NEGATIVE / RBC: < 5 /HPF / WBC 5-10 /HPF   Sq Epi: x / Non Sq Epi: Few /HPF / Bacteria: Present /HPF      Surgical Pathology Report (12.30.16 @ 09:52) Surgical Pathology Report:   -INTERPRETATION: Lymph node: Rare clonal (dim lambda+) cells are noted.  (B-cell lymphoma versus myeloma work-up).  -Final Diagnosis Right jugular lymph node: Diffuse large B-cell lymphoma.  The findings are most consistent with diffuse large B-cell lymphoma,non-germinal center type. Patient is an 86yo F with PMHx of MGUS, HFpEF, CAD s/p CABG, HTN, HLD, severe MR/TR, a-fib s/p PPM on Eliquis, DM2, right-sided breast CA (s/p mastectomy/chemotx/radiation 1997) recently admitted 12/29/16 to 1/4/17 for hypercalcemia treated with IV fluids/Lasix/bisphosphonates, with LN biopsy done at the time by ENT suspicious for lymphoma,  now admitted for management of hypercalcemia after outpatient Ca level of 13 at Dr. Hartmann’s office yesterday. Pt has been followed by Dr Galaviz since last admission. She is scheduled to get chemotherapy (Rituxan gomez). Today she spiked a low grade temp (100.6) initially believed to be 101.6. With concern patient might be septic ID was consulted.         MEDICATIONS  (STANDING):  insulin lispro (HumaLOG) corrective regimen sliding scale  SubCutaneous Before meals and at bedtime  amiodarone    Tablet 200milliGRAM(s) Oral daily  apixaban 2.5milliGRAM(s) Oral two times a day  atorvastatin 80milliGRAM(s) Oral at bedtime  metoprolol succinate ER 50milliGRAM(s) Oral daily  pantoprazole    Tablet 40milliGRAM(s) Oral before breakfast  aspirin enteric coated 81milliGRAM(s) Oral daily  allopurinol 300milliGRAM(s) Oral daily  furosemide   Injectable 60milliGRAM(s) IV Push two times a day  docusate sodium 100milliGRAM(s) Oral three times a day  senna 2Tablet(s) Oral at bedtime  potassium chloride   Powder 40milliEquivalent(s) Oral every 4 hours    MEDICATIONS  (PRN):      Vital Signs Last 24 Hrs  T(C): 38.1, Max: 38.1 (01-11 @ 09:46)  T(F): 100.6, Max: 100.6 (01-11 @ 09:46)  HR: 86 (77 - 86)  BP: 104/51 (104/51 - 146/68)  BP(mean): --  RR: 16 (16 - 16)  SpO2: 96% (92% - 98%)    PHYSICAL EXAM:      Constitutional: Pt is weak, tired, lethargic. She speaks with a very low voice  ENMT: Very Dry mucous membranes  Neck: No JVD  Respiratory: CTABL. Fine crackles at the R base  Cardiovascular: Irregular  Gastrointestinal: soft, mildly distended, NT, pos BS  Extremities: WWP, no edema                          10.7   3.1   )-----------( 182      ( 11 Jan 2017 03:30 )             32.8   11 Jan 2017 03:30    Differential (01.11.17 @ 03:30)    Auto Eosinophil %: 0.0 %    Auto Lymphocyte %: 13.5 %    Auto Neutrophil %: 72.1: Differential percentages must be correlated with absolute numbers for  clinical significance. %    Auto Basophil %: 0.3 %    Auto Monocyte %: 14.1 %      136    |  96     |  24     ----------------------------<  112    3.1     |  31     |  1.09     Ca    11.7       11 Jan 2017 03:30  Phos  2.9       10 Ronny 2017 10:26  Mg     1.9       11 Jan 2017 03:30    TPro  6.0    /  Alb  2.6    /  TBili  0.5    /  DBili  x      /  AST  92     /  ALT  60     /  AlkPhos  92     10 Ronny 2017 18:49    HIV-1/2 Combo Result: Nonreact:     Urinalysis Basic - ( 10 Ronny 2017 10:54 )    Color: Yellow / Appearance: Clear / SG: >=1.030 / pH: x  Gluc: x / Ketone: NEGATIVE  / Bili: NEGATIVE / Urobili: 0.2 E.U./dL   Blood: x / Protein: 100 mg/dL / Nitrite: NEGATIVE   Leuk Esterase: NEGATIVE / RBC: < 5 /HPF / WBC 5-10 /HPF   Sq Epi: x / Non Sq Epi: Few /HPF / Bacteria: Present /HPF      Surgical Pathology Report (12.30.16 @ 09:52) Surgical Pathology Report:   -INTERPRETATION: Lymph node: Rare clonal (dim lambda+) cells are noted.  (B-cell lymphoma versus myeloma work-up).  -Final Diagnosis Right jugular lymph node: Diffuse large B-cell lymphoma.  The findings are most consistent with diffuse large B-cell lymphoma,non-germinal center type.          Patient is an 86yo F with PMHx of MGUS, HFpEF, CAD s/p CABG, HTN, HLD, severe MR/TR, a-fib s/p PPM on Eliquis, DM2, right-sided breast CA (s/p mastectomy/chemotx/radiation 1997) recently admitted 12/29/16 to 1/4/17 for hypercalcemia treated with IV fluids/Lasix/bisphosphonates, with LN biopsy done at the time by ENT suspicious for lymphoma, Patient is an 84yo F with PMHx of MGUS, HFpEF, CAD s/p CABG, HTN, HLD, severe MR/TR, a-fib s/p PPM on Eliquis, DM2, right-sided breast CA (s/p mastectomy/chemotx/radiation 1997) recently admitted 12/29/16 to 1/4/17 for hypercalcemia treated with IV fluids/Lasix/bisphosphonates, with LN biopsy done at the time by ENT suspicious for lymphoma,  now admitted for management of hypercalcemia after outpatient Ca level of 13 at Dr. Hartmann’s office yesterday. Pt has been followed by Dr Galaviz since last admission. She is scheduled to get chemotherapy (Rituxan gomez) pending a PET CT. Today she spiked a low grade temp (100.6) initially believed to be 101.6. With concern patient might be septic ID was consulted.       MEDICATIONS  (STANDING):  insulin lispro (HumaLOG) corrective regimen sliding scale  SubCutaneous Before meals and at bedtime  amiodarone    Tablet 200milliGRAM(s) Oral daily  apixaban 2.5milliGRAM(s) Oral two times a day  atorvastatin 80milliGRAM(s) Oral at bedtime  metoprolol succinate ER 50milliGRAM(s) Oral daily  pantoprazole    Tablet 40milliGRAM(s) Oral before breakfast  aspirin enteric coated 81milliGRAM(s) Oral daily  allopurinol 300milliGRAM(s) Oral daily  furosemide   Injectable 60milliGRAM(s) IV Push two times a day  docusate sodium 100milliGRAM(s) Oral three times a day  senna 2Tablet(s) Oral at bedtime  potassium chloride   Powder 40milliEquivalent(s) Oral every 4 hours    MEDICATIONS  (PRN):      Vital Signs Last 24 Hrs  T(C): 38.1, Max: 38.1 (01-11 @ 09:46)  T(F): 100.6, Max: 100.6 (01-11 @ 09:46)  HR: 86 (77 - 86)  BP: 104/51 (104/51 - 146/68)  BP(mean): --  RR: 16 (16 - 16)  SpO2: 96% (92% - 98%)    PHYSICAL EXAM:      Constitutional: Pt is weak, tired, lethargic. She speaks with a very low voice  ENMT: Very Dry mucous membranes  Neck: No JVD  Respiratory: CTABL. Fine crackles at the R base  Cardiovascular: Irregular  Gastrointestinal: soft, mildly distended, NT, pos BS  Extremities: WWP, no edema                          10.7   3.1   )-----------( 182      ( 11 Jan 2017 03:30 )             32.8   11 Jan 2017 03:30    Differential (01.11.17 @ 03:30)    Auto Eosinophil %: 0.0 %    Auto Lymphocyte %: 13.5 %    Auto Neutrophil %: 72.1: Differential percentages must be correlated with absolute numbers for  clinical significance. %    Auto Basophil %: 0.3 %    Auto Monocyte %: 14.1 %      136    |  96     |  24     ----------------------------<  112    3.1     |  31     |  1.09     Ca    11.7       11 Jan 2017 03:30  Phos  2.9       10 Ronny 2017 10:26  Mg     1.9       11 Jan 2017 03:30    TPro  6.0    /  Alb  2.6    /  TBili  0.5    /  DBili  x      /  AST  92     /  ALT  60     /  AlkPhos  92     10 Ronny 2017 18:49    HIV-1/2 Combo Result: Nonreact:     Urinalysis Basic - ( 10 Ronny 2017 10:54 )    Color: Yellow / Appearance: Clear / SG: >=1.030 / pH: x  Gluc: x / Ketone: NEGATIVE  / Bili: NEGATIVE / Urobili: 0.2 E.U./dL   Blood: x / Protein: 100 mg/dL / Nitrite: NEGATIVE   Leuk Esterase: NEGATIVE / RBC: < 5 /HPF / WBC 5-10 /HPF   Sq Epi: x / Non Sq Epi: Few /HPF / Bacteria: Present /HPF      Surgical Pathology Report (12.30.16 @ 09:52) Surgical Pathology Report:   -INTERPRETATION: Lymph node: Rare clonal (dim lambda+) cells are noted.  (B-cell lymphoma versus myeloma work-up).  -Final Diagnosis Right jugular lymph node: Diffuse large B-cell lymphoma.  The findings are most consistent with diffuse large B-cell lymphoma,non-germinal center type.          A/P: 84yo F with PMHx of MGUS, HFpEF, CAD s/p CABG, HTN, HLD, severe MR/TR, a-fib s/p PPM on Eliquis, DM2, right-sided breast CA (s/p mastectomy/chemotx/radiation 1997) recently admitted 12/29/16 to 1/4/17 for hypercalcemia found to have diffuse large B cell lymphoma from R jugular lymph node biopsy now with 1 episode of low grade fever    -Currently low suspicion that patient is infected. Pt with one single low grade temperature and no obvious source. Fever could be reactive to lymphoma.  -Despite patient having low WBC (3.1), patient not neutropenic. Recommend holding off from Abx for now. Obtain daily CBC with Diff  -As patient immunocompromised, recommend 2 sets of blood cultured (different sites) and obtaining a RVP. Patient denies Urinary symptoms and UA negative  -Pt will need PET CT for eval of B cell lymphoma  -Will cont to follow. Contact if any questions

## 2017-01-11 NOTE — PROGRESS NOTE ADULT - PROBLEM SELECTOR PLAN 9
Softly distended improved after pt voided. Bladder scan post void this evening showed only 27cc in bladder  Chronic Constipation: Cont. +BS, non-tender. Minimal BM overnight. + Flatus.   Abdominal X-ray showed constipation and no obstruction as discussed w/ urology.   Colace and Senna. Tap water enema x 1.    Full Code. Dr. Mckinney may consider palliative care pending PET scan results.     Case discussed with Dr. Mckinney.     Dispo: PET scan tomorrow, f/u Heme, ENT, and Renal recs, closely monitor Calcium. Monitor for further fevers

## 2017-01-11 NOTE — PROGRESS NOTE ADULT - PROBLEM SELECTOR PLAN 3
Please monitor volume and respiratory status closely   Would recommend to give another dose of furosemide 40mg IV, after checking repeat electrolytes   Please monitor electrolytes q6-q8h Please monitor volume and respiratory status closely   Patient may require additional Lasix 20mg, Please check labs first   Please monitor electrolytes q6-q8h

## 2017-01-11 NOTE — PROGRESS NOTE ADULT - PROBLEM SELECTOR PLAN 3
Dr. Galaviz consulted for h/o Breast CA w/ Lymphoma noted on prelim recent results R neck biopsy in setting of Hypercalcemia.   *Pt for PET scan tomorrow. NPO after light breakfast. Test to be done at 1:30PM. No insulin 4 hours prior. Check FS at 12:30PM.   Serology for CA work-up ordered by Dr. Galaviz – follow-up.  On Allopurinol daily to prevent tumor lysis syndrome per prior notes.   H/o Monoclonal Gammopathy

## 2017-01-11 NOTE — PROGRESS NOTE ADULT - ASSESSMENT
84 y/o F w/ IV Contrast Allery w/ PMHx of HTN, DM, HLD, Monoclonal Gammopathy, Chronic Diastolic CHF, CAD s/p CABG, severe MR/TR, A-fib s/p PPM on Eliquis, Right-sided breast CA (s/p mastectomy, chemo therapy, radiation), h/o hypercalcemia, who presented w/ hypercalcemia, general weakness noted who subsequent was noted to go into acute diastolic CHF.

## 2017-01-11 NOTE — CHART NOTE - NSCHARTNOTEFT_GEN_A_CORE
[facility] - Head and Neck Fellow (Dr Westfall)    ATRV surgical wound by home team to rule out potential source of fevers    Pt seen by me in clinic last week where wound was healing well with no sign of infection  On review today - wound looks pristine  No erythema, no underlying collection, no discharge, pain or tenderness with palpation  Pt has full ROM of neck  Small amount of oedema above the incision line in soft tissue as expected    Imp: Surgical site is not source of patients fevers    Plan: Further Mx as per admitting unit

## 2017-01-11 NOTE — PROGRESS NOTE ADULT - SUBJECTIVE AND OBJECTIVE BOX
S: Pt seen and examined bedside.  Patient denies  C/P, SOB, N/V, dizziness, palpitations, and diaphoresis.  Pt denies fever/chills, dysuria, abdominal pain, diarrhea, and cough.    O: Vital Signs Last 24 Hrs  T(C): 36.1, Max: 38.1 ( @ 09:46)  T(F): 96.9, Max: 100.6 ( @ 09:46)  HR: 76 (76 - 86)  BP: 108/53 (103/53 - 146/68)  RR: 17 (15 - 17)  SpO2: 95% (94% - 99%)    PHYSICAL EXAM:  GEN: NAD  PULM:  Crackles to B/L Mid Lobes  CARD: + JVD, RRR, S1 and S2   ABD: +BS, NT, softly distended (improved from this AM)	  EXT: No Edema B/L LE  NEURO: A+Ox3, no focal deficit    LABS:                        10.7   3.1   )-----------( 182      ( 2017 03:30 )             32.8     2017 16:36    135    |  93     |  29     ----------------------------<  100    3.7     |  31     |  1.16     Ca    10.7       2017 16:36  Phos  2.9       10 Ronny 2017 10:26  Mg     1.9       2017 03:30    TPro  6.6    /  Alb  2.8    /  TBili  0.6    /  DBili  x      /  AST  116    /  ALT  66     /  AlkPhos  105    2017 16:36    PT/INR - ( 10 Ronny 2017 10:26 )   PT: 13.8 sec;   INR: 1.24          PTT - ( 10 Ronny 2017 10:26 )  PTT:26.6 sec  I & Os for 24h ending  @ 07:00  =============================================  IN: 450 ml / OUT: 1600 ml / NET: -1150 ml    I & Os for current day (as of  @ 18:38)  =============================================  IN: 50 ml / OUT: 1300 ml / NET: -1250 ml    Daily Weight in k.9 (2017 09:00)

## 2017-01-11 NOTE — PROGRESS NOTE ADULT - PROBLEM SELECTOR PLAN 7
Cont. ASA, BB. Hold Statin due to transaminitis. Hepatitis panel negative.  RUQ US ordered (, ALT 66 on last CMP)

## 2017-01-11 NOTE — PROGRESS NOTE ADULT - PROBLEM SELECTOR PLAN 5
Pt reported dysphagia earlier possibly secondary to prior R neck lymph node biopsy per Dr. Galaviz. R neck NT to palpation and no erythema at side.   CT neck non-contract showed there has been interval increase in right neck fullness since the prior study of 11/9/2016. A discrete abscess cannot be discriminated, however assessment is limited in the absence of intravenous contrast.  ENT Dr. Westfall consulted, f/u recs.    Speech and swallow consulted and pt on aspiration precautions with puree diet and thin liquids. Pt passed dysphagia screen by RN and tolerating diet per RN.

## 2017-01-11 NOTE — PROGRESS NOTE ADULT - RS GEN PE MLT RESP DETAILS PC
b/l rales, however appears comfortable and laying flat/good air movement/respirations non-labored/breath sounds equal

## 2017-01-11 NOTE — PROGRESS NOTE ADULT - SUBJECTIVE AND OBJECTIVE BOX
HPI:  86 y/o F w/ pmhx of MGUS, HFpEF, CAD s/p CABG, severe MR/TR, a-fib s/p PPM on Eliquis, DM2, right-sided breast CA (s/p mastectomy/chemotx/radiation), recently admitted 12/29/16 to 1/4/17 for hypercalcemia treated with IV fluids/Lasix/bisphosphonates, presents again today for hypercalcemia on outpatient labs. She had an outpatient Ca level of 13 at Dr. Hartmann’s office yesterday. Since discharge last week, she reports generalized weakness and difficulty walking due to lack of balance which worsened gradually over the past week. She also experienced a fall (says she lost her balance) on outstretched right arm 4 days ago, with no LOC/head impact. Since then she complains of right shoulder pain when moving her arm but denies pain at rest. Also endorses chronic constipation and lack of appetite, but denies abdominal/kidney pain, nausea, vomiting, fever/chills, chest pain/sob, recent change in meds, dysuria, headache, or vision change. On last admission, she had excisional biopsy of lymph nodes (with ENT) with prelim reports concerning for lymphoma but final diagnosis pending.  Initial vitals in ED: 98.1F, HR 98, 133/52, RR 16, 98% O2 on RA.   ED administration: 250cc bolus of NS followed by NS at 80cc/hr and Lasix 20mg IV x 1. (10 Ronny 2017 13:03)    FAMILY HISTORY:  No pertinent family history in first degree relatives    MEDICATIONS  (STANDING):  insulin lispro (HumaLOG) corrective regimen sliding scale  SubCutaneous Before meals and at bedtime  amiodarone    Tablet 200milliGRAM(s) Oral daily  apixaban 2.5milliGRAM(s) Oral two times a day  atorvastatin 80milliGRAM(s) Oral at bedtime  metoprolol succinate ER 50milliGRAM(s) Oral daily  pantoprazole    Tablet 40milliGRAM(s) Oral before breakfast  aspirin enteric coated 81milliGRAM(s) Oral daily  allopurinol 300milliGRAM(s) Oral daily  sodium chloride 0.45%. 1000milliLiter(s) IV Continuous <Continuous>  furosemide   Injectable 60milliGRAM(s) IV Push two times a day    MEDICATIONS  (PRN):    Vital Signs Last 24 Hrs  T(C): 37.2, Max: 37.2 (01-10 @ 12:25)  T(F): 99, Max: 99 (01-11 @ 05:11)  HR: 79 (77 - 98)  BP: 140/65 (120/57 - 146/68)  BP(mean): --  RR: 16 (16 - 16)  SpO2: 98% (92% - 98%)    Physical exam:    Overall impression  Lymphadenopathy  Liver  spleen    Labs:  CBC Full  -  ( 11 Jan 2017 03:30 )  WBC Count : 3.1 K/uL  Hemoglobin : 10.7 g/dL  Hematocrit : 32.8 %  Platelet Count - Automated : 182 K/uL  Mean Cell Volume : 84.3 fL  Mean Cell Hemoglobin : 27.5 pg  Mean Cell Hemoglobin Concentration : 32.6 g/dL  Auto Neutrophil # : x  Auto Lymphocyte # : x  Auto Monocyte # : x  Auto Eosinophil # : x  Auto Basophil # : x  Auto Neutrophil % : x  Auto Lymphocyte % : x  Auto Monocyte % : x  Auto Eosinophil % : x  Auto Basophil % : x    11 Jan 2017 03:30    136    |  96     |  24     ----------------------------<  112    3.1     |  31     |  1.09     Ca    11.7       11 Jan 2017 03:30  Phos  2.9       10 Ronyn 2017 10:26  Mg     1.9       11 Jan 2017 03:30    TPro  6.0    /  Alb  2.6    /  TBili  0.5    /  DBili  x      /  AST  92     /  ALT  60     /  AlkPhos  92     10 Ronny 2017 18:49      Radiology:  HEALTH ISSUES - R/O PROBLEM Dx:      Assessmant / Problems  1)Hypercalcemia improving currently Ca 11.7 after iv fluids and Zometa  2)Nonhodgkin lymphoma stage not yet known  3)Breast ca  rising tumor marker  4) Negative Hepatitis B, C important for Chemotherapy/Rituximab  administration    Plan  1)continue gentle iv fluids  2)po Allopurinol  3)PET CT scan ( patient has allergy to iv contrast, as discussed with radiology yesterday no problem with PET CT)  4) Chemotherapy/Rituximab tomorrow    Thank you  Neha Galaviz MD HPI:  84 y/o F w/ pmhx of MGUS, HFpEF, CAD s/p CABG, severe MR/TR, a-fib s/p PPM on Eliquis, DM2, right-sided breast CA (s/p mastectomy/chemotx/radiation), recently admitted 12/29/16 to 1/4/17 for hypercalcemia treated with IV fluids/Lasix/bisphosphonates, presents again today for hypercalcemia on outpatient labs. She had an outpatient Ca level of 13 at Dr. Hartmann’s office yesterday. Since discharge last week, she reports generalized weakness and difficulty walking due to lack of balance which worsened gradually over the past week. She also experienced a fall (says she lost her balance) on outstretched right arm 4 days ago, with no LOC/head impact. Since then she complains of right shoulder pain when moving her arm but denies pain at rest. Also endorses chronic constipation and lack of appetite, but denies abdominal/kidney pain, nausea, vomiting, fever/chills, chest pain/sob, recent change in meds, dysuria, headache, or vision change. On last admission, she had excisional biopsy of lymph nodes (with ENT) with prelim reports concerning for lymphoma but final diagnosis pending.  Initial vitals in ED: 98.1F, HR 98, 133/52, RR 16, 98% O2 on RA.   ED administration: 250cc bolus of NS followed by NS at 80cc/hr and Lasix 20mg IV x 1. (10 Ronny 2017 13:03)    FAMILY HISTORY:  No pertinent family history in first degree relatives    MEDICATIONS  (STANDING):  insulin lispro (HumaLOG) corrective regimen sliding scale  SubCutaneous Before meals and at bedtime  amiodarone    Tablet 200milliGRAM(s) Oral daily  apixaban 2.5milliGRAM(s) Oral two times a day  atorvastatin 80milliGRAM(s) Oral at bedtime  metoprolol succinate ER 50milliGRAM(s) Oral daily  pantoprazole    Tablet 40milliGRAM(s) Oral before breakfast  aspirin enteric coated 81milliGRAM(s) Oral daily  allopurinol 300milliGRAM(s) Oral daily  sodium chloride 0.45%. 1000milliLiter(s) IV Continuous <Continuous>  furosemide   Injectable 60milliGRAM(s) IV Push two times a day    MEDICATIONS  (PRN):    Vital Signs Last 24 Hrs  T(C): 37.2, Max: 37.2 (01-10 @ 12:25)  T(F): 99, Max: 99 (01-11 @ 05:11)  HR: 79 (77 - 98)  BP: 140/65 (120/57 - 146/68)  BP(mean): --  RR: 16 (16 - 16).4  Hematocrit : 32.8 %  Platelet Count - Automated : 182 K/uL  Mean Cell Volume : 84.3 fL  Mean Cell Hemoglobin : 27.5 pg  Mean Cell Hemoglobin Concentration : 32.6 g/dL  Auto Neutrophil # : x  Auto Lymphocyte # : x  Auto Monocyte # : x  Auto Eosinophil # : x  Auto Basophil # : x  Auto Neutrophil % : x  Auto Lymphocyte % : x  Auto Monocyte % : x  Auto Eosinophil % : x  Auto Basophil % : x    11 Jan 2017 03:30    136    |  96     |  24     ----------------------------<  112    3.1     |  31     |  1.09     Ca    11.7       11 Jan 2017 03:30  Phos  2.9       10 Ronny 2017 10:26  Mg     1.9       11 Jan 2017 03:30    TPro  6.0    /  Alb  2.6    /  TBili  0.5    /  DBili  x      /  AST  92     /  ALT  60     /  AlkPhos  92     10 Ronny 2017 18:49      Radiology:  HEALTH ISSUES - R/O PROBLEM Dx:      Assessmant / Problems  1)Hypercalcemia improving currently Ca 11.7 after iv fluids and Zometa  2)Nonhodgkin lymphoma stage not yet known  3)Breast ca  rising tumor marker  4) Negative Hepatitis B, C important for Chemotherapy/Rituximab  administration  5) Febrile, intense pain in Right submandibular area of biopsy, r/o  abscess    Plan  1)continue gentle iv fluids  2)po Allopurinol  3)PET CT scan ( patient has allergy to iv contrast, as discussed with radiology yesterday no problem with PET CT)  4) Chemotherapy/Rituximab tomorrow  5) blood, urine c&s  6) CT soft tissue neck no contrast patient allergic  7) Dr Mckenzie ENT office called    Thank you  Neha Galaviz MD

## 2017-01-11 NOTE — PROGRESS NOTE ADULT - PROBLEM SELECTOR PLAN 2
Raw Calcium trended 12.1 -> 12.4 -> 11.7 -> 10.7 (Corrected calcium 11.66)  Pt received IV fluids, IV Lasix, s/p 1 dose of calcitonin  IU and Zoledronic 4mg IV overnight w/ improvement of Ca.  F/u AM CMP and corrected calcium as per Dr. Hartmann.   Patient with chronic weakness, constipation. No abdomen/flank pain or change in mental status. A+O x 3.    Hypokalemia 3.1 this AM repleted w. Klor 40meq PO x 2. Repeat K 3.7 (pt just received 2nd potassium supplement), f/u AM K.

## 2017-01-11 NOTE — PROGRESS NOTE ADULT - PROBLEM SELECTOR PLAN 1
Echo today showed EF 65%, normal wall motion, mild TR.  CXR today showed mild venous congestion, improved from CXR 1/10/17.   D/C IV Lasix as per Dr. Hartmann. Pt was on Lasix 60mg IV q12hrs and ½ NS @ 50cc/hr for hypercalcemia. Fluids and Lasix were D/C’d this AM.   +JVD and crackles to mid lobe noted earlier this AM. No SOB, orthopnea/PND.   O2 sat 96% on 2L NC. Pt diuresed 1.25 liters negative in 24 hrs. Per Dr. Hartmann pt lung usually have crackles. Echo today showed EF 65%, normal wall motion, mild TR.  CXR today showed mild venous congestion, improved from CXR 1/10/17.   D/C IV Lasix as per Dr. Hartmann. Pt was on Lasix 60mg IV q12hrs and ½ NS @ 50cc/hr for hypercalcemia. Fluids and Lasix were D/C’d this AM.   +JVD and crackles to mid lobe noted earlier this AM. No SOB, orthopnea/PND.   O2 sat 96% on 2L NC. Pt diuresed 1.25 liters negative in 24 hrs. Per Dr. Hartmann pt lung usually have crackles.  Lasix tomorrow to be dosed based on Renal recommendations and volume status.

## 2017-01-11 NOTE — PROGRESS NOTE ADULT - PROBLEM SELECTOR PLAN 1
Echo today showed EF 65%, normal wall motion, mild TR.  CXR today showed mild venous congestion, improved from CXR 1/10/17.   D/C IV Lasix as per Dr. Hartmann. Pt was on Lasix 60mg IV q12hrs and ½ NS @ 50cc/hr for hypercalcemia. Fluids and Lasix were D/C’d this AM.   +JVD and crackles to mid lobe noted earlier this AM. No SOB, orthopnea/PND.   O2 sat 96% on 2L NC. Pt diuresed 1.25 liters negative in 24 hrs. Per Dr. Hartmann pt lung usually have crackles.  Lasix tomorrow to be dosed based on Renal recommendations and volume status.

## 2017-01-11 NOTE — PROGRESS NOTE ADULT - ASSESSMENT
86 y/o F w/ IV Contrast Allery w/ PMHx of HTN, DM, HLD, Monoclonal Gammopathy, Chronic Diastolic CHF, CAD s/p CABG, severe MR/TR, A-fib s/p PPM on Eliquis, Right-sided breast CA (s/p mastectomy, chemo therapy, radiation), h/o hypercalcemia, who presented w/ hypercalcemia, general weakness noted who subsequent was noted to go into acute diastolic CHF. 84 y/o F w/ IV Contrast Allery w/ PMHx of HTN, DM, HLD, Monoclonal Gammopathy, Chronic Diastolic CHF, CAD s/p CABG, severe MR/TR, A-fib s/p PPM on Eliquis, Right-sided breast CA (s/p mastectomy, chemo therapy, radiation), h/o hypercalcemia, who presented w/ hypercalcemia, general weakness noted who subsequently was noted to go into acute diastolic CHF.

## 2017-01-11 NOTE — CONSULT NOTE ADULT - SUBJECTIVE AND OBJECTIVE BOX
85F with diffuse large B-cell lymphoma diagnosed after recent ENT right neck biopsy reported to have right neck pain. Low grade temp measured today to 100.6. CT neck non-con obtained today (contrast allergy) to investigate possible infection, demonstrated interval increase in right neck lymphadenopathy, right parotid tail mass, and right submandibular gland. Patient denies right neck pain. States that she has had some trouble swallowing for months but denies odynophagia and recent progression of dysphagia, tolerating current diet without issue. Also reports two weeks of mild voice change however no dyspnea.    AFVSS, NAD  Voice normal  Breathing comfortably  OC/OP: clear  Right levels 1-2 adenopathy and enlarged submandibular gland, non-tender, no overlying skin changes.   Right neck with well healing incision  V1-V3 sensation intact b/l  Facial mobility intact b/l  FDL: nasopharynx wnl, oropharynx with small raised area in midline BOT however no mucosal irregularity, hypopharynx wnl, supraglottis wnl, atrophy of b/l VFs with small glottic gap, VFs fully mobile b/l. No pooling of secretions    1/11/17 CT neck reviewed. No fluid collection. Interval increase in right neck adenopathy and submandibular gland.    A/P:  85F with recently diagnosed DLBCL after recent right neck LN bx by ENT with months of stable mild dysphagia and recent mild voice change. Speech/ Swallow eval pending. Voice change multifactorial- age related changes to VF (atrophy), deconditioning, poor pulmonary effort.  - Speech/ Swallow to eval swallow fx  - No concern for infection at this time

## 2017-01-11 NOTE — PROGRESS NOTE ADULT - PROBLEM SELECTOR PLAN 4
Patient had one episode of fever of 100.6.   Please send septic workup blood culture and urine culture   Xray chest from 1/10 did not show any new infiltrates.

## 2017-01-11 NOTE — PROGRESS NOTE ADULT - PROBLEM SELECTOR PLAN 4
Fever 100.6 this AM. No leukocytosis. Pt Assx. Fever resolved post Tylenol 650mg.   Current Temp. 97.   ID SVC Dr. Parada consulted and suspected temperature 2nd to Lymphoma.    Blood Cx and UCx drawn. F/u RVP. UA showed trace LE, trace blood and no nitrates.  No ABX at this time as per ID and Dr. Mckinney. Monitor closely.

## 2017-01-11 NOTE — PROGRESS NOTE ADULT - SUBJECTIVE AND OBJECTIVE BOX
Patient is a 85y old  Female who presents with a chief complaint of hypercalcemia (10 Ronny 2017 13:03)      HPI:  84 y/o F w/ pmhx of MGUS, HFpEF, CAD s/p CABG, severe MR/TR, a-fib s/p PPM on Eliquis, DM2, right-sided breast CA (s/p mastectomy/chemotx/radiation), recently admitted 16 to 17 for hypercalcemia treated with IV fluids/Lasix/bisphosphonates, presents again today for hypercalcemia on outpatient labs. She had an outpatient Ca level of 13 at Dr. Hartmann’s office yesterday. Since discharge last week, she reports generalized weakness and difficulty walking due to lack of balance which worsened gradually over the past week. She also experienced a fall (says she lost her balance) on outstretched right arm 4 days ago, with no LOC/head impact. Since then she complains of right shoulder pain when moving her arm but denies pain at rest. Also endorses chronic constipation and lack of appetite, but denies abdominal/kidney pain, nausea, vomiting, fever/chills, chest pain/sob, recent change in meds, dysuria, headache, or vision change. On last admission, she had excisional biopsy of lymph nodes (with ENT) with prelim reports concerning for lymphoma but final diagnosis pending.  Initial vitals in ED: 98.1F, HR 98, 133/52, RR 16, 98% O2 on RA.   ED administration: 250cc bolus of NS followed by NS at 80cc/hr and Lasix 20mg IV x 1. (10 Ronny 2017 13:03)         OVERNIGHT EVENT/COMMENTS:     rx for hypercalcemia, comfortable           PAST MEDICAL & SURGICAL HISTORY:  Scoliosis  Follicular lymphoma  Neck mass  Afib  Other hyperlipidemia  Breast cancer  CAD (coronary artery disease)  Diabetes  HTN (hypertension)  No pertinent past medical history  H/O abdominal hysterectomy  H/O thyroidectomy  S/P CABG x 3      MEDICATIONS  (STANDING):  insulin lispro (HumaLOG) corrective regimen sliding scale  SubCutaneous Before meals and at bedtime  amiodarone    Tablet 200milliGRAM(s) Oral daily  apixaban 2.5milliGRAM(s) Oral two times a day  metoprolol succinate ER 50milliGRAM(s) Oral daily  pantoprazole    Tablet 40milliGRAM(s) Oral before breakfast  aspirin enteric coated 81milliGRAM(s) Oral daily  allopurinol 300milliGRAM(s) Oral daily  docusate sodium 100milliGRAM(s) Oral three times a day  senna 2Tablet(s) Oral at bedtime    MEDICATIONS  (PRN):    IV CONtRAST (Flushing (Skin); Rash)  No Known Drug Allergies        Vital Signs Last 24 Hrs  T(C): 36.1, Max: 38.1 ( @ 09:46)  T(F): 96.9, Max: 100.6 ( @ 09:46)  HR: 76 (76 - 86)  BP: 108/53 (103/53 - 146/68)  BP(mean): --  RR: 17 (15 - 17)  SpO2: 95% (95% - 99%)            DIZZINESS: DIZZINESS  H/o or current diagnosis of HF- Contraindication to ACEI/ARBs  H/o or current diagnosis of HF- ACEI/ARB contraindication unknown  H/o or current diagnosis of HF- Contraindication to ACEI/ARBs  H/o or current diagnosis of HF- ACEI/ARB contraindication unknown  H/o or current diagnosis of HF- Contraindication to ACEI/ARBs  H/o or current diagnosis of HF- ACEI/ARB contraindication unknown  H/o or current diagnosis of HF- Contraindication to ACEI/ARBs  H/o or current diagnosis of HF- ACEI/ARB contraindication unknown  No pertinent family history in first degree relatives  Handoff  MEWS Score: 4 (2017 17:51)  Scoliosis  Follicular lymphoma  Neck mass  Afib  Other hyperlipidemia  Breast cancer  CAD (coronary artery disease)  Diabetes  HTN (hypertension)  No pertinent past medical history  Afib  Hypercalcemia  Abdominal distension: Abdominal distension  Diabetes: Diabetes  Dysphagia: Dysphagia  Acute on chronic systolic (congestive) heart failure: Acute on chronic systolic (congestive) heart failure  Fever: Fever  Heart failure: Heart failure  Lymphoma: Lymphoma  Need for prophylactic measure: Need for prophylactic measure  HTN (hypertension): HTN (hypertension)  Type 2 diabetes mellitus: Type 2 diabetes mellitus  CAD (coronary artery disease): CAD (coronary artery disease)  Monoclonal gammopathy of undetermined significance: Monoclonal gammopathy of undetermined significance  Heart failure with preserved ejection fraction: Heart failure with preserved ejection fraction  Paroxysmal atrial fibrillation: Paroxysmal atrial fibrillation  Arm pain, right: Arm pain, right  Hypercalcemia: Hypercalcemia  H/O abdominal hysterectomy  H/O thyroidectomy  S/P CABG x 3  DIZZINESS: DIZZINESS  5  Lymphoma        PT/INR - ( 10 Ronny 2017 10:26 )   PT: 13.8 sec;   INR: 1.24          PTT - ( 10 Ronny 2017 10:26 )  PTT:26.6 sec                                10.7   3.1   )-----------( 182      ( 2017 03:30 )             32.8       2017 16:36    135    |  93     |  29     ----------------------------<  100    3.7     |  31     |  1.16     Ca    10.7       2017 16:36  Phos  2.9       10 Ronny 2017 10:26  Mg     1.9       2017 03:30    TPro  6.6    /  Alb  2.8    /  TBili  0.6    /  DBili  x      /  AST  116    /  ALT  66     /  AlkPhos  105    2017 16:36      Urinalysis Basic - ( 2017 12:21 )    Color: Yellow / Appearance: Clear / S.010 / pH: x  Gluc: x / Ketone: NEGATIVE  / Bili: NEGATIVE / Urobili: 0.2 E.U./dL   Blood: x / Protein: NEGATIVE mg/dL / Nitrite: NEGATIVE   Leuk Esterase: Trace / RBC: < 5 /HPF / WBC < 5 /HPF   Sq Epi: x / Non Sq Epi: x / Bacteria: Present /HPF            MD  consult reviewed           ROS                           review as per  H/P                      +    NO CHANGE                  CHANGE  GENERAL                                awake           alert        confused           lethargic                     Febrile  CARDIOVASC           negative       chest pain    palpitation         SOB           TOSCNAO  +CAD  PULMONARY          negative                         cough            congestion                   wheezing     ENT                          negative +               hoarseness         sore throat      GastrointestinaL    negative          nausea         vomitting         diarrhea            pain                                 negative             continent         incontinent       dysuria         frequency          retention             alexander  MS                          negative               denies         weakness      PSYCH                    negative                   awake              agitation               dementia           Neuro                    negative         +    awake              non focal    SKIN                       negative              dry              rash           decubitus     ENDO                                      DM                     THYROID                          OTHER    PHYSICAL  EXAMINATION ::     HEENT             negative  NECK               Supple                 JVD  COR                 s1s2         +   reg                 irreg             +     murmur    s/p CABG  LUNGS            clear           ++    rales               rhonchi                 +   decreased BS   ABDOMEN     soft BS  present              benign              distended  EXTR               FROM                  DECREASED rom      +  edema  NEURO           grossly intact               non focal                  unable to exam           Psych              awake                confused   SKIN                see RN note             intact            rash        decubitus  /RECTAL   deferred        ADDL  STUDIES::::      n axial acquisition was performed from the   skull base to the thoracic inlet without intravenous contrast   administration.  The data was reformatted in the sagittal, coronal and   axial planes.    Clinical information: Fever and prior right neck biopsy. Diffuse large   B-cell lymphoma. Also history of breast carcinoma.    The current study is compared with previous scan dated 2016.    On this noncontrast examination there is increased fullness in the region   of pre-existing parotid tail mass and adjacent lymphadenopathy. While   there is no obvious fluid collection, assessment for abscess is limited   in theabsence of intravenous contrast. There also appears to have been   interval increased fullness of the right submandibular gland containing   multiple punctate foci of calcification. There is no obvious site of   obstructing calculus and etiology of this appearance is uncertain.     There are again evident numerous mildly enlarged lymph nodes throughout   the cervical ryder chains bilaterally. The appearance of the upper   aerodigestive tract is unchanged. There is again noted enlargement of the   left thyroid lobe with several small nodules in the nonenlarged right   thyroid lobe. Pacemaker device is in place. The lung apices appear well   ventilated bilaterally. The paranasal sinuses and mastoids are well   ventilated bilaterally.    IMPRESSION:     There has been interval increase in right neck fullness since the prior   study of 2016. A discrete abscess cannot be discriminated, however   assessment is limited in the absence of intravenous contrast. Please see   report above for further detail.  Surgical Pathology Report (.16 @ 09:52)    Surgical Pathology Report:   ACCESSION No:  [id number]    TANYA FRAIRE                      4        Addendum Report          Addendum    Flow Cytometry Analysis for Lymphoproliferative Disorders    INTERPRETATION  Lymph node:    - Rare clonal (dim lambda+) cells are noted. See comments.    COMMENT  Due to low viability, correlation with histomorphology, prior  diagnoses and immunohistochemistry is recommended (B-cell  lymphoma versus myeloma work-up).    GROSS DESCRIPTION  Received fresh in RPMI media soft tissue measuring 1.3 x 1.0 x  0.3 cm. A touch imprint is made. The tissue is prepared for Flow  Cytometry.    IMMUNOPHENOTYPIC ANALYSIS  Percentage of Abnormal Cells: 2    Cell Size: Variable Viability  7AAD: 66%    Rare clonal cells are noted (approx. 2%); they show negative  (dim?) CD19, variable CD20 and dim lambda expression. No CD5 or  CD10 expression is noted.    T-cells (30%) show normal phenotype. Decreased viability of the  sample.    RESULTS  Antibody Description Result  B-Cell Associated  CD19 Pan B-cell antigen 1.93%  CD20 Pan B-cell antigen 2.16%  CD10 Follicle center B-cells, RAN, 0.16%  myeloid subset  CD11c Monocytes, myeloid subset, hairy 5.03%  cell leukemia  CD23 Mature B-cells, CLL 0.18%  FMC7 B-Cell associated antigen in B- 1.15%  Cell subset  Kappa Kappa Ig light chain, B-cells, 1.14%  plasma cells  Lambda Lambda Ig light chain, B-cells, 0.74%  plasma cells    Antibody Description Result            TANYA FRAIRE                      4        Addendum Report          T-Cell Associated  CD2 Thymic and peripheral T-cells, NK 34.98%  cells  CD3 Pan T-cell antigen, TCR-epsilon 30.93%  subunit  CD4 Franklin T-cells, thymocyte subset, 18.82%  monocytes  CD5 Pan T-cells, mature B-cell subset 26.71%  (B1a cells)  CD7 Thymic and peripheral T-cells, NK 29.99%  cells  CD8 Suppressor T-cells, NK cells, 10.62%  thymocyte subset  CD56 T-cell subset, NK cells 6.62%  CD57 T-cell subset, NK cells 6.2%    Diagnosis Code(s): R59.9, C90.00  Antibody Description Result  Activation  CD38 Activated T, B, myeloid cells, B- 34.63%  cell subset  [place]  CD45 Leukocyte common antigen 98.3%                        This addendum reports the results of Flow Cytometry Analysis for  Lymphoproliferative Disorders, reported on 2016 by [facility],  a specialized [facility]. Testing has been performed  at [place] [facility] [employer] [facility],  [place] [address], phone: [phone], on [facility] specimen [id number] with GenPath ID: [id number].  Please refer to the official [facility]Path report, which is on file in  the Pathology Department.    JESICA Mosley M.D., Ph.D. [employer]    Hematopathologist   [facility] [employer]                TANYA FRAIRE        Addendum Report          Electronically signed by JESICA Mosley M.D., [place] [address] [phone]    Trinidad York M.D.  (Electronic Signature)  Reported on: 17      Surgical Final Report          Final Diagnosis  Right jugular lymph node:  - Diffuse large B-cell lymphoma.        Note:  Histological sections demonstrate effacement of the lymph  node architecture by a heterogenous population consistent of  small, medium and very large atypical lymphocytes.  Immunohistochemical stains demonstrate that the large atypical  lymphocytes are of B-cell origin based on the CD20 and PAX5  expression.  These B-cells are also OCT2, BOB1, BCL6 (patchy),  MUM1 and BCL2 (patchy) positive.  The B-cells lack expression of  CD10, CD5, , CyclinD1, and CD56. CD30 highlights rare  positive large cells.  Proliferation rate based on Ki67 is  approximately 40-50%.  There are benign CD3 positive T-cells present.  In situ  hybridization for Facundo-barr virus (DAINA) is negative.  The  findings are most consistent with diffuse large B-cell lymphoma,  non-germinal center type.    The case was reviewed by another pathologist in the department  (Dr. Carrillo), in accordance with the departmental policy.    Molecular studies are pending.    Trinidad York M.D.  (Electronic Signature)  Reported on: 17    Clinical History  Lymphoma.              TANYA FRAIRE                      4        Surgical Final Report          Specimen(s) Submitted  1. Right jugular lymph node for lymphoma work up    Gross Description  The specimen is received without fixative, labeled with the  patient's identification and "right jugular lymph node biopsy".  It consists of a 2.5 x 2.2 x 1.3 cm aggregate of eight pink-tan  fragments of lymphoid tissue that range from 0.5 x 0.4 x 0.2 cm  to 1.8 x 1.2 x 1.2 cm.  A portion of the specimen is submitted  for flow cytometric analysis..  The remainder of the specimen is  submitted entirely in three cassettes, 1A-1C (remainder of  largest fragment in 1A).  LR 16 10:23

## 2017-01-11 NOTE — PROGRESS NOTE ADULT - PROBLEM SELECTOR PLAN 9
Softly distended improved after pt voided. Bladder scan post void this evening showed only 27cc in bladder  Chronic Constipation: Cont. +BS, non-tender. Minimal BM overnight. + Flatus.   Abdominal X-ray showed constipation and no obstruction as discussed w/ urology.   Colace and Senna. Tap water enema x 1.    Full Code. Dr. Mckinney may consider palliative care pending PET scan results.     Case discussed with Dr. Mckinney.     Dispo: PET scan tomorrow, f/u Heme, ENT, and Renal recs, closely monitor Calcium. Monitor for further fevers Softly distended improved after pt voided. Bladder scan post void this evening showed only 27cc in bladder  Chronic Constipation: Cont. +BS, non-tender. Minimal BM overnight. + Flatus.   Abdominal X-ray showed constipation and no obstruction as discussed w/ radiology.   Colace and Senna. Tap water enema x 1.    Full Code. Dr. Mckinney may consider palliative care pending PET scan results.     Case discussed with Dr. Mckinney.     Dispo: PET scan tomorrow, f/u Heme, ENT, and Renal recs, closely monitor Calcium. Monitor for further fevers

## 2017-01-11 NOTE — PROGRESS NOTE ADULT - PROBLEM SELECTOR PLAN 2
Patient is being followed by hematology/oncology. Patient has large cell lyphoma. Patient may start chemotherapy tomorrow. Patient is planned for PET CT and CT neck.

## 2017-01-11 NOTE — PROGRESS NOTE ADULT - PROBLEM SELECTOR PLAN 1
Patient is an 85 year old female whom presented to the hospital for management of hypercalcemia. Patient was recently admitted to the hospital for similar reason. Patient's hypercalcemia is thought to be due to patients underlying large cell lymphoma which is causing a vitamin D 1,25 overproduction. Yesterday, patient received Calcitonin 170 units IM for one dose. Patient received 4mg of zometa at approximately 1am. Patient appears to be developing pulmonary edema on examination, even though she appears to be comfortable.     P - would recommend to repeat labs as last set was at 3 am  Would hold fluids for the time being since patient appears to have b/l rales on auscultation and patient is prone to develop pulmonary edema in the setting of heart failure with significant MR/TR   please give KCl 40 mEq PO x 2   Patient will likely require another dose of furosemide 40mg IV (but would like to see repeat labs first)   Monitor Electrolytes closely   Please monitor STRICT I/Os  Heme/onc following patient and possibly planning on starting rituximab tomorrow. Patient is an 85 year old female whom presented to the hospital for management of hypercalcemia. Patient was recently admitted to the hospital for similar reason. Patient's hypercalcemia is thought to be due to patients underlying large cell lymphoma which is causing a vitamin D 1,25 overproduction. Yesterday, patient received Calcitonin 170 units IM for one dose. Patient received 4mg of zometa at approximately 1am. Patient appears to be developing pulmonary edema on examination, even though she appears to be comfortable.     P - would recommend to repeat labs as last set was at 3 am  Would hold fluids for the time being since patient appears to have b/l rales on auscultation and patient is prone to develop pulmonary edema in the setting of heart failure with significant MR/TR   please give KCl 40 mEq PO x 2   Patient will likely require another dose of furosemide 20mg IV (but would like to see repeat labs first, patient is responding to lasix since last labs show hypokalemia, increase in BUN, and contraction alkalosis)  Monitor Electrolytes closely   Please monitor STRICT I/Os  Heme/onc following patient and possibly planning on starting rituximab tomorrow.

## 2017-01-12 DIAGNOSIS — K59.00 CONSTIPATION, UNSPECIFIED: ICD-10-CM

## 2017-01-12 DIAGNOSIS — Z02.9 ENCOUNTER FOR ADMINISTRATIVE EXAMINATIONS, UNSPECIFIED: ICD-10-CM

## 2017-01-12 LAB
ALBUMIN SERPL ELPH-MCNC: 2.6 G/DL — LOW (ref 3.4–5)
ALP SERPL-CCNC: 96 U/L — SIGNIFICANT CHANGE UP (ref 40–120)
ALT FLD-CCNC: 65 U/L — HIGH (ref 12–42)
ANION GAP SERPL CALC-SCNC: 8 MMOL/L — LOW (ref 9–16)
AST SERPL-CCNC: 113 U/L — HIGH (ref 15–37)
BILIRUB SERPL-MCNC: 0.6 MG/DL — SIGNIFICANT CHANGE UP (ref 0.2–1.2)
BUN SERPL-MCNC: 28 MG/DL — HIGH (ref 7–23)
CALCIUM SERPL-MCNC: 11 MG/DL — HIGH (ref 8.5–10.5)
CANCER AG27-29 SERPL-ACNC: 100.3 U/ML — HIGH (ref 0–37.7)
CHLORIDE SERPL-SCNC: 96 MMOL/L — SIGNIFICANT CHANGE UP (ref 96–108)
CO2 SERPL-SCNC: 29 MMOL/L — SIGNIFICANT CHANGE UP (ref 22–31)
CREAT SERPL-MCNC: 1.2 MG/DL — SIGNIFICANT CHANGE UP (ref 0.5–1.3)
GLUCOSE SERPL-MCNC: 83 MG/DL — SIGNIFICANT CHANGE UP (ref 70–99)
HCT VFR BLD CALC: 32.1 % — LOW (ref 34.5–45)
HGB BLD-MCNC: 10.5 G/DL — LOW (ref 11.5–15.5)
MAGNESIUM SERPL-MCNC: 1.5 MG/DL — LOW (ref 1.6–2.4)
MCHC RBC-ENTMCNC: 27.6 PG — SIGNIFICANT CHANGE UP (ref 27–34)
MCHC RBC-ENTMCNC: 32.7 G/DL — SIGNIFICANT CHANGE UP (ref 32–36)
MCV RBC AUTO: 84.3 FL — SIGNIFICANT CHANGE UP (ref 80–100)
PLATELET # BLD AUTO: 170 K/UL — SIGNIFICANT CHANGE UP (ref 150–400)
POTASSIUM SERPL-MCNC: 3.6 MMOL/L — SIGNIFICANT CHANGE UP (ref 3.5–5.3)
POTASSIUM SERPL-SCNC: 3.6 MMOL/L — SIGNIFICANT CHANGE UP (ref 3.5–5.3)
PROT SERPL-MCNC: 6.1 G/DL — LOW (ref 6.4–8.2)
RBC # BLD: 3.81 M/UL — SIGNIFICANT CHANGE UP (ref 3.8–5.2)
RBC # FLD: 16.3 % — SIGNIFICANT CHANGE UP (ref 10.3–16.9)
SODIUM SERPL-SCNC: 133 MMOL/L — LOW (ref 135–145)
WBC # BLD: 3.9 K/UL — SIGNIFICANT CHANGE UP (ref 3.8–10.5)
WBC # FLD AUTO: 3.9 K/UL — SIGNIFICANT CHANGE UP (ref 3.8–10.5)

## 2017-01-12 PROCEDURE — 78815 PET IMAGE W/CT SKULL-THIGH: CPT | Mod: 26,PI

## 2017-01-12 PROCEDURE — 76705 ECHO EXAM OF ABDOMEN: CPT | Mod: 26

## 2017-01-12 PROCEDURE — 99232 SBSQ HOSP IP/OBS MODERATE 35: CPT

## 2017-01-12 RX ORDER — POTASSIUM CHLORIDE 20 MEQ
40 PACKET (EA) ORAL ONCE
Qty: 0 | Refills: 0 | Status: DISCONTINUED | OUTPATIENT
Start: 2017-01-12 | End: 2017-01-12

## 2017-01-12 RX ORDER — MAGNESIUM SULFATE 500 MG/ML
2 VIAL (ML) INJECTION ONCE
Qty: 0 | Refills: 0 | Status: COMPLETED | OUTPATIENT
Start: 2017-01-12 | End: 2017-01-12

## 2017-01-12 RX ORDER — POLYETHYLENE GLYCOL 3350 17 G/17G
17 POWDER, FOR SOLUTION ORAL THREE TIMES A DAY
Qty: 0 | Refills: 0 | Status: DISCONTINUED | OUTPATIENT
Start: 2017-01-12 | End: 2017-01-20

## 2017-01-12 RX ORDER — POTASSIUM CHLORIDE 20 MEQ
40 PACKET (EA) ORAL ONCE
Qty: 0 | Refills: 0 | Status: COMPLETED | OUTPATIENT
Start: 2017-01-12 | End: 2017-01-12

## 2017-01-12 RX ADMIN — Medication 100 MILLIGRAM(S): at 21:49

## 2017-01-12 RX ADMIN — Medication 81 MILLIGRAM(S): at 18:10

## 2017-01-12 RX ADMIN — Medication 50 GRAM(S): at 11:54

## 2017-01-12 RX ADMIN — APIXABAN 2.5 MILLIGRAM(S): 2.5 TABLET, FILM COATED ORAL at 18:10

## 2017-01-12 RX ADMIN — PANTOPRAZOLE SODIUM 40 MILLIGRAM(S): 20 TABLET, DELAYED RELEASE ORAL at 06:49

## 2017-01-12 RX ADMIN — AMIODARONE HYDROCHLORIDE 200 MILLIGRAM(S): 400 TABLET ORAL at 06:49

## 2017-01-12 RX ADMIN — Medication 2: at 21:49

## 2017-01-12 RX ADMIN — Medication 40 MILLIEQUIVALENT(S): at 18:09

## 2017-01-12 RX ADMIN — SENNA PLUS 2 TABLET(S): 8.6 TABLET ORAL at 21:49

## 2017-01-12 RX ADMIN — Medication 300 MILLIGRAM(S): at 18:09

## 2017-01-12 RX ADMIN — POLYETHYLENE GLYCOL 3350 17 GRAM(S): 17 POWDER, FOR SOLUTION ORAL at 22:54

## 2017-01-12 RX ADMIN — Medication 100 MILLIGRAM(S): at 18:10

## 2017-01-12 RX ADMIN — Medication 100 MILLIGRAM(S): at 06:49

## 2017-01-12 RX ADMIN — APIXABAN 2.5 MILLIGRAM(S): 2.5 TABLET, FILM COATED ORAL at 06:49

## 2017-01-12 RX ADMIN — Medication 50 MILLIGRAM(S): at 06:49

## 2017-01-12 NOTE — PROGRESS NOTE ADULT - PROBLEM SELECTOR PLAN 5
ENT consulted (dr. Westfall)  - dysphagia possibly 2/2 to prior R neck lymph node biopsy   - CT neck w/o Contrast 1/11: interval increase in right neck fullness since the prior study of 11/9/2016. A discrete abscess cannot be discriminated, however assessment is limited in the absence of intravenous contrast.  - Speech/Swallow assessment, continue mechanical soft diet, thin liquids, aspiration precuations  - f/u further Recs.

## 2017-01-12 NOTE — PROGRESS NOTE ADULT - ASSESSMENT
Follicular Lymphoma--await PET, chemo?  Ca breast f/u eval  fever-- no s/s of infection.  supportive rx  CV stable

## 2017-01-12 NOTE — PROGRESS NOTE ADULT - PROBLEM SELECTOR PLAN 1
Resolved. Dr. Mckinney following - Patient w/ exacerbation after fluids given in ED.  - Echo 1/11: EF 65%, normal wall motion  - CXR 1/11: mild venous congestion (improved from CXR 1/10/17)    - Patient volume status improved s/p IV lasix.  Lasix discontinued as per Dr. Hartmann.  Continue as needed.

## 2017-01-12 NOTE — PROGRESS NOTE ADULT - ASSESSMENT
86 y/o F w/ IV Contrast Allergy w/ PMHx of HTN, DM, HLD, Monoclonal Gammopathy, Chronic Diastolic CHF, CAD s/p CABG, severe MR/TR, A-fib s/p PPM on Eliquis, Right-sided breast CA (s/p mastectomy, chemo therapy, radiation), h/o hypercalcemia, who presented w/ hypercalcemia, general weakness noted who subsequently was noted to go into acute diastolic CHF, now resolved and stable for transfer to medicine for further work up of lymphoma and chemothearpy.

## 2017-01-12 NOTE — PROGRESS NOTE ADULT - PROBLEM SELECTOR PLAN 9
Likely poor po intake and hypercalcemia. Patient w/ c/o abdominal distention and chronic constipation  - Abdominal X-ray showed constipation and no obstruction   - continue Colace and Senna. s/p Tap water enema x 1 not effective  -start miralax TID

## 2017-01-12 NOTE — PROGRESS NOTE ADULT - PROBLEM SELECTOR PLAN 2
Dr. Hartmann following  - Calcium improved since admission, today 11 corrected 12.12.  (12.4 on 1/10)  - Pt s/p IV fluids,1 dose of calcitonin  IU and Zoledronic 4mg IV on 1/10  - No further intervention as per Dr. Hartmann, continue to follow, Hypercalcemia secondary to lymphoma.

## 2017-01-12 NOTE — H&P ADULT. - NS MD HP PULSE RADIAL
Bowel sounds present and normal/No masses or organomegaly/No distension/Abdomen soft/No tenderness
right normal/left normal

## 2017-01-12 NOTE — PROGRESS NOTE ADULT - SUBJECTIVE AND OBJECTIVE BOX
Patient seen and examined at bedside. feeling better -  still weak but no more SOB    insulin lispro (HumaLOG) corrective regimen sliding scale  Before meals and at bedtime  amiodarone    Tablet 200milliGRAM(s) daily  apixaban 2.5milliGRAM(s) two times a day  metoprolol succinate ER 50milliGRAM(s) daily  pantoprazole    Tablet 40milliGRAM(s) before breakfast  aspirin enteric coated 81milliGRAM(s) daily  allopurinol 300milliGRAM(s) daily  docusate sodium 100milliGRAM(s) three times a day  senna 2Tablet(s) at bedtime  potassium chloride   Powder 40milliEquivalent(s) once      Allergies    IV CONtRAST (Flushing (Skin); Rash)  No Known Drug Allergies    Intolerances        T(C): , Max: 37.2 ( @ 21:50)  T(F): , Max: 99 ( @ 21:50)  HR: 82  BP: 121/59  BP(mean): --  RR: 16  SpO2: 97%  Wt(kg): --  I & Os for 24h ending  @ 07:00  =============================================  IN:    Oral Fluid: 800 ml    sodium chloride 0.45%: 50 ml    Total IN: 850 ml  ---------------------------------------------  OUT:    Voided: 2000 ml    Total OUT: 2000 ml  ---------------------------------------------  Total NET: -1150 ml    I & Os for current day (as of  @ 15:48)  =============================================  IN:    Oral Fluid: 360 ml    IV PiggyBack: 50 ml    Total IN: 410 ml  ---------------------------------------------  OUT:    Total OUT: 0 ml  ---------------------------------------------  Total NET: 410 ml          PHYSICAL EXAM:  Constitutional:  No acute distress, lying flat   ENMT: Moist mucous membrane.  No cyanosis.  Neck: adenopathy.  Respiratory: Clear to auscultation anteriorly  Cardiovascular: S1, S2.  Regular rate and rhythm.  3/6 SM  Gastrointestinal: soft, non-tender, non-distended  Extremities: Warm.  No lower extremity edema.    Neurological: No focal deficits.  Skin: Warm. Dry.    Lymph Nodes: lg cervical lymph nodes.    Psychiatric: Normal affect.    ACCESS:       LABS:                        10.5   3.9   )-----------( 170      ( 2017 08:10 )             32.1     2017 08:10    133    |  96     |  28     ----------------------------<  83     3.6     |  29     |  1.20     Ca    11.0       2017 08:10  Mg     1.5       2017 08:10    TPro  6.1    /  Alb  2.6    /  TBili  0.6    /  DBili  x      /  AST  113    /  ALT  65     /  AlkPhos  96     2017 08:10        Urinalysis Basic - ( 2017 12:21 )    Color: Yellow / Appearance: Clear / S.010 / pH: x  Gluc: x / Ketone: NEGATIVE  / Bili: NEGATIVE / Urobili: 0.2 E.U./dL   Blood: x / Protein: NEGATIVE mg/dL / Nitrite: NEGATIVE   Leuk Esterase: Trace / RBC: < 5 /HPF / WBC < 5 /HPF   Sq Epi: x / Non Sq Epi: x / Bacteria: Present /HPF            RADIOLOGY & ADDITIONAL STUDIES:

## 2017-01-12 NOTE — PROGRESS NOTE ADULT - ASSESSMENT
84 y/o F w/ IV Contrast Allergy w/ PMHx of HTN, DM, HLD, Monoclonal Gammopathy, Chronic Diastolic CHF, CAD s/p CABG, severe MR/TR, A-fib s/p PPM on Eliquis, Right-sided breast CA (s/p mastectomy, chemo therapy, radiation), h/o hypercalcemia, who presented w/ hypercalcemia, general weakness noted who subsequently was noted to go into acute diastolic CHF, now resolved and stable for transfer to medicine for further work up of lymphoma and chemothearpy.

## 2017-01-12 NOTE — SWALLOW BEDSIDE ASSESSMENT ADULT - SLP GENERAL OBSERVATIONS
Pt received sleeping but easily roused to verbal stim.  (+) NC for O2 supplementation, in NAD at baseline. Pt weak-appearing during my visit but followed commands and engaged in discourse well.

## 2017-01-12 NOTE — SWALLOW BEDSIDE ASSESSMENT ADULT - SPECIFY REASON(S)
Pt admitted with hypercalcemia.  Pt's PMH also significant for diffuse large B-cell lymphoma.  Ct neck showed increased neck lymphadenopathy.

## 2017-01-12 NOTE — PROGRESS NOTE ADULT - PROBLEM SELECTOR PLAN 3
Dr. Galaviz following  - Hx Breast CA w/ diffuse large cell Lymphoma noted on prelim results of recent R neck biopsy.  - s/p PET-CT today to further assess stage  - Serology for CA work-up ordered by Dr. Galaviz – follow-up.  - Continue Allopurinol 300mg QD to prevent tumor lysis syndrome  - possibly start chemo tomorrow

## 2017-01-12 NOTE — PROGRESS NOTE ADULT - SUBJECTIVE AND OBJECTIVE BOX
Patient is a 85y old  Female who presents with a chief complaint of hypercalcemia (10 Ronny 2017 13:03)      HPI:  86 y/o F w/ pmhx of MGUS, HFpEF, CAD s/p CABG, severe MR/TR, a-fib s/p PPM on Eliquis, DM2, right-sided breast CA (s/p mastectomy/chemotx/radiation), recently admitted 16 to 17 for hypercalcemia treated with IV fluids/Lasix/bisphosphonates, presents again today for hypercalcemia on outpatient labs. She had an outpatient Ca level of 13 at Dr. Hartmann’s office yesterday. Since discharge last week, she reports generalized weakness and difficulty walking due to lack of balance which worsened gradually over the past week. She also experienced a fall (says she lost her balance) on outstretched right arm 4 days ago, with no LOC/head impact. Since then she complains of right shoulder pain when moving her arm but denies pain at rest. Also endorses chronic constipation and lack of appetite, but denies abdominal/kidney pain, nausea, vomiting, fever/chills, chest pain/sob, recent change in meds, dysuria, headache, or vision change. On last admission, she had excisional biopsy of lymph nodes (with ENT) with prelim reports concerning for lymphoma but final diagnosis pending.  Initial vitals in ED: 98.1F, HR 98, 133/52, RR 16, 98% O2 on RA.            OVERNIGHT EVENT/COMMENTS:     CV stable           PAST MEDICAL & SURGICAL HISTORY:  Scoliosis  Follicular lymphoma  Neck mass  Afib  Other hyperlipidemia  Breast cancer  CAD (coronary artery disease)  Diabetes  HTN (hypertension)  No pertinent past medical history  H/O abdominal hysterectomy  H/O thyroidectomy  S/P CABG x 3      MEDICATIONS  (STANDING):  insulin lispro (HumaLOG) corrective regimen sliding scale  SubCutaneous Before meals and at bedtime  amiodarone    Tablet 200milliGRAM(s) Oral daily  apixaban 2.5milliGRAM(s) Oral two times a day  metoprolol succinate ER 50milliGRAM(s) Oral daily  pantoprazole    Tablet 40milliGRAM(s) Oral before breakfast  aspirin enteric coated 81milliGRAM(s) Oral daily  allopurinol 300milliGRAM(s) Oral daily  docusate sodium 100milliGRAM(s) Oral three times a day  senna 2Tablet(s) Oral at bedtime    MEDICATIONS  (PRN):    IV CONtRAST (Flushing (Skin); Rash)  No Known Drug Allergies        Vital Signs Last 24 Hrs  T(C): 37, Max: 37.2 ( @ 21:50)  T(F): 98.6, Max: 99 ( @ 21:50)  HR: 82 (75 - 82)  BP: 121/59 (103/53 - 121/59)  BP(mean): --  RR: 16 (16 - 17)  SpO2: 97% (95% - 97%)            DIZZINESS: DIZZINESS  H/o or current diagnosis of HF- Contraindication to ACEI/ARBs  H/o or current diagnosis of HF- ACEI/ARB contraindication unknown  H/o or current diagnosis of HF- Contraindication to ACEI/ARBs  H/o or current diagnosis of HF- ACEI/ARB contraindication unknown  H/o or current diagnosis of HF- Contraindication to ACEI/ARBs  H/o or current diagnosis of HF- ACEI/ARB contraindication unknown  H/o or current diagnosis of HF- Contraindication to ACEI/ARBs  H/o or current diagnosis of HF- ACEI/ARB contraindication unknown  No pertinent family history in first degree relatives  Handoff  MEWS Score: 4 (2017 09:08)  Scoliosis  Follicular lymphoma  Neck mass  Afib  Other hyperlipidemia  Breast cancer  CAD (coronary artery disease)  Diabetes  HTN (hypertension)  No pertinent past medical history  Afib  Hypercalcemia  Abdominal distension: Abdominal distension  Diabetes: Diabetes  Dysphagia: Dysphagia  Acute on chronic systolic (congestive) heart failure: Acute on chronic systolic (congestive) heart failure  Fever: Fever  Heart failure: Heart failure  Lymphoma: Lymphoma  Need for prophylactic measure: Need for prophylactic measure  HTN (hypertension): HTN (hypertension)  Type 2 diabetes mellitus: Type 2 diabetes mellitus  CAD (coronary artery disease): CAD (coronary artery disease)  Monoclonal gammopathy of undetermined significance: Monoclonal gammopathy of undetermined significance  Heart failure with preserved ejection fraction: Heart failure with preserved ejection fraction  Paroxysmal atrial fibrillation: Paroxysmal atrial fibrillation  Arm pain, right: Arm pain, right  Hypercalcemia: Hypercalcemia  H/O abdominal hysterectomy  H/O thyroidectomy  S/P CABG x 3  DIZZINESS: DIZZINESS  5  Lymphoma                                        10.5   3.9   )-----------( 170      ( 2017 08:10 )             32.1       2017 08:10    133    |  96     |  28     ----------------------------<  83     3.6     |  29     |  1.20     Ca    11.0       2017 08:10  Mg     1.5       2017 08:10    TPro  6.1    /  Alb  2.6    /  TBili  0.6    /  DBili  x      /  AST  113    /  ALT  65     /  AlkPhos  96     2017 08:10      Urinalysis Basic - ( 2017 12:21 )    Color: Yellow / Appearance: Clear / S.010 / pH: x  Gluc: x / Ketone: NEGATIVE  / Bili: NEGATIVE / Urobili: 0.2 E.U./dL   Blood: x / Protein: NEGATIVE mg/dL / Nitrite: NEGATIVE   Leuk Esterase: Trace / RBC: < 5 /HPF / WBC < 5 /HPF   Sq Epi: x / Non Sq Epi: x / Bacteria: Present /HPF            MD  consult reviewed           ROS                           review as per  H/P                      +    NO CHANGE                  CHANGE  GENERAL                                awake         +  alert        confused           lethargic                     Febrile  CARDIOVASC           negative       chest pain    palpitation      +   SOB           TOSCANO  PULMONARY          negative                         cough            congestion                   wheezing     ENT                          negative +               hoarseness         sore throat      GastrointestinaL    negative          nausea         vomitting         diarrhea            pain                                 negative             continent         incontinent       dysuria         frequency          retention             alexander  MS                          negative               denies     +    weakness      PSYCH                    negative                 +  awake              agitation               dementia           Neuro                    negative            + awake              non focal    SKIN                       negative              dry              rash           decubitus     ENDO                                      DM                     THYROID                          OTHER    PHYSICAL  EXAMINATION ::     HEENT             negative  NECK               Supple                 JVD  COR                 s1s2            reg                 irreg              +    murmur  LUNGS            clear               rales            +   rhonchi                    decreased BS   ABDOMEN     soft BS  present             + benign              distended  EXTR               FROM                +  DECREASED rom        edema  NEURO           grossly intact            +   non focal                  unable to exam           Psych              awake                confused   SKIN                see RN note             intact            rash        decubitus  /RECTAL   deferred        ADDL  STUDIES::::           INDICATION: 85-year-old female with right hilar mass and history of   breast cancer. Recurrent CHF exacerbation.    TECHNIQUE: Thin axial reconstructions and multiplanar reformations were   reviewed.    PRIOR STUDIES: None    FINDINGS:    LUNGS: There is a small right and trace left pleural effusion. There is   bilateraldependent and compressive atelectasis as well as atelectasis in   the lingula and right middle lobe. Prominence of the pulmonary   vasculature and smooth interlobular septal thickening with pleural   effusions is consistent with the patient's known congestive heart failure   exacerbation. Evaluation of fine detail of the lung parenchyma is limited   by respiratory motion and metallic artifact. The right upper lobe there   is a 0.6 cm pulmonary nodule (image 125, series 11). There is a branching   opacity in the right upper lobe measuring 1.3 cm which may represent   mucous plugging, scarring or atelectasis (image 125, series 11). There is   mild bronchiectasis seen in the right upper lobe and left lower lobe.   Mosaic perfusion is also seen and with these other findings is likely the   sequela of small airways disease. In the left upper lobe there is a   ground glass and solid opacity measuring 0.4 cm in total with the solid   component measuring 0.2cm (image 79, series 11).  There is a ground glass   opacity in the right middle lobe measuring 0.8cm (image 140, series 11).   There are several peripheral pulmonary nodules in the left hemithorax the   largest measures 0.5 cm (image 113, series 11).    MEDIASTINUM: The heart is enlarged.The patient is status post coronary   artery bypass grafting and left-sided dual-chamber cardiac pacemaker.   Metallic artifact limits evaluation. There is calcification of the mitral   annulus.  No pericardial effusion is seen.  Evaluation of the vasculature   is limited without intravenous contrast, but the great vessels are   grossly normal in size. There is moderate atherosclerotic disease of the   thoracic aorta.  Evaluation for adenopathy is limited without intravenous   contrast. Mediastinal lymphadenopathy is present measuring up to 1.4cm in   short axis (image 26, series 10). The hilum are prominent due to   pulmonary artery size increase, however without contrast it is difficult   to evaluate. There is increased prominence of the bilateral axillary   lymph nodes measuring up to 0.9cm on the left. The left lobe of the   thyroid is not well seen.    UPPER ABDOMEN, SOFT TISSUES AND BONES: Limited evaluation of the upper   abdomen demonstrates in the left kidney there is a 0.7 cm hyperdense   lesion. There is partial malrotation of the left kidney. There is mild   elevation of the right hemidiaphragm. Moderate atherosclerotic disease is   present. Evaluation of the osseous structures demonstrates severe   degenerative changes ofthe spine. The patient is status post median   sternotomy. Coarse calcifications are seen in the breasts bilaterally,   right greater than left.    IMPRESSION:  1.  Prominence of the bilateral hilum may be secondary to enlarged   pulmonary arteries, however without contrast it is difficult to evaluate   for lymphadenopathy. Follow-up imaging to include a contrast-enhanced   thoracic CT may be of value.  2.  Right middle lobe consolidation may be secondary to atelectasis   however follow up to resolution is recommended.   3.  Bilateral solid pulmonary nodules measuring up to 0.6 cm. In a   patient with a known history of breast cancer periodic short interval   surveillance is recommended to confirm stability.   4.  Bilateral ground glass nodules measuring up to 0.8 cm. In a patient   with a known history of breast cancer periodic short interval   surveillance is recommended.  5.  Branching opacity measuring 1.3 cm most likely represent mucous   plugging, secondary to mild to moderate smallairways disease and   bronchiectasis.   6.  Findings consistent with congestive heart failure including   cardiomegaly, small pleural effusions, prominence of the pulmonary   vasculature and smooth interlobular septal thickening.  7.  0.7 cm hyperdense lesion in the left kidney may represent a   hyperdense cyst, however further evaluation pulmonary with an ultrasound   and/or a renal MRI.

## 2017-01-12 NOTE — PROGRESS NOTE ADULT - PROBLEM SELECTOR PLAN 7
Cont. ASA, BB. Hold Statin due to transaminitis. Hepatitis panel negative.  RUQ US ordered (, ALT 66 on last CMP) CP free, Cont. ASA/BB. Continue holding Statin due to transaminitis until improved. Hepatitis panel negative.

## 2017-01-12 NOTE — PROGRESS NOTE ADULT - PROBLEM SELECTOR PLAN 3
Dr. Galaviz following  - Hx Breast CA w/ diffuse large cell Lymphoma noted on prelim results of recent R neck biopsy.  - NPO for PET-CT today to further assess stage  - Serology for CA work-up ordered by Dr. Galaviz – follow-up.  - Continue Allopurinol 300mg QD to prevent tumor lysis syndrome  - Transfer to 7wo for further work up and to possibly start Chemo tomorrow as discussed w/ Dr. Galaviz.

## 2017-01-12 NOTE — SWALLOW BEDSIDE ASSESSMENT ADULT - SWALLOW EVAL: RECOMMENDED FEEDING/EATING TECHNIQUES
small sips/bites/maintain upright posture during/after eating for 30 mins/position upright (90 degrees)

## 2017-01-12 NOTE — PROGRESS NOTE ADULT - PROBLEM SELECTOR PLAN 7
CP free, Cont. ASA/BB. Continue holding Statin due to transaminitis until improved. Hepatitis panel negative.

## 2017-01-12 NOTE — PROGRESS NOTE ADULT - PROBLEM SELECTOR PLAN 4
Febrile 100.6 on 1/11, Tmax 99 over 24hrs.  ID consulted for sepsis vs. Tumor fever  - No leukocytosis, Blood CX negative, RVP negative, UA showed trace LE, trace blood and no nitrates, f/u Ucx.  Likely tumor fever  - ID signed off.

## 2017-01-12 NOTE — PROGRESS NOTE ADULT - SUBJECTIVE AND OBJECTIVE BOX
Interventional Cardiology PA TRANSFER NOTE    Subjective Assessment: Patient seen and examined at bedside, patient appears lethargic.  states her breathing has improved from yesterday.  	  MEDICATIONS:  amiodarone    Tablet 200milliGRAM(s) Oral daily  metoprolol succinate ER 50milliGRAM(s) Oral daily  pantoprazole    Tablet 40milliGRAM(s) Oral before breakfast  docusate sodium 100milliGRAM(s) Oral three times a day  senna 2Tablet(s) Oral at bedtime  insulin lispro (HumaLOG) corrective regimen sliding scale  SubCutaneous Before meals and at bedtime  allopurinol 300milliGRAM(s) Oral daily  apixaban 2.5milliGRAM(s) Oral two times a day  aspirin enteric coated 81milliGRAM(s) Oral daily  potassium chloride   Powder 40milliEquivalent(s) Oral once    [PHYSICAL EXAM:  TELEMETRY:  T(C): 37, Max: 37.2 (01-11 @ 21:50)  HR: 82 (75 - 82)  BP: 121/59 (108/53 - 121/59)  RR: 16 (16 - 17)  SpO2: 97% (95% - 97%)    I&O's Summary: in 850, out 2000, net negative 1150    Dennis: No  Central/PICC/Mid Line: No                                         Appearance: Normal, NAD	  HEENT:   Normal oral mucosa, PERRL, EOMI	  Neck: Supple, - JVD; Carotid Bruit   Cardiovascular: Normal S1 S2, No JVD, No murmurs,   Respiratory: mild Rales at b/l bases, no Rhonchi, Wheezing	  Gastrointestinal:  Soft, Non-tender, + BS	  Skin: No rashes, No ecchymoses, No cyanosis  Extremities: Normal range of motion, No clubbing, cyanosis or edema  Vascular: Peripheral pulses palpable 2+ bilaterally  Neurologic: Non-focal  Psychiatry: A & O x 3, Mood & affect appropriate    LABS:	 	  CARDIAC MARKERS:                       10.5   3.9   )-----------( 170      ( 12 Jan 2017 08:10 )             32.1     12 Jan 2017 08:10    133    |  96     |  28     ----------------------------<  83     3.6     |  29     |  1.20     Ca    11.0       12 Jan 2017 08:10  Mg     1.5       12 Jan 2017 08:10    TPro  6.1    /  Alb  2.6    /  TBili  0.6    /  DBili  x      /  AST  113    /  ALT  65     /  AlkPhos  96     12 Jan 2017 08:10      ASSESSMENT/PLAN: 	      DVT ppx: Eliquis

## 2017-01-12 NOTE — PROGRESS NOTE ADULT - PROBLEM SELECTOR PLAN 1
Dr. Mckinney following - Patient w/ exacerbation after fluids given in ED.  - Echo 1/11: EF 65%, normal wall motion  - CXR 1/11: mild venous congestion (improved from CXR 1/10/17)   - Patient volume status improved s/p IV lasix.  Lasix discontinued as per Dr. Hartmann.  Continue as needed.    - Stable for Transfer to medicine service.

## 2017-01-12 NOTE — PROGRESS NOTE ADULT - SUBJECTIVE AND OBJECTIVE BOX
Pt continues to be afebrile. Her WBC has improved and she is not neutropenic. Her blood cultures have been negative to date. There continues to be no role in antibiotic therapy. It is likely that her one febrile episode was related to her Lymphoma.  ID will be signing off  Please contact us if blood cultures come back positive and if/or patient deteriorates.

## 2017-01-12 NOTE — PROGRESS NOTE ADULT - PROBLEM SELECTOR PLAN 9
Softly distended improved after pt voided. Bladder scan post void this evening showed only 27cc in bladder  Chronic Constipation: Cont. +BS, non-tender. Minimal BM overnight. + Flatus.   Abdominal X-ray showed constipation and no obstruction as discussed w/ radiology.   Colace and Senna. Tap water enema x 1.    Full Code. Dr. Mckinney may consider palliative care pending PET scan results.     Case discussed with Dr. Mckinney.     Dispo: PET scan tomorrow, f/u Heme, ENT, and Renal recs, closely monitor Calcium. Monitor for further fevers Patient w/ c/o abdominal distention and chronic constipation  - Abdominal X-ray showed constipation and no obstruction   - continue Colace and Senna. s/p Tap water enema x 1.

## 2017-01-12 NOTE — SWALLOW BEDSIDE ASSESSMENT ADULT - SLP PERTINENT HISTORY OF CURRENT PROBLEM
Pt reported long-standing dysphagia characterized by sensation of solids ( e.g., meat) and large pills getting stuck in the esophagus at level of sternum.  Denied regurgitation/backflow of swallowed material.  No coughing/choking sensation with other solids and liquids.

## 2017-01-12 NOTE — PROGRESS NOTE ADULT - PROBLEM SELECTOR PLAN 2
Pt with symptoms of constipation. Likely 2/2 malignancy. Corrected Ca on admission was 13, s/p calcitonin and zoledronic acid x1.   -no IVF given recent episode of CHF exacerbation   -consider zoledronic acid and calcitonin  -will get chemo for lymphoma on 7WO Pt with symptoms of constipation. 2/2 malignancy, consistent with lymphoma from work up in past admission. Corrected Ca on admission was 13, s/p calcitonin and zoledronic acid x1.   -no IVF given recent episode of CHF exacerbation   -consider zoledronic acid and calcitonin  -will get chemo for lymphoma on 7WO

## 2017-01-12 NOTE — PROGRESS NOTE ADULT - PROBLEM SELECTOR PLAN 5
ENT consulted (dr. Westfall)  - dysphagia possibly 2/2 to prior R neck lymph node biopsy   - CT neck w/o Contrast 1/11: interval increase in right neck fullness since the prior study of 11/9/2016. A discrete abscess cannot be discriminated, however assessment is limited in the absence of intravenous contrast.  - Speech/Swallow assessment    Speech and swallow consulted and pt on aspiration precautions with puree diet and thin liquids. ENT consulted (dr. Westfall)  - dysphagia possibly 2/2 to prior R neck lymph node biopsy   - CT neck w/o Contrast 1/11: interval increase in right neck fullness since the prior study of 11/9/2016. A discrete abscess cannot be discriminated, however assessment is limited in the absence of intravenous contrast.  - Speech/Swallow assessment, continue mechanical soft diet, thin liquids, aspiration precuations  - f/u further Recs.

## 2017-01-12 NOTE — SWALLOW BEDSIDE ASSESSMENT ADULT - NS SPL SWALLOW CLINIC TRIAL FT
Patient presents with functional oral and pharyngeal stages of swallowing with no over signs & symptoms of airway protection deficits across consistencies tested.  Pt's dysphagia complaints appear to be attributed to esophageal disorder ( dysmotility vs. stricture) and may best be evaluated by GI if warranted.  Otherwise, patient safe to be on a mechanical soft diet with thin liquids with general aspiration precautions.

## 2017-01-12 NOTE — PROGRESS NOTE ADULT - SUBJECTIVE AND OBJECTIVE BOX
INTERVAL HPI/OVERNIGHT EVENTS:    VITAL SIGNS:  T(F): 98.6  HR: 82  BP: 121/59  RR: 16  SpO2: 97%  Wt(kg): --    PHYSICAL EXAM:    Constitutional:  Eyes:  ENMT:  Neck:  Respiratory:  Cardiovascular:  Gastrointestinal:  Extremities:  Vascular:  Neurological:  Musculoskeletal:    MEDICATIONS  (STANDING):  insulin lispro (HumaLOG) corrective regimen sliding scale  SubCutaneous Before meals and at bedtime  amiodarone    Tablet 200milliGRAM(s) Oral daily  apixaban 2.5milliGRAM(s) Oral two times a day  metoprolol succinate ER 50milliGRAM(s) Oral daily  pantoprazole    Tablet 40milliGRAM(s) Oral before breakfast  aspirin enteric coated 81milliGRAM(s) Oral daily  allopurinol 300milliGRAM(s) Oral daily  docusate sodium 100milliGRAM(s) Oral three times a day  senna 2Tablet(s) Oral at bedtime  potassium chloride   Powder 40milliEquivalent(s) Oral once    MEDICATIONS  (PRN):      Allergies    IV CONtRAST (Flushing (Skin); Rash)  No Known Drug Allergies    Intolerances        LABS:                        10.5   3.9   )-----------( 170      ( 2017 08:10 )             32.1     2017 08:10    133    |  96     |  28     ----------------------------<  83     3.6     |  29     |  1.20     Ca    11.0       2017 08:10  Mg     1.5       2017 08:10    TPro  6.1    /  Alb  2.6    /  TBili  0.6    /  DBili  x      /  AST  113    /  ALT  65     /  AlkPhos  96     2017 08:10      Urinalysis Basic - ( 2017 12:21 )    Color: Yellow / Appearance: Clear / S.010 / pH: x  Gluc: x / Ketone: NEGATIVE  / Bili: NEGATIVE / Urobili: 0.2 E.U./dL   Blood: x / Protein: NEGATIVE mg/dL / Nitrite: NEGATIVE   Leuk Esterase: Trace / RBC: < 5 /HPF / WBC < 5 /HPF   Sq Epi: x / Non Sq Epi: x / Bacteria: Present /HPF        RADIOLOGY & ADDITIONAL TESTS: PGY1 Transfer Accept Note:     84 y/o F PMH of MGUS, HFpEF, CAD s/p CABG, severe MR/TR, a-fib s/p PPM on Eliquis, DM2, right-sided breast CA (s/p mastectomy/chemotx/radiation), recently admitted 16 to 17 for hypercalcemia treated with IV fluids/Lasix/bisphosphonates and diagnosed with diffuse large B cell lymphoma, presented to ED for hypercalcemia found on outpatient labs at Dr. Hartmann's office. Pt has symptoms of generalized weakness and difficulty walking x1 week as well as constipation. On presentation to ED: VS 98.1F, HR 98, 133/52, RR 16, 98% O2 on RA. Labs significant for leukopenia of 3.1, Ca of 13.1. Given zoledronic acid and calcitonin x 1 as well as 250cc bolus followed by NS at 80cc/hr. Pt became SOB, CXR with increased congestion, given Lasix 60mg IV and 20mg IV for acute on chronic CHF exacerbation and admitted to [facility]. Seen by Dr. Hartmann, Dr. Galaviz, and Dr. Mckinney. Acute on chronic CHF exacerbation resolved after another 40 of lasix. Started on allopurinol to prevent tumor lysis. Ready for transfer to [facility] to initiate chemotherapy.      INTERVAL HPI/OVERNIGHT EVENTS: Patient seen at bedside. Complains of constipation, no BM for 2 days as well as low appetite. Denies SOB, CP, fever, chills, cough, dysuria.     VITAL SIGNS:  T(F): 98.6  HR: 82  BP: 121/59  RR: 16  SpO2: 97%  Wt(kg): --    PHYSICAL EXAM:    Constitutional: NAD, resting comfortably   Eyes: PERRLA, EOMI   ENMT: dry MM   Neck: supple, no JVD   Respiratory: crackles to mid and lower lung bases.   Cardiovascular: RRR, +S1/S2, no murmurs, rubs, or gallops   Gastrointestinal: soft, NTND, normal BS x4   Extremities: WWP, no edema   Vascular: distal pulses intact   Neurological: AAOx2, strength 5/5 bilaterally   Musculoskeletal: full ROM     MEDICATIONS  (STANDING):  insulin lispro (HumaLOG) corrective regimen sliding scale  SubCutaneous Before meals and at bedtime  amiodarone    Tablet 200milliGRAM(s) Oral daily  apixaban 2.5milliGRAM(s) Oral two times a day  metoprolol succinate ER 50milliGRAM(s) Oral daily  pantoprazole    Tablet 40milliGRAM(s) Oral before breakfast  aspirin enteric coated 81milliGRAM(s) Oral daily  allopurinol 300milliGRAM(s) Oral daily  docusate sodium 100milliGRAM(s) Oral three times a day  senna 2Tablet(s) Oral at bedtime  potassium chloride   Powder 40milliEquivalent(s) Oral once    MEDICATIONS  (PRN):      Allergies    IV CONtRAST (Flushing (Skin); Rash)  No Known Drug Allergies    Intolerances        LABS:                        10.5   3.9   )-----------( 170      ( 2017 08:10 )             32.1     2017 08:10    133    |  96     |  28     ----------------------------<  83     3.6     |  29     |  1.20     Ca    11.0       2017 08:10  Mg     1.5       2017 08:10    TPro  6.1    /  Alb  2.6    /  TBili  0.6    /  DBili  x      /  AST  113    /  ALT  65     /  AlkPhos  96     2017 08:10      Urinalysis Basic - ( 2017 12:21 )    Color: Yellow / Appearance: Clear / S.010 / pH: x  Gluc: x / Ketone: NEGATIVE  / Bili: NEGATIVE / Urobili: 0.2 E.U./dL   Blood: x / Protein: NEGATIVE mg/dL / Nitrite: NEGATIVE   Leuk Esterase: Trace / RBC: < 5 /HPF / WBC < 5 /HPF   Sq Epi: x / Non Sq Epi: x / Bacteria: Present /HPF        RADIOLOGY & ADDITIONAL TESTS:

## 2017-01-12 NOTE — PROGRESS NOTE ADULT - SUBJECTIVE AND OBJECTIVE BOX
HPI:  86 y/o F w/ pmhx of MGUS, HFpEF, CAD s/p CABG, severe MR/TR, a-fib s/p PPM on Eliquis, DM2, right-sided breast CA (s/p mastectomy/chemotx/radiation), recently admitted 12/29/16 to 1/4/17 for hypercalcemia treated with IV fluids/Lasix/bisphosphonates, presents again today for hypercalcemia on outpatient labs. She had an outpatient Ca level of 13 at Dr. Hartmann’s office yesterday. Since discharge last week, she reports generalized weakness and difficulty walking due to lack of balance which worsened gradually over the past week. She also experienced a fall (says she lost her balance) on outstretched right arm 4 days ago, with no LOC/head impact. Since then she complains of right shoulder pain when moving her arm but denies pain at rest. Also endorses chronic constipation and lack of appetite, but denies abdominal/kidney pain, nausea, vomiting, fever/chills, chest pain/sob, recent change in meds, dysuria, headache, or vision change. On last admission, she had excisional biopsy of lymph nodes (with ENT) with prelim reports concerning for lymphoma but final diagnosis pending.  Initial vitals in ED: 98.1F, HR 98, 133/52, RR 16, 98% O2 on RA.   ED administration: 250cc bolus of NS followed by NS at 80cc/hr and Lasix 20mg IV x 1. (10 Ronny 2017 13:03)    FAMILY HISTORY:  No pertinent family history in first degree relatives    MEDICATIONS  (STANDING):  insulin lispro (HumaLOG) corrective regimen sliding scale  SubCutaneous Before meals and at bedtime  amiodarone    Tablet 200milliGRAM(s) Oral daily  apixaban 2.5milliGRAM(s) Oral two times a day  metoprolol succinate ER 50milliGRAM(s) Oral daily  pantoprazole    Tablet 40milliGRAM(s) Oral before breakfast  aspirin enteric coated 81milliGRAM(s) Oral daily  allopurinol 300milliGRAM(s) Oral daily  docusate sodium 100milliGRAM(s) Oral three times a day  senna 2Tablet(s) Oral at bedtime    MEDICATIONS  (PRN):    Vital Signs Last 24 Hrs  T(C): 36.1, Max: 38.1 (01-11 @ 09:46)  T(F): 96.9, Max: 100.6 (01-11 @ 09:46)  HR: 76 (75 - 78)  BP: 109/55 (103/53 - 121/57)  BP(mean): --  RR: 16 (15 - 17)  SpO2: 95% (95% - 99%)    Physical exam:    Overall impression  Lymphadenopathy  Liver  spleen    Labs:  CBC Full  -  ( 11 Jan 2017 03:30 )  WBC Count : 3.1 K/uL  Hemoglobin : 10.7 g/dL  Hematocrit : 32.8 %  Platelet Count - Automated : 182 K/uL  Mean Cell Volume : 84.3 fL  Mean Cell Hemoglobin : 27.5 pg  Mean Cell Hemoglobin Concentration : 32.6 g/dL  Auto Neutrophil # : x  Auto Lymphocyte # : x  Auto Monocyte # : x  Auto Eosinophil # : x  Auto Basophil # : x  Auto Neutrophil % : 72.1 %  Auto Lymphocyte % : 13.5 %  Auto Monocyte % : 14.1 %  Auto Eosinophil % : 0.0 %  Auto Basophil % : 0.3 %    11 Jan 2017 16:36    135    |  93     |  29     ----------------------------<  100    3.7     |  31     |  1.16     Ca    10.7       11 Jan 2017 16:36  Phos  2.9       10 Ronny 2017 10:26  Mg     1.9       11 Jan 2017 03:30    TPro  6.6    /  Alb  2.8    /  TBili  0.6    /  DBili  x      /  AST  116    /  ALT  66     /  AlkPhos  105    11 Jan 2017 16:36    Hepatitis B,C and HIV negative  Radiology:  HEALTH ISSUES - R/O PROBLEM Dx:      Assessmant / Problems  1)Diffuse large cell lymphoma, stage unknown until PET CT result available  2)Low grade fever yesterday ( today not yet available)  r/o sepsis vs tumor fever: blood c&s negative x 2, CT neck enlarging right parotid gland , cannot definitively   establish abscess due to lack of iv contrast (allergy to contrast)  3)Hypercalcemia s/p Zometa and iv fluids, Ca 10.7 yesterday  4) Negative Hepatitis B,C, HIV important prior to immunosuppressive chemotherapy    Plan:  1)will observe for fever 24 hr, if afebrile iv chemotherapy tomorrow  2)PET CT  3)repeat Ca level  4) po Allopurinol  5)continue gentle hydration    Thank you  Neha Galaviz MD HPI:  84 y/o F w/ pmhx of MGUS, HFpEF, CAD s/p CABG, severe MR/TR, a-fib s/p PPM on Eliquis, DM2, right-sided breast CA (s/p mastectomy/chemotx/radiation), recently admitted 12/29/16 to 1/4/17 for hypercalcemia treated with IV fluids/Lasix/bisphosphonates, presents again today for hypercalcemia on outpatient labs. She had an outpatient Ca level of 13 at Dr. Hartmann’s office yesterday. Since discharge last week, she reports generalized weakness and difficulty walking due to lack of balance which worsened gradually over the past week. She also experienced a fall (says she lost her balance) on outstretched right arm 4 days ago, with no LOC/head impact. Since then she complains of right shoulder pain when moving her arm but denies pain at rest. Also endorses chronic constipation and lack of appetite, but denies abdominal/kidney pain, nausea, vomiting, fever/chills, chest pain/sob, recent change in meds, dysuria, headache, or vision change. On last admission, she had excisional biopsy of lymph nodes (with ENT) with prelim reports concerning for lymphoma but final diagnosis pending.  Initial vitals in ED: 98.1F, HR 98, 133/52, RR 16, 98% O2 on RA.   ED administration: 250cc bolus of NS followed by NS at 80cc/hr and Lasix 20mg IV x 1. (10 Ronny 2017 13:03)    FAMILY HISTORY:  No pertinent family history in first degree relatives    MEDICATIONS  (STANDING):  insulin lispro (HumaLOG) corrective regimen sliding scale  SubCutaneous Before meals and at bedtime  amiodarone    Tablet 200milliGRAM(s) Oral daily  apixaban 2.5milliGRAM(s) Oral two times a day  metoprolol succinate ER 50milliGRAM(s) Oral daily  pantoprazole    Tablet 40milliGRAM(s) Oral before breakfast  aspirin enteric coated 81milliGRAM(s) Oral daily  allopurinol 300milliGRAM(s) Oral daily  docusate sodium 100milliGRAM(s) Oral three times a day  senna 2Tablet(s) Oral at bedtime    MEDICATIONS  (PRN):    Vital Signs Last 24 Hrs  T(C): 36.1, Max: 38.1 (01-11 @ 09:46)  T(F): 96.9, Max: 100.6 (01-11 @ 09:46)  HR: 76 (75 - 78)  BP: 109/55 (103/53 - 121/57)  BP(mean): --  RR: 16 (15 - 17)  SpO2: 95% (95% - 99%)    Physical exam:    Overall impression  Lymphadenopathybilateral cervical  Livernl size  spleen nonpalpable    Labs:  CBC Full  -  ( 11 Jan 2017 03:30 )  WBC Count : 3.1 K/uL  Hemoglobin : 10.7 g/dL  Hematocrit : 32.8 %  Platelet Count - Automated : 182 K/uL  Mean Cell Volume : 84.3 fL  Mean Cell Hemoglobin : 27.5 pg  Mean Cell Hemoglobin Concentration : 32.6 g/dL  Auto Neutrophil # : x  Auto Lymphocyte # : x  Auto Monocyte # : x  Auto Eosinophil # : x  Auto Basophil # : x  Auto Neutrophil % : 72.1 %  Auto Lymphocyte % : 13.5 %  Auto Monocyte % : 14.1 %  Auto Eosinophil % : 0.0 %  Auto Basophil % : 0.3 %    11 Jan 2017 16:36    135    |  93     |  29     ----------------------------<  100    3.7     |  31     |  1.16     Ca    10.7       11 Jan 2017 16:36  Phos  2.9       10 Ronny 2017 10:26  Mg     1.9       11 Jan 2017 03:30    TPro  6.6    /  Alb  2.8    /  TBili  0.6    /  DBili  x      /  AST  116    /  ALT  66     /  AlkPhos  105    11 Jan 2017 16:36    Hepatitis B,C and HIV negative  Radiology:  HEALTH ISSUES - R/O PROBLEM Dx:      Assessmant / Problems  1)Diffuse large cell lymphoma, stage unknown until PET CT result available  2)Low grade fever yesterday ( today not yet available)  r/o sepsis vs tumor fever: blood c&s negative x 2, CT neck enlarging right parotid gland , cannot definitively   establish abscess due to lack of iv contrast (allergy to contrast)  3)Hypercalcemia s/p Zometa and iv fluids, Ca 10.7 yesterday  4) Negative Hepatitis B,C, HIV important prior to immunosuppressive chemotherapy  5)Breast ca rising tumor markers( CEA 4.7 was 3.7 in Nov 2016) ,CA 27-29 pending ( was rising as of Jan 2017)    Plan:  1)will observe for fever 24 hr, if afebrile iv chemotherapy tomorrow  2)PET CT  3)repeat Ca level  4) po Allopurinol  5)continue gentle hydration    Thank you  Neha Galaviz MD

## 2017-01-12 NOTE — PROGRESS NOTE ADULT - PROBLEM SELECTOR PLAN 1
due to lymphoma overproduction of 1,25 vit D-- s/o calcitonin, zometa, IVIF and lasix with some improvement but rising again-- to get rx of lymphoma ASAP-- or at least steroids hopefully if chemo is delayed

## 2017-01-13 LAB
ALBUMIN SERPL ELPH-MCNC: 2.6 G/DL — LOW (ref 3.4–5)
ANION GAP SERPL CALC-SCNC: 8 MMOL/L — LOW (ref 9–16)
APTT BLD: 26.3 SEC — LOW (ref 27.5–37.4)
BLD GP AB SCN SERPL QL: NEGATIVE — SIGNIFICANT CHANGE UP
BUN SERPL-MCNC: 31 MG/DL — HIGH (ref 7–23)
CALCIUM SERPL-MCNC: 11.2 MG/DL — HIGH (ref 8.5–10.5)
CHLORIDE SERPL-SCNC: 98 MMOL/L — SIGNIFICANT CHANGE UP (ref 96–108)
CO2 SERPL-SCNC: 29 MMOL/L — SIGNIFICANT CHANGE UP (ref 22–31)
CREAT SERPL-MCNC: 1.2 MG/DL — SIGNIFICANT CHANGE UP (ref 0.5–1.3)
GLUCOSE SERPL-MCNC: 87 MG/DL — SIGNIFICANT CHANGE UP (ref 70–99)
HCT VFR BLD CALC: 34 % — LOW (ref 34.5–45)
HGB BLD-MCNC: 11.1 G/DL — LOW (ref 11.5–15.5)
INR BLD: 1.14 — SIGNIFICANT CHANGE UP (ref 0.88–1.16)
MAGNESIUM SERPL-MCNC: 1.9 MG/DL — SIGNIFICANT CHANGE UP (ref 1.6–2.4)
MCHC RBC-ENTMCNC: 27.5 PG — SIGNIFICANT CHANGE UP (ref 27–34)
MCHC RBC-ENTMCNC: 32.6 G/DL — SIGNIFICANT CHANGE UP (ref 32–36)
MCV RBC AUTO: 84.2 FL — SIGNIFICANT CHANGE UP (ref 80–100)
PLATELET # BLD AUTO: 192 K/UL — SIGNIFICANT CHANGE UP (ref 150–400)
POTASSIUM SERPL-MCNC: 4 MMOL/L — SIGNIFICANT CHANGE UP (ref 3.5–5.3)
POTASSIUM SERPL-SCNC: 4 MMOL/L — SIGNIFICANT CHANGE UP (ref 3.5–5.3)
PROTHROM AB SERPL-ACNC: 12.7 SEC — SIGNIFICANT CHANGE UP (ref 10–13.1)
RBC # BLD: 4.04 M/UL — SIGNIFICANT CHANGE UP (ref 3.8–5.2)
RBC # FLD: 16.1 % — SIGNIFICANT CHANGE UP (ref 10.3–16.9)
RH IG SCN BLD-IMP: POSITIVE — SIGNIFICANT CHANGE UP
SODIUM SERPL-SCNC: 135 MMOL/L — SIGNIFICANT CHANGE UP (ref 135–145)
WBC # BLD: 3.2 K/UL — LOW (ref 3.8–10.5)
WBC # FLD AUTO: 3.2 K/UL — LOW (ref 3.8–10.5)

## 2017-01-13 PROCEDURE — 99232 SBSQ HOSP IP/OBS MODERATE 35: CPT

## 2017-01-13 PROCEDURE — 99232 SBSQ HOSP IP/OBS MODERATE 35: CPT | Mod: GC

## 2017-01-13 RX ORDER — FUROSEMIDE 40 MG
40 TABLET ORAL ONCE
Qty: 0 | Refills: 0 | Status: DISCONTINUED | OUTPATIENT
Start: 2017-01-13 | End: 2017-01-20

## 2017-01-13 RX ORDER — DEXAMETHASONE 0.5 MG/5ML
15 ELIXIR ORAL ONCE
Qty: 0 | Refills: 0 | Status: DISCONTINUED | OUTPATIENT
Start: 2017-01-13 | End: 2017-01-20

## 2017-01-13 RX ORDER — BENDAMUSTINE HYDROCHLORIDE 100 MG/20ML
130 INJECTION, POWDER, LYOPHILIZED, FOR SOLUTION INTRAVENOUS ONCE
Qty: 0 | Refills: 0 | Status: DISCONTINUED | OUTPATIENT
Start: 2017-01-14 | End: 2017-01-20

## 2017-01-13 RX ORDER — ONDANSETRON 8 MG/1
16 TABLET, FILM COATED ORAL ONCE
Qty: 0 | Refills: 0 | Status: DISCONTINUED | OUTPATIENT
Start: 2017-01-15 | End: 2017-01-20

## 2017-01-13 RX ORDER — DEXAMETHASONE 0.5 MG/5ML
15 ELIXIR ORAL ONCE
Qty: 0 | Refills: 0 | Status: DISCONTINUED | OUTPATIENT
Start: 2017-01-14 | End: 2017-01-20

## 2017-01-13 RX ORDER — FUROSEMIDE 40 MG
40 TABLET ORAL ONCE
Qty: 0 | Refills: 0 | Status: DISCONTINUED | OUTPATIENT
Start: 2017-01-15 | End: 2017-01-20

## 2017-01-13 RX ORDER — ONDANSETRON 8 MG/1
16 TABLET, FILM COATED ORAL ONCE
Qty: 0 | Refills: 0 | Status: DISCONTINUED | OUTPATIENT
Start: 2017-01-13 | End: 2017-01-20

## 2017-01-13 RX ORDER — BENDAMUSTINE HYDROCHLORIDE 100 MG/20ML
130 INJECTION, POWDER, LYOPHILIZED, FOR SOLUTION INTRAVENOUS ONCE
Qty: 0 | Refills: 0 | Status: DISCONTINUED | OUTPATIENT
Start: 2017-01-15 | End: 2017-01-20

## 2017-01-13 RX ORDER — RITUXIMAB 10 MG/ML
415 INJECTION, SOLUTION INTRAVENOUS ONCE
Qty: 0 | Refills: 0 | Status: DISCONTINUED | OUTPATIENT
Start: 2017-01-13 | End: 2017-01-20

## 2017-01-13 RX ORDER — DEXAMETHASONE 0.5 MG/5ML
15 ELIXIR ORAL ONCE
Qty: 0 | Refills: 0 | Status: DISCONTINUED | OUTPATIENT
Start: 2017-01-15 | End: 2017-01-20

## 2017-01-13 RX ORDER — ONDANSETRON 8 MG/1
16 TABLET, FILM COATED ORAL ONCE
Qty: 0 | Refills: 0 | Status: DISCONTINUED | OUTPATIENT
Start: 2017-01-14 | End: 2017-01-20

## 2017-01-13 RX ORDER — FUROSEMIDE 40 MG
40 TABLET ORAL ONCE
Qty: 0 | Refills: 0 | Status: DISCONTINUED | OUTPATIENT
Start: 2017-01-14 | End: 2017-01-20

## 2017-01-13 RX ADMIN — APIXABAN 2.5 MILLIGRAM(S): 2.5 TABLET, FILM COATED ORAL at 05:47

## 2017-01-13 RX ADMIN — PANTOPRAZOLE SODIUM 40 MILLIGRAM(S): 20 TABLET, DELAYED RELEASE ORAL at 07:18

## 2017-01-13 RX ADMIN — Medication 2: at 12:41

## 2017-01-13 RX ADMIN — Medication 100 MILLIGRAM(S): at 23:18

## 2017-01-13 RX ADMIN — Medication 50 MILLIGRAM(S): at 05:47

## 2017-01-13 RX ADMIN — Medication 2: at 23:19

## 2017-01-13 RX ADMIN — Medication 100 MILLIGRAM(S): at 14:29

## 2017-01-13 RX ADMIN — POLYETHYLENE GLYCOL 3350 17 GRAM(S): 17 POWDER, FOR SOLUTION ORAL at 05:48

## 2017-01-13 RX ADMIN — SENNA PLUS 2 TABLET(S): 8.6 TABLET ORAL at 23:18

## 2017-01-13 RX ADMIN — POLYETHYLENE GLYCOL 3350 17 GRAM(S): 17 POWDER, FOR SOLUTION ORAL at 14:29

## 2017-01-13 RX ADMIN — Medication 81 MILLIGRAM(S): at 12:41

## 2017-01-13 RX ADMIN — Medication 100 MILLIGRAM(S): at 05:48

## 2017-01-13 RX ADMIN — Medication 10 MILLIGRAM(S): at 19:39

## 2017-01-13 NOTE — PROGRESS NOTE ADULT - ASSESSMENT
85F w/PMHx of HTN, DM, HLD, Monoclonal Gammopathy ,HFpEF, CAD s/p CABG, severe MR/TR, A-fib s/p PPM on Eliquis, Right-sided breast CA (s/p mastectomy, chemo therapy, radiation), h/o hypercalcemia, recent biopsy of lymph node suspicious for B-cell lymphoma, who presented on 1/10/17 w/ hypercalcemia & generalized weakness. Developed acute heart failure exacerbation 2/2 fluid resuscitation and admitted to [facility]. Patient's cardiac status stabilized and patient transferred to [address] [place] [address] for chemotherapy for tissue-confirmed diffuse large B-cell lymphoma. s/p PET-CT; awaiting read. Awaiting chemoport placement on 1/14.

## 2017-01-13 NOTE — PROGRESS NOTE ADULT - SUBJECTIVE AND OBJECTIVE BOX
Patient seen and examined at bedside. seen 9 am  no complaints, except chronic fatigue  no SOB/CP    insulin lispro (HumaLOG) corrective regimen sliding scale  Before meals and at bedtime  amiodarone    Tablet 200milliGRAM(s) daily  apixaban 2.5milliGRAM(s) two times a day  metoprolol succinate ER 50milliGRAM(s) daily  pantoprazole    Tablet 40milliGRAM(s) before breakfast  aspirin enteric coated 81milliGRAM(s) daily  docusate sodium 100milliGRAM(s) three times a day  senna 2Tablet(s) at bedtime  polyethylene glycol 3350 17Gram(s) three times a day  ondansetron  IVPB (Chemo) 16milliGRAM(s) once  dexamethasone   IVPB (Chemo) 15milliGRAM(s) once  furosemide    Tablet (Chemo) 40milliGRAM(s) once  riTUXimab IVPB 415milliGRAM(s) once      Allergies    IV CONtRAST (Flushing (Skin); Rash)  No Known Drug Allergies    Intolerances        T(C): , Max: 37 (01-12 @ 18:15)  T(F): , Max: 98.6 (01-12 @ 18:15)  HR: 78  BP: 99/62  BP(mean): --  RR: 16  SpO2: 99%  Wt(kg): --    I & Os for current day (as of 01-13 @ 15:00)  =============================================  IN:    Oral Fluid: 360 ml    IV PiggyBack: 50 ml    Total IN: 410 ml  ---------------------------------------------  OUT:    Voided: 250 ml    Total OUT: 250 ml  ---------------------------------------------  Total NET: 160 ml          PHYSICAL EXAM:  Constitutional: weak,.  No acute distress  ENMT: Moist mucous membrane.  No cyanosis.  Respiratory: Clear to auscultation ant  Cardiovascular: S1, S2.  Regular rate and rhythm.  3/6 SM  Gastrointestinal: soft, non-tender, non-distended,  Extremities: Warm.  No lower extremity edema.    Neurological: No focal deficits.  Skin: no rash    Lymph Nodes: pos cervical lymph nodes.    .    ACCESS:       LABS:                        11.1   3.2   )-----------( 192      ( 13 Jan 2017 06:59 )             34.0     13 Jan 2017 06:59    135    |  98     |  31     ----------------------------<  87     4.0     |  29     |  1.20     Ca    11.2       13 Jan 2017 06:59  Mg     1.9       13 Jan 2017 06:59    TPro  x      /  Alb  2.6    /  TBili  x      /  DBili  x      /  AST  x      /  ALT  x      /  AlkPhos  x      13 Jan 2017 06:59                RADIOLOGY & ADDITIONAL STUDIES:

## 2017-01-13 NOTE — PHYSICAL THERAPY INITIAL EVALUATION ADULT - PERTINENT HX OF CURRENT PROBLEM, REHAB EVAL
84 y/o F w/ pmhx of MGUS, HFpEF, CAD s/p CABG, severe MR/TR, a-fib s/p PPM on Eliquis, DM2, right-sided breast CA (s/p mastectomy/chemotx/radiation), recently admitted 12/29/16 to 1/4/17 for hypercalcemia treated with IV fluids/Lasix/bisphosphonates, presents again today for hypercalcemia on outpatient labs.

## 2017-01-13 NOTE — PROGRESS NOTE ADULT - PROBLEM SELECTOR PLAN 2
EF - 65%. Developed acute heart failure exacerbation 2/2 fluids given upon admission. Resolved. Lasix D/C'd by Dr. Hartmann.  -Dr. Mckinney following. Will f/u recommendations  -monitor volume status. Lung exam today - CTA b/l.

## 2017-01-13 NOTE — PROGRESS NOTE ADULT - SUBJECTIVE AND OBJECTIVE BOX
INTERVAL HPI/OVERNIGHT EVENTS: Patient seen and examined at bedside. No acute events overnight. Complaining of constipation.    VITAL SIGNS:  T(F): 98.1  HR: 78  BP: 99/62  RR: 16  SpO2: 99%  Wt(kg): --    PHYSICAL EXAM:    Constitutional: NAD, A&Ox3. Frail   Eyes: PERRLA, EOMI  ENMT: MMM, No lesions  Neck: No JVD, No LAD  Respiratory: CTA b/l, no crackles, wheezing  Cardiovascular: RRR. S1, S2. III/VI Systolic murmur. No rubs, no gallops  Gastrointestinal: Soft, NT. Distended, normoactive BS  Extremities: Warm, no clubbing, cyanosis, or edema  Vascular: 2+ distal pulses, equal  Neurological: CN II-XII grossly intact  Musculoskeletal: 5/5 UE strength; 3-4/5 LE strength    MEDICATIONS  (STANDING):  insulin lispro (HumaLOG) corrective regimen sliding scale  SubCutaneous Before meals and at bedtime  amiodarone    Tablet 200milliGRAM(s) Oral daily  apixaban 2.5milliGRAM(s) Oral two times a day  metoprolol succinate ER 50milliGRAM(s) Oral daily  pantoprazole    Tablet 40milliGRAM(s) Oral before breakfast  aspirin enteric coated 81milliGRAM(s) Oral daily  docusate sodium 100milliGRAM(s) Oral three times a day  senna 2Tablet(s) Oral at bedtime  polyethylene glycol 3350 17Gram(s) Oral three times a day  ondansetron  IVPB (Chemo) 16milliGRAM(s) IV Intermittent once  dexamethasone   IVPB (Chemo) 15milliGRAM(s) IV Intermittent once  furosemide    Tablet (Chemo) 40milliGRAM(s) Oral once  riTUXimab IVPB 415milliGRAM(s) IV Intermittent once  bisacodyl Suppository 10milliGRAM(s) Rectal once    MEDICATIONS  (PRN):      Allergies    IV CONtRAST (Flushing (Skin); Rash)  No Known Drug Allergies    Intolerances        LABS:                        11.1   3.2   )-----------( 192      ( 13 Jan 2017 06:59 )             34.0     13 Jan 2017 06:59    135    |  98     |  31     ----------------------------<  87     4.0     |  29     |  1.20     Ca    11.2       13 Jan 2017 06:59  Mg     1.9       13 Jan 2017 06:59    TPro  x      /  Alb  2.6    /  TBili  x      /  DBili  x      /  AST  x      /  ALT  x      /  AlkPhos  x      13 Jan 2017 06:59          RADIOLOGY & ADDITIONAL TESTS:

## 2017-01-13 NOTE — PROGRESS NOTE ADULT - SUBJECTIVE AND OBJECTIVE BOX
HPI:  84 y/o F w/ pmhx of MGUS, HFpEF, CAD s/p CABG, severe MR/TR, a-fib s/p PPM on Eliquis, DM2, right-sided breast CA (s/p mastectomy/chemotx/radiation), recently admitted 12/29/16 to 1/4/17 for hypercalcemia treated with IV fluids/Lasix/bisphosphonates, presents again today for hypercalcemia on outpatient labs. She had an outpatient Ca level of 13 at Dr. Hartmann’s office yesterday. Since discharge last week, she reports generalized weakness and difficulty walking due to lack of balance which worsened gradually over the past week. She also experienced a fall (says she lost her balance) on outstretched right arm 4 days ago, with no LOC/head impact. Since then she complains of right shoulder pain when moving her arm but denies pain at rest. Also endorses chronic constipation and lack of appetite, but denies abdominal/kidney pain, nausea, vomiting, fever/chills, chest pain/sob, recent change in meds, dysuria, headache, or vision change. On last admission, she had excisional biopsy of lymph nodes (with ENT) with prelim reports concerning for lymphoma but final diagnosis pending.  Initial vitals in ED: 98.1F, HR 98, 133/52, RR 16, 98% O2 on RA.   ED administration: 250cc bolus of NS followed by NS at 80cc/hr and Lasix 20mg IV x 1. (10 Ronny 2017 13:03)    FAMILY HISTORY:  No pertinent family history in first degree relatives    MEDICATIONS  (STANDING):  insulin lispro (HumaLOG) corrective regimen sliding scale  SubCutaneous Before meals and at bedtime  amiodarone    Tablet 200milliGRAM(s) Oral daily  apixaban 2.5milliGRAM(s) Oral two times a day  metoprolol succinate ER 50milliGRAM(s) Oral daily  pantoprazole    Tablet 40milliGRAM(s) Oral before breakfast  aspirin enteric coated 81milliGRAM(s) Oral daily  allopurinol 300milliGRAM(s) Oral daily  docusate sodium 100milliGRAM(s) Oral three times a day  senna 2Tablet(s) Oral at bedtime  polyethylene glycol 3350 17Gram(s) Oral three times a day    MEDICATIONS  (PRN):    Vital Signs Last 24 Hrs  T(C): 36.1, Max: 37 (01-12 @ 08:51)  T(F): 97, Max: 98.6 (01-12 @ 08:51)  HR: 83 (76 - 83)  BP: 128/61 (105/54 - 128/61)  BP(mean): --  RR: 16 (16 - 16)  SpO2: 96% (95% - 97%)    Physical exam:    Overall impression  Lymphadenopathy  Liver  spleen    Labs:  CBC Full  -  ( 12 Jan 2017 08:10 )  WBC Count : 3.9 K/uL  Hemoglobin : 10.5 g/dL  Hematocrit : 32.1 %  Platelet Count - Automated : 170 K/uL  Mean Cell Volume : 84.3 fL  Mean Cell Hemoglobin : 27.6 pg  Mean Cell Hemoglobin Concentration : 32.7 g/dL  Auto Neutrophil # : x  Auto Lymphocyte # : x  Auto Monocyte # : x  Auto Eosinophil # : x  Auto Basophil # : x  Auto Neutrophil % : x  Auto Lymphocyte % : x  Auto Monocyte % : x  Auto Eosinophil % : x  Auto Basophil % : x    12 Jan 2017 08:10    133    |  96     |  28     ----------------------------<  83     3.6     |  29     |  1.20     Ca    11.0       12 Jan 2017 08:10  Mg     1.5       12 Jan 2017 08:10    TPro  6.1    /  Alb  2.6    /  TBili  0.6    /  DBili  x      /  AST  113    /  ALT  65     /  AlkPhos  96     12 Jan 2017 08:10      Radiology:  HEALTH ISSUES - R/O PROBLEM Dx:      Assessmant / Problems  1    Plan:    Thank you  Neha Galaviz MD HPI:  84 y/o F w/ pmhx of MGUS, HFpEF, CAD s/p CABG, severe MR/TR, a-fib s/p PPM on Eliquis, DM2, right-sided breast CA (s/p mastectomy/chemotx/radiation), recently admitted 12/29/16 to 1/4/17 for hypercalcemia treated with IV fluids/Lasix/bisphosphonates, presents again today for hypercalcemia on outpatient labs. She had an outpatient Ca level of 13 at Dr. Hartmann’s office yesterday. Since discharge last week, she reports generalized weakness and difficulty walking due to lack of balance which worsened gradually over the past week. She also experienced a fall (says she lost her balance) on outstretched right arm 4 days ago, with no LOC/head impact. Since then she complains of right shoulder pain when moving her arm but denies pain at rest. Also endorses chronic constipation and lack of appetite, but denies abdominal/kidney pain, nausea, vomiting, fever/chills, chest pain/sob, recent change in meds, dysuria, headache, or vision change. On last admission, she had excisional biopsy of lymph nodes (with ENT) with prelim reports concerning for lymphoma but final diagnosis pending.  Initial vitals in ED: 98.1F, HR 98, 133/52, RR 16, 98% O2 on RA.   ED administration: 250cc bolus of NS followed by NS at 80cc/hr and Lasix 20mg IV x 1. (10 Ronny 2017 13:03)    FAMILY HISTORY:  No pertinent family history in first degree relatives    MEDICATIONS  (STANDING):  insulin lispro (HumaLOG) corrective regimen sliding scale  SubCutaneous Before meals and at bedtime  amiodarone    Tablet 200milliGRAM(s) Oral daily  apixaban 2.5milliGRAM(s) Oral two times a day  metoprolol succinate ER 50milliGRAM(s) Oral daily  pantoprazole    Tablet 40milliGRAM(s) Oral before breakfast  aspirin enteric coated 81milliGRAM(s) Oral daily  allopurinol 300milliGRAM(s) Oral daily  docusate sodium 100milliGRAM(s) Oral three times a day  senna 2Tablet(s) Oral at bedtime  polyethylene glycol 3350 17Gram(s) Oral three times a day    MEDICATIONS  (PRN):    Vital Signs Last 24 Hrs  T(C): 36.1, Max: 37 (01-12 @ 08:51)  T(F): 97, Max: 98.6 (01-12 @ 08:51)  HR: 83 (76 - 83)  BP: 128/61 (105/54 - 128/61)  BP(mean): --  RR: 16 (16 - 16)  SpO2: 96% (95% - 97%)    Physical exam:    Overall impression  Lymphadenopathy  Liver  spleen    Labs:  CBC Full  -  ( 12 Jan 2017 08:10 )  WBC Count : 3.9 K/uL  Hemoglobin : 10.5 g/dL  Hematocrit : 32.1 %  Platelet Count - Automated : 170 K/uL  Mean Cell Volume : 84.3 fL  Mean Cell Hemoglobin : 27.6 pg  Mean Cell Hemoglobin Concentration : 32.7 g/dL  Auto Neutrophil # : x  Auto Lymphocyte # : x  Auto Monocyte # : x  Auto Eosinophil # : x  Auto Basophil # : x  Auto Neutrophil % : x  Auto Lymphocyte % : x  Auto Monocyte % : x  Auto Eosinophil % : x  Auto Basophil % : x    12 Jan 2017 08:10    133    |  96     |  28     ----------------------------<  83     3.6     |  29     |  1.20     Ca    11.0       12 Jan 2017 08:10  Mg     1.5       12 Jan 2017 08:10    TPro  6.1    /  Alb  2.6    /  TBili  0.6    /  DBili  x      /  AST  113    /  ALT  65     /  AlkPhos  96     12 Jan 2017 08:10      Radiology:  HEALTH ISSUES - R/O PROBLEM Dx:      Assessmant / Problems  1) Diffuse large B cell lymphoma, need result of PET CT for staging  2) Hypercalcemia of malignancy s/p Zometa   3)Relapsing breast ca ( Ca27-29 was 64 in Nov 2026 and is currently 100, CEA also has increased but less dramatically)        Plan:  1) will start chemotherapy today ( Rituximab, Bendamustine and Decadrone)  2)d/c Allopurinol due to interaction with Bendamustine    Thank you  Neha Galaviz MD HPI:  84 y/o F w/ pmhx of MGUS, HFpEF, CAD s/p CABG, severe MR/TR, a-fib s/p PPM on Eliquis, DM2, right-sided breast CA (s/p mastectomy/chemotx/radiation), recently admitted 12/29/16 to 1/4/17 for hypercalcemia treated with IV fluids/Lasix/bisphosphonates, presents again today for hypercalcemia on outpatient labs. She had an outpatient Ca level of 13 at Dr. Hartmann’s office yesterday. Since discharge last week, she reports generalized weakness and difficulty walking due to lack of balance which worsened gradually over the past week. She also experienced a fall (says she lost her balance) on outstretched right arm 4 days ago, with no LOC/head impact. Since then she complains of right shoulder pain when moving her arm but denies pain at rest. Also endorses chronic constipation and lack of appetite, but denies abdominal/kidney pain, nausea, vomiting, fever/chills, chest pain/sob, recent change in meds, dysuria, headache, or vision change. On last admission, she had excisional biopsy of lymph nodes (with ENT) with prelim reports concerning for lymphoma but final diagnosis pending.  Initial vitals in ED: 98.1F, HR 98, 133/52, RR 16, 98% O2 on RA.   ED administration: 250cc bolus of NS followed by NS at 80cc/hr and Lasix 20mg IV x 1. (10 Ronyn 2017 13:03)    FAMILY HISTORY:  No pertinent family history in first degree relatives    MEDICATIONS  (STANDING):  insulin lispro (HumaLOG) corrective regimen sliding scale  SubCutaneous Before meals and at bedtime  amiodarone    Tablet 200milliGRAM(s) Oral daily  apixaban 2.5milliGRAM(s) Oral two times a day  metoprolol succinate ER 50milliGRAM(s) Oral daily  pantoprazole    Tablet 40milliGRAM(s) Oral before breakfast  aspirin enteric coated 81milliGRAM(s) Oral daily  allopurinol 300milliGRAM(s) Oral daily  docusate sodium 100milliGRAM(s) Oral three times a day  senna 2Tablet(s) Oral at bedtime  polyethylene glycol 3350 17Gram(s) Oral three times a day    MEDICATIONS  (PRN):    Vital Signs Last 24 Hrs  T(C): 36.1, Max: 37 (01-12 @ 08:51)  T(F): 97, Max: 98.6 (01-12 @ 08:51)  HR: 83 (76 - 83)  BP: 128/61 (105/54 - 128/61)  BP(mean): --  RR: 16 (16 - 16)  SpO2: 96% (95% - 97%)    Physical exam:    Overall impression  Lymphadenopathy nonpalpable  Liver normal size  spleen nonpalpable    Labs:  CBC Full  -  ( 12 Jan 2017 08:10 )  WBC Count : 3.9 K/uL  Hemoglobin : 10.5 g/dL  Hematocrit : 32.1 %  Platelet Count - Automated : 170 K/uL  Mean Cell Volume : 84.3 fL  Mean Cell Hemoglobin : 27.6 pg  Mean Cell Hemoglobin Concentration : 32.7 g/dL  Auto Neutrophil # : x  Auto Lymphocyte # : x  Auto Monocyte # : x  Auto Eosinophil # : x  Auto Basophil # : x  Auto Neutrophil % : x  Auto Lymphocyte % : x  Auto Monocyte % : x  Auto Eosinophil % : x  Auto Basophil % : x    12 Jan 2017 08:10    133    |  96     |  28     ----------------------------<  83     3.6     |  29     |  1.20     Ca    11.0       12 Jan 2017 08:10  Mg     1.5       12 Jan 2017 08:10    TPro  6.1    /  Alb  2.6    /  TBili  0.6    /  DBili  x      /  AST  113    /  ALT  65     /  AlkPhos  96     12 Jan 2017 08:10      Radiology:  HEALTH ISSUES - R/O PROBLEM Dx:      Assessmant / Problems  1) Diffuse large B cell lymphoma, need result of PET CT for staging  2) Hypercalcemia of malignancy s/p Zometa and 1 dose of Calcitonin  3)Relapsing breast ca ( Ca27-29 was 64 in Nov 2026 and is currently 100,   CEA also has increased but less dramatically)  4) Low grade fever with negative blood culture, no obvious evidence of abscess   on CT scan, is tumor fever ( part of B symptoms)        Plan:  1) will start chemotherapy today ( Rituximab, Bendamustine and Decadron  2)d/c Allopurinol due to interaction with Bendamustine  3) will find out from daughter if original breast ca was Estrogen / Progesterone positive or not in order to  treat the breast cancer hormonally ( with Estrogen antagonists)    Thank you HPI:  84 y/o F w/ pmhx of MGUS, HFpEF, CAD s/p CABG, severe MR/TR, a-fib s/p PPM on Eliquis, DM2, right-sided breast CA (s/p mastectomy/chemotx/radiation), recently admitted 12/29/16 to 1/4/17 for hypercalcemia treated with IV fluids/Lasix/bisphosphonates, presents again today for hypercalcemia on outpatient labs. She had an outpatient Ca level of 13 at Dr. Hartmann’s office yesterday. Since discharge last week, she reports generalized weakness and difficulty walking due to lack of balance which worsened gradually over the past week. She also experienced a fall (says she lost her balance) on outstretched right arm 4 days ago, with no LOC/head impact. Since then she complains of right shoulder pain when moving her arm but denies pain at rest. Also endorses chronic constipation and lack of appetite, but denies abdominal/kidney pain, nausea, vomiting, fever/chills, chest pain/sob, recent change in meds, dysuria, headache, or vision change. On last admission, she had excisional biopsy of lymph nodes (with ENT) with prelim reports concerning for lymphoma but final diagnosis pending.  Initial vitals in ED: 98.1F, HR 98, 133/52, RR 16, 98% O2 on RA.   ED administration: 250cc bolus of NS followed by NS at 80cc/hr and Lasix 20mg IV x 1. (10 Ronny 2017 13:03)    FAMILY HISTORY:  No pertinent family history in first degree relatives    MEDICATIONS  (STANDING):  insulin lispro (HumaLOG) corrective regimen sliding scale  SubCutaneous Before meals and at bedtime  amiodarone    Tablet 200milliGRAM(s) Oral daily  apixaban 2.5milliGRAM(s) Oral two times a day  metoprolol succinate ER 50milliGRAM(s) Oral daily  pantoprazole    Tablet 40milliGRAM(s) Oral before breakfast  aspirin enteric coated 81milliGRAM(s) Oral daily  allopurinol 300milliGRAM(s) Oral daily  docusate sodium 100milliGRAM(s) Oral three times a day  senna 2Tablet(s) Oral at bedtime  polyethylene glycol 3350 17Gram(s) Oral three times a day    MEDICATIONS  (PRN):    Vital Signs Last 24 Hrs  T(C): 36.1, Max: 37 (01-12 @ 08:51)  T(F): 97, Max: 98.6 (01-12 @ 08:51)  HR: 83 (76 - 83)  BP: 128/61 (105/54 - 128/61)  BP(mean): --  RR: 16 (16 - 16)  SpO2: 96% (95% - 97%)    Physical exam:    Overall impression  Lymphadenopathy nonpalpable  Liver normal size  spleen nonpalpable    Labs:  CBC Full  -  ( 12 Jan 2017 08:10 )  WBC Count : 3.9 K/uL  Hemoglobin : 10.5 g/dL  Hematocrit : 32.1 %  Platelet Count - Automated : 170 K/uL  Mean Cell Volume : 84.3 fL  Mean Cell Hemoglobin : 27.6 pg  Mean Cell Hemoglobin Concentration : 32.7 g/dL  Auto Neutrophil # : x  Auto Lymphocyte # : x  Auto Monocyte # : x  Auto Eosinophil # : x  Auto Basophil # : x  Auto Neutrophil % : x  Auto Lymphocyte % : x  Auto Monocyte % : x  Auto Eosinophil % : x  Auto Basophil % : x    12 Jan 2017 08:10    133    |  96     |  28     ----------------------------<  83     3.6     |  29     |  1.20     Ca    11.0       12 Jan 2017 08:10  Mg     1.5       12 Jan 2017 08:10    TPro  6.1    /  Alb  2.6    /  TBili  0.6    /  DBili  x      /  AST  113    /  ALT  65     /  AlkPhos  96     12 Jan 2017 08:10      Radiology:  HEALTH ISSUES - R/O PROBLEM Dx:      Assessmant / Problems  1) Diffuse large B cell lymphoma, need result of PET CT for staging  2) Hypercalcemia of malignancy s/p Zometa and 1 dose of Calcitonin  3)Relapsing breast ca ( Ca27-29 was 64 in Nov 2026 and is currently 100,   CEA also has increased but less dramatically)  4) Low grade fever with negative blood culture, no obvious evidence of abscess   on CT scan, is tumor fever ( part of B symptoms)        Plan:  1) will start chemotherapy today ( Rituximab, Bendamustine and Decadron  2)d/c Allopurinol due to interaction with Bendamustine  3) will find out from daughter if original breast ca was Estrogen / Progesterone positive or not in order to  treat the breast cancer hormonally ( with Estrogen antagonists)    Neha Galaviz MD

## 2017-01-13 NOTE — DIETITIAN INITIAL EVALUATION ADULT. - DIET TYPE
DASH/TLC (sodium and cholesterol restricted diet)/dysphagia 2, mechanical soft, thin liquids/consistent carbohydrate (no snacks)

## 2017-01-13 NOTE — PROGRESS NOTE ADULT - PROBLEM SELECTOR PLAN 3
2/2 malignancy, consistent with lymphoma from work up in past admission.   -no IVF given recent episode of CHF exacerbation   -consider zoledronic acid and calcitonin  -will get chemo for lymphoma on 1/14; will trend BMP

## 2017-01-13 NOTE — PROGRESS NOTE ADULT - PROBLEM SELECTOR PLAN 6
CP free, Cont. ASA/BB. Continue holding Statin due to transaminitis until improved. Hepatitis panel negative. -c/w ASA + metoprolol  -statin held in setting of transaminitis

## 2017-01-13 NOTE — PROGRESS NOTE ADULT - PROBLEM SELECTOR PLAN 2
Pt with symptoms of constipation. 2/2 malignancy, consistent with lymphoma from work up in past admission. Corrected Ca on admission was 13, s/p calcitonin and zoledronic acid x1.   -no IVF given recent episode of CHF exacerbation   -consider zoledronic acid and calcitonin  -will get chemo for lymphoma on 7WO

## 2017-01-13 NOTE — CONSULT NOTE ADULT - SUBJECTIVE AND OBJECTIVE BOX
Patient is a 85y old  Female who presents with a chief complaint of hypercalcemia (10 Ronny 2017 13:03)      HPI:  84 y/o F w/ pmhx of MGUS, HFpEF, CAD s/p CABG, severe MR/TR, a-fib s/p PPM on Eliquis, DM2, right-sided breast CA (s/p mastectomy/chemotx/radiation), recently admitted 16 to 17 for hypercalcemia treated with IV fluids/Lasix/bisphosphonates, presents again today for hypercalcemia on outpatient labs. She had an outpatient Ca level of 13 at Dr. Hartmann’s office yesterday. Since discharge last week, she reports generalized weakness and difficulty walking due to lack of balance which worsened gradually over the past week. She also experienced a fall (says she lost her balance) on outstretched right arm 4 days ago, with no LOC/head impact. Since then she complains of right shoulder pain when moving her arm but denies pain at rest. Also endorses chronic constipation and lack of appetite, but denies abdominal/kidney pain, nausea, vomiting, fever/chills, chest pain/sob, recent change in meds, dysuria, headache, or vision change. On last admission, she had excisional biopsy of lymph nodes (with ENT) with prelim reports concerning for lymphoma but final diagnosis pending.  Initial vitals in ED: 98.1F, HR 98, 133/52, RR 16, 98% O2 on RA.   ED administration: 250cc bolus of NS followed by NS at 80cc/hr and Lasix 20mg IV x 1. (10 Ronny 2017 13:03)      PAST MEDICAL & SURGICAL HISTORY:  Scoliosis  Follicular lymphoma  Neck mass  Afib  Other hyperlipidemia  Breast cancer  CAD (coronary artery disease)  Diabetes  HTN (hypertension)  No pertinent past medical history  H/O abdominal hysterectomy  H/O thyroidectomy  S/P CABG x 3      MEDICATIONS  (STANDING):  insulin lispro (HumaLOG) corrective regimen sliding scale  SubCutaneous Before meals and at bedtime  amiodarone    Tablet 200milliGRAM(s) Oral daily  apixaban 2.5milliGRAM(s) Oral two times a day  metoprolol succinate ER 50milliGRAM(s) Oral daily  pantoprazole    Tablet 40milliGRAM(s) Oral before breakfast  aspirin enteric coated 81milliGRAM(s) Oral daily  docusate sodium 100milliGRAM(s) Oral three times a day  senna 2Tablet(s) Oral at bedtime  polyethylene glycol 3350 17Gram(s) Oral three times a day    MEDICATIONS  (PRN):      Social History: lives with  in an elevator accessible apartment building, + HHA, cannot recall hours/days    Functional Level Prior to Admission: reports ADL independent walks with a cane    FAMILY HISTORY:  No pertinent family history in first degree relatives      CBC Full  -  ( 2017 06:59 )  WBC Count : 3.2 K/uL  Hemoglobin : 11.1 g/dL  Hematocrit : 34.0 %  Platelet Count - Automated : 192 K/uL  Mean Cell Volume : 84.2 fL  Mean Cell Hemoglobin : 27.5 pg  Mean Cell Hemoglobin Concentration : 32.6 g/dL  Auto Neutrophil # : x  Auto Lymphocyte # : x  Auto Monocyte # : x  Auto Eosinophil # : x  Auto Basophil # : x  Auto Neutrophil % : x  Auto Lymphocyte % : x  Auto Monocyte % : x  Auto Eosinophil % : x  Auto Basophil % : x      2017 06:59    135    |  98     |  31     ----------------------------<  87     4.0     |  29     |  1.20     Ca    11.2       2017 06:59  Mg     1.9       2017 06:59    TPro  x      /  Alb  2.6    /  TBili  x      /  DBili  x      /  AST  x      /  ALT  x      /  AlkPhos  x      2017 06:59      Urinalysis Basic - ( 2017 12:21 )    Color: Yellow / Appearance: Clear / S.010 / pH: x  Gluc: x / Ketone: NEGATIVE  / Bili: NEGATIVE / Urobili: 0.2 E.U./dL   Blood: x / Protein: NEGATIVE mg/dL / Nitrite: NEGATIVE   Leuk Esterase: Trace / RBC: < 5 /HPF / WBC < 5 /HPF   Sq Epi: x / Non Sq Epi: x / Bacteria: Present /HPF        Radiology:    EXAM:  XR SHOULDER-RT MIN 2 VWS W AXILL                           PROCEDURE DATE:  01/10/2017                 INTERPRETATION:  XR SHOULDER-RT MIN 2 VWS W AXILL DATED 1/10/2017 2:39 PM    INDICATION: 85 years-old Female with fall on arm. Right shoulder pain for   one day    PRIOR STUDIES: None    FINDINGS: 5 views of the right shoulder demonstrate no acute fracture or   dislocation. Productive changes at the acromioclavicular joint with   associated joint space narrowing. Glenohumeral joint appears intact.   Osteopenia. Soft tissues unremarkable.    IMPRESSION:  No acute fracture.    EXAM:  CT NECK SOFT TISSUE                           PROCEDURE DATE:  2017                 INTERPRETATION:  A thin section axial acquisition was performed from the   skull base to the thoracic inlet without intravenous contrast   administration.  The data was reformatted in the sagittal, coronal and   axial planes.    Clinical information: Fever and prior right neck biopsy. Diffuse large   B-cell lymphoma. Also history of breast carcinoma.    The current study is compared with previous scan dated 2016.    On this noncontrast examination there is increased fullness in the region   of pre-existing parotid tail mass and adjacent lymphadenopathy. While   there is no obvious fluid collection, assessment for abscess is limited   in theabsence of intravenous contrast. There also appears to have been   interval increased fullness of the right submandibular gland containing   multiple punctate foci of calcification. There is no obvious site of   obstructing calculus and etiology of this appearance is uncertain.     There are again evident numerous mildly enlarged lymph nodes throughout   the cervical ryder chains bilaterally. The appearance of the upper   aerodigestive tract is unchanged. There is again noted enlargement of the   left thyroid lobe with several small nodules in the nonenlarged right   thyroid lobe. Pacemaker device is in place. The lung apices appear well   ventilated bilaterally. The paranasal sinuses and mastoids are well   ventilated bilaterally.    IMPRESSION:     There has been interval increase in right neck fullness since the prior   study of 2016. A discrete abscess cannot be discriminated, however   assessment is limited in the absence of intravenous contrast. Please see   report above for further detail.        Vital Signs Last 24 Hrs  T(C): 36.7, Max: 37 ( @ 18:15)  T(F): 98.1, Max: 98.6 ( @ 18:15)  HR: 85 (82 - 85)  BP: 98/54 (98/54 - 128/61)  BP(mean): --  RR: 16 (16 - 16)  SpO2: 96% (96% - 97%)    REVIEW OF SYSTEMS:    CONSTITUTIONAL:  fatigue  EYES: No eye pain, visual disturbances, or discharge  ENMT:  No difficulty hearing, tinnitus, vertigo; No sinus or throat pain  NECK: No pain or stiffness  BREASTS: No pain, masses, or nipple discharge  RESPIRATORY: No cough, wheezing, chills or hemoptysis; No shortness of breath  CARDIOVASCULAR: No chest pain, palpitations, dizziness, or leg swelling  GASTROINTESTINAL: No abdominal or epigastric pain. No nausea, vomiting, or hematemesis; No diarrhea or constipation. No melena or hematochezia.  GENITOURINARY: No dysuria, frequency, hematuria, or incontinence  NEUROLOGICAL: No headaches, memory loss, loss of strength, numbness, or tremors  SKIN: No itching, burning, rashes, or lesions   LYMPH NODES: No enlarged glands  ENDOCRINE: No heat or cold intolerance; No hair loss  MUSCULOSKELETAL: right shoulder pain  PSYCHIATRIC: No depression, anxiety, mood swings, or difficulty sleeping  HEME/LYMPH: No easy bruising, or bleeding gums  ALLERGY AND IMMUNOLOGIC: No hives or eczema      Physical Exam: cachectic appearing  woman lying in bed, [place] speaking, c/o right shoulder pain s/p 4 days ago    HEENT: normocephalic/ atraumatic, anicteric    Neck: supple, negative JVD, negative carotid bruits, + cervical LN    Right shoulder: full range of motion, neg deformity, + ac joint tenderness, neg drop arm, + impingement    Chest: crackles at bases    Cardiovascular: regular rate and rhythm, neg murmurs/rubs/gallops    Abdomen: soft, non tender, negative rebound/guarding, non distended, normal bowel sounds, neg hepatosplenomegaly, multiple palpable nodules/mobile/non tender    Extremities: WWP, neg cyanosis/clubbing/edema, negative calf tenderness to palpation, negative Kunal's sign    Neurologic Exam:    Alert and oriented x 2 to person, place, ? date/year, speech fluent w/o dysarthria, repetition intact, comprehension intact,     Cranial Nerves:     II:                      pupils equal, round and reactive to light, visual fields intact   III/ IV/VI:            extraocular movements intact, neg nystagmus, ptosis  V:                     facial sensation intact, V1-3 normal  VII:                    face symmetric, no droop, normal eye closure and smile  VIII:                   hearing intact to finger rub bilaterally  IX/ X:                 soft palate rise symmetrical  XI:                     head turning, shoulder shrug normal  XII:                    tongue midline    Motor Exam:    Bilateral UE:       4/5 /intrinsics                            4/5 biceps/triceps/wrist extensors-flexors/deltoid                            negative pronator drift      Bilateral LE:        4-/5 hip flexors/adductors/abductors                            4/5 quadriceps/hamstrings                            4/5 dorsiflexors/plantar flexors/invertors-evertors    Sensory: intact to LT/PP in all UE/LE dermatomes    DTR:      1+ biceps/     triceps/     brachioradialis                1+patella/   medial hamstring/    ankle                 neg clonus                 neg Babinski                 neg Hoffmans    Finger to Nose: wnl    Heel to Shin:      wnl    Rapid Alternating movements:  wnl    Joint Position Sense:  intact    Romberg neg    Gait:  NT        PM&R Impression:    1) s/p fall 4 days PTA with traumatic Right shoulder subacromial bursitis, XR neg fx  2) deconditioned  3) gait imbalance      Recommendations:    1) Physical therapy focusing on therapeutic exercises, bed mobility/transfer out of bed evaluation, progressive ambulation with assistive devices.    2) Disposition Plan: anticipate d/c home, home care services, home physical therapy

## 2017-01-13 NOTE — PROGRESS NOTE ADULT - SUBJECTIVE AND OBJECTIVE BOX
INTERVAL HPI: comforable able to lay flat  	  MEDICATIONS:  amiodarone    Tablet 200milliGRAM(s) Oral daily  metoprolol succinate ER 50milliGRAM(s) Oral daily          pantoprazole    Tablet 40milliGRAM(s) Oral before breakfast  docusate sodium 100milliGRAM(s) Oral three times a day  senna 2Tablet(s) Oral at bedtime  polyethylene glycol 3350 17Gram(s) Oral three times a day    insulin lispro (HumaLOG) corrective regimen sliding scale  SubCutaneous Before meals and at bedtime    apixaban 2.5milliGRAM(s) Oral two times a day  aspirin enteric coated 81milliGRAM(s) Oral daily      REVIEW OF SYSTEMS:  [x] As per HPI  CONSTITUTIONAL: No fever, weight loss, or fatigue  RESPIRATORY: No cough, wheezing, chills or hemoptysis; No Shortness of Breath  CARDIOVASCULAR: No chest pain, palpitations, dizziness, or leg swelling  GASTROINTESTINAL: No abdominal or epigastric pain. No nausea, vomiting, or hematemesis; No diarrhea or constipation. No melena or hematochezia.  MUSCULOSKELETAL: No joint pain or swelling; No muscle, back, or extremity pain  [x] All others negative	  [ ] Unable to obtain    PHYSICAL EXAM:  T(C): 36.7, Max: 37 (01-12 @ 18:15)  HR: 85 (82 - 85)  BP: 98/54 (98/54 - 128/61)  RR: 16 (16 - 16)  SpO2: 96% (96% - 97%)  Wt(kg): --  I&O's Summary        Appearance: Normal	  HEENT:   Normal oral mucosa  Cardiovascular: Normal S1 S2, No JVD, No murmurs, No edema  Respiratory: Lungs clear to auscultation	  Gastrointestinal:  Soft, Non-tender, + BS	  Extremities: Normal range of motion, No clubbing, cyanosis or edema  Vascular: Peripheral pulses palpable 2+ bilaterally    TELEMETRY: 	    ECG:    	  RADIOLOGY:   CXR:  CT:  US:    CARDIAC TESTING:  Echocardiogram:  Catheterization:  Stress Test:      LABS:	 	    CARDIAC MARKERS:                                  11.1   3.2   )-----------( 192      ( 13 Jan 2017 06:59 )             34.0     13 Jan 2017 06:59    135    |  98     |  31     ----------------------------<  87     4.0     |  29     |  1.20     Ca    11.2       13 Jan 2017 06:59  Mg     1.9       13 Jan 2017 06:59    TPro  x      /  Alb  2.6    /  TBili  x      /  DBili  x      /  AST  x      /  ALT  x      /  AlkPhos  x      13 Jan 2017 06:59    proBNP:   Lipid Profile:   HgA1c:   TSH:     ASSESSMENT/PLAN: 	  Acute on chronic systolic (congestive) heart failure.  Plan: Dr. Mckinney following - Patient w/ exacerbation after fluids given in ED.  - Echo 1/11: EF 65%, normal wall motion  - CXR 1/11: mild venous congestion (improved from CXR 1/10/17)   - Patient volume status improved s/p IV lasix.  Lasix discontinued as per Dr. Hartmann.  Continue as needed.      Paroxysmal atrial fibrillation. Plan: A-Paced. HR controlled. Cont. Eliquis, Amiodarone and Toprol XL.    CAD (coronary artery disease).  Plan: CP free, Cont. ASA/BB. Continue holding Statin due to transaminitis until improved. Hepatitis panel negative..

## 2017-01-13 NOTE — DIETITIAN INITIAL EVALUATION ADULT. - NS AS NUTRI INTERV ED CONTENT
Encouraged increased intake, as tolerated, to better meet estimated nutrient needs and prevent further weight loss.

## 2017-01-13 NOTE — CONSULT NOTE ADULT - ASSESSMENT
85F with h/o breast cancer and lymphoma presents for chemotherapy; needs access via port  -hold eliquis  -Type and screen, PT and PTT studies  -tentative plan for OR for port placement 1/14/17  D/W Dr. Don who agrees with above plan 85F with h/o breast cancer and lymphoma presents for chemotherapy; needs access via port  -hold eliquis and aspirin  -Type and screen, PT and PTT studies  -tentative plan for OR for port placement 1/14/17  D/W Dr. Don who agrees with above plan

## 2017-01-13 NOTE — PROGRESS NOTE ADULT - PROBLEM SELECTOR PLAN 4
ENT consulted (dr. Westfall)  - dysphagia possibly 2/2 to prior R neck lymph node biopsy   - CT neck w/o Contrast 1/11: interval increase in right neck fullness since the prior study of 11/9/2016. A discrete abscess cannot be discriminated, however assessment is limited in the absence of intravenous contrast.  - Speech/Swallow assessment, continue mechanical soft diet, thin liquids, aspiration precuations  - f/u further Recs. Patient complaining of dysphagia + Odynophagia for past few months. ENT consulted; not concerned for infection at this time. Felt dysphagia likey multifactorial to age + poor pulmonary effort + atrophy. Patient on mechanical soft diet. S&S recommended Dys 1 mech soft-thin liquids to promote oral intake with 1 can Ensure Plus tid   -Will initiate new diet post-procedure

## 2017-01-13 NOTE — CONSULT NOTE ADULT - CONSULT REQUESTED DATE/TIME
10-Ronny-2017 13:00
10-Ronny-2017 17:46
11-Jan-2017 11:19
13-Jan-2017 07:05
13-Jan-2017 15:25
11-Jan-2017

## 2017-01-13 NOTE — PROGRESS NOTE ADULT - PROBLEM SELECTOR PLAN 1
Dr. Galaviz following. 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 Patient to be treated with  Rituximab, Bendamustine and Decadron. Patient to have chemoport placed by Dr. Demarco' team on 1/14.  -f/u official PET-CT read  -f/u serology results  -holding ASA + Eliquis for surgery. Will result post-procedure  -Allopurinol D/C'd as per Dr. Galaviz. f/u Dr. Galaviz's recommendations for tumor lysis prevention

## 2017-01-13 NOTE — PROGRESS NOTE ADULT - SUBJECTIVE AND OBJECTIVE BOX
Patient is a 85y old  Female who presents with a chief complaint of hypercalcemia (10 Ronny 2017 13:03)      HPI:  84 y/o F w/ pmhx of MGUS, HFpEF, CAD s/p CABG, severe MR/TR, a-fib s/p PPM on Eliquis, DM2, right-sided breast CA (s/p mastectomy/chemotx/radiation), recently admitted 12/29/16 to 1/4/17 for hypercalcemia treated with IV fluids/Lasix/bisphosphonates, presents again today for hypercalcemia on outpatient labs. She had an outpatient Ca level of 13 at Dr. Hartmann’s office yesterday. Since discharge last week, she reports generalized weakness and difficulty walking due to lack of balance which worsened gradually over the past week. She also experienced a fall (says she lost her balance) on outstretched right arm 4 days ago, with no LOC/head impact. Since then she complains of right shoulder pain when moving her arm but denies pain at rest. Also endorses chronic constipation and lack of appetite, but denies abdominal/kidney pain, nausea, vomiting, fever/chills, chest pain/sob, recent change in meds, dysuria, headache, or vision change. On last admission, she had excisional biopsy of lymph nodes (with ENT) with prelim reports concerning for lymphoma but final diagnosis pending.  Initial vitals in ED: 98.1F, HR 98, 133/52, RR 16, 98% O2 on RA.   ED administration: 250cc bolus of NS followed by NS at 80cc/hr and Lasix 20mg IV x 1. (10 Ronny 2017 13:03)         OVERNIGHT EVENT/COMMENTS:     transfer to oncology floor           PAST MEDICAL & SURGICAL HISTORY:  Scoliosis  Follicular lymphoma  Neck mass  Afib  Other hyperlipidemia  Breast cancer  CAD (coronary artery disease)  Diabetes  HTN (hypertension)  No pertinent past medical history  H/O abdominal hysterectomy  H/O thyroidectomy  S/P CABG x 3      MEDICATIONS  (STANDING):  insulin lispro (HumaLOG) corrective regimen sliding scale  SubCutaneous Before meals and at bedtime  amiodarone    Tablet 200milliGRAM(s) Oral daily  metoprolol succinate ER 50milliGRAM(s) Oral daily  pantoprazole    Tablet 40milliGRAM(s) Oral before breakfast  aspirin enteric coated 81milliGRAM(s) Oral daily  docusate sodium 100milliGRAM(s) Oral three times a day  senna 2Tablet(s) Oral at bedtime  polyethylene glycol 3350 17Gram(s) Oral three times a day  ondansetron  IVPB (Chemo) 16milliGRAM(s) IV Intermittent once  dexamethasone   IVPB (Chemo) 15milliGRAM(s) IV Intermittent once  furosemide    Tablet (Chemo) 40milliGRAM(s) Oral once  riTUXimab IVPB 415milliGRAM(s) IV Intermittent once  bisacodyl Suppository 10milliGRAM(s) Rectal once    MEDICATIONS  (PRN):    IV CONtRAST (Flushing (Skin); Rash)  No Known Drug Allergies        Vital Signs Last 24 Hrs  T(C): 36.7, Max: 37 (01-12 @ 18:15)  T(F): 98.1, Max: 98.6 (01-12 @ 18:15)  HR: 78 (78 - 85)  BP: 99/62 (98/54 - 128/61)  BP(mean): --  RR: 16 (16 - 16)  SpO2: 99% (96% - 99%)            DIZZINESS: DIZZINESS  H/o or current diagnosis of HF- Contraindication to ACEI/ARBs  H/o or current diagnosis of HF- ACEI/ARB contraindication unknown  H/o or current diagnosis of HF- Contraindication to ACEI/ARBs  H/o or current diagnosis of HF- ACEI/ARB contraindication unknown  H/o or current diagnosis of HF- Contraindication to ACEI/ARBs  H/o or current diagnosis of HF- ACEI/ARB contraindication unknown  H/o or current diagnosis of HF- Contraindication to ACEI/ARBs  H/o or current diagnosis of HF- ACEI/ARB contraindication unknown  No pertinent family history in first degree relatives  Handoff  MEWS Score: 5 (13-Jan-2017 12:40)  Scoliosis  Follicular lymphoma  Neck mass  Afib  Other hyperlipidemia  Breast cancer  CAD (coronary artery disease)  Diabetes  HTN (hypertension)  No pertinent past medical history  Afib  Hypercalcemia  Discharge planning issues: Discharge planning issues  Constipation: Constipation  Abdominal distension: Abdominal distension  Diabetes: Diabetes  Dysphagia: Dysphagia  Acute on chronic systolic (congestive) heart failure: Acute on chronic systolic (congestive) heart failure  Fever: Fever  Heart failure: Heart failure  Lymphoma: Lymphoma  Need for prophylactic measure: Need for prophylactic measure  HTN (hypertension): HTN (hypertension)  Type 2 diabetes mellitus: Type 2 diabetes mellitus  CAD (coronary artery disease): CAD (coronary artery disease)  Monoclonal gammopathy of undetermined significance: Monoclonal gammopathy of undetermined significance  Heart failure with preserved ejection fraction: Heart failure with preserved ejection fraction  Paroxysmal atrial fibrillation: Paroxysmal atrial fibrillation  Arm pain, right: Arm pain, right  Hypercalcemia: Hypercalcemia  H/O abdominal hysterectomy  H/O thyroidectomy  S/P CABG x 3  DIZZINESS: DIZZINESS  5  Lymphoma                                        11.1   3.2   )-----------( 192      ( 13 Jan 2017 06:59 )             34.0       13 Jan 2017 06:59    135    |  98     |  31     ----------------------------<  87     4.0     |  29     |  1.20     Ca    11.2       13 Jan 2017 06:59  Mg     1.9       13 Jan 2017 06:59    TPro  x      /  Alb  2.6    /  TBili  x      /  DBili  x      /  AST  x      /  ALT  x      /  AlkPhos  x      13 Jan 2017 06:59              MD  consult reviewed  +         ROS                           review as per  H/P                      ++++    NO CHANGE                  CHANGE  GENERAL                                awake           alert        confused           lethargic                     Febrile  CARDIOVASC           negative       chest pain    palpitation         SOB           TOSCANO  PULMONARY          negative                         cough            congestion                   wheezing     ENT                          negative +               hoarseness         sore throat      GastrointestinaL    negative          nausea         vomitting         diarrhea            pain                                 negative             continent         incontinent       dysuria         frequency          retention             alexander  MS                          negative               denies         weakness      PSYCH                    negative                   awake              agitation               dementia           Neuro                    negative             awake              non focal    SKIN                       negative              dry              rash           decubitus     ENDO                                      DM                     THYROID                          OTHER    PHYSICAL  EXAMINATION ::     HEENT             negative  NECK               Supple                 JVD  COR                 s1s2         +   reg                 irreg                  murmur  LUNGS            clear             +  rales               rhonchi                +    decreased BS   ABDOMEN     soft BS  present              benign              distended  EXTR               FROM                 + DECREASED rom        edema  NEURO         +  grossly intact               non focal                  unable to exam           Psych              +awake                confused   SKIN                see RN note             intact            rash        decubitus  /RECTAL   deferred        ADDL  STUDIES::::    LN bioppsy report   b cell Lymphoma

## 2017-01-13 NOTE — PROGRESS NOTE ADULT - PROBLEM SELECTOR PLAN 9
-Eliquis  -FULL CODE  -Dispo: patient to have chemoport placed tomorrow and start chemotherapy afterwards

## 2017-01-13 NOTE — CHART NOTE - NSCHARTNOTEFT_GEN_A_CORE
Upon Nutritional Assessment by the Registered Dietitian your patient was determined to meet criteria / has evidence of the following diagnosis/diagnoses:          [ ]  Mild Protein Calorie Malnutrition        [ ]  Moderate Protein Calorie Malnutrition        [ ] Severe Protein Calorie Malnutrition        [ ] Unspecified Protein Calorie Malnutrition        [x ] Underweight / BMI <19        [ ] Morbid Obesity / BMI > 40      Findings as based on:  •  Comprehensive nutrition assessment and consultation  •  Calorie counts (nutrient intake analysis)  •  Food acceptance and intake status from observations by staff  •  Follow up  •  Patient education  •  Intervention secondary to interdisciplinary rounds  •   concerns      Treatment:    The following diet has been recommended:  1. Consider liberalizing diet to Dys 1 mech soft-thin liquids to promote oral intake  2. Recommend adding Ensure Plus TID (1050kcal, 39g pro)  **Diet modification pending verification    PROVIDER Section:     By signing this assessment you are acknowledging and agree with the diagnosis/diagnoses assigned by the Registered Dietitian    Comments:

## 2017-01-13 NOTE — CONSULT NOTE ADULT - CONSULT REASON
Right neck pain
Hypercalcemia
PM&R evaluation
Temp of 101.6 in setting of Malignancy
need for hydration with history of fluid overload
Access for chemotherapy

## 2017-01-13 NOTE — DIETITIAN INITIAL EVALUATION ADULT. - ENERGY NEEDS
Admission weight used as pt is currently within % ideal body weight.   Increased needs secondary to BMI <19.0, repletion needs and possible hypermetabolic state

## 2017-01-13 NOTE — PHYSICAL THERAPY INITIAL EVALUATION ADULT - CRITERIA FOR SKILLED THERAPEUTIC INTERVENTIONS
rehab potential/functional limitations in following categories/risk reduction/prevention/anticipated discharge recommendation/therapy frequency/impairments found

## 2017-01-13 NOTE — CONSULT NOTE ADULT - SUBJECTIVE AND OBJECTIVE BOX
HPI:  86 y/o F w/ pmhx of MGUS, HFpEF, CAD s/p CABG, severe MR/TR, a-fib s/p PPM on Eliquis, DM2, right-sided breast CA (s/p mastectomy/chemotx/radiation), recently admitted 12/29/16 to 1/4/17 for hypercalcemia treated with IV fluids/Lasix/bisphosphonates, presents again today for hypercalcemia on outpatient labs. She had an outpatient Ca level of 13 at Dr. Hartmann’s office yesterday. Since discharge last week, she reports generalized weakness and difficulty walking due to lack of balance which worsened gradually over the past week. She also experienced a fall (says she lost her balance) on outstretched right arm 4 days ago, with no LOC/head impact. Since then she complains of right shoulder pain when moving her arm but denies pain at rest. Also endorses chronic constipation and lack of appetite, but denies abdominal/kidney pain, nausea, vomiting, fever/chills, chest pain/sob, recent change in meds, dysuria, headache, or vision change. On last admission, she had excisional biopsy of lymph nodes (with ENT) with prelim reports concerning for lymphoma but final diagnosis pending.  Initial vitals in ED: 98.1F, HR 98, 133/52, RR 16, 98% O2 on RA.   ED administration: 250cc bolus of NS followed by NS at 80cc/hr and Lasix 20mg IV x 1. (10 Ronny 2017 13:03)      Surgery addendum:  Surgery consult for access for chemotherapy. Patient scheduled to receive chemotherapy once port in place. Patient takes eliquis; currently c/o constipation and fatigue.       PAST MEDICAL & SURGICAL HISTORY:  Scoliosis  Follicular lymphoma  Neck mass  Afib  Other hyperlipidemia  Breast cancer  CAD (coronary artery disease)  Diabetes  HTN (hypertension)  No pertinent past medical history  H/O abdominal hysterectomy  H/O thyroidectomy  S/P CABG x 3      REVIEW OF SYSTEMS per HPI        MEDICATIONS  (STANDING):  insulin lispro (HumaLOG) corrective regimen sliding scale  SubCutaneous Before meals and at bedtime  amiodarone    Tablet 200milliGRAM(s) Oral daily  apixaban 2.5milliGRAM(s) Oral two times a day  metoprolol succinate ER 50milliGRAM(s) Oral daily  pantoprazole    Tablet 40milliGRAM(s) Oral before breakfast  aspirin enteric coated 81milliGRAM(s) Oral daily  docusate sodium 100milliGRAM(s) Oral three times a day  senna 2Tablet(s) Oral at bedtime  polyethylene glycol 3350 17Gram(s) Oral three times a day  ondansetron  IVPB (Chemo) 16milliGRAM(s) IV Intermittent once  dexamethasone   IVPB (Chemo) 15milliGRAM(s) IV Intermittent once  furosemide    Tablet (Chemo) 40milliGRAM(s) Oral once  riTUXimab IVPB 415milliGRAM(s) IV Intermittent once    MEDICATIONS  (PRN):      Allergies    IV CONtRAST (Flushing (Skin); Rash)  No Known Drug Allergies    Intolerances        SOCIAL HISTORY: nc    FAMILY HISTORY:  No pertinent family history in first degree relatives      Vital Signs Last 24 Hrs  T(C): 36.7, Max: 37 (01-12 @ 18:15)  T(F): 98.1, Max: 98.6 (01-12 @ 18:15)  HR: 78 (78 - 85)  BP: 99/62 (98/54 - 128/61)  BP(mean): --  RR: 16 (16 - 16)  SpO2: 99% (96% - 99%)    PHYSICAL EXAM:      Gen: NAD  CV: +s1,s2  Pulm: CTAB  Abd: Soft, NDNT, no rebound        LABS:                        11.1   3.2   )-----------( 192      ( 13 Jan 2017 06:59 )             34.0     13 Jan 2017 06:59    135    |  98     |  31     ----------------------------<  87     4.0     |  29     |  1.20     Ca    11.2       13 Jan 2017 06:59  Mg     1.9       13 Jan 2017 06:59    TPro  x      /  Alb  2.6    /  TBili  x      /  DBili  x      /  AST  x      /  ALT  x      /  AlkPhos  x      13 Jan 2017 06:59          RADIOLOGY & ADDITIONAL STUDIES

## 2017-01-13 NOTE — CONSULT NOTE ADULT - ASSESSMENT
Admitted for:  Problem/Plan - 1:  ·  Problem: Acute on chronic systolic (congestive) heart failure.  Plan: Resolved. Dr. Mckinney following - Patient w/ exacerbation after fluids given in ED.  - Echo 1/11: EF 65%, normal wall motion  - CXR 1/11: mild venous congestion (improved from CXR 1/10/17)    - Patient volume status improved s/p IV lasix.  Lasix discontinued as per Dr. Hartmann.  Continue as needed.     Problem/Plan - 2:  ·  Problem: Hypercalcemia.  Plan: Pt with symptoms of constipation. 2/2 malignancy, consistent with lymphoma from work up in past admission. Corrected Ca on admission was 13, s/p calcitonin and zoledronic acid x1.   -no IVF given recent episode of CHF exacerbation   -consider zoledronic acid and calcitonin  -will get chemo for lymphoma on 7WOPt with symptoms of constipation. Likely 2/2 malignancy. Corrected Ca on admission was 13, s/p calcitonin and zoledronic acid x1.   -no IVF given recent episode of CHF exacerbation   -consider zoledronic acid and calcitonin  -will get chemo for lymphoma on 7WO.     Problem/Plan - 3:  ·  Problem: Lymphoma.  Plan: Dr. Galaviz following  - Hx Breast CA w/ diffuse large cell Lymphoma noted on prelim results of recent R neck biopsy.  - s/p PET-CT today to further assess stage  - Serology for CA work-up ordered by Dr. Galaviz – follow-up.  - Continue Allopurinol 300mg QD to prevent tumor lysis syndrome  - possibly start chemo tomorrow.     Problem/Plan - 4:  ·  Problem: Fever.  Plan: Febrile 100.6 on 1/11, Tmax 99 over 24hrs.  ID consulted for sepsis vs. Tumor fever  - No leukocytosis, Blood CX negative, RVP negative, UA showed trace LE, trace blood and no nitrates, f/u Ucx.  Likely tumor fever  - ID signed off.     Problem/Plan - 5:  ·  Problem: Dysphagia.  Plan: ENT consulted (dr. Westfall)  - dysphagia possibly 2/2 to prior R neck lymph node biopsy   - CT neck w/o Contrast 1/11: interval increase in right neck fullness since the prior study of 11/9/2016. A discrete abscess cannot be discriminated, however assessment is limited in the absence of intravenous contrast.  - Speech/Swallow assessment, continue mechanical soft diet, thin liquids, aspiration precuations  - f/u further Recs.

## 2017-01-13 NOTE — DIETITIAN INITIAL EVALUATION ADULT. - OTHER INFO
Pt admitted with hypercalcemia, chronic constipation and decreased appetite.  Per , suspects recent weight loss.  Pt with UBW ~95lbs.  Per bedscale today: 91lbs.  Pt endorses poor appetite at this time; having to force herself to eat.  Pt complains of constipation.  Encouraged increased fluid/fiber intake.  Bowel regimen ordered.  Pt requesting prune juice; will document.  Pt denies pain.  NKFA.  Skin: sacral stage I ulcer.

## 2017-01-13 NOTE — PROGRESS NOTE ADULT - PROBLEM SELECTOR PLAN 1
due to lymphoma overproduction of 1,25 vit D-- s/o calcitonin, zometa, IVIF and lasix with some improvement but rising again--   starting chemo and should help  follow lytes

## 2017-01-13 NOTE — PROGRESS NOTE ADULT - PROBLEM SELECTOR PLAN 8
Likely poor po intake and hypercalcemia. Patient w/ c/o abdominal distention and chronic constipation  - Abdominal X-ray showed constipation and no obstruction   - continue Colace and Senna. s/p Tap water enema x 1 not effective  -start miralax TID likely 2/2 hypercalcemia + decreased PO intake  -c/w Miralax tid  -given Dulcolax suppository today; will add Dulcolax oral + enema if neded

## 2017-01-13 NOTE — PROGRESS NOTE ADULT - PROBLEM SELECTOR PLAN 5
A-Paced. HR controlled. Cont. Eliquis, Amiodarone and Toprol XL. Patient s/p PPM. HR controlled atrial pacing  -c/w metoprolol + amiodarone 200mg QD  -Eliquis held in preparation for procedure. Will restart post-procedure

## 2017-01-13 NOTE — PROGRESS NOTE ADULT - PROBLEM SELECTOR PLAN 3
controlled now-- would keep off IVF and diuretics for now and monitor--if develops tumor lysis will IVF and diuretics concomitantly

## 2017-01-14 LAB
ANION GAP SERPL CALC-SCNC: 11 MMOL/L — SIGNIFICANT CHANGE UP (ref 9–16)
BUN SERPL-MCNC: 33 MG/DL — HIGH (ref 7–23)
CALCIUM SERPL-MCNC: 11.3 MG/DL — HIGH (ref 8.5–10.5)
CHLORIDE SERPL-SCNC: 102 MMOL/L — SIGNIFICANT CHANGE UP (ref 96–108)
CO2 SERPL-SCNC: 23 MMOL/L — SIGNIFICANT CHANGE UP (ref 22–31)
CREAT SERPL-MCNC: 1.02 MG/DL — SIGNIFICANT CHANGE UP (ref 0.5–1.3)
GLUCOSE SERPL-MCNC: 159 MG/DL — HIGH (ref 70–99)
HCT VFR BLD CALC: 31.8 % — LOW (ref 34.5–45)
HGB BLD-MCNC: 10.3 G/DL — LOW (ref 11.5–15.5)
LDH SERPL L TO P-CCNC: 618 U/L — HIGH (ref 84–246)
MCHC RBC-ENTMCNC: 27.7 PG — SIGNIFICANT CHANGE UP (ref 27–34)
MCHC RBC-ENTMCNC: 32.4 G/DL — SIGNIFICANT CHANGE UP (ref 32–36)
MCV RBC AUTO: 85.5 FL — SIGNIFICANT CHANGE UP (ref 80–100)
PLATELET # BLD AUTO: 164 K/UL — SIGNIFICANT CHANGE UP (ref 150–400)
POTASSIUM SERPL-MCNC: 4.7 MMOL/L — SIGNIFICANT CHANGE UP (ref 3.5–5.3)
POTASSIUM SERPL-SCNC: 4.7 MMOL/L — SIGNIFICANT CHANGE UP (ref 3.5–5.3)
RBC # BLD: 3.72 M/UL — LOW (ref 3.8–5.2)
RBC # FLD: 16.4 % — SIGNIFICANT CHANGE UP (ref 10.3–16.9)
SODIUM SERPL-SCNC: 136 MMOL/L — SIGNIFICANT CHANGE UP (ref 135–145)
URATE SERPL-MCNC: 2.4 MG/DL — LOW (ref 2.6–6)
WBC # BLD: 3.8 K/UL — SIGNIFICANT CHANGE UP (ref 3.8–10.5)
WBC # FLD AUTO: 3.8 K/UL — SIGNIFICANT CHANGE UP (ref 3.8–10.5)

## 2017-01-14 PROCEDURE — 99232 SBSQ HOSP IP/OBS MODERATE 35: CPT

## 2017-01-14 RX ORDER — ONDANSETRON 8 MG/1
4 TABLET, FILM COATED ORAL ONCE
Qty: 0 | Refills: 0 | Status: DISCONTINUED | OUTPATIENT
Start: 2017-01-14 | End: 2017-01-20

## 2017-01-14 RX ORDER — HYDROMORPHONE HYDROCHLORIDE 2 MG/ML
0.5 INJECTION INTRAMUSCULAR; INTRAVENOUS; SUBCUTANEOUS
Qty: 0 | Refills: 0 | Status: DISCONTINUED | OUTPATIENT
Start: 2017-01-14 | End: 2017-01-20

## 2017-01-14 RX ORDER — ANASTROZOLE 1 MG/1
1 TABLET ORAL DAILY
Qty: 0 | Refills: 0 | Status: DISCONTINUED | OUTPATIENT
Start: 2017-01-14 | End: 2017-01-20

## 2017-01-14 RX ADMIN — AMIODARONE HYDROCHLORIDE 200 MILLIGRAM(S): 400 TABLET ORAL at 07:17

## 2017-01-14 RX ADMIN — Medication 50 MILLIGRAM(S): at 07:17

## 2017-01-14 RX ADMIN — POLYETHYLENE GLYCOL 3350 17 GRAM(S): 17 POWDER, FOR SOLUTION ORAL at 07:17

## 2017-01-14 RX ADMIN — ANASTROZOLE 1 MILLIGRAM(S): 1 TABLET ORAL at 18:58

## 2017-01-14 RX ADMIN — Medication 100 MILLIGRAM(S): at 07:19

## 2017-01-14 RX ADMIN — POLYETHYLENE GLYCOL 3350 17 GRAM(S): 17 POWDER, FOR SOLUTION ORAL at 00:26

## 2017-01-14 RX ADMIN — PANTOPRAZOLE SODIUM 40 MILLIGRAM(S): 20 TABLET, DELAYED RELEASE ORAL at 07:18

## 2017-01-14 NOTE — PROGRESS NOTE ADULT - SUBJECTIVE AND OBJECTIVE BOX
HPI:  86 y/o F w/ pmhx of MGUS, HFpEF, CAD s/p CABG, severe MR/TR, a-fib s/p PPM on Eliquis, DM2, right-sided breast CA (s/p mastectomy/chemotx/radiation), recently admitted 12/29/16 to 1/4/17 for hypercalcemia treated with IV fluids/Lasix/bisphosphonates, presents again today for hypercalcemia on outpatient labs. She had an outpatient Ca level of 13 at Dr. Hartmann’s office yesterday. Since discharge last week, she reports generalized weakness and difficulty walking due to lack of balance which worsened gradually over the past week. She also experienced a fall (says she lost her balance) on outstretched right arm 4 days ago, with no LOC/head impact. Since then she complains of right shoulder pain when moving her arm but denies pain at rest. Also endorses chronic constipation and lack of appetite, but denies abdominal/kidney pain, nausea, vomiting, fever/chills, chest pain/sob, recent change in meds, dysuria, headache, or vision change. On last admission, she had excisional biopsy of lymph nodes (with ENT) with prelim reports concerning for lymphoma but final diagnosis pending.  Initial vitals in ED: 98.1F, HR 98, 133/52, RR 16, 98% O2 on RA.   ED administration: 250cc bolus of NS followed by NS at 80cc/hr and Lasix 20mg IV x 1. (10 Ronny 2017 13:03)    FAMILY HISTORY:  No pertinent family history in first degree relatives    MEDICATIONS  (STANDING):  insulin lispro (HumaLOG) corrective regimen sliding scale  SubCutaneous Before meals and at bedtime  amiodarone    Tablet 200milliGRAM(s) Oral daily  metoprolol succinate ER 50milliGRAM(s) Oral daily  pantoprazole    Tablet 40milliGRAM(s) Oral before breakfast  docusate sodium 100milliGRAM(s) Oral three times a day  senna 2Tablet(s) Oral at bedtime  polyethylene glycol 3350 17Gram(s) Oral three times a day  ondansetron  IVPB (Chemo) 16milliGRAM(s) IV Intermittent once  dexamethasone   IVPB (Chemo) 15milliGRAM(s) IV Intermittent once  furosemide    Tablet (Chemo) 40milliGRAM(s) Oral once  riTUXimab IVPB 415milliGRAM(s) IV Intermittent once  anastrozole 1milliGRAM(s) Oral daily    MEDICATIONS  (PRN):    Vital Signs Last 24 Hrs  T(C): 36.8, Max: 37.5 (01-13 @ 20:47)  T(F): 98.3, Max: 99.5 (01-13 @ 20:47)  HR: 83 (75 - 84)  BP: 116/62 (97/55 - 116/62)  BP(mean): --  RR: 16 (16 - 20)  SpO2: 97% (95% - 97%)    Physical exam:    Overall impression  Lymphadenopathy  Liver  spleen    Labs:  CBC Full  -  ( 14 Jan 2017 07:21 )  WBC Count : 3.8 K/uL  Hemoglobin : 10.3 g/dL  Hematocrit : 31.8 %  Platelet Count - Automated : 164 K/uL  Mean Cell Volume : 85.5 fL  Mean Cell Hemoglobin : 27.7 pg  Mean Cell Hemoglobin Concentration : 32.4 g/dL  Auto Neutrophil # : x  Auto Lymphocyte # : x  Auto Monocyte # : x  Auto Eosinophil # : x  Auto Basophil # : x  Auto Neutrophil % : x  Auto Lymphocyte % : x  Auto Monocyte % : x  Auto Eosinophil % : x  Auto Basophil % : x    14 Jan 2017 07:21    136    |  102    |  33     ----------------------------<  159    4.7     |  23     |  1.02     Ca    11.3       14 Jan 2017 07:21  Mg     1.9       13 Jan 2017 06:59    TPro  x      /  Alb  2.6    /  TBili  x      /  DBili  x      /  AST  x      /  ALT  x      /  AlkPhos  x      13 Jan 2017 06:59      Radiology:  HEALTH ISSUES - R/O PROBLEM Dx:      Assessmant / Problems  1) diffuse B cell large johnny lymphoma, has received Rituximab plus Decadrone, Bendamustine will  be administered over the next 2 days  2) Relapsing breast ca, originally Er positive    Plan:  infusaport placement today  Bendamustine today and tomorrow      Neha Galaviz MD HPI:  86 y/o F w/ pmhx of MGUS, HFpEF, CAD s/p CABG, severe MR/TR, a-fib s/p PPM on Eliquis, DM2, right-sided breast CA (s/p mastectomy/chemotx/radiation), recently admitted 12/29/16 to 1/4/17 for hypercalcemia treated with IV fluids/Lasix/bisphosphonates, presents again today for hypercalcemia on outpatient labs. She had an outpatient Ca level of 13 at Dr. Hartmann’s office yesterday. Since discharge last week, she reports generalized weakness and difficulty walking due to lack of balance which worsened gradually over the past week. She also experienced a fall (says she lost her balance) on outstretched right arm 4 days ago, with no LOC/head impact. Since then she complains of right shoulder pain when moving her arm but denies pain at rest. Also endorses chronic constipation and lack of appetite, but denies abdominal/kidney pain, nausea, vomiting, fever/chills, chest pain/sob, recent change in meds, dysuria, headache, or vision change. On last admission, she had excisional biopsy of lymph nodes (with ENT) with prelim reports concerning for lymphoma but final diagnosis pending.  Initial vitals in ED: 98.1F, HR 98, 133/52, RR 16, 98% O2 on RA.   ED administration: 250cc bolus of NS followed by NS at 80cc/hr and Lasix 20mg IV x 1. (10 Ronny 2017 13:03)    FAMILY HISTORY:  No pertinent family history in first degree relatives    MEDICATIONS  (STANDING):  insulin lispro (HumaLOG) corrective regimen sliding scale  SubCutaneous Before meals and at bedtime  amiodarone    Tablet 200milliGRAM(s) Oral daily  metoprolol succinate ER 50milliGRAM(s) Oral daily  pantoprazole    Tablet 40milliGRAM(s) Oral before breakfast  docusate sodium 100milliGRAM(s) Oral three times a day  senna 2Tablet(s) Oral at bedtime  polyethylene glycol 3350 17Gram(s) Oral three times a day  ondansetron  IVPB (Chemo) 16milliGRAM(s) IV Intermittent once  dexamethasone   IVPB (Chemo) 15milliGRAM(s) IV Intermittent once  furosemide    Tablet (Chemo) 40milliGRAM(s) Oral once  riTUXimab IVPB 415milliGRAM(s) IV Intermittent once  anastrozole 1milliGRAM(s) Oral daily    MEDICATIONS  (PRN):    Vital Signs Last 24 Hrs  T(C): 36.8, Max: 37.5 (01-13 @ 20:47)  T(F): 98.3, Max: 99.5 (01-13 @ 20:47)  HR: 83 (75 - 84)  BP: 116/62 (97/55 - 116/62)  BP(mean): --  RR: 16 (16 - 20)  SpO2: 97% (95% - 97%)    Physical exam:    Overall impression  Lymphadenopathy  Liver  spleen    Labs:  CBC Full  -  ( 14 Jan 2017 07:21 )  WBC Count : 3.8 K/uL  Hemoglobin : 10.3 g/dL  Hematocrit : 31.8 %  Platelet Count - Automated : 164 K/uL  Mean Cell Volume : 85.5 fL  Mean Cell Hemoglobin : 27.7 pg  Mean Cell Hemoglobin Concentration : 32.4 g/dL  Auto Neutrophil # : x  Auto Lymphocyte # : x  Auto Monocyte # : x  Auto Eosinophil # : x  Auto Basophil # : x  Auto Neutrophil % : x  Auto Lymphocyte % : x  Auto Monocyte % : x  Auto Eosinophil % : x  Auto Basophil % : x    14 Jan 2017 07:21    136    |  102    |  33     ----------------------------<  159    4.7     |  23     |  1.02     Ca    11.3       14 Jan 2017 07:21  Mg     1.9       13 Jan 2017 06:59    TPro  x      /  Alb  2.6    /  TBili  x      /  DBili  x      /  AST  x      /  ALT  x      /  AlkPhos  x      13 Jan 2017 06:59      Radiology:  HEALTH ISSUES - R/O PROBLEM Dx:      Assessmant / Problems  1) diffuse B cell large johnny lymphoma, has received Rituximab plus Decadrone, Bendamustine will  be administered over the next 2 days  2) Relapsing breast ca, originally Er positive  3) hypercalcemia of malignancy    Plan:  1)infusaport placement today  2)Bendamustine today and tomorrow  3)Arimidex 1 mg po od started      Neha Galaviz MD

## 2017-01-14 NOTE — PROGRESS NOTE ADULT - SUBJECTIVE AND OBJECTIVE BOX
POST-OPERATIVE NOTE    Procedure: Insertion of portacath    Diagnosis/Indication: breast cancer, lymphoma    Surgeon: Dr. Don    S: Pt has no complaints. Denies CP, SOB, TOSCANO, calf tenderness. Pain controlled with medication. Denies nausea, vomiting.     O:  T(C): 36, Max: 36 (01-14 @ 16:40)  T(F): 96.8, Max: 96.8 (01-14 @ 16:40)  HR: 80 (70 - 80)  BP: 102/54 (98/52 - 108/52)  RR: 18 (16 - 18)  SpO2: 97% (94% - 98%)  Wt(kg): --                        10.3   3.8   )-----------( 164      ( 14 Jan 2017 07:21 )             31.8     14 Jan 2017 07:21    136    |  102    |  33     ----------------------------<  159    4.7     |  23     |  1.02     Ca    11.3       14 Jan 2017 07:21  Mg     1.9       13 Jan 2017 06:59    TPro  x      /  Alb  2.6    /  TBili  x      /  DBili  x      /  AST  x      /  ALT  x      /  AlkPhos  x      13 Jan 2017 06:59      Gen: NAD, resting comfortably in bed, eating dinner  Pulm: Nonlabored breathing, no respiratory distress  Wound: Incision CDI with dermabond, dressing in place over, IV attached. No swelling or hematoma.

## 2017-01-14 NOTE — PROGRESS NOTE ADULT - SUBJECTIVE AND OBJECTIVE BOX
Patient is a 85y old  Female who presents with a chief complaint of hypercalcemia (10 Ronny 2017 13:03)      HPI:  86 y/o F w/ pmhx of MGUS, HFpEF, CAD s/p CABG, severe MR/TR, a-fib s/p PPM on Eliquis, DM2, right-sided breast CA (s/p mastectomy/chemotx/radiation), recently admitted 12/29/16 to 1/4/17 for hypercalcemia treated with IV fluids/Lasix/bisphosphonates, presents again today for hypercalcemia on outpatient labs. She had an outpatient Ca level of 13 at Dr. Hartmann’s office yesterday. Since discharge last week, she reports generalized weakness and difficulty walking due to lack of balance which worsened gradually over the past week. She also experienced a fall (says she lost her balance) on outstretched right arm 4 days ago, with no LOC/head impact. Since then she complains of right shoulder pain when moving her arm but denies pain at rest. Also endorses chronic constipation and lack of appetite, but denies abdominal/kidney pain, nausea, vomiting, fever/chills, chest pain/sob, recent change in meds, dysuria, headache, or vision change. On last admission, she had excisional biopsy of lymph nodes (with ENT) with prelim reports concerning for lymphoma but final diagnosis pending.  Initial vitals in ED: 98.1F, HR 98, 133/52, RR 16, 98% O2 on RA.   ED administration: 250cc bolus of NS followed by NS at 80cc/hr and Lasix 20mg IV x 1. (10 Ronny 2017 13:03)    INTERVAL HPI/OVERNIGHT EVENTS:::CV stable, receiving chemo,    HEALTH ISSUES - PROBLEM Dx:  Discharge planning issues: Discharge planning issues  Constipation: Constipation  Abdominal distension: Abdominal distension  Diabetes: Diabetes  Dysphagia: Dysphagia  Acute on chronic systolic (congestive) heart failure: Acute on chronic systolic (congestive) heart failure  Fever: Fever  Heart failure: Heart failure  Lymphoma: Lymphoma  Need for prophylactic measure: Need for prophylactic measure  HTN (hypertension): HTN (hypertension)  Type 2 diabetes mellitus: Type 2 diabetes mellitus  CAD (coronary artery disease): CAD (coronary artery disease)  Monoclonal gammopathy of undetermined significance: Monoclonal gammopathy of undetermined significance  Heart failure with preserved ejection fraction: Heart failure with preserved ejection fraction  Paroxysmal atrial fibrillation: Paroxysmal atrial fibrillation  Arm pain, right: Arm pain, right  Hypercalcemia: Hypercalcemia          PAST MEDICAL & SURGICAL HISTORY:  Scoliosis  Follicular lymphoma  Neck mass  Afib  Other hyperlipidemia  Breast cancer  CAD (coronary artery disease)  Diabetes  HTN (hypertension)  No pertinent past medical history  H/O abdominal hysterectomy  H/O thyroidectomy  S/P CABG x 3          Consultant NOTE  REVIEWED  (   )    REVIEW OF SYSTEMS:  [x] As per HPI  CONSTITUTIONAL: No fever, weight loss,weakness  RESPIRATORY: No cough, wheezing, chills or hemoptysis; No Shortness of Breath  CARDIOVASCULAR: No chest pain, palpitations, dizziness, or leg swelling  GASTROINTESTINAL: No abdominal or epigastric pain. No nausea, vomiting, or hematemesis; No diarrhea or constipation. No melena or hematochezia.  MUSCULOSKELETAL: weakness  PSYCH    awake, alert       [x] All others negative	  [ ] Unable to obtain          Vital Signs Last 24 Hrs  T(C): 36.3, Max: 37.5 (01-13 @ 20:47)  T(F): 97.3, Max: 99.5 (01-13 @ 20:47)  HR: 81 (75 - 84)  BP: 129/71 (97/55 - 129/71)  BP(mean): --  RR: 16 (16 - 20)  SpO2: 98% (95% - 98%)        PHYSICAL EXAMINATION:                                    (+    )  NO CHANGE  Appearance: Normal	  HEENT:   Normal oral mucosa, PERRL, EOMI	  Neck: Supple, + JVD/ - JVD; Carotid Bruit   Cardiovascular: Normal S1 S2, No JVD, mur  Respiratory: Lungs clear to auscultation/Decreased Breath Sounds/No Rales, Rhonchi, Wheezing	  Gastrointestinal:  Soft, Non-tender, + BS	  Skin: No rashes, No ecchymoses, No cyanosis  Extremities: Normal range of motion, No clubbing, cyanosis or edema  Vascular: Peripheral pulses palpabl  Neurologic: Non-focal  Psychiatry: A & O x 3, Mood & affect appropriate    insulin lispro (HumaLOG) corrective regimen sliding scale  SubCutaneous Before meals and at bedtime  amiodarone    Tablet 200milliGRAM(s) Oral daily  metoprolol succinate ER 50milliGRAM(s) Oral daily  pantoprazole    Tablet 40milliGRAM(s) Oral before breakfast  docusate sodium 100milliGRAM(s) Oral three times a day  senna 2Tablet(s) Oral at bedtime  polyethylene glycol 3350 17Gram(s) Oral three times a day  ondansetron  IVPB (Chemo) 16milliGRAM(s) IV Intermittent once  dexamethasone   IVPB (Chemo) 15milliGRAM(s) IV Intermittent once  furosemide    Tablet (Chemo) 40milliGRAM(s) Oral once  riTUXimab IVPB 415milliGRAM(s) IV Intermittent once  anastrozole 1milliGRAM(s) Oral daily        PT/INR - ( 13 Jan 2017 20:47 )   PT: 12.7 sec;   INR: 1.14          PTT - ( 13 Jan 2017 20:47 )  PTT:26.3 sec                              10.3   3.8   )-----------( 164      ( 14 Jan 2017 07:21 )             31.8     14 Jan 2017 07:21    136    |  102    |  33     ----------------------------<  159    4.7     |  23     |  1.02     Ca    11.3       14 Jan 2017 07:21  Mg     1.9       13 Jan 2017 06:59    TPro  x      /  Alb  2.6    /  TBili  x      /  DBili  x      /  AST  x      /  ALT  x      /  AlkPhos  x      13 Jan 2017 06:59      CAPILLARY BLOOD GLUCOSE  125 (14 Jan 2017 11:28)  149 (14 Jan 2017 07:41)  186 (13 Jan 2017 20:54)  127 (13 Jan 2017 17:00)

## 2017-01-14 NOTE — PROGRESS NOTE ADULT - SUBJECTIVE AND OBJECTIVE BOX
INTERVAL HPI: no cp no sob no palp  	  MEDICATIONS:  amiodarone    Tablet 200milliGRAM(s) Oral daily  metoprolol succinate ER 50milliGRAM(s) Oral daily  furosemide    Tablet (Chemo) 40milliGRAM(s) Oral once        ondansetron  IVPB (Chemo) 16milliGRAM(s) IV Intermittent once    pantoprazole    Tablet 40milliGRAM(s) Oral before breakfast  docusate sodium 100milliGRAM(s) Oral three times a day  senna 2Tablet(s) Oral at bedtime  polyethylene glycol 3350 17Gram(s) Oral three times a day    insulin lispro (HumaLOG) corrective regimen sliding scale  SubCutaneous Before meals and at bedtime  dexamethasone   IVPB (Chemo) 15milliGRAM(s) IV Intermittent once    riTUXimab IVPB 415milliGRAM(s) IV Intermittent once  anastrozole 1milliGRAM(s) Oral daily      REVIEW OF SYSTEMS:  [x] As per HPI  CONSTITUTIONAL: No fever, weight loss, or fatigue  RESPIRATORY: No cough, wheezing, chills or hemoptysis; No Shortness of Breath  CARDIOVASCULAR: No chest pain, palpitations, dizziness, or leg swelling  GASTROINTESTINAL: No abdominal or epigastric pain. No nausea, vomiting, or hematemesis; No diarrhea or constipation. No melena or hematochezia.  MUSCULOSKELETAL: No joint pain or swelling; No muscle, back, or extremity pain  [x] All others negative	  [ ] Unable to obtain    PHYSICAL EXAM:  T(C): 36.8, Max: 37.5 (01-13 @ 20:47)  HR: 83 (75 - 84)  BP: 116/62 (97/55 - 116/62)  RR: 16 (16 - 20)  SpO2: 97% (95% - 97%)  Wt(kg): --  I&O's Summary        Appearance: Normal	  HEENT:   Normal oral mucosa  Cardiovascular: Normal S1 S2, No JVD, No murmurs, No edema  Respiratory: Lungs clear to auscultation	  Gastrointestinal:  Soft, Non-tender, + BS	  Extremities: Normal range of motion, No clubbing, cyanosis or edema  Vascular: Peripheral pulses palpable 2+ bilaterally    TELEMETRY: 	    ECG:    	  RADIOLOGY:   CXR:  CT:  US:    CARDIAC TESTING:  Echocardiogram:  Catheterization:  Stress Test:      LABS:	 	    CARDIAC MARKERS:                                  10.3   3.8   )-----------( 164      ( 14 Jan 2017 07:21 )             31.8     14 Jan 2017 07:21    136    |  102    |  33     ----------------------------<  159    4.7     |  23     |  1.02     Ca    11.3       14 Jan 2017 07:21  Mg     1.9       13 Jan 2017 06:59    TPro  x      /  Alb  2.6    /  TBili  x      /  DBili  x      /  AST  x      /  ALT  x      /  AlkPhos  x      13 Jan 2017 06:59    proBNP:   Lipid Profile:   HgA1c:   TSH:     ASSESSMENT/PLAN: 	  Dr. Mckinney following - Patient w/ exacerbation after fluids given in ED.  - Echo 1/11: EF 65%, normal wall motion  - CXR 1/11: mild venous congestion (improved from CXR 1/10/17)   - Patient volume status improved s/p IV lasix.  Lasix discontinued as per Dr. Hartmann.  Continue as needed.      Paroxysmal atrial fibrillation. Plan: A-Paced. HR controlled. Cont. Eliquis, Amiodarone and Toprol XL.    CAD (coronary artery disease).  Plan: CP free, Cont. ASA/BB. Continue holding Statin due to transaminitis until improved. Hepatitis panel negative..

## 2017-01-14 NOTE — PROGRESS NOTE ADULT - PROBLEM SELECTOR PLAN 1
Suspect like due to lymphoma overproduction of 1,25 vit D-- patient has received calcitonin, zometa, IVIF and lasix with some improvement but gradually rising again--   - treatment of lymphoma should help   - follow lytes

## 2017-01-14 NOTE — BRIEF OPERATIVE NOTE - OPERATION/FINDINGS
Procedure: portacath placement for infusion of chemotherapy for breast cancer and lymphoma. Placement confirmed by xray.

## 2017-01-14 NOTE — PROGRESS NOTE ADULT - SUBJECTIVE AND OBJECTIVE BOX
Patient seen and examined by me at bedside.  ROS: No chest pain, no sob, no abd pain. No n/v/d    PAST MEDICAL & SURGICAL HISTORY:  Scoliosis  Follicular lymphoma  Neck mass  Afib  Other hyperlipidemia  Breast cancer  CAD (coronary artery disease)  Diabetes  HTN (hypertension)  No pertinent past medical history  H/O abdominal hysterectomy  H/O thyroidectomy  S/P CABG x 3    PHYSICAL EXAM:  T(C): 36.8, Max: 37.5 ( @ 20:47)  HR: 83  BP: 116/62 (97/55 - 116/62)  RR: 16  SpO2: 97%  Wt(kg): --    Weight 43.1 (01-10 @ 09:58)  Constitutional: AAOx3. No acute distress  ENMT: Dry mucous membrane.  No cyanosis.  Respiratory: minimal bibasilar crackles, otherwise CTA b/l  Cardiovascular: S1, S2.  Regular rate and rhythm. (+) systolic murmur  Gastrointestinal: soft, non-tender, non-distended,  Extremities: Warm.  No lower extremity edema.    Neurological: No focal deficits.  Skin: no rash      .      DATA:                        10.3<L>  3.8   )-----------( 164      ( 2017 07:21 )             31.8<L>        136    |  102    |  33<H>  ----------------------------<  159<H>  Ca:11.3<H> (2017 07:21)  4.7     |  23     |  1.02       eGFR if Non : 50 <L>  eGFR if : 58 <L>    TPro  x      /  Alb  2.6 g/dL<L>  /  TBili  x      /  DBili  x      /  AST  x      /  ALT  x      /  AlkPhos  x      2017 06:59  Lactate Dehydrogenase, Serum: 618 U/L <H> ( @ 07:21)      Vitamin D, 1, 25-Dihydroxy: 120.0 pg/mL <H> [19.9 - 79.3] (01-10 @ 14:53)  Vitamin D, 25-Hydroxy: 22.1 ng/mL <L> [30.0 - 100.0] (01-10 @ 14:53)  Vitamin D, 25-Hydroxy: 22.0 ng/mL <L> [30.0 - 100.0] ( @ 14:44)  Vitamin D, 1, 25-Dihydroxy: 100.0 pg/mL <H> [19.9 - 79.3] ( @ 14:44)    Urinalysis Basic - ( 2017 12:21 )  Color: Yellow / Appearance: Clear / S.010 / pH: x  Gluc: x / Ketone: NEGATIVE  / Bili: NEGATIVE / Urobili: 0.2 E.U./dL   Blood: x / Protein: NEGATIVE mg/dL / Nitrite: NEGATIVE   Leuk Esterase: Trace<!!> / RBC: < 5 /HPF / WBC < 5 /HPF   Sq Epi: x / Non Sq Epi: x / Bacteria: Present /HPF<!!>        Protein/Creatinine Ratio Calculation: 3.5 Ratio ( @ 08:12)          MEDICATIONS  (STANDING):  insulin lispro (HumaLOG) corrective regimen sliding scale  SubCutaneous Before meals and at bedtime  amiodarone    Tablet 200milliGRAM(s) Oral daily  metoprolol succinate ER 50milliGRAM(s) Oral daily  pantoprazole    Tablet 40milliGRAM(s) Oral before breakfast  docusate sodium 100milliGRAM(s) Oral three times a day  senna 2Tablet(s) Oral at bedtime  polyethylene glycol 3350 17Gram(s) Oral three times a day  ondansetron  IVPB (Chemo) 16milliGRAM(s) IV Intermittent once  dexamethasone   IVPB (Chemo) 15milliGRAM(s) IV Intermittent once  furosemide    Tablet (Chemo) 40milliGRAM(s) Oral once  riTUXimab IVPB 415milliGRAM(s) IV Intermittent once  anastrozole 1milliGRAM(s) Oral daily    MEDICATIONS  (PRN):

## 2017-01-15 DIAGNOSIS — Z01.89 ENCOUNTER FOR OTHER SPECIFIED SPECIAL EXAMINATIONS: ICD-10-CM

## 2017-01-15 LAB
ALBUMIN SERPL ELPH-MCNC: 2.4 G/DL — LOW (ref 3.4–5)
ALP SERPL-CCNC: 103 U/L — SIGNIFICANT CHANGE UP (ref 40–120)
ALT FLD-CCNC: 71 U/L — HIGH (ref 12–42)
ANION GAP SERPL CALC-SCNC: 9 MMOL/L — SIGNIFICANT CHANGE UP (ref 9–16)
ANION GAP SERPL CALC-SCNC: 9 MMOL/L — SIGNIFICANT CHANGE UP (ref 9–16)
AST SERPL-CCNC: 137 U/L — HIGH (ref 15–37)
BILIRUB SERPL-MCNC: 0.3 MG/DL — SIGNIFICANT CHANGE UP (ref 0.2–1.2)
BUN SERPL-MCNC: 34 MG/DL — HIGH (ref 7–23)
BUN SERPL-MCNC: 36 MG/DL — HIGH (ref 7–23)
CALCIUM SERPL-MCNC: 10 MG/DL — SIGNIFICANT CHANGE UP (ref 8.5–10.5)
CALCIUM SERPL-MCNC: 10.6 MG/DL — HIGH (ref 8.5–10.5)
CHLORIDE SERPL-SCNC: 102 MMOL/L — SIGNIFICANT CHANGE UP (ref 96–108)
CHLORIDE SERPL-SCNC: 104 MMOL/L — SIGNIFICANT CHANGE UP (ref 96–108)
CO2 SERPL-SCNC: 24 MMOL/L — SIGNIFICANT CHANGE UP (ref 22–31)
CO2 SERPL-SCNC: 26 MMOL/L — SIGNIFICANT CHANGE UP (ref 22–31)
CREAT SERPL-MCNC: 0.96 MG/DL — SIGNIFICANT CHANGE UP (ref 0.5–1.3)
CREAT SERPL-MCNC: 0.98 MG/DL — SIGNIFICANT CHANGE UP (ref 0.5–1.3)
GLUCOSE SERPL-MCNC: 142 MG/DL — HIGH (ref 70–99)
GLUCOSE SERPL-MCNC: 151 MG/DL — HIGH (ref 70–99)
HCT VFR BLD CALC: 29.3 % — LOW (ref 34.5–45)
HCT VFR BLD CALC: 29.5 % — LOW (ref 34.5–45)
HGB BLD-MCNC: 9.6 G/DL — LOW (ref 11.5–15.5)
HGB BLD-MCNC: 9.6 G/DL — LOW (ref 11.5–15.5)
LDH SERPL L TO P-CCNC: 737 U/L — HIGH (ref 84–246)
MAGNESIUM SERPL-MCNC: 1.6 MG/DL — SIGNIFICANT CHANGE UP (ref 1.6–2.4)
MCHC RBC-ENTMCNC: 27.7 PG — SIGNIFICANT CHANGE UP (ref 27–34)
MCHC RBC-ENTMCNC: 27.9 PG — SIGNIFICANT CHANGE UP (ref 27–34)
MCHC RBC-ENTMCNC: 32.5 G/DL — SIGNIFICANT CHANGE UP (ref 32–36)
MCHC RBC-ENTMCNC: 32.8 G/DL — SIGNIFICANT CHANGE UP (ref 32–36)
MCV RBC AUTO: 84.7 FL — SIGNIFICANT CHANGE UP (ref 80–100)
MCV RBC AUTO: 85.8 FL — SIGNIFICANT CHANGE UP (ref 80–100)
PLATELET # BLD AUTO: 162 K/UL — SIGNIFICANT CHANGE UP (ref 150–400)
PLATELET # BLD AUTO: 165 K/UL — SIGNIFICANT CHANGE UP (ref 150–400)
POTASSIUM SERPL-MCNC: 4 MMOL/L — SIGNIFICANT CHANGE UP (ref 3.5–5.3)
POTASSIUM SERPL-MCNC: 4.2 MMOL/L — SIGNIFICANT CHANGE UP (ref 3.5–5.3)
POTASSIUM SERPL-SCNC: 4 MMOL/L — SIGNIFICANT CHANGE UP (ref 3.5–5.3)
POTASSIUM SERPL-SCNC: 4.2 MMOL/L — SIGNIFICANT CHANGE UP (ref 3.5–5.3)
PROT SERPL-MCNC: 6 G/DL — LOW (ref 6.4–8.2)
RBC # BLD: 3.44 M/UL — LOW (ref 3.8–5.2)
RBC # BLD: 3.46 M/UL — LOW (ref 3.8–5.2)
RBC # FLD: 16.5 % — SIGNIFICANT CHANGE UP (ref 10.3–16.9)
RBC # FLD: 16.6 % — SIGNIFICANT CHANGE UP (ref 10.3–16.9)
SODIUM SERPL-SCNC: 137 MMOL/L — SIGNIFICANT CHANGE UP (ref 135–145)
SODIUM SERPL-SCNC: 137 MMOL/L — SIGNIFICANT CHANGE UP (ref 135–145)
URATE SERPL-MCNC: 3 MG/DL — SIGNIFICANT CHANGE UP (ref 2.6–6)
WBC # BLD: 3 K/UL — LOW (ref 3.8–10.5)
WBC # BLD: 4.1 K/UL — SIGNIFICANT CHANGE UP (ref 3.8–10.5)
WBC # FLD AUTO: 3 K/UL — LOW (ref 3.8–10.5)
WBC # FLD AUTO: 4.1 K/UL — SIGNIFICANT CHANGE UP (ref 3.8–10.5)

## 2017-01-15 PROCEDURE — 99232 SBSQ HOSP IP/OBS MODERATE 35: CPT

## 2017-01-15 RX ORDER — APIXABAN 2.5 MG/1
2.5 TABLET, FILM COATED ORAL
Qty: 0 | Refills: 0 | Status: DISCONTINUED | OUTPATIENT
Start: 2017-01-15 | End: 2017-01-20

## 2017-01-15 RX ADMIN — PANTOPRAZOLE SODIUM 40 MILLIGRAM(S): 20 TABLET, DELAYED RELEASE ORAL at 06:23

## 2017-01-15 RX ADMIN — ANASTROZOLE 1 MILLIGRAM(S): 1 TABLET ORAL at 13:32

## 2017-01-15 RX ADMIN — Medication 50 MILLIGRAM(S): at 06:23

## 2017-01-15 RX ADMIN — Medication 6: at 13:22

## 2017-01-15 RX ADMIN — Medication 2: at 09:12

## 2017-01-15 RX ADMIN — AMIODARONE HYDROCHLORIDE 200 MILLIGRAM(S): 400 TABLET ORAL at 06:23

## 2017-01-15 RX ADMIN — APIXABAN 2.5 MILLIGRAM(S): 2.5 TABLET, FILM COATED ORAL at 18:16

## 2017-01-15 NOTE — PROGRESS NOTE ADULT - SUBJECTIVE AND OBJECTIVE BOX
Patient is a 85y old  Female who presents with a chief complaint of hypercalcemia (10 Ronny 2017 13:03)      HPI:  86 y/o F w/ pmhx of MGUS, HFpEF, CAD s/p CABG, severe MR/TR, a-fib s/p PPM on Eliquis, DM2, right-sided breast CA (s/p mastectomy/chemotx/radiation), recently admitted 12/29/16 to 1/4/17 for hypercalcemia treated with IV fluids/Lasix/bisphosphonates, presents again today for hypercalcemia on outpatient labs. She had an outpatient Ca level of 13 at Dr. Hartmann’s office yesterday. Since discharge last week, she reports generalized weakness and difficulty walking due to lack of balance which worsened gradually over the past week. She also experienced a fall (says she lost her balance) on outstretched right arm 4 days ago, with no LOC/head impact. Since then she complains of right shoulder pain when moving her arm but denies pain at rest. Also endorses chronic constipation and lack of appetite, but denies abdominal/kidney pain, nausea, vomiting, fever/chills, chest pain/sob, recent change in meds, dysuria, headache, or vision change. On last admission, she had excisional biopsy of lymph nodes (with ENT) with prelim reports concerning for lymphoma but final diagnosis pending.  Initial vitals in ED: 98.1F, HR 98, 133/52, RR 16, 98% O2 on RA.   ED administration: 250cc bolus of NS followed by NS at 80cc/hr and Lasix 20mg IV x 1. (10 Ronny 2017 13:03)    INTERVAL HPI/OVERNIGHT EVENTS:::receiving chemo, frail    HEALTH ISSUES - PROBLEM Dx:  Physical therapy evaluation, initial: Physical therapy evaluation, initial  Discharge planning issues: Discharge planning issues  Constipation: Constipation  Abdominal distension: Abdominal distension  Diabetes: Diabetes  Dysphagia: Dysphagia  Acute on chronic systolic (congestive) heart failure: Acute on chronic systolic (congestive) heart failure  Fever: Fever  Heart failure: Heart failure  Lymphoma: Lymphoma  Need for prophylactic measure: Need for prophylactic measure  HTN (hypertension): HTN (hypertension)  Type 2 diabetes mellitus: Type 2 diabetes mellitus  CAD (coronary artery disease): CAD (coronary artery disease)  Monoclonal gammopathy of undetermined significance: Monoclonal gammopathy of undetermined significance  Heart failure with preserved ejection fraction: Heart failure with preserved ejection fraction  Paroxysmal atrial fibrillation: Paroxysmal atrial fibrillation  Arm pain, right: Arm pain, right  Hypercalcemia: Hypercalcemia          PAST MEDICAL & SURGICAL HISTORY:  Scoliosis  Follicular lymphoma  Neck mass  Afib  Other hyperlipidemia  Breast cancer  CAD (coronary artery disease)  Diabetes  HTN (hypertension)  No pertinent past medical history  H/O abdominal hysterectomy  H/O thyroidectomy  S/P CABG x 3          Consultant NOTE  REVIEWED  (   )    REVIEW OF SYSTEMS:  [x] As per HPI  CONSTITUTIONAL: weakness  RESPIRATORY: SOB  CARDIOVASCULAR: No chest pain, palpitations, dizziness, or leg swelling  GASTROINTESTINAL: No abdominal or epigastric pain. No nausea, vomiting, or hematemesis; No diarrhea or constipation. No melena or hematochezia.  MUSCULOSKELETAL: weakness  PSYCH    awake, alert       [x] All others negative	  [ ] Unable to obtain          Vital Signs Last 24 Hrs  T(C): 36.7, Max: 36.7 (01-15 @ 16:46)  T(F): 98, Max: 98 (01-15 @ 16:46)  HR: 75 (75 - 82)  BP: 125/71 (122/70 - 138/63)  BP(mean): --  RR: 16 (14 - 16)  SpO2: 97% (95% - 98%)        PHYSICAL EXAMINATION:                                    (    )  NO CHANGE  Appearance: Normal	  HEENT:   Normal oral mucosa, PERRL, EOMI	  Neck: Supple, + JVD/ - JVD; Carotid Bruit   Cardiovascular: Normal S1 S2, No JVD,   Respiratory: decr BS bilat rales  Gastrointestinal:  Soft, Non-tender, + BS	  Skin: No rashes, No ecchymoses, No cyanosis  Extremities: Normal range of motion,  Vascular: Peripheral pulses palpable 2+ bilaterally  Neurologic: Non-focal  Psychiatry: A & O x 3, Mood & affect appropriate    insulin lispro (HumaLOG) corrective regimen sliding scale  SubCutaneous Before meals and at bedtime  amiodarone    Tablet 200milliGRAM(s) Oral daily  metoprolol succinate ER 50milliGRAM(s) Oral daily  pantoprazole    Tablet 40milliGRAM(s) Oral before breakfast  docusate sodium 100milliGRAM(s) Oral three times a day  senna 2Tablet(s) Oral at bedtime  polyethylene glycol 3350 17Gram(s) Oral three times a day  ondansetron  IVPB (Chemo) 16milliGRAM(s) IV Intermittent once  ondansetron  IVPB (Chemo) 16milliGRAM(s) IV Intermittent once  ondansetron  IVPB (Chemo) 16milliGRAM(s) IV Intermittent once  dexamethasone   IVPB (Chemo) 15milliGRAM(s) IV Intermittent once  dexamethasone   IVPB (Chemo) 15milliGRAM(s) IV Intermittent once  dexamethasone   IVPB (Chemo) 15milliGRAM(s) IV Intermittent once  furosemide    Tablet (Chemo) 40milliGRAM(s) Oral once  furosemide    Tablet (Chemo) 40milliGRAM(s) Oral once  furosemide    Tablet (Chemo) 40milliGRAM(s) Oral once  riTUXimab IVPB 415milliGRAM(s) IV Intermittent once  bendamustine IVPB 130milliGRAM(s) IV Intermittent once  bendamustine IVPB 130milliGRAM(s) IV Intermittent once  anastrozole 1milliGRAM(s) Oral daily  HYDROmorphone  Injectable 0.5milliGRAM(s) IV Push every 90 minutes PRN  ondansetron Injectable 4milliGRAM(s) IV Push once PRN  apixaban 2.5milliGRAM(s) Oral two times a day        PT/INR - ( 13 Jan 2017 20:47 )   PT: 12.7 sec;   INR: 1.14          PTT - ( 13 Jan 2017 20:47 )  PTT:26.3 sec                              9.6    4.1   )-----------( 165      ( 15 Ronny 2017 12:44 )             29.3     15 Ronny 2017 12:44    137    |  102    |  36     ----------------------------<  151    4.0     |  26     |  0.96     Ca    10.6       15 Ronny 2017 12:44  Mg     1.6       15 Ronny 2017 08:14    TPro  6.0    /  Alb  2.4    /  TBili  0.3    /  DBili  x      /  AST  137    /  ALT  71     /  AlkPhos  103    15 Ronny 2017 12:44      CAPILLARY BLOOD GLUCOSE  79 (15 Ronny 2017 17:00)  292 (15 Ronny 2017 11:29)  153 (15 Ronny 2017 07:37)  144 (14 Jan 2017 21:20)

## 2017-01-15 NOTE — PROGRESS NOTE ADULT - PROBLEM SELECTOR PLAN 1
Dr. Galaviz following. 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 S/p Chemoport placement on 1/14. Pt received chemotherapy today.  -f/u official PET-CT read  -Allopurinol D/C'd as per Dr. Galaviz. f/u Dr. Galaviz's recommendations for tumor lysis prevention

## 2017-01-15 NOTE — PROGRESS NOTE ADULT - SUBJECTIVE AND OBJECTIVE BOX
INTERVAL HPI: events noted for [place] complicaitons no cp able to lay flat  	  MEDICATIONS:  amiodarone    Tablet 200milliGRAM(s) Oral daily  metoprolol succinate ER 50milliGRAM(s) Oral daily  furosemide    Tablet (Chemo) 40milliGRAM(s) Oral once  furosemide    Tablet (Chemo) 40milliGRAM(s) Oral once        ondansetron  IVPB (Chemo) 16milliGRAM(s) IV Intermittent once  ondansetron  IVPB (Chemo) 16milliGRAM(s) IV Intermittent once  HYDROmorphone  Injectable 0.5milliGRAM(s) IV Push every 90 minutes PRN  ondansetron Injectable 4milliGRAM(s) IV Push once PRN    pantoprazole    Tablet 40milliGRAM(s) Oral before breakfast  docusate sodium 100milliGRAM(s) Oral three times a day  senna 2Tablet(s) Oral at bedtime  polyethylene glycol 3350 17Gram(s) Oral three times a day    insulin lispro (HumaLOG) corrective regimen sliding scale  SubCutaneous Before meals and at bedtime  dexamethasone   IVPB (Chemo) 15milliGRAM(s) IV Intermittent once  dexamethasone   IVPB (Chemo) 15milliGRAM(s) IV Intermittent once    riTUXimab IVPB 415milliGRAM(s) IV Intermittent once  bendamustine IVPB 130milliGRAM(s) IV Intermittent once  anastrozole 1milliGRAM(s) Oral daily      REVIEW OF SYSTEMS:  [x] As per HPI  CONSTITUTIONAL: No fever, weight loss, or fatigue  RESPIRATORY: No cough, wheezing, chills or hemoptysis; No Shortness of Breath  CARDIOVASCULAR: No chest pain, palpitations, dizziness, or leg swelling  GASTROINTESTINAL: No abdominal or epigastric pain. No nausea, vomiting, or hematemesis; No diarrhea or constipation. No melena or hematochezia.  MUSCULOSKELETAL: No joint pain or swelling; No muscle, back, or extremity pain  [x] All others negative	  [ ] Unable to obtain    PHYSICAL EXAM:  T(C): 36.1, Max: 36.3 (01-14 @ 13:21)  HR: 82 (70 - 82)  BP: 126/58 (98/52 - 138/63)  RR: 14 (14 - 18)  SpO2: 98% (94% - 98%)  Wt(kg): --  I&O's Summary        Appearance: Normal	  HEENT:   Normal oral mucosa  Cardiovascular: Normal S1 S2, No JVD, No murmurs, No edema  Respiratory: Lungs clear to auscultation	  Gastrointestinal:  Soft, Non-tender, + BS	  Extremities: Normal range of motion, No clubbing, cyanosis or edema  Vascular: Peripheral pulses palpable 2+ bilaterally    TELEMETRY: 	    ECG:    	  RADIOLOGY:   CXR:  CT:  US:    CARDIAC TESTING:  Echocardiogram:  Catheterization:  Stress Test:      LABS:	 	    CARDIAC MARKERS:                                  9.6    3.0   )-----------( 162      ( 15 Ronny 2017 08:14 )             29.5     15 Ronny 2017 08:14    137    |  104    |  34     ----------------------------<  142    4.2     |  24     |  0.98     Ca    10.0       15 Ronny 2017 08:14  Mg     1.6       15 Ronny 2017 08:14      proBNP:   Lipid Profile:   HgA1c:   TSH:     ASSESSMENT/PLAN: 	  Patient w/ exacerbation after fluids given in ED.  - Echo 1/11: EF 65%, normal wall motion  - CXR 1/11: mild venous congestion (improved from CXR 1/10/17)   - Patient volume status improved s/p IV lasix.  Lasix discontinued as per Dr. Hartmann.  Continue as needed.      Paroxysmal atrial fibrillation. Plan: A-Paced. HR controlled. Cont. Eliquis, Amiodarone and Toprol XL.    CAD (coronary artery disease).  Plan: CP free, Cont. ASA/BB. Continue holding Statin due to transaminitis until improved. Hepatitis panel negative..

## 2017-01-15 NOTE — PROGRESS NOTE ADULT - SUBJECTIVE AND OBJECTIVE BOX
Patient seen and examined by me at bedside.  ROS: No chest pain, no sob, no abd pain. No n/v/d  (+) fatigue    Interval events:  Hypercalcemia - slight decrease in calcium  CHF - stable  Malignancy - s/p chemo on this admission    PAST MEDICAL & SURGICAL HISTORY:  Scoliosis  Follicular lymphoma  Neck mass  Afib  Other hyperlipidemia  Breast cancer  CAD (coronary artery disease)  Diabetes  HTN (hypertension)  No pertinent past medical history  H/O abdominal hysterectomy  H/O thyroidectomy  S/P CABG x 3    PHYSICAL EXAM:  T(C): 36.1, Max: 36.3 ( @ 13:21)  HR: 82  BP: 126/58 (98/52 - 138/63)  RR: 14  SpO2: 98%  Wt(kg): --    Weight 43.1 (01-10 @ 09:58)  Constitutional: AAOx3. No acute distress  ENMT: Dry mucous membrane.  No cyanosis.  Respiratory: minimal bibasilar crackles, otherwise CTA b/l  Cardiovascular: S1, S2.  Regular rate and rhythm. (+) systolic murmur  Gastrointestinal: soft, non-tender, non-distended,  Extremities: Warm.  No lower extremity edema.    Neurological: No focal deficits.  Skin: no rash      DATA:                        9.6<L>  3.0<L> )-----------( 162      ( 15 Ronny 2017 08:14 )             29.5<L>        137    |  104    |  34<H>  ----------------------------<  142<H>  Ca:10.0  (15 Ronny 2017 08:14)  4.2     |  24     |  0.98       eGFR if Non : 53 <L>  eGFR if : 61    TPro  x      /  Alb  2.6 g/dL<L>  /  TBili  x      /  DBili  x      /  AST  x      /  ALT  x      /  AlkPhos  x      2017 06:59  Lactate Dehydrogenase, Serum: 618 U/L <H> ( @ 07:21)      Vitamin D, 1, 25-Dihydroxy: 120.0 pg/mL <H> [19.9 - 79.3] (01-10 @ 14:53)  Vitamin D, 25-Hydroxy: 22.1 ng/mL <L> [30.0 - 100.0] (01-10 @ 14:53)  Vitamin D, 25-Hydroxy: 22.0 ng/mL <L> [30.0 - 100.0] ( @ 14:44)  Vitamin D, 1, 25-Dihydroxy: 100.0 pg/mL <H> [19.9 - 79.3] ( @ 14:44)    Urinalysis Basic - ( 2017 12:21 )  Color: Yellow / Appearance: Clear / S.010 / pH: x  Gluc: x / Ketone: NEGATIVE  / Bili: NEGATIVE / Urobili: 0.2 E.U./dL   Blood: x / Protein: NEGATIVE mg/dL / Nitrite: NEGATIVE   Leuk Esterase: Trace<!!> / RBC: < 5 /HPF / WBC < 5 /HPF   Sq Epi: x / Non Sq Epi: x / Bacteria: Present /HPF<!!>        Protein/Creatinine Ratio Calculation: 3.5 Ratio ( @ 08:12)          MEDICATIONS  (STANDING):  insulin lispro (HumaLOG) corrective regimen sliding scale  SubCutaneous Before meals and at bedtime  amiodarone    Tablet 200milliGRAM(s) Oral daily  metoprolol succinate ER 50milliGRAM(s) Oral daily  pantoprazole    Tablet 40milliGRAM(s) Oral before breakfast  docusate sodium 100milliGRAM(s) Oral three times a day  senna 2Tablet(s) Oral at bedtime  polyethylene glycol 3350 17Gram(s) Oral three times a day  ondansetron  IVPB (Chemo) 16milliGRAM(s) IV Intermittent once  ondansetron  IVPB (Chemo) 16milliGRAM(s) IV Intermittent once  dexamethasone   IVPB (Chemo) 15milliGRAM(s) IV Intermittent once  dexamethasone   IVPB (Chemo) 15milliGRAM(s) IV Intermittent once  furosemide    Tablet (Chemo) 40milliGRAM(s) Oral once  furosemide    Tablet (Chemo) 40milliGRAM(s) Oral once  riTUXimab IVPB 415milliGRAM(s) IV Intermittent once  bendamustine IVPB 130milliGRAM(s) IV Intermittent once  anastrozole 1milliGRAM(s) Oral daily    MEDICATIONS  (PRN):  HYDROmorphone  Injectable 0.5milliGRAM(s) IV Push every 90 minutes PRN PACU ONLY, severe pain  ondansetron Injectable 4milliGRAM(s) IV Push once PRN Nausea and/or Vomiting

## 2017-01-15 NOTE — PROGRESS NOTE ADULT - PROBLEM SELECTOR PLAN 2
s/p chemo on this admission - monitor for tumor lysis--could need both IVF and diuretics if develops  - check daily LDH and uric acid levels

## 2017-01-15 NOTE — PROGRESS NOTE ADULT - ASSESSMENT
85F w/PMHx of HTN, DM, HLD, Monoclonal Gammopathy ,HFpEF, CAD s/p CABG, severe MR/TR, A-fib s/p PPM on Eliquis, Right-sided breast CA (s/p mastectomy, chemo therapy, radiation), h/o hypercalcemia, recent biopsy of lymph node suspicious for B-cell lymphoma, who presented on 1/10/17 w/ hypercalcemia & generalized weakness. Developed acute heart failure exacerbation 2/2 fluid resuscitation and admitted to [email or web address] Patient's cardiac status stabilized and patient transferred to [address] [place] [address] for chemotherapy for tissue-confirmed diffuse large B-cell lymphoma. s/p PET-CT; awaiting read. s/p chemoport placement on 1/14.

## 2017-01-15 NOTE — PROGRESS NOTE ADULT - PROBLEM SELECTOR PLAN 5
Patient s/p PPM. HR controlled atrial pacing  -c/w metoprolol + amiodarone 200mg QD  -Eliquis restarted today

## 2017-01-15 NOTE — PROGRESS NOTE ADULT - PROBLEM SELECTOR PLAN 4
Patient complaining of dysphagia + Odynophagia for past few months. ENT consulted; not concerned for infection at this time. Felt dysphagia likely multifactorial to age + poor pulmonary effort + atrophy. Patient on mechanical soft diet. S&S recommended Dys 1 mech soft-thin liquids to promote oral intake with 1 can Ensure Plus tid   -Will initiate new diet post-procedure Patient complaining of dysphagia + Odynophagia for past few months. ENT consulted; not concerned for infection at this time. Felt dysphagia likely multifactorial to age + poor pulmonary effort + atrophy. Patient on mechanical soft diet. S&S recommended Dys 1 mech soft-thin liquids to promote oral intake with 1 can Ensure Plus tid

## 2017-01-15 NOTE — PROGRESS NOTE ADULT - SUBJECTIVE AND OBJECTIVE BOX
HPI:  86 y/o F w/ pmhx of MGUS, HFpEF, CAD s/p CABG, severe MR/TR, a-fib s/p PPM on Eliquis, DM2, right-sided breast CA (s/p mastectomy/chemotx/radiation), recently admitted 12/29/16 to 1/4/17 for hypercalcemia treated with IV fluids/Lasix/bisphosphonates, presents again today for hypercalcemia on outpatient labs. She had an outpatient Ca level of 13 at Dr. Hartmann’s office yesterday. Since discharge last week, she reports generalized weakness and difficulty walking due to lack of balance which worsened gradually over the past week. She also experienced a fall (says she lost her balance) on outstretched right arm 4 days ago, with no LOC/head impact. Since then she complains of right shoulder pain when moving her arm but denies pain at rest. Also endorses chronic constipation and lack of appetite, but denies abdominal/kidney pain, nausea, vomiting, fever/chills, chest pain/sob, recent change in meds, dysuria, headache, or vision change. On last admission, she had excisional biopsy of lymph nodes (with ENT) with prelim reports concerning for lymphoma but final diagnosis pending.  Initial vitals in ED: 98.1F, HR 98, 133/52, RR 16, 98% O2 on RA.   ED administration: 250cc bolus of NS followed by NS at 80cc/hr and Lasix 20mg IV x 1. (10 Ronny 2017 13:03)    FAMILY HISTORY:  No pertinent family history in first degree relatives    MEDICATIONS  (STANDING):  insulin lispro (HumaLOG) corrective regimen sliding scale  SubCutaneous Before meals and at bedtime  amiodarone    Tablet 200milliGRAM(s) Oral daily  metoprolol succinate ER 50milliGRAM(s) Oral daily  pantoprazole    Tablet 40milliGRAM(s) Oral before breakfast  docusate sodium 100milliGRAM(s) Oral three times a day  senna 2Tablet(s) Oral at bedtime  polyethylene glycol 3350 17Gram(s) Oral three times a day  ondansetron  IVPB (Chemo) 16milliGRAM(s) IV Intermittent once  ondansetron  IVPB (Chemo) 16milliGRAM(s) IV Intermittent once  dexamethasone   IVPB (Chemo) 15milliGRAM(s) IV Intermittent once  dexamethasone   IVPB (Chemo) 15milliGRAM(s) IV Intermittent once  furosemide    Tablet (Chemo) 40milliGRAM(s) Oral once  furosemide    Tablet (Chemo) 40milliGRAM(s) Oral once  riTUXimab IVPB 415milliGRAM(s) IV Intermittent once  bendamustine IVPB 130milliGRAM(s) IV Intermittent once  anastrozole 1milliGRAM(s) Oral daily    MEDICATIONS  (PRN):  HYDROmorphone  Injectable 0.5milliGRAM(s) IV Push every 90 minutes PRN PACU ONLY, severe pain  ondansetron Injectable 4milliGRAM(s) IV Push once PRN Nausea and/or Vomiting    Vital Signs Last 24 Hrs  T(C): 36.1, Max: 36.3 (01-14 @ 13:21)  T(F): 97, Max: 97.3 (01-14 @ 13:21)  HR: 82 (70 - 82)  BP: 126/58 (98/52 - 138/63)  BP(mean): --  RR: 14 (14 - 18)  SpO2: 98% (94% - 98%)    Physical exam:    Overall impression  Lymphadenopathy  Liver  spleen    Labs:  CBC Full  -  ( 15 Ronny 2017 08:14 )  WBC Count : 3.0 K/uL  Hemoglobin : 9.6 g/dL  Hematocrit : 29.5 %  Platelet Count - Automated : 162 K/uL  Mean Cell Volume : 85.8 fL  Mean Cell Hemoglobin : 27.9 pg  Mean Cell Hemoglobin Concentration : 32.5 g/dL  Auto Neutrophil # : x  Auto Lymphocyte # : x  Auto Monocyte # : x  Auto Eosinophil # : x  Auto Basophil # : x  Auto Neutrophil % : x  Auto Lymphocyte % : x  Auto Monocyte % : x  Auto Eosinophil % : x  Auto Basophil % : x    15 Ronny 2017 08:14    137    |  104    |  34     ----------------------------<  142    4.2     |  24     |  0.98     Ca    10.0       15 Ronny 2017 08:14  Mg     1.6       15 Ronny 2017 08:14        Radiology:  HEALTH ISSUES - R/O PROBLEM Dx:      Assessmant / Problems  1)Nonhodgkins lymphoma s/p Rituxan and first day Bendamustin  Patient clinically doing very well but Wbc is falling very rapidly from 3.8k to 3k in 1 day  I am considering holding second day of Bendamustine  Will have a noon time repeat cbc and cmp  2) hypercalcemia normalised to Ca 10  3) Relapsing breast ca    Plan:  1) CBC , CMP at noon  If further fall in WBC will not give 2nd dose Bendamustine  2)continue Arimidex   3) PET CT Monday    Thank you  Neha Galaviz MD

## 2017-01-15 NOTE — PROGRESS NOTE ADULT - SUBJECTIVE AND OBJECTIVE BOX
PGY-1 PROGRESS NOTE    O/N EVENTS: TEJINDER  S: PGY-1 PROGRESS NOTE    O/N EVENTS: TEJINDER  S: No complaints. R infusaport in place. Denies SOB/CP.  O: See below for vitals, PE and labs.    Vital Signs- Last 24 Hrs:  T(F): 96.9, Max: 98 (01-15 @ 16:46)  HR: 81 (75 - 82)  BP: 110/52 (110/52 - 126/58)  RR: 17 (14 - 17)  SpO2: 91% (91% - 98%)    PE: General: NAD  HEENT: Normal oral mucosa, PERRL, EOMI	  Neck: Supple, no JVD   Cardiovascular: Normal S1 S2, no murmurs  Respiratory: lungs CTA b/l  Gastrointestinal:  Soft, Non-tender, + BS	  Skin: No rashes   Extremities: Normal range of motion  Vascular: Peripheral pulses palpable 2+ bilaterally  Neurologic: No focal neuro deficits, A&Ox3    Labs:             9.6    4.1   )-----------( 165                   29.3     137    |  102    |  36     ----------------------------<  151    4.0     |  26     |  0.96     Ca    10.6         Mg     1.6            TPro  6.0    /  Alb  2.4    /  TBili  0.3    /  DBili  x      /  AST  137    /  ALT  71     /  AlkPhos  103         MEDICATIONS:  insulin lispro (HumaLOG) corrective regimen sliding scale  SubCutaneous Before meals and at bedtime  amiodarone    Tablet 200milliGRAM(s) Oral daily  metoprolol succinate ER 50milliGRAM(s) Oral daily  pantoprazole    Tablet 40milliGRAM(s) Oral before breakfast  docusate sodium 100milliGRAM(s) Oral three times a day  senna 2Tablet(s) Oral at bedtime  polyethylene glycol 3350 17Gram(s) Oral three times a day  anastrozole 1milliGRAM(s) Oral daily  apixaban 2.5milliGRAM(s) Oral two times a day  HYDROmorphone  Injectable 0.5milliGRAM(s) IV Push every 90 minutes PRN PACU ONLY, severe pain  ondansetron Injectable 4milliGRAM(s) IV Push once PRN Nausea and/or Vomiting

## 2017-01-15 NOTE — PROGRESS NOTE ADULT - PROBLEM SELECTOR PLAN 1
Suspect like due to lymphoma overproduction of 1,25 vit D-- patient has received calcitonin, zometa, IVIF and lasix with some improvement but gradually rising again--   - treatment of lymphoma should help. Today calcium is lower.     - follow lytes

## 2017-01-15 NOTE — PROGRESS NOTE ADULT - PROBLEM SELECTOR PLAN 3
2/2 malignancy, consistent with lymphoma from work up in past admission.   -no IVF given recent episode of CHF exacerbation   -consider zoledronic acid and calcitonin  -will get chemo for lymphoma on 1/14; will trend BMP 2/2 malignancy, consistent with lymphoma from work up in past admission.   -no IVF given recent episode of CHF exacerbation   -f/u renal recs

## 2017-01-16 DIAGNOSIS — R79.9 ABNORMAL FINDING OF BLOOD CHEMISTRY, UNSPECIFIED: ICD-10-CM

## 2017-01-16 LAB
ANION GAP SERPL CALC-SCNC: 9 MMOL/L — SIGNIFICANT CHANGE UP (ref 9–16)
APPEARANCE UR: CLEAR — SIGNIFICANT CHANGE UP
BILIRUB UR-MCNC: NEGATIVE — SIGNIFICANT CHANGE UP
BUN SERPL-MCNC: 34 MG/DL — HIGH (ref 7–23)
CALCIUM SERPL-MCNC: 9.8 MG/DL — SIGNIFICANT CHANGE UP (ref 8.5–10.5)
CHLORIDE SERPL-SCNC: 103 MMOL/L — SIGNIFICANT CHANGE UP (ref 96–108)
CHLORIDE UR-SCNC: 23 MMOL/L — SIGNIFICANT CHANGE UP
CO2 SERPL-SCNC: 26 MMOL/L — SIGNIFICANT CHANGE UP (ref 22–31)
COLOR SPEC: YELLOW — SIGNIFICANT CHANGE UP
CREAT ?TM UR-MCNC: 9.1 MG/DL — SIGNIFICANT CHANGE UP
CREAT SERPL-MCNC: 0.84 MG/DL — SIGNIFICANT CHANGE UP (ref 0.5–1.3)
CULTURE RESULTS: SIGNIFICANT CHANGE UP
DIFF PNL FLD: (no result)
GLUCOSE SERPL-MCNC: 140 MG/DL — HIGH (ref 70–99)
GLUCOSE UR QL: NEGATIVE — SIGNIFICANT CHANGE UP
HCT VFR BLD CALC: 28.5 % — LOW (ref 34.5–45)
HGB BLD-MCNC: 9.2 G/DL — LOW (ref 11.5–15.5)
KETONES UR-MCNC: NEGATIVE — SIGNIFICANT CHANGE UP
LDH SERPL L TO P-CCNC: 508 U/L — HIGH (ref 84–246)
LEUKOCYTE ESTERASE UR-ACNC: (no result)
MAGNESIUM SERPL-MCNC: 1.5 MG/DL — LOW (ref 1.6–2.4)
MCHC RBC-ENTMCNC: 27.4 PG — SIGNIFICANT CHANGE UP (ref 27–34)
MCHC RBC-ENTMCNC: 32.3 G/DL — SIGNIFICANT CHANGE UP (ref 32–36)
MCV RBC AUTO: 84.8 FL — SIGNIFICANT CHANGE UP (ref 80–100)
NITRITE UR-MCNC: NEGATIVE — SIGNIFICANT CHANGE UP
OSMOLALITY UR: 182 MOSMOL/KG — SIGNIFICANT CHANGE UP (ref 100–650)
PH UR: 7 — SIGNIFICANT CHANGE UP (ref 4–8)
PHOSPHATE SERPL-MCNC: 3.7 MG/DL — SIGNIFICANT CHANGE UP (ref 2.5–4.5)
PLATELET # BLD AUTO: 178 K/UL — SIGNIFICANT CHANGE UP (ref 150–400)
POTASSIUM SERPL-MCNC: 3.9 MMOL/L — SIGNIFICANT CHANGE UP (ref 3.5–5.3)
POTASSIUM SERPL-SCNC: 3.9 MMOL/L — SIGNIFICANT CHANGE UP (ref 3.5–5.3)
POTASSIUM UR-SCNC: 10 MMOL/L — SIGNIFICANT CHANGE UP
PROT UR-MCNC: NEGATIVE MG/DL — SIGNIFICANT CHANGE UP
RBC # BLD: 3.36 M/UL — LOW (ref 3.8–5.2)
RBC # FLD: 16.5 % — SIGNIFICANT CHANGE UP (ref 10.3–16.9)
SODIUM SERPL-SCNC: 138 MMOL/L — SIGNIFICANT CHANGE UP (ref 135–145)
SODIUM UR-SCNC: 34 MMOL/L — SIGNIFICANT CHANGE UP
SP GR SPEC: <=1.005 — SIGNIFICANT CHANGE UP (ref 1–1.03)
SPECIMEN SOURCE: SIGNIFICANT CHANGE UP
URATE SERPL-MCNC: 3.6 MG/DL — SIGNIFICANT CHANGE UP (ref 2.6–6)
UROBILINOGEN FLD QL: 0.2 E.U./DL — SIGNIFICANT CHANGE UP
WBC # BLD: 3.9 K/UL — SIGNIFICANT CHANGE UP (ref 3.8–10.5)
WBC # FLD AUTO: 3.9 K/UL — SIGNIFICANT CHANGE UP (ref 3.8–10.5)

## 2017-01-16 PROCEDURE — 99232 SBSQ HOSP IP/OBS MODERATE 35: CPT

## 2017-01-16 PROCEDURE — 99231 SBSQ HOSP IP/OBS SF/LOW 25: CPT | Mod: GC

## 2017-01-16 RX ORDER — ALLOPURINOL 300 MG
300 TABLET ORAL DAILY
Qty: 0 | Refills: 0 | Status: DISCONTINUED | OUTPATIENT
Start: 2017-01-16 | End: 2017-01-20

## 2017-01-16 RX ORDER — MAGNESIUM SULFATE 500 MG/ML
2 VIAL (ML) INJECTION ONCE
Qty: 0 | Refills: 0 | Status: COMPLETED | OUTPATIENT
Start: 2017-01-16 | End: 2017-01-16

## 2017-01-16 RX ADMIN — ANASTROZOLE 1 MILLIGRAM(S): 1 TABLET ORAL at 17:24

## 2017-01-16 RX ADMIN — PANTOPRAZOLE SODIUM 40 MILLIGRAM(S): 20 TABLET, DELAYED RELEASE ORAL at 06:30

## 2017-01-16 RX ADMIN — SENNA PLUS 2 TABLET(S): 8.6 TABLET ORAL at 22:21

## 2017-01-16 RX ADMIN — Medication 300 MILLIGRAM(S): at 11:41

## 2017-01-16 RX ADMIN — POLYETHYLENE GLYCOL 3350 17 GRAM(S): 17 POWDER, FOR SOLUTION ORAL at 22:28

## 2017-01-16 RX ADMIN — APIXABAN 2.5 MILLIGRAM(S): 2.5 TABLET, FILM COATED ORAL at 17:24

## 2017-01-16 RX ADMIN — Medication 100 MILLIGRAM(S): at 22:21

## 2017-01-16 RX ADMIN — Medication 100 MILLIGRAM(S): at 06:31

## 2017-01-16 RX ADMIN — AMIODARONE HYDROCHLORIDE 200 MILLIGRAM(S): 400 TABLET ORAL at 06:31

## 2017-01-16 RX ADMIN — Medication 50 GRAM(S): at 11:41

## 2017-01-16 RX ADMIN — Medication 50 MILLIGRAM(S): at 06:31

## 2017-01-16 RX ADMIN — APIXABAN 2.5 MILLIGRAM(S): 2.5 TABLET, FILM COATED ORAL at 06:34

## 2017-01-16 NOTE — PROGRESS NOTE ADULT - PROBLEM SELECTOR PLAN 4
Patient complaining of dysphagia + Odynophagia for past few months. ENT consulted; not concerned for infection at this time. Felt dysphagia likely multifactorial to age + poor pulmonary effort + atrophy. Patient on mechanical soft diet. S&S recommended Dys 1 mech soft-thin liquids to promote oral intake with 1 can Ensure Plus tid

## 2017-01-16 NOTE — PROGRESS NOTE ADULT - SUBJECTIVE AND OBJECTIVE BOX
INTERVAL HPI: comfortable no sob no palp   	  MEDICATIONS:  amiodarone    Tablet 200milliGRAM(s) Oral daily  metoprolol succinate ER 50milliGRAM(s) Oral daily  furosemide    Tablet (Chemo) 40milliGRAM(s) Oral once  furosemide    Tablet (Chemo) 40milliGRAM(s) Oral once  furosemide    Tablet (Chemo) 40milliGRAM(s) Oral once        ondansetron  IVPB (Chemo) 16milliGRAM(s) IV Intermittent once  ondansetron  IVPB (Chemo) 16milliGRAM(s) IV Intermittent once  ondansetron  IVPB (Chemo) 16milliGRAM(s) IV Intermittent once  HYDROmorphone  Injectable 0.5milliGRAM(s) IV Push every 90 minutes PRN  ondansetron Injectable 4milliGRAM(s) IV Push once PRN    pantoprazole    Tablet 40milliGRAM(s) Oral before breakfast  docusate sodium 100milliGRAM(s) Oral three times a day  senna 2Tablet(s) Oral at bedtime  polyethylene glycol 3350 17Gram(s) Oral three times a day    insulin lispro (HumaLOG) corrective regimen sliding scale  SubCutaneous Before meals and at bedtime  dexamethasone   IVPB (Chemo) 15milliGRAM(s) IV Intermittent once  dexamethasone   IVPB (Chemo) 15milliGRAM(s) IV Intermittent once  dexamethasone   IVPB (Chemo) 15milliGRAM(s) IV Intermittent once  allopurinol 300milliGRAM(s) Oral daily    riTUXimab IVPB 415milliGRAM(s) IV Intermittent once  bendamustine IVPB 130milliGRAM(s) IV Intermittent once  bendamustine IVPB 130milliGRAM(s) IV Intermittent once  anastrozole 1milliGRAM(s) Oral daily  apixaban 2.5milliGRAM(s) Oral two times a day      REVIEW OF SYSTEMS:  [x] As per HPI  CONSTITUTIONAL: No fever, weight loss, or fatigue  RESPIRATORY: No cough, wheezing, chills or hemoptysis; No Shortness of Breath  CARDIOVASCULAR: No chest pain, palpitations, dizziness, or leg swelling  GASTROINTESTINAL: No abdominal or epigastric pain. No nausea, vomiting, or hematemesis; No diarrhea or constipation. No melena or hematochezia.  MUSCULOSKELETAL: No joint pain or swelling; No muscle, back, or extremity pain  [x] All others negative	  [ ] Unable to obtain    PHYSICAL EXAM:  T(C): 36.4, Max: 36.7 (01-15 @ 16:46)  HR: 76 (75 - 81)  BP: 126/63 (110/52 - 131/62)  RR: 16 (16 - 18)  SpO2: 99% (91% - 99%)  Wt(kg): --  I&O's Summary        Appearance: Normal	  HEENT:   Normal oral mucosa  Cardiovascular: Normal S1 S2, No JVD, No murmurs, No edema  Respiratory: Lungs clear to auscultation	  Gastrointestinal:  Soft, Non-tender, + BS	  Extremities: Normal range of motion, No clubbing, cyanosis or edema  Vascular: Peripheral pulses palpable 2+ bilaterally    TELEMETRY: 	    ECG:    	  RADIOLOGY:   CXR:  CT:  US:    CARDIAC TESTING:  Echocardiogram:  Catheterization:  Stress Test:      LABS:	 	    CARDIAC MARKERS:                                  9.2    3.9   )-----------( 178      ( 16 Jan 2017 07:07 )             28.5     16 Jan 2017 07:07    138    |  103    |  34     ----------------------------<  140    3.9     |  26     |  0.84     Ca    9.8        16 Jan 2017 07:07  Phos  3.7       16 Jan 2017 07:07  Mg     1.5       16 Jan 2017 07:07    TPro  6.0    /  Alb  2.4    /  TBili  0.3    /  DBili  x      /  AST  137    /  ALT  71     /  AlkPhos  103    15 Ronny 2017 12:44    proBNP:   Lipid Profile:   HgA1c:   TSH:     ASSESSMENT/PLAN: 	    Patient w/ exacerbation after fluids given in ED.  - Echo 1/11: EF 65%, normal wall motion  - CXR 1/11: mild venous congestion (improved from CXR 1/10/17)   - Patient volume status improved s/p IV lasix.  Lasix discontinued as per Dr. Hartmann.  Continue as needed.      Paroxysmal atrial fibrillation. Plan: A-Paced. HR controlled. Cont. Eliquis, Amiodarone and Toprol XL.    CAD (coronary artery disease).  Plan: CP free, Cont. ASA/BB. Continue holding Statin due to transaminitis until improved. Hepatitis panel negative..

## 2017-01-16 NOTE — PROGRESS NOTE ADULT - PROBLEM SELECTOR PLAN 1
Dr. Galaviz following. 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 S/p Chemoport placement on 1/14. Pt received chemotherapy 11/15.  -f/u official PET-CT read  -restart allopurinol. will check daily LDH and uric acid levels for tumor lysis syndrome  -Elevated BUN- may be due to Decadron, will check ulytes and UA per renal

## 2017-01-16 NOTE — PROGRESS NOTE ADULT - PROBLEM SELECTOR PLAN 5
Patient s/p PPM. HR controlled atrial pacing  -c/w metoprolol + amiodarone 200mg QD  -Eliquis restarted

## 2017-01-16 NOTE — PROGRESS NOTE ADULT - SUBJECTIVE AND OBJECTIVE BOX
Patient seen and examined at bedside.   Patient reports no dyspnea or leg swelling at present  Reports appropriate urination per her report but urine output not reported at present     Objective labs reviewed withotu evidence of TLS at present  corrected calcium today is 11.1mg/dL this morning, but improved since yesterday     insulin lispro (HumaLOG) corrective regimen sliding scale  Before meals and at bedtime  amiodarone    Tablet 200milliGRAM(s) daily  metoprolol succinate ER 50milliGRAM(s) daily  pantoprazole    Tablet 40milliGRAM(s) before breakfast  docusate sodium 100milliGRAM(s) three times a day  senna 2Tablet(s) at bedtime  polyethylene glycol 3350 17Gram(s) three times a day  ondansetron  IVPB (Chemo) 16milliGRAM(s) once  ondansetron  IVPB (Chemo) 16milliGRAM(s) once  ondansetron  IVPB (Chemo) 16milliGRAM(s) once  dexamethasone   IVPB (Chemo) 15milliGRAM(s) once  dexamethasone   IVPB (Chemo) 15milliGRAM(s) once  dexamethasone   IVPB (Chemo) 15milliGRAM(s) once  furosemide    Tablet (Chemo) 40milliGRAM(s) once  furosemide    Tablet (Chemo) 40milliGRAM(s) once  furosemide    Tablet (Chemo) 40milliGRAM(s) once  riTUXimab IVPB 415milliGRAM(s) once  bendamustine IVPB 130milliGRAM(s) once  bendamustine IVPB 130milliGRAM(s) once  anastrozole 1milliGRAM(s) daily  HYDROmorphone  Injectable 0.5milliGRAM(s) every 90 minutes PRN  ondansetron Injectable 4milliGRAM(s) once PRN  apixaban 2.5milliGRAM(s) two times a day      Allergies    IV CONtRAST (Flushing (Skin); Rash)  No Known Drug Allergies    Intolerances        T(C): , Max: 36.7 (01-15 @ 16:46)  T(F): , Max: 98.1 (01-16 @ 01:30)  HR: 76  BP: 126/63  BP(mean): --  RR: 16  SpO2: 99%  Wt(kg): --          LABS:                        9.2    3.9   )-----------( 178      ( 16 Jan 2017 07:07 )             28.5     16 Jan 2017 07:07    138    |  103    |  34     ----------------------------<  140    3.9     |  26     |  0.84     Ca    9.8        16 Jan 2017 07:07  Phos  3.7       16 Jan 2017 07:07  Mg     1.5       16 Jan 2017 07:07    TPro  6.0    /  Alb  2.4    /  TBili  0.3    /  DBili  x      /  AST  137    /  ALT  71     /  AlkPhos  103    15 Ronny 2017 12:44    Uric Acid, Serum: 3.6 mg/dL [2.6 - 6.0] (01-16 @ 07:07)              RADIOLOGY & ADDITIONAL STUDIES: Patient seen and examined at bedside.   Patient reports no dyspnea or leg swelling at present  Reports appropriate urination per her report but urine output not reported at present   overall feels better - energy and appetite improved    Objective labs reviewed withotu evidence of TLS at present  corrected calcium today is 11.1mg/dL this morning, but improved since yesterday     insulin lispro (HumaLOG) corrective regimen sliding scale  Before meals and at bedtime  amiodarone    Tablet 200milliGRAM(s) daily  metoprolol succinate ER 50milliGRAM(s) daily  pantoprazole    Tablet 40milliGRAM(s) before breakfast  docusate sodium 100milliGRAM(s) three times a day  senna 2Tablet(s) at bedtime  polyethylene glycol 3350 17Gram(s) three times a day  ondansetron  IVPB (Chemo) 16milliGRAM(s) once  ondansetron  IVPB (Chemo) 16milliGRAM(s) once  ondansetron  IVPB (Chemo) 16milliGRAM(s) once  dexamethasone   IVPB (Chemo) 15milliGRAM(s) once  dexamethasone   IVPB (Chemo) 15milliGRAM(s) once  dexamethasone   IVPB (Chemo) 15milliGRAM(s) once  furosemide    Tablet (Chemo) 40milliGRAM(s) once  furosemide    Tablet (Chemo) 40milliGRAM(s) once  furosemide    Tablet (Chemo) 40milliGRAM(s) once  riTUXimab IVPB 415milliGRAM(s) once  bendamustine IVPB 130milliGRAM(s) once  bendamustine IVPB 130milliGRAM(s) once  anastrozole 1milliGRAM(s) daily  HYDROmorphone  Injectable 0.5milliGRAM(s) every 90 minutes PRN  ondansetron Injectable 4milliGRAM(s) once PRN  apixaban 2.5milliGRAM(s) two times a day      Allergies    IV CONtRAST (Flushing (Skin); Rash)  No Known Drug Allergies    Intolerances        T(C): , Max: 36.7 (01-15 @ 16:46)  T(F): , Max: 98.1 (01-16 @ 01:30)  HR: 76  BP: 126/63  BP(mean): --  RR: 16  SpO2: 99%  Wt(kg): --          LABS:                        9.2    3.9   )-----------( 178      ( 16 Jan 2017 07:07 )             28.5     16 Jan 2017 07:07    138    |  103    |  34     ----------------------------<  140    3.9     |  26     |  0.84     Ca    9.8        16 Jan 2017 07:07  Phos  3.7       16 Jan 2017 07:07  Mg     1.5       16 Jan 2017 07:07    TPro  6.0    /  Alb  2.4    /  TBili  0.3    /  DBili  x      /  AST  137    /  ALT  71     /  AlkPhos  103    15 Ronny 2017 12:44    Uric Acid, Serum: 3.6 mg/dL [2.6 - 6.0] (01-16 @ 07:07)              RADIOLOGY & ADDITIONAL STUDIES:

## 2017-01-16 NOTE — PROGRESS NOTE ADULT - SUBJECTIVE AND OBJECTIVE BOX
O/N Events: MICA  Subjective: No complaints +BM. No N/U, SOB CP    PMH:   PAST MEDICAL & SURGICAL HISTORY:  Scoliosis  Follicular lymphoma  Neck mass  Afib  Other hyperlipidemia  Breast cancer  CAD (coronary artery disease)  Diabetes  HTN (hypertension)  No pertinent past medical history  H/O abdominal hysterectomy  H/O thyroidectomy  S/P CABG x 3      VITALS  Vital Signs Last 24 Hrs  T(C): 36.4, Max: 36.7 (01-15 @ 16:46)  T(F): 97.6, Max: 98.1 (01-16 @ 01:30)  HR: 76 (75 - 81)  BP: 126/63 (110/52 - 131/62)  BP(mean): --  RR: 16 (16 - 18)  SpO2: 99% (91% - 99%)    I&O's Summary      CAPILLARY BLOOD GLUCOSE  149 (16 Jan 2017 08:42)  144 (15 Ronny 2017 21:30)  79 (15 Ronny 2017 17:00)  292 (15 Ronny 2017 11:29)      PHYSICAL EXAM  General: A&Ox 3; NAD  Head: NC/AT;   Eyes: PERRL; EOMI; anicteric sclera  Neck: Supple; no JVD  Respiratory: CTA B/L; no wheezes/crackles/rales auscultated w/ good air movement  Cardiovascular: Regular rhythm/rate; S1/S2; no gallops or murmurs auscultated  Gastrointestinal: Soft; NTND w/out rebound tenderness or guarding; bowel sounds normal  Extremities: WWP; no edema or cyanosis; radial/pedal pulses palpable  Neurological:  CNII-XII grossly intact; no obvious focal deficits    MEDICATIONS  (STANDING):  insulin lispro (HumaLOG) corrective regimen sliding scale  SubCutaneous Before meals and at bedtime  amiodarone    Tablet 200milliGRAM(s) Oral daily  metoprolol succinate ER 50milliGRAM(s) Oral daily  pantoprazole    Tablet 40milliGRAM(s) Oral before breakfast  docusate sodium 100milliGRAM(s) Oral three times a day  senna 2Tablet(s) Oral at bedtime  polyethylene glycol 3350 17Gram(s) Oral three times a day  ondansetron  IVPB (Chemo) 16milliGRAM(s) IV Intermittent once  ondansetron  IVPB (Chemo) 16milliGRAM(s) IV Intermittent once  ondansetron  IVPB (Chemo) 16milliGRAM(s) IV Intermittent once  dexamethasone   IVPB (Chemo) 15milliGRAM(s) IV Intermittent once  dexamethasone   IVPB (Chemo) 15milliGRAM(s) IV Intermittent once  dexamethasone   IVPB (Chemo) 15milliGRAM(s) IV Intermittent once  furosemide    Tablet (Chemo) 40milliGRAM(s) Oral once  furosemide    Tablet (Chemo) 40milliGRAM(s) Oral once  furosemide    Tablet (Chemo) 40milliGRAM(s) Oral once  riTUXimab IVPB 415milliGRAM(s) IV Intermittent once  bendamustine IVPB 130milliGRAM(s) IV Intermittent once  bendamustine IVPB 130milliGRAM(s) IV Intermittent once  anastrozole 1milliGRAM(s) Oral daily  apixaban 2.5milliGRAM(s) Oral two times a day  allopurinol 300milliGRAM(s) Oral daily    MEDICATIONS  (PRN):  HYDROmorphone  Injectable 0.5milliGRAM(s) IV Push every 90 minutes PRN PACU ONLY, severe pain  ondansetron Injectable 4milliGRAM(s) IV Push once PRN Nausea and/or Vomiting      LABS                        9.2    3.9   )-----------( 178      ( 16 Jan 2017 07:07 )             28.5     16 Jan 2017 07:07    138    |  103    |  34     ----------------------------<  140    3.9     |  26     |  0.84     Ca    9.8        16 Jan 2017 07:07  Phos  3.7       16 Jan 2017 07:07  Mg     1.5       16 Jan 2017 07:07    TPro  6.0    /  Alb  2.4    /  TBili  0.3    /  DBili  x      /  AST  137    /  ALT  71     /  AlkPhos  103    15 Ronny 2017 12:44    LIVER FUNCTIONS - ( 15 Ronny 2017 12:44 )  Alb: 2.4 g/dL / Pro: 6.0 g/dL / ALK PHOS: 103 U/L / ALT: 71 U/L / AST: 137 U/L / GGT: x

## 2017-01-16 NOTE — PROGRESS NOTE ADULT - SUBJECTIVE AND OBJECTIVE BOX
HPI:  86 y/o F w/ pmhx of MGUS, HFpEF, CAD s/p CABG, severe MR/TR, a-fib s/p PPM on Eliquis, DM2, right-sided breast CA (s/p mastectomy/chemotx/radiation), recently admitted 12/29/16 to 1/4/17 for hypercalcemia treated with IV fluids/Lasix/bisphosphonates, presents again today for hypercalcemia on outpatient labs. She had an outpatient Ca level of 13 at Dr. Hartmann’s office yesterday. Since discharge last week, she reports generalized weakness and difficulty walking due to lack of balance which worsened gradually over the past week. She also experienced a fall (says she lost her balance) on outstretched right arm 4 days ago, with no LOC/head impact. Since then she complains of right shoulder pain when moving her arm but denies pain at rest. Also endorses chronic constipation and lack of appetite, but denies abdominal/kidney pain, nausea, vomiting, fever/chills, chest pain/sob, recent change in meds, dysuria, headache, or vision change. On last admission, she had excisional biopsy of lymph nodes (with ENT) with prelim reports concerning for lymphoma but final diagnosis pending.  Initial vitals in ED: 98.1F, HR 98, 133/52, RR 16, 98% O2 on RA.   ED administration: 250cc bolus of NS followed by NS at 80cc/hr and Lasix 20mg IV x 1. (10 Ronny 2017 13:03)    FAMILY HISTORY:  No pertinent family history in first degree relatives    MEDICATIONS  (STANDING):  insulin lispro (HumaLOG) corrective regimen sliding scale  SubCutaneous Before meals and at bedtime  amiodarone    Tablet 200milliGRAM(s) Oral daily  metoprolol succinate ER 50milliGRAM(s) Oral daily  pantoprazole    Tablet 40milliGRAM(s) Oral before breakfast  docusate sodium 100milliGRAM(s) Oral three times a day  senna 2Tablet(s) Oral at bedtime  polyethylene glycol 3350 17Gram(s) Oral three times a day  ondansetron  IVPB (Chemo) 16milliGRAM(s) IV Intermittent once  ondansetron  IVPB (Chemo) 16milliGRAM(s) IV Intermittent once  ondansetron  IVPB (Chemo) 16milliGRAM(s) IV Intermittent once  dexamethasone   IVPB (Chemo) 15milliGRAM(s) IV Intermittent once  dexamethasone   IVPB (Chemo) 15milliGRAM(s) IV Intermittent once  dexamethasone   IVPB (Chemo) 15milliGRAM(s) IV Intermittent once  furosemide    Tablet (Chemo) 40milliGRAM(s) Oral once  furosemide    Tablet (Chemo) 40milliGRAM(s) Oral once  furosemide    Tablet (Chemo) 40milliGRAM(s) Oral once  riTUXimab IVPB 415milliGRAM(s) IV Intermittent once  bendamustine IVPB 130milliGRAM(s) IV Intermittent once  bendamustine IVPB 130milliGRAM(s) IV Intermittent once  anastrozole 1milliGRAM(s) Oral daily  apixaban 2.5milliGRAM(s) Oral two times a day  allopurinol 300milliGRAM(s) Oral daily    MEDICATIONS  (PRN):  HYDROmorphone  Injectable 0.5milliGRAM(s) IV Push every 90 minutes PRN PACU ONLY, severe pain  ondansetron Injectable 4milliGRAM(s) IV Push once PRN Nausea and/or Vomiting    Vital Signs Last 24 Hrs  T(C): 36.4, Max: 36.7 (01-15 @ 16:46)  T(F): 97.6, Max: 98.1 (01-16 @ 01:30)  HR: 76 (75 - 81)  BP: 126/63 (110/52 - 131/62)  BP(mean): --  RR: 16 (16 - 18)  SpO2: 99% (91% - 99%)    Physical exam:    Overall impression  Lymphadenopathy  Liver  spleen    Labs:  CBC Full  -  ( 16 Jan 2017 07:07 )  WBC Count : 3.9 K/uL  Hemoglobin : 9.2 g/dL  Hematocrit : 28.5 %  Platelet Count - Automated : 178 K/uL  Mean Cell Volume : 84.8 fL  Mean Cell Hemoglobin : 27.4 pg  Mean Cell Hemoglobin Concentration : 32.3 g/dL  Auto Neutrophil # : x  Auto Lymphocyte # : x  Auto Monocyte # : x  Auto Eosinophil # : x  Auto Basophil # : x  Auto Neutrophil % : x  Auto Lymphocyte % : x  Auto Monocyte % : x  Auto Eosinophil % : x  Auto Basophil % : x    16 Jan 2017 07:07    138    |  103    |  34     ----------------------------<  140    3.9     |  26     |  0.84     Ca    9.8        16 Jan 2017 07:07  Phos  3.7       16 Jan 2017 07:07  Mg     1.5       16 Jan 2017 07:07    TPro  6.0    /  Alb  2.4    /  TBili  0.3    /  DBili  x      /  AST  137    /  ALT  71     /  AlkPhos  103    15 Ronny 2017 12:44      Radiology:  HEALTH ISSUES - R/O PROBLEM Dx:      Assessmant / Problems  1) NHL s/p Rituximab ,Bendamustine well tolerated  2)Hypercalcemia of malignancy resolved Ca 9.8  3)Relapsing breast cancer    Plan:  1)Restart Allopurinol  2) monitor daily CBC and cmp     Thank you  Neha Galaviz MD

## 2017-01-16 NOTE — PROGRESS NOTE ADULT - ASSESSMENT
Lymphoma as ordered  Glu reviewed  diet as tolerated  PT  Breast ca-- stable.  CV stable- no s/s of CHF

## 2017-01-16 NOTE — PROGRESS NOTE ADULT - ASSESSMENT
85F w/PMHx of HTN, DM, HLD, Monoclonal Gammopathy ,HFpEF, CAD s/p CABG, severe MR/TR, A-fib s/p PPM on Eliquis, Right-sided breast CA (s/p mastectomy, chemo therapy, radiation), h/o hypercalcemia, recent biopsy of lymph node suspicious for B-cell lymphoma, who presented on 1/10/17 w/ hypercalcemia & generalized weakness. Developed acute heart failure exacerbation 2/2 fluid resuscitation and admitted to [facility]. Patient's cardiac status stabilized and patient transferred to [address] [place] [address] for chemotherapy for tissue-confirmed diffuse large B-cell lymphoma. s/p PET-CT; awaiting read. s/p chemoport placement on 1/14 and chemo 11/15

## 2017-01-16 NOTE — PROGRESS NOTE ADULT - PROBLEM SELECTOR PLAN 3
Resolved. 2/2 malignancy, consistent with lymphoma from work up in past admission.   -no IVF given recent episode of CHF exacerbation   -f/u renal recs

## 2017-01-16 NOTE — PROGRESS NOTE ADULT - PROBLEM SELECTOR PLAN 2
Rise was noted prior to Decadron administration in the hospital  Persistence can be attributed to the current Decadron dosing she received recently.  No hypotension noted to ascribe to prerenal ROMEO  No apparent evidence for UGIB noted     Please check:   Urine sodium , potassium , chloride, creatinine , urea nitrogen, osmolality  and  urinalysis    to assess if there is any evidenec of prerenal azotemia that is subclinical

## 2017-01-16 NOTE — PROGRESS NOTE ADULT - SUBJECTIVE AND OBJECTIVE BOX
Patient is a 85y old  Female who presents with a chief complaint of hypercalcemia (10 Ronny 2017 13:03)      HPI:  84 y/o F w/ pmhx of MGUS, HFpEF, CAD s/p CABG, severe MR/TR, a-fib s/p PPM on Eliquis, DM2, right-sided breast CA (s/p mastectomy/chemotx/radiation), recently admitted 12/29/16 to 1/4/17 for hypercalcemia treated with IV fluids/Lasix/bisphosphonates, presents again today for hypercalcemia on outpatient labs. She had an outpatient Ca level of 13 at Dr. Hartmann’s office yesterday. Since discharge last week, she reports generalized weakness and difficulty walking due to lack of balance which worsened gradually over the past week. She also experienced a fall (says she lost her balance) on outstretched right arm 4 days ago, with no LOC/head impact. Since then she complains of right shoulder pain when moving her arm but denies pain at rest. Also endorses chronic constipation and lack of appetite, but denies abdominal/kidney pain, nausea, vomiting, fever/chills, chest pain/sob, recent change in meds, dysuria, headache, or vision change. On last admission, she had excisional biopsy of lymph nodes (with ENT) with prelim reports concerning for lymphoma but final diagnosis pending.  Initial vitals in ED: 98.1F, HR 98, 133/52, RR 16, 98% O2 on RA.   ED administration: 250cc bolus of NS followed by NS at 80cc/hr and Lasix 20mg IV x 1. (10 Ronny 2017 13:03)    INTERVAL HPI/OVERNIGHT EVENTS:::frail, appears comfortable. On rx as per oncology    HEALTH ISSUES - PROBLEM Dx:  Physical therapy evaluation, initial: Physical therapy evaluation, initial  Discharge planning issues: Discharge planning issues  Constipation: Constipation  Abdominal distension: Abdominal distension  Diabetes: Diabetes  Dysphagia: Dysphagia  Acute on chronic systolic (congestive) heart failure: Acute on chronic systolic (congestive) heart failure  Fever: Fever  Heart failure: Heart failure  Lymphoma: Lymphoma  Need for prophylactic measure: Need for prophylactic measure  HTN (hypertension): HTN (hypertension)  Type 2 diabetes mellitus: Type 2 diabetes mellitus  CAD (coronary artery disease): CAD (coronary artery disease)  Monoclonal gammopathy of undetermined significance: Monoclonal gammopathy of undetermined significance  Heart failure with preserved ejection fraction: Heart failure with preserved ejection fraction  Paroxysmal atrial fibrillation: Paroxysmal atrial fibrillation  Arm pain, right: Arm pain, right  Hypercalcemia: Hypercalcemia          PAST MEDICAL & SURGICAL HISTORY:  Scoliosis  Follicular lymphoma  Neck mass  Afib  Other hyperlipidemia  Breast cancer  CAD (coronary artery disease)  Diabetes  HTN (hypertension)  No pertinent past medical history  H/O abdominal hysterectomy  H/O thyroidectomy  S/P CABG x 3          Consultant NOTE  REVIEWED  (+   )    REVIEW OF SYSTEMS:  [x] As per HPI  CONSTITUTIONAL: weakness  RESPIRATORY: No cough, wheezing, chills or hemoptysis; No Shortness of Breath  CARDIOVASCULAR: No chest pain, palpitations, dizziness, or leg swelling  GASTROINTESTINAL: No abdominal or epigastric pain. No nausea, vomiting, or hematemesis; No diarrhea or constipation. No melena or hematochezia.  MUSCULOSKELETAL: weakness  PSYCH    awake, alert       [x] All others negative	  [ ] Unable to obtain          Vital Signs Last 24 Hrs  T(C): 36.4, Max: 36.7 (01-15 @ 16:46)  T(F): 97.6, Max: 98.1 (01-16 @ 01:30)  HR: 76 (75 - 81)  BP: 126/63 (110/52 - 131/62)  BP(mean): --  RR: 16 (16 - 18)  SpO2: 99% (91% - 99%)        PHYSICAL EXAMINATION:                                    (    )  NO CHANGE  Appearance: Normal	  HEENT:   Normal oral mucosa, PERRL, EOMI	  Neck: Supple, + JVD/ - JVD; Carotid Bruit   Cardiovascular: Normal S1 S2, No JVD, ,   Respiratory: Lungs clear to auscultation/Decreased Breath Sounds/No Rales, Rhonchi, Wheezing	  Gastrointestinal:  Soft, Non-tender, + BS	  Skin: No rashes, No ecchymoses, No cyanosis  Extremities: Normal range of motion,   Vascular: Peripheral pulses   Neurologic: Non-focal  Psychiatry: A & O x 3, Mood & affect appropriate    insulin lispro (HumaLOG) corrective regimen sliding scale  SubCutaneous Before meals and at bedtime  amiodarone    Tablet 200milliGRAM(s) Oral daily  metoprolol succinate ER 50milliGRAM(s) Oral daily  pantoprazole    Tablet 40milliGRAM(s) Oral before breakfast  docusate sodium 100milliGRAM(s) Oral three times a day  senna 2Tablet(s) Oral at bedtime  polyethylene glycol 3350 17Gram(s) Oral three times a day  ondansetron  IVPB (Chemo) 16milliGRAM(s) IV Intermittent once  ondansetron  IVPB (Chemo) 16milliGRAM(s) IV Intermittent once  ondansetron  IVPB (Chemo) 16milliGRAM(s) IV Intermittent once  dexamethasone   IVPB (Chemo) 15milliGRAM(s) IV Intermittent once  dexamethasone   IVPB (Chemo) 15milliGRAM(s) IV Intermittent once  dexamethasone   IVPB (Chemo) 15milliGRAM(s) IV Intermittent once  furosemide    Tablet (Chemo) 40milliGRAM(s) Oral once  furosemide    Tablet (Chemo) 40milliGRAM(s) Oral once  furosemide    Tablet (Chemo) 40milliGRAM(s) Oral once  riTUXimab IVPB 415milliGRAM(s) IV Intermittent once  bendamustine IVPB 130milliGRAM(s) IV Intermittent once  bendamustine IVPB 130milliGRAM(s) IV Intermittent once  anastrozole 1milliGRAM(s) Oral daily  HYDROmorphone  Injectable 0.5milliGRAM(s) IV Push every 90 minutes PRN  ondansetron Injectable 4milliGRAM(s) IV Push once PRN  apixaban 2.5milliGRAM(s) Oral two times a day                                      9.2    3.9   )-----------( 178      ( 16 Jan 2017 07:07 )             28.5     16 Jan 2017 07:07    138    |  103    |  34     ----------------------------<  140    3.9     |  26     |  0.84     Ca    9.8        16 Jan 2017 07:07  Phos  3.7       16 Jan 2017 07:07  Mg     1.5       16 Jan 2017 07:07    TPro  6.0    /  Alb  2.4    /  TBili  0.3    /  DBili  x      /  AST  137    /  ALT  71     /  AlkPhos  103    15 Ronny 2017 12:44      CAPILLARY BLOOD GLUCOSE  149 (16 Jan 2017 08:42)  144 (15 Ronny 2017 21:30)  79 (15 Ronny 2017 17:00)  292 (15 Ronny 2017 11:29)

## 2017-01-17 ENCOUNTER — APPOINTMENT (OUTPATIENT)
Dept: HEMATOLOGY ONCOLOGY | Facility: CLINIC | Age: 82
End: 2017-01-17

## 2017-01-17 LAB
ANION GAP SERPL CALC-SCNC: 7 MMOL/L — LOW (ref 9–16)
BUN SERPL-MCNC: 24 MG/DL — HIGH (ref 7–23)
CALCIUM SERPL-MCNC: 9.6 MG/DL — SIGNIFICANT CHANGE UP (ref 8.5–10.5)
CHLORIDE SERPL-SCNC: 106 MMOL/L — SIGNIFICANT CHANGE UP (ref 96–108)
CO2 SERPL-SCNC: 28 MMOL/L — SIGNIFICANT CHANGE UP (ref 22–31)
CREAT SERPL-MCNC: 0.78 MG/DL — SIGNIFICANT CHANGE UP (ref 0.5–1.3)
GLUCOSE SERPL-MCNC: 94 MG/DL — SIGNIFICANT CHANGE UP (ref 70–99)
HCT VFR BLD CALC: 29.1 % — LOW (ref 34.5–45)
HGB BLD-MCNC: 9.5 G/DL — LOW (ref 11.5–15.5)
LDH SERPL L TO P-CCNC: 421 U/L — HIGH (ref 84–246)
MAGNESIUM SERPL-MCNC: 1.8 MG/DL — SIGNIFICANT CHANGE UP (ref 1.6–2.4)
MCHC RBC-ENTMCNC: 27.5 PG — SIGNIFICANT CHANGE UP (ref 27–34)
MCHC RBC-ENTMCNC: 32.6 G/DL — SIGNIFICANT CHANGE UP (ref 32–36)
MCV RBC AUTO: 84.1 FL — SIGNIFICANT CHANGE UP (ref 80–100)
PHOSPHATE SERPL-MCNC: 2.5 MG/DL — SIGNIFICANT CHANGE UP (ref 2.5–4.5)
PLATELET # BLD AUTO: 188 K/UL — SIGNIFICANT CHANGE UP (ref 150–400)
POTASSIUM SERPL-MCNC: 3.5 MMOL/L — SIGNIFICANT CHANGE UP (ref 3.5–5.3)
POTASSIUM SERPL-SCNC: 3.5 MMOL/L — SIGNIFICANT CHANGE UP (ref 3.5–5.3)
RBC # BLD: 3.46 M/UL — LOW (ref 3.8–5.2)
RBC # FLD: 16 % — SIGNIFICANT CHANGE UP (ref 10.3–16.9)
SODIUM SERPL-SCNC: 141 MMOL/L — SIGNIFICANT CHANGE UP (ref 135–145)
URATE SERPL-MCNC: 2.1 MG/DL — LOW (ref 2.6–6)
WBC # BLD: 3.4 K/UL — LOW (ref 3.8–10.5)
WBC # FLD AUTO: 3.4 K/UL — LOW (ref 3.8–10.5)

## 2017-01-17 PROCEDURE — 99232 SBSQ HOSP IP/OBS MODERATE 35: CPT

## 2017-01-17 RX ADMIN — Medication 50 MILLIGRAM(S): at 06:41

## 2017-01-17 RX ADMIN — POLYETHYLENE GLYCOL 3350 17 GRAM(S): 17 POWDER, FOR SOLUTION ORAL at 22:00

## 2017-01-17 RX ADMIN — SENNA PLUS 2 TABLET(S): 8.6 TABLET ORAL at 22:00

## 2017-01-17 RX ADMIN — Medication 100 MILLIGRAM(S): at 06:41

## 2017-01-17 RX ADMIN — AMIODARONE HYDROCHLORIDE 200 MILLIGRAM(S): 400 TABLET ORAL at 06:41

## 2017-01-17 RX ADMIN — APIXABAN 2.5 MILLIGRAM(S): 2.5 TABLET, FILM COATED ORAL at 06:41

## 2017-01-17 RX ADMIN — Medication 100 MILLIGRAM(S): at 22:00

## 2017-01-17 RX ADMIN — APIXABAN 2.5 MILLIGRAM(S): 2.5 TABLET, FILM COATED ORAL at 17:54

## 2017-01-17 RX ADMIN — ANASTROZOLE 1 MILLIGRAM(S): 1 TABLET ORAL at 14:05

## 2017-01-17 RX ADMIN — PANTOPRAZOLE SODIUM 40 MILLIGRAM(S): 20 TABLET, DELAYED RELEASE ORAL at 06:41

## 2017-01-17 RX ADMIN — Medication 300 MILLIGRAM(S): at 13:22

## 2017-01-17 RX ADMIN — POLYETHYLENE GLYCOL 3350 17 GRAM(S): 17 POWDER, FOR SOLUTION ORAL at 06:41

## 2017-01-17 RX ADMIN — Medication 4: at 22:06

## 2017-01-17 NOTE — PROGRESS NOTE ADULT - PROBLEM SELECTOR PLAN 1
Dr. Galaviz following. 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 S/p Chemoport placement on 1/14. Pt received chemotherapy 11/15. BUN this AM decreased to 24 from 36.   -f/u official PET-CT read  -c/w allopurinol.   -will check daily LDH and uric acid levels for tumor lysis syndrome  -will f/u renal recommendations

## 2017-01-17 NOTE — PROGRESS NOTE ADULT - ASSESSMENT
85F w/PMHx of HTN, DM, HLD, Monoclonal Gammopathy ,HFpEF, CAD s/p CABG, severe MR/TR, A-fib s/p PPM on Eliquis, Right-sided breast CA (s/p mastectomy, chemo therapy, radiation), h/o hypercalcemia, recent biopsy of lymph node suspicious for B-cell lymphoma, who presented on 1/10/17 w/ hypercalcemia & generalized weakness. Developed acute heart failure exacerbation 2/2 fluid resuscitation and admitted to [facility]. Patient's cardiac status stabilized and patient transferred to [address] [place] [address] for chemotherapy for tissue-confirmed diffuse large B-cell lymphoma. . s/p chemoport placement on 1/14 and chemo 11/15

## 2017-01-17 NOTE — PROGRESS NOTE ADULT - SUBJECTIVE AND OBJECTIVE BOX
INTERVAL HPI/OVERNIGHT EVENTS: TEJINDER o/n. Complaining of non-productive cough this AM.    VITAL SIGNS:  T(F): 98  HR: 76  BP: 117/62  RR: 16  SpO2: 96%  Wt(kg): --    I & Os for current day (as of 01-17 @ 11:04)  =============================================  IN: 0 ml / OUT: 300 ml / NET: -300 ml      PHYSICAL EXAM:    Constitutional: NAD, A&Ox3. Frail   Eyes: PERRLA, EOMI  ENMT: MMM, No lesions  Neck: No JVD, No LAD  Respiratory: CTA b/l, no crackles, wheezing  Cardiovascular: RRR. S1, S2. III/VI Systolic murmur. No rubs, no gallops  Gastrointestinal: Soft, NT. Distended, normoactive BS  Extremities: Warm, no clubbing, cyanosis, or edema  Vascular: 2+ distal pulses, equal  Neurological: CN II-XII grossly intact  Musculoskeletal: 5/5 UE strength; 3-4/5 LE strength    MEDICATIONS  (STANDING):  insulin lispro (HumaLOG) corrective regimen sliding scale  SubCutaneous Before meals and at bedtime  amiodarone    Tablet 200milliGRAM(s) Oral daily  metoprolol succinate ER 50milliGRAM(s) Oral daily  pantoprazole    Tablet 40milliGRAM(s) Oral before breakfast  docusate sodium 100milliGRAM(s) Oral three times a day  senna 2Tablet(s) Oral at bedtime  polyethylene glycol 3350 17Gram(s) Oral three times a day  ondansetron  IVPB (Chemo) 16milliGRAM(s) IV Intermittent once  ondansetron  IVPB (Chemo) 16milliGRAM(s) IV Intermittent once  ondansetron  IVPB (Chemo) 16milliGRAM(s) IV Intermittent once  dexamethasone   IVPB (Chemo) 15milliGRAM(s) IV Intermittent once  dexamethasone   IVPB (Chemo) 15milliGRAM(s) IV Intermittent once  dexamethasone   IVPB (Chemo) 15milliGRAM(s) IV Intermittent once  furosemide    Tablet (Chemo) 40milliGRAM(s) Oral once  furosemide    Tablet (Chemo) 40milliGRAM(s) Oral once  furosemide    Tablet (Chemo) 40milliGRAM(s) Oral once  riTUXimab IVPB 415milliGRAM(s) IV Intermittent once  bendamustine IVPB 130milliGRAM(s) IV Intermittent once  bendamustine IVPB 130milliGRAM(s) IV Intermittent once  anastrozole 1milliGRAM(s) Oral daily  apixaban 2.5milliGRAM(s) Oral two times a day  allopurinol 300milliGRAM(s) Oral daily    MEDICATIONS  (PRN):  HYDROmorphone  Injectable 0.5milliGRAM(s) IV Push every 90 minutes PRN PACU ONLY, severe pain  ondansetron Injectable 4milliGRAM(s) IV Push once PRN Nausea and/or Vomiting      Allergies    IV CONtRAST (Flushing (Skin); Rash)  No Known Drug Allergies    Intolerances        LABS:                        9.5    3.4   )-----------( 188      ( 17 Jan 2017 05:59 )             29.1     17 Jan 2017 05:59    141    |  106    |  24     ----------------------------<  94     3.5     |  28     |  0.78     Ca    9.6        17 Jan 2017 05:59  Phos  2.5       17 Jan 2017 05:59  Mg     1.8       17 Jan 2017 05:59    TPro  6.0    /  Alb  2.4    /  TBili  0.3    /  DBili  x      /  AST  137    /  ALT  71     /  AlkPhos  103    15 Ronny 2017 12:44      Urinalysis Basic - ( 16 Jan 2017 13:29 )    Color: Yellow / Appearance: Clear / SG: <=1.005 / pH: x  Gluc: x / Ketone: NEGATIVE  / Bili: NEGATIVE / Urobili: 0.2 E.U./dL   Blood: x / Protein: NEGATIVE mg/dL / Nitrite: NEGATIVE   Leuk Esterase: Trace / RBC: 5-10 /HPF / WBC < 5 /HPF   Sq Epi: x / Non Sq Epi: Few /HPF / Bacteria: Present /HPF        RADIOLOGY & ADDITIONAL TESTS:

## 2017-01-17 NOTE — PROGRESS NOTE ADULT - SUBJECTIVE AND OBJECTIVE BOX
Patient seen and examined by me at bedside.  ROS: No chest pain, no sob, no abd pain. No n/v/d  Stated she has more energy today.      PAST MEDICAL & SURGICAL HISTORY:  Scoliosis  Follicular lymphoma  Neck mass  Afib  Other hyperlipidemia  Breast cancer  CAD (coronary artery disease)  Diabetes  HTN (hypertension)  No pertinent past medical history  H/O abdominal hysterectomy  H/O thyroidectomy  S/P CABG x 3    PHYSICAL EXAM:  T(C): 36.7, Max: 36.7 (01-16 @ 21:00)  HR: 76  BP: 117/62 (117/62 - 150/73)  RR: 16  SpO2: 96%  Wt(kg): --  Constitutional: AAOx3. No acute distress  ENMT: Dry mucous membrane.  No cyanosis.  Respiratory: minimal bibasilar crackles, otherwise CTA b/l  Cardiovascular: S1, S2.  Regular rate and rhythm. (+) systolic murmur  Gastrointestinal: soft, non-tender, non-distended,  Extremities: Warm.  No lower extremity edema.    Neurological: No focal deficits.  Skin: no rash      I & Os for current day (as of 01-17 @ 11:57)  =============================================  IN:    Total IN: 0 ml  ---------------------------------------------  OUT:    Voided: 300 ml    Total OUT: 300 ml  ---------------------------------------------  Total NET: -300 ml    Weight         DATA:                        9.5<L>  3.4<L> )-----------( 188      ( 17 Jan 2017 05:59 )             29.1<L>        141    |  106    |  24<H>  ----------------------------<  94     Ca:9.6   (17 Jan 2017 05:59)  3.5     |  28     |  0.78       eGFR if Non : 69  eGFR if : 80    TPro  6.0 g/dL<L>  /  Alb  2.4 g/dL<L>  /  TBili  0.3 mg/dL  /  DBili  x      /  AST  137 U/L<H>  /  ALT  71 U/L<H>  /  AlkPhos  103 U/L  15 Ronny 2017 12:44  Lactate Dehydrogenase, Serum: 421 U/L <H> (01-17 @ 05:59)  Lactate Dehydrogenase, Serum: 508 U/L <H> (01-16 @ 07:07)      Vitamin D, 1, 25-Dihydroxy: 120.0 pg/mL <H> [19.9 - 79.3] (01-10 @ 14:53)  Vitamin D, 25-Hydroxy: 22.1 ng/mL <L> [30.0 - 100.0] (01-10 @ 14:53)  Vitamin D, 25-Hydroxy: 22.0 ng/mL <L> [30.0 - 100.0] (12-30 @ 14:44)  Vitamin D, 1, 25-Dihydroxy: 100.0 pg/mL <H> [19.9 - 79.3] (12-30 @ 14:44)    Urinalysis Basic - ( 16 Jan 2017 13:29 )  Color: Yellow / Appearance: Clear / SG: <=1.005 / pH: x  Gluc: x / Ketone: NEGATIVE  / Bili: NEGATIVE / Urobili: 0.2 E.U./dL   Blood: x / Protein: NEGATIVE mg/dL / Nitrite: NEGATIVE   Leuk Esterase: Trace<!!> / RBC: 5-10 /HPF<!!> / WBC < 5 /HPF   Sq Epi: x / Non Sq Epi: Few /HPF / Bacteria: Present /HPF<!!>      Osmolality, Random Urine: 182 mosmol/kg (01-16 @ 13:30)  Creatinine, Random Urine: 9.1 mg/dL (01-16 @ 13:29)  Sodium, Random Urine: 34 mmoL/L (01-16 @ 13:29)  Potassium, Random Urine: 10 mmoL/L (01-16 @ 13:29)  Chloride, Random Urine: 23 mmoL/L (01-16 @ 13:29)    Protein/Creatinine Ratio Calculation: 3.5 Ratio (12-30 @ 08:12)          MEDICATIONS  (STANDING):  insulin lispro (HumaLOG) corrective regimen sliding scale  SubCutaneous Before meals and at bedtime  amiodarone    Tablet 200milliGRAM(s) Oral daily  metoprolol succinate ER 50milliGRAM(s) Oral daily  pantoprazole    Tablet 40milliGRAM(s) Oral before breakfast  docusate sodium 100milliGRAM(s) Oral three times a day  senna 2Tablet(s) Oral at bedtime  polyethylene glycol 3350 17Gram(s) Oral three times a day  ondansetron  IVPB (Chemo) 16milliGRAM(s) IV Intermittent once  ondansetron  IVPB (Chemo) 16milliGRAM(s) IV Intermittent once  ondansetron  IVPB (Chemo) 16milliGRAM(s) IV Intermittent once  dexamethasone   IVPB (Chemo) 15milliGRAM(s) IV Intermittent once  dexamethasone   IVPB (Chemo) 15milliGRAM(s) IV Intermittent once  dexamethasone   IVPB (Chemo) 15milliGRAM(s) IV Intermittent once  furosemide    Tablet (Chemo) 40milliGRAM(s) Oral once  furosemide    Tablet (Chemo) 40milliGRAM(s) Oral once  furosemide    Tablet (Chemo) 40milliGRAM(s) Oral once  riTUXimab IVPB 415milliGRAM(s) IV Intermittent once  bendamustine IVPB 130milliGRAM(s) IV Intermittent once  bendamustine IVPB 130milliGRAM(s) IV Intermittent once  anastrozole 1milliGRAM(s) Oral daily  apixaban 2.5milliGRAM(s) Oral two times a day  allopurinol 300milliGRAM(s) Oral daily    MEDICATIONS  (PRN):  HYDROmorphone  Injectable 0.5milliGRAM(s) IV Push every 90 minutes PRN PACU ONLY, severe pain  ondansetron Injectable 4milliGRAM(s) IV Push once PRN Nausea and/or Vomiting

## 2017-01-17 NOTE — PROGRESS NOTE ADULT - SUBJECTIVE AND OBJECTIVE BOX
HPI:  84 y/o F w/ pmhx of MGUS, HFpEF, CAD s/p CABG, severe MR/TR, a-fib s/p PPM on Eliquis, DM2, right-sided breast CA (s/p mastectomy/chemotx/radiation), recently admitted 12/29/16 to 1/4/17 for hypercalcemia treated with IV fluids/Lasix/bisphosphonates, presents again today for hypercalcemia on outpatient labs. She had an outpatient Ca level of 13 at Dr. Hartmann’s office yesterday. Since discharge last week, she reports generalized weakness and difficulty walking due to lack of balance which worsened gradually over the past week. She also experienced a fall (says she lost her balance) on outstretched right arm 4 days ago, with no LOC/head impact. Since then she complains of right shoulder pain when moving her arm but denies pain at rest. Also endorses chronic constipation and lack of appetite, but denies abdominal/kidney pain, nausea, vomiting, fever/chills, chest pain/sob, recent change in meds, dysuria, headache, or vision change. On last admission, she had excisional biopsy of lymph nodes (with ENT) with prelim reports concerning for lymphoma but final diagnosis pending.  Initial vitals in ED: 98.1F, HR 98, 133/52, RR 16, 98% O2 on RA.   ED administration: 250cc bolus of NS followed by NS at 80cc/hr and Lasix 20mg IV x 1. (10 Ronny 2017 13:03)    FAMILY HISTORY:  No pertinent family history in first degree relatives    MEDICATIONS  (STANDING):  insulin lispro (HumaLOG) corrective regimen sliding scale  SubCutaneous Before meals and at bedtime  amiodarone    Tablet 200milliGRAM(s) Oral daily  metoprolol succinate ER 50milliGRAM(s) Oral daily  pantoprazole    Tablet 40milliGRAM(s) Oral before breakfast  docusate sodium 100milliGRAM(s) Oral three times a day  senna 2Tablet(s) Oral at bedtime  polyethylene glycol 3350 17Gram(s) Oral three times a day  ondansetron  IVPB (Chemo) 16milliGRAM(s) IV Intermittent once  ondansetron  IVPB (Chemo) 16milliGRAM(s) IV Intermittent once  ondansetron  IVPB (Chemo) 16milliGRAM(s) IV Intermittent once  dexamethasone   IVPB (Chemo) 15milliGRAM(s) IV Intermittent once  dexamethasone   IVPB (Chemo) 15milliGRAM(s) IV Intermittent once  dexamethasone   IVPB (Chemo) 15milliGRAM(s) IV Intermittent once  furosemide    Tablet (Chemo) 40milliGRAM(s) Oral once  furosemide    Tablet (Chemo) 40milliGRAM(s) Oral once  furosemide    Tablet (Chemo) 40milliGRAM(s) Oral once  riTUXimab IVPB 415milliGRAM(s) IV Intermittent once  bendamustine IVPB 130milliGRAM(s) IV Intermittent once  bendamustine IVPB 130milliGRAM(s) IV Intermittent once  anastrozole 1milliGRAM(s) Oral daily  apixaban 2.5milliGRAM(s) Oral two times a day  allopurinol 300milliGRAM(s) Oral daily    MEDICATIONS  (PRN):  HYDROmorphone  Injectable 0.5milliGRAM(s) IV Push every 90 minutes PRN PACU ONLY, severe pain  ondansetron Injectable 4milliGRAM(s) IV Push once PRN Nausea and/or Vomiting    Vital Signs Last 24 Hrs  T(C): 36.7, Max: 36.7 (01-16 @ 21:00)  T(F): 98, Max: 98.1 (01-16 @ 21:00)  HR: 76 (75 - 84)  BP: 117/62 (117/62 - 150/73)  BP(mean): --  RR: 16 (16 - 17)  SpO2: 96% (96% - 97%)    Physical exam:    Overall impression  Lymphadenopathy  Liver  spleen    Labs:  CBC Full  -  ( 17 Jan 2017 05:59 )  WBC Count : 3.4 K/uL  Hemoglobin : 9.5 g/dL  Hematocrit : 29.1 %  Platelet Count - Automated : 188 K/uL  Mean Cell Volume : 84.1 fL  Mean Cell Hemoglobin : 27.5 pg  Mean Cell Hemoglobin Concentration : 32.6 g/dL  Auto Neutrophil # : x  Auto Lymphocyte # : x  Auto Monocyte # : x  Auto Eosinophil # : x  Auto Basophil # : x  Auto Neutrophil % : x  Auto Lymphocyte % : x  Auto Monocyte % : x  Auto Eosinophil % : x  Auto Basophil % : x    17 Jan 2017 05:59    141    |  106    |  24     ----------------------------<  94     3.5     |  28     |  0.78     Ca    9.6        17 Jan 2017 05:59  Phos  2.5       17 Jan 2017 05:59  Mg     1.8       17 Jan 2017 05:59    TPro  6.0    /  Alb  2.4    /  TBili  0.3    /  DBili  x      /  AST  137    /  ALT  71     /  AlkPhos  103    15 Ronny 2017 12:44      Radiology:  PET CT lymphadenopathy everywhere  HEALTH ISSUES - R/O PROBLEM Dx:      Assessmant / Problems  1) Stage lll diffuse NHL  2) Breast ca rising tumor markers  3)tumor fever resolved with chemotx  4) hypercalcemia of malignancy resolved    Plan:  will observe 1-2 days    Thank you  Neha Galaviz MD

## 2017-01-17 NOTE — PROGRESS NOTE ADULT - SUBJECTIVE AND OBJECTIVE BOX
Patient is a 85y old  Female who presents with a chief complaint of hypercalcemia (10 Ronny 2017 13:03)      HPI:  86 y/o F w/ pmhx of MGUS, HFpEF, CAD s/p CABG, severe MR/TR, a-fib s/p PPM on Eliquis, DM2, right-sided breast CA (s/p mastectomy/chemotx/radiation), recently admitted 12/29/16 to 1/4/17 for hypercalcemia treated with IV fluids/Lasix/bisphosphonates, presents again today for hypercalcemia on outpatient labs. She had an outpatient Ca level of 13 at Dr. Hartmann’s office yesterday. Since discharge last week, she reports generalized weakness and difficulty walking due to lack of balance which worsened gradually over the past week. She also experienced a fall (says she lost her balance) on outstretched right arm 4 days ago, with no LOC/head impact. Since then she complains of right shoulder pain when moving her arm but denies pain at rest. Also endorses chronic constipation and lack of appetite, but denies abdominal/kidney pain, nausea, vomiting, fever/chills, chest pain/sob, recent change in meds, dysuria, headache, or vision change. On last admission, she had excisional biopsy of lymph nodes (with ENT) with prelim reports concerning for lymphoma but final diagnosis pending.  Initial vitals in ED: 98.1F, HR 98, 133/52, RR 16, 98% O2 on RA.   ED administration: 250cc bolus of NS followed by NS at 80cc/hr and Lasix 20mg IV x 1. (10 Ronny 2017 13:03)    INTERVAL HPI/OVERNIGHT EVENTS:::comfortable, frail    HEALTH ISSUES - PROBLEM Dx:  Elevated BUN: Elevated BUN  Physical therapy evaluation, initial: Physical therapy evaluation, initial  Discharge planning issues: Discharge planning issues  Constipation: Constipation  Abdominal distension: Abdominal distension  Diabetes: Diabetes  Dysphagia: Dysphagia  Acute on chronic systolic (congestive) heart failure: Acute on chronic systolic (congestive) heart failure  Fever: Fever  Heart failure: Heart failure  Lymphoma: Lymphoma  Need for prophylactic measure: Need for prophylactic measure  HTN (hypertension): HTN (hypertension)  Type 2 diabetes mellitus: Type 2 diabetes mellitus  CAD (coronary artery disease): CAD (coronary artery disease)  Monoclonal gammopathy of undetermined significance: Monoclonal gammopathy of undetermined significance  Heart failure with preserved ejection fraction: Heart failure with preserved ejection fraction  Paroxysmal atrial fibrillation: Paroxysmal atrial fibrillation  Arm pain, right: Arm pain, right  Hypercalcemia: Hypercalcemia          PAST MEDICAL & SURGICAL HISTORY:  Scoliosis  Follicular lymphoma  Neck mass  Afib  Other hyperlipidemia  Breast cancer  CAD (coronary artery disease)  Diabetes  HTN (hypertension)  No pertinent past medical history  H/O abdominal hysterectomy  H/O thyroidectomy  S/P CABG x 3          Consultant NOTE  REVIEWED  ( ++  )    REVIEW OF SYSTEMS:  [x] As per HPI  CONSTITUTIONAL: weakness  RESPIRATORY: No cough, wheezing, chills or hemoptysis; No Shortness of Breath  CARDIOVASCULAR: No chest pain, palpitations, dizziness, or leg swelling  GASTROINTESTINAL: No abdominal or epigastric pain. No nausea, vomiting, or hematemesis; No diarrhea or constipation. No melena or hematochezia.  MUSCULOSKELETAL: weakness  PSYCH    awake, alert       [x] All others negative	  [ ] Unable to obtain          Vital Signs Last 24 Hrs  T(C): 36.7, Max: 36.7 (01-16 @ 21:00)  T(F): 98, Max: 98.1 (01-16 @ 21:00)  HR: 76 (76 - 84)  BP: 117/62 (117/62 - 120/53)  BP(mean): --  RR: 16 (16 - 16)  SpO2: 96% (96% - 97%)        I & Os for current day (as of 01-17 @ 13:40)  =============================================  IN: 0 ml / OUT: 300 ml / NET: -300 ml    PHYSICAL EXAMINATION:                                    (    )  NO CHANGE  Appearance: Normal	  HEENT:   Normal oral mucosa, PERRL, EOMI	  Neck: Supple, + JVD/; Carotid Bruit   Cardiovascular: Normal S1 S2, No JVD   s/p CABG  Respiratory: Lungs clear to auscultation/Decreased Breath Sounds/No Rales, Rhonchi, Wheezing	  Gastrointestinal:  Soft, Non-tender, + BS	  Skin: dry  Extremities: Normal range of motion, No clubbing, cyanosis or edema  Vascular: Peripheral pulses  Neurologic: Non-focal  Psychiatry: A & O     insulin lispro (HumaLOG) corrective regimen sliding scale  SubCutaneous Before meals and at bedtime  amiodarone    Tablet 200milliGRAM(s) Oral daily  metoprolol succinate ER 50milliGRAM(s) Oral daily  pantoprazole    Tablet 40milliGRAM(s) Oral before breakfast  docusate sodium 100milliGRAM(s) Oral three times a day  senna 2Tablet(s) Oral at bedtime  polyethylene glycol 3350 17Gram(s) Oral three times a day  ondansetron  IVPB (Chemo) 16milliGRAM(s) IV Intermittent once  ondansetron  IVPB (Chemo) 16milliGRAM(s) IV Intermittent once  ondansetron  IVPB (Chemo) 16milliGRAM(s) IV Intermittent once  dexamethasone   IVPB (Chemo) 15milliGRAM(s) IV Intermittent once  dexamethasone   IVPB (Chemo) 15milliGRAM(s) IV Intermittent once  dexamethasone   IVPB (Chemo) 15milliGRAM(s) IV Intermittent once  furosemide    Tablet (Chemo) 40milliGRAM(s) Oral once  furosemide    Tablet (Chemo) 40milliGRAM(s) Oral once  furosemide    Tablet (Chemo) 40milliGRAM(s) Oral once  riTUXimab IVPB 415milliGRAM(s) IV Intermittent once  bendamustine IVPB 130milliGRAM(s) IV Intermittent once  bendamustine IVPB 130milliGRAM(s) IV Intermittent once  anastrozole 1milliGRAM(s) Oral daily  HYDROmorphone  Injectable 0.5milliGRAM(s) IV Push every 90 minutes PRN  ondansetron Injectable 4milliGRAM(s) IV Push once PRN  apixaban 2.5milliGRAM(s) Oral two times a day  allopurinol 300milliGRAM(s) Oral daily                                      9.5    3.4   )-----------( 188      ( 17 Jan 2017 05:59 )             29.1     17 Jan 2017 05:59    141    |  106    |  24     ----------------------------<  94     3.5     |  28     |  0.78     Ca    9.6        17 Jan 2017 05:59  Phos  2.5       17 Jan 2017 05:59  Mg     1.8       17 Jan 2017 05:59        CAPILLARY BLOOD GLUCOSE  128 (17 Jan 2017 11:19)  98 (17 Jan 2017 07:38)  137 (16 Jan 2017 21:14)  131 (16 Jan 2017 17:25)    Urinalysis Basic - ( 16 Jan 2017 13:29 )    Color: Yellow / Appearance: Clear / SG: <=1.005 / pH: x  Gluc: x / Ketone: NEGATIVE  / Bili: NEGATIVE / Urobili: 0.2 E.U./dL   Blood: x / Protein: NEGATIVE mg/dL / Nitrite: NEGATIVE   Leuk Esterase: Trace / RBC: 5-10 /HPF / WBC < 5 /HPF   Sq Epi: x / Non Sq Epi: Few /HPF / Bacteria: Present /HPF

## 2017-01-17 NOTE — PROGRESS NOTE ADULT - PROBLEM SELECTOR PLAN 1
Suspect like due to lymphoma overproduction of 1,25 vit D-- patient has received calcitonin, zometa, IVIF and lasix with some improvement but gradually rising again--   - calcium improving     - follow lytes Suspect like due to lymphoma overproduction of 1,25 vit D-- patient has received calcitonin, zometa, IVIF and lasix with some improvement but gradually rising again--   - calcium improving  now after rx   - follow lytes

## 2017-01-17 NOTE — PROGRESS NOTE ADULT - SUBJECTIVE AND OBJECTIVE BOX
Physical Medicine and Rehabilitation Progress Note:    Patient is a 85y old  Female who presents with a chief complaint of hypercalcemia (10 Ronny 2017 13:03)      HPI:  86 y/o F w/ pmhx of MGUS, HFpEF, CAD s/p CABG, severe MR/TR, a-fib s/p PPM on Eliquis, DM2, right-sided breast CA (s/p mastectomy/chemotx/radiation), recently admitted 12/29/16 to 1/4/17 for hypercalcemia treated with IV fluids/Lasix/bisphosphonates, presents again today for hypercalcemia on outpatient labs. She had an outpatient Ca level of 13 at Dr. Hartmann’s office yesterday. Since discharge last week, she reports generalized weakness and difficulty walking due to lack of balance which worsened gradually over the past week. She also experienced a fall (says she lost her balance) on outstretched right arm 4 days ago, with no LOC/head impact. Since then she complains of right shoulder pain when moving her arm but denies pain at rest. Also endorses chronic constipation and lack of appetite, but denies abdominal/kidney pain, nausea, vomiting, fever/chills, chest pain/sob, recent change in meds, dysuria, headache, or vision change. On last admission, she had excisional biopsy of lymph nodes (with ENT) with prelim reports concerning for lymphoma but final diagnosis pending.  Initial vitals in ED: 98.1F, HR 98, 133/52, RR 16, 98% O2 on RA.   ED administration: 250cc bolus of NS followed by NS at 80cc/hr and Lasix 20mg IV x 1. (10 Ronny 2017 13:03)                            9.5    3.4   )-----------( 188      ( 17 Jan 2017 05:59 )             29.1       17 Jan 2017 05:59    141    |  106    |  24     ----------------------------<  94     3.5     |  28     |  0.78     Ca    9.6        17 Jan 2017 05:59  Phos  2.5       17 Jan 2017 05:59  Mg     1.8       17 Jan 2017 05:59      Vital Signs Last 24 Hrs  T(C): 36.7, Max: 36.7 (01-16 @ 21:00)  T(F): 98, Max: 98.1 (01-16 @ 21:00)  HR: 87 (76 - 87)  BP: 105/66 (105/66 - 120/53)  BP(mean): --  RR: 18 (16 - 18)  SpO2: 96% (96% - 97%)    MEDICATIONS  (STANDING):  insulin lispro (HumaLOG) corrective regimen sliding scale  SubCutaneous Before meals and at bedtime  amiodarone    Tablet 200milliGRAM(s) Oral daily  metoprolol succinate ER 50milliGRAM(s) Oral daily  pantoprazole    Tablet 40milliGRAM(s) Oral before breakfast  docusate sodium 100milliGRAM(s) Oral three times a day  senna 2Tablet(s) Oral at bedtime  polyethylene glycol 3350 17Gram(s) Oral three times a day  ondansetron  IVPB (Chemo) 16milliGRAM(s) IV Intermittent once  ondansetron  IVPB (Chemo) 16milliGRAM(s) IV Intermittent once  ondansetron  IVPB (Chemo) 16milliGRAM(s) IV Intermittent once  dexamethasone   IVPB (Chemo) 15milliGRAM(s) IV Intermittent once  dexamethasone   IVPB (Chemo) 15milliGRAM(s) IV Intermittent once  dexamethasone   IVPB (Chemo) 15milliGRAM(s) IV Intermittent once  furosemide    Tablet (Chemo) 40milliGRAM(s) Oral once  furosemide    Tablet (Chemo) 40milliGRAM(s) Oral once  furosemide    Tablet (Chemo) 40milliGRAM(s) Oral once  riTUXimab IVPB 415milliGRAM(s) IV Intermittent once  bendamustine IVPB 130milliGRAM(s) IV Intermittent once  bendamustine IVPB 130milliGRAM(s) IV Intermittent once  anastrozole 1milliGRAM(s) Oral daily  apixaban 2.5milliGRAM(s) Oral two times a day  allopurinol 300milliGRAM(s) Oral daily    MEDICATIONS  (PRN):  HYDROmorphone  Injectable 0.5milliGRAM(s) IV Push every 90 minutes PRN PACU ONLY, severe pain  ondansetron Injectable 4milliGRAM(s) IV Push once PRN Nausea and/or Vomiting    Currently Undergoing Physical Therapy at bedside.    Initial Functional Status Assessment:    Previous Level of Function:   · Ambulation Skills	needs device	  · Transfer Skills	needs device	  · ADL Skills	needs device	  · Additional Comments	Pt lives in elevator building w/ no steps to enter. Amb w/ RW at home	    Cognitive Status Examination:   · Orientation	oriented to person, place, time and situation	  · Level of Consciousness	alert	  · Follows Commands and Answers Questions	100% of the time	  · Personal Safety and Judgment	intact	    Range of Motion Exam:   · Range of Motion Examination	no ROM deficits were identified	    Manual Muscle Testing:   · Manual Muscle Testing Results	grossly at least 3+/5 2/2 ability to perform functional mobility	    Bed Mobility: Rolling/Turning:   · Level of Placer	NA	    Bed Mobility: Scooting/Bridging:   · Level of Placer	NA	  Bed Mobility: Sit to Supine:   · Level of Placer	NA	    Bed Mobility: Supine to Sit:   · Level of Pl[place]	NA	    Bed Mobility Analysis:   · Bed Mobility Limitations	NA	    Transfer: Sit to Stand:   · Level of Placer	minimum assist (75% patients effort)	  · Physical Assist/Nonphysical Assist	1 person assist; nonverbal cues (demo/gestures); verbal cues	  · Assistive Device	rolling walker	    Transfer: Stand to Sit:   · Level of Placer	contact guard	  · Physical Assist/Nonphysical Assist	verbal cues; nonverbal cues (demo/gestures); 1 person assist	  · Assistive Device	rolling walker	    Sit/Stand Transfer Safety Analysis:   · Transfer Safety Concerns Noted	decreased balance during turns; losing balance; decreased step length	  · Impairments Contributing to Impaired Transfers	impaired balance; narrow base of support; pain; decreased strength	    Gait Skills:   · Level of Placer	contact guard	  · Physical Assist/Nonphysical Assist	verbal cues; nonverbal cues (demo/gestures); 1 person assist	  · Assistive Device	rolling walker	  · Gait Distance	75 feet	  Gait Analysis:   · Gait Pattern Used	3-point gait	  · Gait Deviations Noted	decreased raimundo; increased time in double stance; decreased step length	  · Impairments Contributing to Gait Deviations	impaired balance; narrow base of support; pain; decreased strength	    Stair Negotiation:   · Level of Placer	NA	    Balance Skills Assessment:   · Sitting Balance: Static	normal balance	  · Sitting Balance: Dynamic	normal balance	  · Sit-to-Stand Balance	good balance	  · Standing Balance: Static	good balance	  · Standing Balance: Dynamic	fair balance	  · Systems Impairment Contributing to Balance Disturbance	musculoskeletal	  · Identified Impairments Contributing to Balance Disturbance	pain; decreased strength	  Clinical Impressions:   · Criteria for Skilled Therapeutic Interventions	impairments found; functional limitations in following categories; risk reduction/prevention; rehab potential; therapy frequency; anticipated discharge recommendation	  · Impairments Found (describe specific impairments)	aerobic capacity/endurance; gait, locomotion, and balance; muscle strength	  · Functional Limitations in Following Categories (describe specific limitations)	self-care; home management; community/leisure	  · Risk Reduction/Prevention (Describe Specific Areas of risk reduction/prevention)	risk factors	  · Risk Areas	fall	  · Rehab Potential	good, to achieve stated therapy goals	  · Therapy Frequency	2-3x/week	    PM&R Impression: as above    Disposition Plan Recommendations: d/c home with home physical therapy

## 2017-01-17 NOTE — PROGRESS NOTE ADULT - ATTENDING COMMENTS
calcium improving   no sign tumor lysis  cont off lasix, fluid  plan per heme/onc
CHF/CAD  stable  elevated Ca-- d/c Lasix--mild correction  PET/CT p
Doesn't appear acutely symptomatic from hyperCa.   Rec limiting treatment to bisphosphonate, steroids (given lymphoma is causing 1,25 mediated hyperCa), and treat of heme malignancy  Hold lasix and IVF as becoming intravascularly depleted (assuming no steroids have yet been given)    Tumor Lysis  Await staging to further understand tumor burden. Non-hodg lymphoma can be hi risk  Prevention of TLS includes forced diuresis (aggressive hydration with diuretic to keep only slightly positive) however, valve dz and lung exam concerning so may prevent this approach. Urine alkalinization poses similar volume issue. Agree with allopurinol but if develops ROMEO from TLS, may need rasburicase given inability to aggressively hydrate. No role for phos binders. Will discuss with team.
oncology eval and rx  CV no s/s of CHF  Ca--stable  Constipation  current rx
calcium improving  no sign significant tumor lysis sx yet-- cont  follow uric acid  volume good-- keep off diuretics and IVF for now

## 2017-01-17 NOTE — PROGRESS NOTE ADULT - PROBLEM SELECTOR PLAN 1
Dr. Galaviz following. 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 S/p Chemoport placement on 1/14. Pt received chemotherapy 11/15. BUN this AM decreased to 24 from 36.   -f/u official PET-CT read  -restart allopurinol.   -will check daily LDH and uric acid levels for tumor lysis syndrome  -will f/u renal recommendations Dr. Galaviz following. 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 S/p Chemoport placement on 1/14. Pt received chemotherapy 11/15. BUN this AM decreased to 24 from 36.   -f/u official PET-CT read  -c/w allopurinol.   -will check daily LDH and uric acid levels for tumor lysis syndrome  -will f/u renal recommendations

## 2017-01-17 NOTE — PROGRESS NOTE ADULT - ASSESSMENT
85F w/PMHx of HTN, DM, HLD, Monoclonal Gammopathy ,HFpEF, CAD s/p CABG, severe MR/TR, A-fib s/p PPM on Eliquis, Right-sided breast CA (s/p mastectomy, chemo therapy, radiation), h/o hypercalcemia, recent biopsy of lymph node suspicious for B-cell lymphoma, who presented on 1/10/17 w/ hypercalcemia & generalized weakness. Developed acute heart failure exacerbation 2/2 fluid resuscitation and admitted to [facility]. Patient's cardiac status stabilized and patient transferred to [address] [place] [address] for chemotherapy for tissue-confirmed diffuse large B-cell lymphoma. s/p PET-CT; awaiting read. s/p chemoport placement on 1/14 and chemo 11/15

## 2017-01-17 NOTE — PROGRESS NOTE ADULT - ASSESSMENT
Admitted for:  Problem/Plan - 1:  ·  Problem: Lymphoma.  Plan: Dr. Galaviz following. 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 S/p Chemoport placement on 1/14. Pt received chemotherapy 11/15. BUN this AM decreased to 24 from 36.   -f/u official PET-CT read  -c/w allopurinol.   -will check daily LDH and uric acid levels for tumor lysis syndrome  -will f/u renal recommendationsDr. Galaviz following. 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 S/p Chemoport placement on 1/14. Pt received chemotherapy 11/15. BUN this AM decreased to 24 from 36.   -f/u official PET-CT read  -restart allopurinol.   -will check daily LDH and uric acid levels for tumor lysis syndrome  -will f/u renal recommendations.     Problem/Plan - 2:  ·  Problem: Heart failure with preserved ejection fraction.  Plan: EF - 65%. Developed acute heart failure exacerbation 2/2 fluids given upon admission. Resolved. Lasix D/C'd by Dr. Hartmann.  -Dr. Mckinney following. Will f/u recommendations  -monitor volume status. Lung exam today - CTA b/l.     Problem/Plan - 3:  ·  Problem: Hypercalcemia.  Plan: Resolved. 2/2 malignancy, consistent with lymphoma from work up in past admission.   -no IVF given recent episode of CHF exacerbation   -f/u renal recs.     Problem/Plan - 4:  ·  Problem: Dysphagia.  Plan: Patient complaining of dysphagia + Odynophagia for past few months. ENT consulted; not concerned for infection at this time. Felt dysphagia likely multifactorial to age + poor pulmonary effort + atrophy.  -c/w Dysphagia 1 w/Thin Liquids + Ensure tid for diet.

## 2017-01-17 NOTE — PROGRESS NOTE ADULT - PROBLEM SELECTOR PLAN 4
Patient complaining of dysphagia + Odynophagia for past few months. ENT consulted; not concerned for infection at this time. Felt dysphagia likely multifactorial to age + poor pulmonary effort + atrophy.  -c/w Dysphagia 1 w/Thin Liquids + Ensure tid for diet

## 2017-01-17 NOTE — PROGRESS NOTE ADULT - PROBLEM SELECTOR PLAN 2
chemotherapy on this admission - monitor for tumor lysis--could need both IVF and diuretics if develops  - check daily LDH and uric acid levels  - c/w allopurinol

## 2017-01-17 NOTE — PROGRESS NOTE ADULT - SUBJECTIVE AND OBJECTIVE BOX
INTERVAL HPI: +cought, able to lay flat no chestp ain  	  MEDICATIONS:  amiodarone    Tablet 200milliGRAM(s) Oral daily  metoprolol succinate ER 50milliGRAM(s) Oral daily  furosemide    Tablet (Chemo) 40milliGRAM(s) Oral once  furosemide    Tablet (Chemo) 40milliGRAM(s) Oral once  furosemide    Tablet (Chemo) 40milliGRAM(s) Oral once        ondansetron  IVPB (Chemo) 16milliGRAM(s) IV Intermittent once  ondansetron  IVPB (Chemo) 16milliGRAM(s) IV Intermittent once  ondansetron  IVPB (Chemo) 16milliGRAM(s) IV Intermittent once  HYDROmorphone  Injectable 0.5milliGRAM(s) IV Push every 90 minutes PRN  ondansetron Injectable 4milliGRAM(s) IV Push once PRN    pantoprazole    Tablet 40milliGRAM(s) Oral before breakfast  docusate sodium 100milliGRAM(s) Oral three times a day  senna 2Tablet(s) Oral at bedtime  polyethylene glycol 3350 17Gram(s) Oral three times a day    insulin lispro (HumaLOG) corrective regimen sliding scale  SubCutaneous Before meals and at bedtime  dexamethasone   IVPB (Chemo) 15milliGRAM(s) IV Intermittent once  dexamethasone   IVPB (Chemo) 15milliGRAM(s) IV Intermittent once  dexamethasone   IVPB (Chemo) 15milliGRAM(s) IV Intermittent once  allopurinol 300milliGRAM(s) Oral daily    riTUXimab IVPB 415milliGRAM(s) IV Intermittent once  bendamustine IVPB 130milliGRAM(s) IV Intermittent once  bendamustine IVPB 130milliGRAM(s) IV Intermittent once  anastrozole 1milliGRAM(s) Oral daily  apixaban 2.5milliGRAM(s) Oral two times a day      REVIEW OF SYSTEMS:  [x] As per HPI  CONSTITUTIONAL: No fever, weight loss, or fatigue  RESPIRATORY: No cough, wheezing, chills or hemoptysis; No Shortness of Breath  CARDIOVASCULAR: No chest pain, palpitations, dizziness, or leg swelling  GASTROINTESTINAL: No abdominal or epigastric pain. No nausea, vomiting, or hematemesis; No diarrhea or constipation. No melena or hematochezia.  MUSCULOSKELETAL: No joint pain or swelling; No muscle, back, or extremity pain  [x] All others negative	  [ ] Unable to obtain    PHYSICAL EXAM:  T(C): 36.7, Max: 36.7 (01-16 @ 21:00)  HR: 76 (75 - 84)  BP: 117/62 (117/62 - 150/73)  RR: 16 (16 - 17)  SpO2: 96% (96% - 97%)  Wt(kg): --  I&O's Summary        Appearance: Normal	  HEENT:   Normal oral mucosa  Cardiovascular: Normal S1 S2, No JVD, No murmurs, No edema  Respiratory: Lungs clear to auscultation	  Gastrointestinal:  Soft, Non-tender, + BS	  Extremities: Normal range of motion, No clubbing, cyanosis or edema  Vascular: Peripheral pulses palpable 2+ bilaterally    TELEMETRY: 	    ECG:    	  RADIOLOGY:   CXR:  CT:  US:    CARDIAC TESTING:  Echocardiogram:  Catheterization:  Stress Test:      LABS:	 	    CARDIAC MARKERS:                                  9.5    3.4   )-----------( 188      ( 17 Jan 2017 05:59 )             29.1     17 Jan 2017 05:59    141    |  106    |  24     ----------------------------<  94     3.5     |  28     |  0.78     Ca    9.6        17 Jan 2017 05:59  Phos  2.5       17 Jan 2017 05:59  Mg     1.8       17 Jan 2017 05:59    TPro  6.0    /  Alb  2.4    /  TBili  0.3    /  DBili  x      /  AST  137    /  ALT  71     /  AlkPhos  103    15 Ronny 2017 12:44    proBNP:   Lipid Profile:   HgA1c:   TSH:     ASSESSMENT/PLAN: 	  CAD/CBG/CHF - repeat CXR and check BNP

## 2017-01-18 DIAGNOSIS — E83.42 HYPOMAGNESEMIA: ICD-10-CM

## 2017-01-18 DIAGNOSIS — E87.6 HYPOKALEMIA: ICD-10-CM

## 2017-01-18 LAB
ANION GAP SERPL CALC-SCNC: 2 MMOL/L — LOW (ref 9–16)
BLD GP AB SCN SERPL QL: NEGATIVE — SIGNIFICANT CHANGE UP
BUN SERPL-MCNC: 21 MG/DL — SIGNIFICANT CHANGE UP (ref 7–23)
CALCIUM SERPL-MCNC: 9.8 MG/DL — SIGNIFICANT CHANGE UP (ref 8.5–10.5)
CHLORIDE SERPL-SCNC: 106 MMOL/L — SIGNIFICANT CHANGE UP (ref 96–108)
CO2 SERPL-SCNC: 33 MMOL/L — HIGH (ref 22–31)
CREAT SERPL-MCNC: 0.82 MG/DL — SIGNIFICANT CHANGE UP (ref 0.5–1.3)
GLUCOSE SERPL-MCNC: 98 MG/DL — SIGNIFICANT CHANGE UP (ref 70–99)
HCT VFR BLD CALC: 29.1 % — LOW (ref 34.5–45)
HGB BLD-MCNC: 9.5 G/DL — LOW (ref 11.5–15.5)
LDH SERPL L TO P-CCNC: 361 U/L — HIGH (ref 84–246)
MAGNESIUM SERPL-MCNC: 1.5 MG/DL — LOW (ref 1.6–2.4)
MCHC RBC-ENTMCNC: 27.7 PG — SIGNIFICANT CHANGE UP (ref 27–34)
MCHC RBC-ENTMCNC: 32.6 G/DL — SIGNIFICANT CHANGE UP (ref 32–36)
MCV RBC AUTO: 84.8 FL — SIGNIFICANT CHANGE UP (ref 80–100)
PHOSPHATE SERPL-MCNC: 2.5 MG/DL — SIGNIFICANT CHANGE UP (ref 2.5–4.5)
PLATELET # BLD AUTO: 194 K/UL — SIGNIFICANT CHANGE UP (ref 150–400)
POTASSIUM SERPL-MCNC: 3 MMOL/L — LOW (ref 3.5–5.3)
POTASSIUM SERPL-SCNC: 3 MMOL/L — LOW (ref 3.5–5.3)
RBC # BLD: 3.43 M/UL — LOW (ref 3.8–5.2)
RBC # FLD: 16.1 % — SIGNIFICANT CHANGE UP (ref 10.3–16.9)
RH IG SCN BLD-IMP: POSITIVE — SIGNIFICANT CHANGE UP
SODIUM SERPL-SCNC: 141 MMOL/L — SIGNIFICANT CHANGE UP (ref 135–145)
URATE SERPL-MCNC: 1.5 MG/DL — LOW (ref 2.6–6)
WBC # BLD: 2.7 K/UL — LOW (ref 3.8–10.5)
WBC # FLD AUTO: 2.7 K/UL — LOW (ref 3.8–10.5)

## 2017-01-18 PROCEDURE — 99232 SBSQ HOSP IP/OBS MODERATE 35: CPT

## 2017-01-18 PROCEDURE — 71010: CPT | Mod: 26

## 2017-01-18 RX ORDER — IPRATROPIUM/ALBUTEROL SULFATE 18-103MCG
3 AEROSOL WITH ADAPTER (GRAM) INHALATION ONCE
Qty: 0 | Refills: 0 | Status: COMPLETED | OUTPATIENT
Start: 2017-01-18 | End: 2017-01-18

## 2017-01-18 RX ORDER — MEGESTROL ACETATE 40 MG/ML
320 SUSPENSION ORAL DAILY
Qty: 0 | Refills: 0 | Status: DISCONTINUED | OUTPATIENT
Start: 2017-01-18 | End: 2017-01-20

## 2017-01-18 RX ORDER — MEGESTROL ACETATE 40 MG/ML
320 SUSPENSION ORAL DAILY
Qty: 0 | Refills: 0 | Status: DISCONTINUED | OUTPATIENT
Start: 2017-01-18 | End: 2017-01-18

## 2017-01-18 RX ORDER — FILGRASTIM 480MCG/1.6
480 VIAL (ML) INJECTION DAILY
Qty: 0 | Refills: 0 | Status: DISCONTINUED | OUTPATIENT
Start: 2017-01-18 | End: 2017-01-19

## 2017-01-18 RX ORDER — POTASSIUM CHLORIDE 20 MEQ
10 PACKET (EA) ORAL ONCE
Qty: 0 | Refills: 0 | Status: DISCONTINUED | OUTPATIENT
Start: 2017-01-18 | End: 2017-01-18

## 2017-01-18 RX ORDER — MAGNESIUM SULFATE 500 MG/ML
4 VIAL (ML) INJECTION ONCE
Qty: 0 | Refills: 0 | Status: COMPLETED | OUTPATIENT
Start: 2017-01-18 | End: 2017-01-18

## 2017-01-18 RX ORDER — POTASSIUM CHLORIDE 20 MEQ
10 PACKET (EA) ORAL
Qty: 0 | Refills: 0 | Status: COMPLETED | OUTPATIENT
Start: 2017-01-18 | End: 2017-01-18

## 2017-01-18 RX ORDER — POTASSIUM CHLORIDE 20 MEQ
20 PACKET (EA) ORAL
Qty: 0 | Refills: 0 | Status: COMPLETED | OUTPATIENT
Start: 2017-01-18 | End: 2017-01-18

## 2017-01-18 RX ADMIN — AMIODARONE HYDROCHLORIDE 200 MILLIGRAM(S): 400 TABLET ORAL at 06:08

## 2017-01-18 RX ADMIN — APIXABAN 2.5 MILLIGRAM(S): 2.5 TABLET, FILM COATED ORAL at 06:08

## 2017-01-18 RX ADMIN — Medication 100 GRAM(S): at 06:59

## 2017-01-18 RX ADMIN — MEGESTROL ACETATE 320 MILLIGRAM(S): 40 SUSPENSION ORAL at 17:39

## 2017-01-18 RX ADMIN — POLYETHYLENE GLYCOL 3350 17 GRAM(S): 17 POWDER, FOR SOLUTION ORAL at 21:45

## 2017-01-18 RX ADMIN — Medication 100 MILLIEQUIVALENT(S): at 08:47

## 2017-01-18 RX ADMIN — Medication 100 MILLIGRAM(S): at 06:08

## 2017-01-18 RX ADMIN — POLYETHYLENE GLYCOL 3350 17 GRAM(S): 17 POWDER, FOR SOLUTION ORAL at 06:08

## 2017-01-18 RX ADMIN — Medication 20 MILLIEQUIVALENT(S): at 10:58

## 2017-01-18 RX ADMIN — PANTOPRAZOLE SODIUM 40 MILLIGRAM(S): 20 TABLET, DELAYED RELEASE ORAL at 06:08

## 2017-01-18 RX ADMIN — Medication 2: at 22:00

## 2017-01-18 RX ADMIN — Medication 100 MILLIGRAM(S): at 21:45

## 2017-01-18 RX ADMIN — Medication 3 MILLILITER(S): at 09:54

## 2017-01-18 RX ADMIN — Medication 100 MILLIEQUIVALENT(S): at 09:54

## 2017-01-18 RX ADMIN — Medication 100 MILLIEQUIVALENT(S): at 10:58

## 2017-01-18 RX ADMIN — Medication 20 MILLIEQUIVALENT(S): at 08:47

## 2017-01-18 RX ADMIN — APIXABAN 2.5 MILLIGRAM(S): 2.5 TABLET, FILM COATED ORAL at 17:36

## 2017-01-18 RX ADMIN — Medication 300 MILLIGRAM(S): at 12:44

## 2017-01-18 RX ADMIN — SENNA PLUS 2 TABLET(S): 8.6 TABLET ORAL at 21:45

## 2017-01-18 RX ADMIN — ANASTROZOLE 1 MILLIGRAM(S): 1 TABLET ORAL at 17:37

## 2017-01-18 RX ADMIN — Medication 50 MILLIGRAM(S): at 06:08

## 2017-01-18 RX ADMIN — Medication 2: at 17:38

## 2017-01-18 RX ADMIN — Medication 480 MICROGRAM(S): at 12:44

## 2017-01-18 RX ADMIN — Medication 4: at 12:44

## 2017-01-18 NOTE — PROGRESS NOTE ADULT - SUBJECTIVE AND OBJECTIVE BOX
Patient seen and examined by me at bedside.  ROS: No chest pain, no sob, no abd pain. No n/v/d  but states she thinks she caught a "cold"     PAST MEDICAL & SURGICAL HISTORY:  Scoliosis  Follicular lymphoma  Neck mass  Afib  Other hyperlipidemia  Breast cancer  CAD (coronary artery disease)  Diabetes  HTN (hypertension)  No pertinent past medical history  H/O abdominal hysterectomy  H/O thyroidectomy  S/P CABG x 3    PHYSICAL EXAM:  T(C): 36.3, Max: 36.7 (01-17 @ 13:49)  HR: 83  BP: 149/68 (105/66 - 149/68)  RR: 16  SpO2: 95%  Wt(kg): --  Constitutional: AAOx3. No acute distress  ENMT: Dry mucous membrane.  No cyanosis.  Respiratory: minimal bibasilar crackles, +expiratory wheeze,  otherwise CTA b/l  Cardiovascular: S1, S2.  Regular rate and rhythm. (+) systolic murmur  Gastrointestinal: soft, non-tender, non-distended,  Extremities: Warm.  No lower extremity edema.    Neurological: No focal deficits.  Skin: no rash      I & Os for 24h ending 01-18 @ 07:00  =============================================  IN:    Oral Fluid: 240 ml    Total IN: 240 ml  ---------------------------------------------  OUT:    Voided: 300 ml    Total OUT: 300 ml  ---------------------------------------------  Total NET: -60 ml    I & Os for current day (as of 01-18 @ 09:19)  =============================================  IN:    IV PiggyBack: 300 ml    Total IN: 300 ml  ---------------------------------------------  OUT:    Total OUT: 0 ml  ---------------------------------------------  Total NET: 300 ml    Weight       DATA:                        9.5<L>  2.7<L> )-----------( 194      ( 18 Jan 2017 06:02 )             29.1<L>        141    |  106    |  21     ----------------------------<  98     Ca:9.8   (18 Jan 2017 06:02)  3.0<L>   |  33<H>  |  0.82       eGFR if Non : 66  eGFR if : 76    TPro  6.0 g/dL<L>  /  Alb  2.4 g/dL<L>  /  TBili  0.3 mg/dL  /  DBili  x      /  AST  137 U/L<H>  /  ALT  71 U/L<H>  /  AlkPhos  103 U/L  15 Ronny 2017 12:44  Lactate Dehydrogenase, Serum: 361 U/L <H> (01-18 @ 06:02)  Lactate Dehydrogenase, Serum: 421 U/L <H> (01-17 @ 05:59)      Vitamin D, 1, 25-Dihydroxy: 120.0 pg/mL <H> [19.9 - 79.3] (01-10 @ 14:53)  Vitamin D, 25-Hydroxy: 22.1 ng/mL <L> [30.0 - 100.0] (01-10 @ 14:53)  Vitamin D, 25-Hydroxy: 22.0 ng/mL <L> [30.0 - 100.0] (12-30 @ 14:44)  Vitamin D, 1, 25-Dihydroxy: 100.0 pg/mL <H> [19.9 - 79.3] (12-30 @ 14:44)    Urinalysis Basic - ( 16 Jan 2017 13:29 )  Color: Yellow / Appearance: Clear / SG: <=1.005 / pH: x  Gluc: x / Ketone: NEGATIVE  / Bili: NEGATIVE / Urobili: 0.2 E.U./dL   Blood: x / Protein: NEGATIVE mg/dL / Nitrite: NEGATIVE   Leuk Esterase: Trace<!!> / RBC: 5-10 /HPF<!!> / WBC < 5 /HPF   Sq Epi: x / Non Sq Epi: Few /HPF / Bacteria: Present /HPF<!!>      Osmolality, Random Urine: 182 mosmol/kg (01-16 @ 13:30)  Creatinine, Random Urine: 9.1 mg/dL (01-16 @ 13:29)  Sodium, Random Urine: 34 mmoL/L (01-16 @ 13:29)  Potassium, Random Urine: 10 mmoL/L (01-16 @ 13:29)  Chloride, Random Urine: 23 mmoL/L (01-16 @ 13:29)    Protein/Creatinine Ratio Calculation: 3.5 Ratio (12-30 @ 08:12)          MEDICATIONS  (STANDING):  insulin lispro (HumaLOG) corrective regimen sliding scale  SubCutaneous Before meals and at bedtime  amiodarone    Tablet 200milliGRAM(s) Oral daily  metoprolol succinate ER 50milliGRAM(s) Oral daily  pantoprazole    Tablet 40milliGRAM(s) Oral before breakfast  docusate sodium 100milliGRAM(s) Oral three times a day  senna 2Tablet(s) Oral at bedtime  polyethylene glycol 3350 17Gram(s) Oral three times a day  ondansetron  IVPB (Chemo) 16milliGRAM(s) IV Intermittent once  ondansetron  IVPB (Chemo) 16milliGRAM(s) IV Intermittent once  ondansetron  IVPB (Chemo) 16milliGRAM(s) IV Intermittent once  dexamethasone   IVPB (Chemo) 15milliGRAM(s) IV Intermittent once  dexamethasone   IVPB (Chemo) 15milliGRAM(s) IV Intermittent once  dexamethasone   IVPB (Chemo) 15milliGRAM(s) IV Intermittent once  furosemide    Tablet (Chemo) 40milliGRAM(s) Oral once  furosemide    Tablet (Chemo) 40milliGRAM(s) Oral once  furosemide    Tablet (Chemo) 40milliGRAM(s) Oral once  riTUXimab IVPB 415milliGRAM(s) IV Intermittent once  bendamustine IVPB 130milliGRAM(s) IV Intermittent once  bendamustine IVPB 130milliGRAM(s) IV Intermittent once  anastrozole 1milliGRAM(s) Oral daily  apixaban 2.5milliGRAM(s) Oral two times a day  allopurinol 300milliGRAM(s) Oral daily  potassium chloride  10 mEq/100 mL IVPB 10milliEquivalent(s) IV Intermittent every 1 hour  potassium chloride    Tablet ER 20milliEquivalent(s) Oral every 2 hours  ALBUTerol/ipratropium for Nebulization. 3milliLiter(s) Nebulizer once    MEDICATIONS  (PRN):  HYDROmorphone  Injectable 0.5milliGRAM(s) IV Push every 90 minutes PRN PACU ONLY, severe pain  ondansetron Injectable 4milliGRAM(s) IV Push once PRN Nausea and/or Vomiting

## 2017-01-18 NOTE — PROGRESS NOTE ADULT - PROBLEM SELECTOR PLAN 1
Dr. Galaviz following. 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 S/p Chemoport placement on 1/14. Pt received chemotherapy 1/15.  -f/u official PET-CT read  -c/w allopurinol.   -will check daily LDH and uric acid levels for tumor lysis syndrome  -will f/u renal recommendations

## 2017-01-18 NOTE — PROGRESS NOTE ADULT - PROBLEM SELECTOR PLAN 2
EF - 65%. Developed acute heart failure exacerbation 2/2 fluids given upon admission. Resolved. Maria T D/C'd by Dr. Hartmann.  -Dr. Mckinney following. Will f/u recommendations  -monitor volume status

## 2017-01-18 NOTE — PROGRESS NOTE ADULT - SUBJECTIVE AND OBJECTIVE BOX
Patient is a 85y old  Female who presents with a chief complaint of hypercalcemia (10 Ronny 2017 13:03)      HPI:  86 y/o F w/ pmhx of MGUS, HFpEF, CAD s/p CABG, severe MR/TR, a-fib s/p PPM on Eliquis, DM2, right-sided breast CA (s/p mastectomy/chemotx/radiation), recently admitted 12/29/16 to 1/4/17 for hypercalcemia treated with IV fluids/Lasix/bisphosphonates, presents again today for hypercalcemia on outpatient labs. She had an outpatient Ca level of 13 at Dr. Hartmann’s office yesterday. Since discharge last week, she reports generalized weakness and difficulty walking due to lack of balance which worsened gradually over the past week. She also experienced a fall (says she lost her balance) on outstretched right arm 4 days ago, with no LOC/head impact. Since then she complains of right shoulder pain when moving her arm but denies pain at rest. Also endorses chronic constipation and lack of appetite, but denies abdominal/kidney pain, nausea, vomiting, fever/chills, chest pain/sob, recent change in meds, dysuria, headache, or vision change. On last admission, she had excisional biopsy of lymph nodes (with ENT) with prelim reports concerning for lymphoma but final diagnosis pending.  Initial vitals in ED: 98.1F, HR 98, 133/52, RR 16, 98% O2 on RA.   ED administration: 250cc bolus of NS followed by NS at 80cc/hr and Lasix 20mg IV x 1. (10 Ronny 2017 13:03)    INTERVAL HPI/OVERNIGHT EVENTS:::[place] rx by oncology, CV stable   fatigue related to rx    HEALTH ISSUES - PROBLEM Dx:  Hypomagnesemia: Hypomagnesemia  Hypokalemia: Hypokalemia  Elevated BUN: Elevated BUN  Physical therapy evaluation, initial: Physical therapy evaluation, initial  Discharge planning issues: Discharge planning issues  Constipation: Constipation  Abdominal distension: Abdominal distension  Diabetes: Diabetes  Dysphagia: Dysphagia  Acute on chronic systolic (congestive) heart failure: Acute on chronic systolic (congestive) heart failure  Fever: Fever  Heart failure: Heart failure  Lymphoma: Lymphoma  Need for prophylactic measure: Need for prophylactic measure  HTN (hypertension): HTN (hypertension)  Type 2 diabetes mellitus: Type 2 diabetes mellitus  CAD (coronary artery disease): CAD (coronary artery disease)  Monoclonal gammopathy of undetermined significance: Monoclonal gammopathy of undetermined significance  Heart failure with preserved ejection fraction: Heart failure with preserved ejection fraction  Paroxysmal atrial fibrillation: Paroxysmal atrial fibrillation  Arm pain, right: Arm pain, right  Hypercalcemia: Hypercalcemia          PAST MEDICAL & SURGICAL HISTORY:  Scoliosis  Follicular lymphoma  Neck mass  Afib  Other hyperlipidemia  Breast cancer  CAD (coronary artery disease)  Diabetes  HTN (hypertension)  No pertinent past medical history  H/O abdominal hysterectomy  H/O thyroidectomy  S/P CABG x 3          Consultant NOTE  REVIEWED  (   )    REVIEW OF SYSTEMS:  [x] As per HPI      +unchanged  CONSTITUTIONAL: weakness  RESPIRATORY: No cough, wheezing, chills or hemoptysis; No Shortness of Breath  CARDIOVASCULAR: No chest pain, palpitations, dizziness, or leg swelling  GASTROINTESTINAL: nausea  MUSCULOSKELETAL: weakness  PSYCH    awake, alert       [x] All others negative	  [ ] Unable to obtain          Vital Signs Last 24 Hrs  T(C): 36.8, Max: 36.8 (01-18 @ 11:30)  T(F): 98.2, Max: 98.2 (01-18 @ 11:30)  HR: 75 (75 - 84)  BP: 115/58 (108/78 - 149/68)  BP(mean): --  RR: 16 (16 - 18)  SpO2: 96% (95% - 96%)      I & Os for 24h ending 01-18 @ 07:00  =============================================  IN: 240 ml / OUT: 300 ml / NET: -60 ml    I & Os for current day (as of 01-18 @ 20:57)  =============================================  IN: 300 ml / OUT: 0 ml / NET: 300 ml    PHYSICAL EXAMINATION:                                    (    )  NO CHANGE  Appearance: Normal	weakness  HEENT:   Normal oral mucosa, PERRL, EOMI	  Neck: Supple,   Cardiovascular: Normal S1 S2, No JVD, No murmurs,   Respiratory: Lungs clear to auscultation/Decreased Breath Sounds/No Rales, Rhonchi, Wheezing	  Gastrointestinal:  Soft, Non-tender, + BS	  Skin: No rashes, No ecchymoses, No cyanosis  Extremities: Normal range of motion, No clubbing, cyanosis  tr edema  Vascular: Peripheral pulses palpable 2+ bilaterally  Neurologic: Non-focal  Psychiatry: A & O x 3, Mood & affect appropriate    insulin lispro (HumaLOG) corrective regimen sliding scale  SubCutaneous Before meals and at bedtime  amiodarone    Tablet 200milliGRAM(s) Oral daily  metoprolol succinate ER 50milliGRAM(s) Oral daily  pantoprazole    Tablet 40milliGRAM(s) Oral before breakfast  docusate sodium 100milliGRAM(s) Oral three times a day  senna 2Tablet(s) Oral at bedtime  polyethylene glycol 3350 17Gram(s) Oral three times a day  ondansetron  IVPB (Chemo) 16milliGRAM(s) IV Intermittent once  ondansetron  IVPB (Chemo) 16milliGRAM(s) IV Intermittent once  ondansetron  IVPB (Chemo) 16milliGRAM(s) IV Intermittent once  dexamethasone   IVPB (Chemo) 15milliGRAM(s) IV Intermittent once  dexamethasone   IVPB (Chemo) 15milliGRAM(s) IV Intermittent once  dexamethasone   IVPB (Chemo) 15milliGRAM(s) IV Intermittent once  furosemide    Tablet (Chemo) 40milliGRAM(s) Oral once  furosemide    Tablet (Chemo) 40milliGRAM(s) Oral once  furosemide    Tablet (Chemo) 40milliGRAM(s) Oral once  riTUXimab IVPB 415milliGRAM(s) IV Intermittent once  bendamustine IVPB 130milliGRAM(s) IV Intermittent once  bendamustine IVPB 130milliGRAM(s) IV Intermittent once  anastrozole 1milliGRAM(s) Oral daily  HYDROmorphone  Injectable 0.5milliGRAM(s) IV Push every 90 minutes PRN  ondansetron Injectable 4milliGRAM(s) IV Push once PRN  apixaban 2.5milliGRAM(s) Oral two times a day  allopurinol 300milliGRAM(s) Oral daily  filgrastim-sndz Injectable 480MICROGram(s) SubCutaneous daily  megestrol Suspension 320milliGRAM(s) Oral daily                                      9.5    2.7   )-----------( 194      ( 18 Jan 2017 06:02 )             29.1     18 Jan 2017 06:02    141    |  106    |  21     ----------------------------<  98     3.0     |  33     |  0.82     Ca    9.8        18 Jan 2017 06:02  Phos  2.5       18 Jan 2017 06:02  Mg     1.5       18 Jan 2017 06:02        CAPILLARY BLOOD GLUCOSE  389 (18 Jan 2017 16:56)  215 (18 Jan 2017 11:35)  109 (18 Jan 2017 08:04)  208 (17 Jan 2017 21:13)

## 2017-01-18 NOTE — PROGRESS NOTE ADULT - SUBJECTIVE AND OBJECTIVE BOX
HPI:  86 y/o F w/ pmhx of MGUS, HFpEF, CAD s/p CABG, severe MR/TR, a-fib s/p PPM on Eliquis, DM2, right-sided breast CA (s/p mastectomy/chemotx/radiation), recently admitted 12/29/16 to 1/4/17 for hypercalcemia treated with IV fluids/Lasix/bisphosphonates, presents again today for hypercalcemia on outpatient labs. She had an outpatient Ca level of 13 at Dr. Hartmann’s office yesterday. Since discharge last week, she reports generalized weakness and difficulty walking due to lack of balance which worsened gradually over the past week. She also experienced a fall (says she lost her balance) on outstretched right arm 4 days ago, with no LOC/head impact. Since then she complains of right shoulder pain when moving her arm but denies pain at rest. Also endorses chronic constipation and lack of appetite, but denies abdominal/kidney pain, nausea, vomiting, fever/chills, chest pain/sob, recent change in meds, dysuria, headache, or vision change. On last admission, she had excisional biopsy of lymph nodes (with ENT) with prelim reports concerning for lymphoma but final diagnosis pending.  Initial vitals in ED: 98.1F, HR 98, 133/52, RR 16, 98% O2 on RA.   ED administration: 250cc bolus of NS followed by NS at 80cc/hr and Lasix 20mg IV x 1. (10 Ronny 2017 13:03)    FAMILY HISTORY:  No pertinent family history in first degree relatives    MEDICATIONS  (STANDING):  insulin lispro (HumaLOG) corrective regimen sliding scale  SubCutaneous Before meals and at bedtime  amiodarone    Tablet 200milliGRAM(s) Oral daily  metoprolol succinate ER 50milliGRAM(s) Oral daily  pantoprazole    Tablet 40milliGRAM(s) Oral before breakfast  docusate sodium 100milliGRAM(s) Oral three times a day  senna 2Tablet(s) Oral at bedtime  polyethylene glycol 3350 17Gram(s) Oral three times a day  ondansetron  IVPB (Chemo) 16milliGRAM(s) IV Intermittent once  ondansetron  IVPB (Chemo) 16milliGRAM(s) IV Intermittent once  ondansetron  IVPB (Chemo) 16milliGRAM(s) IV Intermittent once  dexamethasone   IVPB (Chemo) 15milliGRAM(s) IV Intermittent once  dexamethasone   IVPB (Chemo) 15milliGRAM(s) IV Intermittent once  dexamethasone   IVPB (Chemo) 15milliGRAM(s) IV Intermittent once  furosemide    Tablet (Chemo) 40milliGRAM(s) Oral once  furosemide    Tablet (Chemo) 40milliGRAM(s) Oral once  furosemide    Tablet (Chemo) 40milliGRAM(s) Oral once  riTUXimab IVPB 415milliGRAM(s) IV Intermittent once  bendamustine IVPB 130milliGRAM(s) IV Intermittent once  bendamustine IVPB 130milliGRAM(s) IV Intermittent once  anastrozole 1milliGRAM(s) Oral daily  apixaban 2.5milliGRAM(s) Oral two times a day  allopurinol 300milliGRAM(s) Oral daily  filgrastim-sndz Injectable 480MICROGram(s) SubCutaneous daily  megestrol Suspension 320milliGRAM(s) Oral daily    MEDICATIONS  (PRN):  HYDROmorphone  Injectable 0.5milliGRAM(s) IV Push every 90 minutes PRN PACU ONLY, severe pain  ondansetron Injectable 4milliGRAM(s) IV Push once PRN Nausea and/or Vomiting    Vital Signs Last 24 Hrs  T(C): 36.8, Max: 36.8 (01-18 @ 11:30)  T(F): 98.2, Max: 98.2 (01-18 @ 11:30)  HR: 75 (75 - 83)  BP: 115/58 (115/58 - 149/68)  BP(mean): --  RR: 16 (16 - 16)  SpO2: 96% (95% - 96%)    Physical exam:    Overall impression  Lymphadenopathy  Liver  spleen    Labs:  CBC Full  -  ( 18 Jan 2017 06:02 )  WBC Count : 2.7 K/uL  Hemoglobin : 9.5 g/dL  Hematocrit : 29.1 %  Platelet Count - Automated : 194 K/uL  Mean Cell Volume : 84.8 fL  Mean Cell Hemoglobin : 27.7 pg  Mean Cell Hemoglobin Concentration : 32.6 g/dL  Auto Neutrophil # : x  Auto Lymphocyte # : x  Auto Monocyte # : x  Auto Eosinophil # : x  Auto Basophil # : x  Auto Neutrophil % : x  Auto Lymphocyte % : x  Auto Monocyte % : x  Auto Eosinophil % : x  Auto Basophil % : x    18 Jan 2017 06:02    141    |  106    |  21     ----------------------------<  98     3.0     |  33     |  0.82     Ca    9.8        18 Jan 2017 06:02  Phos  2.5       18 Jan 2017 06:02  Mg     1.5       18 Jan 2017 06:02        Radiology:  HEALTH ISSUES - R/O PROBLEM Dx:      Assessmant / Problems  1) stg lll NHL s/p first round of chemotherapy  2) Relapsing breast ca   3) low K    Plan:  1) monitor cbc and cmp  2) Neupogen  3) iv K      Neha Galaviz MD

## 2017-01-18 NOTE — PROGRESS NOTE ADULT - SUBJECTIVE AND OBJECTIVE BOX
INTERVAL HPI/OVERNIGHT EVENTS: TEJINDER. Complaining of fatigue, cough this AM.    VITAL SIGNS:  T(F): 98.2  HR: 75  BP: 115/58  RR: 16  SpO2: 96%  Wt(kg): --  I & Os for 24h ending 01-18 @ 07:00  =============================================  IN: 240 ml / OUT: 300 ml / NET: -60 ml    I & Os for current day (as of 01-18 @ 18:12)  =============================================  IN: 300 ml / OUT: 0 ml / NET: 300 ml      PHYSICAL EXAM:    Constitutional: NAD, A&Ox3. Frail   Eyes: PERRLA, EOMI  ENMT: MMM, No lesions  Neck: No JVD, No LAD  Respiratory: b/l basilar crackles, no wheezing  Cardiovascular: RRR. S1, S2. III/VI Systolic murmur. No rubs, no gallops  Gastrointestinal: Soft, NT. Distended, normoactive BS  Extremities: Warm, no clubbing, cyanosis, or edema  Vascular: 2+ distal pulses, equal  Neurological: CN II-XII grossly intact  Musculoskeletal: 5/5 UE strength; 3-4/5 LE strength    MEDICATIONS  (STANDING):  insulin lispro (HumaLOG) corrective regimen sliding scale  SubCutaneous Before meals and at bedtime  amiodarone    Tablet 200milliGRAM(s) Oral daily  metoprolol succinate ER 50milliGRAM(s) Oral daily  pantoprazole    Tablet 40milliGRAM(s) Oral before breakfast  docusate sodium 100milliGRAM(s) Oral three times a day  senna 2Tablet(s) Oral at bedtime  polyethylene glycol 3350 17Gram(s) Oral three times a day  ondansetron  IVPB (Chemo) 16milliGRAM(s) IV Intermittent once  ondansetron  IVPB (Chemo) 16milliGRAM(s) IV Intermittent once  ondansetron  IVPB (Chemo) 16milliGRAM(s) IV Intermittent once  dexamethasone   IVPB (Chemo) 15milliGRAM(s) IV Intermittent once  dexamethasone   IVPB (Chemo) 15milliGRAM(s) IV Intermittent once  dexamethasone   IVPB (Chemo) 15milliGRAM(s) IV Intermittent once  furosemide    Tablet (Chemo) 40milliGRAM(s) Oral once  furosemide    Tablet (Chemo) 40milliGRAM(s) Oral once  furosemide    Tablet (Chemo) 40milliGRAM(s) Oral once  riTUXimab IVPB 415milliGRAM(s) IV Intermittent once  bendamustine IVPB 130milliGRAM(s) IV Intermittent once  bendamustine IVPB 130milliGRAM(s) IV Intermittent once  anastrozole 1milliGRAM(s) Oral daily  apixaban 2.5milliGRAM(s) Oral two times a day  allopurinol 300milliGRAM(s) Oral daily  filgrastim-sndz Injectable 480MICROGram(s) SubCutaneous daily  megestrol Suspension 320milliGRAM(s) Oral daily    MEDICATIONS  (PRN):  HYDROmorphone  Injectable 0.5milliGRAM(s) IV Push every 90 minutes PRN PACU ONLY, severe pain  ondansetron Injectable 4milliGRAM(s) IV Push once PRN Nausea and/or Vomiting      Allergies    IV CONtRAST (Flushing (Skin); Rash)  No Known Drug Allergies    Intolerances        LABS:                        9.5    2.7   )-----------( 194      ( 18 Jan 2017 06:02 )             29.1     18 Jan 2017 06:02    141    |  106    |  21     ----------------------------<  98     3.0     |  33     |  0.82     Ca    9.8        18 Jan 2017 06:02  Phos  2.5       18 Jan 2017 06:02  Mg     1.5       18 Jan 2017 06:02            RADIOLOGY & ADDITIONAL TESTS:

## 2017-01-18 NOTE — PROGRESS NOTE ADULT - ASSESSMENT
85F w/PMHx of HTN, DM, HLD, Monoclonal Gammopathy ,HFpEF, CAD s/p CABG, severe MR/TR, A-fib s/p PPM on Eliquis, Right-sided breast CA (s/p mastectomy, chemo therapy, radiation), h/o hypercalcemia, recent biopsy of lymph node suspicious for B-cell lymphoma, who presented on 1/10/17 w/ hypercalcemia & generalized weakness. Developed acute heart failure exacerbation 2/2 fluid resuscitation and admitted to [facility]. Patient's cardiac status stabilized and patient transferred to [address] [place] [address] for chemotherapy for tissue-confirmed diffuse large B-cell lymphoma. s/p PET-CT; awaiting read. s/p chemoport placement on 1/14 and chemo 11/15. Patient noted to be hypokalemic today

## 2017-01-18 NOTE — PROGRESS NOTE ADULT - PROBLEM SELECTOR PLAN 3
Resolved. 2/2 malignancy, consistent with lymphoma from work up in past admission.   -no IVF given recent episode of CHF exacerbation   -f/u renal recs.

## 2017-01-18 NOTE — PROGRESS NOTE ADULT - PROBLEM SELECTOR PLAN 4
controlled now--   however, getting electrolytes repletion via IV.  If develops signs of overload, will likely need diuretic.  Patient with expiratory wheeze this AM. Check CXR.  If suggestive of volume overload, would recommend giving lasix 40 mg po

## 2017-01-18 NOTE — PROGRESS NOTE ADULT - SUBJECTIVE AND OBJECTIVE BOX
INTERVAL HPI: no sob palp cp  	  MEDICATIONS:  amiodarone    Tablet 200milliGRAM(s) Oral daily  metoprolol succinate ER 50milliGRAM(s) Oral daily  furosemide    Tablet (Chemo) 40milliGRAM(s) Oral once  furosemide    Tablet (Chemo) 40milliGRAM(s) Oral once  furosemide    Tablet (Chemo) 40milliGRAM(s) Oral once        ondansetron  IVPB (Chemo) 16milliGRAM(s) IV Intermittent once  ondansetron  IVPB (Chemo) 16milliGRAM(s) IV Intermittent once  ondansetron  IVPB (Chemo) 16milliGRAM(s) IV Intermittent once  HYDROmorphone  Injectable 0.5milliGRAM(s) IV Push every 90 minutes PRN  ondansetron Injectable 4milliGRAM(s) IV Push once PRN    pantoprazole    Tablet 40milliGRAM(s) Oral before breakfast  docusate sodium 100milliGRAM(s) Oral three times a day  senna 2Tablet(s) Oral at bedtime  polyethylene glycol 3350 17Gram(s) Oral three times a day    insulin lispro (HumaLOG) corrective regimen sliding scale  SubCutaneous Before meals and at bedtime  dexamethasone   IVPB (Chemo) 15milliGRAM(s) IV Intermittent once  dexamethasone   IVPB (Chemo) 15milliGRAM(s) IV Intermittent once  dexamethasone   IVPB (Chemo) 15milliGRAM(s) IV Intermittent once  allopurinol 300milliGRAM(s) Oral daily  megestrol Suspension 320milliGRAM(s) Oral daily    riTUXimab IVPB 415milliGRAM(s) IV Intermittent once  bendamustine IVPB 130milliGRAM(s) IV Intermittent once  bendamustine IVPB 130milliGRAM(s) IV Intermittent once  anastrozole 1milliGRAM(s) Oral daily  apixaban 2.5milliGRAM(s) Oral two times a day  filgrastim-sndz Injectable 480MICROGram(s) SubCutaneous daily      REVIEW OF SYSTEMS:  [x] As per HPI  CONSTITUTIONAL: No fever, weight loss, or fatigue  RESPIRATORY: No cough, wheezing, chills or hemoptysis; No Shortness of Breath  CARDIOVASCULAR: No chest pain, palpitations, dizziness, or leg swelling  GASTROINTESTINAL: No abdominal or epigastric pain. No nausea, vomiting, or hematemesis; No diarrhea or constipation. No melena or hematochezia.  MUSCULOSKELETAL: No joint pain or swelling; No muscle, back, or extremity pain  [x] All others negative	  [ ] Unable to obtain    PHYSICAL EXAM:  T(C): 36.8, Max: 36.8 (01-18 @ 11:30)  HR: 75 (75 - 84)  BP: 115/58 (108/78 - 149/68)  RR: 16 (16 - 18)  SpO2: 96% (95% - 96%)  Wt(kg): --  I&O's Summary        Appearance: Normal	  HEENT:   Normal oral mucosa  Cardiovascular: Normal S1 S2, No JVD, No murmurs, No edema  Respiratory: Lungs clear to auscultation	  Gastrointestinal:  Soft, Non-tender, + BS	  Extremities: Normal range of motion, No clubbing, cyanosis or edema  Vascular: Peripheral pulses palpable 2+ bilaterally    TELEMETRY: 	    ECG:    	  RADIOLOGY:   CXR:New linear atelectasis in the left base. Mild pulmonary venous   congestion, unchanged or slightly improved.  CT:  US:    CARDIAC TESTING:  Echocardiogram:  Catheterization:  Stress Test:      LABS:	 	    CARDIAC MARKERS:                                  9.5    2.7   )-----------( 194      ( 18 Jan 2017 06:02 )             29.1     18 Jan 2017 06:02    141    |  106    |  21     ----------------------------<  98     3.0     |  33     |  0.82     Ca    9.8        18 Jan 2017 06:02  Phos  2.5       18 Jan 2017 06:02  Mg     1.5       18 Jan 2017 06:02      proBNP:   Lipid Profile:   HgA1c:   TSH:     ASSESSMENT/PLAN: 	  cllinically no signs of overload  cxr improved congestion  no need for diuresis for now.

## 2017-01-18 NOTE — PROGRESS NOTE ADULT - PROBLEM SELECTOR PLAN 1
Suspect like due to lymphoma overproduction of 1,25 vit D-- patient has received calcitonin, zometa, IVIF and lasix with some improvement but gradually rising again--   - calcium improving  now after rx   - follow lytes

## 2017-01-19 LAB
ANION GAP SERPL CALC-SCNC: 5 MMOL/L — LOW (ref 9–16)
BUN SERPL-MCNC: 15 MG/DL — SIGNIFICANT CHANGE UP (ref 7–23)
CALCIUM SERPL-MCNC: 10 MG/DL — SIGNIFICANT CHANGE UP (ref 8.5–10.5)
CHLORIDE SERPL-SCNC: 104 MMOL/L — SIGNIFICANT CHANGE UP (ref 96–108)
CO2 SERPL-SCNC: 30 MMOL/L — SIGNIFICANT CHANGE UP (ref 22–31)
CREAT SERPL-MCNC: 0.6 MG/DL — SIGNIFICANT CHANGE UP (ref 0.5–1.3)
GLUCOSE SERPL-MCNC: 72 MG/DL — SIGNIFICANT CHANGE UP (ref 70–99)
HCT VFR BLD CALC: 30.1 % — LOW (ref 34.5–45)
HGB BLD-MCNC: 9.8 G/DL — LOW (ref 11.5–15.5)
MAGNESIUM SERPL-MCNC: 1.9 MG/DL — SIGNIFICANT CHANGE UP (ref 1.6–2.4)
MCHC RBC-ENTMCNC: 28.1 PG — SIGNIFICANT CHANGE UP (ref 27–34)
MCHC RBC-ENTMCNC: 32.6 G/DL — SIGNIFICANT CHANGE UP (ref 32–36)
MCV RBC AUTO: 86.2 FL — SIGNIFICANT CHANGE UP (ref 80–100)
PLATELET # BLD AUTO: 194 K/UL — SIGNIFICANT CHANGE UP (ref 150–400)
POTASSIUM SERPL-MCNC: 4 MMOL/L — SIGNIFICANT CHANGE UP (ref 3.5–5.3)
POTASSIUM SERPL-SCNC: 4 MMOL/L — SIGNIFICANT CHANGE UP (ref 3.5–5.3)
RBC # BLD: 3.49 M/UL — LOW (ref 3.8–5.2)
RBC # FLD: 16.6 % — SIGNIFICANT CHANGE UP (ref 10.3–16.9)
SODIUM SERPL-SCNC: 139 MMOL/L — SIGNIFICANT CHANGE UP (ref 135–145)
WBC # BLD: 24.1 K/UL — HIGH (ref 3.8–10.5)
WBC # FLD AUTO: 24.1 K/UL — HIGH (ref 3.8–10.5)

## 2017-01-19 PROCEDURE — 99232 SBSQ HOSP IP/OBS MODERATE 35: CPT

## 2017-01-19 RX ADMIN — Medication 100 MILLIGRAM(S): at 14:45

## 2017-01-19 RX ADMIN — APIXABAN 2.5 MILLIGRAM(S): 2.5 TABLET, FILM COATED ORAL at 06:41

## 2017-01-19 RX ADMIN — Medication 300 MILLIGRAM(S): at 11:38

## 2017-01-19 RX ADMIN — AMIODARONE HYDROCHLORIDE 200 MILLIGRAM(S): 400 TABLET ORAL at 06:40

## 2017-01-19 RX ADMIN — Medication 4: at 22:36

## 2017-01-19 RX ADMIN — PANTOPRAZOLE SODIUM 40 MILLIGRAM(S): 20 TABLET, DELAYED RELEASE ORAL at 06:40

## 2017-01-19 RX ADMIN — ANASTROZOLE 1 MILLIGRAM(S): 1 TABLET ORAL at 11:38

## 2017-01-19 RX ADMIN — Medication 100 MILLIGRAM(S): at 22:36

## 2017-01-19 RX ADMIN — SENNA PLUS 2 TABLET(S): 8.6 TABLET ORAL at 22:36

## 2017-01-19 RX ADMIN — Medication 50 MILLIGRAM(S): at 06:41

## 2017-01-19 RX ADMIN — Medication 100 MILLIGRAM(S): at 06:41

## 2017-01-19 RX ADMIN — Medication 2: at 13:17

## 2017-01-19 RX ADMIN — POLYETHYLENE GLYCOL 3350 17 GRAM(S): 17 POWDER, FOR SOLUTION ORAL at 06:41

## 2017-01-19 RX ADMIN — APIXABAN 2.5 MILLIGRAM(S): 2.5 TABLET, FILM COATED ORAL at 17:49

## 2017-01-19 RX ADMIN — MEGESTROL ACETATE 320 MILLIGRAM(S): 40 SUSPENSION ORAL at 11:38

## 2017-01-19 NOTE — PROGRESS NOTE ADULT - ASSESSMENT
85F w/PMHx of HTN, DM, HLD, Monoclonal Gammopathy ,HFpEF, CAD s/p CABG, severe MR/TR, A-fib s/p PPM on Eliquis, Right-sided breast CA (s/p mastectomy, chemo therapy, radiation), h/o hypercalcemia, recent biopsy of lymph node suspicious for B-cell lymphoma, who presented on 1/10/17 w/ hypercalcemia & generalized weakness. Developed acute heart failure exacerbation 2/2 fluid resuscitation and admitted to [facility]. Patient's cardiac status stabilized and patient transferred to [address] [place] [address] for chemotherapy for tissue-confirmed diffuse large B-cell lymphoma. s/p PET-CT; awaiting read. s/p chemoport placement on 1/14 and chemo 11/15. Patient stable and ready for discharge.

## 2017-01-19 NOTE — PROGRESS NOTE ADULT - PROBLEM SELECTOR PROBLEM 3
Heart failure
Hypercalcemia
Heart failure
Hypercalcemia
Hypomagnesemia
Lymphoma
Monoclonal gammopathy of undetermined significance
Hypercalcemia
Lymphoma
Heart failure
Heart failure
Lymphoma

## 2017-01-19 NOTE — PROGRESS NOTE ADULT - PROBLEM SELECTOR PROBLEM 7
Diabetes
CAD (coronary artery disease)
Diabetes
Type 2 diabetes mellitus
Diabetes
CAD (coronary artery disease)

## 2017-01-19 NOTE — PROGRESS NOTE ADULT - SUBJECTIVE AND OBJECTIVE BOX
HPI:  84 y/o F w/ pmhx of MGUS, HFpEF, CAD s/p CABG, severe MR/TR, a-fib s/p PPM on Eliquis, DM2, right-sided breast CA (s/p mastectomy/chemotx/radiation), recently admitted 12/29/16 to 1/4/17 for hypercalcemia treated with IV fluids/Lasix/bisphosphonates, presents again today for hypercalcemia on outpatient labs. She had an outpatient Ca level of 13 at Dr. Hartmann’s office yesterday. Since discharge last week, she reports generalized weakness and difficulty walking due to lack of balance which worsened gradually over the past week. She also experienced a fall (says she lost her balance) on outstretched right arm 4 days ago, with no LOC/head impact. Since then she complains of right shoulder pain when moving her arm but denies pain at rest. Also endorses chronic constipation and lack of appetite, but denies abdominal/kidney pain, nausea, vomiting, fever/chills, chest pain/sob, recent change in meds, dysuria, headache, or vision change. On last admission, she had excisional biopsy of lymph nodes (with ENT) with prelim reports concerning for lymphoma but final diagnosis pending.  Initial vitals in ED: 98.1F, HR 98, 133/52, RR 16, 98% O2 on RA.   ED administration: 250cc bolus of NS followed by NS at 80cc/hr and Lasix 20mg IV x 1. (10 Ronny 2017 13:03)    FAMILY HISTORY:  No pertinent family history in first degree relatives    MEDICATIONS  (STANDING):  insulin lispro (HumaLOG) corrective regimen sliding scale  SubCutaneous Before meals and at bedtime  amiodarone    Tablet 200milliGRAM(s) Oral daily  metoprolol succinate ER 50milliGRAM(s) Oral daily  pantoprazole    Tablet 40milliGRAM(s) Oral before breakfast  docusate sodium 100milliGRAM(s) Oral three times a day  senna 2Tablet(s) Oral at bedtime  polyethylene glycol 3350 17Gram(s) Oral three times a day  ondansetron  IVPB (Chemo) 16milliGRAM(s) IV Intermittent once  ondansetron  IVPB (Chemo) 16milliGRAM(s) IV Intermittent once  ondansetron  IVPB (Chemo) 16milliGRAM(s) IV Intermittent once  dexamethasone   IVPB (Chemo) 15milliGRAM(s) IV Intermittent once  dexamethasone   IVPB (Chemo) 15milliGRAM(s) IV Intermittent once  dexamethasone   IVPB (Chemo) 15milliGRAM(s) IV Intermittent once  furosemide    Tablet (Chemo) 40milliGRAM(s) Oral once  furosemide    Tablet (Chemo) 40milliGRAM(s) Oral once  furosemide    Tablet (Chemo) 40milliGRAM(s) Oral once  riTUXimab IVPB 415milliGRAM(s) IV Intermittent once  bendamustine IVPB 130milliGRAM(s) IV Intermittent once  bendamustine IVPB 130milliGRAM(s) IV Intermittent once  anastrozole 1milliGRAM(s) Oral daily  apixaban 2.5milliGRAM(s) Oral two times a day  allopurinol 300milliGRAM(s) Oral daily  megestrol Suspension 320milliGRAM(s) Oral daily    MEDICATIONS  (PRN):  HYDROmorphone  Injectable 0.5milliGRAM(s) IV Push every 90 minutes PRN PACU ONLY, severe pain  ondansetron Injectable 4milliGRAM(s) IV Push once PRN Nausea and/or Vomiting    Vital Signs Last 24 Hrs  T(C): 36.6, Max: 37.1 (01-18 @ 21:22)  T(F): 97.9, Max: 98.8 (01-18 @ 21:22)  HR: 76 (75 - 81)  BP: 111/55 (94/52 - 125/72)  BP(mean): --  RR: 16 (14 - 20)  SpO2: 97% (95% - 97%)    Physical exam:    Overall impression  Lymphadenopathy  Liver  spleen    Labs:  CBC Full  -  ( 19 Jan 2017 08:40 )  WBC Count : 24.1 K/uL  Hemoglobin : 9.8 g/dL  Hematocrit : 30.1 %  Platelet Count - Automated : 194 K/uL  Mean Cell Volume : 86.2 fL  Mean Cell Hemoglobin : 28.1 pg  Mean Cell Hemoglobin Concentration : 32.6 g/dL  Auto Neutrophil # : x  Auto Lymphocyte # : x  Auto Monocyte # : x  Auto Eosinophil # : x  Auto Basophil # : x  Auto Neutrophil % : x  Auto Lymphocyte % : x  Auto Monocyte % : x  Auto Eosinophil % : x  Auto Basophil % : x    19 Jan 2017 08:40    139    |  104    |  15     ----------------------------<  72     4.0     |  30     |  0.60     Ca    10.0       19 Jan 2017 08:40  Phos  2.5       18 Jan 2017 06:02  Mg     1.9       19 Jan 2017 08:40        Radiology:  HEALTH ISSUES - R/O PROBLEM Dx:      Assessmant / Problems  1) Stage lll NHL s/p chemotherapy  2)Hypercalcemia of malignancy resolved  3) Leukopenia 2/2 chemotherapy  4) hypopotasemia    Plan:  hold K supplementation and check repeat SOUMYA Galaviz MD

## 2017-01-19 NOTE — PROGRESS NOTE ADULT - SUBJECTIVE AND OBJECTIVE BOX
Physical Medicine and Rehabilitation Progress Note:    Patient is a 85y old  Female who presents with a chief complaint of hypercalcemia (10 Ronny 2017 13:03)      HPI:  86 y/o F w/ pmhx of MGUS, HFpEF, CAD s/p CABG, severe MR/TR, a-fib s/p PPM on Eliquis, DM2, right-sided breast CA (s/p mastectomy/chemotx/radiation), recently admitted 12/29/16 to 1/4/17 for hypercalcemia treated with IV fluids/Lasix/bisphosphonates, presents again today for hypercalcemia on outpatient labs. She had an outpatient Ca level of 13 at Dr. Hartmann’s office yesterday. Since discharge last week, she reports generalized weakness and difficulty walking due to lack of balance which worsened gradually over the past week. She also experienced a fall (says she lost her balance) on outstretched right arm 4 days ago, with no LOC/head impact. Since then she complains of right shoulder pain when moving her arm but denies pain at rest. Also endorses chronic constipation and lack of appetite, but denies abdominal/kidney pain, nausea, vomiting, fever/chills, chest pain/sob, recent change in meds, dysuria, headache, or vision change. On last admission, she had excisional biopsy of lymph nodes (with ENT) with prelim reports concerning for lymphoma but final diagnosis pending.  Initial vitals in ED: 98.1F, HR 98, 133/52, RR 16, 98% O2 on RA.   ED administration: 250cc bolus of NS followed by NS at 80cc/hr and Lasix 20mg IV x 1. (10 Ronny 2017 13:03)                            9.8    24.1  )-----------( 194      ( 19 Jan 2017 08:40 )             30.1       19 Jan 2017 08:40    139    |  104    |  15     ----------------------------<  72     4.0     |  30     |  0.60     Ca    10.0       19 Jan 2017 08:40  Phos  2.5       18 Jan 2017 06:02  Mg     1.9       19 Jan 2017 08:40      Vital Signs Last 24 Hrs  T(C): 36.7, Max: 37.1 (01-18 @ 21:22)  T(F): 98, Max: 98.8 (01-18 @ 21:22)  HR: 80 (75 - 81)  BP: 109/61 (94/52 - 125/72)  BP(mean): --  RR: 18 (14 - 20)  SpO2: 96% (95% - 96%)    MEDICATIONS  (STANDING):  insulin lispro (HumaLOG) corrective regimen sliding scale  SubCutaneous Before meals and at bedtime  amiodarone    Tablet 200milliGRAM(s) Oral daily  metoprolol succinate ER 50milliGRAM(s) Oral daily  pantoprazole    Tablet 40milliGRAM(s) Oral before breakfast  docusate sodium 100milliGRAM(s) Oral three times a day  senna 2Tablet(s) Oral at bedtime  polyethylene glycol 3350 17Gram(s) Oral three times a day  ondansetron  IVPB (Chemo) 16milliGRAM(s) IV Intermittent once  ondansetron  IVPB (Chemo) 16milliGRAM(s) IV Intermittent once  ondansetron  IVPB (Chemo) 16milliGRAM(s) IV Intermittent once  dexamethasone   IVPB (Chemo) 15milliGRAM(s) IV Intermittent once  dexamethasone   IVPB (Chemo) 15milliGRAM(s) IV Intermittent once  dexamethasone   IVPB (Chemo) 15milliGRAM(s) IV Intermittent once  furosemide    Tablet (Chemo) 40milliGRAM(s) Oral once  furosemide    Tablet (Chemo) 40milliGRAM(s) Oral once  furosemide    Tablet (Chemo) 40milliGRAM(s) Oral once  riTUXimab IVPB 415milliGRAM(s) IV Intermittent once  bendamustine IVPB 130milliGRAM(s) IV Intermittent once  bendamustine IVPB 130milliGRAM(s) IV Intermittent once  anastrozole 1milliGRAM(s) Oral daily  apixaban 2.5milliGRAM(s) Oral two times a day  allopurinol 300milliGRAM(s) Oral daily  megestrol Suspension 320milliGRAM(s) Oral daily    MEDICATIONS  (PRN):  HYDROmorphone  Injectable 0.5milliGRAM(s) IV Push every 90 minutes PRN PACU ONLY, severe pain  ondansetron Injectable 4milliGRAM(s) IV Push once PRN Nausea and/or Vomiting    Currently Undergoing Physical Therapy at bedside.    Functional Status Assessment:    Bed Mobility Training:                                                                                                                                                  - Rolling/Turning:        independent   - Scooting:                 independent                                                                        - Sit to Supine:           independent                                                                      - Supine to Sit:           independent    Transfer Training:  -  Sit to Stand:           with supervision                                                                          - Stand to Sit:            with supervision    Gait Training:             ambulating  ~ 100' with a rolling walker and contact guard assistance        PM&R Impression: as above    Disposition Plan Recommendations: d/c home, home physical therapy

## 2017-01-19 NOTE — PROGRESS NOTE ADULT - SUBJECTIVE AND OBJECTIVE BOX
INTERVAL HPI: +cough no sob palp cp  	  MEDICATIONS:  amiodarone    Tablet 200milliGRAM(s) Oral daily  metoprolol succinate ER 50milliGRAM(s) Oral daily  furosemide    Tablet (Chemo) 40milliGRAM(s) Oral once  furosemide    Tablet (Chemo) 40milliGRAM(s) Oral once  furosemide    Tablet (Chemo) 40milliGRAM(s) Oral once        ondansetron  IVPB (Chemo) 16milliGRAM(s) IV Intermittent once  ondansetron  IVPB (Chemo) 16milliGRAM(s) IV Intermittent once  ondansetron  IVPB (Chemo) 16milliGRAM(s) IV Intermittent once  HYDROmorphone  Injectable 0.5milliGRAM(s) IV Push every 90 minutes PRN  ondansetron Injectable 4milliGRAM(s) IV Push once PRN    pantoprazole    Tablet 40milliGRAM(s) Oral before breakfast  docusate sodium 100milliGRAM(s) Oral three times a day  senna 2Tablet(s) Oral at bedtime  polyethylene glycol 3350 17Gram(s) Oral three times a day    insulin lispro (HumaLOG) corrective regimen sliding scale  SubCutaneous Before meals and at bedtime  dexamethasone   IVPB (Chemo) 15milliGRAM(s) IV Intermittent once  dexamethasone   IVPB (Chemo) 15milliGRAM(s) IV Intermittent once  dexamethasone   IVPB (Chemo) 15milliGRAM(s) IV Intermittent once  allopurinol 300milliGRAM(s) Oral daily  megestrol Suspension 320milliGRAM(s) Oral daily    riTUXimab IVPB 415milliGRAM(s) IV Intermittent once  bendamustine IVPB 130milliGRAM(s) IV Intermittent once  bendamustine IVPB 130milliGRAM(s) IV Intermittent once  anastrozole 1milliGRAM(s) Oral daily  apixaban 2.5milliGRAM(s) Oral two times a day  [id number] Injectable 480MICROGram(s) SubCutaneous daily      REVIEW OF SYSTEMS:  [x] As per HPI  CONSTITUTIONAL: No fever, weight loss, or fatigue  RESPIRATORY: No cough, wheezing, chills or hemoptysis; No Shortness of Breath  CARDIOVASCULAR: No chest pain, palpitations, dizziness, or leg swelling  GASTROINTESTINAL: No abdominal or epigastric pain. No nausea, vomiting, or hematemesis; No diarrhea or constipation. No melena or hematochezia.  MUSCULOSKELETAL: No joint pain or swelling; No muscle, back, or extremity pain  [x] All others negative	  [ ] Unable to obtain    PHYSICAL EXAM:  T(C): 37.1, Max: 37.1 (01-18 @ 21:22)  HR: 76 (75 - 76)  BP: 125/72 (94/52 - 125/72)  RR: 16 (16 - 20)  SpO2: 96% (96% - 96%)  Wt(kg): --  I&O's Summary        Appearance: Normal	  HEENT:   Normal oral mucosa  Cardiovascular: Normal S1 S2, No JVD, No murmurs, No edema  Respiratory: Lungs clear to auscultation	  Gastrointestinal:  Soft, Non-tender, + BS	  Extremities: Normal range of motion, No clubbing, cyanosis or edema  Vascular: Peripheral pulses palpable 2+ bilaterally    TELEMETRY: 	    ECG:    	  RADIOLOGY:   CXR:  CT:  US:    CARDIAC TESTING:  Echocardiogram:  Catheterization:  Stress Test:      LABS:	 	    CARDIAC MARKERS:                                  9.5    2.7   )-----------( 194      ( 18 Jan 2017 06:02 )             29.1     18 Jan 2017 06:02    141    |  106    |  21     ----------------------------<  98     3.0     |  33     |  0.82     Ca    9.8        18 Jan 2017 06:02  Phos  2.5       18 Jan 2017 06:02  Mg     1.5       18 Jan 2017 06:02      proBNP:   Lipid Profile:   HgA1c:   TSH:     ASSESSMENT/PLAN: 	  cllinically no signs of overload  cxr improved congestion  no need for diuresis for now.  +cough - URI symptoms consider nebs and robitussin

## 2017-01-19 NOTE — PROGRESS NOTE ADULT - PROBLEM SELECTOR PROBLEM 9
Need for prophylactic measure
Abdominal distension
Constipation
Need for prophylactic measure
Abdominal distension

## 2017-01-19 NOTE — PROGRESS NOTE ADULT - PROBLEM SELECTOR PROBLEM 2
Lymphoma
Heart failure with preserved ejection fraction
Hypercalcemia
Hypokalemia
Lymphoma
Heart failure with preserved ejection fraction
Hypercalcemia
Lymphoma
Lymphoma
Elevated BUN

## 2017-01-19 NOTE — PROGRESS NOTE ADULT - SUBJECTIVE AND OBJECTIVE BOX
Patient is a 85y old  Female who presents with a chief complaint of hypercalcemia (10 Ronny 2017 13:03)      HPI:  86 y/o F w/ pmhx of MGUS, HFpEF, CAD s/p CABG, severe MR/TR, a-fib s/p PPM on Eliquis, DM2, right-sided breast CA (s/p mastectomy/chemotx/radiation), recently admitted 12/29/16 to 1/4/17 for hypercalcemia treated with IV fluids/Lasix/bisphosphonates, presents again today for hypercalcemia on outpatient labs. She had an outpatient Ca level of 13 at Dr. Hartmann’s office yesterday. Since discharge last week, she reports generalized weakness and difficulty walking due to lack of balance which worsened gradually over the past week. She also experienced a fall (says she lost her balance) on outstretched right arm 4 days ago, with no LOC/head impact. Since then she complains of right shoulder pain when moving her arm but denies pain at rest. Also endorses chronic constipation and lack of appetite, but denies abdominal/kidney pain, nausea, vomiting, fever/chills, chest pain/sob, recent change in meds, dysuria, headache, or vision change. On last admission, she had excisional biopsy of lymph nodes (with ENT) with prelim reports concerning for lymphoma but final diagnosis pending.  Initial vitals in ED: 98.1F, HR 98, 133/52, RR 16, 98% O2 on RA.   ED administration: 250cc bolus of NS followed by NS at 80cc/hr and Lasix 20mg IV x 1. (10 Ronny 2017 13:03)    INTERVAL HPI/OVERNIGHT EVENTS:::no s/s of CHF , oncology stable    HEALTH ISSUES - PROBLEM Dx:  Hypomagnesemia: Hypomagnesemia  Hypokalemia: Hypokalemia  Elevated BUN: Elevated BUN  Physical therapy evaluation, initial: Physical therapy evaluation, initial  Discharge planning issues: Discharge planning issues  Constipation: Constipation  Abdominal distension: Abdominal distension  Diabetes: Diabetes  Dysphagia: Dysphagia  Acute on chronic systolic (congestive) heart failure: Acute on chronic systolic (congestive) heart failure  Fever: Fever  Heart failure: Heart failure  Lymphoma: Lymphoma  Need for prophylactic measure: Need for prophylactic measure  HTN (hypertension): HTN (hypertension)  Type 2 diabetes mellitus: Type 2 diabetes mellitus  CAD (coronary artery disease): CAD (coronary artery disease)  Monoclonal gammopathy of undetermined significance: Monoclonal gammopathy of undetermined significance  Heart failure with preserved ejection fraction: Heart failure with preserved ejection fraction  Paroxysmal atrial fibrillation: Paroxysmal atrial fibrillation  Arm pain, right: Arm pain, right  Hypercalcemia: Hypercalcemia          PAST MEDICAL & SURGICAL HISTORY:  Scoliosis  Follicular lymphoma  Neck mass  Afib  Other hyperlipidemia  Breast cancer  CAD (coronary artery disease)  Diabetes  HTN (hypertension)  No pertinent past medical history  H/O abdominal hysterectomy  H/O thyroidectomy  S/P CABG x 3          Consultant NOTE  REVIEWED  (   )    REVIEW OF SYSTEMS:  [x] As per HPI    NO change  CONSTITUTIONAL: weak, fatigue RESPIRATORY: No cough, wheezing, chills or hemoptysis; No Shortness of Breath  CARDIOVASCULAR: No chest pain, palpitations, dizziness, or leg swelling  GASTROINTESTINAL: No abdominal or epigastric pain. No nausea, vomiting, or hematemesis; No diarrhea or constipation. No melena or hematochezia.  MUSCULOSKELETAL: No joint pain or swelling; No muscle, back, or extremity pain  PSYCH    awake, alert       [x] All others negative	  [ ] Unable to obtain          Vital Signs Last 24 Hrs  T(C): 36.6, Max: 37.1 (01-19 @ 05:36)  T(F): 97.9, Max: 98.8 (01-19 @ 05:36)  HR: 76 (76 - 81)  BP: 111/55 (106/64 - 125/72)  BP(mean): --  RR: 16 (14 - 18)  SpO2: 97% (95% - 97%)      I & Os for 24h ending 01-19 @ 07:00  =============================================  IN: 300 ml / OUT: 0 ml / NET: 300 ml    I & Os for current day (as of 01-19 @ 21:36)  =============================================  IN: 0 ml / OUT: 75 ml / NET: -75 ml    PHYSICAL EXAMINATION:                                    (    )  NO CHANGE  Appearance: Normal	  HEENT:   Normal oral mucosa,	  Neck: Supple,  Cardiovascular: Normal S1 S2   mur  Respiratory: Lungs clear to auscultation/Decreased Breath Sounds/No Rales, Rhonchi, Wheezing	  Gastrointestinal:  Soft, Non-tender, + BS	  Skin: No rashes, No ecchymoses, No cyanosis  dry  Extremities: Normal range of motion, No clubbing, cyanosis or edema  Vascular: Peripheral pulses dimished  Neurologic: Non-focal  Psychiatry: Awake    insulin lispro (HumaLOG) corrective regimen sliding scale  SubCutaneous Before meals and at bedtime  amiodarone    Tablet 200milliGRAM(s) Oral daily  metoprolol succinate ER 50milliGRAM(s) Oral daily  pantoprazole    Tablet 40milliGRAM(s) Oral before breakfast  docusate sodium 100milliGRAM(s) Oral three times a day  senna 2Tablet(s) Oral at bedtime  polyethylene glycol 3350 17Gram(s) Oral three times a day  ondansetron  IVPB (Chemo) 16milliGRAM(s) IV Intermittent once  ondansetron  IVPB (Chemo) 16milliGRAM(s) IV Intermittent once  ondansetron  IVPB (Chemo) 16milliGRAM(s) IV Intermittent once  dexamethasone   IVPB (Chemo) 15milliGRAM(s) IV Intermittent once  dexamethasone   IVPB (Chemo) 15milliGRAM(s) IV Intermittent once  dexamethasone   IVPB (Chemo) 15milliGRAM(s) IV Intermittent once  furosemide    Tablet (Chemo) 40milliGRAM(s) Oral once  furosemide    Tablet (Chemo) 40milliGRAM(s) Oral once  furosemide    Tablet (Chemo) 40milliGRAM(s) Oral once  riTUXimab IVPB 415milliGRAM(s) IV Intermittent once  bendamustine IVPB 130milliGRAM(s) IV Intermittent once  bendamustine IVPB 130milliGRAM(s) IV Intermittent once  anastrozole 1milliGRAM(s) Oral daily  HYDROmorphone  Injectable 0.5milliGRAM(s) IV Push every 90 minutes PRN  ondansetron Injectable 4milliGRAM(s) IV Push once PRN  apixaban 2.5milliGRAM(s) Oral two times a day  allopurinol 300milliGRAM(s) Oral daily  megestrol Suspension 320milliGRAM(s) Oral daily                                      9.8    24.1  )-----------( 194      ( 19 Jan 2017 08:40 )             30.1     19 Jan 2017 08:40    139    |  104    |  15     ----------------------------<  72     4.0     |  30     |  0.60     Ca    10.0       19 Jan 2017 08:40  Phos  2.5       18 Jan 2017 06:02  Mg     1.9       19 Jan 2017 08:40        CAPILLARY BLOOD GLUCOSE  201 (19 Jan 2017 21:00)  115 (19 Jan 2017 16:57)  169 (19 Jan 2017 13:17)  103 (19 Jan 2017 08:08)    Patient is a 85y old  Female who presents with a chief complaint of hypercalcemia (10 Ronny 2017 13:03)      HPI:  86 y/o F w/ pmhx of MGUS, HFpEF, CAD s/p CABG, severe MR/TR, a-fib s/p PPM on Eliquis, DM2, right-sided breast CA (s/p mastectomy/chemotx/radiation), recently admitted 12/29/16 to 1/4/17 for hypercalcemia treated with IV fluids/Lasix/bisphosphonates, presents again today for hypercalcemia on outpatient labs. She had an outpatient Ca level of 13 at Dr. Hartmann’s office yesterday. Since discharge last week, she reports generalized weakness and difficulty walking due to lack of balance which worsened gradually over the past week. She also experienced a fall (says she lost her balance) on outstretched right arm 4 days ago, with no LOC/head impact. Since then she complains of right shoulder pain when moving her arm but denies pain at rest. Also endorses chronic constipation and lack of appetite, but denies abdominal/kidney pain, nausea, vomiting, fever/chills, chest pain/sob, recent change in meds, dysuria, headache, or vision change. On last admission, she had excisional biopsy of lymph nodes (with ENT) with prelim reports concerning for lymphoma but final diagnosis pending.  Initial vitals in ED: 98.1F, HR 98, 133/52, RR 16, 98% O2 on RA.   ED administration: 250cc bolus of NS followed by NS at 80cc/hr and Lasix 20mg IV x 1. (10 Ronny 2017 13:03)    INTERVAL HPI/OVERNIGHT EVENTS:::    HEALTH ISSUES - PROBLEM Dx:  Hypomagnesemia: Hypomagnesemia  Hypokalemia: Hypokalemia  Elevated BUN: Elevated BUN  Physical therapy evaluation, initial: Physical therapy evaluation, initial  Discharge planning issues: Discharge planning issues  Constipation: Constipation  Abdominal distension: Abdominal distension  Diabetes: Diabetes  Dysphagia: Dysphagia  Acute on chronic systolic (congestive) heart failure: Acute on chronic systolic (congestive) heart failure  Fever: Fever  Heart failure: Heart failure  Lymphoma: Lymphoma  Need for prophylactic measure: Need for prophylactic measure  HTN (hypertension): HTN (hypertension)  Type 2 diabetes mellitus: Type 2 diabetes mellitus  CAD (coronary artery disease): CAD (coronary artery disease)  Monoclonal gammopathy of undetermined significance: Monoclonal gammopathy of undetermined significance  Heart failure with preserved ejection fraction: Heart failure with preserved ejection fraction  Paroxysmal atrial fibrillation: Paroxysmal atrial fibrillation  Arm pain, right: Arm pain, right  Hypercalcemia: Hypercalcemia          PAST MEDICAL & SURGICAL HISTORY:  Scoliosis  Follicular lymphoma  Neck mass  Afib  Other hyperlipidemia  Breast cancer  CAD (coronary artery disease)  Diabetes  HTN (hypertension)  No pertinent past medical history  H/O abdominal hysterectomy  H/O thyroidectomy  S/P CABG x 3          Consultant NOTE  REVIEWED  (   )    REVIEW OF SYSTEMS:  [x] As per HPI  CONSTITUTIONAL: No fever, weight loss, or fatigue  RESPIRATORY: No cough, wheezing, chills or hemoptysis; No Shortness of Breath  CARDIOVASCULAR: No chest pain, palpitations, dizziness, or leg swelling  GASTROINTESTINAL: No abdominal or epigastric pain. No nausea, vomiting, or hematemesis; No diarrhea or constipation. No melena or hematochezia.  MUSCULOSKELETAL: No joint pain or swelling; No muscle, back, or extremity pain  PSYCH    awake, alert       [x] All others negative	  [ ] Unable to obtain          Vital Signs Last 24 Hrs  T(C): 36.6, Max: 37.1 (01-19 @ 05:36)  T(F): 97.9, Max: 98.8 (01-19 @ 05:36)  HR: 76 (76 - 81)  BP: 111/55 (106/64 - 125/72)  BP(mean): --  RR: 16 (14 - 18)  SpO2: 97% (95% - 97%)      I & Os for 24h ending 01-19 @ 07:00  =============================================  IN: 300 ml / OUT: 0 ml / NET: 300 ml    I & Os for current day (as of 01-19 @ 21:36)  =============================================  IN: 0 ml / OUT: 75 ml / NET: -75 ml    PHYSICAL EXAMINATION:                                    (    )  NO CHANGE  Appearance: Normal	  HEENT:   Normal oral mucosa, PERRL, EOMI	  Neck: Supple, + JVD/ - JVD; Carotid Bruit   Cardiovascular: Normal S1 S2, No JVD, No murmurs,   Respiratory: Lungs clear to auscultation/Decreased Breath Sounds/No Rales, Rhonchi, Wheezing	  Gastrointestinal:  Soft, Non-tender, + BS	  Skin: No rashes, No ecchymoses, No cyanosis  Extremities: Normal range of motion, No clubbing, cyanosis or edema  Vascular: Peripheral pulses palpable 2+ bilaterally  Neurologic: Non-focal  Psychiatry: A & O x 3, Mood & affect appropriate    insulin lispro (HumaLOG) corrective regimen sliding scale  SubCutaneous Before meals and at bedtime  amiodarone    Tablet 200milliGRAM(s) Oral daily  metoprolol succinate ER 50milliGRAM(s) Oral daily  pantoprazole    Tablet 40milliGRAM(s) Oral before breakfast  docusate sodium 100milliGRAM(s) Oral three times a day  senna 2Tablet(s) Oral at bedtime  polyethylene glycol 3350 17Gram(s) Oral three times a day  ondansetron  IVPB (Chemo) 16milliGRAM(s) IV Intermittent once  ondansetron  IVPB (Chemo) 16milliGRAM(s) IV Intermittent once  ondansetron  IVPB (Chemo) 16milliGRAM(s) IV Intermittent once  dexamethasone   IVPB (Chemo) 15milliGRAM(s) IV Intermittent once  dexamethasone   IVPB (Chemo) 15milliGRAM(s) IV Intermittent once  dexamethasone   IVPB (Chemo) 15milliGRAM(s) IV Intermittent once  furosemide    Tablet (Chemo) 40milliGRAM(s) Oral once  furosemide    Tablet (Chemo) 40milliGRAM(s) Oral once  furosemide    Tablet (Chemo) 40milliGRAM(s) Oral once  riTUXimab IVPB 415milliGRAM(s) IV Intermittent once  bendamustine IVPB 130milliGRAM(s) IV Intermittent once  bendamustine IVPB 130milliGRAM(s) IV Intermittent once  anastrozole 1milliGRAM(s) Oral daily  HYDROmorphone  Injectable 0.5milliGRAM(s) IV Push every 90 minutes PRN  ondansetron Injectable 4milliGRAM(s) IV Push once PRN  apixaban 2.5milliGRAM(s) Oral two times a day  allopurinol 300milliGRAM(s) Oral daily  megestrol Suspension 320milliGRAM(s) Oral daily                                      9.8    24.1  )-----------( 194      ( 19 Jan 2017 08:40 )             30.1     19 Jan 2017 08:40    139    |  104    |  15     ----------------------------<  72     4.0     |  30     |  0.60     Ca    10.0       19 Jan 2017 08:40  Phos  2.5       18 Jan 2017 06:02  Mg     1.9       19 Jan 2017 08:40        CAPILLARY BLOOD GLUCOSE  201 (19 Jan 2017 21:00)  115 (19 Jan 2017 16:57)  169 (19 Jan 2017 13:17)  103 (19 Jan 2017 08:08)              INTERPRETATION:  Portable Chest X-Ray dated 1/18/2017 7:48 AM    Indication: new wheeze    An AP portable view of the chest is compared to 1/11/2017. Poor   inspiratory volume. Prominence of the pulmonary vascularity compatible   with mild pulmonary venous congestion, unchanged or slightly improved.   Linear atelectasis in the left lower lobe has developed. Right   hemidiaphragm is elevated.Sternal wires, mediastinal clips, left   pacemaker are noted. There is a new Port-A-Cath noted on the right side   with its tip in the right atrium. No pneumothorax is seen. The bones are   osteopenic. Degenerative changes in the glenohumeral joints and spine.    Thoracolumbar scoliosis.    IMPRESSION:  New linear atelectasis in the left base. Mild pulmonary venous   congestion, unchanged or slightly improved.

## 2017-01-19 NOTE — PROGRESS NOTE ADULT - SUBJECTIVE AND OBJECTIVE BOX
INTERVAL HPI/OVERNIGHT EVENTS: TEJINDER. Patient with no complaints in AM. No SOB or difficulty breathing.     VITAL SIGNS:  T(F): 98  HR: 80  BP: 109/61  RR: 18  SpO2: 96%  Wt(kg): --  I & Os for 24h ending 01-19 @ 07:00  =============================================  IN: 300 ml / OUT: 0 ml / NET: 300 ml    I & Os for current day (as of 01-19 @ 15:19)  =============================================  IN: 0 ml / OUT: 75 ml / NET: -75 ml      PHYSICAL EXAM:    Constitutional: NAD, A&Ox3. Frail   Eyes: PERRLA, EOMI  ENMT: MMM, No lesions  Neck: No JVD, No LAD  Respiratory: b/l basilar crackles, no wheezing  Cardiovascular: RRR. S1, S2. III/VI Systolic murmur. No rubs, no gallops  Gastrointestinal: Soft, NT. Distended, normoactive BS  Extremities: Warm, no clubbing, cyanosis, or edema  Vascular: 2+ distal pulses, equal  Neurological: CN II-XII grossly intact  Musculoskeletal: 5/5 UE strength; 3-4/5 LE strength    MEDICATIONS  (STANDING):  insulin lispro (HumaLOG) corrective regimen sliding scale  SubCutaneous Before meals and at bedtime  amiodarone    Tablet 200milliGRAM(s) Oral daily  metoprolol succinate ER 50milliGRAM(s) Oral daily  pantoprazole    Tablet 40milliGRAM(s) Oral before breakfast  docusate sodium 100milliGRAM(s) Oral three times a day  senna 2Tablet(s) Oral at bedtime  polyethylene glycol 3350 17Gram(s) Oral three times a day  ondansetron  IVPB (Chemo) 16milliGRAM(s) IV Intermittent once  ondansetron  IVPB (Chemo) 16milliGRAM(s) IV Intermittent once  ondansetron  IVPB (Chemo) 16milliGRAM(s) IV Intermittent once  dexamethasone   IVPB (Chemo) 15milliGRAM(s) IV Intermittent once  dexamethasone   IVPB (Chemo) 15milliGRAM(s) IV Intermittent once  dexamethasone   IVPB (Chemo) 15milliGRAM(s) IV Intermittent once  furosemide    Tablet (Chemo) 40milliGRAM(s) Oral once  furosemide    Tablet (Chemo) 40milliGRAM(s) Oral once  furosemide    Tablet (Chemo) 40milliGRAM(s) Oral once  riTUXimab IVPB 415milliGRAM(s) IV Intermittent once  bendamustine IVPB 130milliGRAM(s) IV Intermittent once  bendamustine IVPB 130milliGRAM(s) IV Intermittent once  anastrozole 1milliGRAM(s) Oral daily  apixaban 2.5milliGRAM(s) Oral two times a day  allopurinol 300milliGRAM(s) Oral daily  megestrol Suspension 320milliGRAM(s) Oral daily    MEDICATIONS  (PRN):  HYDROmorphone  Injectable 0.5milliGRAM(s) IV Push every 90 minutes PRN PACU ONLY, severe pain  ondansetron Injectable 4milliGRAM(s) IV Push once PRN Nausea and/or Vomiting      Allergies    IV CONtRAST (Flushing (Skin); Rash)  No Known Drug Allergies    Intolerances        LABS:                        9.8    24.1  )-----------( 194      ( 19 Jan 2017 08:40 )             30.1     19 Jan 2017 08:40    139    |  104    |  15     ----------------------------<  72     4.0     |  30     |  0.60     Ca    10.0       19 Jan 2017 08:40  Phos  2.5       18 Jan 2017 06:02  Mg     1.9       19 Jan 2017 08:40            RADIOLOGY & ADDITIONAL TESTS:

## 2017-01-19 NOTE — PROGRESS NOTE ADULT - PROBLEM SELECTOR PROBLEM 5
Paroxysmal atrial fibrillation
Dysphagia
Paroxysmal atrial fibrillation
Dysphagia
Lymphoma
Physical therapy evaluation, initial

## 2017-01-19 NOTE — PROGRESS NOTE ADULT - PROBLEM SELECTOR PROBLEM 8
Constipation
Diabetes
HTN (hypertension)
Constipation
Diabetes

## 2017-01-19 NOTE — PROGRESS NOTE ADULT - PROBLEM SELECTOR PROBLEM 1
Hypercalcemia
Acute on chronic systolic (congestive) heart failure
Hypercalcemia
Lymphoma
Acute on chronic systolic (congestive) heart failure
Hypercalcemia

## 2017-01-19 NOTE — PROGRESS NOTE ADULT - ASSESSMENT
Admitted fro:  Problem/Plan - 1:  ·  Problem: Lymphoma.  Plan: Dr. Galaviz following. 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 S/p Chemoport placement on 1/14. Pt received chemotherapy 1/15.  -f/u official PET-CT read  -c/w allopurinol.   -will check daily LDH and uric acid levels for tumor lysis syndrome  -will f/u renal recommendations.     Problem/Plan - 2:  ·  Problem: Heart failure with preserved ejection fraction.  Plan: EF - 65%. Developed acute heart failure exacerbation 2/2 fluids given upon admission. Resolved. Lasix D/C'd by Dr. Hartmann.  -Dr. Mckinney following. Will f/u recommendations  -monitor volume status.     Problem/Plan - 3:  ·  Problem: Hypercalcemia.  Plan: Resolved. 2/2 malignancy, consistent with lymphoma from work up in past admission.   -no IVF given recent episode of CHF exacerbation   -f/u renal recs.     Problem/Plan - 4:  ·  Problem: Dysphagia.  Plan: -c/w Dysphagia 1 w/Thin Liquids + Ensure tid for diet.

## 2017-01-19 NOTE — PROGRESS NOTE ADULT - PROBLEM SELECTOR PROBLEM 6
CAD (coronary artery disease)
Paroxysmal atrial fibrillation
CAD (coronary artery disease)
Paroxysmal atrial fibrillation

## 2017-01-20 ENCOUNTER — TRANSCRIPTION ENCOUNTER (OUTPATIENT)
Age: 82
End: 2017-01-20

## 2017-01-20 VITALS — WEIGHT: 85.76 LBS

## 2017-01-20 LAB
ANION GAP SERPL CALC-SCNC: 5 MMOL/L — LOW (ref 9–16)
BUN SERPL-MCNC: 20 MG/DL — SIGNIFICANT CHANGE UP (ref 7–23)
CALCIUM SERPL-MCNC: 9.4 MG/DL — SIGNIFICANT CHANGE UP (ref 8.5–10.5)
CHLORIDE SERPL-SCNC: 106 MMOL/L — SIGNIFICANT CHANGE UP (ref 96–108)
CO2 SERPL-SCNC: 32 MMOL/L — HIGH (ref 22–31)
CREAT SERPL-MCNC: 0.63 MG/DL — SIGNIFICANT CHANGE UP (ref 0.5–1.3)
GLUCOSE SERPL-MCNC: 98 MG/DL — SIGNIFICANT CHANGE UP (ref 70–99)
HCT VFR BLD CALC: 29.5 % — LOW (ref 34.5–45)
HGB BLD-MCNC: 9.4 G/DL — LOW (ref 11.5–15.5)
MAGNESIUM SERPL-MCNC: 1.5 MG/DL — LOW (ref 1.6–2.4)
MCHC RBC-ENTMCNC: 27.5 PG — SIGNIFICANT CHANGE UP (ref 27–34)
MCHC RBC-ENTMCNC: 31.9 G/DL — LOW (ref 32–36)
MCV RBC AUTO: 86.3 FL — SIGNIFICANT CHANGE UP (ref 80–100)
PLATELET # BLD AUTO: 184 K/UL — SIGNIFICANT CHANGE UP (ref 150–400)
POTASSIUM SERPL-MCNC: 3.6 MMOL/L — SIGNIFICANT CHANGE UP (ref 3.5–5.3)
POTASSIUM SERPL-SCNC: 3.6 MMOL/L — SIGNIFICANT CHANGE UP (ref 3.5–5.3)
RBC # BLD: 3.42 M/UL — LOW (ref 3.8–5.2)
RBC # FLD: 16.5 % — SIGNIFICANT CHANGE UP (ref 10.3–16.9)
SODIUM SERPL-SCNC: 143 MMOL/L — SIGNIFICANT CHANGE UP (ref 135–145)
WBC # BLD: 13.8 K/UL — HIGH (ref 3.8–10.5)
WBC # FLD AUTO: 13.8 K/UL — HIGH (ref 3.8–10.5)

## 2017-01-20 PROCEDURE — 76705 ECHO EXAM OF ABDOMEN: CPT

## 2017-01-20 PROCEDURE — 81001 URINALYSIS AUTO W/SCOPE: CPT

## 2017-01-20 PROCEDURE — C1788: CPT

## 2017-01-20 PROCEDURE — 97001: CPT

## 2017-01-20 PROCEDURE — 86850 RBC ANTIBODY SCREEN: CPT

## 2017-01-20 PROCEDURE — 71010: CPT | Mod: 26

## 2017-01-20 PROCEDURE — 82652 VIT D 1 25-DIHYDROXY: CPT

## 2017-01-20 PROCEDURE — 74018 RADEX ABDOMEN 1 VIEW: CPT

## 2017-01-20 PROCEDURE — 86901 BLOOD TYPING SEROLOGIC RH(D): CPT

## 2017-01-20 PROCEDURE — 83735 ASSAY OF MAGNESIUM: CPT

## 2017-01-20 PROCEDURE — 85027 COMPLETE CBC AUTOMATED: CPT

## 2017-01-20 PROCEDURE — 76000 FLUOROSCOPY <1 HR PHYS/QHP: CPT

## 2017-01-20 PROCEDURE — 83935 ASSAY OF URINE OSMOLALITY: CPT

## 2017-01-20 PROCEDURE — 93306 TTE W/DOPPLER COMPLETE: CPT

## 2017-01-20 PROCEDURE — 86706 HEP B SURFACE ANTIBODY: CPT

## 2017-01-20 PROCEDURE — 86803 HEPATITIS C AB TEST: CPT

## 2017-01-20 PROCEDURE — 81003 URINALYSIS AUTO W/O SCOPE: CPT

## 2017-01-20 PROCEDURE — 87633 RESP VIRUS 12-25 TARGETS: CPT

## 2017-01-20 PROCEDURE — 85610 PROTHROMBIN TIME: CPT

## 2017-01-20 PROCEDURE — 87581 M.PNEUMON DNA AMP PROBE: CPT

## 2017-01-20 PROCEDURE — 86900 BLOOD TYPING SEROLOGIC ABO: CPT

## 2017-01-20 PROCEDURE — 80053 COMPREHEN METABOLIC PANEL: CPT

## 2017-01-20 PROCEDURE — 99232 SBSQ HOSP IP/OBS MODERATE 35: CPT

## 2017-01-20 PROCEDURE — 82330 ASSAY OF CALCIUM: CPT

## 2017-01-20 PROCEDURE — 70490 CT SOFT TISSUE NECK W/O DYE: CPT

## 2017-01-20 PROCEDURE — 80048 BASIC METABOLIC PNL TOTAL CA: CPT

## 2017-01-20 PROCEDURE — 82306 VITAMIN D 25 HYDROXY: CPT

## 2017-01-20 PROCEDURE — 84300 ASSAY OF URINE SODIUM: CPT

## 2017-01-20 PROCEDURE — 87486 CHLMYD PNEUM DNA AMP PROBE: CPT

## 2017-01-20 PROCEDURE — 93005 ELECTROCARDIOGRAM TRACING: CPT

## 2017-01-20 PROCEDURE — 86300 IMMUNOASSAY TUMOR CA 15-3: CPT

## 2017-01-20 PROCEDURE — 82040 ASSAY OF SERUM ALBUMIN: CPT

## 2017-01-20 PROCEDURE — 97116 GAIT TRAINING THERAPY: CPT

## 2017-01-20 PROCEDURE — 87389 HIV-1 AG W/HIV-1&-2 AB AG IA: CPT

## 2017-01-20 PROCEDURE — A9552: CPT

## 2017-01-20 PROCEDURE — 87040 BLOOD CULTURE FOR BACTERIA: CPT

## 2017-01-20 PROCEDURE — 83615 LACTATE (LD) (LDH) ENZYME: CPT

## 2017-01-20 PROCEDURE — 99285 EMERGENCY DEPT VISIT HI MDM: CPT | Mod: 25

## 2017-01-20 PROCEDURE — 73030 X-RAY EXAM OF SHOULDER: CPT

## 2017-01-20 PROCEDURE — 92610 EVALUATE SWALLOWING FUNCTION: CPT | Mod: GN

## 2017-01-20 PROCEDURE — 82570 ASSAY OF URINE CREATININE: CPT

## 2017-01-20 PROCEDURE — 85025 COMPLETE CBC W/AUTO DIFF WBC: CPT

## 2017-01-20 PROCEDURE — 87798 DETECT AGENT NOS DNA AMP: CPT

## 2017-01-20 PROCEDURE — 84100 ASSAY OF PHOSPHORUS: CPT

## 2017-01-20 PROCEDURE — 94640 AIRWAY INHALATION TREATMENT: CPT

## 2017-01-20 PROCEDURE — 71045 X-RAY EXAM CHEST 1 VIEW: CPT

## 2017-01-20 PROCEDURE — 84133 ASSAY OF URINE POTASSIUM: CPT

## 2017-01-20 PROCEDURE — 84550 ASSAY OF BLOOD/URIC ACID: CPT

## 2017-01-20 PROCEDURE — 82378 CARCINOEMBRYONIC ANTIGEN: CPT

## 2017-01-20 PROCEDURE — 82436 ASSAY OF URINE CHLORIDE: CPT

## 2017-01-20 PROCEDURE — 85730 THROMBOPLASTIN TIME PARTIAL: CPT

## 2017-01-20 PROCEDURE — 36415 COLL VENOUS BLD VENIPUNCTURE: CPT

## 2017-01-20 PROCEDURE — 78815 PET IMAGE W/CT SKULL-THIGH: CPT

## 2017-01-20 RX ORDER — METOPROLOL TARTRATE 50 MG
1 TABLET ORAL
Qty: 30 | Refills: 0 | OUTPATIENT
Start: 2017-01-20 | End: 2017-02-19

## 2017-01-20 RX ORDER — PANTOPRAZOLE SODIUM 20 MG/1
1 TABLET, DELAYED RELEASE ORAL
Qty: 30 | Refills: 0 | OUTPATIENT
Start: 2017-01-20 | End: 2017-02-19

## 2017-01-20 RX ORDER — AMIODARONE HYDROCHLORIDE 400 MG/1
1 TABLET ORAL
Qty: 30 | Refills: 0 | OUTPATIENT
Start: 2017-01-20 | End: 2017-02-19

## 2017-01-20 RX ORDER — ATORVASTATIN CALCIUM 80 MG/1
1 TABLET, FILM COATED ORAL
Qty: 30 | Refills: 0 | OUTPATIENT
Start: 2017-01-20 | End: 2017-02-19

## 2017-01-20 RX ORDER — METFORMIN HYDROCHLORIDE 850 MG/1
1 TABLET ORAL
Qty: 60 | Refills: 0 | OUTPATIENT
Start: 2017-01-20 | End: 2017-02-19

## 2017-01-20 RX ORDER — ASPIRIN/CALCIUM CARB/MAGNESIUM 324 MG
1 TABLET ORAL
Qty: 30 | Refills: 0 | OUTPATIENT
Start: 2017-01-20 | End: 2017-02-19

## 2017-01-20 RX ORDER — POTASSIUM CHLORIDE 20 MEQ
1 PACKET (EA) ORAL
Qty: 30 | Refills: 0 | OUTPATIENT
Start: 2017-01-20 | End: 2017-02-19

## 2017-01-20 RX ORDER — FUROSEMIDE 40 MG
1 TABLET ORAL
Qty: 30 | Refills: 0 | OUTPATIENT
Start: 2017-01-20 | End: 2017-02-19

## 2017-01-20 RX ORDER — APIXABAN 2.5 MG/1
1 TABLET, FILM COATED ORAL
Qty: 60 | Refills: 0 | OUTPATIENT
Start: 2017-01-20 | End: 2017-02-19

## 2017-01-20 RX ORDER — ANASTROZOLE 1 MG/1
1 TABLET ORAL
Qty: 30 | Refills: 0 | OUTPATIENT
Start: 2017-01-20 | End: 2017-02-19

## 2017-01-20 RX ORDER — MAGNESIUM SULFATE 500 MG/ML
4 VIAL (ML) INJECTION ONCE
Qty: 0 | Refills: 0 | Status: COMPLETED | OUTPATIENT
Start: 2017-01-20 | End: 2017-01-20

## 2017-01-20 RX ORDER — DIPHENHYDRAMINE HCL 50 MG
1 CAPSULE ORAL
Qty: 30 | Refills: 0 | OUTPATIENT
Start: 2017-01-20 | End: 2017-02-19

## 2017-01-20 RX ADMIN — Medication 300 MILLIGRAM(S): at 12:04

## 2017-01-20 RX ADMIN — Medication 50 MILLIGRAM(S): at 05:59

## 2017-01-20 RX ADMIN — PANTOPRAZOLE SODIUM 40 MILLIGRAM(S): 20 TABLET, DELAYED RELEASE ORAL at 05:59

## 2017-01-20 RX ADMIN — AMIODARONE HYDROCHLORIDE 200 MILLIGRAM(S): 400 TABLET ORAL at 05:59

## 2017-01-20 RX ADMIN — ANASTROZOLE 1 MILLIGRAM(S): 1 TABLET ORAL at 12:04

## 2017-01-20 RX ADMIN — Medication 100 MILLIGRAM(S): at 05:59

## 2017-01-20 RX ADMIN — Medication 100 GRAM(S): at 13:14

## 2017-01-20 RX ADMIN — APIXABAN 2.5 MILLIGRAM(S): 2.5 TABLET, FILM COATED ORAL at 05:59

## 2017-01-20 RX ADMIN — Medication 4: at 13:12

## 2017-01-20 NOTE — DISCHARGE NOTE ADULT - PLAN OF CARE
Treatment with appropriate chemotherapy You have been found to have a cancer of the immune system known as diffuse large-Bcell lymphoma. You were started on a chemotherapy regimen by Dr. Galaviz which you tolerated well. You had a chemoport placed to help administer your chemotherapy. You will continue to receive chemotherapy as an outpatient under the care of Dr. Galaviz. You will have phlebotomists come to your home every Monday & Thursday to draw blood which Dr. Galaviz will use to monitor your response to treatment. You will follow up with Dr. Galaviz in her [place] clinic on February 8, 2017. You have previously been diagnosed with breast cancer, which has relapsed and is active again. Dr. Galaviz is managing your chemotherapy for this cancer. Continue to follow with her in her clinic for continued chemotherapy. Continue to take your metoprolol and xarelto as prescribed. Continue to follow up with your cardiologist for continued management of this condition. Continue to take your Continuue to take your metformin as prescribed. Continue to follow up with your PMD for continued monitoring and management of this condition. You were found to have low potassium during this admission. Dr. Galaviz is prescribing you a potassium chloride pill to help replenish your potassium. Take one pill every day as directed. Follow up with Dr. Galaviz in her clinic for continued management of this condition. You have previously been diagnosed with heart failure with preserved ejection fraction. Please continue to follow with Dr. Hartmann for monitoring and management of this condition. You have been found to have a cancer of the immune system known as diffuse large-Bcell lymphoma. You were started on a chemotherapy regimen by Dr. Galaviz which you tolerated well. You had a chemoport placed to help administer your chemotherapy. You will continue to receive chemotherapy as an outpatient under the care of Dr. Galaviz. You will have phlebotomists come to your home every Monday & Thursday to draw blood which Dr. Galaviz will use to monitor your response to treatment. You will follow up with Dr. Galaviz in her [place] clinic on February 1, 2017. You have been prescribed with hypertension. Continue to follow with your cardiologist, Dr. Mckinney, for continued management of this condition. Continue to take your metformin as prescribed. Continue to follow up with your PMD for continued monitoring and management of this condition. You have previously been diagnosed with heart failure with preserved ejection fraction. Please continue to follow with Dr. Mckinney for monitoring and management of this condition. Do not restart your furosemide until Dr. Mckinney says to do so. You have previously been diagnosed with breast cancer, which has relapsed and is active again. Dr. Galaviz is managing your chemotherapy for this cancer. Continue to follow with her in her clinic for continued chemotherapy. Please continue your arimidex daily until instructed to stop by Dr Galaviz

## 2017-01-20 NOTE — DISCHARGE NOTE ADULT - PATIENT PORTAL LINK FT
“You can access the [facility] Patient Portal, offered by [facility], by registering with the following website: [email or web address]”

## 2017-01-20 NOTE — PROGRESS NOTE ADULT - SUBJECTIVE AND OBJECTIVE BOX
HPI:  84 y/o F w/ pmhx of MGUS, HFpEF, CAD s/p CABG, severe MR/TR, a-fib s/p PPM on Eliquis, DM2, right-sided breast CA (s/p mastectomy/chemotx/radiation), recently admitted 12/29/16 to 1/4/17 for hypercalcemia treated with IV fluids/Lasix/bisphosphonates, presents again today for hypercalcemia on outpatient labs. She had an outpatient Ca level of 13 at Dr. Hartmann’s office yesterday. Since discharge last week, she reports generalized weakness and difficulty walking due to lack of balance which worsened gradually over the past week. She also experienced a fall (says she lost her balance) on outstretched right arm 4 days ago, with no LOC/head impact. Since then she complains of right shoulder pain when moving her arm but denies pain at rest. Also endorses chronic constipation and lack of appetite, but denies abdominal/kidney pain, nausea, vomiting, fever/chills, chest pain/sob, recent change in meds, dysuria, headache, or vision change. On last admission, she had excisional biopsy of lymph nodes (with ENT) with prelim reports concerning for lymphoma but final diagnosis pending.  Initial vitals in ED: 98.1F, HR 98, 133/52, RR 16, 98% O2 on RA.   ED administration: 250cc bolus of NS followed by NS at 80cc/hr and Lasix 20mg IV x 1. (10 Ronny 2017 13:03)    FAMILY HISTORY:  No pertinent family history in first degree relatives    MEDICATIONS  (STANDING):  insulin lispro (HumaLOG) corrective regimen sliding scale  SubCutaneous Before meals and at bedtime  amiodarone    Tablet 200milliGRAM(s) Oral daily  metoprolol succinate ER 50milliGRAM(s) Oral daily  pantoprazole    Tablet 40milliGRAM(s) Oral before breakfast  docusate sodium 100milliGRAM(s) Oral three times a day  senna 2Tablet(s) Oral at bedtime  polyethylene glycol 3350 17Gram(s) Oral three times a day  ondansetron  IVPB (Chemo) 16milliGRAM(s) IV Intermittent once  ondansetron  IVPB (Chemo) 16milliGRAM(s) IV Intermittent once  ondansetron  IVPB (Chemo) 16milliGRAM(s) IV Intermittent once  dexamethasone   IVPB (Chemo) 15milliGRAM(s) IV Intermittent once  dexamethasone   IVPB (Chemo) 15milliGRAM(s) IV Intermittent once  dexamethasone   IVPB (Chemo) 15milliGRAM(s) IV Intermittent once  furosemide    Tablet (Chemo) 40milliGRAM(s) Oral once  furosemide    Tablet (Chemo) 40milliGRAM(s) Oral once  furosemide    Tablet (Chemo) 40milliGRAM(s) Oral once  riTUXimab IVPB 415milliGRAM(s) IV Intermittent once  bendamustine IVPB 130milliGRAM(s) IV Intermittent once  bendamustine IVPB 130milliGRAM(s) IV Intermittent once  anastrozole 1milliGRAM(s) Oral daily  apixaban 2.5milliGRAM(s) Oral two times a day  allopurinol 300milliGRAM(s) Oral daily  megestrol Suspension 320milliGRAM(s) Oral daily    MEDICATIONS  (PRN):  HYDROmorphone  Injectable 0.5milliGRAM(s) IV Push every 90 minutes PRN PACU ONLY, severe pain  ondansetron Injectable 4milliGRAM(s) IV Push once PRN Nausea and/or Vomiting    Vital Signs Last 24 Hrs  T(C): 36.3, Max: 36.7 (01-19 @ 14:43)  T(F): 97.4, Max: 98 (01-19 @ 14:43)  HR: 78 (76 - 81)  BP: 114/75 (106/64 - 114/75)  BP(mean): --  RR: 14 (14 - 18)  SpO2: 98% (95% - 98%)    Physical exam:    Overall impression  Lymphadenopathy  Liver  spleen    Labs:  CBC Full  -  ( 20 Jan 2017 07:41 )  WBC Count : 13.8 K/uL  Hemoglobin : 9.4 g/dL  Hematocrit : 29.5 %  Platelet Count - Automated : 184 K/uL  Mean Cell Volume : 86.3 fL  Mean Cell Hemoglobin : 27.5 pg  Mean Cell Hemoglobin Concentration : 31.9 g/dL  Auto Neutrophil # : x  Auto Lymphocyte # : x  Auto Monocyte # : x  Auto Eosinophil # : x  Auto Basophil # : x  Auto Neutrophil % : x  Auto Lymphocyte % : x  Auto Monocyte % : x  Auto Eosinophil % : x  Auto Basophil % : x    20 Jan 2017 07:41    143    |  106    |  20     ----------------------------<  98     3.6     |  32     |  0.63     Ca    9.4        20 Jan 2017 07:41  Mg     1.5       20 Jan 2017 07:41        Radiology:  HEALTH ISSUES - R/O PROBLEM Dx:      Assessmant / Problems  1) NHL s/p chemotherapy will d/c home today with appointment   in my office  2) Breast ca    Plan:  d/c home today    Thank you  Neha Galaviz MD

## 2017-01-20 NOTE — DISCHARGE NOTE ADULT - SECONDARY DIAGNOSIS.
Malignant neoplasm of female breast, unspecified laterality, unspecified site of breast Atrial fibrillation, unspecified type Essential hypertension Type 2 diabetes mellitus without complication, without long-term current use of insulin Hypokalemia Heart failure with preserved ejection fraction

## 2017-01-20 NOTE — DISCHARGE NOTE ADULT - CARE PROVIDER_API CALL
Cody Vang  [address] [place] [address]  Phone: [phone]  Fax: (   )    -    Mehdi Mckinney), Internal Medicine  [address] [place] [address]  Phone: [phone]  Fax: [phone]    Neha Galaviz  [address] [place] [address]  Phone: [phone]  Fax: (   )    -

## 2017-01-20 NOTE — DISCHARGE NOTE ADULT - ABILITY TO HEAR (WITH HEARING AID OR HEARING APPLIANCE IF NORMALLY USED):
Decreased hearing left ear/Mildly to Moderately Impaired: difficulty hearing in some environments or speaker may need to increase volume or speak distinctly

## 2017-01-20 NOTE — DISCHARGE NOTE ADULT - CARE PROVIDERS DIRECT ADDRESSES
,DirectAddress_Unknown,[email or web address],DirectAddress_Unknown,DirectAddress_Unknown

## 2017-01-20 NOTE — DISCHARGE NOTE ADULT - MEDICATION SUMMARY - MEDICATIONS TO STOP TAKING
I will STOP taking the medications listed below when I get home from the hospital:  None I will STOP taking the medications listed below when I get home from the hospital:    Lasix 20 mg oral tablet  -- 1 tab(s) by mouth once a day

## 2017-01-20 NOTE — DISCHARGE NOTE ADULT - ADDITIONAL INSTRUCTIONS
1. Follow up with Dr. Galaviz in her [facility] clinic on 2/1/17.  2. Follow up with your PMD within 1-2 weeks of discharge  3. Follow up with Dr. Mckinney within 1-2 weeks of discharge. 1. Follow up with Dr. Galaviz in her [place] clinic on 2/1/17.  2. Follow up with your PMD within 1-2 weeks of discharge  3. Follow up with Dr. Mckinney 1/26 at 140 PM at [address] [place] [address] in [facility].

## 2017-01-20 NOTE — PROGRESS NOTE ADULT - PROVIDER SPECIALTY LIST ADULT
Cardiology
Infectious Disease
Internal Medicine
Intervent Cardiology
Nephrology
Rehab Medicine
Rehab Medicine
Surgery
Heme/Onc
Nephrology

## 2017-01-20 NOTE — DISCHARGE NOTE ADULT - HOSPITAL COURSE
85F w/PMHx of HTN, DM, HLD, Monoclonal Gammopathy ,HFpEF, CAD s/p CABG, severe MR/TR, A-fib s/p PPM on Eliquis, Right-sided breast CA (s/p mastectomy, chemo therapy, radiation; now relapsing), h/o hypercalcemia, recent biopsy of lymph node suspicious for B-cell lymphoma, who presented on 1/10/17 w/ hypercalcemia & generalized weakness. Developed acute heart failure exacerbation 2/2 fluid resuscitation and admitted to [facility]. Patient's cardiac status stabilized and patient transferred to [address] [place] [address] for chemotherapy for tissue-confirmed diffuse large B-cell lymphoma. s/p PET-CT; awaiting read. s/p chemoport placement on 1/14 and chemo 11/15. Patient stable and ready for discharge. Discharged on 1/20 with appointments with Dr. Galaviz, Dr. Mckinney, and Dr. Vang

## 2017-01-20 NOTE — DISCHARGE NOTE ADULT - MEDICATION SUMMARY - MEDICATIONS TO TAKE
I will START or STAY ON the medications listed below when I get home from the hospital:    aspirin 81 mg oral tablet  -- 1 tab(s) by mouth once a day  -- Indication: For CAD (coronary artery disease)    amiodarone 200 mg oral tablet  -- 1 tab(s) by mouth once a day  -- Indication: For Paroxysmal atrial fibrillation    apixaban 2.5 mg oral tablet  -- 1 tab(s) by mouth 2 times a day  -- Indication: For Paroxysmal atrial fibrillation    metFORMIN 500 mg oral tablet  -- 1 tab(s) by mouth 2 times a day  -- Indication: For Diabetes    atorvastatin 80 mg oral tablet  -- 1 tab(s) by mouth once a day (at bedtime)  -- Indication: For CAD (coronary artery disease)    Toprol-XL 50 mg oral tablet, extended release  -- 1 tab(s) by mouth once a day  -- Indication: For Paroxysmal atrial fibrillation    Lasix 20 mg oral tablet  -- 1 tab(s) by mouth once a day  -- Indication: For Heart failure with preserved ejection fraction    pantoprazole 40 mg oral delayed release tablet  -- 1 tab(s) by mouth once a day (before a meal)  -- Indication: For Dysphagia I will START or STAY ON the medications listed below when I get home from the hospital:    aspirin 81 mg oral tablet  -- 1 tab(s) by mouth once a day  -- Indication: For CAD (coronary artery disease)    amiodarone 200 mg oral tablet  -- 1 tab(s) by mouth once a day  -- Indication: For Paroxysmal atrial fibrillation    apixaban 2.5 mg oral tablet  -- 1 tab(s) by mouth 2 times a day  -- Indication: For Paroxysmal atrial fibrillation    metFORMIN 500 mg oral tablet  -- 1 tab(s) by mouth 2 times a day  -- Indication: For Diabetes    atorvastatin 80 mg oral tablet  -- 1 tab(s) by mouth once a day (at bedtime)  -- Indication: For CAD (coronary artery disease)    Toprol-XL 50 mg oral tablet, extended release  -- 1 tab(s) by mouth once a day  -- Indication: For Paroxysmal atrial fibrillation    Lasix 20 mg oral tablet  -- 1 tab(s) by mouth once a day  -- Indication: For Heart failure with preserved ejection fraction    potassium chloride 10 mEq oral tablet, extended release  -- 1 tab(s) by mouth once a day  -- It is very important that you take or use this exactly as directed.  Do not skip doses or discontinue unless directed by your doctor.  Medication should be taken with plenty of water.  Take with food or milk.    -- Indication: For Hypokalemia    pantoprazole 40 mg oral delayed release tablet  -- 1 tab(s) by mouth once a day (before a meal)  -- Indication: For Dysphagia I will START or STAY ON the medications listed below when I get home from the hospital:    aspirin 81 mg oral tablet  -- 1 tab(s) by mouth once a day  -- Indication: For CAD (coronary artery disease)    amiodarone 200 mg oral tablet  -- 1 tab(s) by mouth once a day  -- Indication: For Paroxysmal atrial fibrillation    apixaban 2.5 mg oral tablet  -- 1 tab(s) by mouth 2 times a day  -- Indication: For Paroxysmal atrial fibrillation    metFORMIN 500 mg oral tablet  -- 1 tab(s) by mouth 2 times a day  -- Indication: For Diabetes    atorvastatin 80 mg oral tablet  -- 1 tab(s) by mouth once a day (at bedtime)  -- Indication: For CAD (coronary artery disease)    Arimidex 1 mg oral tablet  -- 1 tab(s) by mouth once a day  -- Do not take this drug if you are pregnant.  It is very important that you take or use this exactly as directed.  Do not skip doses or discontinue unless directed by your doctor.    -- Indication: For breast cancer    Toprol-XL 50 mg oral tablet, extended release  -- 1 tab(s) by mouth once a day  -- Indication: For Paroxysmal atrial fibrillation    Lasix 20 mg oral tablet  -- 1 tab(s) by mouth once a day  -- Indication: For Heart failure with preserved ejection fraction    potassium chloride 10 mEq oral tablet, extended release  -- 1 tab(s) by mouth once a day  -- It is very important that you take or use this exactly as directed.  Do not skip doses or discontinue unless directed by your doctor.  Medication should be taken with plenty of water.  Take with food or milk.    -- Indication: For Hypokalemia    pantoprazole 40 mg oral delayed release tablet  -- 1 tab(s) by mouth once a day (before a meal)  -- Indication: For Dysphagia

## 2017-01-20 NOTE — DISCHARGE NOTE ADULT - PROVIDER TOKENS
FREE:[LAST:[Taj],FIRST:[Cody],PHONE:[phone]],FAX:[(   )    -],ADDRESS:[[address] [place] [address]]],TOKEN:'[id number]',FREE:[LAST:[Anastacia],FIRST:[Neha],PHONE:[phone]],FAX:[(   )    -],ADDRESS:[[address] [place] [address]]]

## 2017-01-20 NOTE — PROGRESS NOTE ADULT - SUBJECTIVE AND OBJECTIVE BOX
INTERVAL HPI: no sob palp cp  	  MEDICATIONS:  amiodarone    Tablet 200milliGRAM(s) Oral daily  metoprolol succinate ER 50milliGRAM(s) Oral daily  furosemide    Tablet (Chemo) 40milliGRAM(s) Oral once  furosemide    Tablet (Chemo) 40milliGRAM(s) Oral once  furosemide    Tablet (Chemo) 40milliGRAM(s) Oral once        ondansetron  IVPB (Chemo) 16milliGRAM(s) IV Intermittent once  ondansetron  IVPB (Chemo) 16milliGRAM(s) IV Intermittent once  ondansetron  IVPB (Chemo) 16milliGRAM(s) IV Intermittent once  HYDROmorphone  Injectable 0.5milliGRAM(s) IV Push every 90 minutes PRN  ondansetron Injectable 4milliGRAM(s) IV Push once PRN    pantoprazole    Tablet 40milliGRAM(s) Oral before breakfast  docusate sodium 100milliGRAM(s) Oral three times a day  senna 2Tablet(s) Oral at bedtime  polyethylene glycol 3350 17Gram(s) Oral three times a day    insulin lispro (HumaLOG) corrective regimen sliding scale  SubCutaneous Before meals and at bedtime  dexamethasone   IVPB (Chemo) 15milliGRAM(s) IV Intermittent once  dexamethasone   IVPB (Chemo) 15milliGRAM(s) IV Intermittent once  dexamethasone   IVPB (Chemo) 15milliGRAM(s) IV Intermittent once  allopurinol 300milliGRAM(s) Oral daily  megestrol Suspension 320milliGRAM(s) Oral daily    riTUXimab IVPB 415milliGRAM(s) IV Intermittent once  bendamustine IVPB 130milliGRAM(s) IV Intermittent once  bendamustine IVPB 130milliGRAM(s) IV Intermittent once  anastrozole 1milliGRAM(s) Oral daily  apixaban 2.5milliGRAM(s) Oral two times a day  magnesium sulfate  IVPB 4Gram(s) IV Intermittent once      REVIEW OF SYSTEMS:  [x] As per HPI  CONSTITUTIONAL: No fever, weight loss, or fatigue  RESPIRATORY: No cough, wheezing, chills or hemoptysis; No Shortness of Breath  CARDIOVASCULAR: No chest pain, palpitations, dizziness, or leg swelling  GASTROINTESTINAL: No abdominal or epigastric pain. No nausea, vomiting, or hematemesis; No diarrhea or constipation. No melena or hematochezia.  MUSCULOSKELETAL: No joint pain or swelling; No muscle, back, or extremity pain  [x] All others negative	  [ ] Unable to obtain    PHYSICAL EXAM:  T(C): 36.3, Max: 36.7 (01-19 @ 14:43)  HR: 78 (76 - 80)  BP: 114/75 (109/61 - 114/75)  RR: 14 (14 - 18)  SpO2: 98% (96% - 98%)  Wt(kg): --  I&O's Summary        Appearance: Normal	  HEENT:   Normal oral mucosa  Cardiovascular: Normal S1 S2, No JVD, No murmurs, No edema  Respiratory: Lungs clear to auscultation	  Gastrointestinal:  Soft, Non-tender, + BS	  Extremities: Normal range of motion, No clubbing, cyanosis or edema  Vascular: Peripheral pulses palpable 2+ bilaterally    TELEMETRY: 	    ECG:    	  RADIOLOGY:   CXR:  CT:  US:    CARDIAC TESTING:  Echocardiogram:  Catheterization:  Stress Test:      LABS:	 	    CARDIAC MARKERS:                                  9.4    13.8  )-----------( 184      ( 20 Jan 2017 07:41 )             29.5     20 Jan 2017 07:41    143    |  106    |  20     ----------------------------<  98     3.6     |  32     |  0.63     Ca    9.4        20 Jan 2017 07:41  Mg     1.5       20 Jan 2017 07:41      proBNP:   Lipid Profile:   HgA1c:   TSH:     ASSESSMENT/PLAN: 	  cllinically no signs of overload  cxr improved congestion  no need for diuresis for now.

## 2017-01-23 DIAGNOSIS — Z95.0 PRESENCE OF CARDIAC PACEMAKER: ICD-10-CM

## 2017-01-23 DIAGNOSIS — I11.0 HYPERTENSIVE HEART DISEASE WITH HEART FAILURE: ICD-10-CM

## 2017-01-23 DIAGNOSIS — Z85.3 PERSONAL HISTORY OF MALIGNANT NEOPLASM OF BREAST: ICD-10-CM

## 2017-01-23 DIAGNOSIS — Z91.041 RADIOGRAPHIC DYE ALLERGY STATUS: ICD-10-CM

## 2017-01-23 DIAGNOSIS — E11.9 TYPE 2 DIABETES MELLITUS WITHOUT COMPLICATIONS: ICD-10-CM

## 2017-01-23 DIAGNOSIS — R13.10 DYSPHAGIA, UNSPECIFIED: ICD-10-CM

## 2017-01-23 DIAGNOSIS — E86.0 DEHYDRATION: ICD-10-CM

## 2017-01-23 DIAGNOSIS — Z92.3 PERSONAL HISTORY OF IRRADIATION: ICD-10-CM

## 2017-01-23 DIAGNOSIS — D70.1 AGRANULOCYTOSIS SECONDARY TO CANCER CHEMOTHERAPY: ICD-10-CM

## 2017-01-23 DIAGNOSIS — M41.9 SCOLIOSIS, UNSPECIFIED: ICD-10-CM

## 2017-01-23 DIAGNOSIS — E87.6 HYPOKALEMIA: ICD-10-CM

## 2017-01-23 DIAGNOSIS — E87.3 ALKALOSIS: ICD-10-CM

## 2017-01-23 DIAGNOSIS — R50.9 FEVER, UNSPECIFIED: ICD-10-CM

## 2017-01-23 DIAGNOSIS — E83.42 HYPOMAGNESEMIA: ICD-10-CM

## 2017-01-23 DIAGNOSIS — Z90.710 ACQUIRED ABSENCE OF BOTH CERVIX AND UTERUS: ICD-10-CM

## 2017-01-23 DIAGNOSIS — R14.0 ABDOMINAL DISTENSION (GASEOUS): ICD-10-CM

## 2017-01-23 DIAGNOSIS — M25.511 PAIN IN RIGHT SHOULDER: ICD-10-CM

## 2017-01-23 DIAGNOSIS — R53.1 WEAKNESS: ICD-10-CM

## 2017-01-23 DIAGNOSIS — C83.30 DIFFUSE LARGE B-CELL LYMPHOMA, UNSPECIFIED SITE: ICD-10-CM

## 2017-01-23 DIAGNOSIS — I34.0 NONRHEUMATIC MITRAL (VALVE) INSUFFICIENCY: ICD-10-CM

## 2017-01-23 DIAGNOSIS — I69.998 OTHER SEQUELAE FOLLOWING UNSPECIFIED CEREBROVASCULAR DISEASE: ICD-10-CM

## 2017-01-23 DIAGNOSIS — R79.9 ABNORMAL FINDING OF BLOOD CHEMISTRY, UNSPECIFIED: ICD-10-CM

## 2017-01-23 DIAGNOSIS — I25.10 ATHEROSCLEROTIC HEART DISEASE OF NATIVE CORONARY ARTERY WITHOUT ANGINA PECTORIS: ICD-10-CM

## 2017-01-23 DIAGNOSIS — I48.0 PAROXYSMAL ATRIAL FIBRILLATION: ICD-10-CM

## 2017-01-23 DIAGNOSIS — Z91.81 HISTORY OF FALLING: ICD-10-CM
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
## 2017-01-23 DIAGNOSIS — Z92.21 PERSONAL HISTORY OF ANTINEOPLASTIC CHEMOTHERAPY: ICD-10-CM

## 2017-01-23 DIAGNOSIS — C50.919 MALIGNANT NEOPLASM OF UNSPECIFIED SITE OF UNSPECIFIED FEMALE BREAST: ICD-10-CM

## 2017-01-23 DIAGNOSIS — R74.0 NONSPECIFIC ELEVATION OF LEVELS OF TRANSAMINASE AND LACTIC ACID DEHYDROGENASE [LDH]: ICD-10-CM

## 2017-01-23 DIAGNOSIS — T45.1X5A ADVERSE EFFECT OF ANTINEOPLASTIC AND IMMUNOSUPPRESSIVE DRUGS, INITIAL ENCOUNTER: ICD-10-CM

## 2017-01-23 DIAGNOSIS — I50.23 ACUTE ON CHRONIC SYSTOLIC (CONGESTIVE) HEART FAILURE: ICD-10-CM

## 2017-01-23 DIAGNOSIS — E89.0 POSTPROCEDURAL HYPOTHYROIDISM: ICD-10-CM

## 2017-01-23 DIAGNOSIS — C82.90 FOLLICULAR LYMPHOMA, UNSPECIFIED, UNSPECIFIED SITE: ICD-10-CM

## 2017-01-23 DIAGNOSIS — Z90.11 ACQUIRED ABSENCE OF RIGHT BREAST AND NIPPLE: ICD-10-CM

## 2017-01-23 DIAGNOSIS — K59.00 CONSTIPATION, UNSPECIFIED: ICD-10-CM

## 2017-01-23 DIAGNOSIS — R26.89 OTHER ABNORMALITIES OF GAIT AND MOBILITY: ICD-10-CM

## 2017-01-23 DIAGNOSIS — Z95.1 PRESENCE OF AORTOCORONARY BYPASS GRAFT: ICD-10-CM

## 2017-01-23 DIAGNOSIS — D47.2 MONOCLONAL GAMMOPATHY: ICD-10-CM

## 2017-01-23 DIAGNOSIS — I07.1 RHEUMATIC TRICUSPID INSUFFICIENCY: ICD-10-CM

## 2017-01-23 DIAGNOSIS — Z79.01 LONG TERM (CURRENT) USE OF ANTICOAGULANTS: ICD-10-CM

## 2017-01-23 DIAGNOSIS — E83.52 HYPERCALCEMIA: ICD-10-CM

## 2017-01-23 DIAGNOSIS — R22.1 LOCALIZED SWELLING, MASS AND LUMP, NECK: ICD-10-CM

## 2017-01-23 DIAGNOSIS — Z79.82 LONG TERM (CURRENT) USE OF ASPIRIN: ICD-10-CM

## 2017-01-23 DIAGNOSIS — E78.5 HYPERLIPIDEMIA, UNSPECIFIED: ICD-10-CM

## 2017-01-26 ENCOUNTER — APPOINTMENT (OUTPATIENT)
Dept: HEART AND VASCULAR | Facility: CLINIC | Age: 82
End: 2017-01-26

## 2017-01-26 ENCOUNTER — APPOINTMENT (OUTPATIENT)
Dept: HEMATOLOGY ONCOLOGY | Facility: CLINIC | Age: 82
End: 2017-01-26

## 2017-01-26 VITALS
WEIGHT: 96 LBS | BODY MASS INDEX: 21 KG/M2 | SYSTOLIC BLOOD PRESSURE: 154 MMHG | DIASTOLIC BLOOD PRESSURE: 83 MMHG | HEIGHT: 56.5 IN | OXYGEN SATURATION: 97 % | HEART RATE: 92 BPM

## 2017-01-26 RX ORDER — ANASTROZOLE 1 MG/1
1 TABLET ORAL
Refills: 0 | Status: ACTIVE | COMMUNITY

## 2017-01-26 RX ORDER — POTASSIUM CHLORIDE 10 MEQ
10 CAPSULE, EXTENDED RELEASE ORAL
Refills: 0 | Status: ACTIVE | COMMUNITY

## 2017-02-10 PROBLEM — M41.9 SCOLIOSIS, UNSPECIFIED: Chronic | Status: ACTIVE | Noted: 2017-01-10

## 2017-02-28 ENCOUNTER — EMERGENCY (EMERGENCY)
Facility: HOSPITAL | Age: 82
LOS: 1 days | Discharge: HOME | End: 2017-02-28
Attending: EMERGENCY MEDICINE | Admitting: EMERGENCY MEDICINE
Payer: MEDICARE

## 2017-02-28 VITALS
DIASTOLIC BLOOD PRESSURE: 78 MMHG | RESPIRATION RATE: 95 BRPM | OXYGEN SATURATION: 96 % | SYSTOLIC BLOOD PRESSURE: 159 MMHG | TEMPERATURE: 98 F | HEART RATE: 95 BPM

## 2017-02-28 VITALS
RESPIRATION RATE: 18 BRPM | OXYGEN SATURATION: 97 % | SYSTOLIC BLOOD PRESSURE: 143 MMHG | HEART RATE: 87 BPM | TEMPERATURE: 98 F | DIASTOLIC BLOOD PRESSURE: 62 MMHG

## 2017-02-28 DIAGNOSIS — Z91.041 RADIOGRAPHIC DYE ALLERGY STATUS: ICD-10-CM

## 2017-02-28 DIAGNOSIS — R09.89 OTHER SPECIFIED SYMPTOMS AND SIGNS INVOLVING THE CIRCULATORY AND RESPIRATORY SYSTEMS: ICD-10-CM

## 2017-02-28 DIAGNOSIS — R13.10 DYSPHAGIA, UNSPECIFIED: ICD-10-CM

## 2017-02-28 DIAGNOSIS — Z79.899 OTHER LONG TERM (CURRENT) DRUG THERAPY: ICD-10-CM

## 2017-02-28 DIAGNOSIS — E89.0 POSTPROCEDURAL HYPOTHYROIDISM: Chronic | ICD-10-CM

## 2017-02-28 DIAGNOSIS — Z95.1 PRESENCE OF AORTOCORONARY BYPASS GRAFT: Chronic | ICD-10-CM

## 2017-02-28 DIAGNOSIS — Z79.82 LONG TERM (CURRENT) USE OF ASPIRIN: ICD-10-CM

## 2017-02-28 DIAGNOSIS — Z90.710 ACQUIRED ABSENCE OF BOTH CERVIX AND UTERUS: Chronic | ICD-10-CM

## 2017-02-28 LAB
ALBUMIN SERPL ELPH-MCNC: 3.6 G/DL — SIGNIFICANT CHANGE UP (ref 3.4–5)
ALP SERPL-CCNC: 51 U/L — SIGNIFICANT CHANGE UP (ref 40–120)
ALT FLD-CCNC: 49 U/L — HIGH (ref 12–42)
ANION GAP SERPL CALC-SCNC: 9 MMOL/L — SIGNIFICANT CHANGE UP (ref 9–16)
AST SERPL-CCNC: 39 U/L — HIGH (ref 15–37)
BASOPHILS NFR BLD AUTO: 0.8 % — SIGNIFICANT CHANGE UP (ref 0–2)
BILIRUB SERPL-MCNC: 0.3 MG/DL — SIGNIFICANT CHANGE UP (ref 0.2–1.2)
BUN SERPL-MCNC: 27 MG/DL — HIGH (ref 7–23)
CALCIUM SERPL-MCNC: 10.1 MG/DL — SIGNIFICANT CHANGE UP (ref 8.5–10.5)
CHLORIDE SERPL-SCNC: 102 MMOL/L — SIGNIFICANT CHANGE UP (ref 96–108)
CO2 SERPL-SCNC: 23 MMOL/L — SIGNIFICANT CHANGE UP (ref 22–31)
CREAT SERPL-MCNC: 0.97 MG/DL — SIGNIFICANT CHANGE UP (ref 0.5–1.3)
EOSINOPHIL NFR BLD AUTO: 0.3 % — SIGNIFICANT CHANGE UP (ref 0–6)
GLUCOSE SERPL-MCNC: 157 MG/DL — HIGH (ref 70–99)
HCT VFR BLD CALC: 31.6 % — LOW (ref 34.5–45)
HGB BLD-MCNC: 10.7 G/DL — LOW (ref 11.5–15.5)
IMM GRANULOCYTES NFR BLD AUTO: 6.5 % — HIGH (ref 0–1.5)
LYMPHOCYTES # BLD AUTO: 15.9 % — SIGNIFICANT CHANGE UP (ref 13–44)
MCHC RBC-ENTMCNC: 30.6 PG — SIGNIFICANT CHANGE UP (ref 27–34)
MCHC RBC-ENTMCNC: 33.9 G/DL — SIGNIFICANT CHANGE UP (ref 32–36)
MCV RBC AUTO: 90.3 FL — SIGNIFICANT CHANGE UP (ref 80–100)
MONOCYTES NFR BLD AUTO: 4.5 % — SIGNIFICANT CHANGE UP (ref 2–14)
NEUTROPHILS NFR BLD AUTO: 72 % — SIGNIFICANT CHANGE UP (ref 43–77)
PLATELET # BLD AUTO: 194 K/UL — SIGNIFICANT CHANGE UP (ref 150–400)
POTASSIUM SERPL-MCNC: 5 MMOL/L — SIGNIFICANT CHANGE UP (ref 3.5–5.3)
POTASSIUM SERPL-SCNC: 5 MMOL/L — SIGNIFICANT CHANGE UP (ref 3.5–5.3)
PROT SERPL-MCNC: 9.2 G/DL — HIGH (ref 6.4–8.2)
RBC # BLD: 3.5 M/UL — LOW (ref 3.8–5.2)
RBC # FLD: 19.4 % — HIGH (ref 10.3–16.9)
SODIUM SERPL-SCNC: 134 MMOL/L — SIGNIFICANT CHANGE UP (ref 132–145)
WBC # BLD: 4 K/UL — SIGNIFICANT CHANGE UP (ref 3.8–10.5)
WBC # FLD AUTO: 4 K/UL — SIGNIFICANT CHANGE UP (ref 3.8–10.5)

## 2017-02-28 PROCEDURE — 70490 CT SOFT TISSUE NECK W/O DYE: CPT | Mod: 26

## 2017-02-28 PROCEDURE — 99284 EMERGENCY DEPT VISIT MOD MDM: CPT

## 2017-02-28 NOTE — ED PROVIDER NOTE - OBJECTIVE STATEMENT
85F history of lymphoma and breast CA (on chemo) presents after choking episode on chicken for which she subsequently vomited. She denies pain in the throat/chest, respiratory difficulty, but states that when she swallows now it feels like there is something stuck there. Denies recent illness, bowel/bladder changes.

## 2017-02-28 NOTE — ED PROVIDER NOTE - ATTENDING CONTRIBUTION TO CARE
choking episode about 1-2 hours prior to arrival with many episodes of vomiting and with some throat pain with swallowing.  Tolerated liquids prior to arrival.  Hungry and wanting to eat.  Lungs clear, no hypoxia, speaking un full sentences, no drooling.  Labs - mild pre-renal azotemia.  On chemotherapy.  CT neck without evidence of obstruction and low pretest probability of obstruction.  ENT follow up given.  Conservative management discussed with the patient in detail.  Close PMD follow up encouraged.  Strict ED return instructions discussed in detail and patient given the opportunity to ask any questions about their discharge diagnosis and instructions

## 2017-02-28 NOTE — ED ADULT NURSE NOTE - CHIEF COMPLAINT QUOTE
Patient choked on piece of chicken while in doctors office, able to expel it on her own .  H/o esophogeal lymphoma. No respiratory  compromise noted

## 2017-02-28 NOTE — ED ADULT TRIAGE NOTE - CHIEF COMPLAINT QUOTE
Patient choked on piece of chicken while in doctors office, able to expel it on her own .  H/o esophogeal lymphoma Patient choked on piece of chicken while in doctors office, able to expel it on her own .  H/o esophogeal lymphoma. No respiratory  compromise noted

## 2017-02-28 NOTE — ED PROVIDER NOTE - MEDICAL DECISION MAKING DETAILS
85F presents after choking episode with persistent feeling of FB in the throat when she swallows. Had vomiting x1 after the choking, but denies shortness of breath or respiratory difficulty. Patient swallowing liquids without difficulty. Rip Santiago, Resident.

## 2017-04-06 ENCOUNTER — APPOINTMENT (OUTPATIENT)
Dept: HEART AND VASCULAR | Facility: CLINIC | Age: 82
End: 2017-04-06

## 2017-04-06 VITALS
HEART RATE: 89 BPM | DIASTOLIC BLOOD PRESSURE: 57 MMHG | WEIGHT: 101 LBS | HEIGHT: 56.5 IN | BODY MASS INDEX: 22.09 KG/M2 | SYSTOLIC BLOOD PRESSURE: 124 MMHG

## 2017-04-06 VITALS
DIASTOLIC BLOOD PRESSURE: 72 MMHG | HEIGHT: 56.5 IN | WEIGHT: 101 LBS | OXYGEN SATURATION: 96 % | BODY MASS INDEX: 22.09 KG/M2 | SYSTOLIC BLOOD PRESSURE: 136 MMHG | HEART RATE: 84 BPM

## 2017-04-06 RX ORDER — METOPROLOL SUCCINATE 25 MG/1
25 TABLET, EXTENDED RELEASE ORAL
Qty: 90 | Refills: 3 | Status: ACTIVE | COMMUNITY
Start: 2017-04-06 | End: 1900-01-01

## 2017-05-18 ENCOUNTER — FORM ENCOUNTER (OUTPATIENT)
Age: 82
End: 2017-05-18

## 2017-05-19 ENCOUNTER — OUTPATIENT (OUTPATIENT)
Dept: OUTPATIENT SERVICES | Facility: HOSPITAL | Age: 82
LOS: 1 days | End: 2017-05-19
Payer: MEDICARE

## 2017-05-19 DIAGNOSIS — E89.0 POSTPROCEDURAL HYPOTHYROIDISM: Chronic | ICD-10-CM

## 2017-05-19 DIAGNOSIS — I25.10 ATHEROSCLEROTIC HEART DISEASE OF NATIVE CORONARY ARTERY WITHOUT ANGINA PECTORIS: ICD-10-CM

## 2017-05-19 DIAGNOSIS — Z95.1 PRESENCE OF AORTOCORONARY BYPASS GRAFT: Chronic | ICD-10-CM

## 2017-05-19 DIAGNOSIS — Z90.710 ACQUIRED ABSENCE OF BOTH CERVIX AND UTERUS: Chronic | ICD-10-CM

## 2017-05-19 PROCEDURE — 93306 TTE W/DOPPLER COMPLETE: CPT | Mod: 26

## 2017-05-19 PROCEDURE — 93306 TTE W/DOPPLER COMPLETE: CPT

## 2017-05-31 ENCOUNTER — APPOINTMENT (OUTPATIENT)
Dept: HEART AND VASCULAR | Facility: CLINIC | Age: 82
End: 2017-05-31

## 2017-05-31 VITALS
DIASTOLIC BLOOD PRESSURE: 69 MMHG | HEIGHT: 56.5 IN | HEART RATE: 89 BPM | SYSTOLIC BLOOD PRESSURE: 132 MMHG | WEIGHT: 109 LBS | BODY MASS INDEX: 23.84 KG/M2

## 2017-06-10 ENCOUNTER — INPATIENT (INPATIENT)
Facility: HOSPITAL | Age: 82
LOS: 25 days | Discharge: HOME HEALTH | DRG: 871 | End: 2017-07-06
Attending: INTERNAL MEDICINE | Admitting: INTERNAL MEDICINE
Payer: MEDICARE

## 2017-06-10 VITALS
TEMPERATURE: 101 F | HEART RATE: 77 BPM | SYSTOLIC BLOOD PRESSURE: 131 MMHG | DIASTOLIC BLOOD PRESSURE: 65 MMHG | RESPIRATION RATE: 30 BRPM | OXYGEN SATURATION: 93 %

## 2017-06-10 DIAGNOSIS — N17.9 ACUTE KIDNEY FAILURE, UNSPECIFIED: ICD-10-CM

## 2017-06-10 DIAGNOSIS — Z90.710 ACQUIRED ABSENCE OF BOTH CERVIX AND UTERUS: Chronic | ICD-10-CM

## 2017-06-10 DIAGNOSIS — R06.02 SHORTNESS OF BREATH: ICD-10-CM

## 2017-06-10 DIAGNOSIS — E89.0 POSTPROCEDURAL HYPOTHYROIDISM: Chronic | ICD-10-CM

## 2017-06-10 DIAGNOSIS — Z95.1 PRESENCE OF AORTOCORONARY BYPASS GRAFT: Chronic | ICD-10-CM

## 2017-06-10 DIAGNOSIS — E87.5 HYPERKALEMIA: ICD-10-CM

## 2017-06-10 LAB
ALBUMIN SERPL ELPH-MCNC: 2.4 G/DL — LOW (ref 3.3–5)
ALBUMIN SERPL ELPH-MCNC: 3 G/DL — LOW (ref 3.3–5)
ALP SERPL-CCNC: 194 U/L — HIGH (ref 40–120)
ALP SERPL-CCNC: 273 U/L — HIGH (ref 40–120)
ALT FLD-CCNC: 23 U/L — SIGNIFICANT CHANGE UP (ref 10–45)
ALT FLD-CCNC: 29 U/L — SIGNIFICANT CHANGE UP (ref 10–45)
ANION GAP SERPL CALC-SCNC: 13 MMOL/L — SIGNIFICANT CHANGE UP (ref 5–17)
ANION GAP SERPL CALC-SCNC: 16 MMOL/L — SIGNIFICANT CHANGE UP (ref 5–17)
ANISOCYTOSIS BLD QL: SLIGHT — SIGNIFICANT CHANGE UP
APPEARANCE UR: (no result)
APTT BLD: 26.1 SEC — LOW (ref 27.5–37.4)
APTT BLD: 26.4 SEC — LOW (ref 27.5–37.4)
AST SERPL-CCNC: 27 U/L — SIGNIFICANT CHANGE UP (ref 10–40)
AST SERPL-CCNC: 32 U/L — SIGNIFICANT CHANGE UP (ref 10–40)
BACTERIA # UR AUTO: PRESENT /HPF
BASOPHILS NFR BLD AUTO: 0.1 % — SIGNIFICANT CHANGE UP (ref 0–2)
BILIRUB SERPL-MCNC: 0.4 MG/DL — SIGNIFICANT CHANGE UP (ref 0.2–1.2)
BILIRUB SERPL-MCNC: 0.5 MG/DL — SIGNIFICANT CHANGE UP (ref 0.2–1.2)
BILIRUB UR-MCNC: NEGATIVE — SIGNIFICANT CHANGE UP
BUN SERPL-MCNC: 58 MG/DL — HIGH (ref 7–23)
BUN SERPL-MCNC: 59 MG/DL — HIGH (ref 7–23)
BURR CELLS BLD QL SMEAR: SIGNIFICANT CHANGE UP
CALCIUM SERPL-MCNC: 10.3 MG/DL — SIGNIFICANT CHANGE UP (ref 8.4–10.5)
CALCIUM SERPL-MCNC: 9.8 MG/DL — SIGNIFICANT CHANGE UP (ref 8.4–10.5)
CHLORIDE SERPL-SCNC: 92 MMOL/L — LOW (ref 96–108)
CHLORIDE SERPL-SCNC: 94 MMOL/L — LOW (ref 96–108)
CHLORIDE UR-SCNC: <20 MMOL/L — SIGNIFICANT CHANGE UP
CK MB CFR SERPL CALC: 2.2 NG/ML — SIGNIFICANT CHANGE UP (ref 0–6.7)
CK MB CFR SERPL CALC: 2.4 NG/ML — SIGNIFICANT CHANGE UP (ref 0–6.7)
CK SERPL-CCNC: 40 U/L — SIGNIFICANT CHANGE UP (ref 25–170)
CK SERPL-CCNC: 48 U/L — SIGNIFICANT CHANGE UP (ref 25–170)
CO2 SERPL-SCNC: 19 MMOL/L — LOW (ref 22–31)
CO2 SERPL-SCNC: 19 MMOL/L — LOW (ref 22–31)
COLOR SPEC: YELLOW — SIGNIFICANT CHANGE UP
CREAT ?TM UR-MCNC: 45 MG/DL — SIGNIFICANT CHANGE UP
CREAT SERPL-MCNC: 2 MG/DL — HIGH (ref 0.5–1.3)
CREAT SERPL-MCNC: 2 MG/DL — HIGH (ref 0.5–1.3)
DIFF PNL FLD: (no result)
EOSINOPHIL NFR BLD AUTO: 0.1 % — SIGNIFICANT CHANGE UP (ref 0–6)
EPI CELLS # UR: SIGNIFICANT CHANGE UP /HPF
GAS PNL BLDV: SIGNIFICANT CHANGE UP
GAS PNL BLDV: SIGNIFICANT CHANGE UP
GLUCOSE SERPL-MCNC: 110 MG/DL — HIGH (ref 70–99)
GLUCOSE SERPL-MCNC: 164 MG/DL — HIGH (ref 70–99)
GLUCOSE UR QL: NEGATIVE — SIGNIFICANT CHANGE UP
GRAN CASTS # UR COMP ASSIST: (no result) /LPF
HAPTOGLOB SERPL-MCNC: 391 MG/DL — HIGH (ref 34–200)
HCT VFR BLD CALC: 22 % — LOW (ref 34.5–45)
HCT VFR BLD CALC: 28.5 % — LOW (ref 34.5–45)
HGB BLD-MCNC: 7.1 G/DL — LOW (ref 11.5–15.5)
HGB BLD-MCNC: 9.5 G/DL — LOW (ref 11.5–15.5)
HYPOCHROMIA BLD QL: SLIGHT — SIGNIFICANT CHANGE UP
INR BLD: 1.8 — HIGH (ref 0.88–1.16)
INR BLD: 1.84 — HIGH (ref 0.88–1.16)
KETONES UR-MCNC: NEGATIVE — SIGNIFICANT CHANGE UP
LACTATE SERPL-SCNC: 1.6 MMOL/L — SIGNIFICANT CHANGE UP (ref 0.5–2)
LACTATE SERPL-SCNC: 2.8 MMOL/L — HIGH (ref 0.5–2)
LACTATE SERPL-SCNC: 2.8 MMOL/L — HIGH (ref 0.5–2)
LDH SERPL L TO P-CCNC: 232 U/L — SIGNIFICANT CHANGE UP (ref 50–242)
LEUKOCYTE ESTERASE UR-ACNC: (no result)
LYMPHOCYTES # BLD AUTO: 1 % — LOW (ref 13–44)
LYMPHOCYTES # BLD AUTO: 4.2 % — LOW (ref 13–44)
MAGNESIUM SERPL-MCNC: 1.7 MG/DL — SIGNIFICANT CHANGE UP (ref 1.6–2.6)
MANUAL SMEAR VERIFICATION: SIGNIFICANT CHANGE UP
MCHC RBC-ENTMCNC: 29.8 PG — SIGNIFICANT CHANGE UP (ref 27–34)
MCHC RBC-ENTMCNC: 30.2 PG — SIGNIFICANT CHANGE UP (ref 27–34)
MCHC RBC-ENTMCNC: 32.3 G/DL — SIGNIFICANT CHANGE UP (ref 32–36)
MCHC RBC-ENTMCNC: 33.3 G/DL — SIGNIFICANT CHANGE UP (ref 32–36)
MCV RBC AUTO: 90.5 FL — SIGNIFICANT CHANGE UP (ref 80–100)
MCV RBC AUTO: 92.4 FL — SIGNIFICANT CHANGE UP (ref 80–100)
MICROCYTES BLD QL: SLIGHT — SIGNIFICANT CHANGE UP
MONOCYTES NFR BLD AUTO: 4 % — SIGNIFICANT CHANGE UP (ref 2–14)
MONOCYTES NFR BLD AUTO: 7.9 % — SIGNIFICANT CHANGE UP (ref 2–14)
NEUTROPHILS NFR BLD AUTO: 83 % — HIGH (ref 43–77)
NEUTROPHILS NFR BLD AUTO: 87.7 % — HIGH (ref 43–77)
NEUTS BAND # BLD: 12 % — SIGNIFICANT CHANGE UP
NITRITE UR-MCNC: NEGATIVE — SIGNIFICANT CHANGE UP
OSMOLALITY UR: 309 MOSMOL/KG — SIGNIFICANT CHANGE UP (ref 100–650)
PH UR: 5.5 — SIGNIFICANT CHANGE UP (ref 5–8)
PHOSPHATE 24H UR-MCNC: <3.4 MG/DL — SIGNIFICANT CHANGE UP
PHOSPHATE SERPL-MCNC: 3.6 MG/DL — SIGNIFICANT CHANGE UP (ref 2.5–4.5)
PLAT MORPH BLD: NORMAL — SIGNIFICANT CHANGE UP
PLATELET # BLD AUTO: 78 K/UL — LOW (ref 150–400)
PLATELET # BLD AUTO: 85 K/UL — LOW (ref 150–400)
POTASSIUM SERPL-MCNC: 6.3 MMOL/L — CRITICAL HIGH (ref 3.5–5.3)
POTASSIUM SERPL-MCNC: 7 MMOL/L — CRITICAL HIGH (ref 3.5–5.3)
POTASSIUM SERPL-SCNC: 6.3 MMOL/L — CRITICAL HIGH (ref 3.5–5.3)
POTASSIUM SERPL-SCNC: 7 MMOL/L — CRITICAL HIGH (ref 3.5–5.3)
POTASSIUM UR-SCNC: 43 MMOL/L — SIGNIFICANT CHANGE UP
PROT SERPL-MCNC: 5.1 G/DL — LOW (ref 6–8.3)
PROT SERPL-MCNC: 6.6 G/DL — SIGNIFICANT CHANGE UP (ref 6–8.3)
PROT UR-MCNC: 30 MG/DL
PROTHROM AB SERPL-ACNC: 20.2 SEC — HIGH (ref 9.8–12.7)
PROTHROM AB SERPL-ACNC: 20.7 SEC — HIGH (ref 9.8–12.7)
RAPID RVP RESULT: SIGNIFICANT CHANGE UP
RBC # BLD: 2.38 M/UL — LOW (ref 3.8–5.2)
RBC # BLD: 3.15 M/UL — LOW (ref 3.8–5.2)
RBC # FLD: 16.6 % — SIGNIFICANT CHANGE UP (ref 10.3–16.9)
RBC # FLD: 16.9 % — SIGNIFICANT CHANGE UP (ref 10.3–16.9)
RBC BLD AUTO: (no result)
RBC CASTS # UR COMP ASSIST: (no result) /HPF
SODIUM SERPL-SCNC: 126 MMOL/L — LOW (ref 135–145)
SODIUM SERPL-SCNC: 127 MMOL/L — LOW (ref 135–145)
SODIUM UR-SCNC: <20 MMOL/L — SIGNIFICANT CHANGE UP
SP GR SPEC: <=1.005 — SIGNIFICANT CHANGE UP (ref 1–1.03)
SPHEROCYTES BLD QL SMEAR: SLIGHT — SIGNIFICANT CHANGE UP
TOXIC GRANULES BLD QL SMEAR: SLIGHT — SIGNIFICANT CHANGE UP
TROPONIN T SERPL-MCNC: 0.05 NG/ML — CRITICAL HIGH (ref 0–0.01)
TROPONIN T SERPL-MCNC: 0.07 NG/ML — CRITICAL HIGH (ref 0–0.01)
URATE UR-MCNC: 50.9 MG/DL — SIGNIFICANT CHANGE UP
UROBILINOGEN FLD QL: 0.2 E.U./DL — SIGNIFICANT CHANGE UP
WBC # BLD: 7.1 K/UL — SIGNIFICANT CHANGE UP (ref 3.8–10.5)
WBC # BLD: 8.7 K/UL — SIGNIFICANT CHANGE UP (ref 3.8–10.5)
WBC # FLD AUTO: 7.1 K/UL — SIGNIFICANT CHANGE UP (ref 3.8–10.5)
WBC # FLD AUTO: 8.7 K/UL — SIGNIFICANT CHANGE UP (ref 3.8–10.5)
WBC UR QL: (no result) /HPF

## 2017-06-10 PROCEDURE — 93010 ELECTROCARDIOGRAM REPORT: CPT

## 2017-06-10 PROCEDURE — 99223 1ST HOSP IP/OBS HIGH 75: CPT | Mod: GC

## 2017-06-10 PROCEDURE — 71250 CT THORAX DX C-: CPT | Mod: 26

## 2017-06-10 PROCEDURE — 99291 CRITICAL CARE FIRST HOUR: CPT | Mod: 25

## 2017-06-10 PROCEDURE — 99232 SBSQ HOSP IP/OBS MODERATE 35: CPT

## 2017-06-10 PROCEDURE — 71010: CPT | Mod: 26

## 2017-06-10 RX ORDER — HEPARIN SODIUM 5000 [USP'U]/ML
1000 INJECTION INTRAVENOUS; SUBCUTANEOUS
Qty: 25000 | Refills: 0 | Status: DISCONTINUED | OUTPATIENT
Start: 2017-06-10 | End: 2017-06-11

## 2017-06-10 RX ORDER — INSULIN HUMAN 100 [IU]/ML
5 INJECTION, SOLUTION SUBCUTANEOUS ONCE
Qty: 0 | Refills: 0 | Status: COMPLETED | OUTPATIENT
Start: 2017-06-10 | End: 2017-06-10

## 2017-06-10 RX ORDER — DEXTROSE 50 % IN WATER 50 %
50 SYRINGE (ML) INTRAVENOUS ONCE
Qty: 0 | Refills: 0 | Status: COMPLETED | OUTPATIENT
Start: 2017-06-10 | End: 2017-06-10

## 2017-06-10 RX ORDER — ALBUTEROL 90 UG/1
5 AEROSOL, METERED ORAL ONCE
Qty: 0 | Refills: 0 | Status: COMPLETED | OUTPATIENT
Start: 2017-06-10 | End: 2017-06-10

## 2017-06-10 RX ORDER — VANCOMYCIN HCL 1 G
1000 VIAL (EA) INTRAVENOUS ONCE
Qty: 0 | Refills: 0 | Status: COMPLETED | OUTPATIENT
Start: 2017-06-10 | End: 2017-06-10

## 2017-06-10 RX ORDER — PANTOPRAZOLE SODIUM 20 MG/1
40 TABLET, DELAYED RELEASE ORAL ONCE
Qty: 0 | Refills: 0 | Status: COMPLETED | OUTPATIENT
Start: 2017-06-10 | End: 2017-06-10

## 2017-06-10 RX ORDER — HEPARIN SODIUM 5000 [USP'U]/ML
1000 INJECTION INTRAVENOUS; SUBCUTANEOUS
Qty: 25000 | Refills: 0 | Status: DISCONTINUED | OUTPATIENT
Start: 2017-06-10 | End: 2017-06-10

## 2017-06-10 RX ORDER — AMIODARONE HYDROCHLORIDE 400 MG/1
200 TABLET ORAL DAILY
Qty: 0 | Refills: 0 | Status: DISCONTINUED | OUTPATIENT
Start: 2017-06-10 | End: 2017-07-06

## 2017-06-10 RX ORDER — PIPERACILLIN AND TAZOBACTAM 4; .5 G/20ML; G/20ML
3.38 INJECTION, POWDER, LYOPHILIZED, FOR SOLUTION INTRAVENOUS ONCE
Qty: 0 | Refills: 0 | Status: COMPLETED | OUTPATIENT
Start: 2017-06-10 | End: 2017-06-10

## 2017-06-10 RX ORDER — HEPARIN SODIUM 5000 [USP'U]/ML
5000 INJECTION INTRAVENOUS; SUBCUTANEOUS EVERY 8 HOURS
Qty: 0 | Refills: 0 | Status: DISCONTINUED | OUTPATIENT
Start: 2017-06-10 | End: 2017-06-10

## 2017-06-10 RX ORDER — PIPERACILLIN AND TAZOBACTAM 4; .5 G/20ML; G/20ML
2.25 INJECTION, POWDER, LYOPHILIZED, FOR SOLUTION INTRAVENOUS EVERY 6 HOURS
Qty: 0 | Refills: 0 | Status: DISCONTINUED | OUTPATIENT
Start: 2017-06-10 | End: 2017-06-14

## 2017-06-10 RX ORDER — CALCIUM GLUCONATE 100 MG/ML
2 VIAL (ML) INTRAVENOUS ONCE
Qty: 0 | Refills: 0 | Status: COMPLETED | OUTPATIENT
Start: 2017-06-10 | End: 2017-06-10

## 2017-06-10 RX ORDER — ACETAMINOPHEN 500 MG
975 TABLET ORAL ONCE
Qty: 0 | Refills: 0 | Status: COMPLETED | OUTPATIENT
Start: 2017-06-10 | End: 2017-06-10

## 2017-06-10 RX ORDER — ATORVASTATIN CALCIUM 80 MG/1
80 TABLET, FILM COATED ORAL AT BEDTIME
Qty: 0 | Refills: 0 | Status: DISCONTINUED | OUTPATIENT
Start: 2017-06-10 | End: 2017-06-12

## 2017-06-10 RX ADMIN — ALBUTEROL 5 MILLIGRAM(S): 90 AEROSOL, METERED ORAL at 18:25

## 2017-06-10 RX ADMIN — PANTOPRAZOLE SODIUM 40 MILLIGRAM(S): 20 TABLET, DELAYED RELEASE ORAL at 22:49

## 2017-06-10 RX ADMIN — Medication 50 MILLILITER(S): at 18:12

## 2017-06-10 RX ADMIN — Medication 400 GRAM(S): at 18:20

## 2017-06-10 RX ADMIN — Medication 975 MILLIGRAM(S): at 16:19

## 2017-06-10 RX ADMIN — PIPERACILLIN AND TAZOBACTAM 200 GRAM(S): 4; .5 INJECTION, POWDER, LYOPHILIZED, FOR SOLUTION INTRAVENOUS at 18:40

## 2017-06-10 RX ADMIN — Medication 250 MILLIGRAM(S): at 19:14

## 2017-06-10 RX ADMIN — INSULIN HUMAN 5 UNIT(S): 100 INJECTION, SOLUTION SUBCUTANEOUS at 18:15

## 2017-06-10 RX ADMIN — HEPARIN SODIUM 10 UNIT(S)/HR: 5000 INJECTION INTRAVENOUS; SUBCUTANEOUS at 22:42

## 2017-06-10 RX ADMIN — ALBUTEROL 5 MILLIGRAM(S): 90 AEROSOL, METERED ORAL at 18:47

## 2017-06-10 NOTE — H&P ADULT - NSHPPHYSICALEXAM_GEN_ALL_CORE
.  VITAL SIGNS:  T(C): 38.3, Max: 38.3 (06-10 @ 15:34)  T(F): 101, Max: 101 (06-10 @ 15:34)  HR: 76 (75 - 77)  BP: 126/62 (116/57 - 131/65)  BP(mean): --  RR: 24 (24 - 30)  SpO2: 99% (93% - 99%)      PHYSICAL EXAM:    Constitutional: moderate respiratory distress  Head: NC/AT  Eyes: PERRL, EOMI   ENT: moderately dry   Neck: supple; + JVD  Respiratory: diffusely wheezing; use of accessory muscles, bibasilar crackles   Cardiac: +S1/S2; RRR; no M/R/G; PMI non-displaced  Gastrointestinal: soft, NT, +distended, + BS   Extremities: 2+ LE edema. right leg red, warm   Vascular: 2+ radial, femoral, DP/PT pulses B/L  Dermatologic: skin warm, dry and intact; no rashes, wounds, or scars  Lymphatic: no submandibular or cervical LAD  Neurologic: AAOx3;  no focal deficits

## 2017-06-10 NOTE — ED PROVIDER NOTE - CRITICAL CARE PROVIDED
consultation with other physicians/consult w/ pt's family directly relating to pts condition/conducted a detailed discussion of DNR status/direct patient care (not related to procedure)/additional history taking/interpretation of diagnostic studies/documentation

## 2017-06-10 NOTE — ED PROVIDER NOTE - CONSTITUTIONAL, MLM
normal... tired appearing,  awake, alert, oriented to person, place, time/situation and in no apparent distress.

## 2017-06-10 NOTE — ED ADULT NURSE REASSESSMENT NOTE - NS ED NURSE REASSESS COMMENT FT1
1815 ICU consult and pt's private MD at bedside. Pt with increased work of breathing, bilateral wheezes, RR 24-30, O2 sat maintained 99% on 4L. BiPAP called and initiated,, setting 10/5 rate 8 itime 1.0s, fiO2 40%. Pt markedly improved on bipap, RR 24 O2 sat 100% no further sternal retractions. Pt to be admitted to ICU, pt and family aware and amenable. To place alexander as ordered for monitoring of I&O. VSS. Will continue to monitor.

## 2017-06-10 NOTE — CONSULT NOTE ADULT - SUBJECTIVE AND OBJECTIVE BOX
HPI:    FAMILY HISTORY:  No pertinent family history in first degree relatives    MEDICATIONS  (STANDING):    MEDICATIONS  (PRN):    Vital Signs Last 24 Hrs  T(C): 38.3, Max: 38.3 (06-10 @ 15:34)  T(F): 101, Max: 101 (06-10 @ 15:34)  HR: 75 (75 - 77)  BP: 116/57 (116/57 - 131/65)  BP(mean): --  RR: 25 (25 - 30)  SpO2: 99% (93% - 99%)    Physical exam:    Overall impression  Lymphadenopathy  Liver  spleen    Labs:  CBC Full  -  ( 10 Ramon 2017 16:06 )  WBC Count : 8.7 K/uL  Hemoglobin : 9.5 g/dL  Hematocrit : 28.5 %  Platelet Count - Automated : 85 K/uL  Mean Cell Volume : 90.5 fL  Mean Cell Hemoglobin : 30.2 pg  Mean Cell Hemoglobin Concentration : 33.3 g/dL  Auto Neutrophil # : x  Auto Lymphocyte # : x  Auto Monocyte # : x  Auto Eosinophil # : x  Auto Basophil # : x  Auto Neutrophil % : 87.7 %  Auto Lymphocyte % : 4.2 %  Auto Monocyte % : 7.9 %  Auto Eosinophil % : 0.1 %  Auto Basophil % : 0.1 %    06-10    127<L>  |  92<L>  |  58<H>  ----------------------------<  110<H>  7.0<HH>   |  19<L>  |  2.00<H>    Ca    10.3      10 Ramon 2017 16:06    TPro  6.6  /  Alb  3.0<L>  /  TBili  0.5  /  DBili  x   /  AST  32  /  ALT  29  /  AlkPhos  273<H>  06-10      Radiology:  HEALTH ISSUES - R/O PROBLEM Dx:      Assessmant / Problems  1_ Sepsis with no     Plan:    Thank you  Neha Galaviz MD HPI:    FAMILY HISTORY:  No pertinent family history in first degree relatives    MEDICATIONS  (STANDING):    MEDICATIONS  (PRN):    Vital Signs Last 24 Hrs  T(C): 38.3, Max: 38.3 (06-10 @ 15:34)  T(F): 101, Max: 101 (06-10 @ 15:34)  HR: 75 (75 - 77)  BP: 116/57 (116/57 - 131/65)  BP(mean): --  RR: 25 (25 - 30)  SpO2: 99% (93% - 99%)    Physical exam:    Overall impression  Lymphadenopathy none palpable  Liver nl  spleen nl    Labs:  CBC Full  -  ( 10 Ramon 2017 16:06 )  WBC Count : 8.7 K/uL  Hemoglobin : 9.5 g/dL  Hematocrit : 28.5 %  Platelet Count - Automated : 85 K/uL  Mean Cell Volume : 90.5 fL  Mean Cell Hemoglobin : 30.2 pg  Mean Cell Hemoglobin Concentration : 33.3 g/dL  Auto Neutrophil # : x  Auto Lymphocyte # : x  Auto Monocyte # : x  Auto Eosinophil # : x  Auto Basophil # : x  Auto Neutrophil % : 87.7 %  Auto Lymphocyte % : 4.2 %  Auto Monocyte % : 7.9 %  Auto Eosinophil % : 0.1 %  Auto Basophil % : 0.1 %    06-10    127<L>  |  92<L>  |  58<H>  ----------------------------<  110<H>  7.0<HH>   |  19<L>  |  2.00<H>    Ca    10.3      10 Ramon 2017 16:06    TPro  6.6  /  Alb  3.0<L>  /  TBili  0.5  /  DBili  x   /  AST  32  /  ALT  29  /  AlkPhos  273<H>  06-10      Radiology:  HEALTH ISSUES - R/O PROBLEM Dx:      Assessmant / Problems  1) Sepsis with no localizing symptoms ( Temp 101), SOB  CXR shows possibleCHF  PNA on the right side  2) CHF ( CXR vascular congestion, LE 4 plus edema)  3)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

## 2017-06-10 NOTE — CONSULT NOTE ADULT - PROBLEM SELECTOR RECOMMENDATION 2
- need to increase urine flow to lower potassium  - can consider giving IV lasix  - furthermore, acidosis may be contributing to hyperkalemia

## 2017-06-10 NOTE — H&P ADULT - NSHPREVIEWOFSYSTEMS_GEN_ALL_CORE
ROS:   CV: no chest pain   Pulm: SOB  GI: abdominal distension   : no dysuria   Extrem: lower extremity swelling

## 2017-06-10 NOTE — ED ADULT NURSE NOTE - OBJECTIVE STATEMENT
Pt presents to ED c/o weakness. Pt with current lymphoma diagnosis last chemo 5/23, port to R upper chest wall, presents today with family reporting pt with x2 weeks weakness and lethargy. Per  who lives at home alone with pt pt in decline over the last 2 weeks, per daughters "she's fallen at home" but  is unable to articulate details of falls or if pt has hit her head. In ambulatory triage pt unsteady sitting in wheelchair. Pt brought to bed 16, pt placed on continuous cardiac monitor and pulse ox on arrival, septic workup initiated. Pt O2 sat 93% on RA RR 30, pt placed on 4L NC with improvement to O2 sat 100% RR 24, pt denies SOB at this time. Pt denies pain anywhere, HA, dizziness, lightheadedness, CP, SOB, N/V/D. Pt noted to have firm distended abdomen, no pain on palpation. Pt also with bilateral LE edema, new for pt per family.

## 2017-06-10 NOTE — ED PROVIDER NOTE - MUSCULOSKELETAL, MLM
Spine appears normal, range of motion is not limited, no muscle or joint tenderness. +bilateral pedal edema

## 2017-06-10 NOTE — ED PROVIDER NOTE - MEDICAL DECISION MAKING DETAILS
here with complex pmh including recent chemo, here with sepsis, possible pna vs chf (possibly both). Given broad spectrum abx, but had to be careful with fluids as pt DNR/DNI, but with CHF, did not feel that 30cc/kg fluids quickly as per sepsis protocol was indicated here given her goals of care. went more slowly with fluids, and despite that pts respiratory status did worsen mandating BiPAP, but pt did okay on this. found to have new renal failure with hyperkalemia. discussed case with dr. douglas, dr. olmstead who will come see pt, and renal fellow. given hyper k meds. MICU consulted, and admitted to their service.

## 2017-06-10 NOTE — CONSULT NOTE ADULT - SUBJECTIVE AND OBJECTIVE BOX
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  Family, including , at bedside provided history, as pt was too ill to answer.  Report that pt had a fever one week ago and has been weak for the past week to the point where she is unable to ambulate or able to converse.  Has not been eating or drinking much.  Had diarrhea for 3 days in the past week and once today.  Did not report SOB, cough, sore throat, rhinorrhea.  Report that her legs have became more swollen recently and cold.  Abdomen has been more distended in the past 5 months.  No dysuria or change in urinary frequency.  Urine has been clear.  Had a fever to 102 last night and became progressively weaker, so was brought to ED.     In the ED, VS significant for temp 101, RR 30, O2 sat 93% on RA.  Labs remarkable for K to 7, Na 127, Cre 2.00.  CXR with large R infiltrate.  ICU consulted in setting of multiple electrolyte derangements and hypoxia on RA.  Given duonebs x 2, 1 L NS, hyperkalemia cocktail, vanc, zosyn.    PAST MEDICAL & SURGICAL HISTORY:  Scoliosis  Follicular lymphoma  Neck mass  Afib  Other hyperlipidemia  Breast cancer  CAD (coronary artery disease)  Diabetes  HTN (hypertension)  No pertinent past medical history  H/O abdominal hysterectomy  H/O thyroidectomy  S/P CABG x 3    HOME MEDICATIONS:  Eliquis 2.5 bid  metformin 500 mg bid  amiodarone 200 mg (unknown frequency)  metoprolol 25 mg (unknown frequency or tartrate vs. succinate)  Arimidex 1 mg   potassium 10 mg (unknown frequency)  lipitor 80 mg qhs  aspirin 81 mg qd  super B complex  pantoprazole 20 mg in the am      Vital Signs Last 24 Hrs  T(C): 38.3, Max: 38.3 (06-10 @ 15:34)  T(F): 101, Max: 101 (06-10 @ 15:34)  HR: 76 (75 - 77)  BP: 126/62 (116/57 - 131/65)  BP(mean): --  RR: 24 (24 - 30)  SpO2: 99% (93% - 99%)  	  Constitutional:  appears ill, fragile  HEENT: PERRLA, EOMI, MM dry  Neck: JVD  Back: kyphosis  Respiratory: using accessory mm before bipap placement-improving on bipap; diffuse wheezing b/l improved after 2 duoneb tx; crackles b/l lateral bases  Cardiovascular: Regular rate & rhythm, normal S1, S2; no murmurs, gallops or rubs  Gastrointestinal: Soft, non-tender, distended; normal bowel sounds  Extremities: 2+ pitting edema to distal tibia b/l  Vascular: Equal and normal pulses: 2+ peripheral pulses throughout; slightly cool; varicose vv b/l feet  Neurological: GCS:    A&O x 2 (does not know year)  Skin: skin over R distal medial tibia warm compared to L and mildly erythematous; no drainage or fluctuance or tenderness    CBC Full  -  ( 10 Ramon 2017 16:06 )  WBC Count : 8.7 K/uL  Hemoglobin : 9.5 g/dL  Hematocrit : 28.5 %  Platelet Count - Automated : 85 K/uL  Mean Cell Volume : 90.5 fL  Mean Cell Hemoglobin : 30.2 pg  Mean Cell Hemoglobin Concentration : 33.3 g/dL  Auto Neutrophil # : x  Auto Lymphocyte # : x  Auto Monocyte # : x  Auto Eosinophil # : x  Auto Basophil # : x  Auto Neutrophil % : 87.7 %  Auto Lymphocyte % : 4.2 %  Auto Monocyte % : 7.9 %  Auto Eosinophil % : 0.1 %  Auto Basophil % : 0.1 %    PT/INR - ( 10 Ramon 2017 16:06 )   PT: 20.2 sec;   INR: 1.80          PTT - ( 10 Ramon 2017 16:06 )  PTT:26.1 sec  06-10    127<L>  |  92<L>  |  58<H>  ----------------------------<  110<H>  7.0<HH>   |  19<L>  |  2.00<H>    Ca    10.3      10 Ramon 2017 16:06  Phos  3.7     06-10    TPro  6.6  /  Alb  3.0<L>  /  TBili  0.5  /  DBili  x   /  AST  32  /  ALT  29  /  AlkPhos  273<H>  06-10    Troponin T, Serum (06.10.17 @ 16:06)    Troponin T, Serum: 0.07: TYPE:(C=Critical, N=Notification, A=Abnormal) C  TESTS: TROP T_  DATE/TIME CALLED: _06/10/17 18:55  CALLED TO: ROSANA ARITA  READ BACK (2 Patient Identifiers)(Y/N): _Y  READ BACK VALUES (Y/N): Y_  CALLED BY: CO_  Reference interval for troponin T i0.07: s </= 0.01 ng/mL which includes the  99th percentile of a healthy population. Troponin T results are not  interchangeable with troponin I results. ng/mL    ABG - ( 10 Ramon 2017 18:37 )  pH: 7.28  /  pCO2: 44    /  pO2: 55    / HCO3: 20    / Base Excess: x     /  SaO2: x         Rapid Respiratory Viral Panel (06.10.17 @ 16:43)    Rapid RVP Result: NotDetec: The [employer] RVP Rapid uses polymerase chain reaction (PCR) and melt  curve analysis to screen for adenovirus; coronavirus HKU1, NL63, 229E,  OC43; human metapneumovirus (hMPV); human enterovirus/rhinovirus  (Entero/RV); influenza A; influenza A/H1;NotDetec: influenza A/H3; influenza  A/H1-2009; influenza B; parainfluenza viruses 1, 2, 3, 4; respiratory  syncytial virus; Bordetella pertussis; Mycoplasma pneumoniae; and  Chlamydophila pneumoniae.      CXR:  R middle to lower lobe opacity, cardiomegaly, mild pulm vascular congestion  ]  EKG:  no peaked T waves, junctional rhythm?    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  Some improvement after duonebs x 2 and bipap.  Mixed hypovolemia and hypervolemia on exam.  Tenuous fluid status, concerning in setting of severe sepsis, need for IVF, and pulm congestion.  -admit to ICU, as pt may need intubation (family expressed that pt would want to be full code)  -c/w standing hussein  -vanc, zosyn for empiric HAP tx  -f/u trop, lactate, electrolytes, VBG  -renal, cardio, and heme to follow

## 2017-06-10 NOTE — ED PROVIDER NOTE - OBJECTIVE STATEMENT
87 y/o F w/ pmhx of CAD s/p CABG, atrial fibrillation s/p PPM (on Eliquis), Breast CA (on Arimidex), Large cell lymphoma on chemotherapy ( non-cardiotoxic Bendamustin plus Rituximab), HFpEF, HTN, HLD, DM, follows with Dr. Galaviz for heme/onc and Dr. Mckinney for cards, here with complaints of a fever,  lethargy, with associated lower extremity swelling, SOB, decreased appetite and diarrhea. Family feels that she looks very unwell. Last chemo May 23rd.

## 2017-06-10 NOTE — ED PROVIDER NOTE - SECONDARY DIAGNOSIS.
Acute renal failure, unspecified acute renal failure type Pneumonia of right lower lobe due to infectious organism

## 2017-06-10 NOTE — H&P ADULT - NSHPLABSRESULTS_GEN_ALL_CORE
85 y/o F w/ pmhx of CAD s/p CABG, atrial fibrillation s/p PPM, Breast CA, Large cell lymphoma on chemotherapy, HFpEF, HTN, HLD, DM, p/w fever, found to have severe sepsis 2/2 HAP w/ CXR findings of RLL infiltrate. Admitted to MICU for further management of severe sepsis and respiratory monitoring     ID  #Severe Sepsis 2/2 HAP: pt with temp of 101 in acute respiratory distress w/ CXR finding of RLL infiltrate. 2/4 SIRS with Lactate of 2.8. Treating for HAP in setting of recent hospital admissions. Patient in NSR, without leukocytosis. Patient diffusely wheezy with bibasilar crackles, placed on BIPAP in ED, given Duonebs x 2 with moderate improvement. s/p Vancomycin and Zosyn. Patient with AG metabolic acidosis likely 2/2 lactatemia vs uremia given ROMEO   - LOW threshold for intubation; monitor respiratory status   - c/w Vancomycin (dose by level in setting of ROMEO) and Zosyn 2.25 q6h. (CrCl = 6), Tylenol PRN for fever   - f/u lactate     #Right lower extremity redness and swelling: possibly cellulitis. MRSA risk factors given recent hospitalizations will c/w antibiotic regimen as above     Pulm  #Acute Hypoxic Respiratory failure: requiring BIPAP 2/2 hospital acquired pneumonia and pulmonary vascular congestion.   - low threshold for intubation; monitor respiratory status   - c/w abx as above   - NPO while on BIPAP    Electrolytes  #Hyperkalemia: no EKG changes. given calcium gluconate 2g in ED w/ Insulin 5u and Albuterol x 2. Renal Consulted   - trend BMP  - trend EKG   - monitor on tele   - if worsening renal failure and hyperkalemia, will contact renal for emergent dialysis     CV  #HFpEF: last echo done in May showing Ef of 60-65%. CXR w/ pulmonary vascular congestion. possibly in acute exacerbation. Will hold off on home Lasix in setting of sever sepsis  - alexander catheter  - strict I/O's   - daily CXR's   - monitor respiratory status  - limit fluid intake     #Atrial fibrillation: s/p PPM, on eliquis at home.   - c/w heparin gtt  - c/w Amiodarone 200 daily   - monitor on tele     #CAD s/p CABG: troponin 0.07. EKG w/o ischemic changes. patient denies CP  -c/w ASA 81, Lipitor   - trend troponin to peak     Renal  #ROMEO: BUN 58, Cr 2.0. baseline < 0.9 in January. likely pre-renal. AG metabolic acidosis likely 2/2 lactate and uremia   - alexander catheter  - strict I/O  - f/u urine lytes   - f/u renal rec's     Hem-Onc  #Large Diffuse B-Cell Lymphoma: followed by Dr. Galaviz. last chemo tx on May 23rd w/ Bendamustin (non-cardiotoxic) plus Rituximab.   - f/u with Dr. Galaviz rec's     #Breast CA: on Arimidex 1mg qd  - f/u Dr. Galaviz rec's     PPX: protonix, SCD's .    FEN: monitor lytes, NPO, no IVF     Dispo: MICU   FULL CODE

## 2017-06-10 NOTE — H&P ADULT - HISTORY OF PRESENT ILLNESS
85 y/o F w/ pmhx of CAD s/p CABG, atrial fibrillation s/p PPM (on Eliquis), Breast CA (on Arimidex), 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 Patients family at bedside states she's been more lethargic with associated lower extremity swelling, SOB, decreased appetite and diarrhea. Patient with previous admissions for symptomatic hyperglycemia in January. Patient currently seeing Dr. Galaviz on a regular basis for chemotherapy treatment. Last treatment on May 23rd for which she received Bendamustine and Rituximab. Patient denies any headaches, chest pain, abdominal pain, N/V, dysuria, or bowel changes.     Upon arrival to the ED, patient in moderate respiratory distress using accessory muscles, breathing w/ a RR of 30, O2 93% on room air. Patient placed on BIPAP with slight improvement in respiratory status. Patient febrile to 101 with a HR of 77. Labs significant for AG metabolic acidosis, K 7.0, Na 127, BUN 58, Cr. 2.0 (baseline < 0.9 January), Lactate 2.8. CXR done showing RLL infiltrate with pulmonary vascular congestion. In the ED, patient received Vancomycin, Zosyn, Tylenol, Calcium Gluconate 2g, Insulin 5u, Albuterol neb 5mg x 2. ICU consulted, will be transferred to MICU for management of severe sepsis 2/2 HAP and pulmonary congestion.

## 2017-06-10 NOTE — ED PROVIDER NOTE - CARE PLAN
Principal Discharge DX:	Hyperkalemia  Secondary Diagnosis:	Acute renal failure, unspecified acute renal failure type  Secondary Diagnosis:	Pneumonia of right lower lobe due to infectious organism

## 2017-06-10 NOTE — ED ADULT TRIAGE NOTE - CHIEF COMPLAINT QUOTE
Pt brought in by family, reports 1 week of fever and weakness.  hx of breast CA and lymphoma, last chemo 5/23/17

## 2017-06-10 NOTE — ED PROVIDER NOTE - PROGRESS NOTE DETAILS
seen by dr douglas. both of us think pt has both sepsis and chf. icu consulted. hyperkalemic - meds started. will talk to renal.   dr olmstead contacted as well, knows pt very well. will come see today but thinks pt too complicated medically for ccu, better in micu

## 2017-06-10 NOTE — CONSULT NOTE ADULT - PROBLEM SELECTOR RECOMMENDATION 9
- exact etiology unclear at this time but suspect likely 2/2 hypoperfusion  - patient with mixed CHF and sepsis picture  - patient with hypervolemic status as evidenced by JVD, edema, lung exam, and cxr  - fever + infiltrate on cxr present as well indicating possible infectious component as well  - please check UA, urine electrolytes (sodium, chloride, osm, cr, potassium)  - check renal ultrasound  - other differentials could be renal injury associated with chemotherapy agent  - furthermore, given thrombocytopenia and renal failure - r/o TMA - unlikely though  - check peripheral smear and hemolysis labs (LDH, haptoglobin)    - given volume overload, patient will need diuresis but recommend doing this in an ICU setting as patient at risk for hypotension (may need vasopressors).  Furthermore, if resp distress does not improve then may require mechanical ventilation.  Diuresis should help with resp status, volume status, and hyperkalemia.    - no indication for RRT at this moment, but will re-evaluate pending clinical status  - dose all meds for GFR of < 20

## 2017-06-11 LAB
ANION GAP SERPL CALC-SCNC: 16 MMOL/L — SIGNIFICANT CHANGE UP (ref 5–17)
BASE EXCESS BLDA CALC-SCNC: -1.9 MMOL/L — SIGNIFICANT CHANGE UP (ref -2–3)
BLD GP AB SCN SERPL QL: NEGATIVE — SIGNIFICANT CHANGE UP
BUN SERPL-MCNC: 51 MG/DL — HIGH (ref 7–23)
CALCIUM SERPL-MCNC: 10.8 MG/DL — HIGH (ref 8.4–10.5)
CHLORIDE SERPL-SCNC: 96 MMOL/L — SIGNIFICANT CHANGE UP (ref 96–108)
CO2 SERPL-SCNC: 19 MMOL/L — LOW (ref 22–31)
CREAT SERPL-MCNC: 1.9 MG/DL — HIGH (ref 0.5–1.3)
GAS PNL BLDA: SIGNIFICANT CHANGE UP
GLUCOSE SERPL-MCNC: 102 MG/DL — HIGH (ref 70–99)
GRAM STN FLD: SIGNIFICANT CHANGE UP
HCO3 BLDA-SCNC: 20 MMOL/L — LOW (ref 21–28)
HCT VFR BLD CALC: 21.4 % — LOW (ref 34.5–45)
HCT VFR BLD CALC: 26.9 % — LOW (ref 34.5–45)
HGB BLD-MCNC: 7.1 G/DL — LOW (ref 11.5–15.5)
HGB BLD-MCNC: 9.3 G/DL — LOW (ref 11.5–15.5)
LEGIONELLA AG UR QL: NEGATIVE — SIGNIFICANT CHANGE UP
MAGNESIUM SERPL-MCNC: 1.7 MG/DL — SIGNIFICANT CHANGE UP (ref 1.6–2.6)
MCHC RBC-ENTMCNC: 30.3 PG — SIGNIFICANT CHANGE UP (ref 27–34)
MCHC RBC-ENTMCNC: 30.5 PG — SIGNIFICANT CHANGE UP (ref 27–34)
MCHC RBC-ENTMCNC: 33.2 G/DL — SIGNIFICANT CHANGE UP (ref 32–36)
MCHC RBC-ENTMCNC: 34.6 G/DL — SIGNIFICANT CHANGE UP (ref 32–36)
MCV RBC AUTO: 87.6 FL — SIGNIFICANT CHANGE UP (ref 80–100)
MCV RBC AUTO: 91.8 FL — SIGNIFICANT CHANGE UP (ref 80–100)
NT-PROBNP SERPL-SCNC: 6339 PG/ML — HIGH (ref 0–300)
PCO2 BLDA: 27 MMHG — LOW (ref 32–45)
PH BLDA: 7.49 — HIGH (ref 7.35–7.45)
PHOSPHATE SERPL-MCNC: 4.8 MG/DL — HIGH (ref 2.5–4.5)
PLATELET # BLD AUTO: 74 K/UL — LOW (ref 150–400)
PLATELET # BLD AUTO: 75 K/UL — LOW (ref 150–400)
PO2 BLDA: 141 MMHG — HIGH (ref 83–108)
POTASSIUM SERPL-MCNC: 5.4 MMOL/L — HIGH (ref 3.5–5.3)
POTASSIUM SERPL-SCNC: 5.4 MMOL/L — HIGH (ref 3.5–5.3)
RBC # BLD: 2.33 M/UL — LOW (ref 3.8–5.2)
RBC # BLD: 3.07 M/UL — LOW (ref 3.8–5.2)
RBC # FLD: 16.3 % — SIGNIFICANT CHANGE UP (ref 10.3–16.9)
RBC # FLD: 16.5 % — SIGNIFICANT CHANGE UP (ref 10.3–16.9)
RH IG SCN BLD-IMP: POSITIVE — SIGNIFICANT CHANGE UP
SAO2 % BLDA: 99 % — SIGNIFICANT CHANGE UP (ref 95–100)
SODIUM SERPL-SCNC: 131 MMOL/L — LOW (ref 135–145)
SPECIMEN SOURCE: SIGNIFICANT CHANGE UP
SPECIMEN SOURCE: SIGNIFICANT CHANGE UP
VANCOMYCIN FLD-MCNC: 12 UG/ML — SIGNIFICANT CHANGE UP
WBC # BLD: 6.5 K/UL — SIGNIFICANT CHANGE UP (ref 3.8–10.5)
WBC # BLD: 6.9 K/UL — SIGNIFICANT CHANGE UP (ref 3.8–10.5)
WBC # FLD AUTO: 6.5 K/UL — SIGNIFICANT CHANGE UP (ref 3.8–10.5)
WBC # FLD AUTO: 6.9 K/UL — SIGNIFICANT CHANGE UP (ref 3.8–10.5)

## 2017-06-11 PROCEDURE — 76775 US EXAM ABDO BACK WALL LIM: CPT | Mod: 26

## 2017-06-11 PROCEDURE — 99232 SBSQ HOSP IP/OBS MODERATE 35: CPT

## 2017-06-11 PROCEDURE — 74176 CT ABD & PELVIS W/O CONTRAST: CPT | Mod: 26

## 2017-06-11 PROCEDURE — 99291 CRITICAL CARE FIRST HOUR: CPT

## 2017-06-11 PROCEDURE — 99223 1ST HOSP IP/OBS HIGH 75: CPT

## 2017-06-11 PROCEDURE — 71010: CPT | Mod: 26

## 2017-06-11 RX ORDER — INSULIN LISPRO 100/ML
VIAL (ML) SUBCUTANEOUS
Qty: 0 | Refills: 0 | Status: DISCONTINUED | OUTPATIENT
Start: 2017-06-11 | End: 2017-07-06

## 2017-06-11 RX ORDER — GLUCAGON INJECTION, SOLUTION 0.5 MG/.1ML
1 INJECTION, SOLUTION SUBCUTANEOUS ONCE
Qty: 0 | Refills: 0 | Status: DISCONTINUED | OUTPATIENT
Start: 2017-06-11 | End: 2017-06-17

## 2017-06-11 RX ORDER — CALCIUM GLUCONATE 100 MG/ML
2 VIAL (ML) INTRAVENOUS ONCE
Qty: 0 | Refills: 0 | Status: COMPLETED | OUTPATIENT
Start: 2017-06-11 | End: 2017-06-11

## 2017-06-11 RX ORDER — ACETAMINOPHEN 500 MG
650 TABLET ORAL EVERY 6 HOURS
Qty: 0 | Refills: 0 | Status: DISCONTINUED | OUTPATIENT
Start: 2017-06-11 | End: 2017-06-11

## 2017-06-11 RX ORDER — DEXTROSE 50 % IN WATER 50 %
25 SYRINGE (ML) INTRAVENOUS ONCE
Qty: 0 | Refills: 0 | Status: DISCONTINUED | OUTPATIENT
Start: 2017-06-11 | End: 2017-06-17

## 2017-06-11 RX ORDER — DEXTROSE 50 % IN WATER 50 %
12.5 SYRINGE (ML) INTRAVENOUS ONCE
Qty: 0 | Refills: 0 | Status: DISCONTINUED | OUTPATIENT
Start: 2017-06-11 | End: 2017-06-17

## 2017-06-11 RX ORDER — SODIUM CHLORIDE 9 MG/ML
1000 INJECTION, SOLUTION INTRAVENOUS
Qty: 0 | Refills: 0 | Status: DISCONTINUED | OUTPATIENT
Start: 2017-06-11 | End: 2017-06-17

## 2017-06-11 RX ORDER — ALBUMIN HUMAN 25 %
50 VIAL (ML) INTRAVENOUS ONCE
Qty: 0 | Refills: 0 | Status: COMPLETED | OUTPATIENT
Start: 2017-06-11 | End: 2017-06-11

## 2017-06-11 RX ORDER — DEXTROSE 50 % IN WATER 50 %
1 SYRINGE (ML) INTRAVENOUS ONCE
Qty: 0 | Refills: 0 | Status: DISCONTINUED | OUTPATIENT
Start: 2017-06-11 | End: 2017-06-17

## 2017-06-11 RX ORDER — ACETAMINOPHEN 500 MG
650 TABLET ORAL EVERY 6 HOURS
Qty: 0 | Refills: 0 | Status: DISCONTINUED | OUTPATIENT
Start: 2017-06-11 | End: 2017-06-12

## 2017-06-11 RX ORDER — FUROSEMIDE 40 MG
40 TABLET ORAL ONCE
Qty: 0 | Refills: 0 | Status: COMPLETED | OUTPATIENT
Start: 2017-06-11 | End: 2017-06-11

## 2017-06-11 RX ORDER — ENOXAPARIN SODIUM 100 MG/ML
30 INJECTION SUBCUTANEOUS EVERY 24 HOURS
Qty: 0 | Refills: 0 | Status: DISCONTINUED | OUTPATIENT
Start: 2017-06-11 | End: 2017-06-12

## 2017-06-11 RX ADMIN — PIPERACILLIN AND TAZOBACTAM 200 GRAM(S): 4; .5 INJECTION, POWDER, LYOPHILIZED, FOR SOLUTION INTRAVENOUS at 01:06

## 2017-06-11 RX ADMIN — ENOXAPARIN SODIUM 30 MILLIGRAM(S): 100 INJECTION SUBCUTANEOUS at 15:08

## 2017-06-11 RX ADMIN — PIPERACILLIN AND TAZOBACTAM 200 GRAM(S): 4; .5 INJECTION, POWDER, LYOPHILIZED, FOR SOLUTION INTRAVENOUS at 12:14

## 2017-06-11 RX ADMIN — AMIODARONE HYDROCHLORIDE 200 MILLIGRAM(S): 400 TABLET ORAL at 13:27

## 2017-06-11 RX ADMIN — Medication 50 MILLILITER(S): at 21:00

## 2017-06-11 RX ADMIN — Medication 650 MILLIGRAM(S): at 13:07

## 2017-06-11 RX ADMIN — PIPERACILLIN AND TAZOBACTAM 200 GRAM(S): 4; .5 INJECTION, POWDER, LYOPHILIZED, FOR SOLUTION INTRAVENOUS at 18:25

## 2017-06-11 RX ADMIN — Medication 400 GRAM(S): at 02:07

## 2017-06-11 RX ADMIN — PIPERACILLIN AND TAZOBACTAM 200 GRAM(S): 4; .5 INJECTION, POWDER, LYOPHILIZED, FOR SOLUTION INTRAVENOUS at 07:07

## 2017-06-11 RX ADMIN — ATORVASTATIN CALCIUM 80 MILLIGRAM(S): 80 TABLET, FILM COATED ORAL at 22:23

## 2017-06-11 RX ADMIN — Medication 40 MILLIGRAM(S): at 05:29

## 2017-06-11 RX ADMIN — PIPERACILLIN AND TAZOBACTAM 200 GRAM(S): 4; .5 INJECTION, POWDER, LYOPHILIZED, FOR SOLUTION INTRAVENOUS at 23:15

## 2017-06-11 NOTE — DIETITIAN INITIAL EVALUATION ADULT. - ENERGY NEEDS
45 kg IBW; 55 kg ABW; 122% IBW; BMI 24. IBW used due to pt > 120% of IBW. Needs increased due to catabolic state

## 2017-06-11 NOTE — PROGRESS NOTE ADULT - SUBJECTIVE AND OBJECTIVE BOX
Patient seen and examined by me at bedside.  ROS: unable to obtain from patient      PAST MEDICAL & SURGICAL HISTORY:  Scoliosis  Follicular lymphoma  Neck mass  Afib  Other hyperlipidemia  Breast cancer  CAD (coronary artery disease)  Diabetes  HTN (hypertension)  No pertinent past medical history  H/O abdominal hysterectomy  H/O thyroidectomy  S/P CABG x 3    PHYSICAL EXAM:  T(C): 37, Max: 38.3 (06-10 @ 15:34)  HR: 74  BP: 125/53 (107/42 - 151/44)  RR: 18  SpO2: 100%  Wt(kg): --  I & Os for 24h ending 06-11 @ 07:00  =============================================  IN:    IV PiggyBack: 300 ml    Packed Red Blood Cells: 300 ml    Total IN: 600 ml  ---------------------------------------------  OUT:    Indwelling Catheter - Urethral: 1110 ml    Total OUT: 1110 ml  ---------------------------------------------  Total NET: -510 ml    I & Os for current day (as of 06-11 @ 10:02)  =============================================  IN:    Total IN: 0 ml  ---------------------------------------------  OUT:    Indwelling Catheter - Urethral: 350 ml    Total OUT: 350 ml  ---------------------------------------------  Total NET: -350 ml    Weight 54.6 (06-10 @ 21:46)  General: tachypenic, on BiPAP, responsive to painful stimuli  HEENT: dry mucous membranes, no pallor/cyanosis.  Cardiac: S1, S2. RRR. + systolic murmur  Respratory: +tachypnea, +crackles b/l   Abdomen: soft. nontender. nondistended  Skin: no rashes.  Extremities: +LE edema b/l        DATA:                        7.1<L>  6.9   )-----------( 74<L>    ( 10 Ramon 2017 23:48 )             21.4<L>        126<L>  |  94<L>  |  59<H>  ----------------------------<  164<H>  Ca:9.8   (10 Ramon 2017 22:28)  6.3<HH>   |  19<L>  |  2.00<H>      eGFR if Non : 22 <L>  eGFR if : 26 <L>    TPro  5.1 g/dL<L>  /  Alb  2.4 g/dL<L>  /  TBili  0.4 mg/dL  /  DBili  x      /  AST  27 U/L  /  ALT  23 U/L  /  AlkPhos  194 U/L<H>  10 Ramon 2017 22:28  Lactate Dehydrogenase, Serum: 232 U/L (06-10 @ 16:06)        Urinalysis Basic - ( 10 Ramon 2017 22:50 )  Color: Yellow / Appearance: Cloudy<!!> / SG: <=1.005 / pH: x  Gluc: x / Ketone: NEGATIVE  / Bili: NEGATIVE / Urobili: 0.2 E.U./dL   Blood: x / Protein: 30 mg/dL<!!> / Nitrite: NEGATIVE   Leuk Esterase: Moderate<!!> / RBC: 5-10 /HPF<!!> / WBC Many /HPF<!!>   Sq Epi: x / Non Sq Epi: Rare /HPF / Bacteria: Present /HPF<!!>      Chloride, Random Urine: <20 mmol/L (06-10 @ 22:50)  Creatinine, Random Urine: 45 mg/dL (06-10 @ 22:50)  Osmolality, Random Urine: 309 mosmol/kg (06-10 @ 22:50)  Potassium, Random Urine: 43 mmol/L (06-10 @ 22:50)  Sodium, Random Urine: <20 mmol/L (06-10 @ 22:50)            MEDICATIONS  (STANDING):  amiodarone    Tablet 200milliGRAM(s) Oral daily  atorvastatin 80milliGRAM(s) Oral at bedtime  piperacillin/tazobactam IVPB. 2.25Gram(s) IV Intermittent every 6 hours  insulin lispro (HumaLOG) corrective regimen sliding scale  SubCutaneous Before meals and at bedtime  dextrose 5%. 1000milliLiter(s) IV Continuous <Continuous>  dextrose 50% Injectable 12.5Gram(s) IV Push once  dextrose 50% Injectable 25Gram(s) IV Push once  dextrose 50% Injectable 25Gram(s) IV Push once    MEDICATIONS  (PRN):  dextrose Gel 1Dose(s) Oral once PRN Blood Glucose LESS THAN 70 milliGRAM(s)/deciliter  glucagon  Injectable 1milliGRAM(s) IntraMuscular once PRN Glucose LESS THAN 70 milligrams/deciliter

## 2017-06-11 NOTE — PROGRESS NOTE ADULT - ATTENDING COMMENTS
Patient seen and examined with house-staff during bedside rounds  Resident note read, including vitals, physical findings, laboratory data, and radiological reports.   Revisions included below.  Case discussed with House staff  Direct personal management at bedside  and extensive interpretation of data. Decision making of high complexity.

## 2017-06-11 NOTE — DIETITIAN INITIAL EVALUATION ADULT. - OTHER INFO
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  Pt NPO on BiPAP unable to participate in RD interview this visit. Per H&P PO intake has been poor PTA. Suspect moderate malnutrition due to poor PO intake & wt loss PTA.

## 2017-06-11 NOTE — PROGRESS NOTE ADULT - SUBJECTIVE AND OBJECTIVE BOX
INTERVAL HPI/OVERNIGHT EVENTS:    SUBJECTIVE: Patient seen and examined at bedside.     ROS:  CV: Denies chest pain  Resp: Denies SOB  GI: Denies abdominal pain, constipation, diarrhea, nausea, vomiting  : Denies dysuria  ID: Denies fevers, chills  MSK: Denies joint pain     OBJECTIVE:    VITAL SIGNS:  ICU Vital Signs Last 24 Hrs  T(C): 37, Max: 38.3 (06-10 @ 15:34)  T(F): 98.6, Max: 101 (06-10 @ 15:34)  HR: 74 (66 - 81)  BP: 125/53 (107/42 - 151/44)  BP(mean): 87 (64 - 87)  ABP: --  ABP(mean): --  RR: 18 (12 - 32)  SpO2: 100% (93% - 100%)      I & Os for 24h ending 06-11 @ 07:00  =============================================  IN: 600 ml / OUT: 1110 ml / NET: -510 ml    I & Os for current day (as of 06-11 @ 08:22)  =============================================  IN: 0 ml / OUT: 350 ml / NET: -350 ml    CAPILLARY BLOOD GLUCOSE  116 (11 Jun 2017 07:00)      PHYSICAL EXAM:    General: NAD  HEENT: NC/AT; PERRL, clear conjunctiva  Neck: supple  Respiratory: CTA b/l  Cardiovascular: +S1/S2; RRR  Abdomen: soft, NT/ND; +BS x4  Extremities: WWP, 2+ peripheral pulses b/l; no LE edema  Skin: normal color and turgor; no rash  MSK: no joint swelling  Neurological:     MEDICATIONS:  MEDICATIONS  (STANDING):  amiodarone    Tablet 200milliGRAM(s) Oral daily  atorvastatin 80milliGRAM(s) Oral at bedtime  piperacillin/tazobactam IVPB. 2.25Gram(s) IV Intermittent every 6 hours  insulin lispro (HumaLOG) corrective regimen sliding scale  SubCutaneous Before meals and at bedtime  dextrose 5%. 1000milliLiter(s) IV Continuous <Continuous>  dextrose 50% Injectable 12.5Gram(s) IV Push once  dextrose 50% Injectable 25Gram(s) IV Push once  dextrose 50% Injectable 25Gram(s) IV Push once    MEDICATIONS  (PRN):  dextrose Gel 1Dose(s) Oral once PRN Blood Glucose LESS THAN 70 milliGRAM(s)/deciliter  glucagon  Injectable 1milliGRAM(s) IntraMuscular once PRN Glucose LESS THAN 70 milligrams/deciliter      ALLERGIES:  Allergies    IV CONtRAST (Flushing (Skin); Rash)  No Known Drug Allergies    Intolerances        LABS:                        7.1    6.9   )-----------( 74       ( 10 Ramon 2017 23:48 )             21.4     06-10    126<L>  |  94<L>  |  59<H>  ----------------------------<  164<H>  6.3<HH>   |  19<L>  |  2.00<H>    Ca    9.8      10 Ramon 2017 22:28  Phos  3.6     06-10  Mg     1.7     06-10    TPro  5.1<L>  /  Alb  2.4<L>  /  TBili  0.4  /  DBili  x   /  AST  27  /  ALT  23  /  AlkPhos  194<H>  06-10    PT/INR - ( 10 Ramon 2017 22:30 )   PT: 20.7 sec;   INR: 1.84          PTT - ( 10 Ramon 2017 22:30 )  PTT:26.4 sec  Urinalysis Basic - ( 10 Ramon 2017 22:50 )    Color: Yellow / Appearance: Cloudy / SG: <=1.005 / pH: x  Gluc: x / Ketone: NEGATIVE  / Bili: NEGATIVE / Urobili: 0.2 E.U./dL   Blood: x / Protein: 30 mg/dL / Nitrite: NEGATIVE   Leuk Esterase: Moderate / RBC: 5-10 /HPF / WBC Many /HPF   Sq Epi: x / Non Sq Epi: Rare /HPF / Bacteria: Present /HPF        RADIOLOGY & ADDITIONAL TESTS: Reviewed. INTERVAL HPI/OVERNIGHT EVENTS: Repeat labs in ICU showed hemoglobin of drop of Hgb 9.5 to 7.1, K of 6.3, trop T peaked at 0.05, and lactate cleared from 2.8 to 1.6 w/o intervention. UA positive. Urine lytes ordered. Rectal exam revealed brown stools, no melena or hematochezia. CT A/P performed, negative for RP bleeding. Pt is HD stable. Was slowly transfused one unit of pRBC and was given Lasix 40mg IV to prevent respiratory distress 2/2 fluid overload. Unlikely that that pt has RLL PNA as CT chest showed elevated diaphragm but no obvious infiltrates.  Pt continued on BIPAP and respiratory status remained stable.     SUBJECTIVE: Patient seen and examined at bedside. Patient on BIPAP in mild distress due to pain, but cannot qualify type of pain and location.     VITAL SIGNS:  ICU Vital Signs Last 24 Hrs  T(C): 37, Max: 38.3 (06-10 @ 15:34)  T(F): 98.6, Max: 101 (06-10 @ 15:34)  HR: 74 (66 - 81)  BP: 125/53 (107/42 - 151/44)  BP(mean): 87 (64 - 87)  RR: 18 (12 - 32)  SpO2: 100% (93% - 100%)    I & Os for 24h ending 06-11 @ 07:00  =============================================  IN: 600 ml / OUT: 1110 ml / NET: -510 ml    I & Os for current day (as of 06-11 @ 08:22)  =============================================  IN: 0 ml / OUT: 350 ml / NET: -350 ml    CAPILLARY BLOOD GLUCOSE  116 (11 Jun 2017 07:00)    PHYSICAL EXAM:  Constitutional: elderly, frail, cachectic female, in mild respiratory distress due to pain, on BIPAP  HEENT: NCAT, PERRL, sclera non-icteric, dry mucous membranes   Neck: supple, no JVD   Pulm: clear to auscultation bilaterally, no wheezing/rales/rhonchi  CV: regular rate, irregular, +S1S2, no murmurs appreciated  GI: soft, nondistended, +BS, no guarding   Ext: WWP, trace pitting edema  Vasc: DP pulses 2+ bilaterally   Derm: R chest chemo port. L chest PPM. Both intact without surrounding erythema or edema. No visible rashes on skin.  Neuro: awake, responds to some questions, no focal deficits     MEDICATIONS:  MEDICATIONS  (STANDING):  amiodarone    Tablet 200milliGRAM(s) Oral daily  atorvastatin 80milliGRAM(s) Oral at bedtime  piperacillin/tazobactam IVPB. 2.25Gram(s) IV Intermittent every 6 hours  insulin lispro (HumaLOG) corrective regimen sliding scale  SubCutaneous Before meals and at bedtime  dextrose 5%. 1000milliLiter(s) IV Continuous <Continuous>  dextrose 50% Injectable 12.5Gram(s) IV Push once  dextrose 50% Injectable 25Gram(s) IV Push once  dextrose 50% Injectable 25Gram(s) IV Push once    MEDICATIONS  (PRN):  dextrose Gel 1Dose(s) Oral once PRN Blood Glucose LESS THAN 70 milliGRAM(s)/deciliter  glucagon  Injectable 1milliGRAM(s) IntraMuscular once PRN Glucose LESS THAN 70 milligrams/deciliter    LABS:                        9.3    6.5   )-----------( 75       ( 11 Jun 2017 10:07 )             26.9     131<L>  |  96  |  51<H>  ----------------------------<  102<H>  5.4<H>   |  19<L>  |  1.90<H>    Ca    10.8<H>      Phos  4.8    Mg     1.7         RADIOLOGY & ADDITIONAL TESTS: Reviewed.    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     CV:  #HFpEF: last echo in 5/2017 showing EF 60-65%. On admission, patient in respiratory distress with exam significant for crackles and LE edema. CXR significant for pulmonary congestion. Likely acute CHF exacerbation due to sepsis. Initially not diuresed given sepsis, but overnight received Lasix 40mg IVP x1 with transfusion. BPs stable. Currently net negative 1.5L. Lung exam improved.   - continue to monitor I/Os   - will consider add'l diuresis as needed (at home, patient takes Lasix 20mg QD)  - will continue to monitor daily CXRs    #Afib: s/p PPM, on Eliquis at home. Initially started on hep gtt, but overnight concern for bleeding due to 2U Hgb drop so AC discontinued    - c/w home Amiodarone 200mg QD   - will consider AC when Hgb stabilized and patient tolerating PO    #CAD: s/p CABG. Mild troponemia on presentation, peaked at 0.07. Patient without chest pain. EKG without ischemic changes. Likely demand ischemia due to CHF exacerbation.   - c/w home Lipitor 80mg QHS  - holding ASA in the setting of possible bleed     ID:  #Severe sepsis: On presentation, patient febrile to 101F, lactate 2.8 (cleared without intervention). CXR with pulmonary congestion and possible RLL infiltrate (though less likely after CT chest did not reveal any gross infiltrate). UA positive. BCx growing GNR. Likely urosepsis.   - c/w Zosyn 2.25g Q6hrs (renally dosed)  - will discontinue Vancomycin as low concern for pneumonia at this time  - c/w Tylenol PRN  - f/u surveillance blood cultures    #UTI: UA positive for LE, bacteria. BCx growing GNR in all bottles. Likely urosepsis.   - c/w Zosyn 2.25g Q6hrs (renally dosed)    PULM:  #Acute hypoxic respiratory failure: requiring BIPAP 2/2 pulmonary congestion/edema likely due to CHF exacerbation in the setting of urosepsis. Repeat ABG this AM revealing of good oxygenation on FiO2 40%.   - c/w BIPAP, wean as tolerated  - c/w antibiotics as above   - low threshold for intubation    #Pulmonary edema: Present on admission as seen on CXR and CT chest. Likely CHF exacerbation in the setting of urosepsis. Diuresed overnight with appropriate response. Lung exam improved this AM; patient tolerating BIPAP well.   - c/w BIPAP, wean as tolerated  - c/w antibiotics as above   - low threshold for intubation    RENAL:  #Hyperkalemia: no EKG changes. Given Calcium gluconate 2g, Insulin 5U and Albuterol in the ED. Etiology includes ROMEO due to urosepsis. Potassium downtrending on AM labs.   - renal following, appreciate recs   - f/u renal ultrasound     #ROMEO: Creatinine elevated above baseline. Urine sodium low suggesting pre-renal etiology. Possible that patient is intravascularly depleted, third spacing causing congestion and appearance of overload on exam. Patient also possibly with some hypoperfusion in the setting of acute CHF exacerbation.   - renal following, appreciate recs  - f/u serum osmolality   - f/u renal ultrasound    HEME/ONC:  #Large Diffuse B Cell Lymphoma: last chemo with Bendamustin on 5/23/17. Followed outpatient by Dr. Galaviz.   - Dr. Galaviz following, appreciate recs    #Breast cancer: on Arimidex 1mg QD at home   - Dr. Galaviz following, appreciate recs     #Anemia: Patient with acute drop in hemoglobin overnight. No exam findings suggestive of bleeding. Uric acid wnl (does not suggest tumor lysis). CT A/P negative for RP bleed. Transfused 1U pRBC with appropriate response.   - will continue to monitor     PPx: SCDs   FEN: monitor lytes, NPO, no IVF  Dispo: MICU    FULL CODE

## 2017-06-11 NOTE — PROGRESS NOTE ADULT - PROBLEM SELECTOR PLAN 1
- etiology unclear at the moment  - however, suspect mix CHF + Sepsis component  - see consult note for other differentials  - Urine sodium is low, suggesting possibly impaired renal perfusion  - Currently BP is acceptable  - It is possible that even though CXR may appear congested (per pulm interstitial changes possible as well) and she has edema in lower extremities- that she may be intravascularly "dry," in which case trial of colloids with diuretics may be beneficial.  - Patient responded well to IV lasix and is diuresing currently without drop in BP.  - recheck labs to evaluated cr and potassium  - check renal u/s  - recommend checking ABG as well given patient's resp and mental status  - avoid nephrotoxic agents  - dose all meds for GFR < 20  - will need re-evaluation after set of new labs

## 2017-06-11 NOTE — CHART NOTE - NSCHARTNOTEFT_GEN_A_CORE
Upon Nutritional Assessment by the Registered Dietitian your patient was determined to meet criteria / has evidence of the following diagnosis/diagnoses:          [ ]  Mild Protein Calorie Malnutrition        [X ]  Moderate Protein Calorie Malnutrition        [ ] Severe Protein Calorie Malnutrition        [ ] Unspecified Protein Calorie Malnutrition        [ ] Underweight / BMI <19        [ ] Morbid Obesity / BMI > 40      Findings as based on:  •  Comprehensive nutrition assessment and consultation  •  Calorie counts (nutrient intake analysis)  •  Food acceptance and intake status from observations by staff  •  Follow up  •  Patient education  •  Intervention secondary to interdisciplinary rounds  •   concerns      Treatment:    The following diet has been recommended:      PROVIDER Section:     By signing this assessment you are acknowledging and agree with the diagnosis/diagnoses assigned by the Registered Dietitian    Comments:

## 2017-06-11 NOTE — CONSULT NOTE ADULT - SUBJECTIVE AND OBJECTIVE BOX
REASON FOR CONSULT: well known to me    HISTORY OF PRESENT ILLNESS: 87 y/o F w/ pmhx of CAD s/p CABG, atrial fibrillation s/p PPM (on Eliquis), Breast CA (on Arimidex), 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 Patients family at bedside states she's been more lethargic with associated lower extremity swelling, SOB, decreased appetite and diarrhea. Patient with previous admissions for symptomatic hyperglycemia in January. Patient currently seeing Dr. Galaviz on a regular basis for chemotherapy treatment. Last treatment on May 23rd for which she received Bendamustine and Rituximab. Patient denies any headaches, chest pain, abdominal pain, N/V, dysuria, or bowel changes.     Upon arrival to the ED, patient in moderate respiratory distress using accessory muscles, breathing w/ a RR of 30, O2 93% on room air. Patient placed on BIPAP with slight improvement in respiratory status. Patient febrile to 101 with a HR of 77. Labs significant for AG metabolic acidosis, K 7.0, Na 127, BUN 58, Cr. 2.0 (baseline < 0.9 January), Lactate 2.8. CXR done showing RLL infiltrate with pulmonary vascular congestion. In the ED, patient received Vancomycin, Zosyn, Tylenol, Calcium Gluconate 2g, Insulin 5u, Albuterol neb 5mg x 2. ICU consulted, will be transferred to MICU for management of severe sepsis 2/2 HAP and pulmonary congestion.     PAST MEDICAL & SURGICAL HISTORY:  Scoliosis  Follicular lymphoma  Neck mass  Afib  Other hyperlipidemia  Breast cancer  CAD (coronary artery disease)  Diabetes  HTN (hypertension)  No pertinent past medical history  H/O abdominal hysterectomy  H/O thyroidectomy  S/P CABG x 3      [ ] Diabetes   [ ] Hypertension  [ ] Hyperlipidemia  [ ] CAD  [ ] PCI  [ ] CABG    PREVIOUS DIAGNOSTIC TESTING:    [ ] Echocardiogram:  [ ]  Catheterization:  [ ] Stress Test:  	    MEDICATIONS:  amiodarone    Tablet 200milliGRAM(s) Oral daily    piperacillin/tazobactam IVPB. 2.25Gram(s) IV Intermittent every 6 hours      acetaminophen  Suppository 650milliGRAM(s) Rectal every 6 hours PRN      atorvastatin 80milliGRAM(s) Oral at bedtime  insulin lispro (HumaLOG) corrective regimen sliding scale  SubCutaneous Before meals and at bedtime  dextrose Gel 1Dose(s) Oral once PRN  dextrose 50% Injectable 12.5Gram(s) IV Push once  dextrose 50% Injectable 25Gram(s) IV Push once  dextrose 50% Injectable 25Gram(s) IV Push once  glucagon  Injectable 1milliGRAM(s) IntraMuscular once PRN    dextrose 5%. 1000milliLiter(s) IV Continuous <Continuous>  enoxaparin Injectable 30milliGRAM(s) SubCutaneous every 24 hours      FAMILY HISTORY:  No pertinent family history in first degree relatives      SOCIAL HISTORY:    [ ] Non-smoker  [ ] Smoker  [ ] Alcohol    Allergies    IV CONtRAST (Flushing (Skin); Rash)  No Known Drug Allergies    Intolerances    	    REVIEW OF SYSTEMS:    [x] as per HPI  CONSTITUTIONAL: No fever, weight loss, or fatigue  ENT:  No difficulty hearing, tinnitus, vertigo; No sinus or throat pain  RESPIRATORY: No cough, wheezing, chills or hemoptysis; No Shortness of Breath  CARDIOVASCULAR: No chest pain, palpitations, dizziness, or leg swelling  GASTROINTESTINAL: No abdominal or epigastric pain. No nausea, vomiting, or hematemesis; No diarrhea or constipation. No melena or hematochezia.  GENITOURINARY: No dysuria, frequency, hematuria, or incontinence  NEUROLOGICAL: No headaches, memory loss, loss of strength, numbness, or tremors  MUSCULOSKELETAL: No joint pain or swelling; No muscle, back, or extremity pain  [x] All others negative	  [ ] Unable to obtain    PHYSICAL EXAM:  T(C): 38.9, Max: 38.9 (06-11 @ 10:00)  HR: 82 (66 - 82)  BP: 147/57 (107/42 - 152/60)  RR: 22 (12 - 32)  SpO2: 98% (93% - 100%)  Wt(kg): --  I&O's Summary  I & Os for 24h ending 11 Jun 2017 07:00  =============================================  IN: 600 ml / OUT: 1110 ml / NET: -510 ml    I & Os for current day (as of 11 Jun 2017 13:04)  =============================================  IN: 100 ml / OUT: 1195 ml / NET: -1095 ml      Appearance: Normal	  HEENT:   Normal oral mucosa, PERRL, EOMI	  Lymphatic: No lymphadenopathy  Cardiovascular: Normal S1 S2, No JVD, No murmurs, No edema  Respiratory: Lungs clear to auscultation	  Psychiatry: A & O x 3, Mood & affect appropriate  Gastrointestinal:  Soft, Non-tender, + BS	  Skin: No rashes, No ecchymoses, No cyanosis	  Neurologic: Non-focal  Extremities: Normal range of motion, No clubbing, cyanosis or edema  Vascular: Peripheral pulses palpable 2+ bilaterally    TELEMETRY: 	    ECG:  Accelerated Junctional rhythm  Moderate voltage criteria for LVH, may be normal variant  ST abnormality, possible digitalis effect  Abnormal ECG  ECHO:A complete two-dimensional transthoracic echocardiogram was performed (2D,   M-mode, spectral and color flow doppler).  Study Quality: Good.There is   mild   concentric left ventricular hypertrophy.The left ventricular wall motion   is   normal.The left ventricular ejection fraction is estimated to be   60-65%The   left atrium is moderately dilated.Right atrial size is normal.The right   ventricle is normal in size and function.There is mild to moderate aortic   valve thickening.There is mild aortic regurgitation.No hemodynamically   significant valvular aortic stenosis.Mitral annular calcification   noted.There   is mild to moderate mitral regurgitation.Structurally normal tricuspid   valve.There ismild tricuspid regurgitation.There is borderline pulmonary   hypertension.No aortic root dilatation.There is a pacemaker wire in the   right   heart.There is no pericardial effusion.  STRESS:  CATH:  	  RADIOLOGY:  CXR:Impression: Congestion and/or infiltrates. Right effusion  CT:  US:   	  	  LABS:	 	    CARDIAC MARKERS:                                  9.3    6.5   )-----------( 75       ( 11 Jun 2017 10:07 )             26.9     06-11    131<L>  |  96  |  51<H>  ----------------------------<  102<H>  5.4<H>   |  19<L>  |  1.90<H>    Ca    10.8<H>      11 Jun 2017 10:07  Phos  4.8     06-11  Mg     1.7     06-11    TPro  5.1<L>  /  Alb  2.4<L>  /  TBili  0.4  /  DBili  x   /  AST  27  /  ALT  23  /  AlkPhos  194<H>  06-10    proBNP: Serum Pro-Brain Natriuretic Peptide: 6339 pg/mL (06-11 @ 10:07)    Lipid Profile:   HgA1c:   TSH:     ASSESSMENT/PLAN: 	  #HFpEF: last echo in 5/2017 showing EF 60-65%. On admission, patient in respiratory distress with exam significant for crackles and LE edema. CXR significant for pulmonary congestion. Likely acute CHF exacerbation due to sepsis. Initially not diuresed given sepsis, but overnight received Lasix 40mg IVP x1 with transfusion. BPs stable. Currently net negative 1.5L. Lung exam improved.   - restart home Lasix 20mg QD  - keep euvolemic to net negative    #Afib: s/p PPM, on Eliquis at home. Initially started on hep gtt, but overnight concern for bleeding due to 2U Hgb drop so AC discontinued    - c/w home Amiodarone 200mg QD   - will consider AC when Hgb stabilized and patient tolerating PO    #CAD: s/p CABG. Mild troponemia on presentation, peaked at 0.07. Patient without chest pain. EKG without ischemic changes. Likely demand ischemia due to CHF exacerbation.   - c/w home Lipitor 80mg QHS  - holding ASA in the setting of possible bleed

## 2017-06-12 DIAGNOSIS — Z51.5 ENCOUNTER FOR PALLIATIVE CARE: ICD-10-CM

## 2017-06-12 DIAGNOSIS — R78.81 BACTEREMIA: ICD-10-CM

## 2017-06-12 DIAGNOSIS — Z78.9 OTHER SPECIFIED HEALTH STATUS: ICD-10-CM

## 2017-06-12 DIAGNOSIS — C50.919 MALIGNANT NEOPLASM OF UNSPECIFIED SITE OF UNSPECIFIED FEMALE BREAST: ICD-10-CM

## 2017-06-12 DIAGNOSIS — C83.30 DIFFUSE LARGE B-CELL LYMPHOMA, UNSPECIFIED SITE: ICD-10-CM

## 2017-06-12 LAB
ANION GAP SERPL CALC-SCNC: 15 MMOL/L — SIGNIFICANT CHANGE UP (ref 5–17)
APTT BLD: 26.5 SEC — LOW (ref 27.5–37.4)
BUN SERPL-MCNC: 44 MG/DL — HIGH (ref 7–23)
CALCIUM SERPL-MCNC: 9.7 MG/DL — SIGNIFICANT CHANGE UP (ref 8.4–10.5)
CANCER AG27-29 SERPL-ACNC: 50.1 U/ML — HIGH (ref 0–37.7)
CEA SERPL-MCNC: 5.5 NG/ML — HIGH (ref 0–3.8)
CHLORIDE SERPL-SCNC: 99 MMOL/L — SIGNIFICANT CHANGE UP (ref 96–108)
CO2 SERPL-SCNC: 21 MMOL/L — LOW (ref 22–31)
CREAT SERPL-MCNC: 1.7 MG/DL — HIGH (ref 0.5–1.3)
GLUCOSE SERPL-MCNC: 82 MG/DL — SIGNIFICANT CHANGE UP (ref 70–99)
GRAM STN FLD: SIGNIFICANT CHANGE UP
GRAM STN FLD: SIGNIFICANT CHANGE UP
HCT VFR BLD CALC: 26.4 % — LOW (ref 34.5–45)
HGB BLD-MCNC: 9.2 G/DL — LOW (ref 11.5–15.5)
INR BLD: 1.59 — HIGH (ref 0.88–1.16)
MAGNESIUM SERPL-MCNC: 1.8 MG/DL — SIGNIFICANT CHANGE UP (ref 1.6–2.6)
MCHC RBC-ENTMCNC: 30.5 PG — SIGNIFICANT CHANGE UP (ref 27–34)
MCHC RBC-ENTMCNC: 34.8 G/DL — SIGNIFICANT CHANGE UP (ref 32–36)
MCV RBC AUTO: 87.4 FL — SIGNIFICANT CHANGE UP (ref 80–100)
PHOSPHATE SERPL-MCNC: 5 MG/DL — HIGH (ref 2.5–4.5)
PLATELET # BLD AUTO: 71 K/UL — LOW (ref 150–400)
POTASSIUM SERPL-MCNC: 4.3 MMOL/L — SIGNIFICANT CHANGE UP (ref 3.5–5.3)
POTASSIUM SERPL-SCNC: 4.3 MMOL/L — SIGNIFICANT CHANGE UP (ref 3.5–5.3)
PROTHROM AB SERPL-ACNC: 17.8 SEC — HIGH (ref 9.8–12.7)
RBC # BLD: 3.02 M/UL — LOW (ref 3.8–5.2)
RBC # FLD: 16.6 % — SIGNIFICANT CHANGE UP (ref 10.3–16.9)
SODIUM SERPL-SCNC: 135 MMOL/L — SIGNIFICANT CHANGE UP (ref 135–145)
SPECIMEN SOURCE: SIGNIFICANT CHANGE UP
WBC # BLD: 6.5 K/UL — SIGNIFICANT CHANGE UP (ref 3.8–10.5)
WBC # FLD AUTO: 6.5 K/UL — SIGNIFICANT CHANGE UP (ref 3.8–10.5)

## 2017-06-12 PROCEDURE — 99233 SBSQ HOSP IP/OBS HIGH 50: CPT

## 2017-06-12 PROCEDURE — 93306 TTE W/DOPPLER COMPLETE: CPT | Mod: 26

## 2017-06-12 PROCEDURE — 99233 SBSQ HOSP IP/OBS HIGH 50: CPT | Mod: GC

## 2017-06-12 PROCEDURE — 99232 SBSQ HOSP IP/OBS MODERATE 35: CPT | Mod: GC

## 2017-06-12 PROCEDURE — 36590 REMOVAL TUNNELED CV CATH: CPT

## 2017-06-12 PROCEDURE — 71010: CPT | Mod: 26

## 2017-06-12 RX ORDER — ATORVASTATIN CALCIUM 80 MG/1
40 TABLET, FILM COATED ORAL AT BEDTIME
Qty: 0 | Refills: 0 | Status: DISCONTINUED | OUTPATIENT
Start: 2017-06-12 | End: 2017-07-06

## 2017-06-12 RX ORDER — ACETAMINOPHEN 500 MG
650 TABLET ORAL EVERY 6 HOURS
Qty: 0 | Refills: 0 | Status: DISCONTINUED | OUTPATIENT
Start: 2017-06-12 | End: 2017-07-06

## 2017-06-12 RX ORDER — FUROSEMIDE 40 MG
20 TABLET ORAL ONCE
Qty: 0 | Refills: 0 | Status: COMPLETED | OUTPATIENT
Start: 2017-06-12 | End: 2017-06-12

## 2017-06-12 RX ORDER — HEPARIN SODIUM 5000 [USP'U]/ML
600 INJECTION INTRAVENOUS; SUBCUTANEOUS
Qty: 25000 | Refills: 0 | Status: DISCONTINUED | OUTPATIENT
Start: 2017-06-12 | End: 2017-06-12

## 2017-06-12 RX ORDER — MEPERIDINE HYDROCHLORIDE 50 MG/ML
25 INJECTION INTRAMUSCULAR; INTRAVENOUS; SUBCUTANEOUS ONCE
Qty: 0 | Refills: 0 | Status: DISCONTINUED | OUTPATIENT
Start: 2017-06-12 | End: 2017-06-12

## 2017-06-12 RX ORDER — ACETAMINOPHEN 500 MG
650 TABLET ORAL EVERY 6 HOURS
Qty: 0 | Refills: 0 | Status: DISCONTINUED | OUTPATIENT
Start: 2017-06-12 | End: 2017-06-12

## 2017-06-12 RX ADMIN — Medication 20 MILLIGRAM(S): at 17:21

## 2017-06-12 RX ADMIN — MEPERIDINE HYDROCHLORIDE 25 MILLIGRAM(S): 50 INJECTION INTRAMUSCULAR; INTRAVENOUS; SUBCUTANEOUS at 10:03

## 2017-06-12 RX ADMIN — PIPERACILLIN AND TAZOBACTAM 200 GRAM(S): 4; .5 INJECTION, POWDER, LYOPHILIZED, FOR SOLUTION INTRAVENOUS at 18:30

## 2017-06-12 RX ADMIN — Medication 10 MILLIGRAM(S): at 12:16

## 2017-06-12 RX ADMIN — PIPERACILLIN AND TAZOBACTAM 200 GRAM(S): 4; .5 INJECTION, POWDER, LYOPHILIZED, FOR SOLUTION INTRAVENOUS at 12:10

## 2017-06-12 RX ADMIN — AMIODARONE HYDROCHLORIDE 200 MILLIGRAM(S): 400 TABLET ORAL at 05:55

## 2017-06-12 RX ADMIN — PIPERACILLIN AND TAZOBACTAM 200 GRAM(S): 4; .5 INJECTION, POWDER, LYOPHILIZED, FOR SOLUTION INTRAVENOUS at 05:55

## 2017-06-12 RX ADMIN — Medication 2: at 22:37

## 2017-06-12 RX ADMIN — MEPERIDINE HYDROCHLORIDE 25 MILLIGRAM(S): 50 INJECTION INTRAMUSCULAR; INTRAVENOUS; SUBCUTANEOUS at 09:48

## 2017-06-12 RX ADMIN — Medication 650 MILLIGRAM(S): at 09:48

## 2017-06-12 NOTE — CONSULT NOTE ADULT - ATTENDING COMMENTS
Met with patient's  and brother of  at the bedside.  They are aware of the severity of illness, and we discussed our concern that the infection is not clearing.  Discussed that although cancer is in "remission" as evaluated by Dr. Galaviz, patients immune system and overall health remains severely compromised.  Prognosis guarded.  Further discussions to be held reagrding code status

## 2017-06-12 NOTE — PROGRESS NOTE ADULT - PROBLEM SELECTOR PLAN 1
- etiology unclear at the moment  - however, suspect mix CHF + Sepsis component  - Currently BP is acceptable  creatinine trending down  cxr is wet appearing    - Patient responded well to IV lasix and is diuresing currently without drop in BP.  recommend 40mg iv lasix for pulmonary congestion    -   - avoid nephrotoxic agents  - dose all meds for GFR < 20

## 2017-06-12 NOTE — PROVIDER CONTACT NOTE (CRITICAL VALUE NOTIFICATION) - ACTION/TREATMENT ORDERED:
none at this time feels patient is already on appropriate antibiotics and does not require isolation

## 2017-06-12 NOTE — PROGRESS NOTE ADULT - ATTENDING COMMENTS
Patient seen and examined with house-staff during bedside rounds.  Resident note read, including vitals, physical findings, laboratory data, and radiological reports.   Revisions included below.  Direct personal management at bed side and extensive interpretation of the data.  Plan was outlined and discussed in details with the housestaff.  Decision making of high complexity  respiratory failure and gram-negative bacteremia. Continue BiPAP but the patient might need mechanical ventilation. Continue antibiotic and repeat the blood culture. Avoid fluid overload. Continue with palliative medicine

## 2017-06-12 NOTE — PROVIDER CONTACT NOTE (CRITICAL VALUE NOTIFICATION) - SITUATION
Positive blood culture Aerobic- gram  negative rods- Primary nurse Mallika also informed of positive blood result
patient here for PNA and bacteremia
pt with known hyperkalemia from chem, treated

## 2017-06-12 NOTE — PROGRESS NOTE ADULT - SUBJECTIVE AND OBJECTIVE BOX
Chief Complaint/Reason for Consult: CV mgmt  INTERVAL HPI: Respiratory status stable overnight on BIPAP  	  MEDICATIONS:  amiodarone    Tablet 200milliGRAM(s) Oral daily    piperacillin/tazobactam IVPB. 2.25Gram(s) IV Intermittent every 6 hours      acetaminophen  Suppository 650milliGRAM(s) Rectal every 6 hours PRN    bisacodyl Suppository 10milliGRAM(s) Rectal daily    insulin lispro (HumaLOG) corrective regimen sliding scale  SubCutaneous Before meals and at bedtime  dextrose Gel 1Dose(s) Oral once PRN  dextrose 50% Injectable 12.5Gram(s) IV Push once  dextrose 50% Injectable 25Gram(s) IV Push once  dextrose 50% Injectable 25Gram(s) IV Push once  glucagon  Injectable 1milliGRAM(s) IntraMuscular once PRN  atorvastatin 40milliGRAM(s) Oral at bedtime    dextrose 5%. 1000milliLiter(s) IV Continuous <Continuous>      REVIEW OF SYSTEMS:  [x] As per HPI  CONSTITUTIONAL: No fever, weight loss, or fatigue  RESPIRATORY: No cough, wheezing, chills or hemoptysis; No Shortness of Breath  CARDIOVASCULAR: No chest pain, palpitations, dizziness, or leg swelling  GASTROINTESTINAL: No abdominal or epigastric pain. No nausea, vomiting, or hematemesis; No diarrhea or constipation. No melena or hematochezia.  MUSCULOSKELETAL: No joint pain or swelling; No muscle, back, or extremity pain  [x] All others negative	  [ ] Unable to obtain    PHYSICAL EXAM:  T(C): 38.3, Max: 39.6 (06-12 @ 10:00)  HR: 94 (82 - 121)  BP: 115/52 (79/48 - 125/67)  RR: 18 (15 - 33)  SpO2: 97% (91% - 100%)  Wt(kg): --  I&O's Summary  I & Os for 24h ending 12 Jun 2017 07:00  =============================================  IN: 385 ml / OUT: 2205 ml / NET: -1820 ml    I & Os for current day (as of 12 Jun 2017 13:40)  =============================================  IN: 320 ml / OUT: 120 ml / NET: 200 ml        Appearance: Normal	  HEENT:   Normal oral mucosa  Cardiovascular: Normal S1 S2, No JVD, No murmurs, No edema  Respiratory: Lungs clear to auscultation	  Gastrointestinal:  Soft, Non-tender, + BS	  Extremities: Normal range of motion, No clubbing, cyanosis or edema  Vascular: Peripheral pulses palpable 2+ bilaterally    TELEMETRY: 	    ECG:    	  RADIOLOGY:   CXR:  CT:  US:    CARDIAC TESTING:  Echocardiogram:  Catheterization:  Stress Test:      LABS:	 	    CARDIAC MARKERS:                                  9.2    6.5   )-----------( 71       ( 12 Jun 2017 05:39 )             26.4     06-12    135  |  99  |  44<H>  ----------------------------<  82  4.3   |  21<L>  |  1.70<H>    Ca    9.7      12 Jun 2017 05:41  Phos  5.0     06-12  Mg     1.8     06-12    TPro  5.1<L>  /  Alb  2.4<L>  /  TBili  0.4  /  DBili  x   /  AST  27  /  ALT  23  /  AlkPhos  194<H>  06-10    proBNP:   Lipid Profile:   HgA1c:   TSH:     ASSESSMENT/PLAN: 	  #HFpEF: last echo in 5/2017 showing EF 60-65%. On admission, patient in respiratory distress with exam significant for crackles and LE edema. CXR significant for pulmonary congestion. Likely acute CHF exacerbation due to sepsis. Initially not diuresed given sepsis, but overnight received Lasix 40mg IVP x1 with transfusion. BPs stable.   - continue IV lasix per Renal recs  - keep euvolemic to net negative    #Afib: s/p PPM, on Eliquis at home. Initially started on hep gtt, but overnight concern for bleeding due to 2U Hgb drop so AC discontinued    - c/w home Amiodarone 200mg QD   - will consider AC when Hgb stabilized and patient tolerating PO    #CAD: s/p CABG. Mild troponemia on presentation, peaked at 0.07. Patient without chest pain. EKG without ischemic changes. Likely demand ischemia due to CHF exacerbation.   - c/w home Lipitor 80mg QHS  - holding ASA in the setting of possible bleed

## 2017-06-12 NOTE — CONSULT NOTE ADULT - PROBLEM SELECTOR RECOMMENDATION 3
- Per discussion with Dr. Galaviz, pt is considered to be in remission as CT scans do not show evidence of lymphadenopathy  - As pt in clinical remission, consider removing chemoport in the setting of persistent bacteremia

## 2017-06-12 NOTE — CONSULT NOTE ADULT - PROBLEM SELECTOR RECOMMENDATION 9
- Shortness of breath likely multifactorial in the setting of febrile severe infection and CHF exacerbation  - Pt currently on BiPAP, however, somnolent. Per RN at bedside, pts mental status has been waxing and waning over the last 24 hours  - Care per primary team - Shortness of breath likely multifactorial in the setting of febrile severe infection and CHF exacerbation  - Pt currently on BiPAP, however, somnolent. Per RN at bedside, pts mental status has been waxing and waning over the last 24 hours  - Care per primary team  Presently not requiring intubation. Team has discussed with , for the moment wishes to continue all support

## 2017-06-12 NOTE — CONSULT NOTE ADULT - SUBJECTIVE AND OBJECTIVE BOX
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 Pt was also noted to be hyperkalemic 2/2 acute renal failure and with GNR bacteremia.       PAST MEDICAL & SURGICAL HISTORY:  Scoliosis  Follicular lymphoma  Neck mass  Afib  Other hyperlipidemia  Breast cancer  CAD (coronary artery disease)  Diabetes  HTN (hypertension)  No pertinent past medical history  H/O abdominal hysterectomy  H/O thyroidectomy  S/P CABG x 3      FAMILY HISTORY:  No pertinent family history in first degree relatives    Reviewed and     ROS:    Unable to attain due to:                      Dyspnea (Matthew 0-10):                        N/V (Y/N):                              Secretions (Y/N) :                Agitation(Y/N):   Pain (Y/N):       -Provocation/Palliation:  -Quality/Quantity:  -Radiating:  -Severity:  -Timing/Frequency:  -Impact on ADLs:    General:  Denied  HEENT:    Denied  Neck:  Denied  CVS:  Denied  Resp:  Denied  GI:  Denied  :  Denied  Musc:  Denied  Neuro:  Denied  Psych:  Denied  Skin:  Denied  Lymph:  Denied    Allergies    IV CONtRAST (Flushing (Skin); Rash)  No Known Drug Allergies    Intolerances        Opiate Naive (Y/N):   -iStop reviewed (Y/N):    Medications:      MEDICATIONS  (STANDING):  amiodarone    Tablet 200milliGRAM(s) Oral daily  piperacillin/tazobactam IVPB. 2.25Gram(s) IV Intermittent every 6 hours  insulin lispro (HumaLOG) corrective regimen sliding scale  SubCutaneous Before meals and at bedtime  dextrose 5%. 1000milliLiter(s) IV Continuous <Continuous>  dextrose 50% Injectable 12.5Gram(s) IV Push once  dextrose 50% Injectable 25Gram(s) IV Push once  dextrose 50% Injectable 25Gram(s) IV Push once  heparin  Infusion 600Unit(s)/Hr IV Continuous <Continuous>  atorvastatin 40milliGRAM(s) Oral at bedtime  bisacodyl Suppository 10milliGRAM(s) Rectal daily    MEDICATIONS  (PRN):  dextrose Gel 1Dose(s) Oral once PRN Blood Glucose LESS THAN 70 milliGRAM(s)/deciliter  glucagon  Injectable 1milliGRAM(s) IntraMuscular once PRN Glucose LESS THAN 70 milligrams/deciliter  acetaminophen  Suppository 650milliGRAM(s) Rectal every 6 hours PRN For Temp greater than 38 C (100.4 F)      Labs:    CBC:                        9.2    6.5   )-----------( 71       ( 2017 05:39 )             26.4     CMP:        135  |  99  |  44<H>  ----------------------------<  82  4.3   |  21<L>  |  1.70<H>    Ca    9.7      2017 05:41  Phos  5.0       Mg     1.8         TPro  5.1<L>  /  Alb  2.4<L>  /  TBili  0.4  /  DBili  x   /  AST  27  /  ALT  23  /  AlkPhos  194<H>  06-10    PT/INR - ( 10 Ramon 2017 22:30 )   PT: 20.7 sec;   INR: 1.84          PTT - ( 2017 08:05 )  PTT:26.5 sec  Urinalysis Basic - ( 10 Ramon 2017 22:50 )    Color: Yellow / Appearance: Cloudy / SG: <=1.005 / pH: x  Gluc: x / Ketone: NEGATIVE  / Bili: NEGATIVE / Urobili: 0.2 E.U./dL   Blood: x / Protein: 30 mg/dL / Nitrite: NEGATIVE   Leuk Esterase: Moderate / RBC: 5-10 /HPF / WBC Many /HPF   Sq Epi: x / Non Sq Epi: Rare /HPF / Bacteria: Present /HPF        Imaging:  Reviewed    PEx:  T(C): 39.6, Max: 39.6 ( @ 10:00)  HR: 82 (82 - 121)  BP: 119/46 (79/48 - 149/78)  RR: 24 (15 - 33)  SpO2: 99% (91% - 100%)  Wt(kg): --  Daily     Daily Weight in k.2 (2017 07:00)  CAPILLARY BLOOD GLUCOSE  96 (2017 06:00)    I&O's Summary  I & Os for 24h ending 2017 07:00  =============================================  IN: 385 ml / OUT: 2205 ml / NET: -1820 ml    I & Os for current day (as of 2017 11:00)  =============================================  IN: 210 ml / OUT: 100 ml / NET: 110 ml      General:   HEENT:    Neck:   CVS:   Resp:   GI:    :    Musc:     Neuro:   Psych:     Skin:  Lymph:  Preadmit Karnofsky:  %           Current Karnofsky:     %  Cachexia (Y/N):   BMI:    Advanced Directives:     Full Code     DNR/DNI     MOLST     DPOA     Living Will     Decision maker:  Legal surrogate:    Social History:     GOALS OF CARE DISCUSSION       Palliative care info/counseling provided	           Family meeting       Advanced Directives addressed please see Advance Care Planning Note	           See previous Palliative Medicine Note       Documentation of GOC: 	          REFERRALS	        Palliative Med        Unit SW/Case Mgmt              Speech/Swallow       Patient/Family Support       Massage Therapy       Music Therapy       Hospice       Nutrition       Ethics       PT/OT TANYA CARRILLO   MRN-[id number]     (05/15/1931)    HPI:  87 YO F h/o CAD s/p CABG, atrial fibrillation s/p PPM (on Eliquis), Breast CA dx in  with possible recurrence 5 months ago (now on Arimidex x 5 months), DLBCL dx 2016 on chemotherapy (non-cardiotoxic Bendamustin plus Rituximab - last tx ), HFpEF (EF 60%), HTN, HLD, and DM p/w fever for 1 week, increased lethargy, LE swelling, SOB, decreased appetite, and diarrhea and was admitted to the MICU for severe sepsis 2/2 HAP vs UTI vs colitis and respiratory failure 2/2 acute CHF exacerbation vs HAP now on BiPAP. Pt was also noted to be hyperkalemic 2/2 acute renal failure and PO potassium supplementation and found to have GNR bacteremia.     Pt is currently somnolent and only arousable to noxious stimuli, otherwise unable to cooperate with further history or exam. Pts  at bedside states that she was doing poorly over the last week and appears to have improved since admission.     PAST MEDICAL & SURGICAL HISTORY:  Scoliosis  Follicular lymphoma  Neck mass  Afib  Other hyperlipidemia  Breast cancer  CAD (coronary artery disease)  Diabetes  HTN (hypertension)  No pertinent past medical history  H/O abdominal hysterectomy  H/O thyroidectomy  S/P CABG x 3    FAMILY HISTORY:  No pertinent family history in first degree relatives    Reviewed and     ROS:    Unable to attain due to: altered mental status                    Dyspnea (Matthew 0-10): 7                        N/V (Y/N): No                              Secretions (Y/N): No                Agitation(Y/N): No  Pain (Y/N): No      -Provocation/Palliation: N/A  -Quality/Quantity: N/A  -Radiating: N/A  -Severity: N/A  -Timing/Frequency: N/A  -Impact on ADLs: N/A    General:  Unable to assess  HEENT:    Unable to assess  Neck:  Unable to assess  CVS:  Unable to assess  Resp:  Unable to assess  GI:  Unable to assess  :  Unable to assess  Musc:  Unable to assess  Neuro:  Unable to assess  Psych:  Unable to assess  Skin:  Unable to assess  Lymph:  Unable to assess    Allergies    IV CONTRAST (Flushing (Skin); Rash)  No Known Drug Allergies    Intolerances    Opiate Naive (Y/N):   -iStop reviewed (Y/N):    Medications:      MEDICATIONS  (STANDING):  amiodarone    Tablet 200milliGRAM(s) Oral daily  piperacillin/tazobactam IVPB. 2.25Gram(s) IV Intermittent every 6 hours  insulin lispro (HumaLOG) corrective regimen sliding scale  SubCutaneous Before meals and at bedtime  dextrose 5%. 1000milliLiter(s) IV Continuous <Continuous>  dextrose 50% Injectable 12.5Gram(s) IV Push once  dextrose 50% Injectable 25Gram(s) IV Push once  dextrose 50% Injectable 25Gram(s) IV Push once  heparin  Infusion 600Unit(s)/Hr IV Continuous <Continuous>  atorvastatin 40milliGRAM(s) Oral at bedtime  bisacodyl Suppository 10milliGRAM(s) Rectal daily    MEDICATIONS  (PRN):  dextrose Gel 1Dose(s) Oral once PRN Blood Glucose LESS THAN 70 milliGRAM(s)/deciliter  glucagon  Injectable 1milliGRAM(s) IntraMuscular once PRN Glucose LESS THAN 70 milligrams/deciliter  acetaminophen  Suppository 650milliGRAM(s) Rectal every 6 hours PRN For Temp greater than 38 C (100.4 F)      Labs:    CBC:                        9.2    6.5   )-----------( 71       ( 2017 05:39 )             26.4     CMP:        135  |  99  |  44<H>  ----------------------------<  82  4.3   |  21<L>  |  1.70<H>    Ca    9.7      2017 05:41  Phos  5.0       Mg     1.8     12    TPro  5.1<L>  /  Alb  2.4<L>  /  TBili  0.4  /  DBili  x   /  AST  27  /  ALT  23  /  AlkPhos  194<H>  06-10    PT/INR - ( 10 Ramon 2017 22:30 )   PT: 20.7 sec;   INR: 1.84          PTT - ( 2017 08:05 )  PTT:26.5 sec  Urinalysis Basic - ( 10 Ramon 2017 22:50 )    Color: Yellow / Appearance: Cloudy / SG: <=1.005 / pH: x  Gluc: x / Ketone: NEGATIVE  / Bili: NEGATIVE / Urobili: 0.2 E.U./dL   Blood: x / Protein: 30 mg/dL / Nitrite: NEGATIVE   Leuk Esterase: Moderate / RBC: 5-10 /HPF / WBC Many /HPF   Sq Epi: x / Non Sq Epi: Rare /HPF / Bacteria: Present /HPF    Blood Cx x2 17: GNR x2  Blood Cx x2  6/10/17: GNR x2  Urine Cx 17: 1000 CFU/mL GNR    Imaging:  CT Abdomen/Pelvis  1. Left femoral hernia containing loops of small bowelwithout definite   bowel dilatation to suggest obstruction. Correlate clinically for any   evidence of bowel incarceration.  2. Submucosal thickening of the descending and sigmoid colon which may be   due to underdistention; however, colitis cannot be excluded. Clinical   correlation recommended.  3. Abundant fecal material in the ascending and transverse colon which   may represent constipation.  4. Colonic diverticulosis without radiographic evidence of acute   diverticulitis.  5. Small amount of ascites.  6. Trace bilateral pleural effusions.  7. Incidental/non-acute findings are described above.    CT Chest  Mild cardiomegaly and pulmonary vascular congestion, most consistent with   CHF.    U/S Retroperitoneal  Increased renal echotexture bilaterally suggestive of medical renal   disease.    Bilateral renal cysts.    No hydronephrosis.    CXR  No significant change from 2017.    Surgical Pathology Report:   ACCESSION No:  [id number]  TANYA CARRILLO                      1  Surgical Final Report  Final Diagnosis  Breast, right, 12:00, area of scar; core biopsy:  - Breast parenchyma showing dense fibrosis, elastosis and  calcifications.  Right breast 12 oclock at area of scar    PEx:  T(C): 39.6, Max: 39.6 ( @ 10:00)  HR: 82 (82 - 121)  BP: 119/46 (79/48 - 149/78)  RR: 24 (15 - 33)  SpO2: 99% (91% - 100%)  Wt(kg): --  Daily     Daily Weight in k.2 (2017 07:00)  CAPILLARY BLOOD GLUCOSE  96 (2017 06:00)    I&O's Summary  I & Os for 24h ending 2017 07:00  =============================================  IN: 385 ml / OUT: 2205 ml / NET: -1820 ml    I & Os for current day (as of 2017 11:00)  =============================================  IN: 210 ml / OUT: 100 ml / NET: 110 ml    General: Somnolent on BiPAP, arousable to noxious stimuli, does not follow command  HEENT:  NCAT, PERRL, MMM  Neck: Supple, Shoddy anterior cervical LAD  CVS: Irregular, (+) S1/S2, No M/R/G  Resp: Diffuse rhonchi on anterior auscultation  GI: Soft, NT, ND, Normoactive BS  : External genitalia WNL  Musc: No joint swelling or edema  Neuro: GCS 9  Skin: Warm, dry, sacral and buttock pressure ulcer present  Lymph: Shoddy anterior cervical LAD, no supraclavicular or submandibular LAD  Preadmit Karnofsky: 20 %           Current Karnofsky: 30 %  Cachexia (Y/N): No  BMI: 23.5    Advanced Directives:     Full Code    Decision maker: Seth Carrillo  Legal surrogate: Seth Carrillo    Social History: Pt was previously , however, spouse passed approx. 30 years ago. Has since been re-. Children with prior spouse, no children with current spouse.     GOALS OF CARE DISCUSSION       Palliative care info/counseling provided	           Documentation of GOC: Full Code pending further discussion and medical treatment 	          REFERRALS	        Unit SW/Case Mgmt       Patient/Family Support TANYA CARRILLO   MRN-[id number]     (05/15/1931)    HPI:  85 YO F h/o CAD s/p CABG, atrial fibrillation s/p PPM (on Eliquis), Breast CA dx in  with possible recurrence 5 months ago (now on Arimidex x 5 months), DLBCL dx 2016 on chemotherapy (non-cardiotoxic Bendamustin plus Rituximab - last tx ), HFpEF (EF 60%), HTN, HLD, and DM p/w fever for 1 week, increased lethargy, LE swelling, SOB, decreased appetite, and diarrhea and was admitted to the MICU for severe sepsis 2/2 HAP vs UTI vs colitis and respiratory failure 2/2 acute CHF exacerbation vs HAP now on BiPAP. Pt was also noted to be hyperkalemic 2/2 acute renal failure and PO potassium supplementation and found to have GNR bacteremia.     Pt is currently somnolent and only arousable to noxious stimuli, otherwise unable to cooperate with further history or exam. Pts  at bedside states that she was doing poorly over the last week and appears to have improved since admission.     PAST MEDICAL & SURGICAL HISTORY:  Scoliosis  Follicular lymphoma  Neck mass  Afib  Other hyperlipidemia  Breast cancer  CAD (coronary artery disease)  Diabetes  HTN (hypertension)  No pertinent past medical history  H/O abdominal hysterectomy  H/O thyroidectomy  S/P CABG x 3    FAMILY HISTORY:  No pertinent family history in first degree relatives        ROS:    Unable to attain due to: altered mental status                    Dyspnea (Matthew 0-10): 7  on BiPAP                     N/V (Y/N): No                              Secretions (Y/N): No                Agitation(Y/N): No  Pain (Y/N): No   nonverbal signs of pain  -Provocation/Palliation: N/A  -Quality/Quantity: N/A  -Radiating: N/A  -Severity: N/A  -Timing/Frequency: N/A  -Impact on ADLs: N/A    General:  Unable to assess  HEENT:    Unable to assess  Neck:  Unable to assess  CVS:  Unable to assess  Resp:  Unable to assess  GI:  Unable to assess  :  Unable to assess  Musc:  Unable to assess  Neuro:  Unable to assess  Psych:  Unable to assess  Skin:  Unable to assess  Lymph:  Unable to assess    Allergies    IV CONTRAST (Flushing (Skin); Rash)  No Known Drug Allergies    Intolerances    Opiate Naive (Y/N): yes      Medications:      MEDICATIONS  (STANDING):  amiodarone    Tablet 200milliGRAM(s) Oral daily  piperacillin/tazobactam IVPB. 2.25Gram(s) IV Intermittent every 6 hours  insulin lispro (HumaLOG) corrective regimen sliding scale  SubCutaneous Before meals and at bedtime  dextrose 5%. 1000milliLiter(s) IV Continuous <Continuous>  dextrose 50% Injectable 12.5Gram(s) IV Push once  dextrose 50% Injectable 25Gram(s) IV Push once  dextrose 50% Injectable 25Gram(s) IV Push once  heparin  Infusion 600Unit(s)/Hr IV Continuous <Continuous>  atorvastatin 40milliGRAM(s) Oral at bedtime  bisacodyl Suppository 10milliGRAM(s) Rectal daily    MEDICATIONS  (PRN):  dextrose Gel 1Dose(s) Oral once PRN Blood Glucose LESS THAN 70 milliGRAM(s)/deciliter  glucagon  Injectable 1milliGRAM(s) IntraMuscular once PRN Glucose LESS THAN 70 milligrams/deciliter  acetaminophen  Suppository 650milliGRAM(s) Rectal every 6 hours PRN For Temp greater than 38 C (100.4 F)      Labs:    CBC:                        9.2    6.5   )-----------( 71       ( 2017 05:39 )             26.4     CMP:        135  |  99  |  44<H>  ----------------------------<  82  4.3   |  21<L>  |  1.70<H>    Ca    9.7      2017 05:41  Phos  5.0       Mg     1.8     12    TPro  5.1<L>  /  Alb  2.4<L>  /  TBili  0.4  /  DBili  x   /  AST  27  /  ALT  23  /  AlkPhos  194<H>  06-10    PT/INR - ( 10 Ramon 2017 22:30 )   PT: 20.7 sec;   INR: 1.84          PTT - ( 2017 08:05 )  PTT:26.5 sec  Urinalysis Basic - ( 10 Ramon 2017 22:50 )    Color: Yellow / Appearance: Cloudy / SG: <=1.005 / pH: x  Gluc: x / Ketone: NEGATIVE  / Bili: NEGATIVE / Urobili: 0.2 E.U./dL   Blood: x / Protein: 30 mg/dL / Nitrite: NEGATIVE   Leuk Esterase: Moderate / RBC: 5-10 /HPF / WBC Many /HPF   Sq Epi: x / Non Sq Epi: Rare /HPF / Bacteria: Present /HPF    Blood Cx x2 17: GNR x2  Blood Cx x2  6/10/17: GNR x2  Urine Cx 17: 1000 CFU/mL GNR    Imaging:  CT Abdomen/Pelvis  1. Left femoral hernia containing loops of small bowel without definite   bowel dilatation to suggest obstruction. Correlate clinically for any   evidence of bowel incarceration.  2. Submucosal thickening of the descending and sigmoid colon which may be   due to underdistention; however, colitis cannot be excluded. Clinical   correlation recommended.  3. Abundant fecal material in the ascending and transverse colon which   may represent constipation.  4. Colonic diverticulosis without radiographic evidence of acute   diverticulitis.  5. Small amount of ascites.  6. Trace bilateral pleural effusions.  7. Incidental/non-acute findings are described above.    CT Chest  Mild cardiomegaly and pulmonary vascular congestion, most consistent with   CHF.    U/S Retroperitoneal  Increased renal echotexture bilaterally suggestive of medical renal   disease.    Bilateral renal cysts.    No hydronephrosis.    CXR  No significant change from 2017.    Surgical Pathology Report:   ACCESSION No:  [id number]  TANYA CARRILLO                      1  Surgical Final Report  Final Diagnosis  Breast, right, 12:00, area of scar; core biopsy:  - Breast parenchyma showing dense fibrosis, elastosis and  calcifications.  Right breast 12 oclock at area of scar    PEx:  T(C): 39.6, Max: 39.6 (12 @ 10:00)  HR: 82 (82 - 121)  BP: 119/46 (79/48 - 149/78)  RR: 24 (15 - 33)  SpO2: 99% (91% - 100%)  Wt(kg): --  Daily     Daily Weight in k.2 (2017 07:00)  CAPILLARY BLOOD GLUCOSE  96 (2017 06:00)    I&O's Summary  I & Os for 24h ending 2017 07:00  =============================================  IN: 385 ml / OUT: 2205 ml / NET: -1820 ml    I & Os for current day (as of 2017 11:00)  =============================================  IN: 210 ml / OUT: 100 ml / NET: 110 ml    General: Somnolent on BiPAP, arousable to noxious stimuli, does not follow command  HEENT:  NCAT, PERRL, MMM  Neck: Supple, Shoddy anterior cervical LAD  CVS: Irregular, (+) S1/S2, No M/R/G well healed sternal incision  Resp: Diffuse rhonchi on anterior auscultation    chemoport  GI: Soft, NT, ND, Normoactive BS  : External genitalia WNL  Musc: No joint swelling or edema  Neuro: GCS 9  Skin: Warm, dry, sacral and buttock pressure ulcer present  Lymph: Shoddy anterior cervical LAD, no supraclavicular or submandibular LAD  Preadmit      Karnofsky: 20 %           Current Karnofsky: 20 %  Cachexia (Y/N): No  BMI: 23.5    Advanced Directives:     Full Code    Decision maker: Seth Carrillo  Legal surrogate: Seth Carrillo    Social History: Pt was previously , however, spouse passed approx. 30 years ago. Has since been re-. Children with prior spouse, no children with current spouse.     GOALS OF CARE DISCUSSION       Palliative care info/counseling provided	           Documentation of GOC: Full Code pending further discussion and medical treatment 	          REFERRALS	        Unit SW/Case Mgmt       Patient/Family Support

## 2017-06-12 NOTE — CONSULT NOTE ADULT - PROBLEM SELECTOR RECOMMENDATION 5
- Encounter for discussion of goals of care  - At this time, pts prognosis is guarded dependent on further clinical progress with medical treatment and resolution of underlying infection  - Will continue to have on going discussion with the pts family/HCP regarding further goals of care - Encounter for discussion of goals of care  - At this time, pts prognosis is guarded dependent on further clinical progress with medical treatment and resolution of underlying infection  - Will continue to have on going discussion with the pts family/surrogate regarding further goals of care

## 2017-06-12 NOTE — CONSULT NOTE ADULT - PROBLEM SELECTOR RECOMMENDATION 4
- Per discussion with Dr. Galaviz, pts breast CA is stable on Arimidex - Per discussion with Dr. Galaviz, pts breast CA is stable on Arimidex  She will not need this med during hospitalization

## 2017-06-12 NOTE — CONSULT NOTE ADULT - PROBLEM SELECTOR RECOMMENDATION 2
- Emphasized to the pts  and his brother at the bedside that the patient has a severe blood infection which she is not currently clearing despite antibiotic treatment  - Clinical prognosis is guarded pending further medical treatment - Emphasized to the pts  and his brother at the bedside that the patient has a severe blood infection which she is not currently clearing despite antibiotic treatment  - Clinical prognosis is guarded pending further medical treatment  Concern re seeding from Chemo port?

## 2017-06-12 NOTE — PROGRESS NOTE ADULT - SUBJECTIVE AND OBJECTIVE BOX
Dr. Daniel Colon  Renal Fellow  Beeper # [phone]        Patient seen and examined at bedside.    in respiratory distress / on bipap      amiodarone    Tablet 200milliGRAM(s) daily  piperacillin/tazobactam IVPB. 2.25Gram(s) every 6 hours  insulin lispro (HumaLOG) corrective regimen sliding scale  Before meals and at bedtime  dextrose 5%. 1000milliLiter(s) <Continuous>  dextrose Gel 1Dose(s) once PRN  dextrose 50% Injectable 12.5Gram(s) once  dextrose 50% Injectable 25Gram(s) once  dextrose 50% Injectable 25Gram(s) once  glucagon  Injectable 1milliGRAM(s) once PRN  heparin  Infusion 600Unit(s)/Hr <Continuous>  atorvastatin 40milliGRAM(s) at bedtime  acetaminophen  Suppository 650milliGRAM(s) every 6 hours PRN  bisacodyl Suppository 10milliGRAM(s) daily      Allergies    IV CONtRAST (Flushing (Skin); Rash)  No Known Drug Allergies    Intolerances        T(C): , Max: 39.6 (06-12 @ 10:00)  T(F): , Max: 103.2 (06-12 @ 10:00)  HR: 82  BP: 119/46  BP(mean): 72  RR: 24  SpO2: 99%  Wt(kg): --  I & Os for 24h ending 06-12 @ 07:00  =============================================  IN:    Solution: 300 ml    Solution: 85 ml    Total IN: 385 ml  ---------------------------------------------  OUT:    Indwelling Catheter - Urethral: 2205 ml    Total OUT: 2205 ml  ---------------------------------------------  Total NET: -1820 ml    I & Os for current day (as of 06-12 @ 10:59)  =============================================  IN:    Oral Fluid: 200 ml    Solution: 10 ml    Total IN: 210 ml  ---------------------------------------------  OUT:    Indwelling Catheter - Urethral: 100 ml    Total OUT: 100 ml  ---------------------------------------------  Total NET: 110 ml        PHYSICAL EXAM:  Constitutional: ill./frail appearing.  No acute distress  Neck: Supple. No JVD.  Respiratory: rhonchi b/l   Cardiovascular: S1, S2.  Regular rate and rhythm.    Gastrointestinal: soft, non-tender, non-distended, normal bowel sounds  Extremities: Warm.  No lower extremity edema.          LABS:                        9.2    6.5   )-----------( 71       ( 12 Jun 2017 05:39 )             26.4     06-12    135  |  99  |  44<H>  ----------------------------<  82  4.3   |  21<L>  |  1.70<H>    Ca    9.7      12 Jun 2017 05:41  Phos  5.0     06-12  Mg     1.8     06-12    TPro  5.1<L>  /  Alb  2.4<L>  /  TBili  0.4  /  DBili  x   /  AST  27  /  ALT  23  /  AlkPhos  194<H>  06-10      PT/INR - ( 10 Ramon 2017 22:30 )   PT: 20.7 sec;   INR: 1.84          PTT - ( 12 Jun 2017 08:05 )  PTT:26.5 sec  Urinalysis Basic - ( 10 Ramon 2017 22:50 )    Color: Yellow / Appearance: Cloudy / SG: <=1.005 / pH: x  Gluc: x / Ketone: NEGATIVE  / Bili: NEGATIVE / Urobili: 0.2 E.U./dL   Blood: x / Protein: 30 mg/dL / Nitrite: NEGATIVE   Leuk Esterase: Moderate / RBC: 5-10 /HPF / WBC Many /HPF   Sq Epi: x / Non Sq Epi: Rare /HPF / Bacteria: Present /HPF      Chloride, Random Urine: <20 mmol/L (06-10 @ 22:50)  Creatinine, Random Urine: 45 mg/dL (06-10 @ 22:50) Dr. Daniel Colon  Renal Fellow  Beeper # [phone]        Patient seen and examined at bedside.    in respiratory distress / on bipap      amiodarone    Tablet 200milliGRAM(s) daily  piperacillin/tazobactam IVPB. 2.25Gram(s) every 6 hours  insulin lispro (HumaLOG) corrective regimen sliding scale  Before meals and at bedtime  dextrose 5%. 1000milliLiter(s) <Continuous>  dextrose Gel 1Dose(s) once PRN  dextrose 50% Injectable 12.5Gram(s) once  dextrose 50% Injectable 25Gram(s) once  dextrose 50% Injectable 25Gram(s) once  glucagon  Injectable 1milliGRAM(s) once PRN  heparin  Infusion 600Unit(s)/Hr <Continuous>  atorvastatin 40milliGRAM(s) at bedtime  acetaminophen  Suppository 650milliGRAM(s) every 6 hours PRN  bisacodyl Suppository 10milliGRAM(s) daily      Allergies    IV CONtRAST (Flushing (Skin); Rash)  No Known Drug Allergies    Intolerances        T(C): , Max: 39.6 (06-12 @ 10:00)  T(F): , Max: 103.2 (06-12 @ 10:00)  HR: 82  BP: 119/46  BP(mean): 72  RR: 24  SpO2: 99%  Wt(kg): --  I & Os for 24h ending 06-12 @ 07:00  =============================================  IN:    Solution: 300 ml    Solution: 85 ml    Total IN: 385 ml  ---------------------------------------------  OUT:    Indwelling Catheter - Urethral: 2205 ml    Total OUT: 2205 ml  ---------------------------------------------  Total NET: -1820 ml    I & Os for current day (as of 06-12 @ 10:59)  =============================================  IN:    Oral Fluid: 200 ml    Solution: 10 ml    Total IN: 210 ml  ---------------------------------------------  OUT:    Indwelling Catheter - Urethral: 100 ml    Total OUT: 100 ml  ---------------------------------------------  Total NET: 110 ml        PHYSICAL EXAM:  Constitutional: ill./frail appearing.  lethargic on BIPAP  Neck: No JVD.  Respiratory: rhonchi b/l   Cardiovascular: S1, S2.  Regular rate and rhythm.    Gastrointestinal: soft, non-tender, non-distended  Extremities: Warm.  No lower extremity edema  skin-- no rash         LABS:                        9.2    6.5   )-----------( 71       ( 12 Jun 2017 05:39 )             26.4     06-12    135  |  99  |  44<H>  ----------------------------<  82  4.3   |  21<L>  |  1.70<H>    Ca    9.7      12 Jun 2017 05:41  Phos  5.0     06-12  Mg     1.8     06-12    TPro  5.1<L>  /  Alb  2.4<L>  /  TBili  0.4  /  DBili  x   /  AST  27  /  ALT  23  /  AlkPhos  194<H>  06-10      PT/INR - ( 10 Ramon 2017 22:30 )   PT: 20.7 sec;   INR: 1.84          PTT - ( 12 Jun 2017 08:05 )  PTT:26.5 sec  Urinalysis Basic - ( 10 Ramon 2017 22:50 )    Color: Yellow / Appearance: Cloudy / SG: <=1.005 / pH: x  Gluc: x / Ketone: NEGATIVE  / Bili: NEGATIVE / Urobili: 0.2 E.U./dL   Blood: x / Protein: 30 mg/dL / Nitrite: NEGATIVE   Leuk Esterase: Moderate / RBC: 5-10 /HPF / WBC Many /HPF   Sq Epi: x / Non Sq Epi: Rare /HPF / Bacteria: Present /HPF      Chloride, Random Urine: <20 mmol/L (06-10 @ 22:50)  Creatinine, Random Urine: 45 mg/dL (06-10 @ 22:50)

## 2017-06-12 NOTE — PROGRESS NOTE ADULT - ATTENDING COMMENTS
ARF likely prerenal in setting of sepsis/pneumonia -- improved  would keep I/O even to slt negative if needs diuresis if tolerates  follow lytes  rx infection per team

## 2017-06-12 NOTE — PROGRESS NOTE ADULT - SUBJECTIVE AND OBJECTIVE BOX
INTERVAL HPI/OVERNIGHT EVENTS:    SUBJECTIVE: Patient seen and examined at bedside.     ROS:  CV: Denies chest pain  Resp: Denies SOB  GI: Denies abdominal pain, constipation, diarrhea, nausea, vomiting  : Denies dysuria  ID: Denies fevers, chills  MSK: Denies joint pain     OBJECTIVE:    VITAL SIGNS:  ICU Vital Signs Last 24 Hrs  T(C): 36.5, Max: 38.9 (06-11 @ 10:00)  T(F): 97.7, Max: 102 (06-11 @ 10:00)  HR: 96 (74 - 116)  BP: 103/56 (79/48 - 152/60)  BP(mean): 73 (54 - 102)  ABP: --  ABP(mean): --  RR: 20 (15 - 33)  SpO2: 91% (91% - 100%)      I & Os for 24h ending 06-11 @ 07:00  =============================================  IN: 600 ml / OUT: 1110 ml / NET: -510 ml    I & Os for current day (as of 06-12 @ 06:43)  =============================================  IN: 385 ml / OUT: 2205 ml / NET: -1820 ml    CAPILLARY BLOOD GLUCOSE  101 (11 Jun 2017 16:19)      PHYSICAL EXAM:    General: NAD  HEENT: NC/AT; PERRL, clear conjunctiva  Neck: supple  Respiratory: CTA b/l  Cardiovascular: +S1/S2; RRR  Abdomen: soft, NT/ND; +BS x4  Extremities: WWP, 2+ peripheral pulses b/l; no LE edema  Skin: normal color and turgor; no rash  Neurological:     MEDICATIONS:  MEDICATIONS  (STANDING):  amiodarone    Tablet 200milliGRAM(s) Oral daily  atorvastatin 80milliGRAM(s) Oral at bedtime  piperacillin/tazobactam IVPB. 2.25Gram(s) IV Intermittent every 6 hours  insulin lispro (HumaLOG) corrective regimen sliding scale  SubCutaneous Before meals and at bedtime  dextrose 5%. 1000milliLiter(s) IV Continuous <Continuous>  dextrose 50% Injectable 12.5Gram(s) IV Push once  dextrose 50% Injectable 25Gram(s) IV Push once  dextrose 50% Injectable 25Gram(s) IV Push once  enoxaparin Injectable 30milliGRAM(s) SubCutaneous every 24 hours    MEDICATIONS  (PRN):  dextrose Gel 1Dose(s) Oral once PRN Blood Glucose LESS THAN 70 milliGRAM(s)/deciliter  glucagon  Injectable 1milliGRAM(s) IntraMuscular once PRN Glucose LESS THAN 70 milligrams/deciliter  acetaminophen  Suppository 650milliGRAM(s) Rectal every 6 hours PRN For Temp greater than 38 C (100.4 F)      ALLERGIES:  Allergies    IV CONtRAST (Flushing (Skin); Rash)  No Known Drug Allergies    Intolerances        LABS:                        9.2    6.5   )-----------( 71       ( 12 Jun 2017 05:39 )             26.4     06-12    135  |  99  |  44<H>  ----------------------------<  82  4.3   |  21<L>  |  1.70<H>    Ca    9.7      12 Jun 2017 05:41  Phos  5.0     06-12  Mg     1.8     06-12    TPro  5.1<L>  /  Alb  2.4<L>  /  TBili  0.4  /  DBili  x   /  AST  27  /  ALT  23  /  AlkPhos  194<H>  06-10    PT/INR - ( 10 Ramon 2017 22:30 )   PT: 20.7 sec;   INR: 1.84          PTT - ( 10 Ramon 2017 22:30 )  PTT:26.4 sec  Urinalysis Basic - ( 10 Ramon 2017 22:50 )    Color: Yellow / Appearance: Cloudy / SG: <=1.005 / pH: x  Gluc: x / Ketone: NEGATIVE  / Bili: NEGATIVE / Urobili: 0.2 E.U./dL   Blood: x / Protein: 30 mg/dL / Nitrite: NEGATIVE   Leuk Esterase: Moderate / RBC: 5-10 /HPF / WBC Many /HPF   Sq Epi: x / Non Sq Epi: Rare /HPF / Bacteria: Present /HPF        RADIOLOGY & ADDITIONAL TESTS: Reviewed. INTERVAL HPI/OVERNIGHT EVENTS: Respiratory status stable overnight on BIPAP. Blood cultures re-drawn for surveillance.      SUBJECTIVE: Patient seen and examined at bedside. Patient on aerosol mask, in mild respiratory distress, using accessory muscles. Placed back on BIPAP. Patient denies any pain.      OBJECTIVE:    VITAL SIGNS:  ICU Vital Signs Last 24 Hrs  T(C): 36.5, Max: 38.9 (06-11 @ 10:00)  T(F): 97.7, Max: 102 (06-11 @ 10:00)  HR: 96 (74 - 116)  BP: 103/56 (79/48 - 152/60)  BP(mean): 73 (54 - 102)  ABP: --  ABP(mean): --  RR: 20 (15 - 33)  SpO2: 91% (91% - 100%)      I & Os for 24h ending 06-11 @ 07:00  =============================================  IN: 600 ml / OUT: 1110 ml / NET: -510 ml    I & Os for current day (as of 06-12 @ 06:43)  =============================================  IN: 385 ml / OUT: 2205 ml / NET: -1820 ml    CAPILLARY BLOOD GLUCOSE  101 (11 Jun 2017 16:19)      PHYSICAL EXAM:    Constitutional: frail, cachectic female, in mild respiratory distress, + rigoring   HEENT: NCAT, PERRL, sclera non-icteric, dry mucous membranes   Neck: supple, no JVD   Pulm: decreased breath sounds @ bases w/ fine bibasilar crackles   CV: regular rate, irregular, +S1S2, no murmurs appreciated  GI: soft, nondistended, +BS, no guarding   Ext: WWP, trace pitting edema  Vasc: DP pulses 2+ bilaterally   Derm: R chest chemo port. L chest PPM. Both intact without surrounding erythema or edema. No visible rashes on skin.  Neuro: awake, responds to some questions, no focal deficits     MEDICATIONS:  MEDICATIONS  (STANDING):  amiodarone    Tablet 200milliGRAM(s) Oral daily  atorvastatin 80milliGRAM(s) Oral at bedtime  piperacillin/tazobactam IVPB. 2.25Gram(s) IV Intermittent every 6 hours  insulin lispro (HumaLOG) corrective regimen sliding scale  SubCutaneous Before meals and at bedtime  dextrose 5%. 1000milliLiter(s) IV Continuous <Continuous>  dextrose 50% Injectable 12.5Gram(s) IV Push once  dextrose 50% Injectable 25Gram(s) IV Push once  dextrose 50% Injectable 25Gram(s) IV Push once  enoxaparin Injectable 30milliGRAM(s) SubCutaneous every 24 hours    MEDICATIONS  (PRN):  dextrose Gel 1Dose(s) Oral once PRN Blood Glucose LESS THAN 70 milliGRAM(s)/deciliter  glucagon  Injectable 1milliGRAM(s) IntraMuscular once PRN Glucose LESS THAN 70 milligrams/deciliter  acetaminophen  Suppository 650milliGRAM(s) Rectal every 6 hours PRN For Temp greater than 38 C (100.4 F)      ALLERGIES:  Allergies    IV CONtRAST (Flushing (Skin); Rash)  No Known Drug Allergies    Intolerances        LABS:                        9.2    6.5   )-----------( 71       ( 12 Jun 2017 05:39 )             26.4     06-12    135  |  99  |  44<H>  ----------------------------<  82  4.3   |  21<L>  |  1.70<H>    Ca    9.7      12 Jun 2017 05:41  Phos  5.0     06-12  Mg     1.8     06-12    TPro  5.1<L>  /  Alb  2.4<L>  /  TBili  0.4  /  DBili  x   /  AST  27  /  ALT  23  /  AlkPhos  194<H>  06-10    PT/INR - ( 10 Ramon 2017 22:30 )   PT: 20.7 sec;   INR: 1.84          PTT - ( 10 Ramon 2017 22:30 )  PTT:26.4 sec  Urinalysis Basic - ( 10 Ramon 2017 22:50 )    Color: Yellow / Appearance: Cloudy / SG: <=1.005 / pH: x  Gluc: x / Ketone: NEGATIVE  / Bili: NEGATIVE / Urobili: 0.2 E.U./dL   Blood: x / Protein: 30 mg/dL / Nitrite: NEGATIVE   Leuk Esterase: Moderate / RBC: 5-10 /HPF / WBC Many /HPF   Sq Epi: x / Non Sq Epi: Rare /HPF / Bacteria: Present /HPF        RADIOLOGY & ADDITIONAL TESTS: Reviewed.    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 Presented with respiratory distress with +JVD, crackles, LE edema. CXR significant for pulmonary congestion. Likely acute CHF exacerbation due to sepsis. s/p Lasix 40mg x 1 with adequate urinary output. BPs stable. Currently net negative   - continue to monitor I/Os   - will continue to monitor daily CXRs  - medically optimize cardiac function if BP allows     #Afib: s/p PPM, on Eliquis at home. Decreased CrCl  - c/w heparin gtt and monitor PTT's as per protocol   - c/w home Amiodarone 200mg QD     #CAD: s/p CABG. Patient without chest pain. EKG without ischemic changes.   - c/w home Lipitor 40mg QHS  - holding ASA     ID:  #Severe sepsis: Lactate cleared. Still having fevers despite on IV Abx. Surveillance blood cultures drawn. Cannot r/o possibility of line sepsis given pt has chemoport.  UA positive. BCx growing GNR. Likely urosepsis.   - c/w Zosyn 2.25g Q6hrs (renally dosed)  - c/w Tylenol PRN  - f/u surveillance blood cultures    #UTI: UA positive for LE, bacteria. BCx growing GNR in all bottles. Likely urosepsis.   - c/w Zosyn 2.25g Q6hrs (renally dosed)    PULM:  #Acute hypoxic respiratory failure: requiring BIPAP 2/2 pulmonary congestion/edema likely due to CHF exacerbation in the setting of urosepsis.   - c/w BIPAP, wean as tolerated  - c/w antibiotics as above   - low threshold for intubation    #Pulmonary edema: Likely CHF exacerbation in the setting of urosepsis. Good urinary output after Lasix 40mg x1.  Lung exam improved this AM; patient tolerating BIPAP well.   - c/w BIPAP, wean as tolerated  - c/w antibiotics as above   - low threshold for intubation    RENAL:  #Hyperkalemia: RESOLVED. no EKG changes.- renal following, appreciate recs       #ROMEO: Creatinine elevated above baseline; downtrending. Likely pre-renal in etiology. Renal US negative   - renal following, appreciate recs  - f/u serum osmolality       HEME/ONC:  #Large Diffuse B Cell Lymphoma: last chemo with Bendamustin on 5/23/17. Followed outpatient by Dr. Galaviz- in remission as per her .  - Dr. Galaviz following, appreciate recs  - palliative care consult     #Breast cancer: on Arimidex 1mg QD at home   - Dr. Galaviz following, appreciate recs     #Anemia: Uric acid wnl (does not suggest tumor lysis). CT A/P negative for RP bleed. Transfused 1U pRBC with appropriate response.   - will continue to monitor     PPx: SCDs   FEN: monitor lytes, NPO, no IVF  Dispo: MICU    FULL CODE INTERVAL HPI/OVERNIGHT EVENTS: Respiratory status stable overnight on BIPAP. Blood cultures re-drawn for surveillance.      SUBJECTIVE: Patient seen and examined at bedside. Patient on aerosol mask, in mild respiratory distress, using accessory muscles. Placed back on BIPAP. Patient denies any pain.      OBJECTIVE:    VITAL SIGNS:  ICU Vital Signs Last 24 Hrs  T(C): 36.5, Max: 38.9 (06-11 @ 10:00)  T(F): 97.7, Max: 102 (06-11 @ 10:00)  HR: 96 (74 - 116)  BP: 103/56 (79/48 - 152/60)  BP(mean): 73 (54 - 102)  ABP: --  ABP(mean): --  RR: 20 (15 - 33)  SpO2: 91% (91% - 100%)      I & Os for 24h ending 06-11 @ 07:00  =============================================  IN: 600 ml / OUT: 1110 ml / NET: -510 ml    I & Os for current day (as of 06-12 @ 06:43)  =============================================  IN: 385 ml / OUT: 2205 ml / NET: -1820 ml    CAPILLARY BLOOD GLUCOSE  101 (11 Jun 2017 16:19)      PHYSICAL EXAM:    Constitutional: frail, cachectic female, in mild respiratory distress, + rigoring   HEENT: NCAT, PERRL, sclera non-icteric, dry mucous membranes   Neck: supple, no JVD   Pulm: decreased breath sounds @ bases w/ fine bibasilar crackles   CV: regular rate, irregular, +S1S2, no murmurs appreciated  GI: soft, nondistended, +BS, no guarding   Ext: WWP, trace pitting edema  Vasc: DP pulses 2+ bilaterally   Derm: R chest chemo port. L chest PPM. Both intact without surrounding erythema or edema. No visible rashes on skin.  Neuro: awake, responds to some questions, no focal deficits     MEDICATIONS:  MEDICATIONS  (STANDING):  amiodarone    Tablet 200milliGRAM(s) Oral daily  atorvastatin 80milliGRAM(s) Oral at bedtime  piperacillin/tazobactam IVPB. 2.25Gram(s) IV Intermittent every 6 hours  insulin lispro (HumaLOG) corrective regimen sliding scale  SubCutaneous Before meals and at bedtime  dextrose 5%. 1000milliLiter(s) IV Continuous <Continuous>  dextrose 50% Injectable 12.5Gram(s) IV Push once  dextrose 50% Injectable 25Gram(s) IV Push once  dextrose 50% Injectable 25Gram(s) IV Push once  enoxaparin Injectable 30milliGRAM(s) SubCutaneous every 24 hours    MEDICATIONS  (PRN):  dextrose Gel 1Dose(s) Oral once PRN Blood Glucose LESS THAN 70 milliGRAM(s)/deciliter  glucagon  Injectable 1milliGRAM(s) IntraMuscular once PRN Glucose LESS THAN 70 milligrams/deciliter  acetaminophen  Suppository 650milliGRAM(s) Rectal every 6 hours PRN For Temp greater than 38 C (100.4 F)      ALLERGIES:  Allergies    IV CONtRAST (Flushing (Skin); Rash)  No Known Drug Allergies    Intolerances        LABS:                        9.2    6.5   )-----------( 71       ( 12 Jun 2017 05:39 )             26.4     06-12    135  |  99  |  44<H>  ----------------------------<  82  4.3   |  21<L>  |  1.70<H>    Ca    9.7      12 Jun 2017 05:41  Phos  5.0     06-12  Mg     1.8     06-12    TPro  5.1<L>  /  Alb  2.4<L>  /  TBili  0.4  /  DBili  x   /  AST  27  /  ALT  23  /  AlkPhos  194<H>  06-10    PT/INR - ( 10 Ramon 2017 22:30 )   PT: 20.7 sec;   INR: 1.84          PTT - ( 10 Ramon 2017 22:30 )  PTT:26.4 sec  Urinalysis Basic - ( 10 Ramon 2017 22:50 )    Color: Yellow / Appearance: Cloudy / SG: <=1.005 / pH: x  Gluc: x / Ketone: NEGATIVE  / Bili: NEGATIVE / Urobili: 0.2 E.U./dL   Blood: x / Protein: 30 mg/dL / Nitrite: NEGATIVE   Leuk Esterase: Moderate / RBC: 5-10 /HPF / WBC Many /HPF   Sq Epi: x / Non Sq Epi: Rare /HPF / Bacteria: Present /HPF        RADIOLOGY & ADDITIONAL TESTS: Reviewed.    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 Presented with respiratory distress with +JVD, crackles, LE edema. CXR significant for pulmonary congestion. Likely acute CHF exacerbation due to sepsis. s/p Lasix 40mg x 1 with adequate urinary output. BPs stable. Currently net negative   - continue to monitor I/Os   - will continue to monitor daily CXRs  - medically optimize cardiac function if BP allows     #Afib: s/p PPM, on Eliquis at home. Decreased CrCl  - c/w heparin gtt and monitor PTT's as per protocol   - c/w home Amiodarone 200mg QD     #CAD: s/p CABG. Patient without chest pain. EKG without ischemic changes.   - c/w home Lipitor 40mg QHS  - holding ASA     ID:  #Severe sepsis: Lactate cleared. Still having fevers despite on IV Abx. Surveillance blood cultures drawn. Cannot r/o possibility of line sepsis given pt has chemoport.  UA positive. BCx growing GNR. Likely urosepsis.   - c/w Zosyn 2.25g Q6hrs (renally dosed)  - c/w Tylenol PRN  - f/u surveillance blood cultures    #UTI: UA positive for LE, bacteria. BCx growing GNR in all bottles. Likely urosepsis.   - c/w Zosyn 2.25g Q6hrs (renally dosed)    PULM:  #Acute hypoxic respiratory failure: requiring BIPAP 2/2 pulmonary congestion/edema likely due to CHF exacerbation in the setting of urosepsis.   - c/w BIPAP, wean as tolerated  - c/w antibiotics as above   - low threshold for intubation    #Pulmonary edema: Likely CHF exacerbation in the setting of urosepsis. Good urinary output after Lasix 40mg x1.  Lung exam improved this AM; patient tolerating BIPAP well.   - c/w BIPAP, wean as tolerated  - c/w antibiotics as above   - low threshold for intubation    RENAL:  #Hyperkalemia: RESOLVED. no EKG changes.- renal following, appreciate recs       #ROMEO: Creatinine elevated above baseline; downtrending. Likely pre-renal in etiology. Renal US showing medical renal disease   - renal following, appreciate recs  - f/u serum osmolality       HEME/ONC:  #Large Diffuse B Cell Lymphoma: last chemo with Bendamustin on 5/23/17. Followed outpatient by Dr. Galaviz- in remission as per her .  - Dr. Galaviz following, appreciate recs  - palliative care consult     #Breast cancer: on Arimidex 1mg QD at home   - Dr. Galaviz following, appreciate recs     #Anemia: Uric acid wnl (does not suggest tumor lysis). CT A/P negative for RP bleed. Transfused 1U pRBC with appropriate response.   - will continue to monitor     PPx: SCDs   FEN: monitor lytes, NPO, no IVF  Dispo: MICU    FULL CODE

## 2017-06-13 DIAGNOSIS — Z71.89 OTHER SPECIFIED COUNSELING: ICD-10-CM

## 2017-06-13 DIAGNOSIS — E44.0 MODERATE PROTEIN-CALORIE MALNUTRITION: ICD-10-CM

## 2017-06-13 DIAGNOSIS — Z78.9 OTHER SPECIFIED HEALTH STATUS: ICD-10-CM

## 2017-06-13 DIAGNOSIS — Z74.09 OTHER REDUCED MOBILITY: ICD-10-CM

## 2017-06-13 LAB
-  AMPICILLIN/SULBACTAM: SIGNIFICANT CHANGE UP
-  AMPICILLIN: SIGNIFICANT CHANGE UP
-  CEFAZOLIN: SIGNIFICANT CHANGE UP
-  CEFTRIAXONE: SIGNIFICANT CHANGE UP
-  CIPROFLOXACIN: SIGNIFICANT CHANGE UP
-  GENTAMICIN: SIGNIFICANT CHANGE UP
-  NITROFURANTOIN: SIGNIFICANT CHANGE UP
-  PIPERACILLIN/TAZOBACTAM: SIGNIFICANT CHANGE UP
-  TOBRAMYCIN: SIGNIFICANT CHANGE UP
-  TRIMETHOPRIM/SULFAMETHOXAZOLE: SIGNIFICANT CHANGE UP
ALBUMIN SERPL ELPH-MCNC: 2.3 G/DL — LOW (ref 3.3–5)
ANION GAP SERPL CALC-SCNC: 15 MMOL/L — SIGNIFICANT CHANGE UP (ref 5–17)
APTT BLD: 37.9 SEC — HIGH (ref 27.5–37.4)
APTT BLD: 45.1 SEC — HIGH (ref 27.5–37.4)
APTT BLD: 62.3 SEC — HIGH (ref 27.5–37.4)
BUN SERPL-MCNC: 42 MG/DL — HIGH (ref 7–23)
CALCIUM SERPL-MCNC: 8.8 MG/DL — SIGNIFICANT CHANGE UP (ref 8.4–10.5)
CHLORIDE SERPL-SCNC: 100 MMOL/L — SIGNIFICANT CHANGE UP (ref 96–108)
CO2 SERPL-SCNC: 22 MMOL/L — SIGNIFICANT CHANGE UP (ref 22–31)
CREAT SERPL-MCNC: 1.7 MG/DL — HIGH (ref 0.5–1.3)
CULTURE RESULTS: SIGNIFICANT CHANGE UP
GLUCOSE SERPL-MCNC: 93 MG/DL — SIGNIFICANT CHANGE UP (ref 70–99)
HCT VFR BLD CALC: 25.7 % — LOW (ref 34.5–45)
HGB BLD-MCNC: 8.7 G/DL — LOW (ref 11.5–15.5)
MAGNESIUM SERPL-MCNC: 1.8 MG/DL — SIGNIFICANT CHANGE UP (ref 1.6–2.6)
MCHC RBC-ENTMCNC: 29.9 PG — SIGNIFICANT CHANGE UP (ref 27–34)
MCHC RBC-ENTMCNC: 33.9 G/DL — SIGNIFICANT CHANGE UP (ref 32–36)
MCV RBC AUTO: 88.3 FL — SIGNIFICANT CHANGE UP (ref 80–100)
METHOD TYPE: SIGNIFICANT CHANGE UP
ORGANISM # SPEC MICROSCOPIC CNT: SIGNIFICANT CHANGE UP
PHOSPHATE SERPL-MCNC: 3.8 MG/DL — SIGNIFICANT CHANGE UP (ref 2.5–4.5)
PLATELET # BLD AUTO: 105 K/UL — LOW (ref 150–400)
POTASSIUM SERPL-MCNC: 4.8 MMOL/L — SIGNIFICANT CHANGE UP (ref 3.5–5.3)
POTASSIUM SERPL-SCNC: 4.8 MMOL/L — SIGNIFICANT CHANGE UP (ref 3.5–5.3)
RBC # BLD: 2.91 M/UL — LOW (ref 3.8–5.2)
RBC # FLD: 16.9 % — SIGNIFICANT CHANGE UP (ref 10.3–16.9)
SODIUM SERPL-SCNC: 137 MMOL/L — SIGNIFICANT CHANGE UP (ref 135–145)
SPECIMEN SOURCE: SIGNIFICANT CHANGE UP
WBC # BLD: 5.4 K/UL — SIGNIFICANT CHANGE UP (ref 3.8–10.5)
WBC # FLD AUTO: 5.4 K/UL — SIGNIFICANT CHANGE UP (ref 3.8–10.5)

## 2017-06-13 PROCEDURE — 99233 SBSQ HOSP IP/OBS HIGH 50: CPT | Mod: GC

## 2017-06-13 PROCEDURE — 99232 SBSQ HOSP IP/OBS MODERATE 35: CPT | Mod: GC

## 2017-06-13 PROCEDURE — 99497 ADVNCD CARE PLAN 30 MIN: CPT

## 2017-06-13 PROCEDURE — 99233 SBSQ HOSP IP/OBS HIGH 50: CPT

## 2017-06-13 PROCEDURE — 71010: CPT | Mod: 26

## 2017-06-13 RX ORDER — METOPROLOL TARTRATE 50 MG
12.5 TABLET ORAL EVERY 12 HOURS
Qty: 0 | Refills: 0 | Status: DISCONTINUED | OUTPATIENT
Start: 2017-06-13 | End: 2017-06-15

## 2017-06-13 RX ORDER — IPRATROPIUM/ALBUTEROL SULFATE 18-103MCG
3 AEROSOL WITH ADAPTER (GRAM) INHALATION ONCE
Qty: 0 | Refills: 0 | Status: COMPLETED | OUTPATIENT
Start: 2017-06-13 | End: 2017-06-13

## 2017-06-13 RX ORDER — FUROSEMIDE 40 MG
40 TABLET ORAL ONCE
Qty: 0 | Refills: 0 | Status: COMPLETED | OUTPATIENT
Start: 2017-06-13 | End: 2017-06-13

## 2017-06-13 RX ORDER — HEPARIN SODIUM 5000 [USP'U]/ML
1200 INJECTION INTRAVENOUS; SUBCUTANEOUS
Qty: 25000 | Refills: 0 | Status: DISCONTINUED | OUTPATIENT
Start: 2017-06-13 | End: 2017-06-13

## 2017-06-13 RX ORDER — HEPARIN SODIUM 5000 [USP'U]/ML
INJECTION INTRAVENOUS; SUBCUTANEOUS
Qty: 25000 | Refills: 0 | Status: DISCONTINUED | OUTPATIENT
Start: 2017-06-13 | End: 2017-06-13

## 2017-06-13 RX ORDER — FUROSEMIDE 40 MG
20 TABLET ORAL DAILY
Qty: 0 | Refills: 0 | Status: DISCONTINUED | OUTPATIENT
Start: 2017-06-13 | End: 2017-06-15

## 2017-06-13 RX ORDER — HEPARIN SODIUM 5000 [USP'U]/ML
1300 INJECTION INTRAVENOUS; SUBCUTANEOUS
Qty: 25000 | Refills: 0 | Status: DISCONTINUED | OUTPATIENT
Start: 2017-06-13 | End: 2017-06-14

## 2017-06-13 RX ADMIN — HEPARIN SODIUM 13 UNIT(S)/HR: 5000 INJECTION INTRAVENOUS; SUBCUTANEOUS at 17:48

## 2017-06-13 RX ADMIN — Medication 4: at 23:12

## 2017-06-13 RX ADMIN — PIPERACILLIN AND TAZOBACTAM 200 GRAM(S): 4; .5 INJECTION, POWDER, LYOPHILIZED, FOR SOLUTION INTRAVENOUS at 11:46

## 2017-06-13 RX ADMIN — AMIODARONE HYDROCHLORIDE 200 MILLIGRAM(S): 400 TABLET ORAL at 06:45

## 2017-06-13 RX ADMIN — Medication 3 MILLILITER(S): at 21:19

## 2017-06-13 RX ADMIN — Medication 2: at 16:28

## 2017-06-13 RX ADMIN — Medication 12.5 MILLIGRAM(S): at 16:28

## 2017-06-13 RX ADMIN — Medication 650 MILLIGRAM(S): at 11:53

## 2017-06-13 RX ADMIN — PIPERACILLIN AND TAZOBACTAM 200 GRAM(S): 4; .5 INJECTION, POWDER, LYOPHILIZED, FOR SOLUTION INTRAVENOUS at 06:45

## 2017-06-13 RX ADMIN — Medication 40 MILLIGRAM(S): at 10:54

## 2017-06-13 RX ADMIN — PIPERACILLIN AND TAZOBACTAM 200 GRAM(S): 4; .5 INJECTION, POWDER, LYOPHILIZED, FOR SOLUTION INTRAVENOUS at 00:22

## 2017-06-13 RX ADMIN — PIPERACILLIN AND TAZOBACTAM 200 GRAM(S): 4; .5 INJECTION, POWDER, LYOPHILIZED, FOR SOLUTION INTRAVENOUS at 23:12

## 2017-06-13 RX ADMIN — ATORVASTATIN CALCIUM 40 MILLIGRAM(S): 80 TABLET, FILM COATED ORAL at 21:19

## 2017-06-13 RX ADMIN — PIPERACILLIN AND TAZOBACTAM 200 GRAM(S): 4; .5 INJECTION, POWDER, LYOPHILIZED, FOR SOLUTION INTRAVENOUS at 17:49

## 2017-06-13 RX ADMIN — Medication 10 MILLIGRAM(S): at 11:46

## 2017-06-13 NOTE — PROGRESS NOTE ADULT - PROBLEM SELECTOR PLAN 7
Support provided to patient and family. Patient to have access to supportive services during rest of hospital stay as the patient/family deemed necessary ie. Chaplaincy, Massage therapy, Music therapy, Patient and family supportive services, Palliative SW, etc. Chaplaincy contacted and will visit patient later today. Plan for communion once patient is able to participate.

## 2017-06-13 NOTE — PROGRESS NOTE ADULT - PROBLEM SELECTOR PLAN 4
Albumin 2.3;  states patient has no issues with swallowing and is mostly in a blended diet of vegetables and fruits with occasional meat intake. Some evidence of skin breakdown reported by the  in the past.   Recommend nutritionist consult.

## 2017-06-13 NOTE — PROGRESS NOTE ADULT - PROBLEM SELECTOR PLAN 3
reports patient mostly bedbound and uses wheelchair to exit apartment. He reports wanting her to return home rather than going to a facility for rehabilitation. Currently not receiving any assistance at home. Will have palliative SW involved in anticipation of patient requiring home services eg. home pt, visiting nurse and / or visitng doctors.

## 2017-06-13 NOTE — PROGRESS NOTE ADULT - SUBJECTIVE AND OBJECTIVE BOX
INTERVAL HPI/OVERNIGHT EVENTS:    SUBJECTIVE: Patient seen and examined at bedside.     ROS:  CV: Denies chest pain  Resp: Denies SOB  GI: Denies abdominal pain, constipation, diarrhea, nausea, vomiting  : Denies dysuria  ID: Denies fevers, chills  MSK: Denies joint pain     OBJECTIVE:    VITAL SIGNS:  ICU Vital Signs Last 24 Hrs  T(C): 38, Max: 39.6 (06-12 @ 10:00)  T(F): 100.4, Max: 103.2 (06-12 @ 10:00)  HR: 92 (76 - 121)  BP: 104/54 (84/51 - 125/67)  BP(mean): 65 (55 - 86)  ABP: --  ABP(mean): --  RR: 18 (12 - 29)  SpO2: 100% (90% - 100%)      I & Os for 24h ending 06-12 @ 07:00  =============================================  IN: 385 ml / OUT: 2205 ml / NET: -1820 ml    I & Os for current day (as of 06-13 @ 06:36)  =============================================  IN: 1270 ml / OUT: 1405 ml / NET: -135 ml    CAPILLARY BLOOD GLUCOSE  180 (12 Jun 2017 22:00)      PHYSICAL EXAM:    General: NAD  HEENT: NC/AT; PERRL, clear conjunctiva  Neck: supple  Respiratory: CTA b/l  Cardiovascular: +S1/S2; RRR  Abdomen: soft, NT/ND; +BS x4  Extremities: WWP, 2+ peripheral pulses b/l; no LE edema  Skin: normal color and turgor; no rash  Neurological:     MEDICATIONS:  MEDICATIONS  (STANDING):  amiodarone    Tablet 200milliGRAM(s) Oral daily  piperacillin/tazobactam IVPB. 2.25Gram(s) IV Intermittent every 6 hours  insulin lispro (HumaLOG) corrective regimen sliding scale  SubCutaneous Before meals and at bedtime  dextrose 5%. 1000milliLiter(s) IV Continuous <Continuous>  dextrose 50% Injectable 12.5Gram(s) IV Push once  dextrose 50% Injectable 25Gram(s) IV Push once  dextrose 50% Injectable 25Gram(s) IV Push once  atorvastatin 40milliGRAM(s) Oral at bedtime  bisacodyl Suppository 10milliGRAM(s) Rectal daily  heparin  Infusion. Unit(s)/Hr IV Continuous <Continuous>    MEDICATIONS  (PRN):  dextrose Gel 1Dose(s) Oral once PRN Blood Glucose LESS THAN 70 milliGRAM(s)/deciliter  glucagon  Injectable 1milliGRAM(s) IntraMuscular once PRN Glucose LESS THAN 70 milligrams/deciliter  acetaminophen  Suppository 650milliGRAM(s) Rectal every 6 hours PRN For Temp greater than 38 C (100.4 F)      ALLERGIES:  Allergies    IV CONtRAST (Flushing (Skin); Rash)  No Known Drug Allergies    Intolerances        LABS:                        9.2    6.5   )-----------( 71       ( 12 Jun 2017 05:39 )             26.4     06-12    135  |  99  |  44<H>  ----------------------------<  82  4.3   |  21<L>  |  1.70<H>    Ca    9.7      12 Jun 2017 05:41  Phos  5.0     06-12  Mg     1.8     06-12      PT/INR - ( 12 Jun 2017 08:05 )   PT: 17.8 sec;   INR: 1.59          PTT - ( 12 Jun 2017 08:05 )  PTT:26.5 sec      RADIOLOGY & ADDITIONAL TESTS: Reviewed. INTERVAL HPI/OVERNIGHT EVENTS: Chemo-Port removed by IR yesterday without complication. No overnight events. Fever curve trending down      SUBJECTIVE: Patient seen and examined at bedside. BIPAP taken off, placed on aerosol mask. ROS + abdominal pain, back pain. Patient denies any respiratory distress, denies any chest pain, N/V/D.     OBJECTIVE:    VITAL SIGNS:  ICU Vital Signs Last 24 Hrs  T(C): 38, Max: 39.6 (06-12 @ 10:00)  T(F): 100.4, Max: 103.2 (06-12 @ 10:00)  HR: 92 (76 - 121)  BP: 104/54 (84/51 - 125/67)  BP(mean): 65 (55 - 86)  RR: 18 (12 - 29)  SpO2: 100% (90% - 100%)      I & Os for 24h ending 06-12 @ 07:00  =============================================  IN: 385 ml / OUT: 2205 ml / NET: -1820 ml    I & Os for current day (as of 06-13 @ 06:36)  =============================================  IN: 1270 ml / OUT: 1405 ml / NET: -135 ml    CAPILLARY BLOOD GLUCOSE  180 (12 Jun 2017 22:00)      PHYSICAL EXAM:    Constitutional: frail, cachectic female, in mild respiratory distress still using accessory muscles   HEENT: NCAT, PERRL, sclera non-icteric, dry mucous membranes   Neck: supple, no JVD   Pulm: decreased breath sounds @ bases w/ fine bibasilar crackles   CV: regular rate, irregular, +S1S2, no murmurs appreciated  GI: +distended, tender to RLQ +BS, no guarding   Ext: WWP, trace pitting edema  Vasc: DP pulses 2+ bilaterally   Neuro: awake, responds to some questions, no focal deficits     MEDICATIONS:  MEDICATIONS  (STANDING):  amiodarone    Tablet 200milliGRAM(s) Oral daily  piperacillin/tazobactam IVPB. 2.25Gram(s) IV Intermittent every 6 hours  insulin lispro (HumaLOG) corrective regimen sliding scale  SubCutaneous Before meals and at bedtime  dextrose 5%. 1000milliLiter(s) IV Continuous <Continuous>  dextrose 50% Injectable 12.5Gram(s) IV Push once  dextrose 50% Injectable 25Gram(s) IV Push once  dextrose 50% Injectable 25Gram(s) IV Push once  atorvastatin 40milliGRAM(s) Oral at bedtime  bisacodyl Suppository 10milliGRAM(s) Rectal daily  heparin  Infusion. Unit(s)/Hr IV Continuous <Continuous>    MEDICATIONS  (PRN):  dextrose Gel 1Dose(s) Oral once PRN Blood Glucose LESS THAN 70 milliGRAM(s)/deciliter  glucagon  Injectable 1milliGRAM(s) IntraMuscular once PRN Glucose LESS THAN 70 milligrams/deciliter  acetaminophen  Suppository 650milliGRAM(s) Rectal every 6 hours PRN For Temp greater than 38 C (100.4 F)      ALLERGIES:  Allergies    IV CONtRAST (Flushing (Skin); Rash)  No Known Drug Allergies    Intolerances        LABS:                        9.2    6.5   )-----------( 71       ( 12 Jun 2017 05:39 )             26.4     06-12    135  |  99  |  44<H>  ----------------------------<  82  4.3   |  21<L>  |  1.70<H>    Ca    9.7      12 Jun 2017 05:41  Phos  5.0     06-12  Mg     1.8     06-12      PT/INR - ( 12 Jun 2017 08:05 )   PT: 17.8 sec;   INR: 1.59          PTT - ( 12 Jun 2017 08:05 )  PTT:26.5 sec      RADIOLOGY & ADDITIONAL TESTS: Reviewed.    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 IR removed it on 06/13. Line grew gram neg rods. overall fever curve trending down. UA also positive, urine culture growing e.coli  - c/w Zosyn 2.25g Q6hrs (renally dosed)  - c/w Tylenol PRN  - f/u surveillance blood cultures    CV:  #HFpEF: last echo in 5/2017 showing EF 60-65%. Likely acute CHF exacerbation due to sepsis. BPs stable. Currently net negative   - Start home Lasix 20mg qdaily   - Start Metoprolol 12.5 BID  - continue to monitor I/Os   - will continue to monitor daily CXRs  - medically optimize cardiac function if BP allows     #Afib: s/p PPM, on Eliquis at home. Decreased CrCl  - c/w heparin gtt and monitor PTT's as per protocol   - c/w home Amiodarone 200mg QD     #CAD: s/p CABG. Patient without chest pain. EKG without ischemic changes.   - c/w home Lipitor 40mg QHS  - holding ASA     #UTI: UA positive for LE, bacteria. BCx growing GNR in all bottles. Likely urosepsis.   - c/w Zosyn 2.25g Q6hrs (renally dosed)    PULM:  #Acute hypoxic respiratory failure: requiring BIPAP 2/2 pulmonary congestion/edema likely due to CHF exacerbation in the setting of urosepsis and line sepsis.   - c/w BIPAP, wean as tolerated  - diuresis   - c/w antibiotics as above     #Pulmonary edema: plan as above, c/w BIPAP and diuresis     RENAL:  #Hyperkalemia: RESOLVED. no EKG changes    #ROMEO: Creatinine elevated above baseline; downtrending. Likely pre-renal in etiology. Renal US showing medical renal disease   - renal following, appreciate recs    HEME/ONC:  #Large Diffuse B Cell Lymphoma: last chemo with Bendamustin on 5/23/17. Followed outpatient by Dr. Galaviz- in remission as per her .  - Dr. Galaviz following, appreciate recs  - palliative care consult     #Breast cancer: on Arimidex 1mg QD at home   - Dr. Galaviz following, appreciate recs     #Anemia: Uric acid wnl (does not suggest tumor lysis). CT A/P negative for RP bleed. Transfused 1U pRBC with appropriate response.   - will continue to monitor     PPx: SCDs   FEN: monitor lytes, NPO, no IVF  Dispo: MICU    FULL CODE

## 2017-06-13 NOTE — PROGRESS NOTE ADULT - ATTENDING COMMENTS
Patient seen and examined with house-staff during bedside rounds.  Resident note read, including vitals, physical findings, laboratory data, and radiological reports.   Revisions included below.  Direct personal management at bed side and extensive interpretation of the data.  Plan was outlined and discussed in details with the housestaff.  Decision making of high complexity  Patient is bacteremic and my concern as  infection of the permanent pacemaker.  continue current antibiotic. Gallium scan. Continue diuresis. Patient mouth DNR DNI. BiPAP as needed

## 2017-06-13 NOTE — PROGRESS NOTE ADULT - PROBLEM SELECTOR PLAN 6
wants to continue current level of care with hope for recovery to the point of being able to return home. Primary team had been in communication with extended family which reportedly wanted to be conservative when considering heroic measures ie. code status. Will continue to discuss with family once they are at bedside and as needed given the patients changes in clinical condition.  wants to continue current level of care with hope for recovery to the point of being able to return home. Primary team had been in communication with extended family which reportedly wanted to be conservative when considering heroic measures ie. code status. Will continue to discuss with family once they are at bedside and as needed given the patients changes in clinical condition. Will plan to complete MOLST if changes in advance directives.

## 2017-06-13 NOTE — PROGRESS NOTE ADULT - SUBJECTIVE AND OBJECTIVE BOX
Dr. Daniel Colon  Renal Fellow  Beeper # [phone]        Patient seen and examined at bedside.   on bipap    amiodarone    Tablet 200milliGRAM(s) daily  piperacillin/tazobactam IVPB. 2.25Gram(s) every 6 hours  insulin lispro (HumaLOG) corrective regimen sliding scale  Before meals and at bedtime  dextrose 5%. 1000milliLiter(s) <Continuous>  dextrose Gel 1Dose(s) once PRN  dextrose 50% Injectable 12.5Gram(s) once  dextrose 50% Injectable 25Gram(s) once  dextrose 50% Injectable 25Gram(s) once  glucagon  Injectable 1milliGRAM(s) once PRN  atorvastatin 40milliGRAM(s) at bedtime  acetaminophen  Suppository 650milliGRAM(s) every 6 hours PRN  bisacodyl Suppository 10milliGRAM(s) daily  heparin  Infusion 1200Unit(s)/Hr <Continuous>  furosemide    Tablet 20milliGRAM(s) daily  metoprolol 12.5milliGRAM(s) every 12 hours      Allergies    IV CONtRAST (Flushing (Skin); Rash)  No Known Drug Allergies    Intolerances        T(C): , Max: 38.9 (06-13 @ 13:00)  T(F): , Max: 102 (06-13 @ 13:00)  HR: 86  BP: 109/49  BP(mean): 71  RR: 19  SpO2: 100%  Wt(kg): --  I & Os for 24h ending 06-13 @ 07:00  =============================================  IN:    FFP (Fresh Frozen Plasma): 400 ml    Solution: 400 ml    Platelets - Single Donor: 285 ml    Oral Fluid: 200 ml    heparin  Infusion.: 60 ml    Solution: 35 ml    heparin Infusion: 12 ml    Total IN: 1392 ml  ---------------------------------------------  OUT:    Indwelling Catheter - Urethral: 1480 ml    Total OUT: 1480 ml  ---------------------------------------------  Total NET: -88 ml    I & Os for current day (as of 06-13 @ 14:36)  =============================================  IN:    Solution: 100 ml    heparin Infusion: 84 ml    Oral Fluid: 60 ml    Total IN: 244 ml  ---------------------------------------------  OUT:    Indwelling Catheter - Urethral: 575 ml    Total OUT: 575 ml  ---------------------------------------------  Total NET: -331 ml        PHYSICAL EXAM:  Constitutional:on bipap and sob  Neck: Supple. No JVD.  Respiratory: rhonchi b./l    Cardiovascular: S1, S2.  Regular rate and rhythm.    Gastrointestinal: soft, non-tender, non-distended, normal bowel sounds  Extremities: Warm.  No lower extremity edema.      LABS:                        8.7    5.4   )-----------( 105      ( 13 Jun 2017 06:25 )             25.7     06-13    137  |  100  |  42<H>  ----------------------------<  93  4.8   |  22  |  1.70<H>    Ca    8.8      13 Jun 2017 06:25  Phos  3.8     06-13  Mg     1.8     06-13    TPro  x   /  Alb  2.3<L>  /  TBili  x   /  DBili  x   /  AST  x   /  ALT  x   /  AlkPhos  x   06-13      PT/INR - ( 12 Jun 2017 08:05 )   PT: 17.8 sec;   INR: 1.59          PTT - ( 13 Jun 2017 06:25 )  PTT:37.9 sec Dr. Daniel Colon  Renal Fellow  Beeper # [phone]        Patient seen and examined at bedside.   on bipap    amiodarone    Tablet 200milliGRAM(s) daily  piperacillin/tazobactam IVPB. 2.25Gram(s) every 6 hours  insulin lispro (HumaLOG) corrective regimen sliding scale  Before meals and at bedtime  dextrose 5%. 1000milliLiter(s) <Continuous>  dextrose Gel 1Dose(s) once PRN  dextrose 50% Injectable 12.5Gram(s) once  dextrose 50% Injectable 25Gram(s) once  dextrose 50% Injectable 25Gram(s) once  glucagon  Injectable 1milliGRAM(s) once PRN  atorvastatin 40milliGRAM(s) at bedtime  acetaminophen  Suppository 650milliGRAM(s) every 6 hours PRN  bisacodyl Suppository 10milliGRAM(s) daily  heparin  Infusion 1200Unit(s)/Hr <Continuous>  furosemide    Tablet 20milliGRAM(s) daily  metoprolol 12.5milliGRAM(s) every 12 hours      Allergies    IV CONtRAST (Flushing (Skin); Rash)  No Known Drug Allergies    Intolerances        T(C): , Max: 38.9 (06-13 @ 13:00)  T(F): , Max: 102 (06-13 @ 13:00)  HR: 86  BP: 109/49  BP(mean): 71  RR: 19  SpO2: 100%  Wt(kg): --  I & Os for 24h ending 06-13 @ 07:00  =============================================  IN:    FFP (Fresh Frozen Plasma): 400 ml    Solution: 400 ml    Platelets - Single Donor: 285 ml    Oral Fluid: 200 ml    heparin  Infusion.: 60 ml    Solution: 35 ml    heparin Infusion: 12 ml    Total IN: 1392 ml  ---------------------------------------------  OUT:    Indwelling Catheter - Urethral: 1480 ml    Total OUT: 1480 ml  ---------------------------------------------  Total NET: -88 ml    I & Os for current day (as of 06-13 @ 14:36)  =============================================  IN:    Solution: 100 ml    heparin Infusion: 84 ml    Oral Fluid: 60 ml    Total IN: 244 ml  ---------------------------------------------  OUT:    Indwelling Catheter - Urethral: 575 ml    Total OUT: 575 ml  ---------------------------------------------  Total NET: -331 ml        PHYSICAL EXAM:  Constitutional:on FMO2 and slt dyspneic and lethargic  Neck: Supple. No JVD.  Respiratory: rhonchi b./l    Cardiovascular: S1, S2.  Regular rate and rhythm.    Gastrointestinal: soft, non-tender, non-distended, normal bowel sounds  Extremities: Warm.  No lower extremity edema.    skin - no rash  lethargic     LABS:                        8.7    5.4   )-----------( 105      ( 13 Jun 2017 06:25 )             25.7     06-13    137  |  100  |  42<H>  ----------------------------<  93  4.8   |  22  |  1.70<H>    Ca    8.8      13 Jun 2017 06:25  Phos  3.8     06-13  Mg     1.8     06-13    TPro  x   /  Alb  2.3<L>  /  TBili  x   /  DBili  x   /  AST  x   /  ALT  x   /  AlkPhos  x   06-13      PT/INR - ( 12 Jun 2017 08:05 )   PT: 17.8 sec;   INR: 1.59          PTT - ( 13 Jun 2017 06:25 )  PTT:37.9 sec

## 2017-06-13 NOTE — PROGRESS NOTE ADULT - SUBJECTIVE AND OBJECTIVE BOX
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 Patients family at bedside states she's been more lethargic with associated lower extremity swelling, SOB, decreased appetite and diarrhea. Patient with previous admissions for symptomatic hyperglycemia in January. Patient currently seeing Dr. Galaviz on a regular basis for chemotherapy treatment. Last treatment on May 23rd for which she received Bendamustine and Rituximab. Patient denies any headaches, chest pain, abdominal pain, N/V, dysuria, or bowel changes.     Upon arrival to the ED, patient in moderate respiratory distress using accessory muscles, breathing w/ a RR of 30, O2 93% on room air. Patient placed on BIPAP with slight improvement in respiratory status. Patient febrile to 101 with a HR of 77. Labs significant for AG metabolic acidosis, K 7.0, Na 127, BUN 58, Cr. 2.0 (baseline < 0.9 January), Lactate 2.8. CXR done showing RLL infiltrate with pulmonary vascular congestion. In the ED, patient received Vancomycin, Zosyn, Tylenol, Calcium Gluconate 2g, Insulin 5u, Albuterol neb 5mg x 2. ICU consulted, will be transferred to MICU for management of severe sepsis 2/2 HAP and pulmonary congestion. (10 Ramon 2017 19:13)    FAMILY HISTORY:  No pertinent family history in first degree relatives    MEDICATIONS  (STANDING):  amiodarone    Tablet 200milliGRAM(s) Oral daily  piperacillin/tazobactam IVPB. 2.25Gram(s) IV Intermittent every 6 hours  insulin lispro (HumaLOG) corrective regimen sliding scale  SubCutaneous Before meals and at bedtime  dextrose 5%. 1000milliLiter(s) IV Continuous <Continuous>  dextrose 50% Injectable 12.5Gram(s) IV Push once  dextrose 50% Injectable 25Gram(s) IV Push once  dextrose 50% Injectable 25Gram(s) IV Push once  atorvastatin 40milliGRAM(s) Oral at bedtime  bisacodyl Suppository 10milliGRAM(s) Rectal daily  heparin  Infusion 1200Unit(s)/Hr IV Continuous <Continuous>    MEDICATIONS  (PRN):  dextrose Gel 1Dose(s) Oral once PRN Blood Glucose LESS THAN 70 milliGRAM(s)/deciliter  glucagon  Injectable 1milliGRAM(s) IntraMuscular once PRN Glucose LESS THAN 70 milligrams/deciliter  acetaminophen  Suppository 650milliGRAM(s) Rectal every 6 hours PRN For Temp greater than 38 C (100.4 F)    Vital Signs Last 24 Hrs  T(C): 36.8, Max: 39.6 (06-12 @ 10:00)  T(F): 98.3, Max: 103.2 (06-12 @ 10:00)  HR: 94 (76 - 102)  BP: 107/44 (84/51 - 119/70)  BP(mean): 70 (55 - 86)  RR: 19 (12 - 27)  SpO2: 96% (90% - 100%)    Physical exam:    Overall impression  Lymphadenopathy  Liver  spleen    Labs:  CBC Full  -  ( 13 Jun 2017 06:25 )  WBC Count : 5.4 K/uL  Hemoglobin : 8.7 g/dL  Hematocrit : 25.7 %  Platelet Count - Automated : 105 K/uL  Mean Cell Volume : 88.3 fL  Mean Cell Hemoglobin : 29.9 pg  Mean Cell Hemoglobin Concentration : 33.9 g/dL  Auto Neutrophil # : x  Auto Lymphocyte # : x  Auto Monocyte # : x  Auto Eosinophil # : x  Auto Basophil # : x  Auto Neutrophil % : x  Auto Lymphocyte % : x  Auto Monocyte % : x  Auto Eosinophil % : x  Auto Basophil % : x    06-13    137  |  100  |  42<H>  ----------------------------<  93  4.8   |  22  |  1.70<H>    Ca    8.8      13 Jun 2017 06:25  Phos  3.8     06-13  Mg     1.8     06-13        Radiology:  HEALTH ISSUES - R/O PROBLEM Dx:      Assessmant / Problems  Clinically improved since yesterday post removal of infus a port  1) Gram negative sepsis on Zosin , Temp  was 103.5 yesterday , today 100.5, more alert discussing   appropriately  2)Large cell lymphoma clinicaly in remission  3) breast ca on Arimidex stable    Plan:  Continue tx as per ICU    Thank you  Neha Galaviz MD

## 2017-06-13 NOTE — PROGRESS NOTE ADULT - ATTENDING COMMENTS
ARF in setting of sepsis-- improved, nonoliguric  cont supportive care, antibiotics  gentle diuresis if tolerates to aid pulm status

## 2017-06-13 NOTE — PROGRESS NOTE ADULT - SUBJECTIVE AND OBJECTIVE BOX
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 Patients family at bedside states she's been more lethargic with associated lower extremity swelling, SOB, decreased appetite and diarrhea. Patient with previous admissions for symptomatic hyperglycemia in January. Patient currently seeing Dr. Galaviz on a regular basis for chemotherapy treatment. Last treatment on May 23rd for which she received Bendamustine and Rituximab. Patient denies any headaches, chest pain, abdominal pain, N/V, dysuria, or bowel changes.     Upon arrival to the ED, patient in moderate respiratory distress using accessory muscles, breathing w/ a RR of 30, O2 93% on room air. Patient placed on BIPAP with slight improvement in respiratory status. Patient febrile to 101 with a HR of 77. Labs significant for AG metabolic acidosis, K 7.0, Na 127, BUN 58, Cr. 2.0 (baseline < 0.9 January), Lactate 2.8. CXR done showing RLL infiltrate with pulmonary vascular congestion. In the ED, patient received Vancomycin, Zosyn, Tylenol, Calcium Gluconate 2g, Insulin 5u, Albuterol neb 5mg x 2. ICU consulted, will be transferred to MICU for management of severe sepsis 2/2 HAP and pulmonary congestion. (10 Ramon 2017 19:13)    SUBJECTIVE: Saw and evaluated Mrs Fraire at bedside today. She was found awake and alert able to follow Romanian commands. , Seth Fraire, at bedside. We had a lengthy conversation regarding the patients clinical history, time and symptoms prior to admission as well as the most recent findings and plan of care. He understands and is up to date in the patients findings of port culture results. He reports the patient is doing much better today. He is hopeful of the patient being able to combat this infection and mentioned several times that "its not the cancer, she's cured". He wants all level of care to continue. The patient and  stated wanting to return home once able to instead of going to a rehab/NH facility. He reports he received some help at home after past admission, but agreed to have any services at home that she would qualify for eg. home pt, visiting nurse, visiting doctors, home attendant, etc. The  reports the patient was able to eat without difficulties and he cooked all meals for them. Mostly blended vegetables and fruit juices.     ROS:  DYSPNEA: No	  NAUS/VOM: No	  SECRETIONS: No	  AGITATION: No  Pain (Y/N): Yes  -Provocation/Palliation: When the patient is moved  -Quality/Quantity: Somatic musculoskeletal  -Radiating: No  -Severity: Moderate  -Timing/Frequency: Incidental  -Impact on ADLs: Prevents from physical activity    OTHER REVIEW OF SYSTEMS: Denied    PEx:  T(C): 36.8, Max: 38.3 (06-12 @ 12:26)  HR: 96 (76 - 102)  BP: 149/69 (84/51 - 149/69)  RR: 27 (12 - 27)  SpO2: 99% (90% - 100%)  Wt(kg):54.6    GENERAL:  AAOx1 NAD Romanian speaking becomes agitated if moved  HEENT: BIPAP mask. Dry lips and mouth     	  NECK: Supple           CVS: Regularly irregular            	  RESP: Good air entry B/L anteriorly        	  GI: Soft NT Mild distended BS+             	  : Dennis+ cloudy urine           	  MUSC: Generalized muscle wasting  DP+     	  NEURO: Follows simple commands     	  PSYCH: Calm          SKIN: Normal        	   LYMPH: Normal      	     ALLERGIES: IV CONtRAST (Flushing (Skin); Rash)     OPIATE NAÏVE (Y/N): Yes  ISTOP REVIEWED: Yes, Found Rx for Guaifenesin on iStop, copy in chart (Ref # [id number])    MEDICATIONS: REVIEWED  MEDICATIONS  (STANDING):  amiodarone    Tablet 200milliGRAM(s) Oral daily  piperacillin/tazobactam IVPB. 2.25Gram(s) IV Intermittent every 6 hours  insulin lispro (HumaLOG) corrective regimen sliding scale  SubCutaneous Before meals and at bedtime  dextrose 5%. 1000milliLiter(s) IV Continuous <Continuous>  atorvastatin 40milliGRAM(s) Oral at bedtime  bisacodyl Suppository 10milliGRAM(s) Rectal daily  heparin  Infusion 1200Unit(s)/Hr IV Continuous <Continuous>  furosemide    Tablet 20milliGRAM(s) Oral daily  metoprolol 12.5milliGRAM(s) Oral every 12 hours    MEDICATIONS  (PRN):  dextrose Gel 1Dose(s) Oral once PRN Blood Glucose LESS THAN 70 milliGRAM(s)/deciliter  glucagon  Injectable 1milliGRAM(s) IntraMuscular once PRN Glucose LESS THAN 70 milligrams/deciliter  acetaminophen  Suppository 650milliGRAM(s) Rectal every 6 hours PRN For Temp greater than 38 C (100.4 F)    LABS: REVIEWED                        8.7    5.4   )-----------( 105      ( 13 Jun 2017 06:25 )             25.7   06-13    137  |  100  |  42<H>  ----------------------------<  93  4.8   |  22  |  1.70<H>    Ca    8.8      13 Jun 2017 06:25  Phos  3.8     06-13  Mg     1.8     06-13  TPro  x   /  Alb  2.3<L>  06-13    Prothrombin Time and INR, Plasma (06.12.17 @ 08:05)    Prothrombin Time, Plasma: 17.8: Effective March 21st, the reference range for PT has changed. sec    INR: 1.59: An INR within the range of 2.00 to 3.00 is recommended for patients with  deep vein thrombosis, pulmonary embolism, atrial fibrillation and tissue  valves.  An INR within the range of 2.50 to 3.50 is recommended for most patients  with artificial valv1.59: es.    Activated Partial Thromboplastin Time in AM (06.13.17 @ 06:25)    Activated Partial Thromboplastin Time: 37.9     Culture - Catheter (06.12.17 @ 17:21)    Specimen Source: .Catheter Port Device    Culture Results:   Too numerous to count Gram Negative Rods  Identification and susceptibility to follow.    Culture - Blood (06.12.17 @ 08:25)    Gram Stain:   Aerobic Bottle: Gram Negative Rods  Result called to and read back by_ SARAH BEE RN    Specimen Source: .Blood Blood    Culture Results:   Culture in progress    Culture - Blood (06.11.17 @ 11:41)    Gram Stain:   Growth in aerobic bottle: Gram Negative Rods  Result called to and read back by_ Karly WAYNE    Specimen Source: .Blood Blood    Culture Results:   Growth in aerobic bottle: Gram Negative Rods  Identification and susceptibility to follow.    Culture - Urine (06.11.17 @ 09:03)    -  Ampicillin: R >16    -  Cefazolin: S <=8    -  Ciprofloxacin: R >2    -  Gentamicin: R >8    -  Nitrofurantoin: S <=32    -  Trimethoprim/Sulfamethoxazole: R >2/38    -  Ampicillin/Sulbactam: R >16/8    -  Ceftriaxone: S <=1    -  Piperacillin/Tazobactam: S <=16    -  Tobramycin: S <=4    Specimen Source: .Urine Catheterized    Culture Results:   1,000 CFU/ml Escherichia coli    Organism Identification: Escherichia coli    Organism: Escherichia coli    Method Type: JANEEN    ECG  Ventricular Rate 78 BPM  Atrial Rate 78 BPM  QRS Duration 90 ms  Q-T Interval 352 ms  QTC Calculation(Bezet) 401 ms  R Axis -20 degrees  T Axis 76 degrees  Diagnosis Line Atrial pacemaker  Confirmed by BEBO CARMICHAEL MD ([id number]) on 6/11/2017 2:54:25 PM    IMAGING: REVIEWED    EXAM:  XR CHEST 1 VIEW PORT ROUTINE                        PROCEDURE DATE:  06/12/2017    INTERPRETATION:  HISTORY: congestion, follow-up, hyperkalemia  Portable AP view is tear to prior radiograph of 11/20/2017.  Right-sidedport catheter is unchanged in position. Heart size is   difficult to accurately assess in this patient status post sternotomy and   CABG with dual-lead left-sided transvenous pacer. There has been no gross   change in the pulmonary vascular congestion and right basilar pleural   fluid and atelectasis. There is no evidence of new focal infiltrate, and   there has otherwise been no interval change.  IMPRESSION: No significant change from 6/11/2017.    EXAM:  IR INTERVENTIONAL RAD PROC                        PROCEDURE DATE:  06/12/2017     INTERPRETATION:    Clinical History: Sepsis. Concern for port infection. Now for port   removal.  : Pradeep Odell M.D.  Assistant: Deniz Cuevas M.D.  Anesthesia: Lidocaine for local anesthesia  Complications:  Procedure: The risks benefits and alternatives to the procedure were   discussed with the patient's  (healthcare proxy) and informed   consent obtained. The procedure was performed at the bedside. Following   informed consent the patient was placed in the supine position and the   right-sided chest port prepped and draped in a sterile fashion. Local   anesthesia was obtained with 2% lidocaine. A 2.5 cm incision was made at   the site of the previous incision, the port dissected free from the   surrounding soft tissue and the port and catheter removed. The catheter   tip was sent for culture and sensitivity. The deep tissues were closed   with 2.0 coated Vicryl suture and the skin closed with Dermabond. A   sterile dressing was applied. The patient tolerated the procedure well.   There were no immediate combination.  Impression: Chest port removal as described above.    ADVANCED DIRECTIVES:     FULL CODE.    DECISION MAKER: Patient does not demonstrate capacity for complex medical decision making, but is able to let her needs known and answer simple yes/no questions. Will continue to monitor decision making if clinical improvement is seen.    LEGAL SURROGATE: HCP in the chart. HCP agents are Seth Fraire [phone] and Chay Astudillo.    PSYCHOSOCIAL-SPIRITUAL ASSESSMENT:       Reviewed       Care plan adjusted as above	    GOALS OF CARE DISCUSSION       Palliative care info/counseling provided	           Family meeting at bedside today	           See previous Palliative Medicine Note       Documentation of GOC: HCP  	      AGENCY CHOICE DISCUSSED:           Homecare wound care        Home PT        [facility]        Visiting doctors    REFERRALS	        Palliative Med        Unit SW/Case Mgmt        - Worship       Speech/Swallow       Nutrition       PT/OT TANYA FRAIRE             MRN-[id number]    CC: GOC, AD, Support    HPI:  87 y/o F w/ pmhx of CAD s/p CABG, atrial fibrillation s/p PPM (on Eliquis), Breast CA (on Arimidex), 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 Patients family at bedside states she's been more lethargic with associated lower extremity swelling, SOB, decreased appetite and diarrhea. Patient with previous admissions for symptomatic hyperglycemia in January. Patient currently seeing Dr. Galaviz on a regular basis for chemotherapy treatment. Last treatment on May 23rd for which she received Bendamustine and Rituximab. Patient denies any headaches, chest pain, abdominal pain, N/V, dysuria, or bowel changes.     Upon arrival to the ED, patient in moderate respiratory distress using accessory muscles, breathing w/ a RR of 30, O2 93% on room air. Patient placed on BIPAP with slight improvement in respiratory status. Patient febrile to 101 with a HR of 77. Labs significant for AG metabolic acidosis, K 7.0, Na 127, BUN 58, Cr. 2.0 (baseline < 0.9 January), Lactate 2.8. CXR done showing RLL infiltrate with pulmonary vascular congestion. In the ED, patient received Vancomycin, Zosyn, Tylenol, Calcium Gluconate 2g, Insulin 5u, Albuterol neb 5mg x 2. ICU consulted, will be transferred to MICU for management of severe sepsis 2/2 HAP and pulmonary congestion. (10 Ramon 2017 19:13)    SUBJECTIVE: Saw and evaluated Mrs Fraire at bedside today. She was found awake and alert able to follow Romanian commands. , Seth Fraire, at bedside. We had a lengthy conversation regarding the patients clinical history, time and symptoms prior to admission as well as the most recent findings and plan of care. He understands and is up to date in the patients findings of port culture results. He reports the patient is doing much better today. He is hopeful of the patient being able to combat this infection and mentioned several times that "its not the cancer, she's cured". He wants all level of care to continue. The patient and  stated wanting to return home once able to instead of going to a rehab/NH facility. He reports he received some help at home after past admission, but agreed to have any services at home that she would qualify for eg. home pt, visiting nurse, visiting doctors, home attendant, etc. The  reports the patient was able to eat without difficulties and he cooked all meals for them. Mostly blended vegetables and fruit juices.     ROS:  DYSPNEA: No	  NAUS/VOM: No	  SECRETIONS: No	  AGITATION: No  Pain (Y/N): Yes  -Provocation/Palliation: When the patient is moved  -Quality/Quantity: Somatic musculoskeletal  -Radiating: No  -Severity: Moderate  -Timing/Frequency: Incidental  -Impact on ADLs: Prevents from physical activity    OTHER REVIEW OF SYSTEMS: Denied    PEx:  T(C): 36.8, Max: 38.3 (06-12 @ 12:26)  HR: 96 (76 - 102)  BP: 149/69 (84/51 - 149/69)  RR: 27 (12 - 27)  SpO2: 99% (90% - 100%)  Wt(kg):54.6    GENERAL:  AAOx1 NAD Romanian speaking becomes agitated if moved  HEENT: BIPAP mask. Dry lips and mouth     	  NECK: Supple           CVS: Regularly irregular            	  RESP: Good air entry B/L anteriorly        	  GI: Soft NT Mild distended BS+             	  : Dennis+ cloudy urine           	  MUSC: Generalized muscle wasting  DP+     	  NEURO: Follows simple commands     	  PSYCH: Calm          SKIN: Normal        	   LYMPH: Normal      	     ALLERGIES: IV CONtRAST (Flushing (Skin); Rash)     OPIATE NAÏVE (Y/N): Yes  ISTOP REVIEWED: Yes, Found Rx for Guaifenesin on iStop, copy in chart (Ref # [id number])    MEDICATIONS: REVIEWED  MEDICATIONS  (STANDING):  amiodarone    Tablet 200milliGRAM(s) Oral daily  piperacillin/tazobactam IVPB. 2.25Gram(s) IV Intermittent every 6 hours  insulin lispro (HumaLOG) corrective regimen sliding scale  SubCutaneous Before meals and at bedtime  dextrose 5%. 1000milliLiter(s) IV Continuous <Continuous>  atorvastatin 40milliGRAM(s) Oral at bedtime  bisacodyl Suppository 10milliGRAM(s) Rectal daily  heparin  Infusion 1200Unit(s)/Hr IV Continuous <Continuous>  furosemide    Tablet 20milliGRAM(s) Oral daily  metoprolol 12.5milliGRAM(s) Oral every 12 hours    MEDICATIONS  (PRN):  dextrose Gel 1Dose(s) Oral once PRN Blood Glucose LESS THAN 70 milliGRAM(s)/deciliter  glucagon  Injectable 1milliGRAM(s) IntraMuscular once PRN Glucose LESS THAN 70 milligrams/deciliter  acetaminophen  Suppository 650milliGRAM(s) Rectal every 6 hours PRN For Temp greater than 38 C (100.4 F)    LABS: REVIEWED                        8.7    5.4   )-----------( 105      ( 13 Jun 2017 06:25 )             25.7   06-13    137  |  100  |  42<H>  ----------------------------<  93  4.8   |  22  |  1.70<H>    Ca    8.8      13 Jun 2017 06:25  Phos  3.8     06-13  Mg     1.8     06-13  TPro  x   /  Alb  2.3<L>  06-13    Prothrombin Time and INR, Plasma (06.12.17 @ 08:05)    Prothrombin Time, Plasma: 17.8: Effective March 21st, the reference range for PT has changed. sec    INR: 1.59: An INR within the range of 2.00 to 3.00 is recommended for patients with  deep vein thrombosis, pulmonary embolism, atrial fibrillation and tissue  valves.  An INR within the range of 2.50 to 3.50 is recommended for most patients  with artificial valv1.59: es.    Activated Partial Thromboplastin Time in AM (06.13.17 @ 06:25)    Activated Partial Thromboplastin Time: 37.9     Culture - Catheter (06.12.17 @ 17:21)    Specimen Source: .Catheter Port Device    Culture Results:   Too numerous to count Gram Negative Rods  Identification and susceptibility to follow.    Culture - Blood (06.12.17 @ 08:25)    Gram Stain:   Aerobic Bottle: Gram Negative Rods  Result called to and read back by_ SARAH BEE RN    Specimen Source: .Blood Blood    Culture Results:   Culture in progress    Culture - Blood (06.11.17 @ 11:41)    Gram Stain:   Growth in aerobic bottle: Gram Negative Rods  Result called to and read back by_ Karly WAYNE    Specimen Source: .Blood Blood    Culture Results:   Growth in aerobic bottle: Gram Negative Rods  Identification and susceptibility to follow.    Culture - Urine (06.11.17 @ 09:03)    -  Ampicillin: R >16    -  Cefazolin: S <=8    -  Ciprofloxacin: R >2    -  Gentamicin: R >8    -  Nitrofurantoin: S <=32    -  Trimethoprim/Sulfamethoxazole: R >2/38    -  Ampicillin/Sulbactam: R >16/8    -  Ceftriaxone: S <=1    -  Piperacillin/Tazobactam: S <=16    -  Tobramycin: S <=4    Specimen Source: .Urine Catheterized    Culture Results:   1,000 CFU/ml Escherichia coli    Organism Identification: Escherichia coli    Organism: Escherichia coli    Method Type: JANEEN    ECG  Ventricular Rate 78 BPM  Atrial Rate 78 BPM  QRS Duration 90 ms  Q-T Interval 352 ms  QTC Calculation(Bezet) 401 ms  R Axis -20 degrees  T Axis 76 degrees  Diagnosis Line Atrial pacemaker  Confirmed by BEBO CARMICHAEL MD ([id number]) on 6/11/2017 2:54:25 PM    IMAGING: REVIEWED    EXAM:  XR CHEST 1 VIEW PORT ROUTINE                        PROCEDURE DATE:  06/12/2017    INTERPRETATION:  HISTORY: congestion, follow-up, hyperkalemia  Portable AP view is tear to prior radiograph of 11/20/2017.  Right-sidedport catheter is unchanged in position. Heart size is   difficult to accurately assess in this patient status post sternotomy and   CABG with dual-lead left-sided transvenous pacer. There has been no gross   change in the pulmonary vascular congestion and right basilar pleural   fluid and atelectasis. There is no evidence of new focal infiltrate, and   there has otherwise been no interval change.  IMPRESSION: No significant change from 6/11/2017.    EXAM:  IR INTERVENTIONAL RAD PROC                        PROCEDURE DATE:  06/12/2017     INTERPRETATION:    Clinical History: Sepsis. Concern for port infection. Now for port   removal.  : Pradeep Odell M.D.  Assistant: Deniz Cuevas M.D.  Anesthesia: Lidocaine for local anesthesia  Complications:  Procedure: The risks benefits and alternatives to the procedure were   discussed with the patient's  (healthcare proxy) and informed   consent obtained. The procedure was performed at the bedside. Following   informed consent the patient was placed in the supine position and the   right-sided chest port prepped and draped in a sterile fashion. Local   anesthesia was obtained with 2% lidocaine. A 2.5 cm incision was made at   the site of the previous incision, the port dissected free from the   surrounding soft tissue and the port and catheter removed. The catheter   tip was sent for culture and sensitivity. The deep tissues were closed   with 2.0 coated Vicryl suture and the skin closed with Dermabond. A   sterile dressing was applied. The patient tolerated the procedure well.   There were no immediate combination.  Impression: Chest port removal as described above.    ADVANCED DIRECTIVES:   DNR/DNI.    DECISION MAKER: Patient does not demonstrate capacity for complex medical decision making, but is able to let her needs known and answer simple yes/no questions. Will continue to monitor decision making if clinical improvement is seen.    LEGAL SURROGATE: HCP in the chart. HCP agents are Seth Fraire [phone] and Chay Astudillo.    PSYCHOSOCIAL-SPIRITUAL ASSESSMENT:       Reviewed       Care plan adjusted as above	    GOALS OF CARE DISCUSSION       Palliative care info/counseling provided	           Family meeting at bedside today	           See previous Palliative Medicine Note       Documentation of GOC: HCP  	      AGENCY CHOICE DISCUSSED:           Homecare wound care        Home PT        [facility]        Visiting doctors    REFERRALS	        Palliative Med        Unit SW/Case Mgmt        - Cheondoism       Speech/Swallow       Nutrition       PT/OT

## 2017-06-13 NOTE — PROGRESS NOTE ADULT - PROBLEM SELECTOR PLAN 1
Continues on Zosyn. Multiple cultures showing G-Rods. UA showing E. coli.  Cultured chemo port showing too numerous to count G-Rods.  Management as per MICU.

## 2017-06-13 NOTE — PROGRESS NOTE ADULT - SUBJECTIVE AND OBJECTIVE BOX
Chief Complaint/Reason for Consult: chf  INTERVAL HPI: off bipap, tolerating, but mild sob, tenuous status  	  MEDICATIONS:  amiodarone    Tablet 200milliGRAM(s) Oral daily  furosemide    Tablet 20milliGRAM(s) Oral daily  metoprolol 12.5milliGRAM(s) Oral every 12 hours    piperacillin/tazobactam IVPB. 2.25Gram(s) IV Intermittent every 6 hours      acetaminophen  Suppository 650milliGRAM(s) Rectal every 6 hours PRN    bisacodyl Suppository 10milliGRAM(s) Rectal daily    insulin lispro (HumaLOG) corrective regimen sliding scale  SubCutaneous Before meals and at bedtime  dextrose Gel 1Dose(s) Oral once PRN  dextrose 50% Injectable 12.5Gram(s) IV Push once  dextrose 50% Injectable 25Gram(s) IV Push once  dextrose 50% Injectable 25Gram(s) IV Push once  glucagon  Injectable 1milliGRAM(s) IntraMuscular once PRN  atorvastatin 40milliGRAM(s) Oral at bedtime    dextrose 5%. 1000milliLiter(s) IV Continuous <Continuous>  heparin  Infusion 1200Unit(s)/Hr IV Continuous <Continuous>      REVIEW OF SYSTEMS:  [x] As per HPI  CONSTITUTIONAL: No fever, weight loss, or fatigue  RESPIRATORY: No cough, wheezing, chills or hemoptysis; No Shortness of Breath  CARDIOVASCULAR: No chest pain, palpitations, dizziness, or leg swelling  GASTROINTESTINAL: No abdominal or epigastric pain. No nausea, vomiting, or hematemesis; No diarrhea or constipation. No melena or hematochezia.  MUSCULOSKELETAL: No joint pain or swelling; No muscle, back, or extremity pain  [x] All others negative	  [ ] Unable to obtain    PHYSICAL EXAM:  T(C): 36.8, Max: 38 (06-13 @ 06:00)  HR: 86 (76 - 102)  BP: 103/57 (86/38 - 149/69)  RR: 19 (12 - 27)  SpO2: 99% (90% - 100%)  Wt(kg): --  I&O's Summary  I & Os for 24h ending 13 Jun 2017 07:00  =============================================  IN: 1392 ml / OUT: 1480 ml / NET: -88 ml    I & Os for current day (as of 13 Jun 2017 13:06)  =============================================  IN: 220 ml / OUT: 345 ml / NET: -125 ml        Appearance: Normal	  HEENT:   Normal oral mucosa  Cardiovascular: Normal S1 S2, No JVD, No murmurs, No edema  Respiratory: Lungs clear to auscultation	  Gastrointestinal:  Soft, Non-tender, + BS	  Extremities: Normal range of motion, No clubbing, cyanosis or edema  Vascular: Peripheral pulses palpable 2+ bilaterally    TELEMETRY: 	    ECG:    	  RADIOLOGY:   CXR:  CT:  US:    CARDIAC TESTING:  Echocardiogram:  Catheterization:  Stress Test:      LABS:	 	    CARDIAC MARKERS:                                  8.7    5.4   )-----------( 105      ( 13 Jun 2017 06:25 )             25.7     06-13    137  |  100  |  42<H>  ----------------------------<  93  4.8   |  22  |  1.70<H>    Ca    8.8      13 Jun 2017 06:25  Phos  3.8     06-13  Mg     1.8     06-13    TPro  x   /  Alb  2.3<L>  /  TBili  x   /  DBili  x   /  AST  x   /  ALT  x   /  AlkPhos  x   06-13    proBNP:   Lipid Profile:   HgA1c:   TSH:     ASSESSMENT/PLAN: 	  #HFpEF: last echo in 5/2017 showing EF 60-65%. On admission, patient in respiratory distress with exam significant for crackles and LE edema. CXR significant for pulmonary congestion. Likely acute CHF exacerbation due to sepsis. Initially not diuresed given sepsis, but overnight received Lasix 40mg IVP x1 with transfusion. BPs stable.   - continue IV lasix per Renal recs  - keep euvolemic to net negative    #Afib: s/p PPM, on Eliquis at home. Initially started on hep gtt, but overnight concern for bleeding due to 2U Hgb drop so AC discontinued    - c/w home Amiodarone 200mg QD   - will consider AC when Hgb stabilized and patient tolerating PO    #CAD: s/p CABG. Mild troponemia on presentation, peaked at 0.07. Patient without chest pain. EKG without ischemic changes. Likely demand ischemia due to CHF exacerbation.   - c/w home Lipitor 80mg QHS  - holding ASA in the setting of possible bleed

## 2017-06-13 NOTE — PROGRESS NOTE ADULT - ASSESSMENT
87 YO F h/o CAD s/p CABG, atrial fibrillation s/p PPM (on Eliquis), Breast CA dx in 1997 with possible recurrence 5 months ago (now on Arimidex x 5 months), DLBCL dx 12/2016 on chemotherapy (non-cardiotoxic Bendamustin plus Rituximab - last tx 5/23), HFpEF (EF 60%), HTN, HLD, and DM admitted for severe sepsis 2/2 UTI vs colitis with GNR bacteremia and acute CHF exacerbation

## 2017-06-14 DIAGNOSIS — Z78.9 OTHER SPECIFIED HEALTH STATUS: ICD-10-CM

## 2017-06-14 DIAGNOSIS — M54.9 DORSALGIA, UNSPECIFIED: ICD-10-CM

## 2017-06-14 DIAGNOSIS — Z66 DO NOT RESUSCITATE: ICD-10-CM

## 2017-06-14 DIAGNOSIS — R06.00 DYSPNEA, UNSPECIFIED: ICD-10-CM

## 2017-06-14 LAB
-  AMIKACIN: SIGNIFICANT CHANGE UP
-  AMPICILLIN/SULBACTAM: SIGNIFICANT CHANGE UP
-  AMPICILLIN/SULBACTAM: SIGNIFICANT CHANGE UP
-  AMPICILLIN: SIGNIFICANT CHANGE UP
-  AMPICILLIN: SIGNIFICANT CHANGE UP
-  CEFAZOLIN: SIGNIFICANT CHANGE UP
-  CEFAZOLIN: SIGNIFICANT CHANGE UP
-  CEFTRIAXONE: SIGNIFICANT CHANGE UP
-  CEFTRIAXONE: SIGNIFICANT CHANGE UP
-  CIPROFLOXACIN: SIGNIFICANT CHANGE UP
-  CIPROFLOXACIN: SIGNIFICANT CHANGE UP
-  GENTAMICIN: SIGNIFICANT CHANGE UP
-  GENTAMICIN: SIGNIFICANT CHANGE UP
-  PIPERACILLIN/TAZOBACTAM: SIGNIFICANT CHANGE UP
-  PIPERACILLIN/TAZOBACTAM: SIGNIFICANT CHANGE UP
-  TOBRAMYCIN: SIGNIFICANT CHANGE UP
-  TOBRAMYCIN: SIGNIFICANT CHANGE UP
-  TRIMETHOPRIM/SULFAMETHOXAZOLE: SIGNIFICANT CHANGE UP
-  TRIMETHOPRIM/SULFAMETHOXAZOLE: SIGNIFICANT CHANGE UP
ANION GAP SERPL CALC-SCNC: 14 MMOL/L — SIGNIFICANT CHANGE UP (ref 5–17)
ANION GAP SERPL CALC-SCNC: 14 MMOL/L — SIGNIFICANT CHANGE UP (ref 5–17)
APTT BLD: 51.1 SEC — HIGH (ref 27.5–37.4)
APTT BLD: 69.5 SEC — HIGH (ref 27.5–37.4)
APTT BLD: 72.2 SEC — HIGH (ref 27.5–37.4)
APTT BLD: 75.7 SEC — HIGH (ref 27.5–37.4)
BUN SERPL-MCNC: 44 MG/DL — HIGH (ref 7–23)
BUN SERPL-MCNC: 44 MG/DL — HIGH (ref 7–23)
CALCIUM SERPL-MCNC: 8.4 MG/DL — SIGNIFICANT CHANGE UP (ref 8.4–10.5)
CALCIUM SERPL-MCNC: 8.6 MG/DL — SIGNIFICANT CHANGE UP (ref 8.4–10.5)
CHLORIDE SERPL-SCNC: 100 MMOL/L — SIGNIFICANT CHANGE UP (ref 96–108)
CHLORIDE SERPL-SCNC: 101 MMOL/L — SIGNIFICANT CHANGE UP (ref 96–108)
CO2 SERPL-SCNC: 22 MMOL/L — SIGNIFICANT CHANGE UP (ref 22–31)
CO2 SERPL-SCNC: 22 MMOL/L — SIGNIFICANT CHANGE UP (ref 22–31)
CREAT SERPL-MCNC: 1.5 MG/DL — HIGH (ref 0.5–1.3)
CREAT SERPL-MCNC: 1.6 MG/DL — HIGH (ref 0.5–1.3)
CULTURE RESULTS: SIGNIFICANT CHANGE UP
GLUCOSE SERPL-MCNC: 121 MG/DL — HIGH (ref 70–99)
GLUCOSE SERPL-MCNC: 126 MG/DL — HIGH (ref 70–99)
GRAM STN FLD: SIGNIFICANT CHANGE UP
HCT VFR BLD CALC: 26 % — LOW (ref 34.5–45)
HGB BLD-MCNC: 8.6 G/DL — LOW (ref 11.5–15.5)
MAGNESIUM SERPL-MCNC: 1.7 MG/DL — SIGNIFICANT CHANGE UP (ref 1.6–2.6)
MAGNESIUM SERPL-MCNC: 1.7 MG/DL — SIGNIFICANT CHANGE UP (ref 1.6–2.6)
MCHC RBC-ENTMCNC: 29.9 PG — SIGNIFICANT CHANGE UP (ref 27–34)
MCHC RBC-ENTMCNC: 33.1 G/DL — SIGNIFICANT CHANGE UP (ref 32–36)
MCV RBC AUTO: 90.3 FL — SIGNIFICANT CHANGE UP (ref 80–100)
METHOD TYPE: SIGNIFICANT CHANGE UP
ORGANISM # SPEC MICROSCOPIC CNT: SIGNIFICANT CHANGE UP
PHOSPHATE SERPL-MCNC: 2.9 MG/DL — SIGNIFICANT CHANGE UP (ref 2.5–4.5)
PHOSPHATE SERPL-MCNC: 2.9 MG/DL — SIGNIFICANT CHANGE UP (ref 2.5–4.5)
PLATELET # BLD AUTO: 86 K/UL — LOW (ref 150–400)
POTASSIUM SERPL-MCNC: 3.8 MMOL/L — SIGNIFICANT CHANGE UP (ref 3.5–5.3)
POTASSIUM SERPL-MCNC: 4 MMOL/L — SIGNIFICANT CHANGE UP (ref 3.5–5.3)
POTASSIUM SERPL-SCNC: 3.8 MMOL/L — SIGNIFICANT CHANGE UP (ref 3.5–5.3)
POTASSIUM SERPL-SCNC: 4 MMOL/L — SIGNIFICANT CHANGE UP (ref 3.5–5.3)
RBC # BLD: 2.88 M/UL — LOW (ref 3.8–5.2)
RBC # FLD: 16.5 % — SIGNIFICANT CHANGE UP (ref 10.3–16.9)
SODIUM SERPL-SCNC: 136 MMOL/L — SIGNIFICANT CHANGE UP (ref 135–145)
SODIUM SERPL-SCNC: 137 MMOL/L — SIGNIFICANT CHANGE UP (ref 135–145)
SPECIMEN SOURCE: SIGNIFICANT CHANGE UP
WBC # BLD: 5.1 K/UL — SIGNIFICANT CHANGE UP (ref 3.8–10.5)
WBC # FLD AUTO: 5.1 K/UL — SIGNIFICANT CHANGE UP (ref 3.8–10.5)

## 2017-06-14 PROCEDURE — 99232 SBSQ HOSP IP/OBS MODERATE 35: CPT | Mod: GC

## 2017-06-14 PROCEDURE — 99233 SBSQ HOSP IP/OBS HIGH 50: CPT

## 2017-06-14 PROCEDURE — 71010: CPT | Mod: 26

## 2017-06-14 PROCEDURE — 99233 SBSQ HOSP IP/OBS HIGH 50: CPT | Mod: GC

## 2017-06-14 RX ORDER — MORPHINE SULFATE 50 MG/1
1 CAPSULE, EXTENDED RELEASE ORAL ONCE
Qty: 0 | Refills: 0 | Status: DISCONTINUED | OUTPATIENT
Start: 2017-06-14 | End: 2017-06-14

## 2017-06-14 RX ORDER — IBUPROFEN 200 MG
600 TABLET ORAL EVERY 6 HOURS
Qty: 0 | Refills: 0 | Status: DISCONTINUED | OUTPATIENT
Start: 2017-06-14 | End: 2017-06-14

## 2017-06-14 RX ORDER — HEPARIN SODIUM 5000 [USP'U]/ML
1400 INJECTION INTRAVENOUS; SUBCUTANEOUS
Qty: 25000 | Refills: 0 | Status: DISCONTINUED | OUTPATIENT
Start: 2017-06-14 | End: 2017-06-15

## 2017-06-14 RX ORDER — LIDOCAINE 4 G/100G
1 CREAM TOPICAL DAILY
Qty: 0 | Refills: 0 | Status: DISCONTINUED | OUTPATIENT
Start: 2017-06-14 | End: 2017-07-06

## 2017-06-14 RX ORDER — CEFTRIAXONE 500 MG/1
1 INJECTION, POWDER, FOR SOLUTION INTRAMUSCULAR; INTRAVENOUS EVERY 12 HOURS
Qty: 0 | Refills: 0 | Status: DISCONTINUED | OUTPATIENT
Start: 2017-06-14 | End: 2017-06-15

## 2017-06-14 RX ORDER — POTASSIUM CHLORIDE 20 MEQ
20 PACKET (EA) ORAL ONCE
Qty: 0 | Refills: 0 | Status: COMPLETED | OUTPATIENT
Start: 2017-06-14 | End: 2017-06-14

## 2017-06-14 RX ORDER — CEFTRIAXONE 500 MG/1
INJECTION, POWDER, FOR SOLUTION INTRAMUSCULAR; INTRAVENOUS
Qty: 0 | Refills: 0 | Status: DISCONTINUED | OUTPATIENT
Start: 2017-06-14 | End: 2017-06-15

## 2017-06-14 RX ORDER — CEFTRIAXONE 500 MG/1
1 INJECTION, POWDER, FOR SOLUTION INTRAMUSCULAR; INTRAVENOUS ONCE
Qty: 0 | Refills: 0 | Status: COMPLETED | OUTPATIENT
Start: 2017-06-14 | End: 2017-06-14

## 2017-06-14 RX ORDER — MAGNESIUM SULFATE 500 MG/ML
4 VIAL (ML) INJECTION ONCE
Qty: 0 | Refills: 0 | Status: COMPLETED | OUTPATIENT
Start: 2017-06-14 | End: 2017-06-14

## 2017-06-14 RX ADMIN — Medication 600 MILLIGRAM(S): at 12:21

## 2017-06-14 RX ADMIN — ATORVASTATIN CALCIUM 40 MILLIGRAM(S): 80 TABLET, FILM COATED ORAL at 21:58

## 2017-06-14 RX ADMIN — Medication 20 MILLIEQUIVALENT(S): at 06:48

## 2017-06-14 RX ADMIN — Medication 12.5 MILLIGRAM(S): at 06:46

## 2017-06-14 RX ADMIN — MORPHINE SULFATE 1 MILLIGRAM(S): 50 CAPSULE, EXTENDED RELEASE ORAL at 08:42

## 2017-06-14 RX ADMIN — MORPHINE SULFATE 1 MILLIGRAM(S): 50 CAPSULE, EXTENDED RELEASE ORAL at 08:57

## 2017-06-14 RX ADMIN — LIDOCAINE 1 PATCH: 4 CREAM TOPICAL at 17:06

## 2017-06-14 RX ADMIN — Medication 2: at 11:36

## 2017-06-14 RX ADMIN — Medication 100 GRAM(S): at 03:59

## 2017-06-14 RX ADMIN — Medication 600 MILLIGRAM(S): at 11:36

## 2017-06-14 RX ADMIN — HEPARIN SODIUM 1400 UNIT(S)/HR: 5000 INJECTION INTRAVENOUS; SUBCUTANEOUS at 10:22

## 2017-06-14 RX ADMIN — CEFTRIAXONE 100 GRAM(S): 500 INJECTION, POWDER, FOR SOLUTION INTRAMUSCULAR; INTRAVENOUS at 21:58

## 2017-06-14 RX ADMIN — CEFTRIAXONE 100 GRAM(S): 500 INJECTION, POWDER, FOR SOLUTION INTRAMUSCULAR; INTRAVENOUS at 10:21

## 2017-06-14 RX ADMIN — Medication 20 MILLIGRAM(S): at 06:45

## 2017-06-14 RX ADMIN — Medication 12.5 MILLIGRAM(S): at 17:07

## 2017-06-14 RX ADMIN — Medication 2: at 07:26

## 2017-06-14 RX ADMIN — PIPERACILLIN AND TAZOBACTAM 200 GRAM(S): 4; .5 INJECTION, POWDER, LYOPHILIZED, FOR SOLUTION INTRAVENOUS at 06:44

## 2017-06-14 RX ADMIN — AMIODARONE HYDROCHLORIDE 200 MILLIGRAM(S): 400 TABLET ORAL at 06:45

## 2017-06-14 RX ADMIN — HEPARIN SODIUM 1400 UNIT(S)/HR: 5000 INJECTION INTRAVENOUS; SUBCUTANEOUS at 05:23

## 2017-06-14 NOTE — PROGRESS NOTE ADULT - PROBLEM SELECTOR PLAN 5
reports patient mostly bedbound and uses wheelchair to exit apartment. He reports wanting her to return home rather than going to a facility for rehabilitation. Currently not receiving any assistance at home. Palliative SW involved in anticipation of patient requiring home services eg. home pt, visiting nurse and / or visitng doctors.

## 2017-06-14 NOTE — PROGRESS NOTE ADULT - PROBLEM SELECTOR PLAN 1
Patient tolerating face mask supplemental oxygen, but complains of air hunger.  Received MSIR 1mg IV at 8am. Given renal insufficiency recommend using alternative opioids eg. Dilaudid or Oxycodone  -Recommend Oxycodone solution 5mg PO [id number] PRN Dyspnea / Air Hunger / RR>25 / Excessive work of breathing.   -Consider scheduling a standing regimen if she uses more than 3 PRN today.  -Recommend Dilaudid 0.2mg IV [id number] PRN Resp distress / RR>35

## 2017-06-14 NOTE — PHYSICAL THERAPY INITIAL EVALUATION ADULT - DIAGNOSIS, PT EVAL
Practice Pattern 6C: Impaired ventilation, respiratory/gas exchange, and aerobic capacity/endurance associated with airway clearance dysfunction

## 2017-06-14 NOTE — PHYSICAL THERAPY INITIAL EVALUATION ADULT - ADDITIONAL COMMENTS
Patient lives with . No steps to enter. Prior to admission, patient was mainly wheelchair bound for mobility. Was able to transfer with assist from  and rolling walker, however performed very little ambulation.

## 2017-06-14 NOTE — PHYSICAL THERAPY INITIAL EVALUATION ADULT - PATIENT/FAMILY/SIGNIFICANT OTHER GOALS STATEMENT, PT EVAL
Patient is willing and motivated to participate in physical therapy and wants to go back to bed because her back is hurting

## 2017-06-14 NOTE — PROGRESS NOTE ADULT - PROBLEM SELECTOR PLAN 3
Cultures showing E. coli sensitive to Ceftriaxone which she is on currently.  Management as per MICU.

## 2017-06-14 NOTE — PROGRESS NOTE ADULT - SUBJECTIVE AND OBJECTIVE BOX
INTERVAL HPI/OVERNIGHT EVENTS:    SUBJECTIVE: Patient seen and examined at bedside.     ROS:  CV: Denies chest pain  Resp: Denies SOB  GI: Denies abdominal pain, constipation, diarrhea, nausea, vomiting  : Denies dysuria  ID: Denies fevers, chills  MSK: Denies joint pain     OBJECTIVE:    VITAL SIGNS:  ICU Vital Signs Last 24 Hrs  T(C): 36.4, Max: 38.9 (06-13 @ 13:00)  T(F): 97.6, Max: 102 (06-13 @ 13:00)  HR: 76 (76 - 100)  BP: 114/57 (91/47 - 149/69)  BP(mean): 81 (62 - 101)  ABP: --  ABP(mean): --  RR: 19 (5 - 34)  SpO2: 100% (92% - 100%)      I & Os for 24h ending 06-13 @ 07:00  =============================================  IN: 1392 ml / OUT: 1480 ml / NET: -88 ml    I & Os for current day (as of 06-14 @ 06:35)  =============================================  IN: 866 ml / OUT: 1375 ml / NET: -509 ml    CAPILLARY BLOOD GLUCOSE  201 (13 Jun 2017 21:00)      PHYSICAL EXAM:    General: NAD  HEENT: NC/AT; PERRL, clear conjunctiva  Neck: supple  Respiratory: CTA b/l  Cardiovascular: +S1/S2; RRR  Abdomen: soft, NT/ND; +BS x4  Extremities: WWP, 2+ peripheral pulses b/l; no LE edema  Skin: normal color and turgor; no rash  Neurological:     MEDICATIONS:  MEDICATIONS  (STANDING):  amiodarone    Tablet 200milliGRAM(s) Oral daily  piperacillin/tazobactam IVPB. 2.25Gram(s) IV Intermittent every 6 hours  insulin lispro (HumaLOG) corrective regimen sliding scale  SubCutaneous Before meals and at bedtime  dextrose 5%. 1000milliLiter(s) IV Continuous <Continuous>  dextrose 50% Injectable 12.5Gram(s) IV Push once  dextrose 50% Injectable 25Gram(s) IV Push once  dextrose 50% Injectable 25Gram(s) IV Push once  atorvastatin 40milliGRAM(s) Oral at bedtime  bisacodyl Suppository 10milliGRAM(s) Rectal daily  furosemide    Tablet 20milliGRAM(s) Oral daily  metoprolol 12.5milliGRAM(s) Oral every 12 hours  heparin  Infusion. 1400Unit(s)/Hr IV Continuous <Continuous>  potassium chloride   Powder 20milliEquivalent(s) Oral once    MEDICATIONS  (PRN):  dextrose Gel 1Dose(s) Oral once PRN Blood Glucose LESS THAN 70 milliGRAM(s)/deciliter  glucagon  Injectable 1milliGRAM(s) IntraMuscular once PRN Glucose LESS THAN 70 milligrams/deciliter  acetaminophen  Suppository 650milliGRAM(s) Rectal every 6 hours PRN For Temp greater than 38 C (100.4 F)      ALLERGIES:  Allergies    IV CONtRAST (Flushing (Skin); Rash)  No Known Drug Allergies    Intolerances        LABS:                        8.6    5.1   )-----------( 86       ( 14 Jun 2017 04:32 )             26.0     06-14    137  |  101  |  44<H>  ----------------------------<  126<H>  3.8   |  22  |  1.50<H>    Ca    8.4      14 Jun 2017 04:32  Phos  2.9     06-14  Mg     1.7     06-14    TPro  x   /  Alb  2.3<L>  /  TBili  x   /  DBili  x   /  AST  x   /  ALT  x   /  AlkPhos  x   06-13    PT/INR - ( 12 Jun 2017 08:05 )   PT: 17.8 sec;   INR: 1.59          PTT - ( 14 Jun 2017 04:31 )  PTT:51.1 sec      RADIOLOGY & ADDITIONAL TESTS: Reviewed. INTERVAL HPI/OVERNIGHT EVENTS: Atrial fibrillation w/ aberrancy on telemetry     SUBJECTIVE: Patient seen and examined at bedside. Had been on BIPAP overnight, now on aerosol mask. Patient awake, alert, responding to questions. Oriented to person and place. Patient desires to go home. Patient denies any respiratory difficulties, chest pain. ROS + right sided abdominal pain.     OBJECTIVE:    VITAL SIGNS:  ICU Vital Signs Last 24 Hrs  T(C): 36.4, Max: 38.9 (06-13 @ 13:00)  T(F): 97.6, Max: 102 (06-13 @ 13:00)  HR: 76 (76 - 100)  BP: 114/57 (91/47 - 149/69)  BP(mean): 81 (62 - 101)  ABP: --  ABP(mean): --  RR: 19 (5 - 34)  SpO2: 100% (92% - 100%)      I & Os for 24h ending 06-13 @ 07:00  =============================================  IN: 1392 ml / OUT: 1480 ml / NET: -88 ml    I & Os for current day (as of 06-14 @ 06:35)  =============================================  IN: 866 ml / OUT: 1375 ml / NET: -509 ml    CAPILLARY BLOOD GLUCOSE  201 (13 Jun 2017 21:00)      PHYSICAL EXAM:    Constitutional: frail, cachectic female, in mild respiratory distress still using accessory muscles   HEENT: NCAT, PERRL, sclera non-icteric, dry mucous membranes   Neck: supple, no JVD   Pulm: decreased breath sounds @ bases w/ fine bibasilar crackles   CV: regular rate, irregular, +S1S2, no murmurs appreciated  GI: +distended, tender to RLQ +BS, no guarding   Ext: WWP, trace pitting edema  Vasc: DP pulses 2+ bilaterally   Neuro: awake, responds to some questions, no focal deficits     MEDICATIONS:  MEDICATIONS  (STANDING):  amiodarone    Tablet 200milliGRAM(s) Oral daily  piperacillin/tazobactam IVPB. 2.25Gram(s) IV Intermittent every 6 hours  insulin lispro (HumaLOG) corrective regimen sliding scale  SubCutaneous Before meals and at bedtime  dextrose 5%. 1000milliLiter(s) IV Continuous <Continuous>  dextrose 50% Injectable 12.5Gram(s) IV Push once  dextrose 50% Injectable 25Gram(s) IV Push once  dextrose 50% Injectable 25Gram(s) IV Push once  atorvastatin 40milliGRAM(s) Oral at bedtime  bisacodyl Suppository 10milliGRAM(s) Rectal daily  furosemide    Tablet 20milliGRAM(s) Oral daily  metoprolol 12.5milliGRAM(s) Oral every 12 hours  heparin  Infusion. 1400Unit(s)/Hr IV Continuous <Continuous>  potassium chloride   Powder 20milliEquivalent(s) Oral once    MEDICATIONS  (PRN):  dextrose Gel 1Dose(s) Oral once PRN Blood Glucose LESS THAN 70 milliGRAM(s)/deciliter  glucagon  Injectable 1milliGRAM(s) IntraMuscular once PRN Glucose LESS THAN 70 milligrams/deciliter  acetaminophen  Suppository 650milliGRAM(s) Rectal every 6 hours PRN For Temp greater than 38 C (100.4 F)      ALLERGIES:  Allergies    IV CONtRAST (Flushing (Skin); Rash)  No Known Drug Allergies    Intolerances        LABS:                        8.6    5.1   )-----------( 86       ( 14 Jun 2017 04:32 )             26.0     06-14    137  |  101  |  44<H>  ----------------------------<  126<H>  3.8   |  22  |  1.50<H>    Ca    8.4      14 Jun 2017 04:32  Phos  2.9     06-14  Mg     1.7     06-14    TPro  x   /  Alb  2.3<L>  /  TBili  x   /  DBili  x   /  AST  x   /  ALT  x   /  AlkPhos  x   06-13    PT/INR - ( 12 Jun 2017 08:05 )   PT: 17.8 sec;   INR: 1.59          PTT - ( 14 Jun 2017 04:31 )  PTT:51.1 sec      RADIOLOGY & ADDITIONAL TESTS: Reviewed.    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 chemo port removed and showed many gram negative rods on line. however, patient still febrile after chemo port removal to >102, 103. Etiology possibly coming from pacemaker as she's been having more PVC's, a-fib w/ aberrancy  l  - c/w Zosyn 2.25g Q6hrs (renally dosed)  - NM Gallium Scan   - c/w Tylenol PRN  - f/u surveillance blood cultures    CV:  #HFpEF: last echo in 5/2017 showing EF 60-65%. Likely acute CHF exacerbation due to sepsis. BPs stable. Currently net negative   - c/w Lasix 20mg qdaily   - c/w Metoprolol 12.5 BID  - continue to monitor I/Os   - will continue to monitor daily CXRs  - medically optimize cardiac function if BP allows     #Afib: s/p PPM, on Eliquis at home. Decreased CrCl  - c/w heparin gtt and monitor PTT's as per protocol   - c/w home Amiodarone 200mg QD     #CAD: s/p CABG. Patient without chest pain. EKG without ischemic changes.   - c/w home Lipitor 40mg QHS  - holding ASA     #UTI: UA positive for LE, bacteria. BCx growing GNR in all bottles. Likely urosepsis.   - c/w Zosyn 2.25g Q6hrs (renally dosed)    PULM:  #Acute hypoxic respiratory failure: requiring BIPAP 2/2 pulmonary congestion/edema likely due to CHF exacerbation in the setting of urosepsis and line sepsis.   - c/w BIPAP, wean as tolerated  - diuresis   - c/w antibiotics as above     #Pulmonary edema: plan as above, c/w BIPAP and diuresis     RENAL:  #Hyperkalemia: RESOLVED. no EKG changes    #ROMEO: Creatinine elevated above baseline; downtrending. Likely pre-renal in etiology. Renal US showing medical renal disease   - renal following, appreciate recs    HEME/ONC:  #Large Diffuse B Cell Lymphoma: last chemo with Bendamustin on 5/23/17. Followed outpatient by Dr. Galaviz- in remission as per her .  - Dr. Galaviz following, appreciate recs  - palliative care consult     #Breast cancer: on Arimidex 1mg QD at home   - Dr. Galaviz following, appreciate recs     #Anemia: Uric acid wnl (does not suggest tumor lysis). CT A/P negative for RP bleed. Transfused 1U pRBC with appropriate response.   - will continue to monitor     PPx: SCDs   FEN: monitor lytes, NPO, no IVF  Dispo: MICU    FULL CODE INTERVAL HPI/OVERNIGHT EVENTS: Atrial fibrillation w/ aberrancy on telemetry     SUBJECTIVE: Patient seen and examined at bedside. Had been on BIPAP overnight, now on aerosol mask. Patient awake, alert, responding to questions. Oriented to person and place. Patient desires to go home. Patient denies any respiratory difficulties, chest pain. ROS + right sided abdominal pain.     OBJECTIVE:    VITAL SIGNS:  ICU Vital Signs Last 24 Hrs  T(C): 36.4, Max: 38.9 (06-13 @ 13:00)  T(F): 97.6, Max: 102 (06-13 @ 13:00)  HR: 76 (76 - 100)  BP: 114/57 (91/47 - 149/69)  BP(mean): 81 (62 - 101)  ABP: --  ABP(mean): --  RR: 19 (5 - 34)  SpO2: 100% (92% - 100%)      I & Os for 24h ending 06-13 @ 07:00  =============================================  IN: 1392 ml / OUT: 1480 ml / NET: -88 ml    I & Os for current day (as of 06-14 @ 06:35)  =============================================  IN: 866 ml / OUT: 1375 ml / NET: -509 ml    CAPILLARY BLOOD GLUCOSE  201 (13 Jun 2017 21:00)      PHYSICAL EXAM:    Constitutional: frail, cachectic female, in mild respiratory distress still using accessory muscles   HEENT: NCAT, PERRL, sclera non-icteric, dry mucous membranes   Neck: supple, no JVD   Pulm: decreased breath sounds @ bases w/ fine bibasilar crackles   CV: regular rate, irregular, +S1S2, no murmurs appreciated  GI: +distended, tender to RLQ +BS, no guarding   Ext: WWP, trace pitting edema  Vasc: DP pulses 2+ bilaterally   Neuro: awake, responds to some questions, no focal deficits     MEDICATIONS:  MEDICATIONS  (STANDING):  amiodarone    Tablet 200milliGRAM(s) Oral daily  piperacillin/tazobactam IVPB. 2.25Gram(s) IV Intermittent every 6 hours  insulin lispro (HumaLOG) corrective regimen sliding scale  SubCutaneous Before meals and at bedtime  dextrose 5%. 1000milliLiter(s) IV Continuous <Continuous>  dextrose 50% Injectable 12.5Gram(s) IV Push once  dextrose 50% Injectable 25Gram(s) IV Push once  dextrose 50% Injectable 25Gram(s) IV Push once  atorvastatin 40milliGRAM(s) Oral at bedtime  bisacodyl Suppository 10milliGRAM(s) Rectal daily  furosemide    Tablet 20milliGRAM(s) Oral daily  metoprolol 12.5milliGRAM(s) Oral every 12 hours  heparin  Infusion. 1400Unit(s)/Hr IV Continuous <Continuous>  potassium chloride   Powder 20milliEquivalent(s) Oral once    MEDICATIONS  (PRN):  dextrose Gel 1Dose(s) Oral once PRN Blood Glucose LESS THAN 70 milliGRAM(s)/deciliter  glucagon  Injectable 1milliGRAM(s) IntraMuscular once PRN Glucose LESS THAN 70 milligrams/deciliter  acetaminophen  Suppository 650milliGRAM(s) Rectal every 6 hours PRN For Temp greater than 38 C (100.4 F)      ALLERGIES:  Allergies    IV CONtRAST (Flushing (Skin); Rash)  No Known Drug Allergies    Intolerances        LABS:                        8.6    5.1   )-----------( 86       ( 14 Jun 2017 04:32 )             26.0     06-14    137  |  101  |  44<H>  ----------------------------<  126<H>  3.8   |  22  |  1.50<H>    Ca    8.4      14 Jun 2017 04:32  Phos  2.9     06-14  Mg     1.7     06-14    TPro  x   /  Alb  2.3<L>  /  TBili  x   /  DBili  x   /  AST  x   /  ALT  x   /  AlkPhos  x   06-13    PT/INR - ( 12 Jun 2017 08:05 )   PT: 17.8 sec;   INR: 1.59          PTT - ( 14 Jun 2017 04:31 )  PTT:51.1 sec      RADIOLOGY & ADDITIONAL TESTS: Reviewed.    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 chemo port removed and showed many gram negative rods on line. however, patient still febrile after chemo port removal to >102, 103. Etiology possibly coming from pacemaker as she's been having more PVC's, a-fib w/ aberrancy  l  - c/w Zosyn 2.25g Q6hrs (renally dosed)  - NM Gallium Scan   - c/w Tylenol PRN  - f/u surveillance blood cultures    CV:  #HFpEF: last echo in 5/2017 showing EF 60-65%. Likely acute CHF exacerbation due to sepsis. BPs stable. Currently net negative   - c/w Lasix 20mg qdaily   - c/w Metoprolol 12.5 BID  - continue to monitor I/Os   - will continue to monitor daily CXRs  - medically optimize cardiac function if BP allows     #Afib: s/p PPM, on Eliquis at home. Decreased CrCl  - c/w heparin gtt and monitor PTT's as per protocol   - c/w home Amiodarone 200mg QD     #CAD: s/p CABG. Patient without chest pain. EKG without ischemic changes.   - c/w home Lipitor 40mg QHS  - holding ASA     #UTI: UA positive for LE, bacteria. BCx growing GNR in all bottles. Likely urosepsis.   - c/w Zosyn 2.25g Q6hrs (renally dosed)    PULM:  #Acute hypoxic respiratory failure: requiring BIPAP 2/2 pulmonary congestion/edema likely due to CHF exacerbation in the setting of urosepsis and line sepsis.   - c/w BIPAP, wean as tolerated  - diuresis   - c/w antibiotics as above     #Pulmonary edema: plan as above, c/w BIPAP and diuresis     RENAL:  #Hyperkalemia: RESOLVED. no EKG changes    #ROMEO: Creatinine elevated above baseline; downtrending. Likely pre-renal in etiology. Renal US showing medical renal disease   - renal following, appreciate recs    HEME/ONC:  #Large Diffuse B Cell Lymphoma: last chemo with Bendamustin on 5/23/17. Followed outpatient by Dr. Galaviz- in remission as per her .  - Dr. Galaviz following, appreciate recs  - palliative care consult     #Breast cancer: on Arimidex 1mg QD at home   - Dr. Galaviz following, appreciate recs     #Anemia: Uric acid wnl (does not suggest tumor lysis). CT A/P negative for RP bleed. Transfused 1U pRBC with appropriate response.   - will continue to monitor     PPx: SCDs   FEN: monitor lytes, NPO, no IVF  Dispo: MICU    DNR/ DNI INTERVAL HPI/OVERNIGHT EVENTS: Atrial fibrillation w/ aberrancy on telemetry     SUBJECTIVE: Patient seen and examined at bedside. Had been on BIPAP overnight, now on aerosol mask. Patient awake, alert, responding to questions. Oriented to person and place. Patient desires to go home. Patient denies any respiratory difficulties, chest pain. ROS + right sided abdominal pain.     OBJECTIVE:    VITAL SIGNS:  ICU Vital Signs Last 24 Hrs  T(C): 36.4, Max: 38.9 (06-13 @ 13:00)  T(F): 97.6, Max: 102 (06-13 @ 13:00)  HR: 76 (76 - 100)  BP: 114/57 (91/47 - 149/69)  BP(mean): 81 (62 - 101)  ABP: --  ABP(mean): --  RR: 19 (5 - 34)  SpO2: 100% (92% - 100%)      I & Os for 24h ending 06-13 @ 07:00  =============================================  IN: 1392 ml / OUT: 1480 ml / NET: -88 ml    I & Os for current day (as of 06-14 @ 06:35)  =============================================  IN: 866 ml / OUT: 1375 ml / NET: -509 ml    CAPILLARY BLOOD GLUCOSE  201 (13 Jun 2017 21:00)      PHYSICAL EXAM:    Constitutional: frail, cachectic female, in mild respiratory distress still using accessory muscles   HEENT: NCAT, PERRL, sclera non-icteric, dry mucous membranes   Neck: supple, no JVD   Pulm: decreased breath sounds @ bases w/ fine bibasilar crackles   CV: regular rate, irregular, +S1S2, no murmurs appreciated  GI: +distended, tender to RLQ +BS, no guarding   Ext: WWP, trace pitting edema  Vasc: DP pulses 2+ bilaterally   Neuro: awake, responds to some questions, no focal deficits     MEDICATIONS:  MEDICATIONS  (STANDING):  amiodarone    Tablet 200milliGRAM(s) Oral daily  piperacillin/tazobactam IVPB. 2.25Gram(s) IV Intermittent every 6 hours  insulin lispro (HumaLOG) corrective regimen sliding scale  SubCutaneous Before meals and at bedtime  dextrose 5%. 1000milliLiter(s) IV Continuous <Continuous>  dextrose 50% Injectable 12.5Gram(s) IV Push once  dextrose 50% Injectable 25Gram(s) IV Push once  dextrose 50% Injectable 25Gram(s) IV Push once  atorvastatin 40milliGRAM(s) Oral at bedtime  bisacodyl Suppository 10milliGRAM(s) Rectal daily  furosemide    Tablet 20milliGRAM(s) Oral daily  metoprolol 12.5milliGRAM(s) Oral every 12 hours  heparin  Infusion. 1400Unit(s)/Hr IV Continuous <Continuous>  potassium chloride   Powder 20milliEquivalent(s) Oral once    MEDICATIONS  (PRN):  dextrose Gel 1Dose(s) Oral once PRN Blood Glucose LESS THAN 70 milliGRAM(s)/deciliter  glucagon  Injectable 1milliGRAM(s) IntraMuscular once PRN Glucose LESS THAN 70 milligrams/deciliter  acetaminophen  Suppository 650milliGRAM(s) Rectal every 6 hours PRN For Temp greater than 38 C (100.4 F)      ALLERGIES:  Allergies    IV CONtRAST (Flushing (Skin); Rash)  No Known Drug Allergies    Intolerances        LABS:                        8.6    5.1   )-----------( 86       ( 14 Jun 2017 04:32 )             26.0     06-14    137  |  101  |  44<H>  ----------------------------<  126<H>  3.8   |  22  |  1.50<H>    Ca    8.4      14 Jun 2017 04:32  Phos  2.9     06-14  Mg     1.7     06-14    TPro  x   /  Alb  2.3<L>  /  TBili  x   /  DBili  x   /  AST  x   /  ALT  x   /  AlkPhos  x   06-13    PT/INR - ( 12 Jun 2017 08:05 )   PT: 17.8 sec;   INR: 1.59          PTT - ( 14 Jun 2017 04:31 )  PTT:51.1 sec      RADIOLOGY & ADDITIONAL TESTS: Reviewed.    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 chemo port removed and showed many gram negative rods on line. however, patient still febrile after chemo port removal to >102, 103. Etiology possibly coming from pacemaker as she's been having more PVC's, a-fib w/ aberrancy  l  - change to Ceftriaxone 1g q12h   - NM Gallium Scan   - c/w Tylenol PRN  - f/u surveillance blood cultures    CV:  #HFpEF: last echo in 5/2017 showing EF 60-65%. Likely acute CHF exacerbation due to sepsis. BPs stable. Currently net negative   - c/w Lasix 20mg qdaily   - c/w Metoprolol 12.5 BID  - continue to monitor I/Os   - will continue to monitor daily CXRs  - medically optimize cardiac function if BP allows     #Afib: s/p PPM, on Eliquis at home. Decreased CrCl  - c/w heparin gtt and monitor PTT's as per protocol   - c/w home Amiodarone 200mg QD     #CAD: s/p CABG. Patient without chest pain. EKG without ischemic changes.   - c/w home Lipitor 40mg QHS  - holding ASA     #UTI: UA positive for LE, bacteria. BCx growing GNR in all bottles. Likely urosepsis.   - c/w Ceftriaxone 1g q12h     PULM:  #Acute hypoxic respiratory failure: requiring BIPAP 2/2 pulmonary congestion/edema likely due to CHF exacerbation in the setting of urosepsis and line sepsis.   - c/w BIPAP, wean as tolerated  - diuresis   - c/w antibiotics as above     #Pulmonary edema: plan as above, c/w BIPAP and diuresis     RENAL:  #Hyperkalemia: RESOLVED. no EKG changes    #ROMEO: Creatinine elevated above baseline; downtrending. Likely pre-renal in etiology. Renal US showing medical renal disease   - renal following, appreciate recs    HEME/ONC:  #Large Diffuse B Cell Lymphoma: last chemo with Bendamustin on 5/23/17. Followed outpatient by Dr. Galaivz- in remission as per her .  - Dr. Galaviz following, appreciate recs  - palliative care consult     #Breast cancer: on Arimidex 1mg QD at home   - Dr. Galaviz following, appreciate recs     #Anemia: Uric acid wnl (does not suggest tumor lysis). CT A/P negative for RP bleed. Transfused 1U pRBC with appropriate response.   - will continue to monitor     PPx: SCDs   FEN: monitor lytes, NPO, no IVF  Dispo: MICU    DNR/ DNI

## 2017-06-14 NOTE — PROGRESS NOTE ADULT - PROBLEM SELECTOR PLAN 6
Albumin 2.3;  states patient has no issues with swallowing and is mostly in a blended diet of vegetables and fruits with occasional meat intake. Some evidence of skin breakdown.  Recommend nutritionist consult.

## 2017-06-14 NOTE — PROGRESS NOTE ADULT - ATTENDING COMMENTS
Patient seen and examined with house-staff during bedside rounds.  Resident note read, including vitals, physical findings, laboratory data, and radiological reports.   Revisions included below.  Direct personal management at bed side and extensive interpretation of the data.  Plan was outlined and discussed in details with the housestaff.  Decision making of high complexity  respiratory failure persistent bacteremia, port infection thrombocytopenia.  She is improving pulmonary wise on +FM and as needed bipap.  Port culture positive.  Blood culture positive.   Gallium scan.  cr stable. No bleeding on heparin

## 2017-06-14 NOTE — PROGRESS NOTE ADULT - SUBJECTIVE AND OBJECTIVE BOX
Dr. Daniel Colon  Renal Fellow  Beeper # [phone]        Patient seen and examined at bedside.   on bipap     amiodarone    Tablet 200milliGRAM(s) daily  insulin lispro (HumaLOG) corrective regimen sliding scale  Before meals and at bedtime  dextrose 5%. 1000milliLiter(s) <Continuous>  dextrose Gel 1Dose(s) once PRN  dextrose 50% Injectable 12.5Gram(s) once  dextrose 50% Injectable 25Gram(s) once  dextrose 50% Injectable 25Gram(s) once  glucagon  Injectable 1milliGRAM(s) once PRN  atorvastatin 40milliGRAM(s) at bedtime  acetaminophen  Suppository 650milliGRAM(s) every 6 hours PRN  bisacodyl Suppository 10milliGRAM(s) daily  furosemide    Tablet 20milliGRAM(s) daily  metoprolol 12.5milliGRAM(s) every 12 hours  heparin  Infusion. 1400Unit(s)/Hr <Continuous>  cefTRIAXone   IVPB    cefTRIAXone   IVPB 1Gram(s) every 12 hours  ibuprofen  Tablet 600milliGRAM(s) every 6 hours PRN      Allergies    IV CONtRAST (Flushing (Skin); Rash)  No Known Drug Allergies    Intolerances        T(C): , Max: 38.9 (06-13 @ 13:00)  T(F): , Max: 102 (06-13 @ 13:00)  HR: 78  BP: 134/56  BP(mean): 81  RR: 24  SpO2: 100%  Wt(kg): --  I & Os for 24h ending 06-14 @ 07:00  =============================================  IN:    Oral Fluid: 550 ml    Solution: 400 ml    heparin Infusion: 182 ml    heparin Infusion: 84 ml    heparin  Infusion.: 42 ml    Total IN: 1258 ml  ---------------------------------------------  OUT:    Indwelling Catheter - Urethral: 1455 ml    Total OUT: 1455 ml  ---------------------------------------------  Total NET: -197 ml    I & Os for current day (as of 06-14 @ 12:04)  =============================================  IN:    heparin  Infusion.: 42 ml    Total IN: 42 ml  ---------------------------------------------  OUT:    Indwelling Catheter - Urethral: 350 ml    Total OUT: 350 ml  ---------------------------------------------  Total NET: -308 ml        PHYSICAL EXAM:  Constitutional: on bipap - thin appearing.   Neck: Supple. No JVD.  Respiratory: Clear to auscultation   Cardiovascular: S1, S2.  Regular rate and rhythm.    Gastrointestinal: soft, non-tender, non-distended, normal bowel sounds  Extremities: Warm.  No lower extremity edema.      ACCESS:     LABS:                        8.6    5.1   )-----------( 86       ( 14 Jun 2017 04:32 )             26.0     06-14    137  |  101  |  44<H>  ----------------------------<  126<H>  3.8   |  22  |  1.50<H>    Ca    8.4      14 Jun 2017 04:32  Phos  2.9     06-14  Mg     1.7     06-14    TPro  x   /  Alb  2.3<L>  /  TBili  x   /  DBili  x   /  AST  x   /  ALT  x   /  AlkPhos  x   06-13      PTT - ( 14 Jun 2017 10:43 )  PTT:75.7 sec Dr. Daniel Colon  Renal Fellow  Beeper # [phone]        Patient seen and examined at bedside.   on FM O2    amiodarone    Tablet 200milliGRAM(s) daily  insulin lispro (HumaLOG) corrective regimen sliding scale  Before meals and at bedtime  dextrose 5%. 1000milliLiter(s) <Continuous>  dextrose Gel 1Dose(s) once PRN  dextrose 50% Injectable 12.5Gram(s) once  dextrose 50% Injectable 25Gram(s) once  dextrose 50% Injectable 25Gram(s) once  glucagon  Injectable 1milliGRAM(s) once PRN  atorvastatin 40milliGRAM(s) at bedtime  acetaminophen  Suppository 650milliGRAM(s) every 6 hours PRN  bisacodyl Suppository 10milliGRAM(s) daily  furosemide    Tablet 20milliGRAM(s) daily  metoprolol 12.5milliGRAM(s) every 12 hours  heparin  Infusion. 1400Unit(s)/Hr <Continuous>  cefTRIAXone   IVPB    cefTRIAXone   IVPB 1Gram(s) every 12 hours  ibuprofen  Tablet 600milliGRAM(s) every 6 hours PRN      Allergies    IV CONtRAST (Flushing (Skin); Rash)  No Known Drug Allergies    Intolerances        T(C): , Max: 38.9 (06-13 @ 13:00)  T(F): , Max: 102 (06-13 @ 13:00)  HR: 78  BP: 134/56  BP(mean): 81  RR: 24  SpO2: 100%  Wt(kg): --  I & Os for 24h ending 06-14 @ 07:00  =============================================  IN:    Oral Fluid: 550 ml    Solution: 400 ml    heparin Infusion: 182 ml    heparin Infusion: 84 ml    heparin  Infusion.: 42 ml    Total IN: 1258 ml  ---------------------------------------------  OUT:    Indwelling Catheter - Urethral: 1455 ml    Total OUT: 1455 ml  ---------------------------------------------  Total NET: -197 ml    I & Os for current day (as of 06-14 @ 12:04)  =============================================  IN:    heparin  Infusion.: 42 ml    Total IN: 42 ml  ---------------------------------------------  OUT:    Indwelling Catheter - Urethral: 350 ml    Total OUT: 350 ml  ---------------------------------------------  Total NET: -308 ml        PHYSICAL EXAM:  Constitutional: on FMO2 - alert, very thin appearing.   Neck: Supple. No JVD.  Respiratory: Clear to auscultation   Cardiovascular: S1, S2.  Regular rate and rhythm.    Gastrointestinal: soft, non-tender, non-distended,  Extremities: Warm.  1+ lower extremity edema b/l.    skin - no rash     ACCESS:     LABS:                        8.6    5.1   )-----------( 86       ( 14 Jun 2017 04:32 )             26.0     06-14    137  |  101  |  44<H>  ----------------------------<  126<H>  3.8   |  22  |  1.50<H>    Ca    8.4      14 Jun 2017 04:32  Phos  2.9     06-14  Mg     1.7     06-14    TPro  x   /  Alb  2.3<L>  /  TBili  x   /  DBili  x   /  AST  x   /  ALT  x   /  AlkPhos  x   06-13      PTT - ( 14 Jun 2017 10:43 )  PTT:75.7 sec

## 2017-06-14 NOTE — PHYSICAL THERAPY INITIAL EVALUATION ADULT - CRITERIA FOR SKILLED THERAPEUTIC INTERVENTIONS
risk reduction/prevention/therapy frequency/functional limitations in following categories/rehab potential/impairments found/anticipated discharge recommendation

## 2017-06-14 NOTE — PHYSICAL THERAPY INITIAL EVALUATION ADULT - PERTINENT HX OF CURRENT PROBLEM, REHAB EVAL
Patient is an 86 year old F who presents to the ED with complaints of a fever for 1 week. Patients family at bedside states she's been more lethargic with associated lower extremity swelling, SOB, decreased appetite and diarrhea. 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

## 2017-06-14 NOTE — PROGRESS NOTE ADULT - SUBJECTIVE AND OBJECTIVE BOX
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 Patients family at bedside states she's been more lethargic with associated lower extremity swelling, SOB, decreased appetite and diarrhea. Patient with previous admissions for symptomatic hyperglycemia in January. Patient currently seeing Dr. Galaviz on a regular basis for chemotherapy treatment. Last treatment on May 23rd for which she received Bendamustine and Rituximab. Patient denies any headaches, chest pain, abdominal pain, N/V, dysuria, or bowel changes.     Upon arrival to the ED, patient in moderate respiratory distress using accessory muscles, breathing w/ a RR of 30, O2 93% on room air. Patient placed on BIPAP with slight improvement in respiratory status. Patient febrile to 101 with a HR of 77. Labs significant for AG metabolic acidosis, K 7.0, Na 127, BUN 58, Cr. 2.0 (baseline < 0.9 January), Lactate 2.8. CXR done showing RLL infiltrate with pulmonary vascular congestion. In the ED, patient received Vancomycin, Zosyn, Tylenol, Calcium Gluconate 2g, Insulin 5u, Albuterol neb 5mg x 2. ICU consulted, will be transferred to MICU for management of severe sepsis 2/2 HAP and pulmonary congestion. (10 Ramon 2017 19:13)    SUBJECTIVE: Saw and evaluated Mrs Fraire at bedside today. She was found sitting in a chair at bedside on facemask. Currently more alert and awake than yesterday. Able to answer questions and let her needs known.    ROS:  DYSPNEA: Yes	  NAUS/VOM: No	  SECRETIONS: No	  AGITATION: No  Pain (Y/N):  Yes     -Provocation/Palliation: Physical activity worsens pain  -Quality/Quantity: Chronic somatic musculoskeletal back pain  -Radiating: No  -Severity: Mod-Severe  -Timing/Frequency: Incidental  -Impact on ADLs: Prevents from ambulating, mostly remains in bed    OTHER REVIEW OF SYSTEMS: Denied    PEx:  T(C): 37.3, Max: 38.9 (06-13 @ 13:00)  HR: 78 (76 - 96)  BP: 134/56 (91/47 - 134/56)  RR: 24 (5 - 34)  SpO2: 100% (92% - 100%)  Wt(kg): 54.6    GENERAL:  AAOx2 In Moderate distress due to back pain and dyspnea  HEENT: Facemask+ Dry lips      	  NECK: Supple          CVS: RR S1S2          	  RESP: Tachypneic >30 shallow breaths CTAB       	  GI: Soft NT BS+           	  : Dennis+           	  MUSC:Generalized muscle wasting      	  NEURO: Following commands    	  PSYCH: Anxious/Agitated due to symptoms, but able to be calmed  SKIN: Normal         	   LYMPH: Normal     	     ALLERGIES: IV CONtRAST (Flushing (Skin); Rash)  No Known Drug Allergies    OPIATE NAÏVE (Y/N):Yes  ISTOP REVIEWED: Yes, Found Rx for Guaifenesin on iStop, copy in chart (Ref # [id number])    MEDICATIONS: REVIEWED  MEDICATIONS  (STANDING):  amiodarone    Tablet 200milliGRAM(s) Oral daily  insulin lispro (HumaLOG) corrective regimen sliding scale  SubCutaneous Before meals and at bedtime  dextrose 5%. 1000milliLiter(s) IV Continuous <Continuous>  dextrose 50% Injectable 12.5Gram(s) IV Push once  dextrose 50% Injectable 25Gram(s) IV Push once  dextrose 50% Injectable 25Gram(s) IV Push once  atorvastatin 40milliGRAM(s) Oral at bedtime  bisacodyl Suppository 10milliGRAM(s) Rectal daily  furosemide    Tablet 20milliGRAM(s) Oral daily  metoprolol 12.5milliGRAM(s) Oral every 12 hours  heparin  Infusion. 1400Unit(s)/Hr IV Continuous <Continuous>  cefTRIAXone   IVPB  IV Intermittent   cefTRIAXone   IVPB 1Gram(s) IV Intermittent every 12 hours    MEDICATIONS  (PRN):  dextrose Gel 1Dose(s) Oral once PRN Blood Glucose LESS THAN 70 milliGRAM(s)/deciliter  glucagon  Injectable 1milliGRAM(s) IntraMuscular once PRN Glucose LESS THAN 70 milligrams/deciliter  acetaminophen  Suppository 650milliGRAM(s) Rectal every 6 hours PRN For Temp greater than 38 C (100.4 F)  ibuprofen  Tablet 600milliGRAM(s) Oral every 6 hours PRN pain above 6    LABS: REVIEWED             8.6    5.1   )-----------( 86       ( 14 Jun 2017 04:32 )             26.0   06-14    137  |  101  |  44<H>  ----------------------------<  126<H>  3.8   |  22  |  1.50<H>    Ca    8.4      14 Jun 2017 04:32  Phos  2.9     06-14  Mg     1.7     06-14    TPro  x   /  Alb  2.3<L>  /  TBili  x   /  DBili  x   /  AST  x   /  ALT  x   /  AlkPhos  x   06-13    Culture - Catheter (06.12.17 @ 17:21)    -  Ceftriaxone: S <=1    -  Piperacillin/Tazobactam: S <=16    -  Ampicillin: R >16    -  Ampicillin/Sulbactam: R >16/8    -  Cefazolin: S <=8    -  Gentamicin: R >8    -  Ciprofloxacin: R >2    -  Tobramycin: I 8    -  Trimethoprim/Sulfamethoxazole: R >2/38    Specimen Source: .Catheter Port Device    Culture Results:   Too numerous to count Escherichia coli    Organism Identification: Escherichia coli  Escherichia coli    Organism: Escherichia coli    Organism: Escherichia coli    Method Type: JANEEN    Method Type: JANEEN      IMAGING: REVIEWED    EXAM:  XR CHEST 1 VIEW PORT ROUTINE                        PROCEDURE DATE:  06/14/2017    INTERPRETATION:  XR CHEST 1 VIEW PORT ROUTINE DATED 6/14/2017 5:57 AM  INDICATION: 86 years old Female with Shortness of Breath.  PRIORSTUDIES: 6/13/2017  FINDINGS: Single frontal view the chest is submitted. There is a left   chest wall pacemaker with leads in the right atrium and right ventricle.   There are small bilateral pleural effusions with adjacent atelectatic   changes. There is mild pulmonary vascular congestion. The cardia   mediastinal silhouette is stable. Mediastinal clips and sternotomy wires   are in place.  IMPRESSION: No significant interval change.    ADVANCED DIRECTIVES:  DNR     DNI    MOLST    DECISION MAKER: Patient is able to participate in medical decision making.  LEGAL SURROGATE: HCP in the chart. HCP agents are Seth Fraire [phone] and Chay Astudillo.    PSYCHOSOCIAL-SPIRITUAL ASSESSMENT:       Reviewed       Care plan adjusted as above	    GOALS OF CARE DISCUSSION       Palliative care info/counseling provided	           Family meeting at bedside           See previous Palliative Medicine Note        Documentation of GOC: HCP, DNR/DNI, MOLST  	      AGENCY CHOICE DISCUSSED:           Homecare wound care        Home PT        [facility]        Visiting doctors    REFERRALS	        Palliative Med        Unit SW/Case Mgmt        - [facility]       Speech/Swallow       Nutrition       PT/OT

## 2017-06-14 NOTE — PHYSICAL THERAPY INITIAL EVALUATION ADULT - IMPAIRMENTS FOUND, PT EVAL
muscle strength/aerobic capacity/endurance/ventilation and respiration/gas exchange/gait, locomotion, and balance

## 2017-06-14 NOTE — PROGRESS NOTE ADULT - SUBJECTIVE AND OBJECTIVE BOX
Chief Complaint/Reason for Consult: CV mgmt  INTERVAL HPI: events noted pt back on Bipap, sepsis r/o PPM as source  	  MEDICATIONS:  amiodarone    Tablet 200milliGRAM(s) Oral daily  furosemide    Tablet 20milliGRAM(s) Oral daily  metoprolol 12.5milliGRAM(s) Oral every 12 hours    cefTRIAXone   IVPB  IV Intermittent   cefTRIAXone   IVPB 1Gram(s) IV Intermittent every 12 hours      acetaminophen  Suppository 650milliGRAM(s) Rectal every 6 hours PRN    bisacodyl Suppository 10milliGRAM(s) Rectal daily    insulin lispro (HumaLOG) corrective regimen sliding scale  SubCutaneous Before meals and at bedtime  dextrose Gel 1Dose(s) Oral once PRN  dextrose 50% Injectable 12.5Gram(s) IV Push once  dextrose 50% Injectable 25Gram(s) IV Push once  dextrose 50% Injectable 25Gram(s) IV Push once  glucagon  Injectable 1milliGRAM(s) IntraMuscular once PRN  atorvastatin 40milliGRAM(s) Oral at bedtime    dextrose 5%. 1000milliLiter(s) IV Continuous <Continuous>  heparin  Infusion. 1400Unit(s)/Hr IV Continuous <Continuous>      REVIEW OF SYSTEMS:  [x] As per HPI  CONSTITUTIONAL: No fever, weight loss, or fatigue  RESPIRATORY: No cough, wheezing, chills or hemoptysis; No Shortness of Breath  CARDIOVASCULAR: No chest pain, palpitations, dizziness, or leg swelling  GASTROINTESTINAL: No abdominal or epigastric pain. No nausea, vomiting, or hematemesis; No diarrhea or constipation. No melena or hematochezia.  MUSCULOSKELETAL: No joint pain or swelling; No muscle, back, or extremity pain  [x] All others negative	  [ ] Unable to obtain    PHYSICAL EXAM:  T(C): 37.3, Max: 38.9 (06-13 @ 13:00)  HR: 78 (76 - 96)  BP: 110/53 (91/47 - 149/69)  RR: 21 (5 - 34)  SpO2: 100% (92% - 100%)  Wt(kg): --  I&O's Summary  I & Os for 24h ending 14 Jun 2017 07:00  =============================================  IN: 1258 ml / OUT: 1455 ml / NET: -197 ml    I & Os for current day (as of 14 Jun 2017 10:47)  =============================================  IN: 42 ml / OUT: 310 ml / NET: -268 ml        Appearance: Normal	  HEENT:   Normal oral mucosa  Cardiovascular: Normal S1 S2, No JVD, No murmurs, No edema  Respiratory: Lungs clear to auscultation	  Gastrointestinal:  Soft, Non-tender, + BS	  Extremities: Normal range of motion, No clubbing, cyanosis or edema  Vascular: Peripheral pulses palpable 2+ bilaterally    TELEMETRY: 	    ECG:    	  RADIOLOGY:   CXR:  CT:  US:    CARDIAC TESTING:  Echocardiogram:  Catheterization:  Stress Test:      LABS:	 	    CARDIAC MARKERS:                                  8.6    5.1   )-----------( 86       ( 14 Jun 2017 04:32 )             26.0     06-14    137  |  101  |  44<H>  ----------------------------<  126<H>  3.8   |  22  |  1.50<H>    Ca    8.4      14 Jun 2017 04:32  Phos  2.9     06-14  Mg     1.7     06-14    TPro  x   /  Alb  2.3<L>  /  TBili  x   /  DBili  x   /  AST  x   /  ALT  x   /  AlkPhos  x   06-13    proBNP:   Lipid Profile:   HgA1c:   TSH:     ASSESSMENT/PLAN: 	  #HFpEF: last echo in 5/2017 showing EF 60-65%. On admission, patient in respiratory distress with exam significant for crackles and LE edema. CXR significant for pulmonary congestion. Likely acute CHF exacerbation due to sepsis. Initially not diuresed given sepsis, but overnight received Lasix 40mg IVP x1 with transfusion. BPs stable.   - continue IV lasix per Renal recs  - keep euvolemic to net negative    #Afib: s/p PPM, on Eliquis at home. Initially started on hep gtt, but overnight concern for bleeding due to 2U Hgb drop so AC discontinued    - c/w home Amiodarone 200mg QD   - will consider AC when Hgb stabilized and patient tolerating PO    #CAD: s/p CABG. Mild troponemia on presentation, peaked at 0.07. Patient without chest pain. EKG without ischemic changes. Likely demand ischemia due to CHF exacerbation.   - c/w home Lipitor 80mg QHS  - holding ASA in the setting of possible bleed

## 2017-06-14 NOTE — PHYSICAL THERAPY INITIAL EVALUATION ADULT - PLANNED THERAPY INTERVENTIONS, PT EVAL
postural re-education/transfer training/bed mobility training/balance training/strengthening/gait training

## 2017-06-14 NOTE — PHYSICAL THERAPY INITIAL EVALUATION ADULT - GENERAL OBSERVATIONS, REHAB EVAL
Received sitting in bedside chair with +face mask 40% FiO2, +IV, +, +friend, in no apparent distress. Patient denies shortness of breath at rest but endorses low back pain

## 2017-06-15 LAB
ACANTHOCYTES BLD QL SMEAR: SIGNIFICANT CHANGE UP
ALBUMIN SERPL ELPH-MCNC: 2.3 G/DL — LOW (ref 3.3–5)
ALBUMIN SERPL ELPH-MCNC: 2.7 G/DL — LOW (ref 3.3–5)
ALP SERPL-CCNC: 150 U/L — HIGH (ref 40–120)
ALP SERPL-CCNC: 194 U/L — HIGH (ref 40–120)
ALT FLD-CCNC: 19 U/L — SIGNIFICANT CHANGE UP (ref 10–45)
ALT FLD-CCNC: 24 U/L — SIGNIFICANT CHANGE UP (ref 10–45)
ANION GAP SERPL CALC-SCNC: 13 MMOL/L — SIGNIFICANT CHANGE UP (ref 5–17)
ANION GAP SERPL CALC-SCNC: 15 MMOL/L — SIGNIFICANT CHANGE UP (ref 5–17)
ANISOCYTOSIS BLD QL: SLIGHT — SIGNIFICANT CHANGE UP
APTT BLD: 53.4 SEC — HIGH (ref 27.5–37.4)
APTT BLD: 80.8 SEC — HIGH (ref 27.5–37.4)
AST SERPL-CCNC: 21 U/L — SIGNIFICANT CHANGE UP (ref 10–40)
AST SERPL-CCNC: 24 U/L — SIGNIFICANT CHANGE UP (ref 10–40)
BILIRUB DIRECT SERPL-MCNC: <0.2 MG/DL — SIGNIFICANT CHANGE UP (ref 0–0.2)
BILIRUB INDIRECT FLD-MCNC: >0.1 MG/DL — LOW (ref 0.2–1)
BILIRUB SERPL-MCNC: 0.3 MG/DL — SIGNIFICANT CHANGE UP (ref 0.2–1.2)
BILIRUB SERPL-MCNC: 0.3 MG/DL — SIGNIFICANT CHANGE UP (ref 0.2–1.2)
BLD GP AB SCN SERPL QL: NEGATIVE — SIGNIFICANT CHANGE UP
BUN SERPL-MCNC: 38 MG/DL — HIGH (ref 7–23)
BUN SERPL-MCNC: 43 MG/DL — HIGH (ref 7–23)
BURR CELLS BLD QL SMEAR: SIGNIFICANT CHANGE UP
BURR CELLS BLD QL SMEAR: SIGNIFICANT CHANGE UP
CALCIUM SERPL-MCNC: 8.2 MG/DL — LOW (ref 8.4–10.5)
CALCIUM SERPL-MCNC: 8.2 MG/DL — LOW (ref 8.4–10.5)
CHLORIDE SERPL-SCNC: 98 MMOL/L — SIGNIFICANT CHANGE UP (ref 96–108)
CHLORIDE SERPL-SCNC: 99 MMOL/L — SIGNIFICANT CHANGE UP (ref 96–108)
CK MB CFR SERPL CALC: 1.8 NG/ML — SIGNIFICANT CHANGE UP (ref 0–6.7)
CK SERPL-CCNC: 43 U/L — SIGNIFICANT CHANGE UP (ref 25–170)
CO2 SERPL-SCNC: 21 MMOL/L — LOW (ref 22–31)
CO2 SERPL-SCNC: 22 MMOL/L — SIGNIFICANT CHANGE UP (ref 22–31)
CREAT SERPL-MCNC: 1.3 MG/DL — SIGNIFICANT CHANGE UP (ref 0.5–1.3)
CREAT SERPL-MCNC: 1.4 MG/DL — HIGH (ref 0.5–1.3)
DOHLE BOD BLD QL SMEAR: SIGNIFICANT CHANGE UP
GLUCOSE SERPL-MCNC: 113 MG/DL — HIGH (ref 70–99)
GLUCOSE SERPL-MCNC: 121 MG/DL — HIGH (ref 70–99)
HCT VFR BLD CALC: 25.6 % — LOW (ref 34.5–45)
HCT VFR BLD CALC: 29 % — LOW (ref 34.5–45)
HGB BLD-MCNC: 8.6 G/DL — LOW (ref 11.5–15.5)
HGB BLD-MCNC: 9.6 G/DL — LOW (ref 11.5–15.5)
HYPOCHROMIA BLD QL: SLIGHT — SIGNIFICANT CHANGE UP
INR BLD: 1.22 — HIGH (ref 0.88–1.16)
LACTATE SERPL-SCNC: 1.6 MMOL/L — SIGNIFICANT CHANGE UP (ref 0.5–2)
LG PLATELETS BLD QL AUTO: PRESENT — SIGNIFICANT CHANGE UP
MACROCYTES BLD QL: SLIGHT — SIGNIFICANT CHANGE UP
MAGNESIUM SERPL-MCNC: 2.3 MG/DL — SIGNIFICANT CHANGE UP (ref 1.6–2.6)
MAGNESIUM SERPL-MCNC: 2.6 MG/DL — SIGNIFICANT CHANGE UP (ref 1.6–2.6)
MANUAL SMEAR VERIFICATION: SIGNIFICANT CHANGE UP
MCHC RBC-ENTMCNC: 29.8 PG — SIGNIFICANT CHANGE UP (ref 27–34)
MCHC RBC-ENTMCNC: 30.1 PG — SIGNIFICANT CHANGE UP (ref 27–34)
MCHC RBC-ENTMCNC: 33.1 G/DL — SIGNIFICANT CHANGE UP (ref 32–36)
MCHC RBC-ENTMCNC: 33.6 G/DL — SIGNIFICANT CHANGE UP (ref 32–36)
MCV RBC AUTO: 89.5 FL — SIGNIFICANT CHANGE UP (ref 80–100)
MCV RBC AUTO: 90.1 FL — SIGNIFICANT CHANGE UP (ref 80–100)
MICROCYTES BLD QL: SLIGHT — SIGNIFICANT CHANGE UP
NEUTS VAC BLD QL SMEAR: PRESENT — SIGNIFICANT CHANGE UP
OVALOCYTES BLD QL SMEAR: SLIGHT — SIGNIFICANT CHANGE UP
PHOSPHATE SERPL-MCNC: 2.5 MG/DL — SIGNIFICANT CHANGE UP (ref 2.5–4.5)
PLAT MORPH BLD: (no result)
PLATELET # BLD AUTO: 91 K/UL — LOW (ref 150–400)
PLATELET # BLD AUTO: 95 K/UL — LOW (ref 150–400)
POIKILOCYTOSIS BLD QL AUTO: SIGNIFICANT CHANGE UP
POTASSIUM SERPL-MCNC: 4.5 MMOL/L — SIGNIFICANT CHANGE UP (ref 3.5–5.3)
POTASSIUM SERPL-MCNC: 5 MMOL/L — SIGNIFICANT CHANGE UP (ref 3.5–5.3)
POTASSIUM SERPL-SCNC: 4.5 MMOL/L — SIGNIFICANT CHANGE UP (ref 3.5–5.3)
POTASSIUM SERPL-SCNC: 5 MMOL/L — SIGNIFICANT CHANGE UP (ref 3.5–5.3)
PROT SERPL-MCNC: 5.2 G/DL — LOW (ref 6–8.3)
PROT SERPL-MCNC: 6 G/DL — SIGNIFICANT CHANGE UP (ref 6–8.3)
PROTHROM AB SERPL-ACNC: 13.6 SEC — HIGH (ref 9.8–12.7)
RBC # BLD: 2.86 M/UL — LOW (ref 3.8–5.2)
RBC # BLD: 3.22 M/UL — LOW (ref 3.8–5.2)
RBC # FLD: 16.9 % — SIGNIFICANT CHANGE UP (ref 10.3–16.9)
RBC # FLD: 17 % — HIGH (ref 10.3–16.9)
RBC BLD AUTO: (no result)
RH IG SCN BLD-IMP: POSITIVE — SIGNIFICANT CHANGE UP
SCHISTOCYTES BLD QL AUTO: SIGNIFICANT CHANGE UP
SODIUM SERPL-SCNC: 133 MMOL/L — LOW (ref 135–145)
SODIUM SERPL-SCNC: 135 MMOL/L — SIGNIFICANT CHANGE UP (ref 135–145)
SPHEROCYTES BLD QL SMEAR: SLIGHT — SIGNIFICANT CHANGE UP
TOXIC GRANULES BLD QL SMEAR: SLIGHT — SIGNIFICANT CHANGE UP
TROPONIN T SERPL-MCNC: 0.02 NG/ML — HIGH (ref 0–0.01)
WBC # BLD: 4.9 K/UL — SIGNIFICANT CHANGE UP (ref 3.8–10.5)
WBC # BLD: 6 K/UL — SIGNIFICANT CHANGE UP (ref 3.8–10.5)
WBC # FLD AUTO: 4.9 K/UL — SIGNIFICANT CHANGE UP (ref 3.8–10.5)
WBC # FLD AUTO: 6 K/UL — SIGNIFICANT CHANGE UP (ref 3.8–10.5)

## 2017-06-15 PROCEDURE — 99233 SBSQ HOSP IP/OBS HIGH 50: CPT

## 2017-06-15 PROCEDURE — 99231 SBSQ HOSP IP/OBS SF/LOW 25: CPT | Mod: GC

## 2017-06-15 PROCEDURE — 71010: CPT | Mod: 26,76

## 2017-06-15 PROCEDURE — 99233 SBSQ HOSP IP/OBS HIGH 50: CPT | Mod: GC

## 2017-06-15 RX ORDER — HEPARIN SODIUM 5000 [USP'U]/ML
1300 INJECTION INTRAVENOUS; SUBCUTANEOUS
Qty: 25000 | Refills: 0 | Status: DISCONTINUED | OUTPATIENT
Start: 2017-06-15 | End: 2017-06-15

## 2017-06-15 RX ORDER — IPRATROPIUM/ALBUTEROL SULFATE 18-103MCG
3 AEROSOL WITH ADAPTER (GRAM) INHALATION ONCE
Qty: 0 | Refills: 0 | Status: COMPLETED | OUTPATIENT
Start: 2017-06-15 | End: 2017-06-15

## 2017-06-15 RX ORDER — ACETAMINOPHEN 500 MG
1000 TABLET ORAL ONCE
Qty: 0 | Refills: 0 | Status: COMPLETED | OUTPATIENT
Start: 2017-06-15 | End: 2017-06-15

## 2017-06-15 RX ORDER — PIPERACILLIN AND TAZOBACTAM 4; .5 G/20ML; G/20ML
2.25 INJECTION, POWDER, LYOPHILIZED, FOR SOLUTION INTRAVENOUS ONCE
Qty: 0 | Refills: 0 | Status: COMPLETED | OUTPATIENT
Start: 2017-06-15 | End: 2017-06-15

## 2017-06-15 RX ORDER — MORPHINE SULFATE 50 MG/1
1 CAPSULE, EXTENDED RELEASE ORAL ONCE
Qty: 0 | Refills: 0 | Status: DISCONTINUED | OUTPATIENT
Start: 2017-06-15 | End: 2017-06-15

## 2017-06-15 RX ORDER — PIPERACILLIN AND TAZOBACTAM 4; .5 G/20ML; G/20ML
2.25 INJECTION, POWDER, LYOPHILIZED, FOR SOLUTION INTRAVENOUS EVERY 6 HOURS
Qty: 0 | Refills: 0 | Status: DISCONTINUED | OUTPATIENT
Start: 2017-06-15 | End: 2017-06-16

## 2017-06-15 RX ORDER — MEPERIDINE HYDROCHLORIDE 50 MG/ML
25 INJECTION INTRAMUSCULAR; INTRAVENOUS; SUBCUTANEOUS EVERY 6 HOURS
Qty: 0 | Refills: 0 | Status: DISCONTINUED | OUTPATIENT
Start: 2017-06-15 | End: 2017-06-16

## 2017-06-15 RX ORDER — MORPHINE SULFATE 50 MG/1
2 CAPSULE, EXTENDED RELEASE ORAL ONCE
Qty: 0 | Refills: 0 | Status: DISCONTINUED | OUTPATIENT
Start: 2017-06-15 | End: 2017-06-16

## 2017-06-15 RX ORDER — SODIUM CHLORIDE 9 MG/ML
500 INJECTION INTRAMUSCULAR; INTRAVENOUS; SUBCUTANEOUS ONCE
Qty: 0 | Refills: 0 | Status: COMPLETED | OUTPATIENT
Start: 2017-06-15 | End: 2017-06-15

## 2017-06-15 RX ADMIN — MEPERIDINE HYDROCHLORIDE 25 MILLIGRAM(S): 50 INJECTION INTRAMUSCULAR; INTRAVENOUS; SUBCUTANEOUS at 11:52

## 2017-06-15 RX ADMIN — Medication 20 MILLIGRAM(S): at 05:38

## 2017-06-15 RX ADMIN — AMIODARONE HYDROCHLORIDE 200 MILLIGRAM(S): 400 TABLET ORAL at 05:38

## 2017-06-15 RX ADMIN — CEFTRIAXONE 100 GRAM(S): 500 INJECTION, POWDER, FOR SOLUTION INTRAMUSCULAR; INTRAVENOUS at 05:38

## 2017-06-15 RX ADMIN — PIPERACILLIN AND TAZOBACTAM 200 GRAM(S): 4; .5 INJECTION, POWDER, LYOPHILIZED, FOR SOLUTION INTRAVENOUS at 17:36

## 2017-06-15 RX ADMIN — LIDOCAINE 1 PATCH: 4 CREAM TOPICAL at 17:37

## 2017-06-15 RX ADMIN — Medication 400 MILLIGRAM(S): at 12:53

## 2017-06-15 RX ADMIN — SODIUM CHLORIDE 1000 MILLILITER(S): 9 INJECTION INTRAMUSCULAR; INTRAVENOUS; SUBCUTANEOUS at 15:16

## 2017-06-15 RX ADMIN — LIDOCAINE 1 PATCH: 4 CREAM TOPICAL at 05:40

## 2017-06-15 RX ADMIN — MORPHINE SULFATE 1 MILLIGRAM(S): 50 CAPSULE, EXTENDED RELEASE ORAL at 18:25

## 2017-06-15 RX ADMIN — MEPERIDINE HYDROCHLORIDE 25 MILLIGRAM(S): 50 INJECTION INTRAMUSCULAR; INTRAVENOUS; SUBCUTANEOUS at 12:22

## 2017-06-15 RX ADMIN — Medication 650 MILLIGRAM(S): at 11:08

## 2017-06-15 RX ADMIN — MORPHINE SULFATE 1 MILLIGRAM(S): 50 CAPSULE, EXTENDED RELEASE ORAL at 17:51

## 2017-06-15 RX ADMIN — PIPERACILLIN AND TAZOBACTAM 200 GRAM(S): 4; .5 INJECTION, POWDER, LYOPHILIZED, FOR SOLUTION INTRAVENOUS at 12:10

## 2017-06-15 RX ADMIN — Medication 3 MILLILITER(S): at 17:33

## 2017-06-15 RX ADMIN — HEPARIN SODIUM 1400 UNIT(S)/HR: 5000 INJECTION INTRAVENOUS; SUBCUTANEOUS at 05:39

## 2017-06-15 RX ADMIN — Medication 12.5 MILLIGRAM(S): at 05:38

## 2017-06-15 NOTE — PROGRESS NOTE ADULT - ATTENDING COMMENTS
Patient seen and examined with house-staff during bedside rounds.  Resident note read, including vitals, physical findings, laboratory data, and radiological reports.   Revisions included below.  Direct personal management at bed side and extensive interpretation of the data.  Plan was outlined and discussed in details with the housestaff.  Decision making of high complexity.  resolving sepsis persistent bacteremia, port infection, anemia thrombocytopenia, resolving respiratory failure.  Await gallium scan concern is PPPM bed infection or discitis. On heparin and platelet stable aim PPT close to 60.  Cr stable with adequate diuresis.  On nasal cannula and slept with boipap.  7 LA

## 2017-06-15 NOTE — PROGRESS NOTE ADULT - SUBJECTIVE AND OBJECTIVE BOX
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 Patients family at bedside states she's been more lethargic with associated lower extremity swelling, SOB, decreased appetite and diarrhea. Patient with previous admissions for symptomatic hyperglycemia in January. Patient currently seeing Dr. Galaviz on a regular basis for chemotherapy treatment. Last treatment on May 23rd for which she received Bendamustine and Rituximab. Patient denies any headaches, chest pain, abdominal pain, N/V, dysuria, or bowel changes.     Upon arrival to the ED, patient in moderate respiratory distress using accessory muscles, breathing w/ a RR of 30, O2 93% on room air. Patient placed on BIPAP with slight improvement in respiratory status. Patient febrile to 101 with a HR of 77. Labs significant for AG metabolic acidosis, K 7.0, Na 127, BUN 58, Cr. 2.0 (baseline < 0.9 January), Lactate 2.8. CXR done showing RLL infiltrate with pulmonary vascular congestion. In the ED, patient received Vancomycin, Zosyn, Tylenol, Calcium Gluconate 2g, Insulin 5u, Albuterol neb 5mg x 2. ICU consulted, will be transferred to MICU for management of severe sepsis 2/2 HAP and pulmonary congestion. (10 Ramon 2017 19:13)    SUBJECTIVE: Saw and evaluated Mrs Fraire at bedside today. Informed of prior episode of fever, chills, and respiratory distress. Currently patient was on BIPAP and reported improvement of respiratory distress, but remained with sensation of air hunger. The patients  is at bedside and reports the difficulties earlier in the day. The patient reports having had exacerbated back pain when being transferred for CT when symptoms worsened.    ROS:  DYSPNEA: Yes	  NAUS/VOM: No  SECRETIONS: No	  AGITATION: No  Pain (Y/N): Pain      -Provocation/Palliation: Physical activity worsens pain  -Quality/Quantity: Chronic somatic musculoskeletal back pain  -Radiating: No  -Severity: Mod-Severe  -Timing/Frequency: Incidental  -Impact on ADLs: Prevents from ambulating, mostly remains in bed    OTHER REVIEW OF SYSTEMS: Fatigue, air hunger    PEx:  T(C): 39.7, Max: 40.2 (06-15 @ 12:22)  HR: 82 (75 - 103)  BP: 121/59 (91/46 - 125/53)  RR: 26 (9 - 30)  SpO2: 98% (85% - 100%)  Wt(kg): 54.6    GENERAL:  AAOx3 In mild distress due to back pain and dyspnea  HEENT: BIPAP+ Dry lips      	  NECK: Supple          CVS: Irregularly irregular S1S2   L PPM      	  RESP: shallow breaths B/L crackles      	  GI: Soft NT BS+           	  : Dennis+           	  MUSC: Generalized muscle wasting      	  NEURO: Following commands    	  PSYCH: Calm, previously agitated  SKIN: Normal         	   LYMPH: Normal          ALLERGIES: IV CONtRAST (Flushing (Skin); Rash)  No Known Drug Allergies    OPIATE NAÏVE (Y/N): Yes    MEDICATIONS: REVIEWED  MEDICATIONS  (STANDING):  amiodarone    Tablet 200milliGRAM(s) Oral daily  insulin lispro (HumaLOG) corrective regimen sliding scale  SubCutaneous Before meals and at bedtime  dextrose 5%. 1000milliLiter(s) IV Continuous <Continuous>  dextrose 50% Injectable 12.5Gram(s) IV Push once  dextrose 50% Injectable 25Gram(s) IV Push once  dextrose 50% Injectable 25Gram(s) IV Push once  atorvastatin 40milliGRAM(s) Oral at bedtime  bisacodyl Suppository 10milliGRAM(s) Rectal daily  furosemide    Tablet 20milliGRAM(s) Oral daily  metoprolol 12.5milliGRAM(s) Oral every 12 hours  lidocaine   Patch 1Patch Transdermal daily  heparin  Infusion 1300Unit(s)/Hr IV Continuous <Continuous>  piperacillin/tazobactam IVPB. 2.25Gram(s) IV Intermittent every 6 hours  ALBUTerol/ipratropium for Nebulization. 3milliLiter(s) Nebulizer once    MEDICATIONS  (PRN):  dextrose Gel 1Dose(s) Oral once PRN Blood Glucose LESS THAN 70 milliGRAM(s)/deciliter  glucagon  Injectable 1milliGRAM(s) IntraMuscular once PRN Glucose LESS THAN 70 milligrams/deciliter  acetaminophen  Suppository 650milliGRAM(s) Rectal every 6 hours PRN For Temp greater than 38 C (100.4 F)  meperidine     Injectable 25milliGRAM(s) IV Push every 6 hours PRN Rigors    LABS: REVIEWED                        9.6    4.9   )-----------( 95       ( 15 Ramon 2017 12:30 )             29.0   06-15    135  |  99  |  38<H>  ----------------------------<  121<H>  5.0   |  21<L>  |  1.30    Ca    8.2<L>      15 Ramon 2017 12:30  Phos  2.5     06-15  Mg     2.3     06-15    TPro  6.0  /  Alb  2.7<L>  /  TBili  0.3  /  DBili  x   /  AST  24  /  ALT  24  /  AlkPhos  194<H>  06-15    IMAGING: REVIEWED    EXAM:  XR CHEST 1 VIEW PORT IMMEDIATE                        PROCEDURE DATE:  06/15/2017    INTERPRETATION:    CLINICAL INDICATION: Respiratory distress  EXAM: Portable AP radiograph of the chest.  COMPARISON: 06/15/2017 6:07AM  FINDINGS:  The lungs remain clear of focal consolidation. There is mild interstitial   edema and elevation of the right hemidiaphragm. No evidence of   pneumothorax. Cardiac pacemaker, sternotomy wires and CABG clips are   again seen. The cardiomediastinal silhouette is unchanged. There is   S-shaped scoliosis of the spine.  IMPRESSION:   No change    ADVANCED DIRECTIVES:  DNR     DNI     MOLST    DECISION MAKER: The patient is able to participate in medical decisions  LEGAL SURROGATE: HCP in the chart. HCP agents are Seth Fraire [phone] and Chay Astudillo.    PSYCHOSOCIAL-SPIRITUAL ASSESSMENT:       Reviewed       Care plan adjusted as above	    GOALS OF CARE DISCUSSION       Palliative care info/counseling provided	           Family meeting at bedside           See previous Palliative Medicine Note        Documentation of GOC: HCP, DNR/DNI, MOLST  	      AGENCY CHOICE DISCUSSED:           Homecare wound care        Home PT        [facility]        Visiting doctors    REFERRALS	        Palliative Med        Unit SW/Case Mgmt        - Yarsani       Speech/Swallow       Nutrition       PT/OT

## 2017-06-15 NOTE — PROGRESS NOTE ADULT - PROBLEM SELECTOR PLAN 2
Received Lidoderm patch yesterday, but continues with incidental worsening of pain during transfers and position changes. Recommend use of above opioid PRN regimen to medicate prior to transfers.

## 2017-06-15 NOTE — PROGRESS NOTE ADULT - PROBLEM SELECTOR PLAN 3
Cultures showing E. coli sensitive to Ceftriaxone which raised concerns for reaction. Abx changed to Zosyn and pending CT scan to assess possible PPM seeding. Patients rate driven by PPM. May consider EP consultation if seeding of PPM.  Management as per MICU.

## 2017-06-15 NOTE — PROGRESS NOTE ADULT - SUBJECTIVE AND OBJECTIVE BOX
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 Patients family at bedside states she's been more lethargic with associated lower extremity swelling, SOB, decreased appetite and diarrhea. Patient with previous admissions for symptomatic hyperglycemia in January. Patient currently seeing Dr. Galaviz on a regular basis for chemotherapy treatment. Last treatment on May 23rd for which she received Bendamustine and Rituximab. Patient denies any headaches, chest pain, abdominal pain, N/V, dysuria, or bowel changes.     Upon arrival to the ED, patient in moderate respiratory distress using accessory muscles, breathing w/ a RR of 30, O2 93% on room air. Patient placed on BIPAP with slight improvement in respiratory status. Patient febrile to 101 with a HR of 77. Labs significant for AG metabolic acidosis, K 7.0, Na 127, BUN 58, Cr. 2.0 (baseline < 0.9 January), Lactate 2.8. CXR done showing RLL infiltrate with pulmonary vascular congestion. In the ED, patient received Vancomycin, Zosyn, Tylenol, Calcium Gluconate 2g, Insulin 5u, Albuterol neb 5mg x 2. ICU consulted, will be transferred to MICU for management of severe sepsis 2/2 HAP and pulmonary congestion. (10 Ramon 2017 19:13)    FAMILY HISTORY:  No pertinent family history in first degree relatives    MEDICATIONS  (STANDING):  amiodarone    Tablet 200milliGRAM(s) Oral daily  insulin lispro (HumaLOG) corrective regimen sliding scale  SubCutaneous Before meals and at bedtime  dextrose 5%. 1000milliLiter(s) IV Continuous <Continuous>  dextrose 50% Injectable 12.5Gram(s) IV Push once  dextrose 50% Injectable 25Gram(s) IV Push once  dextrose 50% Injectable 25Gram(s) IV Push once  atorvastatin 40milliGRAM(s) Oral at bedtime  bisacodyl Suppository 10milliGRAM(s) Rectal daily  furosemide    Tablet 20milliGRAM(s) Oral daily  metoprolol 12.5milliGRAM(s) Oral every 12 hours  lidocaine   Patch 1Patch Transdermal daily  heparin  Infusion 1300Unit(s)/Hr IV Continuous <Continuous>  piperacillin/tazobactam IVPB. 2.25Gram(s) IV Intermittent once  piperacillin/tazobactam IVPB. 2.25Gram(s) IV Intermittent every 6 hours    MEDICATIONS  (PRN):  dextrose Gel 1Dose(s) Oral once PRN Blood Glucose LESS THAN 70 milliGRAM(s)/deciliter  glucagon  Injectable 1milliGRAM(s) IntraMuscular once PRN Glucose LESS THAN 70 milligrams/deciliter  acetaminophen  Suppository 650milliGRAM(s) Rectal every 6 hours PRN For Temp greater than 38 C (100.4 F)  meperidine     Injectable 25milliGRAM(s) IV Push every 6 hours PRN Rigors    Vital Signs Last 24 Hrs  T(C): 36.5, Max: 37.3 (06-14 @ 14:22)  T(F): 97.7, Max: 99.1 (06-14 @ 14:22)  HR: 82 (75 - 103)  BP: 117/68 (87/50 - 125/53)  BP(mean): 88 (63 - 90)  RR: 30 (9 - 30)  SpO2: 95% (85% - 100%)    Physical exam:    Overall impression  Lymphadenopathy  Liver  spleen    Labs:  CBC Full  -  ( 15 Ramon 2017 05:23 )  WBC Count : 6.0 K/uL  Hemoglobin : 8.6 g/dL  Hematocrit : 25.6 %  Platelet Count - Automated : 91 K/uL  Mean Cell Volume : 89.5 fL  Mean Cell Hemoglobin : 30.1 pg  Mean Cell Hemoglobin Concentration : 33.6 g/dL  Auto Neutrophil # : x  Auto Lymphocyte # : x  Auto Monocyte # : x  Auto Eosinophil # : x  Auto Basophil # : x  Auto Neutrophil % : x  Auto Lymphocyte % : x  Auto Monocyte % : x  Auto Eosinophil % : x  Auto Basophil % : x    06-15    133<L>  |  98  |  43<H>  ----------------------------<  113<H>  4.5   |  22  |  1.40<H>    Ca    8.2<L>      15 Ramon 2017 05:26  Phos  2.5     06-15  Mg     2.6     06-15    TPro  5.2<L>  /  Alb  2.3<L>  /  TBili  0.3  /  DBili  <0.2  /  AST  21  /  ALT  19  /  AlkPhos  150<H>  06-15      Radiology:  HEALTH ISSUES - R/O PROBLEM Dx:      Assessmant / Problems  Gram negative sepsis did excellent until this am   half way through iv Ceftriaxone started shaking and spiking fever  Drug (Ceftriaxone) allergic reaction     Plan:  would change antibiotic    Thank you  Neha Galaviz MD

## 2017-06-15 NOTE — PROGRESS NOTE ADULT - PROBLEM SELECTOR PLAN 6
Albumin 2.7 today;  states patient has no issues with swallowing and is mostly in a blended diet of vegetables and fruits with occasional meat intake. Evidence of skin breakdown.  Recommend nutritionist consult.

## 2017-06-15 NOTE — PROGRESS NOTE ADULT - SUBJECTIVE AND OBJECTIVE BOX
INTERVAL HPI/OVERNIGHT EVENTS: PTT's within normal range. Patient HD stable on BIPAP overnight.     SUBJECTIVE: Patient seen and examined at bedside.     ROS:  CV: Denies chest pain  Resp: Denies SOB  GI: Denies abdominal pain, constipation, diarrhea, nausea, vomiting  : Denies dysuria  ID: Denies fevers, chills  MSK: Denies joint pain     OBJECTIVE:    VITAL SIGNS:  ICU Vital Signs Last 24 Hrs  T(C): 37.2, Max: 37.3 (06-14 @ 10:00)  T(F): 99, Max: 99.2 (06-14 @ 10:00)  HR: 103 (75 - 103)  BP: 113/58 (87/50 - 134/56)  BP(mean): 76 (63 - 88)  ABP: --  ABP(mean): --  RR: 14 (8 - 30)  SpO2: 95% (94% - 100%)      I & Os for 24h ending 06-14 @ 07:00  =============================================  IN: 1258 ml / OUT: 1455 ml / NET: -197 ml    I & Os for current day (as of 06-15 @ 06:43)  =============================================  IN: 436 ml / OUT: 1370 ml / NET: -934 ml    CAPILLARY BLOOD GLUCOSE  125 (15 Ramon 2017 06:00)      PHYSICAL EXAM:    General: NAD  HEENT: NC/AT; PERRL, clear conjunctiva  Neck: supple  Respiratory: CTA b/l  Cardiovascular: +S1/S2; RRR  Abdomen: soft, NT/ND; +BS x4  Extremities: WWP, 2+ peripheral pulses b/l; no LE edema  Skin: normal color and turgor; no rash  Neurological:     MEDICATIONS:  MEDICATIONS  (STANDING):  amiodarone    Tablet 200milliGRAM(s) Oral daily  insulin lispro (HumaLOG) corrective regimen sliding scale  SubCutaneous Before meals and at bedtime  dextrose 5%. 1000milliLiter(s) IV Continuous <Continuous>  dextrose 50% Injectable 12.5Gram(s) IV Push once  dextrose 50% Injectable 25Gram(s) IV Push once  dextrose 50% Injectable 25Gram(s) IV Push once  atorvastatin 40milliGRAM(s) Oral at bedtime  bisacodyl Suppository 10milliGRAM(s) Rectal daily  furosemide    Tablet 20milliGRAM(s) Oral daily  metoprolol 12.5milliGRAM(s) Oral every 12 hours  heparin  Infusion. 1400Unit(s)/Hr IV Continuous <Continuous>  cefTRIAXone   IVPB  IV Intermittent   cefTRIAXone   IVPB 1Gram(s) IV Intermittent every 12 hours  lidocaine   Patch 1Patch Transdermal daily    MEDICATIONS  (PRN):  dextrose Gel 1Dose(s) Oral once PRN Blood Glucose LESS THAN 70 milliGRAM(s)/deciliter  glucagon  Injectable 1milliGRAM(s) IntraMuscular once PRN Glucose LESS THAN 70 milligrams/deciliter  acetaminophen  Suppository 650milliGRAM(s) Rectal every 6 hours PRN For Temp greater than 38 C (100.4 F)      ALLERGIES:  Allergies    IV CONtRAST (Flushing (Skin); Rash)  No Known Drug Allergies    Intolerances        LABS:                        8.6    6.0   )-----------( 91       ( 15 Ramon 2017 05:23 )             25.6     06-15    133<L>  |  98  |  43<H>  ----------------------------<  113<H>  4.5   |  22  |  1.40<H>    Ca    8.2<L>      15 Ramon 2017 05:26  Phos  2.5     06-15  Mg     2.6     06-15      PTT - ( 15 Ramon 2017 05:23 )  PTT:80.8 sec      RADIOLOGY & ADDITIONAL TESTS: Reviewed. INTERVAL HPI/OVERNIGHT EVENTS: PTT's within normal range. Patient HD stable on BIPAP overnight.     SUBJECTIVE: Patient seen and examined at bedside. Patient feels well, in no respiratory distress. Other than chronic back pain, patient denies any other pain. Patient states she feels better today than previous.     ROS:  CV: Denies chest pain  Resp: Denies SOB  GI: Denies abdominal pain, diarrhea, nausea, vomiting  : Denies dysuria  MSK: Denies joint pain     OBJECTIVE:    VITAL SIGNS:  ICU Vital Signs Last 24 Hrs  T(C): 37.2, Max: 37.3 (06-14 @ 10:00)  T(F): 99, Max: 99.2 (06-14 @ 10:00)  HR: 103 (75 - 103)  BP: 113/58 (87/50 - 134/56)  BP(mean): 76 (63 - 88)  ABP: --  ABP(mean): --  RR: 14 (8 - 30)  SpO2: 95% (94% - 100%)      I & Os for 24h ending 06-14 @ 07:00  =============================================  IN: 1258 ml / OUT: 1455 ml / NET: -197 ml    I & Os for current day (as of 06-15 @ 06:43)  =============================================  IN: 436 ml / OUT: 1370 ml / NET: -934 ml    CAPILLARY BLOOD GLUCOSE  125 (15 Ramon 2017 06:00)      PHYSICAL EXAM:    General: NAD  HEENT: NC/AT; PERRL, clear conjunctiva  Neck: supple  Respiratory: CTA b/l  Cardiovascular: +S1/S2; RRR  Abdomen: soft, NT/ND; +BS x4  Extremities: WWP, 2+ peripheral pulses b/l; no LE edema  Skin: normal color and turgor; no rash  Neurological:     MEDICATIONS:  MEDICATIONS  (STANDING):  amiodarone    Tablet 200milliGRAM(s) Oral daily  insulin lispro (HumaLOG) corrective regimen sliding scale  SubCutaneous Before meals and at bedtime  dextrose 5%. 1000milliLiter(s) IV Continuous <Continuous>  dextrose 50% Injectable 12.5Gram(s) IV Push once  dextrose 50% Injectable 25Gram(s) IV Push once  dextrose 50% Injectable 25Gram(s) IV Push once  atorvastatin 40milliGRAM(s) Oral at bedtime  bisacodyl Suppository 10milliGRAM(s) Rectal daily  furosemide    Tablet 20milliGRAM(s) Oral daily  metoprolol 12.5milliGRAM(s) Oral every 12 hours  heparin  Infusion. 1400Unit(s)/Hr IV Continuous <Continuous>  cefTRIAXone   IVPB  IV Intermittent   cefTRIAXone   IVPB 1Gram(s) IV Intermittent every 12 hours  lidocaine   Patch 1Patch Transdermal daily    MEDICATIONS  (PRN):  dextrose Gel 1Dose(s) Oral once PRN Blood Glucose LESS THAN 70 milliGRAM(s)/deciliter  glucagon  Injectable 1milliGRAM(s) IntraMuscular once PRN Glucose LESS THAN 70 milligrams/deciliter  acetaminophen  Suppository 650milliGRAM(s) Rectal every 6 hours PRN For Temp greater than 38 C (100.4 F)      ALLERGIES:  Allergies    IV CONtRAST (Flushing (Skin); Rash)  No Known Drug Allergies    Intolerances        LABS:                        8.6    6.0   )-----------( 91       ( 15 Ramon 2017 05:23 )             25.6     06-15    133<L>  |  98  |  43<H>  ----------------------------<  113<H>  4.5   |  22  |  1.40<H>    Ca    8.2<L>      15 Ramon 2017 05:26  Phos  2.5     06-15  Mg     2.6     06-15      PTT - ( 15 Ramon 2017 05:23 )  PTT:80.8 sec      RADIOLOGY & ADDITIONAL TESTS: Reviewed.  ASSESSMENT/PLAN: 86F with PMHx of CAD s/p CABG, Afib s/p PPM (on Eliquis), breast cancer, large B cell lymphoma on chemo, HFpEF, HTN, HLD, DM presented with severe sepsis 2/2 UTI, persistent bacteremia, admitted to MICU for respiratory monitoring    ID:  #Severe sepsis: Source probably urosepsis, seeding to chemoport given gram neg rods on line culture. In setting of persistent bacteremia, fevers, and rigoring, concern for pacemaker involvement. T-Max 101.6.   - c/w Ceftriaxone 1g q12h   - NM Gallium Scan today  - c/w Tylenol PRN, Demerol PRN   - f/u surveillance blood cultures today     CV:  #HFpEF: last echo in 5/2017 showing EF 60-65%. Likely acute CHF exacerbation due to sepsis. BPs stable. Currently net negative   - c/w Lasix 20mg qdaily   - c/w Metoprolol 12.5 BID  - continue to monitor I/Os   - will continue to monitor daily CXRs  - medically optimize cardiac function if BP allows     #Afib: s/p PPM, on Eliquis at home. Decreased CrCl. Concern for pacemaker involvement. Will obtain NM Gallium to confirm   - c/w heparin gtt and monitor PTT's as per protocol   - c/w home Amiodarone 200mg QD     #CAD: s/p CABG. Patient without chest pain. EKG without ischemic changes.   - c/w home Lipitor 40mg QHS  - holding ASA     #UTI: UA positive for LE, bacteria. BCx growing GNR in all bottles. Likely urosepsis.   - c/w Ceftriaxone 1g q12h     PULM:  #Acute hypoxic respiratory failure: requiring BIPAP 2/2 pulmonary congestion/edema likely due to CHF exacerbation in the setting of urosepsis and line sepsis. Doing well w/ intermittent nasal cannula throughout the day. Lung exam with improvement   - c/w BIPAP/ nasal cannula, wean as tolerated  - diuresis   - c/w antibiotics as above     #Pulmonary edema: plan as above, c/w BIPAP and diuresis     RENAL:  #Hyperkalemia: RESOLVED. no EKG changes    #ROMEO: Cr. above baseline; downtrending. Likely pre-renal in etiology.   - renal following, appreciate recs    HEME/ONC:  #Large Diffuse B Cell Lymphoma: last chemo with Bendamustin on 5/23/17. Followed outpatient by Dr. Galaviz- in remission as per her .  - Dr. Galaviz following, appreciate recs  - palliative care consult     #Breast cancer: on Arimidex 1mg QD at home   - Dr. Galaviz following, appreciate recs     #Anemia: Uric acid wnl (does not suggest tumor lysis). CT A/P negative for RP bleed. Transfused 1U pRBC with appropriate response.   - will continue to monitor     PPx: SCDs   FEN: monitor lytes, NPO, no IVF  Dispo: MICU    DNR/ DNI

## 2017-06-15 NOTE — PROGRESS NOTE ADULT - SUBJECTIVE AND OBJECTIVE BOX
Dr. Daniel Colon  Renal Fellow  Beeper # [phone]        Patient seen and examined at bedside.   sitting up in bed     amiodarone    Tablet 200milliGRAM(s) daily  insulin lispro (HumaLOG) corrective regimen sliding scale  Before meals and at bedtime  dextrose 5%. 1000milliLiter(s) <Continuous>  dextrose Gel 1Dose(s) once PRN  dextrose 50% Injectable 12.5Gram(s) once  dextrose 50% Injectable 25Gram(s) once  dextrose 50% Injectable 25Gram(s) once  glucagon  Injectable 1milliGRAM(s) once PRN  atorvastatin 40milliGRAM(s) at bedtime  acetaminophen  Suppository 650milliGRAM(s) every 6 hours PRN  bisacodyl Suppository 10milliGRAM(s) daily  furosemide    Tablet 20milliGRAM(s) daily  metoprolol 12.5milliGRAM(s) every 12 hours  cefTRIAXone   IVPB    cefTRIAXone   IVPB 1Gram(s) every 12 hours  lidocaine   Patch 1Patch daily  heparin  Infusion 1300Unit(s)/Hr <Continuous>  meperidine     Injectable 25milliGRAM(s) every 6 hours PRN      Allergies    IV CONtRAST (Flushing (Skin); Rash)  No Known Drug Allergies    Intolerances        T(C): , Max: 37.3 (06-14 @ 14:22)  T(F): , Max: 99.1 (06-14 @ 14:22)  HR: 82  BP: 109/57  BP(mean): 90  RR: 20  SpO2: 95%  Wt(kg): --  I & Os for 24h ending 06-15 @ 07:00  =============================================  IN:    heparin  Infusion.: 336 ml    Solution: 200 ml    Total IN: 536 ml  ---------------------------------------------  OUT:    Indwelling Catheter - Urethral: 1535 ml    Total OUT: 1535 ml  ---------------------------------------------  Total NET: -999 ml    I & Os for current day (as of 06-15 @ 11:34)  =============================================  IN:    heparin  Infusion.: 40 ml    Total IN: 40 ml  ---------------------------------------------  OUT:    Indwelling Catheter - Urethral: 200 ml    Total OUT: 200 ml  ---------------------------------------------  Total NET: -160 ml        PHYSICAL EXAM:  Constitutional: tired  appearing.  No acute distress  Neck: Supple. No JVD.  Respiratory: Clear to auscultation   Cardiovascular: S1, S2.  Regular rate and rhythm.    Gastrointestinal: soft, non-tender, non-distended, normal bowel sounds  Extremities: Warm. one plus le           LABS:                        8.6    6.0   )-----------( 91       ( 15 Ramon 2017 05:23 )             25.6     06-15    133<L>  |  98  |  43<H>  ----------------------------<  113<H>  4.5   |  22  |  1.40<H>    Ca    8.2<L>      15 Ramon 2017 05:26  Phos  2.5     06-15  Mg     2.6     06-15    TPro  5.2<L>  /  Alb  2.3<L>  /  TBili  0.3  /  DBili  <0.2  /  AST  21  /  ALT  19  /  AlkPhos  150<H>  06-15      PTT - ( 15 Ramon 2017 05:23 )  PTT:80.8 sec Dr. Daniel Colon  Renal Fellow  Beeper # [phone]        Patient seen and examined at bedside.   sitting up in bed NAD on NCO2  c.o back pain    amiodarone    Tablet 200milliGRAM(s) daily  insulin lispro (HumaLOG) corrective regimen sliding scale  Before meals and at bedtime  dextrose 5%. 1000milliLiter(s) <Continuous>  dextrose Gel 1Dose(s) once PRN  dextrose 50% Injectable 12.5Gram(s) once  dextrose 50% Injectable 25Gram(s) once  dextrose 50% Injectable 25Gram(s) once  glucagon  Injectable 1milliGRAM(s) once PRN  atorvastatin 40milliGRAM(s) at bedtime  acetaminophen  Suppository 650milliGRAM(s) every 6 hours PRN  bisacodyl Suppository 10milliGRAM(s) daily  furosemide    Tablet 20milliGRAM(s) daily  metoprolol 12.5milliGRAM(s) every 12 hours  cefTRIAXone   IVPB    cefTRIAXone   IVPB 1Gram(s) every 12 hours  lidocaine   Patch 1Patch daily  heparin  Infusion 1300Unit(s)/Hr <Continuous>  meperidine     Injectable 25milliGRAM(s) every 6 hours PRN      Allergies    IV CONtRAST (Flushing (Skin); Rash)  No Known Drug Allergies    Intolerances        T(C): , Max: 37.3 (06-14 @ 14:22)  T(F): , Max: 99.1 (06-14 @ 14:22)  HR: 82  BP: 109/57  BP(mean): 90  RR: 20  SpO2: 95%  Wt(kg): --  I & Os for 24h ending 06-15 @ 07:00  =============================================  IN:    heparin  Infusion.: 336 ml    Solution: 200 ml    Total IN: 536 ml  ---------------------------------------------  OUT:    Indwelling Catheter - Urethral: 1535 ml    Total OUT: 1535 ml  ---------------------------------------------  Total NET: -999 ml    I & Os for current day (as of 06-15 @ 11:34)  =============================================  IN:    heparin  Infusion.: 40 ml    Total IN: 40 ml  ---------------------------------------------  OUT:    Indwelling Catheter - Urethral: 200 ml    Total OUT: 200 ml  ---------------------------------------------  Total NET: -160 ml        PHYSICAL EXAM:  Constitutional: cachectic tired  appearing.  No acute distress on NCO2  Neck: Supple. No JVD.  Respiratory: crackles rt base  Cardiovascular: S1, S2.  Regular rate and rhythm.    Gastrointestinal: soft, non-tender, non-distended  Extremities: Warm. 1+ ankle edema B/l  skin - no rash          LABS:                        8.6    6.0   )-----------( 91       ( 15 Ramon 2017 05:23 )             25.6     06-15    133<L>  |  98  |  43<H>  ----------------------------<  113<H>  4.5   |  22  |  1.40<H>    Ca    8.2<L>      15 Ramon 2017 05:26  Phos  2.5     06-15  Mg     2.6     06-15    TPro  5.2<L>  /  Alb  2.3<L>  /  TBili  0.3  /  DBili  <0.2  /  AST  21  /  ALT  19  /  AlkPhos  150<H>  06-15      PTT - ( 15 Ramon 2017 05:23 )  PTT:80.8 sec

## 2017-06-15 NOTE — PROGRESS NOTE ADULT - PROBLEM SELECTOR PLAN 5
Patient and  reports wanting to return home rather than going to a facility for rehabilitation. Currently not receiving any assistance at home. Palliative SW involved in anticipation of patient requiring home services eg. home pt, visiting nurse and / or visitng doctors, or possible prolong antibiotic use.

## 2017-06-15 NOTE — PROGRESS NOTE ADULT - PROBLEM SELECTOR PLAN 1
Patient tolerating BIPAP mask, but complains of persistent air hunger.  Received Demerol 25mg IV at 11am for rigors which might of helped with resp distress. Given renal insufficiency recommend using alternative opioids eg. Dilaudid or Oxycodone  -Recommend Oxycodone solution 5mg PO [id number] PRN Dyspnea / Air Hunger / RR>25 / Excessive work of breathing.   -Consider scheduling a standing regimen if she uses more than 3 PRN today.  -Recommend Dilaudid 0.2mg IV [id number] PRN Resp distress / RR>35  Goal of opioid regimen would be to wean off HFNC and be able to comfortably tolerate NC.

## 2017-06-15 NOTE — CHART NOTE - NSCHARTNOTEFT_GEN_A_CORE
At approximately 9:30 AM, patient noted to have rigors. Temperature at that time 101 rectally. Blood cultures repeated. Tylenol and Demerol given. Patient left MICU on BIPAP and on monitor for NM Gallium scan with myself and respiratory therapist. In transit, patient noted to have increasing rigors and warm skin. Patient awake, however non-responsive at this time, which is abnormal from her baseline. Patients neck was very stiff, seemed to be in moderate respiratory distress, using accessory muscles with neck retraction. Decision was made by me to cancel radiology imaging at this time and bring the patient back to the MICU.     When brought back to the MICU, vital signs-  temp: > 104, 's, HR: 70-80's, RR > 35-40, O2 sat: 93% on BIPAP.   Physical Exam:  General: Moderate respiratory distress. awake, however, unresponsive, rigoring   Skin: Warm, flushed, mottled   Pulm: + accessory muscles use and neck retractions. Otherwise good air movement bilaterally w/ bibasilar crackles   CV: Irregular, +S1/S2, + systolic murmur @ RUSQ   Abdo: soft, NTND, +BS  Extrem: visibly rigoring. mottled extremities, warm to touch      CBC, BMP, Coags, Lactate, T&S sent to lab     Plan:   - Cancel NM Gallium at this time- will re-assess pending clinical status   - Patients family at bedside, palliative called   - f/u Labs, blood cultures   - patient DNR/ DNI At approximately 9:30 AM, patient noted to have rigors. Temperature at that time 101 rectally. Blood cultures repeated. Tylenol and Demerol given. Patient left MICU on BIPAP and on monitor for NM Gallium scan with myself and respiratory therapist. In transit, patient noted to have increasing rigors and warm skin. Patient awake, however non-responsive at this time, which is abnormal from her baseline. Patients neck was very stiff, seemed to be in moderate respiratory distress, using accessory muscles with neck retraction. Decision was made by me to cancel radiology imaging at this time and bring the patient back to the MICU.     When brought back to the MICU, vital signs-  temp: > 104, 's, HR: 70-80's, RR > 35-40, O2 sat: 93% on BIPAP.   Physical Exam:  General: Moderate respiratory distress. awake, however, unresponsive, rigoring   Skin: Warm, flushed, mottled   Pulm: + accessory muscles use and neck retractions. Otherwise good air movement bilaterally w/ bibasilar crackles   CV: Irregular, +S1/S2, + systolic murmur @ RUSQ   Abdo: soft, NTND, +BS  Extrem: visibly rigoring. mottled extremities, warm to touch      CBC, BMP, Coags, Lactate, T&S sent to lab     Plan:   - Cancel NM Gallium at this time- will re-assess pending clinical status   - Patients family at bedside, palliative called   - f/u Labs, blood cultures   - Tylenol IV, ice packs   - c/w IV abx   - patient DNR/ DNI  - will continue to monitor patient closely

## 2017-06-15 NOTE — PROGRESS NOTE ADULT - SUBJECTIVE AND OBJECTIVE BOX
Chief Complaint/Reason for Consult: CV mgmt  INTERVAL HPI: events noted pt Bipap overnight , sepsis r/o PPM as source  	  MEDICATIONS:  amiodarone    Tablet 200milliGRAM(s) Oral daily  furosemide    Tablet 20milliGRAM(s) Oral daily  metoprolol 12.5milliGRAM(s) Oral every 12 hours    cefTRIAXone   IVPB  IV Intermittent   cefTRIAXone   IVPB 1Gram(s) IV Intermittent every 12 hours      acetaminophen  Suppository 650milliGRAM(s) Rectal every 6 hours PRN  meperidine     Injectable 25milliGRAM(s) IV Push every 6 hours PRN    bisacodyl Suppository 10milliGRAM(s) Rectal daily    insulin lispro (HumaLOG) corrective regimen sliding scale  SubCutaneous Before meals and at bedtime  dextrose Gel 1Dose(s) Oral once PRN  dextrose 50% Injectable 12.5Gram(s) IV Push once  dextrose 50% Injectable 25Gram(s) IV Push once  dextrose 50% Injectable 25Gram(s) IV Push once  glucagon  Injectable 1milliGRAM(s) IntraMuscular once PRN  atorvastatin 40milliGRAM(s) Oral at bedtime    dextrose 5%. 1000milliLiter(s) IV Continuous <Continuous>  lidocaine   Patch 1Patch Transdermal daily  heparin  Infusion 1300Unit(s)/Hr IV Continuous <Continuous>      REVIEW OF SYSTEMS:  [x] As per HPI  CONSTITUTIONAL: No fever, weight loss, or fatigue  RESPIRATORY: No cough, wheezing, chills or hemoptysis; No Shortness of Breath  CARDIOVASCULAR: No chest pain, palpitations, dizziness, or leg swelling  GASTROINTESTINAL: No abdominal or epigastric pain. No nausea, vomiting, or hematemesis; No diarrhea or constipation. No melena or hematochezia.  MUSCULOSKELETAL: No joint pain or swelling; No muscle, back, or extremity pain  [x] All others negative	  [ ] Unable to obtain    PHYSICAL EXAM:  T(C): 36.5, Max: 37.3 (06-14 @ 14:22)  HR: 82 (75 - 103)  BP: 117/68 (87/50 - 125/53)  RR: 30 (9 - 30)  SpO2: 95% (85% - 100%)  Wt(kg): --  I&O's Summary  I & Os for 24h ending 15 Ramon 2017 07:00  =============================================  IN: 536 ml / OUT: 1535 ml / NET: -999 ml    I & Os for current day (as of 15 Ramon 2017 11:40)  =============================================  IN: 53 ml / OUT: 235 ml / NET: -182 ml        Appearance: Normal	  HEENT:   Normal oral mucosa  Cardiovascular: Normal S1 S2, No JVD, No murmurs, No edema  Respiratory: Lungs clear to auscultation	  Gastrointestinal:  Soft, Non-tender, + BS	  Extremities: Normal range of motion, No clubbing, cyanosis or edema  Vascular: Peripheral pulses palpable 2+ bilaterally    TELEMETRY: 	    ECG:    	  RADIOLOGY:   CXR:  CT:  US:    CARDIAC TESTING:  Echocardiogram:  Catheterization:  Stress Test:      LABS:	 	    CARDIAC MARKERS:                                  8.6    6.0   )-----------( 91       ( 15 Ramon 2017 05:23 )             25.6     06-15    133<L>  |  98  |  43<H>  ----------------------------<  113<H>  4.5   |  22  |  1.40<H>    Ca    8.2<L>      15 Ramon 2017 05:26  Phos  2.5     06-15  Mg     2.6     06-15    TPro  5.2<L>  /  Alb  2.3<L>  /  TBili  0.3  /  DBili  <0.2  /  AST  21  /  ALT  19  /  AlkPhos  150<H>  06-15    proBNP:   Lipid Profile:   HgA1c:   TSH:     ASSESSMENT/PLAN: 	  #HFpEF: last echo in 5/2017 showing EF 60-65%. On admission, patient in respiratory distress with exam significant for crackles and LE edema. CXR significant for pulmonary congestion. Likely acute CHF exacerbation due to sepsis. Initially not diuresed given sepsis, but overnight received Lasix 40mg IVP x1 with transfusion. BPs stable.   - continue IV lasix per Renal recs  - keep euvolemic to net negative    #Afib: s/p PPM, on Eliquis at home. Initially started on hep gtt, but overnight concern for bleeding due to 2U Hgb drop so AC discontinued    - c/w home Amiodarone 200mg QD   - will consider AC when Hgb stabilized and patient tolerating PO    #CAD: s/p CABG. Mild troponemia on presentation, peaked at 0.07. Patient without chest pain. EKG without ischemic changes. Likely demand ischemia due to CHF exacerbation.   - c/w home Lipitor 80mg QHS  - holding ASA in the setting of possible bleed

## 2017-06-16 DIAGNOSIS — T80.219A UNSPECIFIED INFECTION DUE TO CENTRAL VENOUS CATHETER, INITIAL ENCOUNTER: ICD-10-CM

## 2017-06-16 DIAGNOSIS — R78.81 BACTEREMIA: ICD-10-CM

## 2017-06-16 DIAGNOSIS — A41.9 SEPSIS, UNSPECIFIED ORGANISM: ICD-10-CM

## 2017-06-16 LAB
-  AMPICILLIN/SULBACTAM: SIGNIFICANT CHANGE UP
-  AMPICILLIN: SIGNIFICANT CHANGE UP
-  CEFAZOLIN: SIGNIFICANT CHANGE UP
-  CEFTRIAXONE: SIGNIFICANT CHANGE UP
-  CIPROFLOXACIN: SIGNIFICANT CHANGE UP
-  GENTAMICIN: SIGNIFICANT CHANGE UP
-  PIPERACILLIN/TAZOBACTAM: SIGNIFICANT CHANGE UP
-  TOBRAMYCIN: SIGNIFICANT CHANGE UP
-  TRIMETHOPRIM/SULFAMETHOXAZOLE: SIGNIFICANT CHANGE UP
ANION GAP SERPL CALC-SCNC: 11 MMOL/L — SIGNIFICANT CHANGE UP (ref 5–17)
APTT BLD: 23.8 SEC — LOW (ref 27.5–37.4)
APTT BLD: 35.9 SEC — SIGNIFICANT CHANGE UP (ref 27.5–37.4)
BUN SERPL-MCNC: 37 MG/DL — HIGH (ref 7–23)
CALCIUM SERPL-MCNC: 7.4 MG/DL — LOW (ref 8.4–10.5)
CHLORIDE SERPL-SCNC: 101 MMOL/L — SIGNIFICANT CHANGE UP (ref 96–108)
CO2 SERPL-SCNC: 21 MMOL/L — LOW (ref 22–31)
CREAT SERPL-MCNC: 1.2 MG/DL — SIGNIFICANT CHANGE UP (ref 0.5–1.3)
GLUCOSE SERPL-MCNC: 108 MG/DL — HIGH (ref 70–99)
HCT VFR BLD CALC: 24.4 % — LOW (ref 34.5–45)
HGB BLD-MCNC: 8.1 G/DL — LOW (ref 11.5–15.5)
INR BLD: 1.2 — HIGH (ref 0.88–1.16)
INR BLD: 1.24 — HIGH (ref 0.88–1.16)
MAGNESIUM SERPL-MCNC: 2.4 MG/DL — SIGNIFICANT CHANGE UP (ref 1.6–2.6)
MCHC RBC-ENTMCNC: 30 PG — SIGNIFICANT CHANGE UP (ref 27–34)
MCHC RBC-ENTMCNC: 33.2 G/DL — SIGNIFICANT CHANGE UP (ref 32–36)
MCV RBC AUTO: 90.4 FL — SIGNIFICANT CHANGE UP (ref 80–100)
METHOD TYPE: SIGNIFICANT CHANGE UP
PHOSPHATE SERPL-MCNC: 3.1 MG/DL — SIGNIFICANT CHANGE UP (ref 2.5–4.5)
PLATELET # BLD AUTO: 75 K/UL — LOW (ref 150–400)
POTASSIUM SERPL-MCNC: 4.5 MMOL/L — SIGNIFICANT CHANGE UP (ref 3.5–5.3)
POTASSIUM SERPL-SCNC: 4.5 MMOL/L — SIGNIFICANT CHANGE UP (ref 3.5–5.3)
PROTHROM AB SERPL-ACNC: 13.4 SEC — HIGH (ref 9.8–12.7)
PROTHROM AB SERPL-ACNC: 13.8 SEC — HIGH (ref 9.8–12.7)
RBC # BLD: 2.7 M/UL — LOW (ref 3.8–5.2)
RBC # FLD: 17.2 % — HIGH (ref 10.3–16.9)
SODIUM SERPL-SCNC: 133 MMOL/L — LOW (ref 135–145)
WBC # BLD: 10.3 K/UL — SIGNIFICANT CHANGE UP (ref 3.8–10.5)
WBC # FLD AUTO: 10.3 K/UL — SIGNIFICANT CHANGE UP (ref 3.8–10.5)

## 2017-06-16 PROCEDURE — 99233 SBSQ HOSP IP/OBS HIGH 50: CPT

## 2017-06-16 PROCEDURE — 99233 SBSQ HOSP IP/OBS HIGH 50: CPT | Mod: GC

## 2017-06-16 PROCEDURE — 71010: CPT | Mod: 26

## 2017-06-16 PROCEDURE — 78806: CPT | Mod: 26

## 2017-06-16 RX ORDER — MORPHINE SULFATE 50 MG/1
1 CAPSULE, EXTENDED RELEASE ORAL ONCE
Qty: 0 | Refills: 0 | Status: DISCONTINUED | OUTPATIENT
Start: 2017-06-16 | End: 2017-06-16

## 2017-06-16 RX ORDER — HEPARIN SODIUM 5000 [USP'U]/ML
1400 INJECTION INTRAVENOUS; SUBCUTANEOUS
Qty: 25000 | Refills: 0 | Status: DISCONTINUED | OUTPATIENT
Start: 2017-06-16 | End: 2017-06-17

## 2017-06-16 RX ORDER — LIDOCAINE 4 G/100G
1 CREAM TOPICAL ONCE
Qty: 0 | Refills: 0 | Status: DISCONTINUED | OUTPATIENT
Start: 2017-06-16 | End: 2017-06-16

## 2017-06-16 RX ORDER — HEPARIN SODIUM 5000 [USP'U]/ML
1300 INJECTION INTRAVENOUS; SUBCUTANEOUS
Qty: 25000 | Refills: 0 | Status: DISCONTINUED | OUTPATIENT
Start: 2017-06-16 | End: 2017-06-16

## 2017-06-16 RX ORDER — METOPROLOL TARTRATE 50 MG
12.5 TABLET ORAL
Qty: 0 | Refills: 0 | Status: DISCONTINUED | OUTPATIENT
Start: 2017-06-16 | End: 2017-06-26

## 2017-06-16 RX ORDER — CEFTRIAXONE 500 MG/1
2 INJECTION, POWDER, FOR SOLUTION INTRAMUSCULAR; INTRAVENOUS EVERY 24 HOURS
Qty: 0 | Refills: 0 | Status: DISCONTINUED | OUTPATIENT
Start: 2017-06-16 | End: 2017-06-17

## 2017-06-16 RX ADMIN — HEPARIN SODIUM 13 UNIT(S)/HR: 5000 INJECTION INTRAVENOUS; SUBCUTANEOUS at 11:36

## 2017-06-16 RX ADMIN — CEFTRIAXONE 100 GRAM(S): 500 INJECTION, POWDER, FOR SOLUTION INTRAMUSCULAR; INTRAVENOUS at 19:16

## 2017-06-16 RX ADMIN — PIPERACILLIN AND TAZOBACTAM 200 GRAM(S): 4; .5 INJECTION, POWDER, LYOPHILIZED, FOR SOLUTION INTRAVENOUS at 01:12

## 2017-06-16 RX ADMIN — PIPERACILLIN AND TAZOBACTAM 200 GRAM(S): 4; .5 INJECTION, POWDER, LYOPHILIZED, FOR SOLUTION INTRAVENOUS at 05:40

## 2017-06-16 RX ADMIN — Medication 12.5 MILLIGRAM(S): at 19:16

## 2017-06-16 RX ADMIN — MORPHINE SULFATE 1 MILLIGRAM(S): 50 CAPSULE, EXTENDED RELEASE ORAL at 19:16

## 2017-06-16 RX ADMIN — LIDOCAINE 1 PATCH: 4 CREAM TOPICAL at 08:18

## 2017-06-16 RX ADMIN — PIPERACILLIN AND TAZOBACTAM 200 GRAM(S): 4; .5 INJECTION, POWDER, LYOPHILIZED, FOR SOLUTION INTRAVENOUS at 11:05

## 2017-06-16 RX ADMIN — MORPHINE SULFATE 2 MILLIGRAM(S): 50 CAPSULE, EXTENDED RELEASE ORAL at 01:11

## 2017-06-16 RX ADMIN — MORPHINE SULFATE 2 MILLIGRAM(S): 50 CAPSULE, EXTENDED RELEASE ORAL at 01:25

## 2017-06-16 RX ADMIN — Medication: at 18:52

## 2017-06-16 RX ADMIN — Medication 10 MILLIGRAM(S): at 11:05

## 2017-06-16 RX ADMIN — LIDOCAINE 1 PATCH: 4 CREAM TOPICAL at 11:05

## 2017-06-16 RX ADMIN — AMIODARONE HYDROCHLORIDE 200 MILLIGRAM(S): 400 TABLET ORAL at 05:40

## 2017-06-16 NOTE — PROGRESS NOTE ADULT - PROBLEM SELECTOR PLAN 3
Cultures showing E. coli . Abx changed to Zosyn and pending CT scan to assess possible PPM seeding.  Management as per MICU.

## 2017-06-16 NOTE — PROGRESS NOTE ADULT - PROBLEM SELECTOR PLAN 2
Received Lidoderm patch, but continues with incidental worsening of pain during transfers and position changes. Recommend use of above opioid PRN regimen to medicate prior to transfers.

## 2017-06-16 NOTE — PROGRESS NOTE ADULT - PROBLEM SELECTOR PLAN 1
Complains of persistent air hunger despite tolerating NC and having less work of breathing.  Given renal insufficiency recommend using alternative opioids for dyspnea eg. Dilaudid or Oxycodone  -Recommend Oxycodone solution 5mg PO [id number] PRN Dyspnea / Air Hunger / RR>25 / Excessive work of breathing.   -Consider scheduling a standing regimen if she uses more than 3 PRN today.  -Recommend Dilaudid 0.2mg IV [id number] PRN Resp distress / RR>35  -Massage therapist to see patient today

## 2017-06-16 NOTE — PROGRESS NOTE ADULT - SUBJECTIVE AND OBJECTIVE BOX
Chief Complaint/Reason for Consult:  cv mgmt  INTERVAL HPI: events noted for rigors and gallium scan canceled, remains on bipap  	  MEDICATIONS:  amiodarone    Tablet 200milliGRAM(s) Oral daily    piperacillin/tazobactam IVPB. 2.25Gram(s) IV Intermittent every 6 hours      acetaminophen  Suppository 650milliGRAM(s) Rectal every 6 hours PRN  meperidine     Injectable 25milliGRAM(s) IV Push every 6 hours PRN    bisacodyl Suppository 10milliGRAM(s) Rectal daily    insulin lispro (HumaLOG) corrective regimen sliding scale  SubCutaneous Before meals and at bedtime  dextrose Gel 1Dose(s) Oral once PRN  dextrose 50% Injectable 12.5Gram(s) IV Push once  dextrose 50% Injectable 25Gram(s) IV Push once  dextrose 50% Injectable 25Gram(s) IV Push once  glucagon  Injectable 1milliGRAM(s) IntraMuscular once PRN  atorvastatin 40milliGRAM(s) Oral at bedtime    dextrose 5%. 1000milliLiter(s) IV Continuous <Continuous>  lidocaine   Patch 1Patch Transdermal daily      REVIEW OF SYSTEMS:  [x] As per HPI  CONSTITUTIONAL: No fever, weight loss, or fatigue  RESPIRATORY: No cough, wheezing, chills or hemoptysis; No Shortness of Breath  CARDIOVASCULAR: No chest pain, palpitations, dizziness, or leg swelling  GASTROINTESTINAL: No abdominal or epigastric pain. No nausea, vomiting, or hematemesis; No diarrhea or constipation. No melena or hematochezia.  MUSCULOSKELETAL: No joint pain or swelling; No muscle, back, or extremity pain  [x] All others negative	  [ ] Unable to obtain    PHYSICAL EXAM:  T(C): 36.8, Max: 40.2 (06-15 @ 12:22)  HR: 95 (76 - 119)  BP: 124/51 (81/36 - 124/51)  RR: 14 (11 - 32)  SpO2: 100% (85% - 100%)  Wt(kg): --  I&O's Summary  I & Os for 24h ending 16 Jun 2017 07:00  =============================================  IN: 338 ml / OUT: 1073 ml / NET: -735 ml    I & Os for current day (as of 16 Jun 2017 08:33)  =============================================  IN: 0 ml / OUT: 150 ml / NET: -150 ml        Appearance: Normal	  HEENT:   Normal oral mucosa  Cardiovascular: Normal S1 S2, No JVD, No murmurs, No edema  Respiratory: Lungs clear to auscultation	  Gastrointestinal:  Soft, Non-tender, + BS	  Extremities: Normal range of motion, No clubbing, cyanosis or edema  Vascular: Peripheral pulses palpable 2+ bilaterally    TELEMETRY: 	    ECG:    	  RADIOLOGY:   CXR:  CT:  US:    CARDIAC TESTING:  Echocardiogram:  Catheterization:  Stress Test:      LABS:	 	    CARDIAC MARKERS:                                  8.1    10.3  )-----------( 75       ( 16 Jun 2017 05:07 )             24.4     06-16    133<L>  |  101  |  37<H>  ----------------------------<  108<H>  4.5   |  21<L>  |  1.20    Ca    7.4<L>      16 Jun 2017 05:12  Phos  3.1     06-16  Mg     2.4     06-16    TPro  6.0  /  Alb  2.7<L>  /  TBili  0.3  /  DBili  x   /  AST  24  /  ALT  24  /  AlkPhos  194<H>  06-15    proBNP:   Lipid Profile:   HgA1c:   TSH:     ASSESSMENT/PLAN: 	  #HFpEF: last echo in 5/2017 showing EF 60-65%. On admission, patient in respiratory distress with exam significant for crackles and LE edema. CXR significant for pulmonary congestion. Likely acute CHF exacerbation due to sepsis. Initially not diuresed given sepsis, but overnight received Lasix 40mg IVP x1 with transfusion. BPs stable.   - continue IV lasix per Renal recs  - keep euvolemic to net negative    #Afib: s/p PPM, on Eliquis at home. Initially started on hep gtt, but overnight concern for bleeding due to 2U Hgb drop so AC discontinued    - c/w home Amiodarone 200mg QD   - will consider AC when Hgb stabilized and patient tolerating PO    #CAD: s/p CABG. Mild troponemia on presentation, peaked at 0.07. Patient without chest pain. EKG without ischemic changes. Likely demand ischemia due to CHF exacerbation.   - c/w home Lipitor 80mg QHS  - holding ASA in the setting of possible bleed

## 2017-06-16 NOTE — CONSULT NOTE ADULT - SUBJECTIVE AND OBJECTIVE BOX
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 Patient reports generalized malaise/fatigue with subjective fevers at home for 1 week prior to presentation. Her family had noted her to be more lethargic that usual. Patient was admitted to the MICU for severe sepsis and started on Vancomycin and Zosyn. She received a single dose of vancomycin and received Zosyn from 6/10 to 6/14 when she was transitioned to ceftriaxone given culture sensitivities. Given persistent bacteremia, chemoport was removed on 6/12 and was found to be infected with the same organism. Since line removal, patient has had fevers on 6/13 and 6/15. ID was consulted for the management of recurrent fevers and E coli bacteremia.    Patient afebrile for 24h.    PAST MEDICAL & SURGICAL HISTORY:  Scoliosis  Follicular lymphoma  Neck mass  Afib  Other hyperlipidemia  Breast cancer  CAD (coronary artery disease)  Diabetes  HTN (hypertension)  No pertinent past medical history  H/O abdominal hysterectomy  H/O thyroidectomy  S/P CABG x 3    FAMILY HISTORY:  No pertinent family history in first degree relatives    SOCIAL HISTORY:  never smoker  denies EtOH use    REVIEW OF SYSTEMS:  Constitutional: +fever, +malaise, + fatigue  HEENT: no headache/sore throat/rhinorrhea  Respiratory: + non-productive cough occasionally for 2 days  Cardiac: no palpitations/chest pain  Vascular: no leg swelling  Gastrointestinal: no nausea/vomiting/diarrhea/constipation  Genitourinary: no dysuria/nocturia/polyuria  MSK: +chronic back pain  Skin: no rashes    Vital Signs Last 24 Hrs  T(C): 37.1, Max: 37.2 (06-16 @ 01:11)  T(F): 98.8, Max: 98.9 (06-16 @ 01:11)  HR: 92 (76 - 119)  BP: 98/47 (95/55 - 128/64)  BP(mean): 68 (61 - 98)  RR: 25 (11 - 32)  SpO2: 98% (95% - 100%)    Height (cm): 152.4 (06-10 @ 21:46)  Weight (kg): 54.6 (06-10 @ 21:46)  BMI (kg/m2): 23.5 (06-10 @ 21:46)    PHYSICAL EXAM:  Gen:       frail appearing, no acute distress  Skin:       warm, dry, no rash  HEENT:  moist mucous membranes, oropharynx clear, nares clear, no septal deviation  Neck:     supple, no jugular venous distention, no thyromegaly or goiter  Back:     reports significant pain with sitting up in bed  Cardiac: regular rate and rhythm, S1/S2 present, no murmur/gallop/rub  Pulm:     L crackles in middle and lower lung fields  Abd:       soft/nontender/nondistended, normoactive bowel sounds  Lymph:  no anterior/posterior/supraclavicular nodes appreciated  Vasc:      warm and well perfused, 2+ dorsalis pedis and radial pulses, no pedal edema  Ext:         normal range of motion, atraumatic    ANTIBIOTICS:  cefTRIAXone   IVPB 2Gram(s) IV Intermittent every 24 hours      Allergies    IV CONTRAST (Flushing (Skin); Rash)  No Known Drug Allergies    Intolerances      LABS:                        8.1    10.3  )-----------( 75       ( 16 Jun 2017 05:07 )             24.4    Mean Cell Volume: 90.4 fL (06-16 @ 05:07)    06-16    133<L>  |  101  |  37<H>  ----------------------------<  108<H>  4.5   |  21<L>  |  1.20    Ca    7.4<L>      16 Jun 2017 05:12  Phos  3.1     06-16  Mg     2.4     06-16    TPro  6.0  /  Alb  2.7<L>  /  TBili  0.3  /  DBili  x   /  AST  24  /  ALT  24  /  AlkPhos  194<H>  06-15    PT/INR - ( 16 Jun 2017 05:10 )   PT: 13.8 sec;   INR: 1.24          PTT - ( 16 Jun 2017 05:10 )  PTT:23.8 sec      MICROBIOLOGY: REVIEWED    Culture - Blood (06.15.17 @ 11:38)    Specimen Source: .Blood Blood-Venous    Culture Results:   No growth at 1 day.    Culture - Blood (06.13.17 @ 12:45)    -  Ceftriaxone: S <=1    -  Piperacillin/Tazobactam: S <=16    -  Trimethoprim/Sulfamethoxazole: R >2/38    -  Ampicillin/Sulbactam: R >16/8    -  Ciprofloxacin: R >2    -  Tobramycin: I 8    -  Ampicillin: R >16    -  Cefazolin: S <=8    -  Gentamicin: R >8    Gram Stain:   Aerobic Bottle: Gram Negative Rods  Result called to and read back byJacinto Gomez RN 06/14/2017 06:36:29    Specimen Source: .Blood Blood    Organism: Escherichia coli    Culture Results:   Growth in aerobic bottle: Escherichia coli    Organism Identification: Escherichia coli    Method Type: JANEEN          RADIOLOGY: REVIEWED    EXAM:  XR CHEST 1 VIEW PORT ROUTINE                        PROCEDURE DATE:  06/16/2017    FINDINGS:    Exam is intensely unchanged given differences in positioning of the   patient. There is bibasilar subsegmental atelectasis and mild   interstitial pulmonary edema. No evidence of pneumothorax or large   pleural effusion. Sternotomy wires, CABG clips and a left chest wall   pacer device AP unchanged. The cardiomediastinal silhouette is grossly   stable.    IMPRESSION:   No significant interval change      EXAM:  CT ABDOMEN AND PELVIS                        PROCEDURE DATE:  06/11/2017      IMPRESSION:  1. Left femoral hernia containing loops of small bowelwithout definite   bowel dilatation to suggest obstruction. Correlate clinically for any   evidence of bowel incarceration.  2. Submucosal thickening of the descending and sigmoid colon which may be   due to underdistention; however, colitis cannot be excluded. Clinical   correlation recommended.  3. Abundant fecal material in the ascending and transverse colon which   may represent constipation.  4. Colonic diverticulosis without radiographic evidence of acute   diverticulitis.  5. Small amount of ascites.  6. Trace bilateral pleural effusions.  7. Incidental/non-acute findings are described above.      EXAM:  CT CHEST                        PROCEDURE DATE:  06/10/2017      IMPRESSION:  Mild cardiomegaly and pulmonary vascular congestion, most consistent with   CHF.      EXAM:  US RETROPERITONEAL LIMITED                        PROCEDURE DATE:  06/11/2017      IMPRESSION:  Increased renal echotexture bilaterally suggestive of medical renal   disease.    Bilateral renal cysts.    No hydronephrosis.

## 2017-06-16 NOTE — PROGRESS NOTE ADULT - SUBJECTIVE AND OBJECTIVE BOX
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 Patients family at bedside states she's been more lethargic with associated lower extremity swelling, SOB, decreased appetite and diarrhea. Patient with previous admissions for symptomatic hyperglycemia in January. Patient currently seeing Dr. Galaviz on a regular basis for chemotherapy treatment. Last treatment on May 23rd for which she received Bendamustine and Rituximab. Patient denies any headaches, chest pain, abdominal pain, N/V, dysuria, or bowel changes.     Upon arrival to the ED, patient in moderate respiratory distress using accessory muscles, breathing w/ a RR of 30, O2 93% on room air. Patient placed on BIPAP with slight improvement in respiratory status. Patient febrile to 101 with a HR of 77. Labs significant for AG metabolic acidosis, K 7.0, Na 127, BUN 58, Cr. 2.0 (baseline < 0.9 January), Lactate 2.8. CXR done showing RLL infiltrate with pulmonary vascular congestion. In the ED, patient received Vancomycin, Zosyn, Tylenol, Calcium Gluconate 2g, Insulin 5u, Albuterol neb 5mg x 2. ICU consulted, will be transferred to MICU for management of severe sepsis 2/2 HAP and pulmonary congestion. (10 Ramon 2017 19:13)    FAMILY HISTORY:  No pertinent family history in first degree relatives    MEDICATIONS  (STANDING):  amiodarone    Tablet 200milliGRAM(s) Oral daily  insulin lispro (HumaLOG) corrective regimen sliding scale  SubCutaneous Before meals and at bedtime  dextrose 5%. 1000milliLiter(s) IV Continuous <Continuous>  dextrose 50% Injectable 12.5Gram(s) IV Push once  dextrose 50% Injectable 25Gram(s) IV Push once  dextrose 50% Injectable 25Gram(s) IV Push once  atorvastatin 40milliGRAM(s) Oral at bedtime  bisacodyl Suppository 10milliGRAM(s) Rectal daily  lidocaine   Patch 1Patch Transdermal daily  piperacillin/tazobactam IVPB. 2.25Gram(s) IV Intermittent every 6 hours  metoprolol 12.5milliGRAM(s) Oral two times a day    MEDICATIONS  (PRN):  dextrose Gel 1Dose(s) Oral once PRN Blood Glucose LESS THAN 70 milliGRAM(s)/deciliter  glucagon  Injectable 1milliGRAM(s) IntraMuscular once PRN Glucose LESS THAN 70 milligrams/deciliter  acetaminophen  Suppository 650milliGRAM(s) Rectal every 6 hours PRN For Temp greater than 38 C (100.4 F)  meperidine     Injectable 25milliGRAM(s) IV Push every 6 hours PRN Rigors    Vital Signs Last 24 Hrs  T(C): 36.9, Max: 40.2 (06-15 @ 12:22)  T(F): 98.4, Max: 104.4 (06-15 @ 12:22)  HR: 86 (76 - 119)  BP: 124/63 (81/36 - 124/63)  BP(mean): 78 (47 - 90)  RR: 26 (11 - 32)  SpO2: 100% (85% - 100%)    Physical exam:    Overall impression  Lymphadenopathy  Liver  spleen    Labs:  CBC Full  -  ( 16 Jun 2017 05:07 )  WBC Count : 10.3 K/uL  Hemoglobin : 8.1 g/dL  Hematocrit : 24.4 %  Platelet Count - Automated : 75 K/uL  Mean Cell Volume : 90.4 fL  Mean Cell Hemoglobin : 30.0 pg  Mean Cell Hemoglobin Concentration : 33.2 g/dL  Auto Neutrophil # : x  Auto Lymphocyte # : x  Auto Monocyte # : x  Auto Eosinophil # : x  Auto Basophil # : x  Auto Neutrophil % : x  Auto Lymphocyte % : x  Auto Monocyte % : x  Auto Eosinophil % : x  Auto Basophil % : x    06-16    133<L>  |  101  |  37<H>  ----------------------------<  108<H>  4.5   |  21<L>  |  1.20    Ca    7.4<L>      16 Jun 2017 05:12  Phos  3.1     06-16  Mg     2.4     06-16    TPro  6.0  /  Alb  2.7<L>  /  TBili  0.3  /  DBili  x   /  AST  24  /  ALT  24  /  AlkPhos  194<H>  06-15      Radiology:  HEALTH ISSUES - R/O PROBLEM Dx:      Assessmant / Problems  Much improved clinically  Blood c 7 s negative   Gallium scan just being done    Plan:  As per ICU Dr Sapp    Thank you  Neha Galaviz MD

## 2017-06-16 NOTE — CONSULT NOTE ADULT - PROBLEM SELECTOR RECOMMENDATION 9
Patient presented with fever, malaise, lethargy and severe sepsis in the setting of ?UTI and was found to have E coli bacteremia. Patient was also found to have infected chemoport which grew the same organism. Organism is sensitive to Zosyn and to ceftriaxone, both of which the patient has been treated with on this admission. The patient is immunocompromised given lymphoma on chemotherapy. ANC ~5800 on admission.  -Chemoport was removed on 6/12. Patient presented with fever, malaise, lethargy and severe sepsis in the setting of ?UTI and was found to have E coli bacteremia. Patient was also found to have infected chemoport which grew the same organism. Organism is sensitive to Zosyn and to ceftriaxone, both of which the patient has been treated with on this admission. The patient is immunocompromised given lymphoma on chemotherapy. ANC ~5800 on admission.  -Chemoport was removed on 6/12.  -Ceftriaxone 1g q24h for 10 days post-line removal 6/13 is antibiotic day 1.  -F/u repeat BCx to verify clearance.

## 2017-06-16 NOTE — PROGRESS NOTE ADULT - SUBJECTIVE AND OBJECTIVE BOX
PGY-1 TRANSFER ACCEPTANCE NOTE  Hospital Course:      INTERVAL HPI/OVERNIGHT EVENTS:    SUBJECTIVE: Patient seen and examined at bedside.    OBJECTIVE:    VITAL SIGNS:  ICU Vital Signs Last 24 Hrs  T(C): 37.1, Max: 37.2 (06-16 @ 01:11)  T(F): 98.8, Max: 98.9 (06-16 @ 01:11)  HR: 98 (76 - 119)  BP: 108/52 (87/41 - 128/64)  BP(mean): 75 (50 - 98)  ABP: --  ABP(mean): --  RR: 27 (11 - 32)  SpO2: 95% (95% - 100%)      I & Os for 24h ending 06-16 @ 07:00  =============================================  IN: 338 ml / OUT: 1073 ml / NET: -735 ml    I & Os for current day (as of 06-16 @ 14:32)  =============================================  IN: 99 ml / OUT: 520 ml / NET: -421 ml    CAPILLARY BLOOD GLUCOSE  129 (16 Jun 2017 11:00)      PHYSICAL EXAM:    General: NAD  HEENT: NC/AT; PERRL, clear conjunctiva  Neck: supple  Respiratory: CTA b/l  Cardiovascular: +S1/S2; RRR  Abdomen: soft, NT/ND; +BS x4  Extremities: WWP, 2+ peripheral pulses b/l; no LE edema  Skin: normal color and turgor; no rash  Neurological:     MEDICATIONS:  MEDICATIONS  (STANDING):  amiodarone    Tablet 200milliGRAM(s) Oral daily  insulin lispro (HumaLOG) corrective regimen sliding scale  SubCutaneous Before meals and at bedtime  dextrose 5%. 1000milliLiter(s) IV Continuous <Continuous>  dextrose 50% Injectable 12.5Gram(s) IV Push once  dextrose 50% Injectable 25Gram(s) IV Push once  dextrose 50% Injectable 25Gram(s) IV Push once  atorvastatin 40milliGRAM(s) Oral at bedtime  bisacodyl Suppository 10milliGRAM(s) Rectal daily  lidocaine   Patch 1Patch Transdermal daily  piperacillin/tazobactam IVPB. 2.25Gram(s) IV Intermittent every 6 hours  metoprolol 12.5milliGRAM(s) Oral two times a day  heparin  Infusion 1300Unit(s)/Hr IV Continuous <Continuous>    MEDICATIONS  (PRN):  dextrose Gel 1Dose(s) Oral once PRN Blood Glucose LESS THAN 70 milliGRAM(s)/deciliter  glucagon  Injectable 1milliGRAM(s) IntraMuscular once PRN Glucose LESS THAN 70 milligrams/deciliter  acetaminophen  Suppository 650milliGRAM(s) Rectal every 6 hours PRN For Temp greater than 38 C (100.4 F)  meperidine     Injectable 25milliGRAM(s) IV Push every 6 hours PRN Rigors      ALLERGIES:  Allergies    IV CONtRAST (Flushing (Skin); Rash)  No Known Drug Allergies    Intolerances        LABS:                        8.1    10.3  )-----------( 75       ( 16 Jun 2017 05:07 )             24.4     06-16    133<L>  |  101  |  37<H>  ----------------------------<  108<H>  4.5   |  21<L>  |  1.20    Ca    7.4<L>      16 Jun 2017 05:12  Phos  3.1     06-16  Mg     2.4     06-16    TPro  6.0  /  Alb  2.7<L>  /  TBili  0.3  /  DBili  x   /  AST  24  /  ALT  24  /  AlkPhos  194<H>  06-15    PT/INR - ( 16 Jun 2017 05:10 )   PT: 13.8 sec;   INR: 1.24          PTT - ( 16 Jun 2017 05:10 )  PTT:23.8 sec      RADIOLOGY & ADDITIONAL TESTS: Reviewed. 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 Patient started on heparin gtt for atrial fibrillation, and diuresed for pulmonary edema which improved. Zosyn started for urosepsis, GNR bacteremia. Despite being on adequete dosing of abx, patient persistently bacteremia, having temp > 103-104, with severe rigors. Chemoport removed by IR and line positive for gram negative rods. Pacemaker thought to be infected as well. Gallium scan (6/16) done which was negative. Patient still spiking fevers w/rigors, disorientation, but otherwise HD stable with baseline SBP high 80- low 90's. Respiratory status stable on BIPAP overnight, nasal cannula/ aerosol mask throughout the day. Palliative involved in patients care as she is DNR/DNI. Patient ok for stepdown to [facility].    Blood cx (6/15) NGTD    INTERVAL HPI/OVERNIGHT EVENTS:    SUBJECTIVE: Patient seen and examined at bedside.    OBJECTIVE:    VITAL SIGNS:  ICU Vital Signs Last 24 Hrs  T(C): 37.1, Max: 37.2 (06-16 @ 01:11)  T(F): 98.8, Max: 98.9 (06-16 @ 01:11)  HR: 98 (76 - 119)  BP: 108/52 (87/41 - 128/64)  BP(mean): 75 (50 - 98)  ABP: --  ABP(mean): --  RR: 27 (11 - 32)  SpO2: 95% (95% - 100%)      I & Os for 24h ending 06-16 @ 07:00  =============================================  IN: 338 ml / OUT: 1073 ml / NET: -735 ml    I & Os for current day (as of 06-16 @ 14:32)  =============================================  IN: 99 ml / OUT: 520 ml / NET: -421 ml    CAPILLARY BLOOD GLUCOSE  129 (16 Jun 2017 11:00)      PHYSICAL EXAM:    General: NAD  HEENT: NC/AT; PERRL, clear conjunctiva  Neck: supple  Respiratory: CTA b/l  Cardiovascular: +S1/S2; RRR  Abdomen: soft, NT/ND; +BS x4  Extremities: WWP, 2+ peripheral pulses b/l; no LE edema  Skin: normal color and turgor; no rash  Neurological:     MEDICATIONS:  MEDICATIONS  (STANDING):  amiodarone    Tablet 200milliGRAM(s) Oral daily  insulin lispro (HumaLOG) corrective regimen sliding scale  SubCutaneous Before meals and at bedtime  dextrose 5%. 1000milliLiter(s) IV Continuous <Continuous>  dextrose 50% Injectable 12.5Gram(s) IV Push once  dextrose 50% Injectable 25Gram(s) IV Push once  dextrose 50% Injectable 25Gram(s) IV Push once  atorvastatin 40milliGRAM(s) Oral at bedtime  bisacodyl Suppository 10milliGRAM(s) Rectal daily  lidocaine   Patch 1Patch Transdermal daily  piperacillin/tazobactam IVPB. 2.25Gram(s) IV Intermittent every 6 hours  metoprolol 12.5milliGRAM(s) Oral two times a day  heparin  Infusion 1300Unit(s)/Hr IV Continuous <Continuous>    MEDICATIONS  (PRN):  dextrose Gel 1Dose(s) Oral once PRN Blood Glucose LESS THAN 70 milliGRAM(s)/deciliter  glucagon  Injectable 1milliGRAM(s) IntraMuscular once PRN Glucose LESS THAN 70 milligrams/deciliter  acetaminophen  Suppository 650milliGRAM(s) Rectal every 6 hours PRN For Temp greater than 38 C (100.4 F)  meperidine     Injectable 25milliGRAM(s) IV Push every 6 hours PRN Rigors      ALLERGIES:  Allergies    IV CONtRAST (Flushing (Skin); Rash)  No Known Drug Allergies    Intolerances        LABS:                        8.1    10.3  )-----------( 75       ( 16 Jun 2017 05:07 )             24.4     06-16    133<L>  |  101  |  37<H>  ----------------------------<  108<H>  4.5   |  21<L>  |  1.20    Ca    7.4<L>      16 Jun 2017 05:12  Phos  3.1     06-16  Mg     2.4     06-16    TPro  6.0  /  Alb  2.7<L>  /  TBili  0.3  /  DBili  x   /  AST  24  /  ALT  24  /  AlkPhos  194<H>  06-15    PT/INR - ( 16 Jun 2017 05:10 )   PT: 13.8 sec;   INR: 1.24          PTT - ( 16 Jun 2017 05:10 )  PTT:23.8 sec    Culture - Blood (06.15.17 @ 11:38)    Specimen Source: .Blood Blood-Venous    Culture Results:   No growth at 1 day.    RADIOLOGY & ADDITIONAL TESTS: Reviewed.    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 persistent bacteremia of GNR despite being on Zosyn.    - c/w Zosyn   - NM Gallium Scan (6/16)- negative scan. pacemaker clean   - c/w Tylenol PRN, Demerol PRN   - f/u surveillance blood cultures today     #UTI: UA positive for LE, bacteria. BCx growing GNR in all bottles. Likely urosepsis.   - c/w zosyn     CV:  #HFpEF: EF 60-65%. Currently net negative   - c/w Metoprolol 12.5 BID  - continue to monitor I/Os   - will continue to monitor daily CXRs  - medically optimize cardiac function if BP allows   - Consider restarting Lasix     #Afib: s/p PPM, on Eliquis at home. Decreased CrCl. Concern for pacemaker involvement, however gallium scan negative   - c/w Heparin gtt - f/u 4pm PTT   - c/w home Amiodarone 200mg QD     #CAD: s/p CABG. Patient without chest pain. EKG without ischemic changes.   - c/w home Lipitor 40mg QHS  - holding ASA     PULM:  #Acute hypoxic respiratory failure: requiring BIPAP 2/2 pulmonary congestion/edema likely due to CHF exacerbation in the setting of urosepsis and line sepsis. Doing well w/ intermittent nasal cannula throughout the day. Lung exam with improvement   - c/w BIPAP/ nasal cannula, wean as tolerated  - c/w antibiotics as above     #Pulmonary edema: Resolving. plan as above, c/w BIPAP  - consider IV Lasix     RENAL:  #ROMEO: Cr. back to baseline.    HEME/ONC:  #Large Diffuse B Cell Lymphoma: last chemo with Bendamustin on 5/23/17. Followed outpatient by Dr. Galaviz- in remission as per her .  - Dr. Galaviz following, appreciate recs  - palliative care consult     #Breast cancer: on Arimidex 1mg QD at home   - Dr. Galaviz following, appreciate recs     #Anemia: Uric acid wnl (does not suggest tumor lysis). CT A/P negative for RP bleed. Transfused 1U pRBC with appropriate response.   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 Patient started on heparin gtt for atrial fibrillation, and diuresed for pulmonary edema which improved. Zosyn started for urosepsis, GNR bacteremia. Despite being on adequete dosing of abx, patient persistently bacteremia, having temp > 103-104, with severe rigors. Chemoport removed by IR on 6/12 and line positive for gram negative rods. Pacemaker thought to be infected as well, however gallium scan (6/16) done which was negative. Patient still spiking fevers w/rigors, disorientation, but otherwise HD stable with baseline SBP high 80- low 90's. Respiratory status stable on BIPAP overnight, nasal cannula/ aerosol mask throughout the day. Palliative involved in patients care as she is DNR/DNI. Patient ok for stepdown to [facility].    Blood cx (6/15) NGTD    SUBJECTIVE: Patient seen and examined at bedside. Patient complaining of lower back pain and slight shortness of breath. Also needs to have a BM. Denies fevers/chills, nvd, abdominal pain.     OBJECTIVE:    VITAL SIGNS:  ICU Vital Signs Last 24 Hrs  T(C): 37.1, Max: 37.2 (06-16 @ 01:11)  T(F): 98.8, Max: 98.9 (06-16 @ 01:11)  HR: 98 (76 - 119)  BP: 108/52 (87/41 - 128/64)  BP(mean): 75 (50 - 98)  ABP: --  ABP(mean): --  RR: 27 (11 - 32)  SpO2: 95% (95% - 100%)      I & Os for 24h ending 06-16 @ 07:00  =============================================  IN: 338 ml / OUT: 1073 ml / NET: -735 ml    I & Os for current day (as of 06-16 @ 14:32)  =============================================  IN: 99 ml / OUT: 520 ml / NET: -421 ml    CAPILLARY BLOOD GLUCOSE  129 (16 Jun 2017 11:00)      PHYSICAL EXAM:    General: NAD, elderly female, pleasant, mild distress 2/2 pain   HEENT: NC/AT; PERRL, clear conjunctiva  Neck: supple  Respiratory: decreased BS at bases.   Cardiovascular: +S1/S2; RRR  Abdomen: soft, distended, +tympany, NT; +BS x4  Extremities: WWP, 2+ peripheral pulses b/l; no LE edema  Skin: normal color and turgor; no rash  Neurological: A&O x3, no focal deficits     MEDICATIONS:  MEDICATIONS  (STANDING):  amiodarone    Tablet 200milliGRAM(s) Oral daily  insulin lispro (HumaLOG) corrective regimen sliding scale  SubCutaneous Before meals and at bedtime  dextrose 5%. 1000milliLiter(s) IV Continuous <Continuous>  dextrose 50% Injectable 12.5Gram(s) IV Push once  dextrose 50% Injectable 25Gram(s) IV Push once  dextrose 50% Injectable 25Gram(s) IV Push once  atorvastatin 40milliGRAM(s) Oral at bedtime  bisacodyl Suppository 10milliGRAM(s) Rectal daily  lidocaine   Patch 1Patch Transdermal daily  piperacillin/tazobactam IVPB. 2.25Gram(s) IV Intermittent every 6 hours  metoprolol 12.5milliGRAM(s) Oral two times a day  heparin  Infusion 1300Unit(s)/Hr IV Continuous <Continuous>    MEDICATIONS  (PRN):  dextrose Gel 1Dose(s) Oral once PRN Blood Glucose LESS THAN 70 milliGRAM(s)/deciliter  glucagon  Injectable 1milliGRAM(s) IntraMuscular once PRN Glucose LESS THAN 70 milligrams/deciliter  acetaminophen  Suppository 650milliGRAM(s) Rectal every 6 hours PRN For Temp greater than 38 C (100.4 F)  meperidine     Injectable 25milliGRAM(s) IV Push every 6 hours PRN Rigors      ALLERGIES:  Allergies    IV CONtRAST (Flushing (Skin); Rash)  No Known Drug Allergies    Intolerances        LABS:                        8.1    10.3  )-----------( 75       ( 16 Jun 2017 05:07 )             24.4     06-16    133<L>  |  101  |  37<H>  ----------------------------<  108<H>  4.5   |  21<L>  |  1.20    Ca    7.4<L>      16 Jun 2017 05:12  Phos  3.1     06-16  Mg     2.4     06-16    TPro  6.0  /  Alb  2.7<L>  /  TBili  0.3  /  DBili  x   /  AST  24  /  ALT  24  /  AlkPhos  194<H>  06-15    PT/INR - ( 16 Jun 2017 05:10 )   PT: 13.8 sec;   INR: 1.24          PTT - ( 16 Jun 2017 05:10 )  PTT:23.8 sec    Culture - Blood (06.15.17 @ 11:38)    Specimen Source: .Blood Blood-Venous    Culture Results:   No growth at 1 day.    RADIOLOGY & ADDITIONAL TESTS: Reviewed.    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 persistent bacteremia of GNR despite being on Zosyn.    - ID consulted, per recs will de-escalate to Ceftriaxone 1g for total 10 days starting 6/13-6/22  - Awaiting final blood cx from 6/15  - NM Gallium Scan (6/16)- negative scan. pacemaker clean   - c/w Tylenol PRN, Demerol PRN   - Repeat renal US as pt still spiking     #UTI: UA positive for LE, bacteria. BCx growing GNR in all bottles. Likely urosepsis.   - Plan as per above    CV:  #HFpEF: EF 60-65%. Currently net negative   - c/w Metoprolol 12.5 BID  - continue to monitor I/Os   - will continue to monitor daily CXRs  - medically optimize cardiac function if BP allows   - Consider restarting Lasix     #Afib: s/p PPM, on Eliquis at home. Decreased CrCl. Concern for pacemaker involvement, however gallium scan negative   - c/w Heparin gtt  - c/w home Amiodarone 200mg QD     #CAD: s/p CABG. Patient without chest pain. EKG without ischemic changes.   - c/w home Lipitor 40mg QHS  - holding ASA     PULM:  #Acute hypoxic respiratory failure: requiring BIPAP 2/2 pulmonary congestion/edema likely due to CHF exacerbation in the setting of urosepsis and line sepsis. Doing well w/ intermittent nasal cannula throughout the day. Lung exam with improvement   - c/w HFNC for comfort, BIPAP if absolutely necessary, nasal cannula, wean as tolerated  - c/w antibiotics as above     #Pulmonary edema: Resolving. plan as above, c/w BIPAP  - consider IV Lasix if overloaded     RENAL:  #ROMEO: Cr. back to baseline.    HEME/ONC:  #Large Diffuse B Cell Lymphoma: last chemo with Bendamustin on 5/23/17. Followed outpatient by Dr. Galaviz- in remission as per her .  - Dr. Galaviz following, appreciate recs  - palliative care consult     #Breast cancer: on Arimidex 1mg QD at home   - Dr. Galaviz following, appreciate recs     #Anemia: Uric acid wnl (does not suggest tumor lysis). CT A/P negative for RP bleed. Transfused 1U pRBC with appropriate response.   - will continue to monitor     PPx: SCDs   FEN: monitor lytes, dysphagia diet, no IVF  Dispo: Continue management on [facility]    DNR/DNI, limited medical interventions per ANDREA

## 2017-06-16 NOTE — PROGRESS NOTE ADULT - ASSESSMENT
85 YO F h/o CAD s/p CABG, atrial fibrillation s/p PPM (on Eliquis), Breast CA dx in 1997 with possible recurrence 5 months ago (now on Arimidex x 5 months), DLBCL dx 12/2016 on chemotherapy (non-cardiotoxic Bendamustin plus Rituximab - last tx 5/23), HFpEF (EF 60%), HTN, HLD, and DM admitted for severe sepsis 2/2 UTI vs colitis with GNR bacteremia and acute CHF exacerbation

## 2017-06-16 NOTE — PROGRESS NOTE ADULT - SUBJECTIVE AND OBJECTIVE BOX
TANYA FRAIRE             MRN-[id number]    CC: GOC, Pain, Dyspnea, Support    HPI:  85 y/o F w/ pmhx of CAD s/p CABG, atrial fibrillation s/p PPM (on Eliquis), Breast CA (on Arimidex), 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 Patients family at bedside states she's been more lethargic with associated lower extremity swelling, SOB, decreased appetite and diarrhea. Patient with previous admissions for symptomatic hyperglycemia in January. Patient currently seeing Dr. Galaviz on a regular basis for chemotherapy treatment. Last treatment on May 23rd for which she received Bendamustine and Rituximab. Patient denies any headaches, chest pain, abdominal pain, N/V, dysuria, or bowel changes.     Upon arrival to the ED, patient in moderate respiratory distress using accessory muscles, breathing w/ a RR of 30, O2 93% on room air. Patient placed on BIPAP with slight improvement in respiratory status. Patient febrile to 101 with a HR of 77. Labs significant for AG metabolic acidosis, K 7.0, Na 127, BUN 58, Cr. 2.0 (baseline < 0.9 January), Lactate 2.8. CXR done showing RLL infiltrate with pulmonary vascular congestion. In the ED, patient received Vancomycin, Zosyn, Tylenol, Calcium Gluconate 2g, Insulin 5u, Albuterol neb 5mg x 2. ICU consulted, will be transferred to MICU for management of severe sepsis 2/2 HAP and pulmonary congestion. (10 Ramon 2017 19:13)    SUBJECTIVE: Saw and evaluated Mrs Fraire at bedside. She was found tolerating NC without much distress. She reports some back pain and mild air hunger. The patients  at bedside reports expecting to be downgraded off the MICU which they look forward to.     ROS:  DYSPNEA: Yes	, Air hunger  NAUS/VOM: No	  SECRETIONS: No	  AGITATION: No  Pain (Y/N):  Yes     -Provocation/Palliation: Physical activity / movement worsens pain  -Quality/Quantity: Chronic somatic musculoskeletal back pain  -Radiating: No  -Severity: Mod-Severe  -Timing/Frequency: Incidental  -Impact on ADLs: Prevents from ambulating, mostly remains in bed    OTHER REVIEW OF SYSTEMS: Denied    PEx:  T(C): 36.9, Max: 40.2 (06-15 @ 12:22)  HR: 90 (76 - 119)  BP: 128/64 (81/36 - 128/64)  RR: 22 (11 - 32)  SpO2: 100% (96% - 100%)  Wt(kg): 54.6    GENERAL:  AAOx3 NAD Chinese speaking elderly frail appearing woman  HEENT: NC+ PERRL EOMI MOM     	  NECK: Supple          CVS: Regularly irregular S1S2   L PPM      	  RESP: shallow breaths B/L crackles      	  GI: Soft NT BS+           	  : Dennis+           	  MUSC: Generalized muscle wasting      	  NEURO: Following commands    	  PSYCH: Calm   SKIN: Normal         	   LYMPH: Normal        	   ALLERGIES: IV CONtRAST (Flushing (Skin); Rash    OPIATE NAÏVE (Y/N): Yes    MEDICATIONS: REVIEWED  MEDICATIONS  (STANDING):  amiodarone    Tablet 200milliGRAM(s) Oral daily  insulin lispro (HumaLOG) corrective regimen sliding scale  SubCutaneous Before meals and at bedtime  dextrose 5%. 1000milliLiter(s) IV Continuous <Continuous>  dextrose 50% Injectable 12.5Gram(s) IV Push once  dextrose 50% Injectable 25Gram(s) IV Push once  dextrose 50% Injectable 25Gram(s) IV Push once  atorvastatin 40milliGRAM(s) Oral at bedtime  bisacodyl Suppository 10milliGRAM(s) Rectal daily  lidocaine   Patch 1Patch Transdermal daily  piperacillin/tazobactam IVPB. 2.25Gram(s) IV Intermittent every 6 hours  metoprolol 12.5milliGRAM(s) Oral two times a day  heparin  Infusion 1300Unit(s)/Hr IV Continuous <Continuous>    MEDICATIONS  (PRN):  dextrose Gel 1Dose(s) Oral once PRN Blood Glucose LESS THAN 70 milliGRAM(s)/deciliter  glucago Injectable 1milliGRAM(s) IntraMuscular once PRN Glucose LESS THAN 70 milligrams/deciliter  acetaminophen  Suppository 650milliGRAM(s) Rectal every 6 hours PRN For Temp greater than 38 C (100.4 F)  meperidine     Injectable 25milliGRAM(s) IV Push every 6 hours PRN Rigors    LABS: REVIEWED                        8.1    10.3  )-----------( 75       ( 16 Jun 2017 05:07 )             24.4   06-16    133<L>  |  101  |  37<H>  ----------------------------<  108<H>  4.5   |  21<L>  |  1.20    Ca    7.4<L>      16 Jun 2017 05:12  Phos  3.1     06-16  Mg     2.4     06-16    TPro  6.0  /  Alb  2.7<L>  /  TBili  0.3  /  DBili  x   /  AST  24  /  ALT  24  /  AlkPhos  194<H>  06-15    Culture - Blood (06.15.17 @ 11:38)    Specimen Source: .Blood Blood-Venous    Culture Results:   No growth at 12 hours    Culture - Blood (06.13.17 @ 12:45)    -  Ceftriaxone: S <=1    -  Piperacillin/Tazobactam: S <=16    -  Trimethoprim/Sulfamethoxazole: R >2/38    -  Ampicillin/Sulbactam: R >16/8    -  Ciprofloxacin: R >2    -  Tobramycin: I 8    -  Ampicillin: R >16    -  Cefazolin: S <=8    -  Gentamicin: R >8    Gram Stain:   Aerobic Bottle: Gram Negative Rods  Result called to and read back byJacinto Gomez RN 06/14/2017 06:36:29    Specimen Source: .Blood Blood    Organism: Escherichia coli    Culture Results:   Growth in aerobic bottle: Escherichia coli    Organism Identification: Escherichia coli    Method Type: JANEEN    Culture - Catheter (06.12.17 @ 17:21)    -  Ceftriaxone: S <=1    -  Piperacillin/Tazobactam: S <=16    -  Ampicillin: R >16    -  Ampicillin/Sulbactam: R >16/8    -  Cefazolin: S <=8    -  Gentamicin: R >8    -  Ciprofloxacin: R >2    -  Tobramycin: I 8    -  Trimethoprim/Sulfamethoxazole: R >2/38    Specimen Source: .Catheter Port Device    Culture Results:   Too numerous to count Escherichia coli    Organism Identification: Escherichia coli  Escherichia coli    Organism: Escherichia coli    Organism: Escherichia coli    Method Type: JANEEN    Method Type: JANEEN    IMAGING: REVIEWED    EXAM:  XR CHEST 1 VIEW PORT IMMEDIATE                        PROCEDURE DATE:  06/15/2017    INTERPRETATION:    CLINICAL INDICATION: Respiratory distress  EXAM: Portable AP radiograph of the chest.  COMPARISON: 06/15/2017 6:07AM  FINDINGS:  The lungs remain clear of focal consolidation. There is mild interstitial   edema and elevation of the right hemidiaphragm. No evidence of   pneumothorax. Cardiac pacemaker, sternotomy wires and CABG clips are   again seen. The cardiomediastinal silhouette is unchanged. There is   S-shaped scoliosis of the spine.  IMPRESSION:   No change    ADVANCED DIRECTIVES:   DNR     DNI     MOLST    DECISION MAKER: The patient is able to participate in medical decisions  LEGAL SURROGATE: HCP in the chart. HCP agents are Seth Friare [phone] and Chay Astudillo.    PSYCHOSOCIAL-SPIRITUAL ASSESSMENT:       Reviewed       Care plan adjusted as above	    GOALS OF CARE DISCUSSION       Palliative care info/counseling provided	           Family meeting at bedside           See previous Palliative Medicine Note        Documentation of GOC: HCP, DNR/DNI, MOLST  	      AGENCY CHOICE DISCUSSED:           Homecare wound care        Home PT        [facility]        Visiting doctors    REFERRALS	        Palliative Med        Unit SW/Case Mgmt        - [facility]       Speech/Swallow       Nutrition       PT/OT

## 2017-06-16 NOTE — PROGRESS NOTE ADULT - SUBJECTIVE AND OBJECTIVE BOX
INTERVAL HPI/OVERNIGHT EVENTS:    SUBJECTIVE: Patient seen and examined at bedside.     ROS:  CV: Denies chest pain  Resp: Denies SOB  GI: Denies abdominal pain, constipation, diarrhea, nausea, vomiting  : Denies dysuria  ID: Denies fevers, chills  MSK: Denies joint pain     OBJECTIVE:    VITAL SIGNS:  ICU Vital Signs Last 24 Hrs  T(C): 36.8, Max: 40.2 (06-15 @ 12:22)  T(F): 98.3, Max: 104.4 (06-15 @ 12:22)  HR: 95 (76 - 119)  BP: 124/51 (81/36 - 124/51)  BP(mean): 61 (47 - 90)  ABP: --  ABP(mean): --  RR: 14 (11 - 32)  SpO2: 100% (85% - 100%)      I & Os for 24h ending 06-16 @ 07:00  =============================================  IN: 338 ml / OUT: 1073 ml / NET: -735 ml    I & Os for current day (as of 06-16 @ 08:51)  =============================================  IN: 0 ml / OUT: 150 ml / NET: -150 ml    CAPILLARY BLOOD GLUCOSE  118 (16 Jun 2017 06:00)      PHYSICAL EXAM:    General: NAD  HEENT: NC/AT; PERRL, clear conjunctiva  Neck: supple  Respiratory: CTA b/l  Cardiovascular: +S1/S2; RRR  Abdomen: soft, NT/ND; +BS x4  Extremities: WWP, 2+ peripheral pulses b/l; no LE edema  Skin: normal color and turgor; no rash  Neurological:     MEDICATIONS:  MEDICATIONS  (STANDING):  amiodarone    Tablet 200milliGRAM(s) Oral daily  insulin lispro (HumaLOG) corrective regimen sliding scale  SubCutaneous Before meals and at bedtime  dextrose 5%. 1000milliLiter(s) IV Continuous <Continuous>  dextrose 50% Injectable 12.5Gram(s) IV Push once  dextrose 50% Injectable 25Gram(s) IV Push once  dextrose 50% Injectable 25Gram(s) IV Push once  atorvastatin 40milliGRAM(s) Oral at bedtime  bisacodyl Suppository 10milliGRAM(s) Rectal daily  lidocaine   Patch 1Patch Transdermal daily  piperacillin/tazobactam IVPB. 2.25Gram(s) IV Intermittent every 6 hours    MEDICATIONS  (PRN):  dextrose Gel 1Dose(s) Oral once PRN Blood Glucose LESS THAN 70 milliGRAM(s)/deciliter  glucagon  Injectable 1milliGRAM(s) IntraMuscular once PRN Glucose LESS THAN 70 milligrams/deciliter  acetaminophen  Suppository 650milliGRAM(s) Rectal every 6 hours PRN For Temp greater than 38 C (100.4 F)  meperidine     Injectable 25milliGRAM(s) IV Push every 6 hours PRN Rigors      ALLERGIES:  Allergies    IV CONtRAST (Flushing (Skin); Rash)  No Known Drug Allergies    Intolerances        LABS:                        8.1    10.3  )-----------( 75       ( 16 Jun 2017 05:07 )             24.4     06-16    133<L>  |  101  |  37<H>  ----------------------------<  108<H>  4.5   |  21<L>  |  1.20    Ca    7.4<L>      16 Jun 2017 05:12  Phos  3.1     06-16  Mg     2.4     06-16    TPro  6.0  /  Alb  2.7<L>  /  TBili  0.3  /  DBili  x   /  AST  24  /  ALT  24  /  AlkPhos  194<H>  06-15    PT/INR - ( 16 Jun 2017 05:10 )   PT: 13.8 sec;   INR: 1.24          PTT - ( 16 Jun 2017 05:10 )  PTT:23.8 sec      RADIOLOGY & ADDITIONAL TESTS: Reviewed. INTERVAL HPI/OVERNIGHT EVENTS: no overnight events. afebrile without rigors throughout the night     SUBJECTIVE: Patient seen and examined at bedside. Oriented x 3, mentating well. ROS + back pain. Denies any SOB, CP, abdominal pain.     OBJECTIVE:    VITAL SIGNS:  ICU Vital Signs Last 24 Hrs  T(C): 36.8, Max: 40.2 (06-15 @ 12:22)  T(F): 98.3, Max: 104.4 (06-15 @ 12:22)  HR: 95 (76 - 119)  BP: 124/51 (81/36 - 124/51)  BP(mean): 61 (47 - 90)  ABP: --  ABP(mean): --  RR: 14 (11 - 32)  SpO2: 100% (85% - 100%)      I & Os for 24h ending 06-16 @ 07:00  =============================================  IN: 338 ml / OUT: 1073 ml / NET: -735 ml    I & Os for current day (as of 06-16 @ 08:51)  =============================================  IN: 0 ml / OUT: 150 ml / NET: -150 ml    CAPILLARY BLOOD GLUCOSE  118 (16 Jun 2017 06:00)      PHYSICAL EXAM:    Constitutional: frail, cachectic female, in no respiratory distress at this moment   HEENT: NCAT, PERRL, sclera non-icteric, dry mucous membranes   Neck: supple, no JVD   Pulm: improved air movement. decreased breath sounds @ bases w/ fine bibasilar crackles   CV: regular rate, irregular, +S1S2, no murmurs appreciated  GI: +distended, tender to RLQ +BS, no guarding   Ext: WWP, trace pitting edema  Vasc: DP pulses 2+ bilaterally   Neuro: awake, alert, orientedx3, no focal deficits      MEDICATIONS:  MEDICATIONS  (STANDING):  amiodarone    Tablet 200milliGRAM(s) Oral daily  insulin lispro (HumaLOG) corrective regimen sliding scale  SubCutaneous Before meals and at bedtime  dextrose 5%. 1000milliLiter(s) IV Continuous <Continuous>  dextrose 50% Injectable 12.5Gram(s) IV Push once  dextrose 50% Injectable 25Gram(s) IV Push once  dextrose 50% Injectable 25Gram(s) IV Push once  atorvastatin 40milliGRAM(s) Oral at bedtime  bisacodyl Suppository 10milliGRAM(s) Rectal daily  lidocaine   Patch 1Patch Transdermal daily  piperacillin/tazobactam IVPB. 2.25Gram(s) IV Intermittent every 6 hours    MEDICATIONS  (PRN):  dextrose Gel 1Dose(s) Oral once PRN Blood Glucose LESS THAN 70 milliGRAM(s)/deciliter  glucagon  Injectable 1milliGRAM(s) IntraMuscular once PRN Glucose LESS THAN 70 milligrams/deciliter  acetaminophen  Suppository 650milliGRAM(s) Rectal every 6 hours PRN For Temp greater than 38 C (100.4 F)  meperidine     Injectable 25milliGRAM(s) IV Push every 6 hours PRN Rigors      ALLERGIES:  Allergies    IV CONtRAST (Flushing (Skin); Rash)  No Known Drug Allergies    Intolerances        LABS:                        8.1    10.3  )-----------( 75       ( 16 Jun 2017 05:07 )             24.4     06-16    133<L>  |  101  |  37<H>  ----------------------------<  108<H>  4.5   |  21<L>  |  1.20    Ca    7.4<L>      16 Jun 2017 05:12  Phos  3.1     06-16  Mg     2.4     06-16    TPro  6.0  /  Alb  2.7<L>  /  TBili  0.3  /  DBili  x   /  AST  24  /  ALT  24  /  AlkPhos  194<H>  06-15    PT/INR - ( 16 Jun 2017 05:10 )   PT: 13.8 sec;   INR: 1.24          PTT - ( 16 Jun 2017 05:10 )  PTT:23.8 sec      RADIOLOGY & ADDITIONAL TESTS: Reviewed.    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 persistent bacteremia of GNR despite being on Zosyn.    - c/w Zosyn   - NM Gallium Scan today  - c/w Tylenol PRN, Demerol PRN   - f/u surveillance blood cultures today     CV:  #HFpEF: EF 60-65%. Currently net negative   - c/w Metoprolol 12.5 BID  - continue to monitor I/Os   - will continue to monitor daily CXRs  - medically optimize cardiac function if BP allows     #Afib: s/p PPM, on Eliquis at home. Decreased CrCl. Concern for pacemaker involvement. Will obtain NM Gallium to confirm   - re-assess AC after gallium   - c/w home Amiodarone 200mg QD     #CAD: s/p CABG. Patient without chest pain. EKG without ischemic changes.   - c/w home Lipitor 40mg QHS  - holding ASA     #UTI: UA positive for LE, bacteria. BCx growing GNR in all bottles. Likely urosepsis.   - c/w zosyn     PULM:  #Acute hypoxic respiratory failure: requiring BIPAP 2/2 pulmonary congestion/edema likely due to CHF exacerbation in the setting of urosepsis and line sepsis. Doing well w/ intermittent nasal cannula throughout the day. Lung exam with improvement   - c/w BIPAP/ nasal cannula, wean as tolerated  - c/w antibiotics as above     #Pulmonary edema: Resolving. plan as above, c/w BIPAP    RENAL:  #ROMEO: Cr. back to baseline.    HEME/ONC:  #Large Diffuse B Cell Lymphoma: last chemo with Bendamustin on 5/23/17. Followed outpatient by Dr. Galaviz- in remission as per her .  - Dr. Galaviz following, appreciate recs  - palliative care consult     #Breast cancer: on Arimidex 1mg QD at home   - Dr. Galaviz following, appreciate recs     #Anemia: Uric acid wnl (does not suggest tumor lysis). CT A/P negative for RP bleed. Transfused 1U pRBC with appropriate response.   - will continue to monitor     PPx: SCDs   FEN: monitor lytes, NPO, no IVF  Dispo: MICU    DNR/ DNI  INTERVAL HPI/OVERNIGHT EVENTS: PTT's within normal range. Patient HD stable on BIPAP overnight.     SUBJECTIVE: Patient seen and examined at bedside.     ROS:  CV: Denies chest pain  Resp: Denies SOB  GI: Denies abdominal pain, constipation, diarrhea, nausea, vomiting  : Denies dysuria  ID: Denies fevers, chills  MSK: Denies joint pain     OBJECTIVE:    VITAL SIGNS:  ICU Vital Signs Last 24 Hrs  T(C): 37.2, Max: 37.3 (06-14 @ 10:00)  T(F): 99, Max: 99.2 (06-14 @ 10:00)  HR: 103 (75 - 103)  BP: 113/58 (87/50 - 134/56)  BP(mean): 76 (63 - 88)  ABP: --  ABP(mean): --  RR: 14 (8 - 30)  SpO2: 95% (94% - 100%)      I & Os for 24h ending 06-14 @ 07:00  =============================================  IN: 1258 ml / OUT: 1455 ml / NET: -197 ml    I & Os for current day (as of 06-15 @ 06:43)  =============================================  IN: 436 ml / OUT: 1370 ml / NET: -934 ml    CAPILLARY BLOOD GLUCOSE  125 (15 Ramon 2017 06:00)      PHYSICAL EXAM:    General: NAD  HEENT: NC/AT; PERRL, clear conjunctiva  Neck: supple  Respiratory: CTA b/l  Cardiovascular: +S1/S2; RRR  Abdomen: soft, NT/ND; +BS x4  Extremities: WWP, 2+ peripheral pulses b/l; no LE edema  Skin: normal color and turgor; no rash  Neurological:     MEDICATIONS:  MEDICATIONS  (STANDING):  amiodarone    Tablet 200milliGRAM(s) Oral daily  insulin lispro (HumaLOG) corrective regimen sliding scale  SubCutaneous Before meals and at bedtime  dextrose 5%. 1000milliLiter(s) IV Continuous <Continuous>  dextrose 50% Injectable 12.5Gram(s) IV Push once  dextrose 50% Injectable 25Gram(s) IV Push once  dextrose 50% Injectable 25Gram(s) IV Push once  atorvastatin 40milliGRAM(s) Oral at bedtime  bisacodyl Suppository 10milliGRAM(s) Rectal daily  furosemide    Tablet 20milliGRAM(s) Oral daily  metoprolol 12.5milliGRAM(s) Oral every 12 hours  heparin  Infusion. 1400Unit(s)/Hr IV Continuous <Continuous>  cefTRIAXone   IVPB  IV Intermittent   cefTRIAXone   IVPB 1Gram(s) IV Intermittent every 12 hours  lidocaine   Patch 1Patch Transdermal daily    MEDICATIONS  (PRN):  dextrose Gel 1Dose(s) Oral once PRN Blood Glucose LESS THAN 70 milliGRAM(s)/deciliter  glucagon  Injectable 1milliGRAM(s) IntraMuscular once PRN Glucose LESS THAN 70 milligrams/deciliter  acetaminophen  Suppository 650milliGRAM(s) Rectal every 6 hours PRN For Temp greater than 38 C (100.4 F)      ALLERGIES:  Allergies    IV CONtRAST (Flushing (Skin); Rash)  No Known Drug Allergies    Intolerances        LABS:                        8.6    6.0   )-----------( 91       ( 15 Ramon 2017 05:23 )             25.6     06-15    133<L>  |  98  |  43<H>  ----------------------------<  113<H>  4.5   |  22  |  1.40<H>    Ca    8.2<L>      15 Ramon 2017 05:26  Phos  2.5     06-15  Mg     2.6     06-15      PTT - ( 15 Ramon 2017 05:23 )  PTT:80.8 sec      RADIOLOGY & ADDITIONAL TESTS: Reviewed. 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 Patient started on heparin gtt for atrial fibrillation, and diuresed for pulmonary edema which improved. Zosyn started for urosepsis, GNR bacteremia. Despite being on adequete dosing of abx, patient persistently bacteremia, having temp > 103-104, with severe rigors. Chemoport removed and line positive for gram negative rods. Pacemaker thought to be infected as well. Gallium scan done which was negative. Patient otherwise HD stable with baseline SBP high 80- low 90's. Respiratory status stable on BIPAP overnight, nassal cannnular/ aerosol mask throughout the day. Palliative involved in patients care as she is DNR/DNI. Patient ok for stepdown to [facility].      INTERVAL HPI/OVERNIGHT EVENTS: no overnight events. afebrile without rigors throughout the night     SUBJECTIVE: Patient seen and examined at bedside. Oriented x 3, mentating well. ROS + back pain. Denies any SOB, CP, abdominal pain.     OBJECTIVE:    VITAL SIGNS:  ICU Vital Signs Last 24 Hrs  T(C): 36.8, Max: 40.2 (06-15 @ 12:22)  T(F): 98.3, Max: 104.4 (06-15 @ 12:22)  HR: 95 (76 - 119)  BP: 124/51 (81/36 - 124/51)  BP(mean): 61 (47 - 90)  ABP: --  ABP(mean): --  RR: 14 (11 - 32)  SpO2: 100% (85% - 100%)      I & Os for 24h ending 06-16 @ 07:00  =============================================  IN: 338 ml / OUT: 1073 ml / NET: -735 ml    I & Os for current day (as of 06-16 @ 08:51)  =============================================  IN: 0 ml / OUT: 150 ml / NET: -150 ml    CAPILLARY BLOOD GLUCOSE  118 (16 Jun 2017 06:00)      PHYSICAL EXAM:    Constitutional: frail, cachectic female, in no respiratory distress at this moment   HEENT: NCAT, PERRL, sclera non-icteric, dry mucous membranes   Neck: supple, no JVD   Pulm: improved air movement. decreased breath sounds @ bases w/ fine bibasilar crackles   CV: regular rate, irregular, +S1S2, no murmurs appreciated  GI: +distended, tender to RLQ +BS, no guarding   Ext: WWP, trace pitting edema  Vasc: DP pulses 2+ bilaterally   Neuro: awake, alert, orientedx3, no focal deficits      MEDICATIONS:  MEDICATIONS  (STANDING):  amiodarone    Tablet 200milliGRAM(s) Oral daily  insulin lispro (HumaLOG) corrective regimen sliding scale  SubCutaneous Before meals and at bedtime  dextrose 5%. 1000milliLiter(s) IV Continuous <Continuous>  dextrose 50% Injectable 12.5Gram(s) IV Push once  dextrose 50% Injectable 25Gram(s) IV Push once  dextrose 50% Injectable 25Gram(s) IV Push once  atorvastatin 40milliGRAM(s) Oral at bedtime  bisacodyl Suppository 10milliGRAM(s) Rectal daily  lidocaine   Patch 1Patch Transdermal daily  piperacillin/tazobactam IVPB. 2.25Gram(s) IV Intermittent every 6 hours    MEDICATIONS  (PRN):  dextrose Gel 1Dose(s) Oral once PRN Blood Glucose LESS THAN 70 milliGRAM(s)/deciliter  glucagon  Injectable 1milliGRAM(s) IntraMuscular once PRN Glucose LESS THAN 70 milligrams/deciliter  acetaminophen  Suppository 650milliGRAM(s) Rectal every 6 hours PRN For Temp greater than 38 C (100.4 F)  meperidine     Injectable 25milliGRAM(s) IV Push every 6 hours PRN Rigors      ALLERGIES:  Allergies    IV CONtRAST (Flushing (Skin); Rash)  No Known Drug Allergies    Intolerances        LABS:                        8.1    10.3  )-----------( 75       ( 16 Jun 2017 05:07 )             24.4     06-16    133<L>  |  101  |  37<H>  ----------------------------<  108<H>  4.5   |  21<L>  |  1.20    Ca    7.4<L>      16 Jun 2017 05:12  Phos  3.1     06-16  Mg     2.4     06-16    TPro  6.0  /  Alb  2.7<L>  /  TBili  0.3  /  DBili  x   /  AST  24  /  ALT  24  /  AlkPhos  194<H>  06-15    PT/INR - ( 16 Jun 2017 05:10 )   PT: 13.8 sec;   INR: 1.24          PTT - ( 16 Jun 2017 05:10 )  PTT:23.8 sec      RADIOLOGY & ADDITIONAL TESTS: Reviewed.    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 persistent bacteremia of GNR despite being on Zosyn.    - c/w Zosyn   - NM Gallium Scan today  - c/w Tylenol PRN, Demerol PRN   - f/u surveillance blood cultures today     CV:  #HFpEF: EF 60-65%. Currently net negative   - c/w Metoprolol 12.5 BID  - continue to monitor I/Os   - will continue to monitor daily CXRs  - medically optimize cardiac function if BP allows     #Afib: s/p PPM, on Eliquis at home. Decreased CrCl. Concern for pacemaker involvement. Will obtain NM Gallium to confirm   - re-assess AC after gallium   - c/w home Amiodarone 200mg QD     #CAD: s/p CABG. Patient without chest pain. EKG without ischemic changes.   - c/w home Lipitor 40mg QHS  - holding ASA     #UTI: UA positive for LE, bacteria. BCx growing GNR in all bottles. Likely urosepsis.   - c/w zosyn     PULM:  #Acute hypoxic respiratory failure: requiring BIPAP 2/2 pulmonary congestion/edema likely due to CHF exacerbation in the setting of urosepsis and line sepsis. Doing well w/ intermittent nasal cannula throughout the day. Lung exam with improvement   - c/w BIPAP/ nasal cannula, wean as tolerated  - c/w antibiotics as above     #Pulmonary edema: Resolving. plan as above, c/w BIPAP    RENAL:  #ROMEO: Cr. back to baseline.    HEME/ONC:  #Large Diffuse B Cell Lymphoma: last chemo with Bendamustin on 5/23/17. Followed outpatient by Dr. Galaviz- in remission as per her .  - Dr. Galaviz following, appreciate recs  - palliative care consult     #Breast cancer: on Arimidex 1mg QD at home   - Dr. Galaviz following, appreciate recs     #Anemia: Uric acid wnl (does not suggest tumor lysis). CT A/P negative for RP bleed. Transfused 1U pRBC with appropriate response.   - will continue to monitor     PPx: SCDs   FEN: monitor lytes, NPO, no IVF  Dispo: MICU    DNR/ DNI  INTERVAL HPI/OVERNIGHT EVENTS: PTT's within normal range. Patient HD stable on BIPAP overnight.     SUBJECTIVE: Patient seen and examined at bedside.     ROS:  CV: Denies chest pain  Resp: Denies SOB  GI: Denies abdominal pain, constipation, diarrhea, nausea, vomiting  : Denies dysuria  ID: Denies fevers, chills  MSK: Denies joint pain     OBJECTIVE:    VITAL SIGNS:  ICU Vital Signs Last 24 Hrs  T(C): 37.2, Max: 37.3 (06-14 @ 10:00)  T(F): 99, Max: 99.2 (06-14 @ 10:00)  HR: 103 (75 - 103)  BP: 113/58 (87/50 - 134/56)  BP(mean): 76 (63 - 88)  ABP: --  ABP(mean): --  RR: 14 (8 - 30)  SpO2: 95% (94% - 100%)      I & Os for 24h ending 06-14 @ 07:00  =============================================  IN: 1258 ml / OUT: 1455 ml / NET: -197 ml    I & Os for current day (as of 06-15 @ 06:43)  =============================================  IN: 436 ml / OUT: 1370 ml / NET: -934 ml    CAPILLARY BLOOD GLUCOSE  125 (15 Ramon 2017 06:00)      PHYSICAL EXAM:    General: NAD  HEENT: NC/AT; PERRL, clear conjunctiva  Neck: supple  Respiratory: CTA b/l  Cardiovascular: +S1/S2; RRR  Abdomen: soft, NT/ND; +BS x4  Extremities: WWP, 2+ peripheral pulses b/l; no LE edema  Skin: normal color and turgor; no rash  Neurological:     MEDICATIONS:  MEDICATIONS  (STANDING):  amiodarone    Tablet 200milliGRAM(s) Oral daily  insulin lispro (HumaLOG) corrective regimen sliding scale  SubCutaneous Before meals and at bedtime  dextrose 5%. 1000milliLiter(s) IV Continuous <Continuous>  dextrose 50% Injectable 12.5Gram(s) IV Push once  dextrose 50% Injectable 25Gram(s) IV Push once  dextrose 50% Injectable 25Gram(s) IV Push once  atorvastatin 40milliGRAM(s) Oral at bedtime  bisacodyl Suppository 10milliGRAM(s) Rectal daily  furosemide    Tablet 20milliGRAM(s) Oral daily  metoprolol 12.5milliGRAM(s) Oral every 12 hours  heparin  Infusion. 1400Unit(s)/Hr IV Continuous <Continuous>  cefTRIAXone   IVPB  IV Intermittent   cefTRIAXone   IVPB 1Gram(s) IV Intermittent every 12 hours  lidocaine   Patch 1Patch Transdermal daily    MEDICATIONS  (PRN):  dextrose Gel 1Dose(s) Oral once PRN Blood Glucose LESS THAN 70 milliGRAM(s)/deciliter  glucagon  Injectable 1milliGRAM(s) IntraMuscular once PRN Glucose LESS THAN 70 milligrams/deciliter  acetaminophen  Suppository 650milliGRAM(s) Rectal every 6 hours PRN For Temp greater than 38 C (100.4 F)      ALLERGIES:  Allergies    IV CONtRAST (Flushing (Skin); Rash)  No Known Drug Allergies    Intolerances        LABS:                        8.6    6.0   )-----------( 91       ( 15 Ramon 2017 05:23 )             25.6     06-15    133<L>  |  98  |  43<H>  ----------------------------<  113<H>  4.5   |  22  |  1.40<H>    Ca    8.2<L>      15 Ramon 2017 05:26  Phos  2.5     06-15  Mg     2.6     06-15      PTT - ( 15 Ramon 2017 05:23 )  PTT:80.8 sec      RADIOLOGY & ADDITIONAL TESTS: Reviewed. 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 Patient started on heparin gtt for atrial fibrillation, and diuresed for pulmonary edema which improved. Zosyn started for urosepsis, GNR bacteremia. Despite being on adequete dosing of abx, patient persistently bacteremia, having temp > 103-104, with severe rigors. Chemoport removed and line positive for gram negative rods. Pacemaker thought to be infected as well. Gallium scan done which was negative. Patient otherwise HD stable with baseline SBP high 80- low 90's. Respiratory status stable on BIPAP overnight, nassal cannnular/ aerosol mask throughout the day. Palliative involved in patients care as she is DNR/DNI. Patient ok for stepdown to [facility].      INTERVAL HPI/OVERNIGHT EVENTS: no overnight events. afebrile without rigors throughout the night     SUBJECTIVE: Patient seen and examined at bedside. Oriented x 3, mentating well. ROS + back pain. Denies any SOB, CP, abdominal pain.     OBJECTIVE:    VITAL SIGNS:  ICU Vital Signs Last 24 Hrs  T(C): 36.8, Max: 40.2 (06-15 @ 12:22)  T(F): 98.3, Max: 104.4 (06-15 @ 12:22)  HR: 95 (76 - 119)  BP: 124/51 (81/36 - 124/51)  BP(mean): 61 (47 - 90)  ABP: --  ABP(mean): --  RR: 14 (11 - 32)  SpO2: 100% (85% - 100%)      I & Os for 24h ending 06-16 @ 07:00  =============================================  IN: 338 ml / OUT: 1073 ml / NET: -735 ml    I & Os for current day (as of 06-16 @ 08:51)  =============================================  IN: 0 ml / OUT: 150 ml / NET: -150 ml    CAPILLARY BLOOD GLUCOSE  118 (16 Jun 2017 06:00)      PHYSICAL EXAM:    Constitutional: frail, cachectic female, in no respiratory distress at this moment   HEENT: NCAT, PERRL, sclera non-icteric, dry mucous membranes   Neck: supple, no JVD   Pulm: improved air movement. decreased breath sounds @ bases w/ fine bibasilar crackles   CV: regular rate, irregular, +S1S2, no murmurs appreciated  GI: +distended, tender to RLQ +BS, no guarding   Ext: WWP, trace pitting edema  Vasc: DP pulses 2+ bilaterally   Neuro: awake, alert, orientedx3, no focal deficits      MEDICATIONS:  MEDICATIONS  (STANDING):  amiodarone    Tablet 200milliGRAM(s) Oral daily  insulin lispro (HumaLOG) corrective regimen sliding scale  SubCutaneous Before meals and at bedtime  dextrose 5%. 1000milliLiter(s) IV Continuous <Continuous>  dextrose 50% Injectable 12.5Gram(s) IV Push once  dextrose 50% Injectable 25Gram(s) IV Push once  dextrose 50% Injectable 25Gram(s) IV Push once  atorvastatin 40milliGRAM(s) Oral at bedtime  bisacodyl Suppository 10milliGRAM(s) Rectal daily  lidocaine   Patch 1Patch Transdermal daily  piperacillin/tazobactam IVPB. 2.25Gram(s) IV Intermittent every 6 hours    MEDICATIONS  (PRN):  dextrose Gel 1Dose(s) Oral once PRN Blood Glucose LESS THAN 70 milliGRAM(s)/deciliter  glucagon  Injectable 1milliGRAM(s) IntraMuscular once PRN Glucose LESS THAN 70 milligrams/deciliter  acetaminophen  Suppository 650milliGRAM(s) Rectal every 6 hours PRN For Temp greater than 38 C (100.4 F)  meperidine     Injectable 25milliGRAM(s) IV Push every 6 hours PRN Rigors      ALLERGIES:  Allergies    IV CONtRAST (Flushing (Skin); Rash)  No Known Drug Allergies    Intolerances        LABS:                        8.1    10.3  )-----------( 75       ( 16 Jun 2017 05:07 )             24.4     06-16    133<L>  |  101  |  37<H>  ----------------------------<  108<H>  4.5   |  21<L>  |  1.20    Ca    7.4<L>      16 Jun 2017 05:12  Phos  3.1     06-16  Mg     2.4     06-16    TPro  6.0  /  Alb  2.7<L>  /  TBili  0.3  /  DBili  x   /  AST  24  /  ALT  24  /  AlkPhos  194<H>  06-15    PT/INR - ( 16 Jun 2017 05:10 )   PT: 13.8 sec;   INR: 1.24          PTT - ( 16 Jun 2017 05:10 )  PTT:23.8 sec      RADIOLOGY & ADDITIONAL TESTS: Reviewed.    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 persistent bacteremia of GNR despite being on Zosyn.    - c/w Zosyn   - NM Gallium Scan today- negative scan. pacemaker clean   - c/w Tylenol PRN, Demerol PRN   - f/u surveillance blood cultures today     CV:  #HFpEF: EF 60-65%. Currently net negative   - c/w Metoprolol 12.5 BID  - continue to monitor I/Os   - will continue to monitor daily CXRs  - medically optimize cardiac function if BP allows     #Afib: s/p PPM, on Eliquis at home. Decreased CrCl. Concern for pacemaker involvement. Will obtain NM Gallium to confirm   - c/w Heparin gtt - f/u 4pm PTT   - c/w home Amiodarone 200mg QD     #CAD: s/p CABG. Patient without chest pain. EKG without ischemic changes.   - c/w home Lipitor 40mg QHS  - holding ASA     #UTI: UA positive for LE, bacteria. BCx growing GNR in all bottles. Likely urosepsis.   - c/w zosyn     PULM:  #Acute hypoxic respiratory failure: requiring BIPAP 2/2 pulmonary congestion/edema likely due to CHF exacerbation in the setting of urosepsis and line sepsis. Doing well w/ intermittent nasal cannula throughout the day. Lung exam with improvement   - c/w BIPAP/ nasal cannula, wean as tolerated  - c/w antibiotics as above     #Pulmonary edema: Resolving. plan as above, c/w BIPAP    RENAL:  #ROMEO: Cr. back to baseline.    HEME/ONC:  #Large Diffuse B Cell Lymphoma: last chemo with Bendamustin on 5/23/17. Followed outpatient by Dr. Galaviz- in remission as per her .  - Dr. Galaviz following, appreciate recs  - palliative care consult     #Breast cancer: on Arimidex 1mg QD at home   - Dr. Galaviz following, appreciate recs     #Anemia: Uric acid wnl (does not suggest tumor lysis). CT A/P negative for RP bleed. Transfused 1U pRBC with appropriate response.   - will continue to monitor     PPx: SCDs   FEN: monitor lytes, NPO, no IVF  Dispo: MICU    DNR/ DNI  INTERVAL HPI/OVERNIGHT EVENTS: PTT's within normal range. Patient HD stable on BIPAP overnight.     SUBJECTIVE: Patient seen and examined at bedside.     ROS:  CV: Denies chest pain  Resp: Denies SOB  GI: Denies abdominal pain, constipation, diarrhea, nausea, vomiting  : Denies dysuria  ID: Denies fevers, chills  MSK: Denies joint pain     OBJECTIVE:    VITAL SIGNS:  ICU Vital Signs Last 24 Hrs  T(C): 37.2, Max: 37.3 (06-14 @ 10:00)  T(F): 99, Max: 99.2 (06-14 @ 10:00)  HR: 103 (75 - 103)  BP: 113/58 (87/50 - 134/56)  BP(mean): 76 (63 - 88)  ABP: --  ABP(mean): --  RR: 14 (8 - 30)  SpO2: 95% (94% - 100%)      I & Os for 24h ending 06-14 @ 07:00  =============================================  IN: 1258 ml / OUT: 1455 ml / NET: -197 ml    I & Os for current day (as of 06-15 @ 06:43)  =============================================  IN: 436 ml / OUT: 1370 ml / NET: -934 ml    CAPILLARY BLOOD GLUCOSE  125 (15 Ramon 2017 06:00)      PHYSICAL EXAM:    General: NAD  HEENT: NC/AT; PERRL, clear conjunctiva  Neck: supple  Respiratory: CTA b/l  Cardiovascular: +S1/S2; RRR  Abdomen: soft, NT/ND; +BS x4  Extremities: WWP, 2+ peripheral pulses b/l; no LE edema  Skin: normal color and turgor; no rash  Neurological:     MEDICATIONS:  MEDICATIONS  (STANDING):  amiodarone    Tablet 200milliGRAM(s) Oral daily  insulin lispro (HumaLOG) corrective regimen sliding scale  SubCutaneous Before meals and at bedtime  dextrose 5%. 1000milliLiter(s) IV Continuous <Continuous>  dextrose 50% Injectable 12.5Gram(s) IV Push once  dextrose 50% Injectable 25Gram(s) IV Push once  dextrose 50% Injectable 25Gram(s) IV Push once  atorvastatin 40milliGRAM(s) Oral at bedtime  bisacodyl Suppository 10milliGRAM(s) Rectal daily  furosemide    Tablet 20milliGRAM(s) Oral daily  metoprolol 12.5milliGRAM(s) Oral every 12 hours  heparin  Infusion. 1400Unit(s)/Hr IV Continuous <Continuous>  cefTRIAXone   IVPB  IV Intermittent   cefTRIAXone   IVPB 1Gram(s) IV Intermittent every 12 hours  lidocaine   Patch 1Patch Transdermal daily    MEDICATIONS  (PRN):  dextrose Gel 1Dose(s) Oral once PRN Blood Glucose LESS THAN 70 milliGRAM(s)/deciliter  glucagon  Injectable 1milliGRAM(s) IntraMuscular once PRN Glucose LESS THAN 70 milligrams/deciliter  acetaminophen  Suppository 650milliGRAM(s) Rectal every 6 hours PRN For Temp greater than 38 C (100.4 F)      ALLERGIES:  Allergies    IV CONtRAST (Flushing (Skin); Rash)  No Known Drug Allergies    Intolerances        LABS:                        8.6    6.0   )-----------( 91       ( 15 Ramon 2017 05:23 )             25.6     06-15    133<L>  |  98  |  43<H>  ----------------------------<  113<H>  4.5   |  22  |  1.40<H>    Ca    8.2<L>      15 Ramon 2017 05:26  Phos  2.5     06-15  Mg     2.6     06-15      PTT - ( 15 Ramon 2017 05:23 )  PTT:80.8 sec      RADIOLOGY & ADDITIONAL TESTS: Reviewed.

## 2017-06-16 NOTE — PROGRESS NOTE ADULT - PROBLEM SELECTOR PLAN 5
Palliative SW involved in anticipation of patient requiring home services eg. home pt, visiting nurse and / or visiting doctors, or possible prolong antibiotic use.

## 2017-06-17 LAB
ANION GAP SERPL CALC-SCNC: 12 MMOL/L — SIGNIFICANT CHANGE UP (ref 5–17)
APTT BLD: 44.3 SEC — HIGH (ref 27.5–37.4)
APTT BLD: 64.9 SEC — HIGH (ref 27.5–37.4)
APTT BLD: 66.8 SEC — HIGH (ref 27.5–37.4)
APTT BLD: 66.9 SEC — HIGH (ref 27.5–37.4)
BUN SERPL-MCNC: 25 MG/DL — HIGH (ref 7–23)
CALCIUM SERPL-MCNC: 7.8 MG/DL — LOW (ref 8.4–10.5)
CHLORIDE SERPL-SCNC: 105 MMOL/L — SIGNIFICANT CHANGE UP (ref 96–108)
CK MB CFR SERPL CALC: 2.3 NG/ML — SIGNIFICANT CHANGE UP (ref 0–6.7)
CK SERPL-CCNC: 45 U/L — SIGNIFICANT CHANGE UP (ref 25–170)
CO2 SERPL-SCNC: 22 MMOL/L — SIGNIFICANT CHANGE UP (ref 22–31)
CREAT SERPL-MCNC: 0.9 MG/DL — SIGNIFICANT CHANGE UP (ref 0.5–1.3)
CULTURE RESULTS: SIGNIFICANT CHANGE UP
GLUCOSE SERPL-MCNC: 128 MG/DL — HIGH (ref 70–99)
HCT VFR BLD CALC: 26.5 % — LOW (ref 34.5–45)
HGB BLD-MCNC: 8.7 G/DL — LOW (ref 11.5–15.5)
MAGNESIUM SERPL-MCNC: 2.4 MG/DL — SIGNIFICANT CHANGE UP (ref 1.6–2.6)
MCHC RBC-ENTMCNC: 30.2 PG — SIGNIFICANT CHANGE UP (ref 27–34)
MCHC RBC-ENTMCNC: 32.8 G/DL — SIGNIFICANT CHANGE UP (ref 32–36)
MCV RBC AUTO: 92 FL — SIGNIFICANT CHANGE UP (ref 80–100)
PHOSPHATE SERPL-MCNC: 2.1 MG/DL — LOW (ref 2.5–4.5)
PLATELET # BLD AUTO: 103 K/UL — LOW (ref 150–400)
POTASSIUM SERPL-MCNC: 4.1 MMOL/L — SIGNIFICANT CHANGE UP (ref 3.5–5.3)
POTASSIUM SERPL-SCNC: 4.1 MMOL/L — SIGNIFICANT CHANGE UP (ref 3.5–5.3)
RBC # BLD: 2.88 M/UL — LOW (ref 3.8–5.2)
RBC # FLD: 16.9 % — SIGNIFICANT CHANGE UP (ref 10.3–16.9)
SODIUM SERPL-SCNC: 139 MMOL/L — SIGNIFICANT CHANGE UP (ref 135–145)
SPECIMEN SOURCE: SIGNIFICANT CHANGE UP
TROPONIN T SERPL-MCNC: 0.02 NG/ML — HIGH (ref 0–0.01)
WBC # BLD: 9.1 K/UL — SIGNIFICANT CHANGE UP (ref 3.8–10.5)
WBC # FLD AUTO: 9.1 K/UL — SIGNIFICANT CHANGE UP (ref 3.8–10.5)

## 2017-06-17 PROCEDURE — 76775 US EXAM ABDO BACK WALL LIM: CPT | Mod: 26

## 2017-06-17 PROCEDURE — 99232 SBSQ HOSP IP/OBS MODERATE 35: CPT

## 2017-06-17 RX ORDER — CEFTRIAXONE 500 MG/1
1 INJECTION, POWDER, FOR SOLUTION INTRAMUSCULAR; INTRAVENOUS EVERY 24 HOURS
Qty: 0 | Refills: 0 | Status: DISCONTINUED | OUTPATIENT
Start: 2017-06-17 | End: 2017-06-18

## 2017-06-17 RX ORDER — OXYCODONE HYDROCHLORIDE 5 MG/1
5 TABLET ORAL EVERY 6 HOURS
Qty: 0 | Refills: 0 | Status: DISCONTINUED | OUTPATIENT
Start: 2017-06-17 | End: 2017-06-19

## 2017-06-17 RX ORDER — HEPARIN SODIUM 5000 [USP'U]/ML
1500 INJECTION INTRAVENOUS; SUBCUTANEOUS
Qty: 25000 | Refills: 0 | Status: DISCONTINUED | OUTPATIENT
Start: 2017-06-17 | End: 2017-06-18

## 2017-06-17 RX ADMIN — LIDOCAINE 1 PATCH: 4 CREAM TOPICAL at 01:11

## 2017-06-17 RX ADMIN — Medication 10 MILLIGRAM(S): at 14:05

## 2017-06-17 RX ADMIN — HEPARIN SODIUM 15 UNIT(S)/HR: 5000 INJECTION INTRAVENOUS; SUBCUTANEOUS at 01:00

## 2017-06-17 RX ADMIN — Medication 12.5 MILLIGRAM(S): at 06:36

## 2017-06-17 RX ADMIN — Medication 12.5 MILLIGRAM(S): at 18:18

## 2017-06-17 RX ADMIN — OXYCODONE HYDROCHLORIDE 5 MILLIGRAM(S): 5 TABLET ORAL at 10:48

## 2017-06-17 RX ADMIN — LIDOCAINE 1 PATCH: 4 CREAM TOPICAL at 12:18

## 2017-06-17 RX ADMIN — OXYCODONE HYDROCHLORIDE 5 MILLIGRAM(S): 5 TABLET ORAL at 12:11

## 2017-06-17 RX ADMIN — CEFTRIAXONE 100 GRAM(S): 500 INJECTION, POWDER, FOR SOLUTION INTRAMUSCULAR; INTRAVENOUS at 18:18

## 2017-06-17 RX ADMIN — ATORVASTATIN CALCIUM 40 MILLIGRAM(S): 80 TABLET, FILM COATED ORAL at 23:07

## 2017-06-17 RX ADMIN — OXYCODONE HYDROCHLORIDE 5 MILLIGRAM(S): 5 TABLET ORAL at 18:37

## 2017-06-17 RX ADMIN — AMIODARONE HYDROCHLORIDE 200 MILLIGRAM(S): 400 TABLET ORAL at 06:36

## 2017-06-17 NOTE — PROGRESS NOTE ADULT - ASSESSMENT
: 86F with PMHx of CAD s/p CABG, Afib s/p PPM (on Eliquis), breast cancer, large B cell lymphoma on chemo, HFpEF, HTN, HLD, DM presented with severe sepsis 2/2 UTI, persistent bacteremia, admitted to MICU for respiratory monitoring now stepped down to 7[facility], now fever resolved and blood culture from 6/15/2017 negative

## 2017-06-17 NOTE — PROGRESS NOTE ADULT - PROBLEM SELECTOR PLAN 1
1) Continue Ceftriaxone 1gr IV q 24h x 10 days  2) Repeated Blood culture from 6/15/2017 negative so far, Gallium scan negative, TTE negative for valvular disease

## 2017-06-17 NOTE — PROGRESS NOTE ADULT - SUBJECTIVE AND OBJECTIVE BOX
INTERVAL HPI/OVERNIGHT EVENTS: Afebrile, Blood culture 6/15/2017 negative so far    CONSTITUTIONAL:  Negative fever or chills  EYES:  Negative  blurry vision or double vision  CARDIOVASCULAR:  Negative for chest pain or palpitations  RESPIRATORY:  Negative for cough, wheezing, or SOB   GASTROINTESTINAL:  Negative for nausea, vomiting, diarrhea, constipation, or abdominal pain  GENITOURINARY:  Negative frequency, urgency or dysuria  NEUROLOGIC:  No headache, confusion, dizziness, lightheadedness      ANTIBIOTICS/RELEVANT:    cefTRIAXone   IVPB 1Gram(s) IV Intermittent every 24 hours      Vital Signs Last 24 Hrs  T(C): 36.3, Max: 37.6 (06-17 @ 14:02)  T(F): 97.3, Max: 99.6 (06-17 @ 14:02)  HR: 102 (80 - 106)  BP: 138/55 (127/58 - 150/75)  BP(mean): 96 (83 - 96)  RR: 16 (16 - 22)  SpO2: 93% (93% - 100%)    PHYSICAL EXAM:    Constitutional: Well-developed, well nourished  Eyes:ESSENCE, EOMI  Ear/Nose/Throat: no oral lesion, no sinus tenderness on percussion	  Neck:no JVD, no lymphadenopathy, supple  Respiratory: bibasilar crackles  Cardiovascular: S1S2 RRR, no murmurs  Gastrointestinal:soft, (+) BS, no HSM  Extremities:no e/e/c        LABS:                        8.7    9.1   )-----------( 103      ( 17 Jun 2017 05:32 )             26.5     06-17    139  |  105  |  25<H>  ----------------------------<  128<H>  4.1   |  22  |  0.90    Ca    7.8<L>      17 Jun 2017 05:32  Phos  2.1     06-17  Mg     2.4     06-17      PT/INR - ( 16 Jun 2017 16:11 )   PT: 13.4 sec;   INR: 1.20          PTT - ( 17 Jun 2017 12:31 )  PTT:66.9 sec      MICROBIOLOGY:  Culture - Blood (06.15.17 @ 11:38)    Specimen Source: .Blood Blood-Venous    Culture Results:   No growth at 2 days.    Culture - Blood (06.13.17 @ 12:45)    -  Ceftriaxone: S <=1    -  Piperacillin/Tazobactam: S <=16    -  Trimethoprim/Sulfamethoxazole: R >2/38    -  Ampicillin/Sulbactam: R >16/8    -  Ciprofloxacin: R >2    -  Tobramycin: I 8    -  Ampicillin: R >16    -  Cefazolin: S <=8    -  Gentamicin: R >8    Gram Stain:   Aerobic Bottle: Gram Negative Rods  Result called to and read back byJacinto Gomez RN 06/14/2017 06:36:29    Specimen Source: .Blood Blood    Organism: Escherichia coli    Culture Results:   Growth in aerobic bottle: Escherichia coli    Organism Identification: Escherichia coli    Method Type: JANEEN

## 2017-06-17 NOTE — PROGRESS NOTE ADULT - SUBJECTIVE AND OBJECTIVE BOX
Patient is a 86y old  Female who presents with a chief complaint of Fever (10 Ramon 2017 19:13)        INTERVAL HPI/OVERNIGHT EVENTS: none    SYMPTOMS  non specific complaints, wants to go home    DRIPS heparin        ICU Vital Signs Last 24 Hrs  T(C): 36.9, Max: 37.1 (06-16 @ 14:00)  T(F): 98.4, Max: 98.8 (06-16 @ 14:00)  HR: 85 (77 - 98)  BP: 142/67 (98/47 - 142/67)  BP(mean): 96 (61 - 98)  ABP: --  ABP(mean): --  RR: 22 (14 - 27)  SpO2: 96% (95% - 100%)      I&O's Summary  I & Os for 24h ending 16 Jun 2017 07:00  =============================================  IN: 338 ml / OUT: 1073 ml / NET: -735 ml    I & Os for current day (as of 17 Jun 2017 06:20)  =============================================  IN: 425 ml / OUT: 721 ml / NET: -296 ml      EXAM    Chest few ronchi    Heart rr    Abdomen soft nontender    Extremities trace edema    Neuro intact      LABS:  CAPILLARY BLOOD GLUCOSE  127 (16 Jun 2017 22:45)  129 (16 Jun 2017 11:00)                            8.7    9.1   )-----------( 103      ( 17 Jun 2017 05:32 )             26.5     06-17    139  |  105  |  25<H>  ----------------------------<  128<H>  4.1   |  22  |  0.90    Ca    7.8<L>      17 Jun 2017 05:32  Phos  2.1     06-17  Mg     2.4     06-17    TPro  6.0  /  Alb  2.7<L>  /  TBili  0.3  /  DBili  x   /  AST  24  /  ALT  24  /  AlkPhos  194<H>  06-15    PT/INR - ( 16 Jun 2017 16:11 )   PT: 13.4 sec;   INR: 1.20          PTT - ( 17 Jun 2017 05:32 )  PTT:64.9 sec          RADIOLOGY & ADDITIONAL STUDIES:    CRITICAL CARE TIME SPENT:

## 2017-06-17 NOTE — PROGRESS NOTE ADULT - ASSESSMENT
86F with PMHx of CAD s/p CABG, Afib s/p PPM (on Eliquis), breast cancer, large B cell lymphoma on chemo    heparin gtt for atrial fibrillation, and diuresed for pulmonary edema which improved. Zosyn started for urosepsis, GNR bacteremia. Despite being on adequete dosing of abx, patient persistently bacteremia, having temp > 103-104, with severe rigors

## 2017-06-17 NOTE — PROGRESS NOTE ADULT - SUBJECTIVE AND OBJECTIVE BOX
CC: HYPERKALEMIA      INTERVAL HISTORY:: 86F with PMHx of CAD s/p CABG, Afib s/p PPM (on Eliquis), breast cancer, large B cell lymphoma on chemo, HFpEF, HTN, HLD, DM presented with severe sepsis 2/2 UTI, persistent bacteremia, admitted to MICU for respiratory monitoring,  stepped down to 7 [place]  lethargic but arousable    ROS: No chest pain, no sob, no abd pain. No n/v/d    PAST MEDICAL & SURGICAL HISTORY:  Scoliosis  Follicular lymphoma  Neck mass  Afib  Other hyperlipidemia  Breast cancer  CAD (coronary artery disease)  Diabetes  HTN (hypertension)  No pertinent past medical history  H/O abdominal hysterectomy  H/O thyroidectomy  S/P CABG x 3      PHYSICAL EXAM:  T(C): 36.4, Max: 37.1 (06-16 @ 14:00)  HR: 93  BP: 150/75 (98/47 - 150/75)  RR: 18  SpO2: 96%  Wt(kg): --  I&O's Summary    I & Os for current day (as of 17 Jun 2017 10:30)  =============================================  IN: 425 ml / OUT: 721 ml / NET: -296 ml    Weight 54.6 (06-10 @ 21:46)  General: ,  NAD.  HEENT: moist mucous membranes, no pallor/cyanosis.  Neck: no JVD visible.  Cardiac: S1, S2. RRR. No murmurs   Respratory:rhonchi  Abdomen: soft. nontender. nondistended  Skin: no rashes.  Extremities: no LE edema b/l  Access:       DATA:                        8.7<L>  9.1   )-----------( 103<L>    ( 17 Jun 2017 05:32 )             26.5<L>        139    |  105    |  25<H>  ----------------------------<  128<H>  Ca:7.8<L> (17 Jun 2017 05:32)  4.1     |  22     |  0.90       eGFR if Non : 58 <L>  eGFR if : 67    TPro  6.0 g/dL  /  Alb  2.7 g/dL<L>  /  TBili  0.3 mg/dL  /  DBili  x      /  AST  24 U/L  /  ALT  24 U/L  /  AlkPhos  194 U/L<H>  15 Ramon 2017 12:30                    MEDICATIONS  (STANDING):  amiodarone    Tablet 200milliGRAM(s) Oral daily  insulin lispro (HumaLOG) corrective regimen sliding scale  SubCutaneous Before meals and at bedtime  atorvastatin 40milliGRAM(s) Oral at bedtime  bisacodyl Suppository 10milliGRAM(s) Rectal daily  lidocaine   Patch 1Patch Transdermal daily  metoprolol 12.5milliGRAM(s) Oral two times a day  heparin  Infusion 1500Unit(s)/Hr IV Continuous <Continuous>  cefTRIAXone   IVPB 1Gram(s) IV Intermittent every 24 hours    MEDICATIONS  (PRN):  acetaminophen  Suppository 650milliGRAM(s) Rectal every 6 hours PRN For Temp greater than 38 C (100.4 F)  guaiFENesin    Syrup 200milliGRAM(s) Oral every 6 hours PRN Cough  oxyCODONE Solution 5milliGRAM(s) Oral every 6 hours PRN RR>25/air hunger/increased work of breathing

## 2017-06-17 NOTE — PROGRESS NOTE ADULT - ASSESSMENT
A- gram negative bacteremia/lyphoma/atrial fibrillation/cad/ATN    Suggest  if creat continue to improve switch to eliquis  continue ceftriaxone  lopressor for rate control  onc/palliative  fu  DC FS  consider transfer to floor

## 2017-06-17 NOTE — PROGRESS NOTE ADULT - SUBJECTIVE AND OBJECTIVE BOX
INTERVAL HPI/OVERNIGHT EVENTS: patient satting well w/o supplemental O2 o/n. In the AM, currently on nasal cannula.     ICU Vital Signs Last 24 Hrs  T(C): 36.4, Max: 37.1 (06-16 @ 14:00)  T(F): 97.5, Max: 98.8 (06-16 @ 14:00)  HR: 80 (77 - 98)  BP: 150/75 (98/47 - 150/75)  BP(mean): 96 (68 - 98)  ABP: --  ABP(mean): --  RR: 18 (18 - 27)  SpO2: 94% (94% - 100%)    I&O's Summary    I & Os for current day (as of 17 Jun 2017 09:35)  =============================================  IN: 425 ml / OUT: 721 ml / NET: -296 ml    PHYSICAL EXAM:    General: elderly, ill-appearing   HEENT: NC/AT; PERRL, clear conjunctiva  Neck: supple  Respiratory: decreased BS at bases.   Cardiovascular: +S1/S2; RRR  Abdomen: soft, distended, +tympany, NT; +BS x4  Extremities: WWP, 2+ peripheral pulses b/l; no LE edema  Skin: normal color and turgor; no rash  Neurological: A&O x3, no focal deficits     MEDICATIONS  (STANDING):  amiodarone    Tablet 200milliGRAM(s) Oral daily  insulin lispro (HumaLOG) corrective regimen sliding scale  SubCutaneous Before meals and at bedtime  dextrose 5%. 1000milliLiter(s) IV Continuous <Continuous>  dextrose 50% Injectable 12.5Gram(s) IV Push once  dextrose 50% Injectable 25Gram(s) IV Push once  dextrose 50% Injectable 25Gram(s) IV Push once  atorvastatin 40milliGRAM(s) Oral at bedtime  bisacodyl Suppository 10milliGRAM(s) Rectal daily  lidocaine   Patch 1Patch Transdermal daily  metoprolol 12.5milliGRAM(s) Oral two times a day  heparin  Infusion 1500Unit(s)/Hr IV Continuous <Continuous>  cefTRIAXone   IVPB 1Gram(s) IV Intermittent every 24 hours    MEDICATIONS  (PRN):  dextrose Gel 1Dose(s) Oral once PRN Blood Glucose LESS THAN 70 milliGRAM(s)/deciliter  glucagon  Injectable 1milliGRAM(s) IntraMuscular once PRN Glucose LESS THAN 70 milligrams/deciliter  acetaminophen  Suppository 650milliGRAM(s) Rectal every 6 hours PRN For Temp greater than 38 C (100.4 F)  guaiFENesin    Syrup 200milliGRAM(s) Oral every 6 hours PRN Cough      Allergies    IV CONtRAST (Flushing (Skin); Rash)  No Known Drug Allergies    Intolerances        LABS:                        8.7    9.1   )-----------( 103      ( 17 Jun 2017 05:32 )             26.5     06-17    139  |  105  |  25<H>  ----------------------------<  128<H>  4.1   |  22  |  0.90    Ca    7.8<L>      17 Jun 2017 05:32  Phos  2.1     06-17  Mg     2.4     06-17    TPro  6.0  /  Alb  2.7<L>  /  TBili  0.3  /  DBili  x   /  AST  24  /  ALT  24  /  AlkPhos  194<H>  06-15    PT/INR - ( 16 Jun 2017 16:11 )   PT: 13.4 sec;   INR: 1.20          PTT - ( 17 Jun 2017 05:32 )  PTT:64.9 sec      RADIOLOGY & ADDITIONAL TESTS:    ASSESSMENT/PLAN: 86F with PMHx of CAD s/p CABG, Afib s/p PPM (on Eliquis), breast cancer, large B cell lymphoma on chemo, HFpEF, HTN, HLD, DM presented with severe sepsis 2/2 UTI, persistent bacteremia, admitted to MICU for respiratory monitoring now stepped down to 7[facility]ach    ID:  #Severe sepsis: Source is urosepsis, seeding to chemoport. persistent bacteremia of GNR despite being on Zosyn.    - ID consulted, c/w Ceftriaxone 1g for total 10 days starting 6/13-6/22 (day 5)  - f/u blood cx from 6/15  - NM Gallium Scan (6/16)- negative scan. pacemaker clean   - c/w Tylenol PRN, Demerol PRN   - f/u renal US      #UTI: urine cx w/ e.coli    - Plan as per above    CV:  #HFpEF: EF 60-65%. Currently net negative   - c/w Metoprolol 12.5 BID  - continue to monitor I/Os   - will continue to monitor daily CXRs  - medically optimize cardiac function if BP allows   - Consider restarting Lasix     #Afib: s/p PPM, on Eliquis at home. Decreased CrCl. Concern for pacemaker involvement, however gallium scan negative   - c/w Heparin gtt, will transition to eliquis if Cr remains stable  - c/w home Amiodarone 200mg QD     #CAD: s/p CABG. Patient without chest pain. EKG without ischemic changes.   - c/w home Lipitor 40mg QHS  - holding ASA     PULM:  #Acute hypoxic respiratory failure: requiring BIPAP 2/2 pulmonary congestion/edema likely due to CHF exacerbation in the setting of urosepsis and line sepsis. Doing well w/ intermittent nasal cannula throughout the day. Lung exam with improvement   - c/w supplemental O2 as needed, hiflow or BIPAP if absolutely necessary, nasal cannula, wean as tolerated  - c/w antibiotics as above     #Pulmonary edema: Resolving. plan as above, c/w BIPAP  - consider IV Lasix if overloaded     RENAL:  #ROMEO: Cr. back to baseline    HEME/ONC:  #Large Diffuse B Cell Lymphoma: last chemo with Bendamustin on 5/23/17. Followed outpatient by Dr. Galaviz- in remission as per her .  - Dr. Galaviz following, appreciate recs  - palliative care consult     #Breast cancer: on Arimidex 1mg QD at home   - Dr. Galaviz following, appreciate recs     #Anemia: Uric acid wnl (does not suggest tumor lysis). CT A/P negative for RP bleed. Transfused 1U pRBC with appropriate response.   - will continue to monitor     PPx: SCDs, heparin   FEN: monitor lytes, dysphagia diet, no IVF  Dispo: [facility]    DNR/DNI, limited medical interventions per MOLST

## 2017-06-18 LAB
ANION GAP SERPL CALC-SCNC: 12 MMOL/L — SIGNIFICANT CHANGE UP (ref 5–17)
APTT BLD: 70.2 SEC — HIGH (ref 27.5–37.4)
BUN SERPL-MCNC: 23 MG/DL — SIGNIFICANT CHANGE UP (ref 7–23)
CALCIUM SERPL-MCNC: 8.3 MG/DL — LOW (ref 8.4–10.5)
CHLORIDE SERPL-SCNC: 106 MMOL/L — SIGNIFICANT CHANGE UP (ref 96–108)
CO2 SERPL-SCNC: 23 MMOL/L — SIGNIFICANT CHANGE UP (ref 22–31)
CREAT SERPL-MCNC: 0.9 MG/DL — SIGNIFICANT CHANGE UP (ref 0.5–1.3)
FIBRINOGEN PPP-MCNC: 786 MG/DL — HIGH (ref 310–510)
GLUCOSE SERPL-MCNC: 113 MG/DL — HIGH (ref 70–99)
HCT VFR BLD CALC: 26.6 % — LOW (ref 34.5–45)
HGB BLD-MCNC: 8.4 G/DL — LOW (ref 11.5–15.5)
MAGNESIUM SERPL-MCNC: 2.5 MG/DL — SIGNIFICANT CHANGE UP (ref 1.6–2.6)
MCHC RBC-ENTMCNC: 29.6 PG — SIGNIFICANT CHANGE UP (ref 27–34)
MCHC RBC-ENTMCNC: 31.6 G/DL — LOW (ref 32–36)
MCV RBC AUTO: 93.7 FL — SIGNIFICANT CHANGE UP (ref 80–100)
PLATELET # BLD AUTO: 102 K/UL — LOW (ref 150–400)
POTASSIUM SERPL-MCNC: 4.1 MMOL/L — SIGNIFICANT CHANGE UP (ref 3.5–5.3)
POTASSIUM SERPL-SCNC: 4.1 MMOL/L — SIGNIFICANT CHANGE UP (ref 3.5–5.3)
RBC # BLD: 2.84 M/UL — LOW (ref 3.8–5.2)
RBC # FLD: 17.1 % — HIGH (ref 10.3–16.9)
SODIUM SERPL-SCNC: 141 MMOL/L — SIGNIFICANT CHANGE UP (ref 135–145)
WBC # BLD: 5.8 K/UL — SIGNIFICANT CHANGE UP (ref 3.8–10.5)
WBC # FLD AUTO: 5.8 K/UL — SIGNIFICANT CHANGE UP (ref 3.8–10.5)

## 2017-06-18 PROCEDURE — 99233 SBSQ HOSP IP/OBS HIGH 50: CPT

## 2017-06-18 PROCEDURE — 71010: CPT | Mod: 26

## 2017-06-18 PROCEDURE — 71010: CPT | Mod: 26,77

## 2017-06-18 RX ORDER — PIPERACILLIN AND TAZOBACTAM 4; .5 G/20ML; G/20ML
2.25 INJECTION, POWDER, LYOPHILIZED, FOR SOLUTION INTRAVENOUS ONCE
Qty: 0 | Refills: 0 | Status: COMPLETED | OUTPATIENT
Start: 2017-06-18 | End: 2017-06-18

## 2017-06-18 RX ORDER — PIPERACILLIN AND TAZOBACTAM 4; .5 G/20ML; G/20ML
INJECTION, POWDER, LYOPHILIZED, FOR SOLUTION INTRAVENOUS
Qty: 0 | Refills: 0 | Status: DISCONTINUED | OUTPATIENT
Start: 2017-06-18 | End: 2017-06-19

## 2017-06-18 RX ORDER — ENOXAPARIN SODIUM 100 MG/ML
50 INJECTION SUBCUTANEOUS
Qty: 0 | Refills: 0 | Status: DISCONTINUED | OUTPATIENT
Start: 2017-06-18 | End: 2017-06-19

## 2017-06-18 RX ORDER — FUROSEMIDE 40 MG
20 TABLET ORAL ONCE
Qty: 0 | Refills: 0 | Status: COMPLETED | OUTPATIENT
Start: 2017-06-18 | End: 2017-06-18

## 2017-06-18 RX ORDER — PIPERACILLIN AND TAZOBACTAM 4; .5 G/20ML; G/20ML
2.25 INJECTION, POWDER, LYOPHILIZED, FOR SOLUTION INTRAVENOUS EVERY 8 HOURS
Qty: 0 | Refills: 0 | Status: DISCONTINUED | OUTPATIENT
Start: 2017-06-19 | End: 2017-06-19

## 2017-06-18 RX ORDER — PIPERACILLIN AND TAZOBACTAM 4; .5 G/20ML; G/20ML
2.25 INJECTION, POWDER, LYOPHILIZED, FOR SOLUTION INTRAVENOUS EVERY 8 HOURS
Qty: 0 | Refills: 0 | Status: DISCONTINUED | OUTPATIENT
Start: 2017-06-18 | End: 2017-06-18

## 2017-06-18 RX ORDER — VANCOMYCIN HCL 1 G
750 VIAL (EA) INTRAVENOUS ONCE
Qty: 0 | Refills: 0 | Status: COMPLETED | OUTPATIENT
Start: 2017-06-18 | End: 2017-06-18

## 2017-06-18 RX ORDER — VANCOMYCIN HCL 1 G
VIAL (EA) INTRAVENOUS
Qty: 0 | Refills: 0 | Status: DISCONTINUED | OUTPATIENT
Start: 2017-06-18 | End: 2017-06-19

## 2017-06-18 RX ORDER — VANCOMYCIN HCL 1 G
750 VIAL (EA) INTRAVENOUS EVERY 24 HOURS
Qty: 0 | Refills: 0 | Status: DISCONTINUED | OUTPATIENT
Start: 2017-06-19 | End: 2017-06-19

## 2017-06-18 RX ADMIN — Medication 20 MILLIGRAM(S): at 15:02

## 2017-06-18 RX ADMIN — Medication 650 MILLIGRAM(S): at 20:44

## 2017-06-18 RX ADMIN — PIPERACILLIN AND TAZOBACTAM 200 GRAM(S): 4; .5 INJECTION, POWDER, LYOPHILIZED, FOR SOLUTION INTRAVENOUS at 21:11

## 2017-06-18 RX ADMIN — Medication 2: at 17:49

## 2017-06-18 RX ADMIN — OXYCODONE HYDROCHLORIDE 5 MILLIGRAM(S): 5 TABLET ORAL at 16:36

## 2017-06-18 RX ADMIN — ENOXAPARIN SODIUM 50 MILLIGRAM(S): 100 INJECTION SUBCUTANEOUS at 12:59

## 2017-06-18 RX ADMIN — AMIODARONE HYDROCHLORIDE 200 MILLIGRAM(S): 400 TABLET ORAL at 06:11

## 2017-06-18 RX ADMIN — LIDOCAINE 1 PATCH: 4 CREAM TOPICAL at 01:25

## 2017-06-18 RX ADMIN — CEFTRIAXONE 100 GRAM(S): 500 INJECTION, POWDER, FOR SOLUTION INTRAMUSCULAR; INTRAVENOUS at 18:13

## 2017-06-18 RX ADMIN — Medication 12.5 MILLIGRAM(S): at 06:11

## 2017-06-18 NOTE — PROGRESS NOTE ADULT - ASSESSMENT
A - lyphoma/ atrial fibrillaton/gram negtive bacteremia/throbocytopenia/ respiratory failure/ ATN    Sugest:    continue ABX  try to taper high flow off  switch to lovenox  continue meds for rate control  comfort measures as required  palliative care followup when availble

## 2017-06-18 NOTE — PROGRESS NOTE ADULT - PROBLEM SELECTOR PLAN 1
1) Continue Ceftriaxone 1gr IV q 24h x 10 days post catheter removal  2) Repeated Blood culture from 6/15/2017 negative so far, Gallium scan negative, TTE negative for valvular disease. 1) d/c Ceftriaxone and Start Zosyn and Vancomycin  2) Repeat Blood culture , urine culture, Chest x-ray

## 2017-06-18 NOTE — PROGRESS NOTE ADULT - SUBJECTIVE AND OBJECTIVE BOX
Patient is a 86y old  Female who presents with a chief complaint of Fever (10 Ramon 2017 19:13)        INTERVAL HPI/OVERNIGHT EVENTS: none    SYMPTOMS resting comfortably on high flow O2.  No SOB , pain    DRIPS none        ICU Vital Signs Last 24 Hrs  T(C): 36.9, Max: 37.6 (06-17 @ 14:02)  T(F): 98.5, Max: 99.6 (06-17 @ 14:02)  HR: 102 (80 - 106)  BP: 115/55 (115/55 - 151/66)  BP(mean): 79 (79 - 83)  ABP: --  ABP(mean): --  RR: 17 (16 - 18)  SpO2: 96% (93% - 100%)      I&O's Summary  I & Os for 24h ending 17 Jun 2017 07:00  =============================================  IN: 425 ml / OUT: 721 ml / NET: -296 ml    I & Os for current day (as of 18 Jun 2017 06:13)  =============================================  IN: 990 ml / OUT: 450 ml / NET: 540 ml      EXAM    Chest few ronchi    Heart ireg    Abdomen soft nontender with bs    Extremities trace edm    Neuro intact      LABS:  CAPILLARY BLOOD GLUCOSE  149 (17 Jun 2017 21:00)  140 (17 Jun 2017 16:07)  129 (17 Jun 2017 11:00)  122 (17 Jun 2017 06:35)                            8.4    5.8   )-----------( 102      ( 18 Jun 2017 05:42 )             26.6     06-17    139  |  105  |  25<H>  ----------------------------<  128<H>  4.1   |  22  |  0.90    Ca    7.8<L>      17 Jun 2017 05:32  Phos  2.1     06-17  Mg     2.4     06-17      PT/INR - ( 18 Jun 2017 02:33 )   PT: 13.4 sec;   INR: 1.20          PTT - ( 18 Jun 2017 02:47 )  PTT:70.2 sec          RADIOLOGY & ADDITIONAL STUDIES:    CRITICAL CARE TIME SPENT:

## 2017-06-18 NOTE — PROGRESS NOTE ADULT - SUBJECTIVE AND OBJECTIVE BOX
INTERVAL HPI/OVERNIGHT EVENTS: Feels better    CONSTITUTIONAL: Negative fever or chills  EYES:  Negative  blurry vision or double vision  CARDIOVASCULAR:  Negative for chest pain or palpitations  RESPIRATORY:  Negative for cough, wheezing, or SOB   GASTROINTESTINAL:  Negative for nausea, vomiting, diarrhea, constipation, or abdominal pain  GENITOURINARY:  Negative frequency, urgency or dysuria  NEUROLOGIC:  No headache, confusion, dizziness, lightheadedness      ANTIBIOTICS/RELEVANT:    cefTRIAXone   IVPB 1Gram(s) IV Intermittent every 24 hours    Vital Signs Last 24 Hrs  T(C): 37.9, Max: 37.9 (06-18 @ 16:41)  T(F): 100.2, Max: 100.2 (06-18 @ 16:41)  HR: 84 (83 - 102)  BP: 141/60 (115/55 - 141/60)  BP(mean): 90 (79 - 90)  RR: 23 (16 - 23)  SpO2: 97% (96% - 100%)    PHYSICAL EXAM:  Constitutional: Elderly female, no distress  Eyes:ESSENCE, EOMI  Ear/Nose/Throat: no oral lesion, no sinus tenderness on percussion	  Neck:no JVD, no lymphadenopathy, supple  Respiratory: bibasal crackles  Cardiovascular: S1S2 RRR, no murmurs  Gastrointestinal:soft, (+) BS, no HSM  Extremities:no e/e/c    LABS:                        8.4    5.8   )-----------( 102      ( 18 Jun 2017 05:42 )             26.6     06-18    141  |  106  |  23  ----------------------------<  113<H>  4.1   |  23  |  0.90    Ca    8.3<L>      18 Jun 2017 05:42  Phos  2.1     06-17  Mg     2.5     06-18      PT/INR - ( 18 Jun 2017 02:33 )   PT: 13.4 sec;   INR: 1.20          PTT - ( 18 Jun 2017 02:47 )  PTT:70.2 sec      MICROBIOLOGY:  Culture - Blood (06.15.17 @ 11:38)    Specimen Source: .Blood Blood-Venous    Culture Results:   No growth at 3 days.      RADIOLOGY & ADDITIONAL STUDIES: INTERVAL HPI/OVERNIGHT EVENTS: Fever 103.3F, mild confusion    CONSTITUTIONAL: (+)fever or chills  EYES:  Negative  blurry vision or double vision  CARDIOVASCULAR:  Negative for chest pain or palpitations  RESPIRATORY:  Negative for cough, wheezing, or SOB   GASTROINTESTINAL:  Negative for nausea, vomiting, diarrhea, constipation, or abdominal pain  GENITOURINARY:  Negative frequency, urgency or dysuria  NEUROLOGIC:  (+)confusion, dizziness, lightheadedness      ANTIBIOTICS/RELEVANT:    cefTRIAXone   IVPB 1Gram(s) IV Intermittent every 24 hours    Vital Signs Last 24 Hrs  T(C): 37.9, Max: 37.9 (06-18 @ 16:41)  T(F): 100.2, Max: 103.3 (06-18 @ 16:41)  HR: 84 (83 - 102)  BP: 141/60 (115/55 - 141/60)  BP(mean): 90 (79 - 90)  RR: 23 (16 - 23)  SpO2: 97% (96% - 100%)    PHYSICAL EXAM:  Constitutional: Elderly female, mild distress, on Bipap  Eyes:ESSENCE, EOMI  Ear/Nose/Throat: no oral lesion, no sinus tenderness on percussion	  Neck:no JVD, no lymphadenopathy, supple  Respiratory: bibasal crackles  Cardiovascular: S1S2 RRR, no murmurs  Gastrointestinal:soft, (+) BS, no HSM  Extremities:no e/e/c    LABS:                        8.4    5.8   )-----------( 102      ( 18 Jun 2017 05:42 )             26.6     06-18    141  |  106  |  23  ----------------------------<  113<H>  4.1   |  23  |  0.90    Ca    8.3<L>      18 Jun 2017 05:42  Phos  2.1     06-17  Mg     2.5     06-18      PT/INR - ( 18 Jun 2017 02:33 )   PT: 13.4 sec;   INR: 1.20          PTT - ( 18 Jun 2017 02:47 )  PTT:70.2 sec      MICROBIOLOGY:  Culture - Blood (06.15.17 @ 11:38)    Specimen Source: .Blood Blood-Venous    Culture Results:   No growth at 3 days.      RADIOLOGY & ADDITIONAL STUDIES:

## 2017-06-18 NOTE — PROGRESS NOTE ADULT - SUBJECTIVE AND OBJECTIVE BOX
INTERVAL HPI/OVERNIGHT EVENTS:    SUBJECTIVE: Patient seen and examined at bedside.    OBJECTIVE:    VITAL SIGNS:  ICU Vital Signs Last 24 Hrs  T(C): 36.9, Max: 37.6 (06-17 @ 14:02)  T(F): 98.5, Max: 99.6 (06-17 @ 14:02)  HR: 102 (80 - 106)  BP: 115/55 (115/55 - 151/66)  BP(mean): 79 (79 - 83)  ABP: --  ABP(mean): --  RR: 17 (16 - 18)  SpO2: 96% (93% - 100%)        I & Os for current day (as of 06-18 @ 07:26)  =============================================  IN: 990 ml / OUT: 450 ml / NET: 540 ml    CAPILLARY BLOOD GLUCOSE  149 (17 Jun 2017 21:00)      PHYSICAL EXAM:    General: NAD  HEENT: NC/AT; PERRL, clear conjunctiva  Neck: supple  Respiratory: CTA b/l  Cardiovascular: +S1/S2; RRR  Abdomen: soft, NT/ND; +BS x4  Extremities: WWP, 2+ peripheral pulses b/l; no LE edema  Skin: normal color and turgor; no rash  Neurological:     MEDICATIONS:  MEDICATIONS  (STANDING):  amiodarone    Tablet 200milliGRAM(s) Oral daily  insulin lispro (HumaLOG) corrective regimen sliding scale  SubCutaneous Before meals and at bedtime  atorvastatin 40milliGRAM(s) Oral at bedtime  bisacodyl Suppository 10milliGRAM(s) Rectal daily  lidocaine   Patch 1Patch Transdermal daily  metoprolol 12.5milliGRAM(s) Oral two times a day  heparin  Infusion 1500Unit(s)/Hr IV Continuous <Continuous>  cefTRIAXone   IVPB 1Gram(s) IV Intermittent every 24 hours    MEDICATIONS  (PRN):  acetaminophen  Suppository 650milliGRAM(s) Rectal every 6 hours PRN For Temp greater than 38 C (100.4 F)  guaiFENesin    Syrup 200milliGRAM(s) Oral every 6 hours PRN Cough  oxyCODONE Solution 5milliGRAM(s) Oral every 6 hours PRN RR>25/air hunger/increased work of breathing      ALLERGIES:  Allergies    IV CONtRAST (Flushing (Skin); Rash)  No Known Drug Allergies    Intolerances        LABS:                        8.4    5.8   )-----------( 102      ( 18 Jun 2017 05:42 )             26.6     06-18    141  |  106  |  23  ----------------------------<  113<H>  4.1   |  23  |  0.90    Ca    8.3<L>      18 Jun 2017 05:42  Phos  2.1     06-17  Mg     2.5     06-18      PT/INR - ( 18 Jun 2017 02:33 )   PT: 13.4 sec;   INR: 1.20          PTT - ( 18 Jun 2017 02:47 )  PTT:70.2 sec      RADIOLOGY & ADDITIONAL TESTS: Reviewed. INTERVAL HPI/OVERNIGHT EVENTS: afebrile overnight. no acute events     SUBJECTIVE: Patient seen and examined at bedside. Patient resting comfortably in bed with  at bedside. She reports improvement in respiratory status on the HFNC. Denies any fever/chills/sob/cp/nvd.     OBJECTIVE:    VITAL SIGNS:  ICU Vital Signs Last 24 Hrs  T(C): 36.9, Max: 37.6 (06-17 @ 14:02)  T(F): 98.5, Max: 99.6 (06-17 @ 14:02)  HR: 102 (80 - 106)  BP: 115/55 (115/55 - 151/66)  BP(mean): 79 (79 - 83)  ABP: --  ABP(mean): --  RR: 17 (16 - 18)  SpO2: 96% (93% - 100%)        I & Os for current day (as of 06-18 @ 07:26)  =============================================  IN: 990 ml / OUT: 450 ml / NET: 540 ml    CAPILLARY BLOOD GLUCOSE  149 (17 Jun 2017 21:00)      PHYSICAL EXAM:    General: elderly, frail, NAD  HEENT: NC/AT; PERRL, clear conjunctiva  Neck: supple  Respiratory: fine crackles at bases.   Cardiovascular: +S1/S2; RRR  Abdomen: soft, NTND +BS x4  Extremities: WWP, 2+ peripheral pulses b/l; no LE edema  Skin: normal color and turgor; no rash  Neurological: A&O x3, no focal deficits       MEDICATIONS:  MEDICATIONS  (STANDING):  amiodarone    Tablet 200milliGRAM(s) Oral daily  insulin lispro (HumaLOG) corrective regimen sliding scale  SubCutaneous Before meals and at bedtime  atorvastatin 40milliGRAM(s) Oral at bedtime  bisacodyl Suppository 10milliGRAM(s) Rectal daily  lidocaine   Patch 1Patch Transdermal daily  metoprolol 12.5milliGRAM(s) Oral two times a day  heparin  Infusion 1500Unit(s)/Hr IV Continuous <Continuous>  cefTRIAXone   IVPB 1Gram(s) IV Intermittent every 24 hours    MEDICATIONS  (PRN):  acetaminophen  Suppository 650milliGRAM(s) Rectal every 6 hours PRN For Temp greater than 38 C (100.4 F)  guaiFENesin    Syrup 200milliGRAM(s) Oral every 6 hours PRN Cough  oxyCODONE Solution 5milliGRAM(s) Oral every 6 hours PRN RR>25/air hunger/increased work of breathing      ALLERGIES:  Allergies    IV CONtRAST (Flushing (Skin); Rash)  No Known Drug Allergies    Intolerances        LABS:                        8.4    5.8   )-----------( 102      ( 18 Jun 2017 05:42 )             26.6     06-18    141  |  106  |  23  ----------------------------<  113<H>  4.1   |  23  |  0.90    Ca    8.3<L>      18 Jun 2017 05:42  Phos  2.1     06-17  Mg     2.5     06-18      PT/INR - ( 18 Jun 2017 02:33 )   PT: 13.4 sec;   INR: 1.20          PTT - ( 18 Jun 2017 02:47 )  PTT:70.2 sec      RADIOLOGY & ADDITIONAL TESTS: Reviewed.    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 persistent bacteremia of GNR despite being on Zosyn.    - ID consulted, c/w Ceftriaxone 1g for total 10 days post-line removal, 6/13-6/22 (day 6)  - Blood Cx (6/15) NGTD   - NM Gallium Scan (6/16)- negative scan. pacemaker clean   - c/w Tylenol PRN, Demerol can be ordered for rigors   - f/u renal US      #UTI: urine cx w/ e.coli    - Plan as per above    CV:  #HFpEF: EF 60-65%. Currently net negative   - c/w Metoprolol 12.5 BID  - continue to monitor I/Os   - will continue to monitor daily CXRs  - medically optimize cardiac function if BP allows   - Consider restarting Lasix     #Afib: s/p PPM, on Eliquis at home. Decreased CrCl. Concern for pacemaker involvement, however gallium scan negative   - Transition to eliquis (6/18)   - c/w home Amiodarone 200mg QD     #CAD: s/p CABG. Patient without chest pain. EKG without ischemic changes.   - c/w home Lipitor 40mg QHS  - holding ASA     PULM:  #Acute hypoxic respiratory failure: requiring BIPAP 2/2 pulmonary congestion/edema likely due to CHF exacerbation in the setting of urosepsis and line sepsis. Doing well w/ intermittent nasal cannula throughout the day. Lung exam with improvement   - c/w supplemental O2 as needed, hiflow, nasal cannula, wean as tolerated  - Oxycodone 5mg solution for air hunger, increased work of breathing, comfort, and LBP   - c/w antibiotics as above     #Pulmonary edema: Resolving. plan as above, c/w HF  - Deescalate to NC as needed   - consider IV Lasix if overloaded     RENAL:  #ROMEO: Cr. back to baseline    HEME/ONC:  #Large Diffuse B Cell Lymphoma: last chemo with Bendamustin on 5/23/17. Followed outpatient by Dr. Galaviz- in remission as per her .  - Dr. Galaviz following, appreciate recs  - palliative care consult     #Breast cancer: on Arimidex 1mg QD at home   - Dr. Galaviz following, appreciate recs     #Anemia: Uric acid wnl (does not suggest tumor lysis). CT A/P negative for RP bleed. Transfused 1U pRBC with appropriate response.   - will continue to monitor     PPx: SCDs, transition to Lovenox as pts ROMEO resolved   FEN: monitor lytes, dysphagia diet, no IVF  Dispo: [facility]    DNR/DNI, limited medical interventions per [facility] INTERVAL HPI/OVERNIGHT EVENTS: afebrile overnight. no acute events     SUBJECTIVE: Patient seen and examined at bedside. Patient resting comfortably in bed with  at bedside. She reports improvement in respiratory status on the HFNC. Denies any fever/chills/sob/cp/nvd.     OBJECTIVE:    VITAL SIGNS:  ICU Vital Signs Last 24 Hrs  T(C): 36.9, Max: 37.6 (06-17 @ 14:02)  T(F): 98.5, Max: 99.6 (06-17 @ 14:02)  HR: 102 (80 - 106)  BP: 115/55 (115/55 - 151/66)  BP(mean): 79 (79 - 83)  ABP: --  ABP(mean): --  RR: 17 (16 - 18)  SpO2: 96% (93% - 100%)        I & Os for current day (as of 06-18 @ 07:26)  =============================================  IN: 990 ml / OUT: 450 ml / NET: 540 ml    CAPILLARY BLOOD GLUCOSE  149 (17 Jun 2017 21:00)      PHYSICAL EXAM:    General: elderly, frail, NAD  HEENT: NC/AT; PERRL, clear conjunctiva  Neck: supple  Respiratory: fine crackles at bases.   Cardiovascular: +S1/S2; RRR  Abdomen: soft, NTND +BS x4  Extremities: WWP, 2+ peripheral pulses b/l; no LE edema  Skin: normal color and turgor; no rash  Neurological: A&O x3, no focal deficits       MEDICATIONS:  MEDICATIONS  (STANDING):  amiodarone    Tablet 200milliGRAM(s) Oral daily  insulin lispro (HumaLOG) corrective regimen sliding scale  SubCutaneous Before meals and at bedtime  atorvastatin 40milliGRAM(s) Oral at bedtime  bisacodyl Suppository 10milliGRAM(s) Rectal daily  lidocaine   Patch 1Patch Transdermal daily  metoprolol 12.5milliGRAM(s) Oral two times a day  heparin  Infusion 1500Unit(s)/Hr IV Continuous <Continuous>  cefTRIAXone   IVPB 1Gram(s) IV Intermittent every 24 hours    MEDICATIONS  (PRN):  acetaminophen  Suppository 650milliGRAM(s) Rectal every 6 hours PRN For Temp greater than 38 C (100.4 F)  guaiFENesin    Syrup 200milliGRAM(s) Oral every 6 hours PRN Cough  oxyCODONE Solution 5milliGRAM(s) Oral every 6 hours PRN RR>25/air hunger/increased work of breathing      ALLERGIES:  Allergies    IV CONtRAST (Flushing (Skin); Rash)  No Known Drug Allergies    Intolerances        LABS:                        8.4    5.8   )-----------( 102      ( 18 Jun 2017 05:42 )             26.6     06-18    141  |  106  |  23  ----------------------------<  113<H>  4.1   |  23  |  0.90    Ca    8.3<L>      18 Jun 2017 05:42  Phos  2.1     06-17  Mg     2.5     06-18      PT/INR - ( 18 Jun 2017 02:33 )   PT: 13.4 sec;   INR: 1.20          PTT - ( 18 Jun 2017 02:47 )  PTT:70.2 sec      RADIOLOGY & ADDITIONAL TESTS: Reviewed.    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 persistent bacteremia of GNR despite being on Zosyn.    - ID consulted, c/w Ceftriaxone 1g for total 10 days post-line removal, 6/13-6/22 (day 6)  - Blood Cx (6/15) NGTD   - NM Gallium Scan (6/16)- negative scan. pacemaker clean   - c/w Tylenol PRN, Demerol can be ordered for rigors   - f/u renal US      #UTI: urine cx w/ e.coli    - Plan as per above    CV:  #HFpEF: EF 60-65%. Currently net negative   - c/w Metoprolol 12.5 BID  - continue to monitor I/Os   - will continue to monitor daily CXRs  - medically optimize cardiac function if BP allows   - Consider restarting Lasix     #Afib: s/p PPM, on Eliquis at home. Decreased CrCl. Concern for pacemaker involvement, however gallium scan negative   - Transition to Lovenox 50mg BID in setting of malignancy   - c/w home Amiodarone 200mg QD     #CAD: s/p CABG. Patient without chest pain. EKG without ischemic changes.   - c/w home Lipitor 40mg QHS  - holding ASA     PULM:  #Acute hypoxic respiratory failure: requiring BIPAP 2/2 pulmonary congestion/edema likely due to CHF exacerbation in the setting of urosepsis and line sepsis. Doing well w/ intermittent nasal cannula throughout the day. Lung exam with improvement   - c/w supplemental O2 as needed, hiflow, nasal cannula, wean as tolerated  - Oxycodone 5mg solution for air hunger, increased work of breathing, comfort, and LBP   - c/w antibiotics as above     #Pulmonary edema: Resolving. plan as above, c/w HF  - Deescalate to NC as needed   - consider IV Lasix if overloaded     RENAL:  #ROMEO: Cr. back to baseline    HEME/ONC:  #Large Diffuse B Cell Lymphoma: last chemo with Bendamustin on 5/23/17. Followed outpatient by Dr. Galaviz- in remission as per her .  - Dr. Galaviz following, appreciate recs  - palliative care consult     #Breast cancer: on Arimidex 1mg QD at home   - Dr. Galaviz following, appreciate recs     #Anemia: Uric acid wnl (does not suggest tumor lysis). CT A/P negative for RP bleed. Transfused 1U pRBC with appropriate response.   - will continue to monitor     PPx: SCDs, transition to Lovenox as pts ROMEO resolved   FEN: monitor lytes, dysphagia diet, no IVF  Dispo: [facility]    DNR/DNI, limited medical interventions per [facility]

## 2017-06-18 NOTE — PROGRESS NOTE ADULT - ASSESSMENT
86F with PMHx of CAD s/p CABG, Afib s/p PPM (on Eliquis), breast cancer, large B cell lymphoma on chemo, HFpEF, HTN, HLD, DM presented with severe sepsis 2/2 UTI, persistent bacteremia, admitted to MICU for respiratory monitoring now stepped down to [facility], now fever resolved and blood culture from 6/15/2017 negative 86F with PMHx of CAD s/p CABG, Afib s/p PPM (on Eliquis), breast cancer, large B cell lymphoma on chemo, HFpEF, HTN, HLD, DM presented with severe sepsis 2/2 UTI, persistent bacteremia, admitted to MICU for respiratory monitoring now stepped down to [facility], new onset of fever

## 2017-06-19 DIAGNOSIS — J81.0 ACUTE PULMONARY EDEMA: ICD-10-CM

## 2017-06-19 DIAGNOSIS — R63.8 OTHER SYMPTOMS AND SIGNS CONCERNING FOOD AND FLUID INTAKE: ICD-10-CM

## 2017-06-19 DIAGNOSIS — C83.30 DIFFUSE LARGE B-CELL LYMPHOMA, UNSPECIFIED SITE: ICD-10-CM

## 2017-06-19 DIAGNOSIS — D64.9 ANEMIA, UNSPECIFIED: ICD-10-CM

## 2017-06-19 DIAGNOSIS — A41.9 SEPSIS, UNSPECIFIED ORGANISM: ICD-10-CM

## 2017-06-19 DIAGNOSIS — J96.01 ACUTE RESPIRATORY FAILURE WITH HYPOXIA: ICD-10-CM

## 2017-06-19 DIAGNOSIS — I25.10 ATHEROSCLEROTIC HEART DISEASE OF NATIVE CORONARY ARTERY WITHOUT ANGINA PECTORIS: ICD-10-CM

## 2017-06-19 DIAGNOSIS — I48.0 PAROXYSMAL ATRIAL FIBRILLATION: ICD-10-CM

## 2017-06-19 DIAGNOSIS — C85.90 NON-HODGKIN LYMPHOMA, UNSPECIFIED, UNSPECIFIED SITE: ICD-10-CM

## 2017-06-19 DIAGNOSIS — C50.919 MALIGNANT NEOPLASM OF UNSPECIFIED SITE OF UNSPECIFIED FEMALE BREAST: ICD-10-CM

## 2017-06-19 DIAGNOSIS — Z29.9 ENCOUNTER FOR PROPHYLACTIC MEASURES, UNSPECIFIED: ICD-10-CM

## 2017-06-19 LAB
ANION GAP SERPL CALC-SCNC: 11 MMOL/L — SIGNIFICANT CHANGE UP (ref 5–17)
APPEARANCE UR: CLEAR — SIGNIFICANT CHANGE UP
BACTERIA # UR AUTO: PRESENT /HPF
BASOPHILS NFR BLD AUTO: 0.5 % — SIGNIFICANT CHANGE UP (ref 0–2)
BILIRUB UR-MCNC: NEGATIVE — SIGNIFICANT CHANGE UP
BUN SERPL-MCNC: 24 MG/DL — HIGH (ref 7–23)
CALCIUM SERPL-MCNC: 8.1 MG/DL — LOW (ref 8.4–10.5)
CHLORIDE SERPL-SCNC: 104 MMOL/L — SIGNIFICANT CHANGE UP (ref 96–108)
CO2 SERPL-SCNC: 23 MMOL/L — SIGNIFICANT CHANGE UP (ref 22–31)
COLOR SPEC: YELLOW — SIGNIFICANT CHANGE UP
CREAT SERPL-MCNC: 0.8 MG/DL — SIGNIFICANT CHANGE UP (ref 0.5–1.3)
DIFF PNL FLD: (no result)
EOSINOPHIL NFR BLD AUTO: 1.3 % — SIGNIFICANT CHANGE UP (ref 0–6)
EPI CELLS # UR: SIGNIFICANT CHANGE UP /HPF
FACT VII ACT/NOR PPP: 68 % — SIGNIFICANT CHANGE UP (ref 53–149)
GLUCOSE SERPL-MCNC: 87 MG/DL — SIGNIFICANT CHANGE UP (ref 70–99)
GLUCOSE UR QL: NEGATIVE — SIGNIFICANT CHANGE UP
GRAN CASTS # UR COMP ASSIST: (no result) /LPF
HCT VFR BLD CALC: 23.6 % — LOW (ref 34.5–45)
HCT VFR BLD CALC: 24.8 % — LOW (ref 34.5–45)
HGB BLD-MCNC: 7.6 G/DL — LOW (ref 11.5–15.5)
HGB BLD-MCNC: 7.9 G/DL — LOW (ref 11.5–15.5)
KETONES UR-MCNC: NEGATIVE — SIGNIFICANT CHANGE UP
LEUKOCYTE ESTERASE UR-ACNC: NEGATIVE — SIGNIFICANT CHANGE UP
LYMPHOCYTES # BLD AUTO: 21.2 % — SIGNIFICANT CHANGE UP (ref 13–44)
MCHC RBC-ENTMCNC: 29.7 PG — SIGNIFICANT CHANGE UP (ref 27–34)
MCHC RBC-ENTMCNC: 29.8 PG — SIGNIFICANT CHANGE UP (ref 27–34)
MCHC RBC-ENTMCNC: 31.9 G/DL — LOW (ref 32–36)
MCHC RBC-ENTMCNC: 32.2 G/DL — SIGNIFICANT CHANGE UP (ref 32–36)
MCV RBC AUTO: 92.5 FL — SIGNIFICANT CHANGE UP (ref 80–100)
MCV RBC AUTO: 93.2 FL — SIGNIFICANT CHANGE UP (ref 80–100)
MONOCYTES NFR BLD AUTO: 11.4 % — SIGNIFICANT CHANGE UP (ref 2–14)
NEUTROPHILS NFR BLD AUTO: 65.6 % — SIGNIFICANT CHANGE UP (ref 43–77)
NITRITE UR-MCNC: NEGATIVE — SIGNIFICANT CHANGE UP
PH UR: 6 — SIGNIFICANT CHANGE UP (ref 5–8)
PLATELET # BLD AUTO: 73 K/UL — LOW (ref 150–400)
PLATELET # BLD AUTO: 87 K/UL — LOW (ref 150–400)
POTASSIUM SERPL-MCNC: 3.9 MMOL/L — SIGNIFICANT CHANGE UP (ref 3.5–5.3)
POTASSIUM SERPL-SCNC: 3.9 MMOL/L — SIGNIFICANT CHANGE UP (ref 3.5–5.3)
PROT UR-MCNC: 100 MG/DL
PT 50/50: 11.6 SECS — SIGNIFICANT CHANGE UP (ref 9.7–13.5)
RBC # BLD: 2.55 M/UL — LOW (ref 3.8–5.2)
RBC # BLD: 2.66 M/UL — LOW (ref 3.8–5.2)
RBC # FLD: 17.6 % — HIGH (ref 10.3–16.9)
RBC # FLD: 17.7 % — HIGH (ref 10.3–16.9)
RBC CASTS # UR COMP ASSIST: (no result) /HPF
SODIUM SERPL-SCNC: 138 MMOL/L — SIGNIFICANT CHANGE UP (ref 135–145)
SP GR SPEC: 1.02 — SIGNIFICANT CHANGE UP (ref 1–1.03)
THROMBIN TIME: 35.2 SEC — HIGH (ref 17.6–24)
UROBILINOGEN FLD QL: 0.2 E.U./DL — SIGNIFICANT CHANGE UP
WBC # BLD: 3.6 K/UL — LOW (ref 3.8–10.5)
WBC # BLD: 3.9 K/UL — SIGNIFICANT CHANGE UP (ref 3.8–10.5)
WBC # FLD AUTO: 3.6 K/UL — LOW (ref 3.8–10.5)
WBC # FLD AUTO: 3.9 K/UL — SIGNIFICANT CHANGE UP (ref 3.8–10.5)
WBC UR QL: (no result) /HPF

## 2017-06-19 PROCEDURE — 99233 SBSQ HOSP IP/OBS HIGH 50: CPT

## 2017-06-19 PROCEDURE — 71010: CPT | Mod: 26

## 2017-06-19 PROCEDURE — 99232 SBSQ HOSP IP/OBS MODERATE 35: CPT | Mod: GC

## 2017-06-19 PROCEDURE — 99233 SBSQ HOSP IP/OBS HIGH 50: CPT | Mod: GC

## 2017-06-19 RX ORDER — OXYCODONE HYDROCHLORIDE 5 MG/1
5 TABLET ORAL EVERY 4 HOURS
Qty: 0 | Refills: 0 | Status: DISCONTINUED | OUTPATIENT
Start: 2017-06-19 | End: 2017-06-21

## 2017-06-19 RX ORDER — FUROSEMIDE 40 MG
20 TABLET ORAL DAILY
Qty: 0 | Refills: 0 | Status: DISCONTINUED | OUTPATIENT
Start: 2017-06-19 | End: 2017-06-26

## 2017-06-19 RX ORDER — CEFTRIAXONE 500 MG/1
1 INJECTION, POWDER, FOR SOLUTION INTRAMUSCULAR; INTRAVENOUS EVERY 24 HOURS
Qty: 0 | Refills: 0 | Status: DISCONTINUED | OUTPATIENT
Start: 2017-06-19 | End: 2017-06-19

## 2017-06-19 RX ORDER — ASPIRIN/CALCIUM CARB/MAGNESIUM 324 MG
81 TABLET ORAL DAILY
Qty: 0 | Refills: 0 | Status: DISCONTINUED | OUTPATIENT
Start: 2017-06-19 | End: 2017-07-06

## 2017-06-19 RX ORDER — VANCOMYCIN HCL 1 G
750 VIAL (EA) INTRAVENOUS EVERY 24 HOURS
Qty: 0 | Refills: 0 | Status: DISCONTINUED | OUTPATIENT
Start: 2017-06-20 | End: 2017-06-21

## 2017-06-19 RX ORDER — APIXABAN 2.5 MG/1
2.5 TABLET, FILM COATED ORAL
Qty: 0 | Refills: 0 | Status: DISCONTINUED | OUTPATIENT
Start: 2017-06-19 | End: 2017-07-06

## 2017-06-19 RX ORDER — PIPERACILLIN AND TAZOBACTAM 4; .5 G/20ML; G/20ML
3.38 INJECTION, POWDER, LYOPHILIZED, FOR SOLUTION INTRAVENOUS EVERY 6 HOURS
Qty: 0 | Refills: 0 | Status: DISCONTINUED | OUTPATIENT
Start: 2017-06-19 | End: 2017-06-21

## 2017-06-19 RX ORDER — OXYCODONE HYDROCHLORIDE 5 MG/1
5 TABLET ORAL ONCE
Qty: 0 | Refills: 0 | Status: DISCONTINUED | OUTPATIENT
Start: 2017-06-19 | End: 2017-06-19

## 2017-06-19 RX ORDER — MEPERIDINE HYDROCHLORIDE 50 MG/ML
25 INJECTION INTRAMUSCULAR; INTRAVENOUS; SUBCUTANEOUS EVERY 6 HOURS
Qty: 0 | Refills: 0 | Status: DISCONTINUED | OUTPATIENT
Start: 2017-06-19 | End: 2017-06-26

## 2017-06-19 RX ADMIN — ENOXAPARIN SODIUM 50 MILLIGRAM(S): 100 INJECTION SUBCUTANEOUS at 10:14

## 2017-06-19 RX ADMIN — Medication 81 MILLIGRAM(S): at 18:13

## 2017-06-19 RX ADMIN — OXYCODONE HYDROCHLORIDE 5 MILLIGRAM(S): 5 TABLET ORAL at 11:42

## 2017-06-19 RX ADMIN — PIPERACILLIN AND TAZOBACTAM 200 GRAM(S): 4; .5 INJECTION, POWDER, LYOPHILIZED, FOR SOLUTION INTRAVENOUS at 20:34

## 2017-06-19 RX ADMIN — Medication 20 MILLIGRAM(S): at 15:11

## 2017-06-19 RX ADMIN — Medication 12.5 MILLIGRAM(S): at 18:13

## 2017-06-19 RX ADMIN — LIDOCAINE 1 PATCH: 4 CREAM TOPICAL at 13:04

## 2017-06-19 RX ADMIN — ATORVASTATIN CALCIUM 40 MILLIGRAM(S): 80 TABLET, FILM COATED ORAL at 22:35

## 2017-06-19 RX ADMIN — APIXABAN 2.5 MILLIGRAM(S): 2.5 TABLET, FILM COATED ORAL at 22:35

## 2017-06-19 RX ADMIN — OXYCODONE HYDROCHLORIDE 5 MILLIGRAM(S): 5 TABLET ORAL at 16:53

## 2017-06-19 RX ADMIN — Medication 150 MILLIGRAM(S): at 00:00

## 2017-06-19 RX ADMIN — PIPERACILLIN AND TAZOBACTAM 200 GRAM(S): 4; .5 INJECTION, POWDER, LYOPHILIZED, FOR SOLUTION INTRAVENOUS at 13:32

## 2017-06-19 RX ADMIN — PIPERACILLIN AND TAZOBACTAM 200 GRAM(S): 4; .5 INJECTION, POWDER, LYOPHILIZED, FOR SOLUTION INTRAVENOUS at 06:31

## 2017-06-19 RX ADMIN — OXYCODONE HYDROCHLORIDE 5 MILLIGRAM(S): 5 TABLET ORAL at 11:45

## 2017-06-19 RX ADMIN — Medication 10 MILLIGRAM(S): at 13:04

## 2017-06-19 NOTE — PROGRESS NOTE ADULT - ATTENDING COMMENTS
Pt remains comfortable on high flow NC but tachypnic at times with exertion. Still had fever to 103 last night and remains hemodynamically stable. ID f/u noted and cont broad spectrum abx. Cont care on regional floor.

## 2017-06-19 NOTE — PROGRESS NOTE ADULT - SUBJECTIVE AND OBJECTIVE BOX
Patient seen and examined at bedside. Denies pain, breathing better, not able to eat.      amiodarone    Tablet 200 daily  insulin lispro (HumaLOG) corrective regimen sliding scale  Before meals and at bedtime  atorvastatin 40 at bedtime  acetaminophen  Suppository 650 every 6 hours PRN  bisacodyl Suppository 10 daily  lidocaine   Patch 1 daily  metoprolol 12.5 two times a day  guaiFENesin    Syrup 200 every 6 hours PRN  enoxaparin Injectable 50 two times a day  meperidine     Injectable 25 every 6 hours PRN  oxyCODONE Solution 5 every 4 hours PRN  piperacillin/tazobactam IVPB. 3.375 every 6 hours      Allergies    IV CONtRAST (Flushing (Skin); Rash)  No Known Drug Allergies    Intolerances        T(C): , Max: 39.6 (06-18 @ 20:45)  T(F): , Max: 103.3 (06-18 @ 20:45)  HR: 101  BP: 122/69  BP(mean): 89  RR: 18  SpO2: 97%  Wt(kg): --  I & Os for 24h ending 06-19 @ 07:00  =============================================  IN:    heparin Infusion: 60 ml    Total IN: 60 ml  ---------------------------------------------  OUT:    Voided: 300 ml    Total OUT: 300 ml  ---------------------------------------------  Total NET: -240 ml    I & Os for current day (as of 06-19 @ 12:26)  =============================================  IN:    Total IN: 0 ml  ---------------------------------------------  OUT:    Voided: 150 ml    Total OUT: 150 ml  ---------------------------------------------  Total NET: -150 ml          LABS:                        7.6    3.6   )-----------( 73       ( 19 Jun 2017 06:21 )             23.6     06-19    138  |  104  |  24<H>  ----------------------------<  87  3.9   |  23  |  0.80    Ca    8.1<L>      19 Jun 2017 06:21  Mg     2.5     06-18        PTT - ( 18 Jun 2017 02:47 )  PTT:70.2 sec          RADIOLOGY & ADDITIONAL STUDIES:          Breathing assist, not much pain, reactive to questions,

## 2017-06-19 NOTE — PROGRESS NOTE ADULT - PROBLEM SELECTOR PROBLEM 6
Malignant neoplasm of female breast, unspecified laterality, unspecified site of breast Diffuse large B-cell lymphoma, unspecified body region

## 2017-06-19 NOTE — PROGRESS NOTE ADULT - SUBJECTIVE AND OBJECTIVE BOX
Chief Complaint/Reason for Consult: cv mgmt  INTERVAL HPI: weekend events noted for improved respiratory status  	  MEDICATIONS:  amiodarone    Tablet 200milliGRAM(s) Oral daily  metoprolol 12.5milliGRAM(s) Oral two times a day    piperacillin/tazobactam IVPB. 3.375Gram(s) IV Intermittent every 6 hours    guaiFENesin    Syrup 200milliGRAM(s) Oral every 6 hours PRN    acetaminophen  Suppository 650milliGRAM(s) Rectal every 6 hours PRN  meperidine     Injectable 25milliGRAM(s) IV Push every 6 hours PRN  oxyCODONE Solution 5milliGRAM(s) Oral every 4 hours PRN    bisacodyl Suppository 10milliGRAM(s) Rectal daily    insulin lispro (HumaLOG) corrective regimen sliding scale  SubCutaneous Before meals and at bedtime  atorvastatin 40milliGRAM(s) Oral at bedtime    lidocaine   Patch 1Patch Transdermal daily  enoxaparin Injectable 50milliGRAM(s) SubCutaneous two times a day      REVIEW OF SYSTEMS:  [x] As per HPI  CONSTITUTIONAL: No fever, weight loss, or fatigue  RESPIRATORY: No cough, wheezing, chills or hemoptysis; No Shortness of Breath  CARDIOVASCULAR: No chest pain, palpitations, dizziness, or leg swelling  GASTROINTESTINAL: No abdominal or epigastric pain. No nausea, vomiting, or hematemesis; No diarrhea or constipation. No melena or hematochezia.  MUSCULOSKELETAL: No joint pain or swelling; No muscle, back, or extremity pain  [x] All others negative	  [ ] Unable to obtain    PHYSICAL EXAM:  T(C): 37, Max: 39.6 (06-18 @ 20:45)  HR: 92 (77 - 105)  BP: 113/58 (99/57 - 141/60)  RR: 18 (14 - 23)  SpO2: 100% (96% - 100%)  Wt(kg): --  I&O's Summary  I & Os for 24h ending 19 Jun 2017 07:00  =============================================  IN: 60 ml / OUT: 300 ml / NET: -240 ml    I & Os for current day (as of 19 Jun 2017 13:54)  =============================================  IN: 0 ml / OUT: 150 ml / NET: -150 ml        Appearance: Normal	  HEENT:   Normal oral mucosa  Cardiovascular: Normal S1 S2, No JVD, No murmurs, No edema  Respiratory: Lungs clear to auscultation	  Gastrointestinal:  Soft, Non-tender, + BS	  Extremities: Normal range of motion, No clubbing, cyanosis or edema  Vascular: Peripheral pulses palpable 2+ bilaterally    TELEMETRY: 	    ECG:    	  RADIOLOGY:   CXR:  CT:  US:    CARDIAC TESTING:  Echocardiogram:  Catheterization:  Stress Test:      LABS:	 	    CARDIAC MARKERS:                                  7.9    3.9   )-----------( 87       ( 19 Jun 2017 12:43 )             24.8     06-19    138  |  104  |  24<H>  ----------------------------<  87  3.9   |  23  |  0.80    Ca    8.1<L>      19 Jun 2017 06:21  Mg     2.5     06-18      proBNP:   Lipid Profile:   HgA1c:   TSH:     ASSESSMENT/PLAN: 	  #HFpEF: last echo in 5/2017 showing EF 60-65%. On admission, patient in respiratory distress with exam significant for crackles and LE edema. CXR significant for pulmonary congestion. Likely acute CHF exacerbation due to sepsis. Initially not diuresed given sepsis, but overnight received Lasix 40mg IVP x1 with transfusion. BPs stable.   - continue IV lasix per Renal recs  - keep euvolemic to net negative    #Afib: s/p PPM, on Eliquis at home. Initially started on hep gtt, but overnight concern for bleeding due to 2U Hgb drop so AC discontinued    - c/w home Amiodarone 200mg QD   - will consider AC when Hgb stabilized and patient tolerating PO    #CAD: s/p CABG. Mild troponemia on presentation, peaked at 0.07. Patient without chest pain. EKG without ischemic changes. Likely demand ischemia due to CHF exacerbation.   - c/w home Lipitor 80mg QHS  - holding ASA in the setting of possible bleed

## 2017-06-19 NOTE — PROGRESS NOTE ADULT - PROBLEM SELECTOR PROBLEM 5
Diffuse large B-cell lymphoma, unspecified body region Coronary artery disease involving native coronary artery of native heart without angina pectoris

## 2017-06-19 NOTE — PROGRESS NOTE ADULT - PROBLEM SELECTOR PLAN 4
Patient s/p CABG. Patient without chest pain. EKG without ischemic changes. EF EF 60-65%.   - c/w home Lipitor 40mg QHS  - Holding ASA   - c/w Metoprolol 12.5 BID, may need to uptitrate   - Medically optimize cardiac function if BP allows   - Consider restarting standing Lasix , currently only getting as PRN if pt is overloaded on am CXR  - Continue to monitor I's and O's s/p PPM, on Eliquis at home. Decreased CrCl. Concern for pacemaker involvement, however gallium scan negative   - S/p Lovenox 50mg BID in setting of malignancy   - c/w home Amiodarone 200mg QD   - AC switched from Lovenox to Eliquis (2.5mg since patient age >80yrs and weight <60km)

## 2017-06-19 NOTE — PROGRESS NOTE ADULT - PROBLEM SELECTOR PLAN 6
On Arimidex 1mg QD at home   - Dr. Galaviz following, appreciate recs Last chemo with Bendamustin on 5/23/17. Followed outpatient by Dr. Galaviz- in remission as per her .  - Dr. Galaviz following, appreciate recs

## 2017-06-19 NOTE — PROGRESS NOTE ADULT - PROBLEM SELECTOR PLAN 2
Requiring BIPAP 2/2 pulmonary congestion/edema likely due to CHF exacerbation in the setting of urosepsis and line sepsis. Doing well w/ intermittent nasal cannula throughout the day. Lung exam with improvement   - c/w supplemental O2 as needed, hiflow, nasal cannula, wean as tolerated  - Oxycodone 5mg solution q6 for air hunger, increased work of breathing, comfort, and LBP   - C/w antibiotics as above Requiring BIPAP 2/2 pulmonary congestion/edema likely due to CHF exacerbation in the setting of urosepsis and line sepsis. Doing well w/ intermittent nasal cannula throughout the day. Lung exam with improvement   - c/w supplemental O2 as needed, hiflow nasal cannula, wean as tolerated  - Oxycodone 5mg solution q6 for air hunger, increased work of breathing, comfort, and LBP   - C/w antibiotics as above

## 2017-06-19 NOTE — PROGRESS NOTE ADULT - PROBLEM SELECTOR PROBLEM 4
Coronary artery disease involving native coronary artery of native heart without angina pectoris Paroxysmal atrial fibrillation

## 2017-06-19 NOTE — PROGRESS NOTE ADULT - PROBLEM SELECTOR PLAN 7
Drop in Hg by 2 points on 6/11, s/p 1 unit of PRBC. Uric acid wnl (does not suggest tumor lysis). CT A/P negative for RP bleed.   - Will continue to monitor    Also, patient received 2 units of platelets on 6/12 for warfarin reversal for INR of 1.5-1.8 on 6/12. On Arimidex 1mg QD at home   - Dr. Galaviz following, appreciate recs

## 2017-06-19 NOTE — PROGRESS NOTE ADULT - SUBJECTIVE AND OBJECTIVE BOX
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 Patient started on heparin gtt for atrial fibrillation, and diuresed for pulmonary edema which improved. Zosyn started for urosepsis, E. Coli bacteremia. Despite being on adequate dosing of abx, patient persistently bacteremic, having temp > 103-104, with severe rigors. Chemoport removed by IR on  and line positive for gram negative rods. Pacemaker thought to be infected as well, however gallium scan () done which was negative. Patient still spiking fevers w/rigors, disorientation, but otherwise HD stable with baseline SBP high 80- low 90's. Respiratory status stable on BIPAP during the nights, and nasal cannula/aerosol mask throughout the day. Palliative involved in patients care as she was then made DNR/DNI. Patient was stepped down to [facility]ach on  and abx were deescalated to Ceftriaxone 1g q24 per ID. Patient's was transitioned to HFNC and respiratory status was stable. For mild tachypnea and air hunger, patient was started on Oxycodone solution per Palliative. On, , overnight pt spiked to 103 rectally, blood cx drawn, CXR unchanged from am, UA bland. Per ID, abx broadened to Vanc/Zosyn. Patient afebrile today, saturating well on NC.   Blood cx (6/15) NGTD  Blood cx () NGTD       INTERVAL HPI/OVERNIGHT EVENTS:    VITAL SIGNS:  T(F): 98.6  HR: 92  BP: 113/58  RR: 18  SpO2: 100%  Wt(kg): --    PHYSICAL EXAM:  Constitutional:  Eyes:  ENMT:  Neck:  Respiratory:  Cardiovascular:  Gastrointestinal:  Extremities:  Vascular:  Neurological:  Musculoskeletal:    MEDICATIONS  (STANDING):  amiodarone    Tablet 200milliGRAM(s) Oral daily  insulin lispro (HumaLOG) corrective regimen sliding scale  SubCutaneous Before meals and at bedtime  atorvastatin 40milliGRAM(s) Oral at bedtime  bisacodyl Suppository 10milliGRAM(s) Rectal daily  lidocaine   Patch 1Patch Transdermal daily  metoprolol 12.5milliGRAM(s) Oral two times a day  enoxaparin Injectable 50milliGRAM(s) SubCutaneous two times a day  piperacillin/tazobactam IVPB. 3.375Gram(s) IV Intermittent every 6 hours    MEDICATIONS  (PRN):  acetaminophen  Suppository 650milliGRAM(s) Rectal every 6 hours PRN For Temp greater than 38 C (100.4 F)  guaiFENesin    Syrup 200milliGRAM(s) Oral every 6 hours PRN Cough  meperidine     Injectable 25milliGRAM(s) IV Push every 6 hours PRN Rigors  oxyCODONE Solution 5milliGRAM(s) Oral every 4 hours PRN Dyspnea / Air Hunger / RR>25 / Excessive work of breathing.    Allergies  IV CONtRAST (Flushing (Skin); Rash)  No Known Drug Allergies    LABS:                        7.9    3.9   )-----------( 87       ( 2017 12:43 )             24.8     06-19    138  |  104  |  24<H>  ----------------------------<  87  3.9   |  23  |  0.80    Ca    8.1<L>      2017 06:21  Mg     2.5     06-18    PTT - ( 2017 02:47 )  PTT:70.2 sec  Urinalysis Basic - ( 2017 12:28 )    Color: Yellow / Appearance: Clear / S.020 / pH: x  Gluc: x / Ketone: NEGATIVE  / Bili: NEGATIVE / Urobili: 0.2 E.U./dL   Blood: x / Protein: 100 mg/dL / Nitrite: NEGATIVE   Leuk Esterase: NEGATIVE / RBC: 5-10 /HPF / WBC 5-10 /HPF   Sq Epi: x / Non Sq Epi: Rare /HPF / Bacteria: Present /HPF PGY-1 ACCEPTANCE OF CARE NOTE  Hospital course    Patient is a 85yo F with PMHx of CAD s/p CABG, Afib s/p PPM (on Eliquis), breast cancer, 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 Patient started on heparin gtt for atrial fibrillation, and diuresed for pulmonary edema which improved. Zosyn started for urosepsis, E. Coli bacteremia. Despite being on adequate dosing of abx, patient persistently bacteremic, having temp > 103-104, with severe rigors. Chemoport removed by IR on  and line positive for gram negative rods. Pacemaker thought to be infected as well, however gallium scan () done which was negative. Patient still spiking fevers w/rigors, disorientation, but otherwise HD stable with baseline SBP high 80- low 90's. Palliative involved in patients care as she was then made DNR/DNI. Patient was stepped down to [facility] on  and abx were deescalated to Ceftriaxone 1g q24 per ID. Patient's was transitioned from BiPAP to HFNC and respiratory status was stable. For mild tachypnea and air hunger, patient was started on Oxycodone solution per Palliative. On, , overnight pt spiked to 103 rectally, blood cx drawn, CXR unchanged from am, UA bland. Per ID, abx broadened to Vanc/Zosyn. Patient afebrile today, saturating well on HFNC, can now continue her care in the [facility].  Blood cx (6/15) NGTD  Blood cx () NGTD     INTERVAL HPI EVENTS:  - S: patient states feeling well, denies any urinary symptoms, chest pain and SOB.    VITAL SIGNS:  T(F): 98.6  HR: 92  BP: 113/58  RR: 18  SpO2: 100%  Wt(kg): --    PHYSICAL EXAM:  Constitutional: patient laying down comfortable using HFNC in no distress  Eyes: EOMI  ENMT: slightly dry MM  Respiratory: fine bibasilar crackles  Cardiovascular: irregularly irregular, no M/R/G  Gastrointestinal: normoactive bowel sounds, abdomen soft, NTND  Extremities: symmetric, no LE edema, however there is trace pitting edema in the dependent areas (upper thighs)  Vascular: + DP and radial pulses  Neurological: A&Ox2-3 (knew self and place, however, did not know year and thought that Rahul was the past president)  Musculoskeletal: no joint swelling    MEDICATIONS  (STANDING):  amiodarone    Tablet 200milliGRAM(s) Oral daily  insulin lispro (HumaLOG) corrective regimen sliding scale  SubCutaneous Before meals and at bedtime  atorvastatin 40milliGRAM(s) Oral at bedtime  bisacodyl Suppository 10milliGRAM(s) Rectal daily  lidocaine   Patch 1Patch Transdermal daily  metoprolol 12.5milliGRAM(s) Oral two times a day  enoxaparin Injectable 50milliGRAM(s) SubCutaneous two times a day  piperacillin/tazobactam IVPB. 3.375Gram(s) IV Intermittent every 6 hours    MEDICATIONS  (PRN):  acetaminophen  Suppository 650milliGRAM(s) Rectal every 6 hours PRN For Temp greater than 38 C (100.4 F)  guaiFENesin    Syrup 200milliGRAM(s) Oral every 6 hours PRN Cough  meperidine     Injectable 25milliGRAM(s) IV Push every 6 hours PRN Rigors  oxyCODONE Solution 5milliGRAM(s) Oral every 4 hours PRN Dyspnea / Air Hunger / RR>25 / Excessive work of breathing.    Allergies  IV CONtRAST (Flushing (Skin); Rash)  No Known Drug Allergies    LABS:                        7.9    3.9   )-----------( 87       ( 2017 12:43 )             24.8     -    138  |  104  |  24<H>  ----------------------------<  87  3.9   |  23  |  0.80    Ca    8.1<L>      2017 06:21  Mg     2.5     -    PTT - ( 2017 02:47 )  PTT:70.2 sec  Urinalysis Basic - ( 2017 12:28 )    Color: Yellow / Appearance: Clear / S.020 / pH: x  Gluc: x / Ketone: NEGATIVE  / Bili: NEGATIVE / Urobili: 0.2 E.U./dL   Blood: x / Protein: 100 mg/dL / Nitrite: NEGATIVE   Leuk Esterase: NEGATIVE / RBC: 5-10 /HPF / WBC 5-10 /HPF   Sq Epi: x / Non Sq Epi: Rare /HPF / Bacteria: Present /HPF

## 2017-06-19 NOTE — PROGRESS NOTE ADULT - PROBLEM SELECTOR PLAN 2
Continue Lidoderm. Incidental worsening of pain during transfers and position changes. Recommend use of above opioid PRN regimen to medicate prior to transfers.

## 2017-06-19 NOTE — PROGRESS NOTE ADULT - SUBJECTIVE AND OBJECTIVE BOX
PGY-1 TRANSFER NOTE  Hospital Course:     INTERVAL HPI/OVERNIGHT EVENTS:    SUBJECTIVE: Patient seen and examined at bedside.    OBJECTIVE:    VITAL SIGNS:  ICU Vital Signs Last 24 Hrs  T(C): 36.3, Max: 39.6 (06-18 @ 20:45)  T(F): 97.4, Max: 103.3 (06-18 @ 20:45)  HR: 101 (77 - 105)  BP: 122/69 (99/57 - 141/60)  BP(mean): 89 (72 - 89)  ABP: --  ABP(mean): --  RR: 18 (14 - 23)  SpO2: 97% (96% - 100%)      I & Os for 24h ending 06-19 @ 07:00  =============================================  IN: 60 ml / OUT: 300 ml / NET: -240 ml    I & Os for current day (as of 06-19 @ 11:28)  =============================================  IN: 0 ml / OUT: 150 ml / NET: -150 ml    CAPILLARY BLOOD GLUCOSE  90 (19 Jun 2017 11:05)      PHYSICAL EXAM:    General: NAD  HEENT: NC/AT; PERRL, clear conjunctiva  Neck: supple  Respiratory: CTA b/l  Cardiovascular: +S1/S2; RRR  Abdomen: soft, NT/ND; +BS x4  Extremities: WWP, 2+ peripheral pulses b/l; no LE edema  Skin: normal color and turgor; no rash  Neurological:     MEDICATIONS:  MEDICATIONS  (STANDING):  amiodarone    Tablet 200milliGRAM(s) Oral daily  insulin lispro (HumaLOG) corrective regimen sliding scale  SubCutaneous Before meals and at bedtime  atorvastatin 40milliGRAM(s) Oral at bedtime  bisacodyl Suppository 10milliGRAM(s) Rectal daily  lidocaine   Patch 1Patch Transdermal daily  metoprolol 12.5milliGRAM(s) Oral two times a day  enoxaparin Injectable 50milliGRAM(s) SubCutaneous two times a day  cefTRIAXone   IVPB 1Gram(s) IV Intermittent every 24 hours    MEDICATIONS  (PRN):  acetaminophen  Suppository 650milliGRAM(s) Rectal every 6 hours PRN For Temp greater than 38 C (100.4 F)  guaiFENesin    Syrup 200milliGRAM(s) Oral every 6 hours PRN Cough  meperidine     Injectable 25milliGRAM(s) IV Push every 6 hours PRN Rigors  oxyCODONE Solution 5milliGRAM(s) Oral every 4 hours PRN Dyspnea / Air Hunger / RR>25 / Excessive work of breathing.      ALLERGIES:  Allergies    IV CONtRAST (Flushing (Skin); Rash)  No Known Drug Allergies    Intolerances        LABS:                        7.6    3.6   )-----------( 73       ( 19 Jun 2017 06:21 )             23.6     06-19    138  |  104  |  24<H>  ----------------------------<  87  3.9   |  23  |  0.80    Ca    8.1<L>      19 Jun 2017 06:21  Mg     2.5     06-18      PTT - ( 18 Jun 2017 02:47 )  PTT:70.2 sec      RADIOLOGY & ADDITIONAL TESTS: Reviewed.    ASSESSMENT:     PLAN:    FEN - no IVF; replete lytes prn; NPO    PPx - HSQ    CODE - FULL.    DISPO - continue telemetry monitoring in ICU-step down level of care on 7laman. 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 Patient started on heparin gtt for atrial fibrillation, and diuresed for pulmonary edema which improved. Zosyn started for urosepsis, E. Coli bacteremia. Despite being on adequete dosing of abx, patient persistently bacteremia, having temp > 103-104, with severe rigors. Chemoport removed by IR on 6/12 and line positive for gram negative rods. Pacemaker thought to be infected as well, however gallium scan (6/16) done which was negative. Patient still spiking fevers w/rigors, disorientation, but otherwise HD stable with baseline SBP high 80- low 90's. Respiratory status stable on BIPAP overnight, nasal cannula/ aerosol mask throughout the day. Palliative involved in patients care as she is DNR/DNI. Patient was stepped down to 7[facility] on 6/16 and abx were deescalated to Ceftriaxone 1g q24 per ID. Patient's was transitioned to HF and respiratory status was stable. For mild tachypnea and air hunger, patient was started on Oxycodone solution per Palliative. On, 6/18, overnight pt spiked to 103 rectally, blood cx drawn, CXR unchanged from am, UA unable to collect due to incontinence. Per ID, abx broadened to Vanc/Zosyn. Patient afebrile today, saturating well on NC.   Blood cx (6/15) NGTD  Blood cx (6/18) NGTD     INTERVAL HPI/OVERNIGHT EVENTS:    SUBJECTIVE: Patient seen and examined at bedside.    OBJECTIVE:    VITAL SIGNS:  ICU Vital Signs Last 24 Hrs  T(C): 36.3, Max: 39.6 (06-18 @ 20:45)  T(F): 97.4, Max: 103.3 (06-18 @ 20:45)  HR: 101 (77 - 105)  BP: 122/69 (99/57 - 141/60)  BP(mean): 89 (72 - 89)  ABP: --  ABP(mean): --  RR: 18 (14 - 23)  SpO2: 97% (96% - 100%)      I & Os for 24h ending 06-19 @ 07:00  =============================================  IN: 60 ml / OUT: 300 ml / NET: -240 ml    I & Os for current day (as of 06-19 @ 11:28)  =============================================  IN: 0 ml / OUT: 150 ml / NET: -150 ml    CAPILLARY BLOOD GLUCOSE  90 (19 Jun 2017 11:05)      PHYSICAL EXAM:    General: NAD  HEENT: NC/AT; PERRL, clear conjunctiva  Neck: supple  Respiratory: CTA b/l  Cardiovascular: +S1/S2; RRR  Abdomen: soft, NT/ND; +BS x4  Extremities: WWP, 2+ peripheral pulses b/l; no LE edema  Skin: normal color and turgor; no rash  Neurological:     MEDICATIONS:  MEDICATIONS  (STANDING):  amiodarone    Tablet 200milliGRAM(s) Oral daily  insulin lispro (HumaLOG) corrective regimen sliding scale  SubCutaneous Before meals and at bedtime  atorvastatin 40milliGRAM(s) Oral at bedtime  bisacodyl Suppository 10milliGRAM(s) Rectal daily  lidocaine   Patch 1Patch Transdermal daily  metoprolol 12.5milliGRAM(s) Oral two times a day  enoxaparin Injectable 50milliGRAM(s) SubCutaneous two times a day  cefTRIAXone   IVPB 1Gram(s) IV Intermittent every 24 hours    MEDICATIONS  (PRN):  acetaminophen  Suppository 650milliGRAM(s) Rectal every 6 hours PRN For Temp greater than 38 C (100.4 F)  guaiFENesin    Syrup 200milliGRAM(s) Oral every 6 hours PRN Cough  meperidine     Injectable 25milliGRAM(s) IV Push every 6 hours PRN Rigors  oxyCODONE Solution 5milliGRAM(s) Oral every 4 hours PRN Dyspnea / Air Hunger / RR>25 / Excessive work of breathing.      ALLERGIES:  Allergies    IV CONtRAST (Flushing (Skin); Rash)  No Known Drug Allergies    Intolerances        LABS:                        7.6    3.6   )-----------( 73       ( 19 Jun 2017 06:21 )             23.6     06-19    138  |  104  |  24<H>  ----------------------------<  87  3.9   |  23  |  0.80    Ca    8.1<L>      19 Jun 2017 06:21  Mg     2.5     06-18      PTT - ( 18 Jun 2017 02:47 )  PTT:70.2 sec      RADIOLOGY & ADDITIONAL TESTS: Reviewed.    ASSESSMENT:     PLAN:    FEN - no IVF; replete lytes prn; NPO    PPx - HSQ    CODE - FULL.    DISPO - continue telemetry monitoring in ICU-step down level of care on 7lachman. 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 Patient started on heparin gtt for atrial fibrillation, and diuresed for pulmonary edema which improved. Zosyn started for urosepsis, E. Coli bacteremia. Despite being on adequete dosing of abx, patient persistently bacteremia, having temp > 103-104, with severe rigors. Chemoport removed by IR on 6/12 and line positive for gram negative rods. Pacemaker thought to be infected as well, however gallium scan (6/16) done which was negative. Patient still spiking fevers w/rigors, disorientation, but otherwise HD stable with baseline SBP high 80- low 90's. Respiratory status stable on BIPAP overnight, nasal cannula/ aerosol mask throughout the day. Palliative involved in patients care as she is DNR/DNI. Patient was stepped down to 7[facility] on 6/16 and abx were deescalated to Ceftriaxone 1g q24 per ID. Patient's was transitioned to HF and respiratory status was stable. For mild tachypnea and air hunger, patient was started on Oxycodone solution per Palliative. On, 6/18, overnight pt spiked to 103 rectally, blood cx drawn, CXR unchanged from am, UA (6/19) negative. Per ID, abx broadened to Vanc/Zosyn. Patient afebrile today, saturating well on HFNC.     Blood cx (6/15) NGTD  Blood cx (6/18) NGTD     INTERVAL HPI/OVERNIGHT EVENTS: As above     SUBJECTIVE: Patient seen and examined at bedside. Patient on BIPAP, breathing comfortably. She reports being hungry and wanting to eat. Denies fevers/chills, nvd, abd pain, chest pain, shortness of breath.     OBJECTIVE:    VITAL SIGNS:  ICU Vital Signs Last 24 Hrs  T(C): 36.3, Max: 39.6 (06-18 @ 20:45)  T(F): 97.4, Max: 103.3 (06-18 @ 20:45)  HR: 101 (77 - 105)  BP: 122/69 (99/57 - 141/60)  BP(mean): 89 (72 - 89)  ABP: --  ABP(mean): --  RR: 18 (14 - 23)  SpO2: 97% (96% - 100%)      I & Os for 24h ending 06-19 @ 07:00  =============================================  IN: 60 ml / OUT: 300 ml / NET: -240 ml    I & Os for current day (as of 06-19 @ 11:28)  =============================================  IN: 0 ml / OUT: 150 ml / NET: -150 ml    CAPILLARY BLOOD GLUCOSE  90 (19 Jun 2017 11:05)      PHYSICAL EXAM:    General: elderly, frail, NAD, on BIPAP   HEENT: NC/AT; PERRL, clear conjunctiva  Neck: supple  Respiratory: fine crackles at bases  Cardiovascular: +S1/S2; RRR  Abdomen: soft, NTND +BS x4  Extremities: WWP, 2+ peripheral pulses b/l; no LE edema  Skin: normal color and turgor; no rash  Neurological: A&O x3, no focal deficits     MEDICATIONS:  MEDICATIONS  (STANDING):  amiodarone    Tablet 200milliGRAM(s) Oral daily  insulin lispro (HumaLOG) corrective regimen sliding scale  SubCutaneous Before meals and at bedtime  atorvastatin 40milliGRAM(s) Oral at bedtime  bisacodyl Suppository 10milliGRAM(s) Rectal daily  lidocaine   Patch 1Patch Transdermal daily  metoprolol 12.5milliGRAM(s) Oral two times a day  enoxaparin Injectable 50milliGRAM(s) SubCutaneous two times a day  cefTRIAXone   IVPB 1Gram(s) IV Intermittent every 24 hours    MEDICATIONS  (PRN):  acetaminophen  Suppository 650milliGRAM(s) Rectal every 6 hours PRN For Temp greater than 38 C (100.4 F)  guaiFENesin    Syrup 200milliGRAM(s) Oral every 6 hours PRN Cough  meperidine     Injectable 25milliGRAM(s) IV Push every 6 hours PRN Rigors  oxyCODONE Solution 5milliGRAM(s) Oral every 4 hours PRN Dyspnea / Air Hunger / RR>25 / Excessive work of breathing.      ALLERGIES:  Allergies    IV CONtRAST (Flushing (Skin); Rash)  No Known Drug Allergies    Intolerances        LABS:                        7.6    3.6   )-----------( 73       ( 19 Jun 2017 06:21 )             23.6     06-19    138  |  104  |  24<H>  ----------------------------<  87  3.9   |  23  |  0.80    Ca    8.1<L>      19 Jun 2017 06:21  Mg     2.5     06-18      PTT - ( 18 Jun 2017 02:47 )  PTT:70.2 sec      RADIOLOGY & ADDITIONAL TESTS: Reviewed.    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   - ID consulted, patient was on Ceftriaxone 1g for total 10 days post-line removal, 6/13-6/18, but then was febrile to 103 and abx were broadened to Vanc/Zosyn.  - C/w Vanc 750mg daily (6/18- )   - C/w Zosyn 3.375mg daily (6/18- )   - f/u up with ID on duration of Abx   - Blood Cx (6/18) NGTD   - NM Gallium Scan (6/16)- negative scan. pacemaker clean. Consider repeat scan as pt febrile again last night to 103   - c/w Tylenol PRN, Demerol PRN can be ordered for rigors   - Repeat renal US- no changes     #UTI: urine cx w/ e.coli    - Repeat UA (6/18)- negative     CV:  #HFpEF: EF 60-65%. Currently net negative   - c/w Metoprolol 12.5 BID, may need to uptitrate   - continue to monitor I/Os   - will continue to monitor daily CXRs  - medically optimize cardiac function if BP allows   - Consider restarting Standing Lasix , currently only getting as PRN if pt is overloaded on am CXR     #Afib: s/p PPM, on Eliquis at home. Decreased CrCl. Concern for pacemaker involvement, however gallium scan negative   - C/w Lovenox 50mg BID in setting of malignancy   - c/w home Amiodarone 200mg QD     #CAD: s/p CABG. Patient without chest pain. EKG without ischemic changes.   - c/w home Lipitor 40mg QHS  - holding ASA     PULM:  #Acute hypoxic respiratory failure: requiring BIPAP 2/2 pulmonary congestion/edema likely due to CHF exacerbation in the setting of urosepsis and line sepsis. Doing well w/ intermittent nasal cannula throughout the day. Lung exam with improvement   - c/w supplemental O2 as needed, hiflow, nasal cannula, wean as tolerated  - Oxycodone 5mg solution q6 for air hunger, increased work of breathing, comfort, and LBP   - c/w antibiotics as above     #Pulmonary edema: Resolving. plan as above, c/w HF  - C/w BIPAP/HF, deescalate to NC as needed   - consider IV Lasix if overloaded     RENAL:  #ROMEO: Cr. back to baseline    HEME/ONC:  #Large Diffuse B Cell Lymphoma: last chemo with Bendamustin on 5/23/17. Followed outpatient by Dr. Galaviz- in remission as per her .  - Dr. Galaviz following, appreciate recs  - palliative care consult     #Breast cancer: on Arimidex 1mg QD at home   - Dr. Galaviz following, appreciate recs     #Anemia: Uric acid wnl (does not suggest tumor lysis). CT A/P negative for RP bleed. Transfused 1U pRBC with appropriate response.  - will continue to monitor     PPx: SCDs, Lovenox  FEN: monitor lytes, dysphagia diet, no IVF  Dispo: Transfer to [facility] under [facility]     DNR/DNI, limited medical interventions per MOLST  Palliative following: Currently patient's family is accepting of TRAVIS near their [place] home when she is weaned off HFNC, Oxycodone solution has been prescribed to help with tachypnea and air hunger. 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 Patient started on heparin gtt for atrial fibrillation, and diuresed for pulmonary edema which improved. Zosyn started for urosepsis, E. Coli bacteremia. Despite being on adequete dosing of abx, patient persistently bacteremia, having temp > 103-104, with severe rigors. Chemoport removed by IR on 6/12 and line positive for gram negative rods. Pacemaker thought to be infected as well, however gallium scan (6/16) done which was negative. Patient still spiking fevers w/rigors, disorientation, but otherwise HD stable with baseline SBP high 80- low 90's. Respiratory status stable on BIPAP overnight, nasal cannula/ aerosol mask throughout the day. Palliative involved in patients care as she is DNR/DNI. Patient was stepped down to 7[facility]ach on 6/16 and abx were deescalated to Ceftriaxone 1g q24 per ID. Patient's was transitioned to HF and respiratory status was stable. For mild tachypnea and air hunger, patient was started on Oxycodone solution per Palliative. On, 6/18, overnight pt spiked to 103 rectally, blood cx drawn, CXR unchanged from am, UA (6/19) negative. Per ID, abx broadened to Vanc/Zosyn. Patient afebrile today, saturating well on HFNC.     Blood cx (6/15) NGTD  Blood cx (6/18) NGTD     INTERVAL HPI/OVERNIGHT EVENTS: As above     SUBJECTIVE: Patient seen and examined at bedside. Patient on BIPAP, breathing comfortably. She reports being hungry and wanting to eat. Denies fevers/chills, nvd, abd pain, chest pain, shortness of breath.     OBJECTIVE:    VITAL SIGNS:  ICU Vital Signs Last 24 Hrs  T(C): 36.3, Max: 39.6 (06-18 @ 20:45)  T(F): 97.4, Max: 103.3 (06-18 @ 20:45)  HR: 101 (77 - 105)  BP: 122/69 (99/57 - 141/60)  BP(mean): 89 (72 - 89)  ABP: --  ABP(mean): --  RR: 18 (14 - 23)  SpO2: 97% (96% - 100%)      I & Os for 24h ending 06-19 @ 07:00  =============================================  IN: 60 ml / OUT: 300 ml / NET: -240 ml    I & Os for current day (as of 06-19 @ 11:28)  =============================================  IN: 0 ml / OUT: 150 ml / NET: -150 ml    CAPILLARY BLOOD GLUCOSE  90 (19 Jun 2017 11:05)      PHYSICAL EXAM:    General: elderly, frail, NAD, on BIPAP   HEENT: NC/AT; PERRL, clear conjunctiva  Neck: supple  Respiratory: fine crackles at bases  Cardiovascular: +S1/S2; RRR  Abdomen: soft, NTND +BS x4  Extremities: WWP, 2+ peripheral pulses b/l; no LE edema  Skin: normal color and turgor; no rash  Neurological: A&O x3, no focal deficits     MEDICATIONS:  MEDICATIONS  (STANDING):  amiodarone    Tablet 200milliGRAM(s) Oral daily  insulin lispro (HumaLOG) corrective regimen sliding scale  SubCutaneous Before meals and at bedtime  atorvastatin 40milliGRAM(s) Oral at bedtime  bisacodyl Suppository 10milliGRAM(s) Rectal daily  lidocaine   Patch 1Patch Transdermal daily  metoprolol 12.5milliGRAM(s) Oral two times a day  enoxaparin Injectable 50milliGRAM(s) SubCutaneous two times a day  cefTRIAXone   IVPB 1Gram(s) IV Intermittent every 24 hours    MEDICATIONS  (PRN):  acetaminophen  Suppository 650milliGRAM(s) Rectal every 6 hours PRN For Temp greater than 38 C (100.4 F)  guaiFENesin    Syrup 200milliGRAM(s) Oral every 6 hours PRN Cough  meperidine     Injectable 25milliGRAM(s) IV Push every 6 hours PRN Rigors  oxyCODONE Solution 5milliGRAM(s) Oral every 4 hours PRN Dyspnea / Air Hunger / RR>25 / Excessive work of breathing.      ALLERGIES:  Allergies    IV CONtRAST (Flushing (Skin); Rash)  No Known Drug Allergies    Intolerances        LABS:                        7.6    3.6   )-----------( 73       ( 19 Jun 2017 06:21 )             23.6     06-19    138  |  104  |  24<H>  ----------------------------<  87  3.9   |  23  |  0.80    Ca    8.1<L>      19 Jun 2017 06:21  Mg     2.5     06-18      PTT - ( 18 Jun 2017 02:47 )  PTT:70.2 sec      RADIOLOGY & ADDITIONAL TESTS: Reviewed.    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   - ID consulted, patient was on Ceftriaxone 1g for total 10 days post-line removal, 6/13-6/18, but then was febrile to 103 and abx were broadened to Vanc/Zosyn.  - C/w Vanc 750mg daily (6/18- )   - C/w Zosyn 3.375mg daily (6/18- )   - f/u up with ID on duration of Abx   - Blood Cx (6/18) NGTD   - NM Gallium Scan (6/16)- negative scan. pacemaker clean. Consider calling Dr. Walker for repeat scan as pt febrile again last night to 103   - c/w Tylenol PRN, Demerol PRN can be ordered for rigors   - Repeat renal US- no changes     #UTI: urine cx w/ e.coli    - Repeat UA (6/18)- negative     CV:  #HFpEF: EF 60-65%. Currently net negative   - c/w Metoprolol 12.5 BID, may need to uptitrate   - continue to monitor I/Os   - will continue to monitor daily CXRs  - medically optimize cardiac function if BP allows   - Consider restarting Standing Lasix , currently only getting as PRN if pt is overloaded on am CXR     #Afib: s/p PPM, on Eliquis at home. Decreased CrCl. Concern for pacemaker involvement, however gallium scan negative   - C/w Lovenox 50mg BID in setting of malignancy   - c/w home Amiodarone 200mg QD     #CAD: s/p CABG. Patient without chest pain. EKG without ischemic changes.   - c/w home Lipitor 40mg QHS  - holding ASA     PULM:  #Acute hypoxic respiratory failure: requiring BIPAP 2/2 pulmonary congestion/edema likely due to CHF exacerbation in the setting of urosepsis and line sepsis. Doing well w/ intermittent nasal cannula throughout the day. Lung exam with improvement   - c/w supplemental O2 as needed, hiflow, nasal cannula, wean as tolerated  - Oxycodone 5mg solution q6 for air hunger, increased work of breathing, comfort, and LBP   - c/w antibiotics as above     #Pulmonary edema: Resolving. plan as above, c/w HF  - C/w BIPAP/HF, deescalate to NC as needed   - consider IV Lasix if overloaded     RENAL:  #ROMEO: Cr. back to baseline    HEME/ONC:  #Large Diffuse B Cell Lymphoma: last chemo with Bendamustin on 5/23/17. Followed outpatient by Dr. Galaviz- in remission as per her .  - Dr. Galaviz following, appreciate recs  - palliative care consult     #Breast cancer: on Arimidex 1mg QD at home   - Dr. Galaviz following, appreciate recs     #Anemia: Uric acid wnl (does not suggest tumor lysis). CT A/P negative for RP bleed. Transfused 1U pRBC with appropriate response.  - will continue to monitor     PPx: SCDs, Lovenox  FEN: monitor lytes, dysphagia diet, no IVF  Dispo: Transfer to [facility] under [facility]     DNR/DNI, limited medical interventions per MOLST  Palliative following: Currently patient's family is accepting of [facility] near their [place] home when she is weaned off HFNC, Oxycodone solution has been prescribed to help with tachypnea and air hunger.

## 2017-06-19 NOTE — PROGRESS NOTE ADULT - ASSESSMENT
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 Developed new fever yesterday, has remained afebrile since 6/18. Unclear source of new fever.

## 2017-06-19 NOTE — PROGRESS NOTE ADULT - PROBLEM SELECTOR PLAN 1
1) Blood culture negative since 6/18/17 ; F/u urine culture and sputum culture  2) Would continue with Zosyn (6/18 - ) and Vancomycin (6/18 -) for now, if continues to remain afebrile, will discuss de-escalating

## 2017-06-19 NOTE — PROGRESS NOTE ADULT - SUBJECTIVE AND OBJECTIVE BOX
INTERVAL HPI/OVERNIGHT EVENTS: Feels well this morning. Tolerating PO.  Stepped down to regional medical floor today.    CONSTITUTIONAL:  Negative fever or chills, feels well, good appetite  EYES:  Negative  blurry vision or double vision  CARDIOVASCULAR:  Negative for chest pain or palpitations  RESPIRATORY:  Negative for cough, wheezing, or SOB   GASTROINTESTINAL:  Negative for nausea, vomiting, diarrhea, constipation, or abdominal pain  GENITOURINARY:  Negative frequency, urgency or dysuria  NEUROLOGIC:  No headache, confusion, dizziness, lightheadedness      ANTIBIOTICS/RELEVANT:  piperacillin/tazobactam IVPB. 3.375Gram(s) IV Intermittent every 6 hours      Vital Signs Last 24 Hrs  T(C): 37.2, Max: 39.6 ( @ 20:45)  T(F): 98.9, Max: 103.3 ( @ 20:45)  HR: 103 (77 - 105)  BP: 133/71 (99/57 - 133/82)  BP(mean): 80 (72 - 100)  RR: 16 (14 - 18)  SpO2: 97% (96% - 100%)    PHYSICAL EXAM:  Constitutional: Elderly female, on HiFlow nasal cannula  Eyes:ESSENCE, EOMI  Ear/Nose/Throat: no oral lesion, no sinus tenderness on percussion	  Neck:no JVD, no lymphadenopathy, supple  Respiratory: bibasal crackles  Cardiovascular: S1S2 RRR, no murmurs  Gastrointestinal:soft, (+) BS, no HSM  Extremities:no e/e/c      LABS:                        7.9    3.9   )-----------( 87       ( 2017 12:43 )             24.8         138  |  104  |  24<H>  ----------------------------<  87  3.9   |  23  |  0.80    Ca    8.1<L>      2017 06:21  Mg     2.5           PTT - ( 2017 02:47 )  PTT:70.2 sec  Urinalysis Basic - ( 2017 12:28 )    Color: Yellow / Appearance: Clear / S.020 / pH: x  Gluc: x / Ketone: NEGATIVE  / Bili: NEGATIVE / Urobili: 0.2 E.U./dL   Blood: x / Protein: 100 mg/dL / Nitrite: NEGATIVE   Leuk Esterase: NEGATIVE / RBC: 5-10 /HPF / WBC 5-10 /HPF   Sq Epi: x / Non Sq Epi: Rare /HPF / Bacteria: Present /HPF        MICROBIOLOGY:  Culture - Blood (17 @ 21:11)    Specimen Source: .Blood Blood-Peripheral    Culture Results:   No growth at 12 hours        RADIOLOGY & ADDITIONAL STUDIES:  CXR (): There are findings suggestive of pulmonary edema with small bilateral   pleural effusions and pulmonary vascular congestion in the setting of   cardiomegaly.  There is no pneumothorax. The cardiomediastinal silhouette   silhouette is unchanged. Support devices and lines are unchanged.

## 2017-06-19 NOTE — PROGRESS NOTE ADULT - PROBLEM SELECTOR PROBLEM 7
Anemia, unspecified type Malignant neoplasm of female breast, unspecified laterality, unspecified site of breast

## 2017-06-19 NOTE — PROGRESS NOTE ADULT - PROBLEM SELECTOR PLAN 1
Having work of breathing after conversation during her breakfast.  -Continue Oxycodone solution 5mg PO [id number] PRN Dyspnea / Air Hunger / RR>25 / Excessive work of breathing. Encouraged patient to request PRN if symptomatic.  -Consider scheduling a standing regimen if she uses more than 3 PRN today.

## 2017-06-19 NOTE — PROGRESS NOTE ADULT - PROBLEM SELECTOR PLAN 6
Albumin 2.7 today; Evidence of skin breakdown.  Recommend nutritional supplements and OOB to chair as tolerated.

## 2017-06-19 NOTE — PROGRESS NOTE ADULT - ATTENDING COMMENTS
Pt seen and examined by me with  at bedside. Agree with housestaff's exam/a/p as noted above. Plan d/w housestaff in detail. Currently on Zosyn, last Bcx 6/15 and 6/18 NGTD. f/u with ID on abx regimen. Taper down O2 requirement as tolerated.

## 2017-06-19 NOTE — PROGRESS NOTE ADULT - PROBLEM SELECTOR PLAN 5
Palliative SW involved in anticipation of patient requiring placement in [facility] and possible prolong antibiotic use.

## 2017-06-19 NOTE — PROGRESS NOTE ADULT - SUBJECTIVE AND OBJECTIVE BOX
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 Patients family at bedside states she's been more lethargic with associated lower extremity swelling, SOB, decreased appetite and diarrhea. Patient with previous admissions for symptomatic hyperglycemia in January. Patient currently seeing Dr. Galaviz on a regular basis for chemotherapy treatment. Last treatment on May 23rd for which she received Bendamustine and Rituximab. Patient denies any headaches, chest pain, abdominal pain, N/V, dysuria, or bowel changes.     Upon arrival to the ED, patient in moderate respiratory distress using accessory muscles, breathing w/ a RR of 30, O2 93% on room air. Patient placed on BIPAP with slight improvement in respiratory status. Patient febrile to 101 with a HR of 77. Labs significant for AG metabolic acidosis, K 7.0, Na 127, BUN 58, Cr. 2.0 (baseline < 0.9 January), Lactate 2.8. CXR done showing RLL infiltrate with pulmonary vascular congestion. In the ED, patient received Vancomycin, Zosyn, Tylenol, Calcium Gluconate 2g, Insulin 5u, Albuterol neb 5mg x 2. ICU consulted, will be transferred to MICU for management of severe sepsis 2/2 HAP and pulmonary congestion. (10 Ramon 2017 19:13)    SUBJECTIVE: Saw and evaluated Mrs Fraire at bedside today. The patients  was at bedside helping the patient with her breakfast. She reports good appetite, sleep, pain, but continues to experience air hunger and dyspnea. She was made aware of PRN regimen of Oxycodone for her symptoms and she stated wanting to receive a dose now. We discussed the plan of care as it stands with the current need for HFNC vs weaning off to NC. The patient and  aware of possibility of requiring rehab and would consider [facility] at a facility near their [facility]. Initial hopes for home with PT were discussed and they understand that the current need for oxygen as well as ongoing antibiotic treatment will be better done at a [facility] facility. The patient continued to have fever spikes. The patient is due to be transferred to a [facility] where further disposition planning can continue.    ROS:  DYSPNEA: Yes  NAUS/VOM: No  SECRETIONS: No	  AGITATION: No  Pain (Y/N): No      -Provocation/Palliation:  -Quality/Quantity:  -Radiating:  -Severity:  -Timing/Frequency:  -Impact on ADLs:    OTHER REVIEW OF SYSTEMS: Denied    PEx:  T(C): 36.3, Max: 39.6 (06-18 @ 20:45)  HR: 101 (77 - 105)  BP: 122/69 (99/57 - 141/60)  RR: 18 (14 - 23)  SpO2: 97% (96% - 100%)  Wt(kg): 54.6    GENERAL:  AAOx3 NAD Nigerian speaking frail appearing woman  HEENT: HFNC+ Dry lips PERRL eomi     	  NECK: Supple          CVS:  RR S1S2  L PPM        	  RESP: Increased work of breathing Tachypneic       	  GI: Soft NT ND            	  : Normal           	  MUSC: Generalized muscle weakness      	  NEURO: No focal deficits     	  PSYCH: Calm         SKIN: Normal        	   LYMPH: Normal      	     ALLERGIES: IV CONtRAST (Flushing (Skin); Rash)  No Known Drug Allergies    OPIATE NAÏVE (Y/N): Yes    MEDICATIONS: REVIEWED  MEDICATIONS  (STANDING):  amiodarone    Tablet 200milliGRAM(s) Oral daily  insulin lispro (HumaLOG) corrective regimen sliding scale  SubCutaneous Before meals and at bedtime  atorvastatin 40milliGRAM(s) Oral at bedtime  bisacodyl Suppository 10milliGRAM(s) Rectal daily  lidocaine   Patch 1Patch Transdermal daily  metoprolol 12.5milliGRAM(s) Oral two times a day  enoxaparin Injectable 50milliGRAM(s) SubCutaneous two times a day  vancomycin  IVPB 750milliGRAM(s) IV Intermittent every 24 hours  piperacillin/tazobactam IVPB. 2.25Gram(s) IV Intermittent every 8 hours  vancomycin  IVPB  IV Intermittent   piperacillin/tazobactam IVPB.  IV Intermittent     MEDICATIONS  (PRN):  acetaminophen  Suppository 650milliGRAM(s) Rectal every 6 hours PRN For Temp greater than 38 C (100.4 F)  guaiFENesin    Syrup 200milliGRAM(s) Oral every 6 hours PRN Cough  oxyCODONE Solution 5milliGRAM(s) Oral every 6 hours PRN RR>25/air hunger/increased work of breathing    LABS: REVIEWED                        7.6    3.6   )-----------( 73       ( 19 Jun 2017 06:21 )             23.6   06-19    138  |  104  |  24<H>  ----------------------------<  87  3.9   |  23  |  0.80    Ca    8.1<L>      19 Jun 2017 06:21  Mg     2.5     06-18    Albumin, Serum: 2.7 g/dL (06.15.17 @ 12:30)    Culture - Blood (06.18.17 @ 21:11)    Specimen Source: .Blood Blood-Peripheral    Culture Results:   No growth at 12 hours    Lactate, Blood (06.15.17 @ 12:30)    Lactate, Blood: 1.6 mmoL/L    Culture - Blood (06.15.17 @ 11:38)    Specimen Source: .Blood Blood-Venous    Culture Results:   No growth at 3 days.    Culture - Blood (06.13.17 @ 12:45)    -  Ceftriaxone: S <=1    -  Piperacillin/Tazobactam: S <=16    -  Trimethoprim/Sulfamethoxazole: R >2/38    -  Ampicillin/Sulbactam: R >16/8    -  Ciprofloxacin: R >2    -  Tobramycin: I 8    -  Ampicillin: R >16    -  Cefazolin: S <=8    -  Gentamicin: R >8    Gram Stain:   Aerobic Bottle: Gram Negative Rods  Result called to and read back byJacinto Gomez RN 06/14/2017 06:36:29    Specimen Source: .Blood Blood    Organism: Escherichia coli    Culture Results:   Growth in aerobic bottle: Escherichia coli    Organism Identification: Escherichia coli    Method Type: JANEEN    IMAGING: REVIEWED    EXAM:  XR CHEST 1 VIEW PORT IMMEDIATE                        PROCEDURE DATE:  06/18/2017    INTERPRETATION:    AP Portable CXR dated 6/18/2017 9:01 PM  CLINICAL INFORMATION: 86 years, Female, fever  PRIOR STUDIES: Portable CXR on 6/18/2017  FINDINGS:   Heart Size, Mediastinum & Hilar Contours: Heart size is suboptimally   evaluated in this projection. Calcification of the aortic knob.   Sternotomy wires and clips along the mediastinum. Dual lead cardiac   pacemaker.  Elevated right hemidiaphragm. Probable enlarged main   pulmonary artery.  Lungs: Low lung volumes.  No pneumothorax. No significant interval change   in the bilateral lung patchy opacities. 1.6 cm nodular opacity right mid   zone peripherally not seenon prior study.  Bones/Soft Tissues: No acute abnormalities of the soft tissues and   osseous structures. Degenerative changes noted.  IMPRESSION: Bilateral lung opacities similar to prior study. Differential   diagnosis would include pulmonary edema, multifocal pneumonia,   aspiration, interstitial pneumonitis.    EXAM:  US RETROPERITONEAL LIMITED                        PROCEDURE DATE:  06/17/2017    INTERPRETATION:  RENAL ULTRASOUND dated 6/17/2017 4:37 PM  Indication: Gram-negative bacteremia. Rule out renal   abscess/pyelonephritis.  Prior studies: Renal ultrasound dated 6/11/2017.  Findings:  Limited portable study as patient was unable to cooperate with the exam.  RIGHT KIDNEY:  Length: 11.1 cm  Lesions: 0.9 cm cyst at the upper pole.  Hydronephrosis: None  Stones: None  Echogenicity: Increased  LEFT KIDNEY:  Length: 10.5 cm  Lesions: 0.9 cm cyst at the upper pole.  Hydronephrosis: None  Stones: None  Echogenicity: Increased  Incidental note is made of a trace bilateral pleural effusions.  IMPRESSION:  No significant interval change since the prior study of 6/11/2017.  Increased renal echogenicity bilaterally suggestive of medical renal   disease.  Bilateral renal cysts.  No hydronephrosis.    ADVANCED DIRECTIVES:  DNR     DNI    MOLST    DECISION MAKER: The patient is able to participate in medical decisions  LEGAL SURROGATE: HCP in the chart. HCP agents are Seth Fraire [phone] and Chay Astudillo.    PSYCHOSOCIAL-SPIRITUAL ASSESSMENT:       Reviewed       Care plan adjusted as above	    GOALS OF CARE DISCUSSION       Palliative care info/counseling provided	           Family meetings ongoing       See previous Palliative Medicine Note        Documentation of GOC: HCP, DNR/DNI, MOLST  	      AGENCY CHOICE DISCUSSED:           [facility]    REFERRALS	        Palliative Med        Unit SW/Case Mgmt        - [employer]       PT/OT

## 2017-06-19 NOTE — PROGRESS NOTE ADULT - PROBLEM SELECTOR PLAN 5
Last chemo with Bendamustin on 5/23/17. Followed outpatient by Dr. Galaviz- in remission as per her .  - Dr. Galaviz following, appreciate recs Patient s/p CABG. Patient without chest pain. EKG without ischemic changes. EF EF 60-65%.   - c/w home Lipitor 40mg QHS  - Holding ASA   - c/w Metoprolol 12.5 BID, may need to uptitrate   - Medically optimize cardiac function if BP allows   - Consider restarting standing Lasix , currently only getting as PRN if pt is overloaded on am CXR  - Continue to monitor I's and O's

## 2017-06-19 NOTE — PROGRESS NOTE ADULT - PROBLEM SELECTOR PLAN 3
Cultures showing E. coli . Abx changed to Zosyn and possible repeat CT scan to reassess PPM seeding.  Management as per primary team and ID.

## 2017-06-19 NOTE — PROGRESS NOTE ADULT - PROBLEM SELECTOR PLAN 3
s/p PPM, on Eliquis at home. Decreased CrCl. Concern for pacemaker involvement, however gallium scan negative   - S/p Lovenox 50mg BID in setting of malignancy   - c/w home Amiodarone 200mg QD   - AC switched from Lovenox to Eliquis (2.5mg since patient age >80yrs and weight <60km) Resolving. plan as above, c/w HF  - C/w BIPAP/HF, deescalate to NC as needed   - Lasix prn

## 2017-06-19 NOTE — PROGRESS NOTE ADULT - PROBLEM SELECTOR PLAN 1
Source is urosepsis, seeding to chemoport, which was removed on 6/12, persistent bacteremia of GNR, however last two sets of cultures NGTD so far (6/15-6/18).   - ID consulted, patient was on Ceftriaxone 1g with intention to keep her for a total of 10 days post-line removal (starting 6/13), but on 6/18, patient was febrile to 103 and abx were broadened to Vanc/Zosyn.  - C/w Vanc 750mg daily (6/19- )   - C/w Zosyn 3.375mg daily (6/18- )   - f/u up with ID on duration of Abx   - Blood Cx (6/18) NGTD   - NM Gallium Scan (6/16)- negative scan- Pacemaker clean. Consider repeat scan as pt febrile again last night to 103   - c/w Tylenol PRN, Demerol PRN can be ordered for rigors   - Repeat renal US- no changes   - Repeat UA (6/18)- negative

## 2017-06-19 NOTE — PROGRESS NOTE ADULT - ASSESSMENT
86F with PMHx of CAD s/p CABG, Afib s/p PPM (on Eliquis), breast cancer, large B cell lymphoma on chemo, HFpEF, HTN, HLD, DM presented with severe sepsis 2/2 UTI, persistent bacteremia, initially admitted to the MICU s/p chemo port removal (which was infected), steppeddown to [facility], now transitioned to HFNC and can continue her care in the [facility].

## 2017-06-19 NOTE — PROGRESS NOTE ADULT - SUBJECTIVE AND OBJECTIVE BOX
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 Patients family at bedside states she's been more lethargic with associated lower extremity swelling, SOB, decreased appetite and diarrhea. Patient with previous admissions for symptomatic hyperglycemia in January. Patient currently seeing Dr. Galaviz on a regular basis for chemotherapy treatment. Last treatment on May 23rd for which she received Bendamustine and Rituximab. Patient denies any headaches, chest pain, abdominal pain, N/V, dysuria, or bowel changes.     Upon arrival to the ED, patient in moderate respiratory distress using accessory muscles, breathing w/ a RR of 30, O2 93% on room air. Patient placed on BIPAP with slight improvement in respiratory status. Patient febrile to 101 with a HR of 77. Labs significant for AG metabolic acidosis, K 7.0, Na 127, BUN 58, Cr. 2.0 (baseline < 0.9 January), Lactate 2.8. CXR done showing RLL infiltrate with pulmonary vascular congestion. In the ED, patient received Vancomycin, Zosyn, Tylenol, Calcium Gluconate 2g, Insulin 5u, Albuterol neb 5mg x 2. ICU consulted, will be transferred to MICU for management of severe sepsis 2/2 HAP and pulmonary congestion. (10 Ramon 2017 19:13)    FAMILY HISTORY:  No pertinent family history in first degree relatives    MEDICATIONS  (STANDING):  amiodarone    Tablet 200milliGRAM(s) Oral daily  insulin lispro (HumaLOG) corrective regimen sliding scale  SubCutaneous Before meals and at bedtime  atorvastatin 40milliGRAM(s) Oral at bedtime  bisacodyl Suppository 10milliGRAM(s) Rectal daily  lidocaine   Patch 1Patch Transdermal daily  metoprolol 12.5milliGRAM(s) Oral two times a day  enoxaparin Injectable 50milliGRAM(s) SubCutaneous two times a day  cefTRIAXone   IVPB 1Gram(s) IV Intermittent every 24 hours    MEDICATIONS  (PRN):  acetaminophen  Suppository 650milliGRAM(s) Rectal every 6 hours PRN For Temp greater than 38 C (100.4 F)  guaiFENesin    Syrup 200milliGRAM(s) Oral every 6 hours PRN Cough  meperidine     Injectable 25milliGRAM(s) IV Push every 6 hours PRN Rigors  oxyCODONE Solution 5milliGRAM(s) Oral every 4 hours PRN Dyspnea / Air Hunger / RR>25 / Excessive work of breathing.    Vital Signs Last 24 Hrs  T(C): 36.3, Max: 39.6 (06-18 @ 20:45)  T(F): 97.4, Max: 103.3 (06-18 @ 20:45)  HR: 101 (77 - 105)  BP: 122/69 (99/57 - 141/60)  BP(mean): 89 (72 - 89)  RR: 18 (14 - 23)  SpO2: 97% (96% - 100%)    Physical exam:    Overall impression  Lymphadenopathy  Liver  spleen    Labs:  CBC Full  -  ( 19 Jun 2017 06:21 )  WBC Count : 3.6 K/uL  Hemoglobin : 7.6 g/dL  Hematocrit : 23.6 %  Platelet Count - Automated : 73 K/uL  Mean Cell Volume : 92.5 fL  Mean Cell Hemoglobin : 29.8 pg  Mean Cell Hemoglobin Concentration : 32.2 g/dL  Auto Neutrophil # : x  Auto Lymphocyte # : x  Auto Monocyte # : x  Auto Eosinophil # : x  Auto Basophil # : x  Auto Neutrophil % : x  Auto Lymphocyte % : x  Auto Monocyte % : x  Auto Eosinophil % : x  Auto Basophil % : x    06-19    138  |  104  |  24<H>  ----------------------------<  87  3.9   |  23  |  0.80    Ca    8.1<L>      19 Jun 2017 06:21  Mg     2.5     06-18        Radiology:  HEALTH ISSUES - R/O PROBLEM Dx:      Assessmant / Problems  Patient much improved clinically  1) Afebrile, some precordial chest wall pain, some SOB  2) Benign positional vertigo  3)Pancytopenia likely 2/2 Ceftriaxone  Hg 7.6    Plan:  Transfuse 1 U PRBC  Tx as per ICU    Thank you  eNha Galaviz MD

## 2017-06-20 LAB
ANION GAP SERPL CALC-SCNC: 10 MMOL/L — SIGNIFICANT CHANGE UP (ref 5–17)
APPEARANCE UR: CLEAR — SIGNIFICANT CHANGE UP
BILIRUB UR-MCNC: NEGATIVE — SIGNIFICANT CHANGE UP
BUN SERPL-MCNC: 20 MG/DL — SIGNIFICANT CHANGE UP (ref 7–23)
CALCIUM SERPL-MCNC: 8 MG/DL — LOW (ref 8.4–10.5)
CHLORIDE SERPL-SCNC: 103 MMOL/L — SIGNIFICANT CHANGE UP (ref 96–108)
CO2 SERPL-SCNC: 25 MMOL/L — SIGNIFICANT CHANGE UP (ref 22–31)
COLOR SPEC: YELLOW — SIGNIFICANT CHANGE UP
CREAT SERPL-MCNC: 0.9 MG/DL — SIGNIFICANT CHANGE UP (ref 0.5–1.3)
CULTURE RESULTS: SIGNIFICANT CHANGE UP
DIFF PNL FLD: (no result)
GLUCOSE SERPL-MCNC: 102 MG/DL — HIGH (ref 70–99)
GLUCOSE UR QL: NEGATIVE — SIGNIFICANT CHANGE UP
HCT VFR BLD CALC: 22.7 % — LOW (ref 34.5–45)
HGB BLD-MCNC: 7.4 G/DL — LOW (ref 11.5–15.5)
KETONES UR-MCNC: NEGATIVE — SIGNIFICANT CHANGE UP
LACTATE SERPL-SCNC: 2.1 MMOL/L — HIGH (ref 0.5–2)
LEUKOCYTE ESTERASE UR-ACNC: NEGATIVE — SIGNIFICANT CHANGE UP
MAGNESIUM SERPL-MCNC: 1.7 MG/DL — SIGNIFICANT CHANGE UP (ref 1.6–2.6)
MCHC RBC-ENTMCNC: 30.1 PG — SIGNIFICANT CHANGE UP (ref 27–34)
MCHC RBC-ENTMCNC: 32.6 G/DL — SIGNIFICANT CHANGE UP (ref 32–36)
MCV RBC AUTO: 92.3 FL — SIGNIFICANT CHANGE UP (ref 80–100)
NITRITE UR-MCNC: NEGATIVE — SIGNIFICANT CHANGE UP
PH UR: 5 — SIGNIFICANT CHANGE UP (ref 5–8)
PHOSPHATE SERPL-MCNC: 1.8 MG/DL — LOW (ref 2.5–4.5)
PLATELET # BLD AUTO: 74 K/UL — LOW (ref 150–400)
POTASSIUM SERPL-MCNC: 3.7 MMOL/L — SIGNIFICANT CHANGE UP (ref 3.5–5.3)
POTASSIUM SERPL-SCNC: 3.7 MMOL/L — SIGNIFICANT CHANGE UP (ref 3.5–5.3)
PROT UR-MCNC: (no result) MG/DL
RBC # BLD: 2.46 M/UL — LOW (ref 3.8–5.2)
RBC # FLD: 17.5 % — HIGH (ref 10.3–16.9)
SODIUM SERPL-SCNC: 138 MMOL/L — SIGNIFICANT CHANGE UP (ref 135–145)
SP GR SPEC: 1.01 — SIGNIFICANT CHANGE UP (ref 1–1.03)
SPECIMEN SOURCE: SIGNIFICANT CHANGE UP
UROBILINOGEN FLD QL: 0.2 E.U./DL — SIGNIFICANT CHANGE UP
WBC # BLD: 3 K/UL — LOW (ref 3.8–10.5)
WBC # FLD AUTO: 3 K/UL — LOW (ref 3.8–10.5)

## 2017-06-20 PROCEDURE — 71010: CPT | Mod: 26

## 2017-06-20 PROCEDURE — 99232 SBSQ HOSP IP/OBS MODERATE 35: CPT | Mod: GC

## 2017-06-20 PROCEDURE — 99233 SBSQ HOSP IP/OBS HIGH 50: CPT

## 2017-06-20 RX ORDER — MORPHINE SULFATE 50 MG/1
1 CAPSULE, EXTENDED RELEASE ORAL EVERY 4 HOURS
Qty: 0 | Refills: 0 | Status: DISCONTINUED | OUTPATIENT
Start: 2017-06-20 | End: 2017-06-21

## 2017-06-20 RX ORDER — SODIUM CHLORIDE 9 MG/ML
250 INJECTION INTRAMUSCULAR; INTRAVENOUS; SUBCUTANEOUS ONCE
Qty: 0 | Refills: 0 | Status: DISCONTINUED | OUTPATIENT
Start: 2017-06-20 | End: 2017-06-20

## 2017-06-20 RX ORDER — MAGNESIUM SULFATE 500 MG/ML
2 VIAL (ML) INJECTION ONCE
Qty: 0 | Refills: 0 | Status: COMPLETED | OUTPATIENT
Start: 2017-06-20 | End: 2017-06-20

## 2017-06-20 RX ORDER — MORPHINE SULFATE 50 MG/1
0.5 CAPSULE, EXTENDED RELEASE ORAL EVERY 4 HOURS
Qty: 0 | Refills: 0 | Status: DISCONTINUED | OUTPATIENT
Start: 2017-06-20 | End: 2017-06-21

## 2017-06-20 RX ORDER — POTASSIUM PHOSPHATE, MONOBASIC POTASSIUM PHOSPHATE, DIBASIC 236; 224 MG/ML; MG/ML
21 INJECTION, SOLUTION INTRAVENOUS ONCE
Qty: 0 | Refills: 0 | Status: COMPLETED | OUTPATIENT
Start: 2017-06-20 | End: 2017-06-20

## 2017-06-20 RX ADMIN — ATORVASTATIN CALCIUM 40 MILLIGRAM(S): 80 TABLET, FILM COATED ORAL at 21:38

## 2017-06-20 RX ADMIN — PIPERACILLIN AND TAZOBACTAM 200 GRAM(S): 4; .5 INJECTION, POWDER, LYOPHILIZED, FOR SOLUTION INTRAVENOUS at 13:47

## 2017-06-20 RX ADMIN — MORPHINE SULFATE 0.5 MILLIGRAM(S): 50 CAPSULE, EXTENDED RELEASE ORAL at 21:53

## 2017-06-20 RX ADMIN — Medication 250 MILLIGRAM(S): at 23:10

## 2017-06-20 RX ADMIN — APIXABAN 2.5 MILLIGRAM(S): 2.5 TABLET, FILM COATED ORAL at 21:38

## 2017-06-20 RX ADMIN — POTASSIUM PHOSPHATE, MONOBASIC POTASSIUM PHOSPHATE, DIBASIC 64.25 MILLIMOLE(S): 236; 224 INJECTION, SOLUTION INTRAVENOUS at 10:32

## 2017-06-20 RX ADMIN — APIXABAN 2.5 MILLIGRAM(S): 2.5 TABLET, FILM COATED ORAL at 09:34

## 2017-06-20 RX ADMIN — PIPERACILLIN AND TAZOBACTAM 200 GRAM(S): 4; .5 INJECTION, POWDER, LYOPHILIZED, FOR SOLUTION INTRAVENOUS at 21:38

## 2017-06-20 RX ADMIN — Medication 81 MILLIGRAM(S): at 11:29

## 2017-06-20 RX ADMIN — Medication 10 MILLIGRAM(S): at 11:30

## 2017-06-20 RX ADMIN — Medication 12.5 MILLIGRAM(S): at 06:10

## 2017-06-20 RX ADMIN — LIDOCAINE 1 PATCH: 4 CREAM TOPICAL at 01:19

## 2017-06-20 RX ADMIN — LIDOCAINE 1 PATCH: 4 CREAM TOPICAL at 23:10

## 2017-06-20 RX ADMIN — PIPERACILLIN AND TAZOBACTAM 200 GRAM(S): 4; .5 INJECTION, POWDER, LYOPHILIZED, FOR SOLUTION INTRAVENOUS at 01:57

## 2017-06-20 RX ADMIN — Medication 200 MILLIGRAM(S): at 14:31

## 2017-06-20 RX ADMIN — Medication 12.5 MILLIGRAM(S): at 17:04

## 2017-06-20 RX ADMIN — Medication 50 GRAM(S): at 08:39

## 2017-06-20 RX ADMIN — MORPHINE SULFATE 0.5 MILLIGRAM(S): 50 CAPSULE, EXTENDED RELEASE ORAL at 13:50

## 2017-06-20 RX ADMIN — AMIODARONE HYDROCHLORIDE 200 MILLIGRAM(S): 400 TABLET ORAL at 06:10

## 2017-06-20 RX ADMIN — MORPHINE SULFATE 0.5 MILLIGRAM(S): 50 CAPSULE, EXTENDED RELEASE ORAL at 21:38

## 2017-06-20 RX ADMIN — MORPHINE SULFATE 0.5 MILLIGRAM(S): 50 CAPSULE, EXTENDED RELEASE ORAL at 13:38

## 2017-06-20 RX ADMIN — Medication 650 MILLIGRAM(S): at 11:40

## 2017-06-20 RX ADMIN — Medication 250 MILLIGRAM(S): at 00:07

## 2017-06-20 RX ADMIN — Medication 20 MILLIGRAM(S): at 06:10

## 2017-06-20 RX ADMIN — PIPERACILLIN AND TAZOBACTAM 200 GRAM(S): 4; .5 INJECTION, POWDER, LYOPHILIZED, FOR SOLUTION INTRAVENOUS at 07:45

## 2017-06-20 NOTE — PROGRESS NOTE ADULT - PROBLEM SELECTOR PLAN 6
Last chemo with Bendamustin on 5/23/17. Followed outpatient by Dr. Galaviz- in remission as per her .  - Dr. Galaviz following, appreciate recs

## 2017-06-20 NOTE — PROGRESS NOTE ADULT - PROBLEM SELECTOR PLAN 1
Having increased work of breathing.  -Continue Oxycodone solution 5mg PO x7feffn PRN Dyspnea / Air Hunger / RR>25 / Excessive work of breathing. Encouraged patient to request PRN if symptomatic.  -Started morphine  - Injectable 0.5milliGRAM(s) IV Push every 4 hours PRN Dyspnea / Air hunger / Increased work of breathing / RR>25  -Started morphine  - Injectable 1milliGRAM(s) IV Push every 4 hours PRN Excessive work of breathing / RR>35 / Resp distress  -Consider scheduling a standing regimen if she uses more than 3 PRN today since the patient forgets to ask for the medication.

## 2017-06-20 NOTE — PROGRESS NOTE ADULT - PROBLEM SELECTOR PLAN 4
s/p PPM, on Eliquis at home. Decreased CrCl. Concern for pacemaker involvement, however gallium scan negative   - S/p Lovenox 50mg BID in setting of malignancy   - c/w home Amiodarone 200mg QD   - AC switched from Lovenox to Eliquis (2.5mg since patient age >80yrs and weight <60km)

## 2017-06-20 NOTE — PROGRESS NOTE ADULT - PROBLEM SELECTOR PLAN 5
Patient s/p CABG. Patient without chest pain. EKG without ischemic changes. EF EF 60-65%.   - c/w home Lipitor 40mg QHS  - Holding ASA   - c/w Metoprolol 12.5 BID, may need to uptitrate   - Medically optimize cardiac function if BP allows   - Consider restarting standing Lasix , currently only getting as PRN if pt is overloaded on am CXR  - Continue to monitor I's and O's

## 2017-06-20 NOTE — PROGRESS NOTE ADULT - SUBJECTIVE AND OBJECTIVE BOX
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 Patients family at bedside states she's been more lethargic with associated lower extremity swelling, SOB, decreased appetite and diarrhea. Patient with previous admissions for symptomatic hyperglycemia in January. Patient currently seeing Dr. Galaviz on a regular basis for chemotherapy treatment. Last treatment on May 23rd for which she received Bendamustine and Rituximab. Patient denies any headaches, chest pain, abdominal pain, N/V, dysuria, or bowel changes.     Upon arrival to the ED, patient in moderate respiratory distress using accessory muscles, breathing w/ a RR of 30, O2 93% on room air. Patient placed on BIPAP with slight improvement in respiratory status. Patient febrile to 101 with a HR of 77. Labs significant for AG metabolic acidosis, K 7.0, Na 127, BUN 58, Cr. 2.0 (baseline < 0.9 January), Lactate 2.8. CXR done showing RLL infiltrate with pulmonary vascular congestion. In the ED, patient received Vancomycin, Zosyn, Tylenol, Calcium Gluconate 2g, Insulin 5u, Albuterol neb 5mg x 2. ICU consulted, will be transferred to MICU for management of severe sepsis 2/2 HAP and pulmonary congestion. (10 Ramon 2017 19:13)    SUBJECTIVE: Saw and evaluated Mrs Carrillo at bedside. Found in bed being fed by . Patient complaints of air hunger and currently tachypneic at 28. Patient has not requested PRN and she states thinking they would come and give the medication without asking. Will give IV dose of MSIR 0.5mg now then have available [id number]. Goal of transitioning off HFNC.     ROS:  DYSPNEA: Yes	  NAUS/VOM: No	  SECRETIONS: No	  AGITATION: Yes  Pain (Y/N): No       -Provocation/Palliation:  -Quality/Quantity:  -Radiating:  -Severity:  -Timing/Frequency:  -Impact on ADLs:    OTHER REVIEW OF SYSTEMS: Denied    PEx:  T(C): 37.8, Max: 37.8 (06-20 @ 11:41)  HR: 95 (89 - 103)  BP: 123/76 (109/54 - 133/71)  RR: 18 (16 - 21)  SpO2: 95% (95% - 100%)  Wt(kg): 54    GENERAL:  AAOx3 In moderate resp distress    HEENT: HFNC+     	  NECK:  Supple         CVS:  Tachycardic S1S2          	  RESP:  Tachypneic, increased work of breathing.	  GI: Soft NT ND BS+             	  : Normal           	  MUSC: Generalized muscle weakness      	  NEURO: No focal deficits     	  PSYCH: Concerned, distressed         SKIN: Normal        	   LYMPH: Normal     	     ALLERGIES: IV CONtRAST (Flushing (Skin); Rash), No Known Drug Allergies    OPIATE NAÏVE (Y/N): Yes    MEDICATIONS: REVIEWED  MEDICATIONS  (STANDING):  amiodarone    Tablet 200milliGRAM(s) Oral daily  insulin lispro (HumaLOG) corrective regimen sliding scale  SubCutaneous Before meals and at bedtime  atorvastatin 40milliGRAM(s) Oral at bedtime  bisacodyl Suppository 10milliGRAM(s) Rectal daily  lidocaine   Patch 1Patch Transdermal daily  metoprolol 12.5milliGRAM(s) Oral two times a day  piperacillin/tazobactam IVPB. 3.375Gram(s) IV Intermittent every 6 hours  vancomycin  IVPB 750milliGRAM(s) IV Intermittent every 24 hours  apixaban 2.5milliGRAM(s) Oral two times a day  furosemide    Tablet 20milliGRAM(s) Oral daily  aspirin enteric coated 81milliGRAM(s) Oral daily    MEDICATIONS  (PRN):  acetaminophen  Suppository 650milliGRAM(s) Rectal every 6 hours PRN For Temp greater than 38 C (100.4 F)  guaiFENesin    Syrup 200milliGRAM(s) Oral every 6 hours PRN Cough  meperidine     Injectable 25milliGRAM(s) IV Push every 6 hours PRN Rigors  oxyCODONE Solution 5milliGRAM(s) Oral every 4 hours PRN Dyspnea / Air Hunger / RR>25 / Excessive work of breathing.      LABS: REVIEWED                        7.4    3.0   )-----------( 74       ( 20 Jun 2017 06:00 )             22.7   06-20    138  |  103  |  20  ----------------------------<  102<H>  3.7   |  25  |  0.90    Ca    8.0<L>      20 Jun 2017 06:01  Phos  1.8     06-20  Mg     1.7     06-20    Culture - Blood (06.18.17 @ 21:11)    Specimen Source: .Blood Blood-Peripheral    Culture Results:   No growth at 1 day.    IMAGING: REVIEWED    EXAM:  XR CHEST 1 VIEW PORT ROUTINE                        PROCEDURE DATE:  06/19/2017    INTERPRETATION:  CLINICAL INFORMATION:  concern for fluid overload.  TECHNIQUE: A frontal view of the chest is dated 6/19/2017 6:44 AM.  COMPARISON:  Radiographs of the chest dated 6/18/2017  FINDINGS:  There are findings suggestive of pulmonary edema with small bilateral   pleural effusions and pulmonary vascular congestion in the setting of   cardiomegaly.  There is no pneumothorax. The cardiomediastinal silhouette   silhouette is unchanged. Support devices and lines are unchanged.  IMPRESSION: No significant interval change in findings suggestive of   congestive heart failure.    ADVANCED DIRECTIVES: DNR     DNI     MOLST    DECISION MAKER: The patient is able to participate in medical decisions  LEGAL SURROGATE: HCP in the chart. HCP agents are Seth Carrillo [phone] and Chay Astudillo.    PSYCHOSOCIAL-SPIRITUAL ASSESSMENT:       Reviewed       Care plan adjusted as above	    GOALS OF CARE DISCUSSION       Palliative care info/counseling provided	           Family meetings ongoing       See previous Palliative Medicine Note        Documentation of GOC: HCP, DNR/DNI, MOLST  	      AGENCY CHOICE DISCUSSED:           [facility].    REFERRALS	        Palliative Med        Unit SW/Case Mgmt        - [facility]       PT/OT

## 2017-06-20 NOTE — PROGRESS NOTE ADULT - SUBJECTIVE AND OBJECTIVE BOX
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 Patients family at bedside states she's been more lethargic with associated lower extremity swelling, SOB, decreased appetite and diarrhea. Patient with previous admissions for symptomatic hyperglycemia in January. Patient currently seeing Dr. Galaviz on a regular basis for chemotherapy treatment. Last treatment on May 23rd for which she received Bendamustine and Rituximab. Patient denies any headaches, chest pain, abdominal pain, N/V, dysuria, or bowel changes.     Upon arrival to the ED, patient in moderate respiratory distress using accessory muscles, breathing w/ a RR of 30, O2 93% on room air. Patient placed on BIPAP with slight improvement in respiratory status. Patient febrile to 101 with a HR of 77. Labs significant for AG metabolic acidosis, K 7.0, Na 127, BUN 58, Cr. 2.0 (baseline < 0.9 January), Lactate 2.8. CXR done showing RLL infiltrate with pulmonary vascular congestion. In the ED, patient received Vancomycin, Zosyn, Tylenol, Calcium Gluconate 2g, Insulin 5u, Albuterol neb 5mg x 2. ICU consulted, will be transferred to MICU for management of severe sepsis 2/2 HAP and pulmonary congestion. (10 Ramon 2017 19:13)    FAMILY HISTORY:  No pertinent family history in first degree relatives    MEDICATIONS  (STANDING):  amiodarone    Tablet 200milliGRAM(s) Oral daily  insulin lispro (HumaLOG) corrective regimen sliding scale  SubCutaneous Before meals and at bedtime  atorvastatin 40milliGRAM(s) Oral at bedtime  bisacodyl Suppository 10milliGRAM(s) Rectal daily  lidocaine   Patch 1Patch Transdermal daily  metoprolol 12.5milliGRAM(s) Oral two times a day  piperacillin/tazobactam IVPB. 3.375Gram(s) IV Intermittent every 6 hours  vancomycin  IVPB 750milliGRAM(s) IV Intermittent every 24 hours  apixaban 2.5milliGRAM(s) Oral two times a day  furosemide    Tablet 20milliGRAM(s) Oral daily  aspirin enteric coated 81milliGRAM(s) Oral daily    MEDICATIONS  (PRN):  acetaminophen  Suppository 650milliGRAM(s) Rectal every 6 hours PRN For Temp greater than 38 C (100.4 F)  guaiFENesin    Syrup 200milliGRAM(s) Oral every 6 hours PRN Cough  meperidine     Injectable 25milliGRAM(s) IV Push every 6 hours PRN Rigors  oxyCODONE Solution 5milliGRAM(s) Oral every 4 hours PRN Dyspnea / Air Hunger / RR>25 / Excessive work of breathing.    Vital Signs Last 24 Hrs  T(C): 36.6, Max: 37.2 (06-19 @ 16:27)  T(F): 97.8, Max: 98.9 (06-19 @ 16:27)  HR: 100 (89 - 103)  BP: 109/54 (109/54 - 133/82)  BP(mean): 80 (80 - 100)  RR: 18 (16 - 21)  SpO2: 98% (95% - 100%)    Physical exam:    Overall impression  Lymphadenopathy  Liver  spleen    Labs:  CBC Full  -  ( 20 Jun 2017 06:00 )  WBC Count : 3.0 K/uL  Hemoglobin : 7.4 g/dL  Hematocrit : 22.7 %  Platelet Count - Automated : 74 K/uL  Mean Cell Volume : 92.3 fL  Mean Cell Hemoglobin : 30.1 pg  Mean Cell Hemoglobin Concentration : 32.6 g/dL  Auto Neutrophil # : x  Auto Lymphocyte # : x  Auto Monocyte # : x  Auto Eosinophil # : x  Auto Basophil # : x  Auto Neutrophil % : x  Auto Lymphocyte % : x  Auto Monocyte % : x  Auto Eosinophil % : x  Auto Basophil % : x    06-20    138  |  103  |  20  ----------------------------<  102<H>  3.7   |  25  |  0.90    Ca    8.0<L>      20 Jun 2017 06:01  Phos  1.8     06-20  Mg     1.7     06-20        Radiology:  HEALTH ISSUES - R/O PROBLEM Dx:      Assessmant / Problems  1) Clinically much improved  Has some cough , whitish sputum  Afebrile  2) Hg 7.4  3) NHL remission  4) Breast ca stable on Arimidex    Plan:  Transfuse 1 U PRBC    Thank you  Neha Galaviz MD

## 2017-06-20 NOTE — PROGRESS NOTE ADULT - PROBLEM SELECTOR PLAN 3
Resolving. plan as above, c/w HF  - C/w HF, deescalate to NC as needed   - Holding off lasix since patient febrile and having insensitive losses.

## 2017-06-20 NOTE — PROGRESS NOTE ADULT - SUBJECTIVE AND OBJECTIVE BOX
Patient seen and examined at bedside. Still weak, breathing issues, stiff      amiodarone    Tablet 200 daily  insulin lispro (HumaLOG) corrective regimen sliding scale  Before meals and at bedtime  atorvastatin 40 at bedtime  acetaminophen  Suppository 650 every 6 hours PRN  bisacodyl Suppository 10 daily  lidocaine   Patch 1 daily  metoprolol 12.5 two times a day  guaiFENesin    Syrup 200 every 6 hours PRN  meperidine     Injectable 25 every 6 hours PRN  oxyCODONE Solution 5 every 4 hours PRN  piperacillin/tazobactam IVPB. 3.375 every 6 hours  vancomycin  IVPB 750 every 24 hours  apixaban 2.5 two times a day  furosemide    Tablet 20 daily  aspirin enteric coated 81 daily      Allergies    IV CONtRAST (Flushing (Skin); Rash)  No Known Drug Allergies    Intolerances        T(C): , Max: 37.8 ( @ 11:41)  T(F): , Max: 100 ( @ 11:41)  HR: 95  BP: 123/76  BP(mean): 80  RR: 18  SpO2: 95%  Wt(kg): --    I & Os for current day (as of  @ 12:37)  =============================================  IN:    Solution: 100 ml    Total IN: 100 ml  ---------------------------------------------  OUT:    Voided: 150 ml    Total OUT: 150 ml  ---------------------------------------------  Total NET: -50 ml          LABS:                        7.4    3.0   )-----------( 74       ( 2017 06:00 )             22.7         138  |  103  |  20  ----------------------------<  102<H>  3.7   |  25  |  0.90    Ca    8.0<L>      2017 06:01  Phos  1.8     06-20  Mg     1.7     06-20          Urinalysis Basic - ( 2017 12:28 )    Color: Yellow / Appearance: Clear / S.020 / pH: x  Gluc: x / Ketone: NEGATIVE  / Bili: NEGATIVE / Urobili: 0.2 E.U./dL   Blood: x / Protein: 100 mg/dL / Nitrite: NEGATIVE   Leuk Esterase: NEGATIVE / RBC: 5-10 /HPF / WBC 5-10 /HPF   Sq Epi: x / Non Sq Epi: Rare /HPF / Bacteria: Present /HPF            RADIOLOGY & ADDITIONAL STUDIES:

## 2017-06-20 NOTE — PROGRESS NOTE ADULT - PROBLEM SELECTOR PLAN 1
Source is urosepsis, seeding to chemoport, which was removed on 6/12, persistent bacteremia of GNR, however last two sets of cultures NGTD so far (6/15-6/18).   - Patient spiked a temp of 100 oral. Fever workup done which showed worsening of R pleural effusion. BCx and UA sent, f/u.   - ID consulted, patient was on Ceftriaxone 1g with intention to keep her for a total of 10 days post-line removal (starting 6/13), but on 6/18, patient was febrile to 103 and abx were broadened to Vanc/Zosyn.   - C/w Vanc 750mg daily (6/19- )   - C/w Zosyn 3.375mg daily (6/18- )   - f/u up with ID on duration of Abx   - Blood Cx (6/18) NGTD   - NM Gallium Scan (6/16)- negative scan- Pacemaker clean. Consider repeat scan as pt febrile again last night to 103   - c/w Tylenol PRN, Demerol PRN can be ordered for rigors   - Repeat renal US- no changes   - Repeat UA (6/18)- negative

## 2017-06-20 NOTE — PROGRESS NOTE ADULT - SUBJECTIVE AND OBJECTIVE BOX
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 Patients family at bedside states she's been more lethargic with associated lower extremity swelling, SOB, decreased appetite and diarrhea. Patient with previous admissions for symptomatic hyperglycemia in January. Patient currently seeing Dr. Galaviz on a regular basis for chemotherapy treatment. Last treatment on May 23rd for which she received Bendamustine and Rituximab. Patient denies any headaches, chest pain, abdominal pain, N/V, dysuria, or bowel changes.     Upon arrival to the ED, patient in moderate respiratory distress using accessory muscles, breathing w/ a RR of 30, O2 93% on room air. Patient placed on BIPAP with slight improvement in respiratory status. Patient febrile to 101 with a HR of 77. Labs significant for AG metabolic acidosis, K 7.0, Na 127, BUN 58, Cr. 2.0 (baseline < 0.9 January), Lactate 2.8. CXR done showing RLL infiltrate with pulmonary vascular congestion. In the ED, patient received Vancomycin, Zosyn, Tylenol, Calcium Gluconate 2g, Insulin 5u, Albuterol neb 5mg x 2. ICU consulted, will be transferred to MICU for management of severe sepsis 2/2 HAP and pulmonary congestion. (10 Ramon 2017 19:13)    INTERVAL HPI/OVERNIGHT EVENTS:::transfer to my service-- will follow, chart reviewed.    HEALTH ISSUES - PROBLEM Dx:  Acute pulmonary edema: Acute pulmonary edema  Need for prophylactic measure: Need for prophylactic measure  Nutrition, metabolism, and development symptoms: Nutrition, metabolism, and development symptoms  Anemia, unspecified type: Anemia, unspecified type  Malignant neoplasm of female breast, unspecified laterality, unspecified site of breast: Malignant neoplasm of female breast, unspecified laterality, unspecified site of breast  Diffuse large B-cell lymphoma, unspecified body region: Diffuse large B-cell lymphoma, unspecified body region  Coronary artery disease involving native coronary artery of native heart without angina pectoris: Coronary artery disease involving native coronary artery of native heart without angina pectoris  Paroxysmal atrial fibrillation: Paroxysmal atrial fibrillation  Acute respiratory failure with hypoxia: Acute respiratory failure with hypoxia  Sepsis: Sepsis  Lymphoma: Lymphoma  Severe sepsis: Severe sepsis  Central venous line infection, initial encounter: Central venous line infection, initial encounter  E coli bacteremia: E coli bacteremia  Medical orders for life-sustaining treatment (MOLST) form in chart: Medical orders for life-sustaining treatment (MOLST) form in chart  DNR (do not resuscitate): DNR (do not resuscitate)  Chronic back pain: Chronic back pain  Dyspnea and respiratory abnormalities: Dyspnea and respiratory abnormalities  Goals of care, counseling/discussion: Goals of care, counseling/discussion  Patient has healthcare proxy: Patient has healthcare proxy  Moderate protein-calorie malnutrition: Moderate protein-calorie malnutrition  Impaired mobility and activities of daily living: Impaired mobility and activities of daily living  Full code status: Full code status  Palliative care encounter: Palliative care encounter  Breast cancer: Breast cancer  Diffuse large B cell lymphoma: Diffuse large B cell lymphoma  Bacteremia due to Gram-negative bacteria: Bacteremia due to Gram-negative bacteria  Shortness of breath: Shortness of breath  Hyperkalemia: Hyperkalemia  ROMEO (acute kidney injury): ROMEO (acute kidney injury)          PAST MEDICAL & SURGICAL HISTORY:  Scoliosis  Follicular lymphoma  Neck mass  Afib  Other hyperlipidemia  Breast cancer  CAD (coronary artery disease)  Diabetes  HTN (hypertension)  No pertinent past medical history  H/O abdominal hysterectomy  H/O thyroidectomy  S/P CABG x 3          Consultant NOTE  REVIEWED  (   )    REVIEW OF SYSTEMS:  [x] As per HPI              Vital Signs Last 24 Hrs  T(C): 37.2, Max: 37.8 ( @ 11:41)  T(F): 99, Max: 100 ( @ 11:41)  HR: 94 (89 - 103)  BP: 118/66 (109/54 - 133/71)  BP(mean): --  RR: 18 (16 - 21)  SpO2: 98% (95% - 99%)      I & Os for 24h ending  @ 07:00  =============================================  IN: 100 ml / OUT: 150 ml / NET: -50 ml    I & Os for current day (as of  @ 15:51)  =============================================  IN: 100 ml / OUT: 0 ml / NET: 100 ml    PHYSICAL EXAMINATION:                                    (    )  NO CHANGE  Appearance: Normal	weak, frail  HEENT:   Normal oral mucosa, PERRL, EOMI	  Neck: Supple, + JVD/ - JVD; Carotid Bruit   Cardiovascular: Normal S1 S2, No JVD, s/p CABG   Respiratory: rales and rhonchi	  Gastrointestinal:  Soft, Non-tender, + BS	  Skin: No rashes, No ecchymoses, No cyanosis  Extremities: edema  Vascular: Peripheral pulses palpable 2+ bilaterally  Neurologic: Non-focal  Psychiatry: Awake    amiodarone    Tablet 200milliGRAM(s) Oral daily  insulin lispro (HumaLOG) corrective regimen sliding scale  SubCutaneous Before meals and at bedtime  atorvastatin 40milliGRAM(s) Oral at bedtime  acetaminophen  Suppository 650milliGRAM(s) Rectal every 6 hours PRN  bisacodyl Suppository 10milliGRAM(s) Rectal daily  lidocaine   Patch 1Patch Transdermal daily  metoprolol 12.5milliGRAM(s) Oral two times a day  guaiFENesin    Syrup 200milliGRAM(s) Oral every 6 hours PRN  meperidine     Injectable 25milliGRAM(s) IV Push every 6 hours PRN  oxyCODONE Solution 5milliGRAM(s) Oral every 4 hours PRN  piperacillin/tazobactam IVPB. 3.375Gram(s) IV Intermittent every 6 hours  vancomycin  IVPB 750milliGRAM(s) IV Intermittent every 24 hours  apixaban 2.5milliGRAM(s) Oral two times a day  furosemide    Tablet 20milliGRAM(s) Oral daily  aspirin enteric coated 81milliGRAM(s) Oral daily  morphine  - Injectable 0.5milliGRAM(s) IV Push every 4 hours PRN  morphine  - Injectable 1milliGRAM(s) IV Push every 4 hours PRN                                      7.4    3.0   )-----------( 74       ( 2017 06:00 )             22.7     06-20    138  |  103  |  20  ----------------------------<  102<H>  3.7   |  25  |  0.90    Ca    8.0<L>      2017 06:01  Phos  1.8     06-20  Mg     1.7     06-20        CAPILLARY BLOOD GLUCOSE  131 (2017 12:10)  134 (2017 09:00)  150 (2017 22:07)  142 (2017 16:58)    Urinalysis Basic - ( 2017 12:28 )    Color: Yellow / Appearance: Clear / S.020 / pH: x  Gluc: x / Ketone: NEGATIVE  / Bili: NEGATIVE / Urobili: 0.2 E.U./dL   Blood: x / Protein: 100 mg/dL / Nitrite: NEGATIVE   Leuk Esterase: NEGATIVE / RBC: 5-10 /HPF / WBC 5-10 /HPF   Sq Epi: x / Non Sq Epi: Rare /HPF / Bacteria: Present /HPF Patient is a 86y old  Female who presents with a chief complaint of Fever (10 Ramon 2017 19:13)      HPI:  87 y/o F w/ pmhx of CAD s/p CABG, atrial fibrillation s/p PPM (on Eliquis), Breast CA (on Arimidex), 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 Patients family at bedside states she's been more lethargic with associated lower extremity swelling, SOB, decreased appetite and diarrhea. Patient with previous admissions for symptomatic hyperglycemia in January. Patient currently seeing Dr. Galaviz on a regular basis for chemotherapy treatment. Last treatment on May 23rd for which she received Bendamustine and Rituximab. Patient denies any headaches, chest pain, abdominal pain, N/V, dysuria, or bowel changes.     Upon arrival to the ED, patient in moderate respiratory distress using accessory muscles, breathing w/ a RR of 30, O2 93% on room air. Patient placed on BIPAP with slight improvement in respiratory status. Patient febrile to 101 with a HR of 77. Labs significant for AG metabolic acidosis, K 7.0, Na 127, BUN 58, Cr. 2.0 (baseline < 0.9 January), Lactate 2.8. CXR done showing RLL infiltrate with pulmonary vascular congestion. In the ED, patient received Vancomycin, Zosyn, Tylenol, Calcium Gluconate 2g, Insulin 5u, Albuterol neb 5mg x 2. ICU consulted,    INTERVAL HPI/OVERNIGHT EVENTS:::transfer to my service-- will follow, chart reviewed.    HEALTH ISSUES - PROBLEM Dx:  Acute pulmonary edema: Acute pulmonary edema  Need for prophylactic measure: Need for prophylactic measure  Nutrition, metabolism, and development symptoms: Nutrition, metabolism, and development symptoms  Anemia, unspecified type: Anemia, unspecified type  Malignant neoplasm of female breast, unspecified laterality, unspecified site of breast: Malignant neoplasm of female breast, unspecified laterality, unspecified site of breast  Diffuse large B-cell lymphoma, unspecified body region: Diffuse large B-cell lymphoma, unspecified body region  Coronary artery disease involving native coronary artery of native heart without angina pectoris: Coronary artery disease involving native coronary artery of native heart without angina pectoris  Paroxysmal atrial fibrillation: Paroxysmal atrial fibrillation  Acute respiratory failure with hypoxia: Acute respiratory failure with hypoxia  Sepsis: Sepsis  Lymphoma: Lymphoma  Severe sepsis: Severe sepsis  Central venous line infection, initial encounter: Central venous line infection, initial encounter  E coli bacteremia: E coli bacteremia  Medical orders for life-sustaining treatment (MOLST) form in chart: Medical orders for life-sustaining treatment (MOLST) form in chart  DNR (do not resuscitate): DNR (do not resuscitate)  Chronic back pain: Chronic back pain  Dyspnea and respiratory abnormalities: Dyspnea and respiratory abnormalities  Goals of care, counseling/discussion: Goals of care, counseling/discussion  Patient has healthcare proxy: Patient has healthcare proxy  Moderate protein-calorie malnutrition: Moderate protein-calorie malnutrition  Impaired mobility and activities of daily living: Impaired mobility and activities of daily living  Full code status: Full code status  Palliative care encounter: Palliative care encounter  Breast cancer: Breast cancer  Diffuse large B cell lymphoma: Diffuse large B cell lymphoma  Bacteremia due to Gram-negative bacteria: Bacteremia due to Gram-negative bacteria  Shortness of breath: Shortness of breath  Hyperkalemia: Hyperkalemia  ROMEO (acute kidney injury): ROMEO (acute kidney injury)          PAST MEDICAL & SURGICAL HISTORY:  Scoliosis  Follicular lymphoma  Neck mass  Afib  Other hyperlipidemia  Breast cancer  CAD (coronary artery disease)  Diabetes  HTN (hypertension)  No pertinent past medical history  H/O abdominal hysterectomy  H/O thyroidectomy  S/P CABG x 3          Consultant NOTE  REVIEWED  ( +++  )    REVIEW OF SYSTEMS:  [x] As per HPI              Vital Signs Last 24 Hrs  T(C): 37.2, Max: 37.8 ( @ 11:41)  T(F): 99, Max: 100 ( @ 11:41)  HR: 94 (89 - 103)  BP: 118/66 (109/54 - 133/71)  BP(mean): --  RR: 18 (16 - 21)  SpO2: 98% (95% - 99%)      I & Os for 24h ending  @ 07:00  =============================================  IN: 100 ml / OUT: 150 ml / NET: -50 ml    I & Os for current day (as of  @ 15:51)  =============================================  IN: 100 ml / OUT: 0 ml / NET: 100 ml    PHYSICAL EXAMINATION:                                    (    )  NO CHANGE  Appearance: Normal	weak, frail  HEENT:   Normal oral mucosa, PERRL, EOMI	  Neck: Supple, + JVD/ - JVD; Carotid Bruit   Cardiovascular: Normal S1 S2, No JVD, s/p CABG   Respiratory: rales and rhonchi	  Gastrointestinal:  Soft, Non-tender, + BS	  Skin: No rashes,   Extremities: edema  Vascular: Peripheral pulses   Neurologic: Non-focal  Psychiatry: Awake    amiodarone    Tablet 200milliGRAM(s) Oral daily  insulin lispro (HumaLOG) corrective regimen sliding scale  SubCutaneous Before meals and at bedtime  atorvastatin 40milliGRAM(s) Oral at bedtime  acetaminophen  Suppository 650milliGRAM(s) Rectal every 6 hours PRN  bisacodyl Suppository 10milliGRAM(s) Rectal daily  lidocaine   Patch 1Patch Transdermal daily  metoprolol 12.5milliGRAM(s) Oral two times a day  guaiFENesin    Syrup 200milliGRAM(s) Oral every 6 hours PRN  meperidine     Injectable 25milliGRAM(s) IV Push every 6 hours PRN  oxyCODONE Solution 5milliGRAM(s) Oral every 4 hours PRN  piperacillin/tazobactam IVPB. 3.375Gram(s) IV Intermittent every 6 hours  vancomycin  IVPB 750milliGRAM(s) IV Intermittent every 24 hours  apixaban 2.5milliGRAM(s) Oral two times a day  furosemide    Tablet 20milliGRAM(s) Oral daily  aspirin enteric coated 81milliGRAM(s) Oral daily  morphine  - Injectable 0.5milliGRAM(s) IV Push every 4 hours PRN  morphine  - Injectable 1milliGRAM(s) IV Push every 4 hours PRN                                      7.4    3.0   )-----------( 74       ( 2017 06:00 )             22.7     06-20    138  |  103  |  20  ----------------------------<  102<H>  3.7   |  25  |  0.90    Ca    8.0<L>      2017 06:01  Phos  1.8     06-20  Mg     1.7     06-20        CAPILLARY BLOOD GLUCOSE  131 (2017 12:10)  134 (2017 09:00)  150 (2017 22:07)  142 (2017 16:58)    Urinalysis Basic - ( 2017 12:28 )    Color: Yellow / Appearance: Clear / S.020 / pH: x  Gluc: x / Ketone: NEGATIVE  / Bili: NEGATIVE / Urobili: 0.2 E.U./dL   Blood: x / Protein: 100 mg/dL / Nitrite: NEGATIVE   ere are findings suggestive of pulmonary edema with small bilateral   pleural effusions and pulmonary vascular congestion in the setting of   cardiomegaly.  There is no pneumothorax. The cardiomediastinal silhouette   silhouette is unchanged. Support devices and lines are unchanged.      IMPRESSION: No significant interval change in findings suggestive of   congestive heart failure.  Leuk Esterase: NEGATIVE / RBC: 5-10 /HPF / WBC 5-10 /HPF   Sq Epi: x / Non Sq Epi: Rare /HPF / Bacteria: Present /HPF

## 2017-06-20 NOTE — PROGRESS NOTE ADULT - SUBJECTIVE AND OBJECTIVE BOX
Chief Complaint/Reason for Consult: cv mgmt  INTERVAL HPI: overnight events noted for improved respiratory status  	  MEDICATIONS:  amiodarone    Tablet 200milliGRAM(s) Oral daily  metoprolol 12.5milliGRAM(s) Oral two times a day  furosemide    Tablet 20milliGRAM(s) Oral daily    piperacillin/tazobactam IVPB. 3.375Gram(s) IV Intermittent every 6 hours  vancomycin  IVPB 750milliGRAM(s) IV Intermittent every 24 hours    guaiFENesin    Syrup 200milliGRAM(s) Oral every 6 hours PRN    acetaminophen  Suppository 650milliGRAM(s) Rectal every 6 hours PRN  meperidine     Injectable 25milliGRAM(s) IV Push every 6 hours PRN  oxyCODONE Solution 5milliGRAM(s) Oral every 4 hours PRN    bisacodyl Suppository 10milliGRAM(s) Rectal daily    insulin lispro (HumaLOG) corrective regimen sliding scale  SubCutaneous Before meals and at bedtime  atorvastatin 40milliGRAM(s) Oral at bedtime    lidocaine   Patch 1Patch Transdermal daily  apixaban 2.5milliGRAM(s) Oral two times a day  aspirin enteric coated 81milliGRAM(s) Oral daily      REVIEW OF SYSTEMS:  [x] As per HPI  CONSTITUTIONAL: No fever, weight loss, or fatigue  RESPIRATORY: No cough, wheezing, chills or hemoptysis; No Shortness of Breath  CARDIOVASCULAR: No chest pain, palpitations, dizziness, or leg swelling  GASTROINTESTINAL: No abdominal or epigastric pain. No nausea, vomiting, or hematemesis; No diarrhea or constipation. No melena or hematochezia.  MUSCULOSKELETAL: No joint pain or swelling; No muscle, back, or extremity pain  [x] All others negative	  [ ] Unable to obtain    PHYSICAL EXAM:  T(C): 36.6, Max: 37.2 (06-19 @ 16:27)  HR: 100 (89 - 103)  BP: 109/54 (109/54 - 133/82)  RR: 18 (16 - 21)  SpO2: 98% (95% - 100%)  Wt(kg): --  I&O's Summary    I & Os for current day (as of 20 Jun 2017 11:06)  =============================================  IN: 100 ml / OUT: 150 ml / NET: -50 ml        Appearance: Normal	  HEENT:   Normal oral mucosa  Cardiovascular: Normal S1 S2, No JVD, No murmurs, No edema  Respiratory: Lungs clear to auscultation	  Gastrointestinal:  Soft, Non-tender, + BS	  Extremities: Normal range of motion, No clubbing, cyanosis or edema  Vascular: Peripheral pulses palpable 2+ bilaterally    TELEMETRY: 	    ECG:    	  RADIOLOGY:   CXR:  CT:  US:    CARDIAC TESTING:  Echocardiogram:  Catheterization:  Stress Test:      LABS:	 	    CARDIAC MARKERS:                                  7.4    3.0   )-----------( 74       ( 20 Jun 2017 06:00 )             22.7     06-20    138  |  103  |  20  ----------------------------<  102<H>  3.7   |  25  |  0.90    Ca    8.0<L>      20 Jun 2017 06:01  Phos  1.8     06-20  Mg     1.7     06-20      proBNP:   Lipid Profile:   HgA1c:   TSH:     ASSESSMENT/PLAN: 	  ·  Problem: Acute pulmonary edema.  Plan: Resolving. plan as above, c/w HF  - C/w BIPAP/HF, deescalate to NC as needed   - Lasix prn.     ·  Problem: Paroxysmal atrial fibrillation.  Plan: s/p PPM, on Eliquis at home. Decreased CrCl. Concern for pacemaker involvement, however gallium scan negative   - S/p Lovenox 50mg BID in setting of malignancy   - c/w home Amiodarone 200mg QD   - AC switched from Lovenox to Eliquis (2.5mg since patient age >80yrs and weight <60km).       ·  Problem: Coronary artery disease involving native coronary artery of native heart without angina pectoris.  Plan: Patient s/p CABG. Patient without chest pain. EKG without ischemic changes. EF EF 60-65%.   - c/w home Lipitor 40mg QHS  - Holding ASA   - c/w Metoprolol 12.5 BID, may need to uptitrate   - Medically optimize cardiac function if BP allows   - Consider restarting standing Lasix , currently only getting as PRN if pt is overloaded on am CXR  - Continue to monitor I's and O's.

## 2017-06-20 NOTE — PROGRESS NOTE ADULT - PROBLEM SELECTOR PLAN 2
Requiring BIPAP 2/2 pulmonary congestion/edema likely due to CHF exacerbation in the setting of urosepsis and line sepsis. Doing well w/ high flow nasal cannula throughout the day. Lung exam with improvement   - c/w hiflow nasal cannula as needed, wean as tolerated  - Oxycodone 5mg solution q6 for air hunger, increased work of breathing, comfort, and LBP   - C/w antibiotics as above

## 2017-06-20 NOTE — PROGRESS NOTE ADULT - COMMENTS
Still problems with face mask; some sob; not eating much, seems alert,  has to help with chores, may be a bit better, low grade temp.
unobtainable due to lethargy on BIPAP

## 2017-06-20 NOTE — PROGRESS NOTE ADULT - SUBJECTIVE AND OBJECTIVE BOX
CHIEF COMPLAINT:  Patient is a 86y old  Female who presents with a chief complaint of Fever (10 Ramon 2017 19:13)    INTERVAL HX:  Patient was seen and examined at bedside. Reports fever today. Small cough, unchanged. Productive of a small amount of white sputum, also unchanged from prior.    REVIEW OF SYSTEMS:  Constitutional: +fever  HEENT: no headache/sore throat/rhinorrhea  Respiratory: +cough productive of small amount of white sputum, no SOB or chest pain  Cardiac: no palpitations/chest pain  Gastrointestinal: no nausea/vomiting/diarrhea/constipation  Genitourinary: no dysuria/nocturia/polyuria  Skin: no rashes    OBJECTIVE:    Vital Signs Last 24 Hrs  T(C): 37.2, Max: 37.8 ( @ 11:41)  T(F): 99, Max: 100 ( @ 11:41)  HR: 94 (89 - 103)  BP: 118/66 (109/54 - 133/71)  BP(mean): --  RR: 18 (16 - 21)  SpO2: 98% (95% - 99%)    PHYSICAL EXAM:  Gen:       non-toxic, no acute distress  Skin:       warm, dry, no rash  HEENT:  moist mucous membranes, oropharynx clear, nares clear, no septal deviation  Cardiac: irregular, S1/S2 present, no murmur/gallop/rub  Pulm:     trace bibasilar crackles  Abd:       soft/nontender/nondistended, normoactive bowel sounds    ANTIBIOTICS:  piperacillin/tazobactam IVPB. 3.375Gram(s) IV Intermittent every 6 hours  vancomycin  IVPB 750milliGRAM(s) IV Intermittent every 24 hours      Allergies:  IV CONtRAST (Flushing (Skin); Rash)  No Known Drug Allergies        LABS:                        7.4    3.0   )-----------( 74       ( 2017 06:00 )             22.7    Mean Cell Volume: 92.3 fL ( @ 06:00)    -    138  |  103  |  20  ----------------------------<  102<H>  3.7   |  25  |  0.90    Ca    8.0<L>      2017 06:01  Phos  1.8       Mg     1.7             Urinalysis Basic - ( 2017 12:28 )    Color: Yellow / Appearance: Clear / S.020 / pH: x  Gluc: x / Ketone: NEGATIVE  / Bili: NEGATIVE / Urobili: 0.2 E.U./dL   Blood: x / Protein: 100 mg/dL / Nitrite: NEGATIVE   Leuk Esterase: NEGATIVE / RBC: 5-10 /HPF / WBC 5-10 /HPF   Sq Epi: x / Non Sq Epi: Rare /HPF / Bacteria: Present /HPF        MICROBIOLOGY: REVIEWED  Culture - Blood (17 @ 21:11)    Specimen Source: .Blood Blood-Peripheral    Culture Results:   No growth at 1 day.    Culture - Blood (17 @ 21:11)    Specimen Source: .Blood Blood-Peripheral    Culture Results:   No growth at 1 day.    Culture - Blood (06.15.17 @ 11:38)    Specimen Source: .Blood Blood-Venous    Culture Results:   No growth at 5 days.    RADIOLOGY: REVIEWED    Xray Chest  (my read): new R sided effusion, continued pulmonary vascular congestion

## 2017-06-20 NOTE — PROGRESS NOTE ADULT - PROBLEM SELECTOR PLAN 1
-febrile today. May represent bacteremia vs aspiration vs non-infectious cause of fever. Repeat fever workup sent, will f/u. Blood Cx from 6/18 remains no growth to date. F/u UA, UCx, BCx. Please check sputum culture.  -Xray with new vs worsening R sided effusion. Lungs continue to remain congested, but not consistent with focal infiltrate.  -C/w Zosyn (6/18 - ) and Vancomycin (6/18 -) for now given new fever. Will await further culture results prior to changing Abx regimen.

## 2017-06-20 NOTE — PROGRESS NOTE ADULT - SUBJECTIVE AND OBJECTIVE BOX
INTERVAL HPI/OVERNIGHT EVENTS:  - ON: TEJINDER  - S: patient denies urinary symptoms or any complaints     VITAL SIGNS:  T(F): 99  HR: 94  BP: 118/66  RR: 18  SpO2: 98%  Wt(kg): --    PHYSICAL EXAM:  Constitutional:   Eyes:  ENMT:  Neck:  Respiratory:  Cardiovascular:  Gastrointestinal:  Extremities:  Vascular:  Neurological:  Musculoskeletal:    MEDICATIONS  (STANDING):  amiodarone    Tablet 200milliGRAM(s) Oral daily  insulin lispro (HumaLOG) corrective regimen sliding scale  SubCutaneous Before meals and at bedtime  atorvastatin 40milliGRAM(s) Oral at bedtime  bisacodyl Suppository 10milliGRAM(s) Rectal daily  lidocaine   Patch 1Patch Transdermal daily  metoprolol 12.5milliGRAM(s) Oral two times a day  piperacillin/tazobactam IVPB. 3.375Gram(s) IV Intermittent every 6 hours  vancomycin  IVPB 750milliGRAM(s) IV Intermittent every 24 hours  apixaban 2.5milliGRAM(s) Oral two times a day  furosemide    Tablet 20milliGRAM(s) Oral daily  aspirin enteric coated 81milliGRAM(s) Oral daily    MEDICATIONS  (PRN):  acetaminophen  Suppository 650milliGRAM(s) Rectal every 6 hours PRN For Temp greater than 38 C (100.4 F)  guaiFENesin    Syrup 200milliGRAM(s) Oral every 6 hours PRN Cough  meperidine     Injectable 25milliGRAM(s) IV Push every 6 hours PRN Rigors  oxyCODONE Solution 5milliGRAM(s) Oral every 4 hours PRN Dyspnea / Air Hunger / RR>25 / Excessive work of breathing.  morphine  - Injectable 0.5milliGRAM(s) IV Push every 4 hours PRN Dyspnea / Air hunger / Increased work of breathing / RR>25  morphine  - Injectable 1milliGRAM(s) IV Push every 4 hours PRN Excessive work of breathing / RR>35 / Resp distress    Allergies  IV CONtRAST (Flushing (Skin); Rash)  No Known Drug Allergies    LABS:                        7.4    3.0   )-----------( 74       ( 2017 06:00 )             22.7     06-20    138  |  103  |  20  ----------------------------<  102<H>  3.7   |  25  |  0.90    Ca    8.0<L>      2017 06:01  Phos  1.8     06-20  Mg     1.7     06-20    Urinalysis Basic - ( 2017 12:28 )    Color: Yellow / Appearance: Clear / S.020 / pH: x  Gluc: x / Ketone: NEGATIVE  / Bili: NEGATIVE / Urobili: 0.2 E.U./dL   Blood: x / Protein: 100 mg/dL / Nitrite: NEGATIVE   Leuk Esterase: NEGATIVE / RBC: 5-10 /HPF / WBC 5-10 /HPF   Sq Epi: x / Non Sq Epi: Rare /HPF / Bacteria: Present /HPF    RADIOLOGY & ADDITIONAL TESTS: INTERVAL HPI/OVERNIGHT EVENTS:  - ON: TEJINDER  - S: patient denies urinary symptoms or any complaints     VITAL SIGNS:  T(F): 99  HR: 94  BP: 118/66  RR: 18  SpO2: 98%  Wt(kg): --    PHYSICAL EXAM:  Constitutional: ill appearing laying down in bed in no distress  Eyes: EOMI  ENMT: MMM  Respiratory: fine crackles at the bases, otherwise clear to auscultation  Cardiovascular: RRR, no M/R/G appreciated  Gastrointestinal: normoactive bowel sounds, abdomen soft, non tender, non distended  Extremities: symmetric, trace lower extremity edema and 1+ dependent edema  Vascular: + DP and radial pulses  Neurological: A&Ox3, follows commands  Musculoskeletal: no joint swelling    MEDICATIONS  (STANDING):  amiodarone    Tablet 200milliGRAM(s) Oral daily  insulin lispro (HumaLOG) corrective regimen sliding scale  SubCutaneous Before meals and at bedtime  atorvastatin 40milliGRAM(s) Oral at bedtime  bisacodyl Suppository 10milliGRAM(s) Rectal daily  lidocaine   Patch 1Patch Transdermal daily  metoprolol 12.5milliGRAM(s) Oral two times a day  piperacillin/tazobactam IVPB. 3.375Gram(s) IV Intermittent every 6 hours  vancomycin  IVPB 750milliGRAM(s) IV Intermittent every 24 hours  apixaban 2.5milliGRAM(s) Oral two times a day  furosemide    Tablet 20milliGRAM(s) Oral daily  aspirin enteric coated 81milliGRAM(s) Oral daily    MEDICATIONS  (PRN):  acetaminophen  Suppository 650milliGRAM(s) Rectal every 6 hours PRN For Temp greater than 38 C (100.4 F)  guaiFENesin    Syrup 200milliGRAM(s) Oral every 6 hours PRN Cough  meperidine     Injectable 25milliGRAM(s) IV Push every 6 hours PRN Rigors  oxyCODONE Solution 5milliGRAM(s) Oral every 4 hours PRN Dyspnea / Air Hunger / RR>25 / Excessive work of breathing.  morphine  - Injectable 0.5milliGRAM(s) IV Push every 4 hours PRN Dyspnea / Air hunger / Increased work of breathing / RR>25  morphine  - Injectable 1milliGRAM(s) IV Push every 4 hours PRN Excessive work of breathing / RR>35 / Resp distress    Allergies  IV CONtRAST (Flushing (Skin); Rash)  No Known Drug Allergies    LABS:                        7.4    3.0   )-----------( 74       ( 2017 06:00 )             22.7     06-20    138  |  103  |  20  ----------------------------<  102<H>  3.7   |  25  |  0.90    Ca    8.0<L>      2017 06:01  Phos  1.8       Mg     1.7     20    Urinalysis Basic - ( 2017 12:28 )    Color: Yellow / Appearance: Clear / S.020 / pH: x  Gluc: x / Ketone: NEGATIVE  / Bili: NEGATIVE / Urobili: 0.2 E.U./dL   Blood: x / Protein: 100 mg/dL / Nitrite: NEGATIVE   Leuk Esterase: NEGATIVE / RBC: 5-10 /HPF / WBC 5-10 /HPF   Sq Epi: x / Non Sq Epi: Rare /HPF / Bacteria: Present /HPF    RADIOLOGY & ADDITIONAL TESTS:

## 2017-06-21 DIAGNOSIS — E87.1 HYPO-OSMOLALITY AND HYPONATREMIA: ICD-10-CM

## 2017-06-21 LAB
ANION GAP SERPL CALC-SCNC: 14 MMOL/L — SIGNIFICANT CHANGE UP (ref 5–17)
BUN SERPL-MCNC: 16 MG/DL — SIGNIFICANT CHANGE UP (ref 7–23)
CALCIUM SERPL-MCNC: 8 MG/DL — LOW (ref 8.4–10.5)
CHLORIDE SERPL-SCNC: 99 MMOL/L — SIGNIFICANT CHANGE UP (ref 96–108)
CO2 SERPL-SCNC: 25 MMOL/L — SIGNIFICANT CHANGE UP (ref 22–31)
CREAT SERPL-MCNC: 0.8 MG/DL — SIGNIFICANT CHANGE UP (ref 0.5–1.3)
GLUCOSE SERPL-MCNC: 104 MG/DL — HIGH (ref 70–99)
HCT VFR BLD CALC: 24.3 % — LOW (ref 34.5–45)
HGB BLD-MCNC: 7.8 G/DL — LOW (ref 11.5–15.5)
MAGNESIUM SERPL-MCNC: 1.6 MG/DL — SIGNIFICANT CHANGE UP (ref 1.6–2.6)
MCHC RBC-ENTMCNC: 30 PG — SIGNIFICANT CHANGE UP (ref 27–34)
MCHC RBC-ENTMCNC: 32.1 G/DL — SIGNIFICANT CHANGE UP (ref 32–36)
MCV RBC AUTO: 93.5 FL — SIGNIFICANT CHANGE UP (ref 80–100)
PHOSPHATE SERPL-MCNC: 2.3 MG/DL — LOW (ref 2.5–4.5)
PLATELET # BLD AUTO: 79 K/UL — LOW (ref 150–400)
POTASSIUM SERPL-MCNC: 3.4 MMOL/L — LOW (ref 3.5–5.3)
POTASSIUM SERPL-SCNC: 3.4 MMOL/L — LOW (ref 3.5–5.3)
RBC # BLD: 2.6 M/UL — LOW (ref 3.8–5.2)
RBC # FLD: 17.1 % — HIGH (ref 10.3–16.9)
SODIUM SERPL-SCNC: 138 MMOL/L — SIGNIFICANT CHANGE UP (ref 135–145)
WBC # BLD: 1.8 K/UL — LOW (ref 3.8–10.5)
WBC # FLD AUTO: 1.8 K/UL — LOW (ref 3.8–10.5)

## 2017-06-21 PROCEDURE — 99233 SBSQ HOSP IP/OBS HIGH 50: CPT

## 2017-06-21 PROCEDURE — 71010: CPT | Mod: 26

## 2017-06-21 PROCEDURE — 99232 SBSQ HOSP IP/OBS MODERATE 35: CPT | Mod: GC

## 2017-06-21 RX ORDER — MAGNESIUM SULFATE 500 MG/ML
2 VIAL (ML) INJECTION ONCE
Qty: 0 | Refills: 0 | Status: COMPLETED | OUTPATIENT
Start: 2017-06-21 | End: 2017-06-21

## 2017-06-21 RX ORDER — MEGESTROL ACETATE 40 MG/ML
625 SUSPENSION ORAL DAILY
Qty: 0 | Refills: 0 | Status: DISCONTINUED | OUTPATIENT
Start: 2017-06-21 | End: 2017-06-27

## 2017-06-21 RX ORDER — FUROSEMIDE 40 MG
20 TABLET ORAL ONCE
Qty: 0 | Refills: 0 | Status: COMPLETED | OUTPATIENT
Start: 2017-06-21 | End: 2017-06-21

## 2017-06-21 RX ORDER — OXYCODONE HYDROCHLORIDE 5 MG/1
5 TABLET ORAL EVERY 4 HOURS
Qty: 0 | Refills: 0 | Status: DISCONTINUED | OUTPATIENT
Start: 2017-06-21 | End: 2017-06-27

## 2017-06-21 RX ORDER — CEFTRIAXONE 500 MG/1
1 INJECTION, POWDER, FOR SOLUTION INTRAMUSCULAR; INTRAVENOUS EVERY 24 HOURS
Qty: 0 | Refills: 0 | Status: DISCONTINUED | OUTPATIENT
Start: 2017-06-21 | End: 2017-06-23

## 2017-06-21 RX ORDER — POTASSIUM PHOSPHATE, MONOBASIC POTASSIUM PHOSPHATE, DIBASIC 236; 224 MG/ML; MG/ML
15 INJECTION, SOLUTION INTRAVENOUS ONCE
Qty: 0 | Refills: 0 | Status: COMPLETED | OUTPATIENT
Start: 2017-06-21 | End: 2017-06-21

## 2017-06-21 RX ORDER — PEGFILGRASTIM-CBQV 6 MG/.6ML
6 INJECTION, SOLUTION SUBCUTANEOUS ONCE
Qty: 0 | Refills: 0 | Status: COMPLETED | OUTPATIENT
Start: 2017-06-21 | End: 2017-06-21

## 2017-06-21 RX ORDER — MORPHINE SULFATE 50 MG/1
1 CAPSULE, EXTENDED RELEASE ORAL EVERY 4 HOURS
Qty: 0 | Refills: 0 | Status: DISCONTINUED | OUTPATIENT
Start: 2017-06-21 | End: 2017-06-27

## 2017-06-21 RX ORDER — MEGESTROL ACETATE 40 MG/ML
625 SUSPENSION ORAL DAILY
Qty: 0 | Refills: 0 | Status: DISCONTINUED | OUTPATIENT
Start: 2017-06-21 | End: 2017-06-21

## 2017-06-21 RX ORDER — POTASSIUM CHLORIDE 20 MEQ
40 PACKET (EA) ORAL ONCE
Qty: 0 | Refills: 0 | Status: COMPLETED | OUTPATIENT
Start: 2017-06-21 | End: 2017-06-21

## 2017-06-21 RX ORDER — MORPHINE SULFATE 50 MG/1
0.5 CAPSULE, EXTENDED RELEASE ORAL EVERY 4 HOURS
Qty: 0 | Refills: 0 | Status: DISCONTINUED | OUTPATIENT
Start: 2017-06-21 | End: 2017-06-27

## 2017-06-21 RX ORDER — PEGFILGRASTIM-CBQV 6 MG/.6ML
480 INJECTION, SOLUTION SUBCUTANEOUS ONCE
Qty: 0 | Refills: 0 | Status: DISCONTINUED | OUTPATIENT
Start: 2017-06-21 | End: 2017-06-21

## 2017-06-21 RX ORDER — FUROSEMIDE 40 MG
40 TABLET ORAL ONCE
Qty: 0 | Refills: 0 | Status: COMPLETED | OUTPATIENT
Start: 2017-06-21 | End: 2017-06-21

## 2017-06-21 RX ORDER — OXYCODONE HYDROCHLORIDE 5 MG/1
5 TABLET ORAL EVERY 4 HOURS
Qty: 0 | Refills: 0 | Status: DISCONTINUED | OUTPATIENT
Start: 2017-06-21 | End: 2017-06-21

## 2017-06-21 RX ORDER — PREGABALIN 225 MG/1
1000 CAPSULE ORAL DAILY
Qty: 0 | Refills: 0 | Status: DISCONTINUED | OUTPATIENT
Start: 2017-06-21 | End: 2017-07-06

## 2017-06-21 RX ADMIN — APIXABAN 2.5 MILLIGRAM(S): 2.5 TABLET, FILM COATED ORAL at 11:15

## 2017-06-21 RX ADMIN — OXYCODONE HYDROCHLORIDE 5 MILLIGRAM(S): 5 TABLET ORAL at 16:21

## 2017-06-21 RX ADMIN — Medication 12.5 MILLIGRAM(S): at 06:42

## 2017-06-21 RX ADMIN — MORPHINE SULFATE 0.5 MILLIGRAM(S): 50 CAPSULE, EXTENDED RELEASE ORAL at 02:01

## 2017-06-21 RX ADMIN — Medication 20 MILLIGRAM(S): at 10:42

## 2017-06-21 RX ADMIN — OXYCODONE HYDROCHLORIDE 5 MILLIGRAM(S): 5 TABLET ORAL at 22:54

## 2017-06-21 RX ADMIN — Medication 12.5 MILLIGRAM(S): at 17:59

## 2017-06-21 RX ADMIN — MEGESTROL ACETATE 625 MILLIGRAM(S): 40 SUSPENSION ORAL at 15:57

## 2017-06-21 RX ADMIN — AMIODARONE HYDROCHLORIDE 200 MILLIGRAM(S): 400 TABLET ORAL at 06:42

## 2017-06-21 RX ADMIN — PIPERACILLIN AND TAZOBACTAM 200 GRAM(S): 4; .5 INJECTION, POWDER, LYOPHILIZED, FOR SOLUTION INTRAVENOUS at 03:15

## 2017-06-21 RX ADMIN — Medication 81 MILLIGRAM(S): at 12:41

## 2017-06-21 RX ADMIN — ATORVASTATIN CALCIUM 40 MILLIGRAM(S): 80 TABLET, FILM COATED ORAL at 22:52

## 2017-06-21 RX ADMIN — OXYCODONE HYDROCHLORIDE 5 MILLIGRAM(S): 5 TABLET ORAL at 15:57

## 2017-06-21 RX ADMIN — Medication 40 MILLIEQUIVALENT(S): at 09:45

## 2017-06-21 RX ADMIN — Medication 40 MILLIGRAM(S): at 02:45

## 2017-06-21 RX ADMIN — OXYCODONE HYDROCHLORIDE 5 MILLIGRAM(S): 5 TABLET ORAL at 18:33

## 2017-06-21 RX ADMIN — Medication 2: at 18:26

## 2017-06-21 RX ADMIN — CEFTRIAXONE 100 GRAM(S): 500 INJECTION, POWDER, FOR SOLUTION INTRAMUSCULAR; INTRAVENOUS at 15:57

## 2017-06-21 RX ADMIN — PEGFILGRASTIM-CBQV 6 MILLIGRAM(S): 6 INJECTION, SOLUTION SUBCUTANEOUS at 15:18

## 2017-06-21 RX ADMIN — Medication 50 GRAM(S): at 09:45

## 2017-06-21 RX ADMIN — Medication 10 MILLIGRAM(S): at 12:41

## 2017-06-21 RX ADMIN — PREGABALIN 1000 MICROGRAM(S): 225 CAPSULE ORAL at 13:22

## 2017-06-21 RX ADMIN — LIDOCAINE 1 PATCH: 4 CREAM TOPICAL at 22:52

## 2017-06-21 RX ADMIN — MORPHINE SULFATE 0.5 MILLIGRAM(S): 50 CAPSULE, EXTENDED RELEASE ORAL at 15:46

## 2017-06-21 RX ADMIN — Medication 20 MILLIGRAM(S): at 06:42

## 2017-06-21 RX ADMIN — APIXABAN 2.5 MILLIGRAM(S): 2.5 TABLET, FILM COATED ORAL at 22:53

## 2017-06-21 RX ADMIN — OXYCODONE HYDROCHLORIDE 5 MILLIGRAM(S): 5 TABLET ORAL at 23:15

## 2017-06-21 RX ADMIN — OXYCODONE HYDROCHLORIDE 5 MILLIGRAM(S): 5 TABLET ORAL at 18:00

## 2017-06-21 RX ADMIN — MORPHINE SULFATE 0.5 MILLIGRAM(S): 50 CAPSULE, EXTENDED RELEASE ORAL at 01:46

## 2017-06-21 RX ADMIN — MORPHINE SULFATE 0.5 MILLIGRAM(S): 50 CAPSULE, EXTENDED RELEASE ORAL at 16:21

## 2017-06-21 RX ADMIN — POTASSIUM PHOSPHATE, MONOBASIC POTASSIUM PHOSPHATE, DIBASIC 62.5 MILLIMOLE(S): 236; 224 INJECTION, SOLUTION INTRAVENOUS at 15:16

## 2017-06-21 RX ADMIN — LIDOCAINE 1 PATCH: 4 CREAM TOPICAL at 12:49

## 2017-06-21 NOTE — PROVIDER CONTACT NOTE (CHANGE IN STATUS NOTIFICATION) - ACTION/TREATMENT ORDERED:
lasix 40 and chest x-ray
ice packs applied, pending tylenol order and repeat urine culture, unable to get sputum culture

## 2017-06-21 NOTE — PROGRESS NOTE ADULT - SUBJECTIVE AND OBJECTIVE BOX
INTERVAL HPI/OVERNIGHT EVENTS:  - ON: patient was c/o SOB, CXR done and was more congested than previous and got diuresis  - S: patient states her breathing is somewhat improved    VITAL SIGNS:  T(F): 98.1  HR: 86  BP: 110/52  RR: 16  SpO2: 96%  Wt(kg): --    PHYSICAL EXAM:  Constitutional: ill appearing, laying down in no distress  Respiratory: bibasilar crackles, otherwise CTA  Cardiovascular: RRR, no M/R/G appreciated  Gastrointestinal: normoactive bowel sounds, abdomen soft, NTND  Extremities: symmetric, trace edema  Vascular: + peripheral pulses  Neurological: A&Ox3, follows commands  Musculoskeletal: no joint swelling    MEDICATIONS  (STANDING):  amiodarone    Tablet 200milliGRAM(s) Oral daily  insulin lispro (HumaLOG) corrective regimen sliding scale  SubCutaneous Before meals and at bedtime  atorvastatin 40milliGRAM(s) Oral at bedtime  bisacodyl Suppository 10milliGRAM(s) Rectal daily  lidocaine   Patch 1Patch Transdermal daily  metoprolol 12.5milliGRAM(s) Oral two times a day  apixaban 2.5milliGRAM(s) Oral two times a day  furosemide    Tablet 20milliGRAM(s) Oral daily  aspirin enteric coated 81milliGRAM(s) Oral daily  cyanocobalamin Injectable 1000MICROGram(s) SubCutaneous daily  cefTRIAXone   IVPB 1Gram(s) IV Intermittent every 24 hours  megestrol Suspension 625milliGRAM(s) Oral daily  oxyCODONE Solution 5milliGRAM(s) Oral every 4 hours    MEDICATIONS  (PRN):  acetaminophen  Suppository 650milliGRAM(s) Rectal every 6 hours PRN For Temp greater than 38 C (100.4 F)  guaiFENesin    Syrup 200milliGRAM(s) Oral every 6 hours PRN Cough  meperidine     Injectable 25milliGRAM(s) IV Push every 6 hours PRN Rigors  morphine  - Injectable 0.5milliGRAM(s) IV Push every 4 hours PRN Dyspnea / Air hunger / Increased work of breathing / RR>25 / moderate pain (4-6)  morphine  - Injectable 1milliGRAM(s) IV Push every 4 hours PRN Excessive work of breathing / RR>35 / Resp distress / severe pain (7-10)    Allergies  IV CONtRAST (Flushing (Skin); Rash)  No Known Drug Allergies    LABS:                        7.8    1.8   )-----------( 79       ( 2017 06:05 )             24.3     06-21    138  |  99  |  16  ----------------------------<  104<H>  3.4<L>   |  25  |  0.80    Ca    8.0<L>      2017 07:05  Phos  2.3       Mg     1.6         Urinalysis Basic - ( 2017 19:05 )  Color: Yellow / Appearance: Clear / S.010 / pH: x  Gluc: x / Ketone: NEGATIVE  / Bili: NEGATIVE / Urobili: 0.2 E.U./dL   Blood: x / Protein: Trace mg/dL / Nitrite: NEGATIVE   Leuk Esterase: NEGATIVE / RBC: 5-10 /HPF / WBC 5-10 /HPF   Sq Epi: x / Non Sq Epi: Few /HPF / Bacteria: Present /HPF        RADIOLOGY & ADDITIONAL TESTS:

## 2017-06-21 NOTE — PROVIDER CONTACT NOTE (CHANGE IN STATUS NOTIFICATION) - SITUATION
Pt in respiratory distress and pulled off bipap.  Bipap placed back on pt and pt o2 sat at 85-89% and using accessory muscles.

## 2017-06-21 NOTE — PROGRESS NOTE ADULT - ASSESSMENT
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 Developed new fever yesterday, but otherwise afebrile since 6/18. Now also with pancytopenia.

## 2017-06-21 NOTE — PROVIDER CONTACT NOTE (CHANGE IN STATUS NOTIFICATION) - RECOMMENDATIONS
lasix chest x ray
blood cultures already performed today as well as repeat labs, consider increasing antibiotics, and tylenol to control fever, ice pack applied

## 2017-06-21 NOTE — PROGRESS NOTE ADULT - SUBJECTIVE AND OBJECTIVE BOX
Chief Complaint/Reason for Consult: CAD  INTERVAL HPI: events noted for dyspneia and fluid overload s/p Lasix  	  MEDICATIONS:  amiodarone    Tablet 200milliGRAM(s) Oral daily  metoprolol 12.5milliGRAM(s) Oral two times a day  furosemide    Tablet 20milliGRAM(s) Oral daily    cefTRIAXone   IVPB 1Gram(s) IV Intermittent every 24 hours    guaiFENesin    Syrup 200milliGRAM(s) Oral every 6 hours PRN    acetaminophen  Suppository 650milliGRAM(s) Rectal every 6 hours PRN  meperidine     Injectable 25milliGRAM(s) IV Push every 6 hours PRN  morphine  - Injectable 0.5milliGRAM(s) IV Push every 4 hours PRN  morphine  - Injectable 1milliGRAM(s) IV Push every 4 hours PRN  oxyCODONE Solution 5milliGRAM(s) Oral every 4 hours    bisacodyl Suppository 10milliGRAM(s) Rectal daily    insulin lispro (HumaLOG) corrective regimen sliding scale  SubCutaneous Before meals and at bedtime  atorvastatin 40milliGRAM(s) Oral at bedtime  megestrol Suspension 625milliGRAM(s) Oral daily    lidocaine   Patch 1Patch Transdermal daily  apixaban 2.5milliGRAM(s) Oral two times a day  aspirin enteric coated 81milliGRAM(s) Oral daily  potassium phosphate IVPB 15milliMole(s) IV Intermittent once  cyanocobalamin Injectable 1000MICROGram(s) SubCutaneous daily  pegfilgrastim Injectable 6milliGRAM(s) SubCutaneous once      REVIEW OF SYSTEMS:  [x] As per HPI  CONSTITUTIONAL: No fever, weight loss, or fatigue  RESPIRATORY: No cough, wheezing, chills or hemoptysis; No Shortness of Breath  CARDIOVASCULAR: No chest pain, palpitations, dizziness, or leg swelling  GASTROINTESTINAL: No abdominal or epigastric pain. No nausea, vomiting, or hematemesis; No diarrhea or constipation. No melena or hematochezia.  MUSCULOSKELETAL: No joint pain or swelling; No muscle, back, or extremity pain  [x] All others negative	  [ ] Unable to obtain    PHYSICAL EXAM:  T(C): 36.7, Max: 37.2 (06-20 @ 13:51)  HR: 87 (87 - 106)  BP: 110/52 (92/50 - 149/76)  RR: 16 (16 - 24)  SpO2: 97% (91% - 100%)  Wt(kg): --  I&O's Summary    I & Os for current day (as of 21 Jun 2017 12:33)  =============================================  IN: 100 ml / OUT: 400 ml / NET: -300 ml        Appearance: Normal	  HEENT:   Normal oral mucosa  Cardiovascular: Normal S1 S2, No JVD, No murmurs, No edema  Respiratory: Lungs clear to auscultation	  Gastrointestinal:  Soft, Non-tender, + BS	  Extremities: Normal range of motion, No clubbing, cyanosis or edema  Vascular: Peripheral pulses palpable 2+ bilaterally    TELEMETRY: 	    ECG:    	  RADIOLOGY:   CXR:  CT:  US:    CARDIAC TESTING:  Echocardiogram:  Catheterization:  Stress Test:      LABS:	 	    CARDIAC MARKERS:                                  7.8    1.8   )-----------( 79       ( 21 Jun 2017 06:05 )             24.3     06-21    138  |  99  |  16  ----------------------------<  104<H>  3.4<L>   |  25  |  0.80    Ca    8.0<L>      21 Jun 2017 07:05  Phos  2.3     06-21  Mg     1.6     06-21      proBNP:   Lipid Profile:   HgA1c:   TSH:     ASSESSMENT/PLAN: 	  Problem: Acute pulmonary edema.  Plan:above events noted s/p Lasix for relief  - C/w BIPAP/HF, deescalate to NC as needed   - Lasix prn dyspnea or desaturation    ·  Problem: Paroxysmal atrial fibrillation.  Plan: s/p PPM, on Eliquis at home. Decreased CrCl. Concern for pacemaker involvement, however gallium scan negative   - S/p Lovenox 50mg BID in setting of malignancy   - c/w home Amiodarone 200mg QD   - AC switched from Lovenox to Eliquis (2.5mg since patient age >80yrs and weight <60km).       ·  Problem: Coronary artery disease involving native coronary artery of native heart without angina pectoris.  Plan: Patient s/p CABG. Patient without chest pain. EKG without ischemic changes. EF EF 60-65%.   - c/w home Lipitor 40mg QHS  - Holding ASA   - c/w Metoprolol 12.5 BID, may need to uptitrate   - Medically optimize cardiac function if BP allows   - Consider restarting standing Lasix , currently only getting as PRN if pt is overloaded on am CXR  - Continue to monitor I's and O's.

## 2017-06-21 NOTE — PROGRESS NOTE ADULT - PROBLEM SELECTOR PLAN 2
Initially requiring BIPAP 2/2 pulmonary congestion/edema likely due to CHF exacerbation in the setting of urosepsis and line sepsis. Doing well w/ high flow nasal cannula throughout the day. Lung exam with improvement   - Weaned off to 6L NC  - Oxycodone 5mg solution q6 for air hunger, increased work of breathing, comfort, and LBP   - C/w antibiotics as above

## 2017-06-21 NOTE — PROGRESS NOTE ADULT - PROBLEM SELECTOR PLAN 1
Source is urosepsis, seeding to chemoport, which was removed on 6/12, persistent bacteremia of GNR, however last two sets of cultures NGTD so far (6/15-6/18).   - Patient spiked a temp of 100 oral yesterday. Fever workup done which showed worsening of R pleural effusion. BCx and UA sent, f/u.   - ID consulted, patient was on Ceftriaxone 1g with intention to keep her for a total of 10 days post-line removal (starting 6/13), but on 6/18, patient was febrile to 103 and abx were broadened to Vanc/Zosyn, however, considering sensitivities, patient switched back to ceftriaxone today to complete a 10 days course (last day 6/23)  - Blood Cx (6/15-6/18-6/20) NGTD   - NM Gallium Scan (6/16)- negative scan- Pacemaker clean. Consider repeat scan as pt febrile again last night to 103   - c/w Tylenol PRN, Demerol PRN can be ordered for rigors   - Repeat renal US- no changes   - Repeat UA (6/18)- negative

## 2017-06-21 NOTE — PROGRESS NOTE ADULT - SUBJECTIVE AND OBJECTIVE BOX
Patient seen and examined at bedside. still sob, weak, anxious      amiodarone    Tablet 200 daily  insulin lispro (HumaLOG) corrective regimen sliding scale  Before meals and at bedtime  atorvastatin 40 at bedtime  acetaminophen  Suppository 650 every 6 hours PRN  bisacodyl Suppository 10 daily  lidocaine   Patch 1 daily  metoprolol 12.5 two times a day  guaiFENesin    Syrup 200 every 6 hours PRN  meperidine     Injectable 25 every 6 hours PRN  apixaban 2.5 two times a day  furosemide    Tablet 20 daily  aspirin enteric coated 81 daily  morphine  - Injectable 0.5 every 4 hours PRN  morphine  - Injectable 1 every 4 hours PRN  cyanocobalamin Injectable 1000 daily  cefTRIAXone   IVPB 1 every 24 hours  megestrol Suspension 625 daily  oxyCODONE Solution 5 every 4 hours      Allergies    IV CONtRAST (Flushing (Skin); Rash)  No Known Drug Allergies    Intolerances        T(C): , Max: 37.1 ( @ 16:01)  T(F): , Max: 98.8 ( @ 16:01)  HR: 86  BP: 110/52  BP(mean): --  RR: 16  SpO2: 96%  Wt(kg): --    I & Os for current day (as of  @ 15:51)  =============================================  IN:    Solution: 100 ml    Total IN: 100 ml  ---------------------------------------------  OUT:    Voided: 400 ml    Total OUT: 400 ml  ---------------------------------------------  Total NET: -300 ml          LABS:                        7.8    1.8   )-----------( 79       ( 2017 06:05 )             24.3     06-    138  |  99  |  16  ----------------------------<  104<H>  3.4<L>   |  25  |  0.80    Ca    8.0<L>      2017 07:05  Phos  2.3     -  Mg     1.6     -          Urinalysis Basic - ( 2017 19:05 )    Color: Yellow / Appearance: Clear / S.010 / pH: x  Gluc: x / Ketone: NEGATIVE  / Bili: NEGATIVE / Urobili: 0.2 E.U./dL   Blood: x / Protein: Trace mg/dL / Nitrite: NEGATIVE   Leuk Esterase: NEGATIVE / RBC: 5-10 /HPF / WBC 5-10 /HPF   Sq Epi: x / Non Sq Epi: Few /HPF / Bacteria: Present /HPF            RADIOLOGY & ADDITIONAL STUDIES:

## 2017-06-21 NOTE — PROGRESS NOTE ADULT - PROBLEM SELECTOR PLAN 1
-febrile yesterday. Xray chest with improving lung infiltrate. Fever with xray findings may represent microaspiration.  -BCx negative so far. Will f/u. UA neg.  -Please obtain sputum culture.  -pancytopenia may be 2/2 vancomycin or Zosyn. Patient overall improving and all culture data shows E coli sensitive to ceftriaxone.  -Discontinue Vanc/Zosyn and restart ceftriaxone.  -This admission patient has received ceftriaxone (6/14-6/18), then Vanc/Zosyn (6/18-6/21).    -Plan was discussed with the patient.  -Case discussed with Dr Jaquez and primary team.

## 2017-06-21 NOTE — CHART NOTE - NSCHARTNOTEFT_GEN_A_CORE
At 2:15am, MD called by RN to bedside because patient "self-removed BiPAP" and now tachypneic. At bedside, pt breathing with RR 24 and in mild respiratory distress, placed back on BiPAP and saturating 85% on BiPAP. Other vitals - afebrile, , BP 160s/90s. On exam, pt mentating well, WWP x 4 extremities, with diffuse b/l crackles on lung exam. Per signout from day team, pt known to be overloaded from today's chest XR but not given Lasix due to sepsis and pt asymptomatic. Pt given 40mg IV Lasix with improvement within 15 minutes. O2 sat increased to 95% on BiPAP, RR decreased to 15 and no longer using accessory muscles, HR improved to 80-90, remains afebrile. Previously ordered ABG, however due to improvement with Lasix (etiology of SOB likely due to pulmonary edema), no need to perform ABG. Ordered for stat chest XR. PGY-1 Event Note    At 2:15am, MD called by RN to bedside because patient "self-removed BiPAP" and now tachypneic. At bedside, pt breathing with RR 24 and in mild respiratory distress, placed back on BiPAP and saturating 85% on BiPAP. Other vitals - afebrile, , BP 160s/90s. On exam, pt mentating well, WWP x 4 extremities, with diffuse b/l crackles on lung exam. Per signout from day team, pt known to be overloaded from today's chest XR but not given Lasix due to sepsis and pt asymptomatic. Pt given 40mg IV Lasix with improvement within 15 minutes. O2 sat increased to 95% on BiPAP, RR decreased to 15 and no longer using accessory muscles, HR improved to 80-90, remains afebrile. Previously ordered ABG, however due to improvement with Lasix (etiology of SOB likely due to pulmonary edema), no need to perform ABG. Ordered for stat chest XR. Discussed with PGY-2 Dr. Shankar. PGY-1 Event Note    At 2:15am, MD called by RN to bedside because patient "self-removed BiPAP" and now tachypneic. At bedside, pt breathing with RR 24 and in mild respiratory distress, placed back on BiPAP and saturating 85% on BiPAP. Other vitals - afebrile, , BP 160s/90s. On exam, pt mentating well, WWP x 4 extremities, with diffuse b/l crackles on lung exam. Per signout from day team, pt known to be overloaded from today's chest XR but not given Lasix due to sepsis and pt asymptomatic. Pt given 40mg IV Lasix with improvement within 15 minutes. O2 sat increased to 95% on BiPAP, RR decreased to 15 and no longer using accessory muscles, HR improved to 80-90, BP improved to 140s/80s. remains afebrile. Previously ordered ABG, however due to improvement with Lasix (etiology of SOB likely due to pulmonary edema), no need to perform ABG. Ordered for stat chest XR. Discussed with PGY-2 Dr. Shankar. PGY-1 Event Note    At 2:15am, MD called by RN to bedside because patient "self-removed BiPAP" and now tachypneic. At bedside, pt breathing with RR 24 and in mild respiratory distress, placed back on BiPAP and saturating 85% on BiPAP. Other vitals - afebrile, , BP 160s/90s. On exam, pt mentating well, WWP x 4 extremities, with diffuse b/l crackles on lung exam. Per signout from day team, pt known to be overloaded from today's chest XR but not given Lasix due to sepsis and pt asymptomatic. Pt given 40mg IV Lasix with improvement within 15 minutes. O2 sat increased to 95% on BiPAP, RR decreased to 15 and no longer using accessory muscles, HR improved to 80-90, BP improved to 140s/80s. remains afebrile. Previously ordered ABG, however due to improvement with Lasix (etiology of SOB likely due to pulmonary edema), no need to perform ABG. Ordered for stat chest XR-shows marked improvement in b/l infiltrates compared to 6/20. Discussed with PGY-2 Dr. Shankar.

## 2017-06-21 NOTE — PROGRESS NOTE ADULT - PROBLEM SELECTOR PLAN 5
Patient s/p CABG. Patient without chest pain. EKG without ischemic changes. EF EF 60-65%.   - c/w home Lipitor 40mg QHS  - Holding ASA   - c/w Metoprolol 12.5 BID, may need to uptitrate   - Medically optimize cardiac function if BP allows   - Continue to monitor I's and O's

## 2017-06-21 NOTE — PROGRESS NOTE ADULT - PROBLEM SELECTOR PLAN 4
Currently in remission as per [facility]. Patient and  state would continue treatments if required in the future, hence not a hospice candidate.

## 2017-06-21 NOTE — PROGRESS NOTE ADULT - SUBJECTIVE AND OBJECTIVE BOX
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 Patients family at bedside states she's been more lethargic with associated lower extremity swelling, SOB, decreased appetite and diarrhea. Patient with previous admissions for symptomatic hyperglycemia in January. Patient currently seeing Dr. Galaviz on a regular basis for chemotherapy treatment. Last treatment on May 23rd for which she received Bendamustine and Rituximab. Patient denies any headaches, chest pain, abdominal pain, N/V, dysuria, or bowel changes.     Upon arrival to the ED, patient in moderate respiratory distress using accessory muscles, breathing w/ a RR of 30, O2 93% on room air. Patient placed on BIPAP with slight improvement in respiratory status. Patient febrile to 101 with a HR of 77. Labs significant for AG metabolic acidosis, K 7.0, Na 127, BUN 58, Cr. 2.0 (baseline < 0.9 January), Lactate 2.8. CXR done showing RLL infiltrate with pulmonary vascular congestion. In the ED, patient received Vancomycin, Zosyn, Tylenol, Calcium Gluconate 2g, Insulin 5u, Albuterol neb 5mg x 2. ICU consulted, will be transferred to MICU for management of severe sepsis 2/2 HAP and pulmonary congestion. (10 Ramon 2017 19:13)    SUBJECTIVE: Saw and evaluated Mrs Carrillo at bedside today. She was found in bed with increased work of breathing, continues on HFNC. Had event last night that required lasix dosing with improvement in saturation. Currently sats of 99%, but complains of air hunger. Encouraged patient to sit up in bed rather than lay flat given concerns for aspiration during feedings. Also encouraged to be OOB to chair as tolerated. Patient waiting for PT to assist her in ambulating.     ROS:  DYSPNEA: Matthew: 5/10  Increased work of breathing	  NAUS/VOM: No  SECRETIONS: Yes, phlegm	  AGITATION: No  Pain (Y/N): No      -Provocation/Palliation:  -Quality/Quantity:  -Radiating:  -Severity:  -Timing/Frequency:  -Impact on ADLs:    OTHER REVIEW OF SYSTEMS: Denied    PEx:  T(C): 36.7, Max: 37.2 (06-20 @ 13:51)  HR: 87 (87 - 106)  BP: 110/52 (92/50 - 149/76)  RR: 16 (16 - 24)  SpO2: 97% (91% - 100%)  Wt(kg): 54.6    GENERAL: AAOx3 Khmer speaking woman In mod resp distress due to air hunger   HEENT: HFNC+     	  NECK:  Supple         CVS:  RR S1S2          	  RESP:  Tachypneic, increased work of breathing.	  GI: Soft NT ND BS+             	  : Normal           	  MUSC: Generalized muscle weakness      	  NEURO: No focal deficits     	  PSYCH: Calm        SKIN: Normal        	   LYMPH: Normal          ALLERGIES: IV CONtRAST (Flushing (Skin); Rash), No Known Drug Allergies    OPIATE NAÏVE (Y/N): Yes    MEDICATIONS: REVIEWED  MEDICATIONS  (STANDING):  amiodarone    Tablet 200milliGRAM(s) Oral daily  insulin lispro (HumaLOG) corrective regimen sliding scale  SubCutaneous Before meals and at bedtime  atorvastatin 40milliGRAM(s) Oral at bedtime  bisacodyl Suppository 10milliGRAM(s) Rectal daily  lidocaine   Patch 1Patch Transdermal daily  metoprolol 12.5milliGRAM(s) Oral two times a day  apixaban 2.5milliGRAM(s) Oral two times a day  furosemide    Tablet 20milliGRAM(s) Oral daily  aspirin enteric coated 81milliGRAM(s) Oral daily  potassium phosphate IVPB 15milliMole(s) IV Intermittent once  pegfilgrastim Injectable 480milliGRAM(s) SubCutaneous once  cyanocobalamin Injectable 1000MICROGram(s) SubCutaneous daily  cefTRIAXone   IVPB 1Gram(s) IV Intermittent every 24 hours  megestrol Suspension 625milliGRAM(s) Oral daily    MEDICATIONS  (PRN):  acetaminophen  Suppository 650milliGRAM(s) Rectal every 6 hours PRN For Temp greater than 38 C (100.4 F)  guaiFENesin    Syrup 200milliGRAM(s) Oral every 6 hours PRN Cough  meperidine     Injectable 25milliGRAM(s) IV Push every 6 hours PRN Rigors  oxyCODONE Solution 5milliGRAM(s) Oral every 4 hours PRN Dyspnea / Air Hunger / RR>25 / increased work of breathing / mild pain (1-3)  morphine  - Injectable 0.5milliGRAM(s) IV Push every 4 hours PRN Dyspnea / Air hunger / Increased work of breathing / RR>25 / moderate pain (4-6)  morphine  - Injectable 1milliGRAM(s) IV Push every 4 hours PRN Excessive work of breathing / RR>35 / Resp distress / severe pain (7-10)    LABS: REVIEWED                        7.8    1.8   )-----------( 79       ( 2017 06:05 )             24.3   06-21    138  |  99  |  16  ----------------------------<  104<H>  3.4<L>   |  25  |  0.80    Ca    8.0<L>      2017 07:05  Phos  2.3     -  Mg     1.6     -    IMAGING: REVIEWED    EXAM:  XR CHEST 1 VIEW PORT IMMEDIATE                        PROCEDURE DATE:  2017    INTERPRETATION:    AP Portable CXR dated 2017 4:46 AM  CLINICAL INFORMATION: 86 years, Female, sob  PRIOR STUDIES: Portable CXR on 2017  FINDINGS:   Lines, Catheters & Support Devices: There is a left-sided dual-chamber   cardiac pacemaker.  Heart Size, Mediastinum & Hilar Contours: Heart size is suboptimally   evaluated in this projection. Normal mediastinal and hilar contours.    Sternotomy wires and clips overlie the mediastinum. There is   calcification of the aortic knob.  Lungs: Interval improvement in the prominence of the pulmonary   vasculature. Right lower lobe and retrocardiac opacity. Bilateral pleural   effusions.  No pneumothorax.   Bones/Soft Tissues: No acute abnormalities of the soft tissues and   osseous structures. Degenerative changes noted.  IMPRESSION:  Lung infiltrates improving.    ADVANCED DIRECTIVES: DNR     DNI     MOLST    DECISION MAKER: The patient is able to participate in medical decisions  LEGAL SURROGATE: HCP in the chart. HCP agents are Seth Carrillo [phone] and Chay Astudillo.    PSYCHOSOCIAL-SPIRITUAL ASSESSMENT:       Reviewed       Care plan adjusted as above	    GOALS OF CARE DISCUSSION       Palliative care info/counseling provided	           Family meetings ongoing       See previous Palliative Medicine Note        Documentation of GOC: HCP, DNR/DNI, MOLST  	      AGENCY CHOICE DISCUSSED:          Patient not a hospice candidate since would consider restarting lymphoma treatments if required in the future        [facility].    REFERRALS	        Palliative Med        Unit SW/Case Mgmt         - [facility]       PT/OT

## 2017-06-21 NOTE — PROGRESS NOTE ADULT - SUBJECTIVE AND OBJECTIVE BOX
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 Patients family at bedside states she's been more lethargic with associated lower extremity swelling, SOB, decreased appetite and diarrhea. Patient with previous admissions for symptomatic hyperglycemia in January. Patient currently seeing Dr. Galaviz on a regular basis for chemotherapy treatment. Last treatment on May 23rd for which she received Bendamustine and Rituximab. Patient denies any headaches, chest pain, abdominal pain, N/V, dysuria, or bowel changes.     Upon arrival to the ED, patient in moderate respiratory distress using accessory muscles, breathing w/ a RR of 30, O2 93% on room air. Patient placed on BIPAP with slight improvement in respiratory status. Patient febrile to 101 with a HR of 77. Labs significant for AG metabolic acidosis, K 7.0, Na 127, BUN 58, Cr. 2.0 (baseline < 0.9 January), Lactate 2.8. CXR done showing RLL infiltrate with pulmonary vascular congestion. In the ED, patient received Vancomycin, Zosyn, Tylenol, Calcium Gluconate 2g, Insulin 5u, Albuterol neb 5mg x 2. ICU consulted, will be transferred to MICU for management of severe sepsis 2/2 HAP and pulmonary congestion. (10 Ramon 2017 19:13)    INTERVAL HPI/OVERNIGHT EVENTS:::    HEALTH ISSUES - PROBLEM Dx:  Acute pulmonary edema: Acute pulmonary edema  Need for prophylactic measure: Need for prophylactic measure  Nutrition, metabolism, and development symptoms: Nutrition, metabolism, and development symptoms  Anemia, unspecified type: Anemia, unspecified type  Malignant neoplasm of female breast, unspecified laterality, unspecified site of breast: Malignant neoplasm of female breast, unspecified laterality, unspecified site of breast  Diffuse large B-cell lymphoma, unspecified body region: Diffuse large B-cell lymphoma, unspecified body region  Coronary artery disease involving native coronary artery of native heart without angina pectoris: Coronary artery disease involving native coronary artery of native heart without angina pectoris  Paroxysmal atrial fibrillation: Paroxysmal atrial fibrillation  Acute respiratory failure with hypoxia: Acute respiratory failure with hypoxia  Sepsis: Sepsis  Lymphoma: Lymphoma  Severe sepsis: Severe sepsis  Central venous line infection, initial encounter: Central venous line infection, initial encounter  E coli bacteremia: E coli bacteremia  Medical orders for life-sustaining treatment (MOLST) form in chart: Medical orders for life-sustaining treatment (MOLST) form in chart  DNR (do not resuscitate): DNR (do not resuscitate)  Chronic back pain: Chronic back pain  Dyspnea and respiratory abnormalities: Dyspnea and respiratory abnormalities  Goals of care, counseling/discussion: Goals of care, counseling/discussion  Patient has healthcare proxy: Patient has healthcare proxy  Moderate protein-calorie malnutrition: Moderate protein-calorie malnutrition  Impaired mobility and activities of daily living: Impaired mobility and activities of daily living  Full code status: Full code status  Palliative care encounter: Palliative care encounter  Breast cancer: Breast cancer  Diffuse large B cell lymphoma: Diffuse large B cell lymphoma  Bacteremia due to Gram-negative bacteria: Bacteremia due to Gram-negative bacteria  Shortness of breath: Shortness of breath  Hyperkalemia: Hyperkalemia  ROMEO (acute kidney injury): ROMEO (acute kidney injury)          PAST MEDICAL & SURGICAL HISTORY:  Scoliosis  Follicular lymphoma  Neck mass  Afib  Other hyperlipidemia  Breast cancer  CAD (coronary artery disease)  Diabetes  HTN (hypertension)  No pertinent past medical history  H/O abdominal hysterectomy  H/O thyroidectomy  S/P CABG x 3          Consultant NOTE  REVIEWED  (   )    REVIEW OF SYSTEMS:  [x] As per HPI  CONSTITUTIONAL: No fever, weight loss, or fatigue  RESPIRATORY: No cough, wheezing, chills or hemoptysis; No Shortness of Breath  CARDIOVASCULAR: No chest pain, palpitations, dizziness, or leg swelling  GASTROINTESTINAL: No abdominal or epigastric pain. No nausea, vomiting, or hematemesis; No diarrhea or constipation. No melena or hematochezia.  MUSCULOSKELETAL: No joint pain or swelling; No muscle, back, or extremity pain  PSYCH    awake, alert       [x] All others negative	  [ ] Unable to obtain          Vital Signs Last 24 Hrs  T(C): 36.7, Max: 37.8 (- @ 11:41)  T(F): 98.1, Max: 100 (- @ 11:41)  HR: 88 (88 - 106)  BP: 110/52 (92/50 - 149/76)  BP(mean): --  RR: 16 (16 - 24)  SpO2: 97% (91% - 100%)        I & Os for current day (as of  @ 08:05)  =============================================  IN: 100 ml / OUT: 400 ml / NET: -300 ml    PHYSICAL EXAMINATION:                                    (    )  NO CHANGE  Appearance: Normal	  HEENT:   Normal oral mucosa, PERRL, EOMI	  Neck: Supple, + JVD/ - JVD; Carotid Bruit   Cardiovascular: Normal S1 S2, No JVD, No murmurs,   Respiratory: Lungs clear to auscultation/Decreased Breath Sounds/No Rales, Rhonchi, Wheezing	  Gastrointestinal:  Soft, Non-tender, + BS	  Skin: No rashes, No ecchymoses, No cyanosis  Extremities: Normal range of motion, No clubbing, cyanosis or edema  Vascular: Peripheral pulses palpable 2+ bilaterally  Neurologic: Non-focal  Psychiatry: A & O x 3, Mood & affect appropriate    amiodarone    Tablet 200milliGRAM(s) Oral daily  insulin lispro (HumaLOG) corrective regimen sliding scale  SubCutaneous Before meals and at bedtime  atorvastatin 40milliGRAM(s) Oral at bedtime  acetaminophen  Suppository 650milliGRAM(s) Rectal every 6 hours PRN  bisacodyl Suppository 10milliGRAM(s) Rectal daily  lidocaine   Patch 1Patch Transdermal daily  metoprolol 12.5milliGRAM(s) Oral two times a day  guaiFENesin    Syrup 200milliGRAM(s) Oral every 6 hours PRN  meperidine     Injectable 25milliGRAM(s) IV Push every 6 hours PRN  piperacillin/tazobactam IVPB. 3.375Gram(s) IV Intermittent every 6 hours  vancomycin  IVPB 750milliGRAM(s) IV Intermittent every 24 hours  apixaban 2.5milliGRAM(s) Oral two times a day  furosemide    Tablet 20milliGRAM(s) Oral daily  aspirin enteric coated 81milliGRAM(s) Oral daily  oxyCODONE Solution 5milliGRAM(s) Oral every 4 hours PRN  morphine  - Injectable 0.5milliGRAM(s) IV Push every 4 hours PRN  morphine  - Injectable 1milliGRAM(s) IV Push every 4 hours PRN  furosemide   Injectable 20milliGRAM(s) IV Push once                                      7.8    1.8   )-----------( 79       ( 2017 06:05 )             24.3     06-21    138  |  99  |  16  ----------------------------<  104<H>  3.4<L>   |  25  |  0.80    Ca    8.0<L>      2017 07:05  Phos  2.3       Mg     1.6             CAPILLARY BLOOD GLUCOSE  104 (2017 07:33)  132 (2017 21:32)  129 (2017 17:12)  131 (2017 12:10)  134 (2017 09:00)    Urinalysis Basic - ( 2017 19:05 )    Color: Yellow / Appearance: Clear / S.010 / pH: x  Gluc: x / Ketone: NEGATIVE  / Bili: NEGATIVE / Urobili: 0.2 E.U./dL   Blood: x / Protein: Trace mg/dL / Nitrite: NEGATIVE   Leuk Esterase: NEGATIVE / RBC: 5-10 /HPF / WBC 5-10 /HPF   Sq Epi: x / Non Sq Epi: Few /HPF / Bacteria: Present /HPF Patient is a 86y old  Female who presents with a chief complaint of Fever (10 Ramon 2017 19:13)      HPI:  85 y/o F w/ pmhx of CAD s/p CABG, atrial fibrillation s/p PPM (on Eliquis), Breast CA (on Arimidex), 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 Patients family at bedside states she's been more lethargic with associated lower extremity swelling, SOB, decreased appetite and diarrhea. Patient with previous admissions for symptomatic hyperglycemia in January. Patient currently seeing Dr. Galaviz on a regular basis for chemotherapy treatment. Last treatment on May 23rd for which she received Bendamustine and Rituximab. Patient denies any headaches, chest pain, abdominal pain, N/V, dysuria, or bowel changes.         INTERVAL HPI/OVERNIGHT EVENTS:::comfortable  SOB      HEALTH ISSUES - PROBLEM Dx:  Acute pulmonary edema: Acute pulmonary edema  Need for prophylactic measure: Need for prophylactic measure  Nutrition, metabolism, and development symptoms: Nutrition, metabolism, and development symptoms  Anemia, unspecified type: Anemia, unspecified type  Malignant neoplasm of female breast, unspecified laterality, unspecified site of breast: Malignant neoplasm of female breast, unspecified laterality, unspecified site of breast  Diffuse large B-cell lymphoma, unspecified body region: Diffuse large B-cell lymphoma, unspecified body region  Coronary artery disease involving native coronary artery of native heart without angina pectoris: Coronary artery disease involving native coronary artery of native heart without angina pectoris  Paroxysmal atrial fibrillation: Paroxysmal atrial fibrillation  Acute respiratory failure with hypoxia: Acute respiratory failure with hypoxia  Sepsis: Sepsis  Lymphoma: Lymphoma  Severe sepsis: Severe sepsis  Central venous line infection, initial encounter: Central venous line infection, initial encounter  E coli bacteremia: E coli bacteremia  Medical orders for life-sustaining treatment (MOLST) form in chart: Medical orders for life-sustaining treatment (MOLST) form in chart  DNR (do not resuscitate): DNR (do not resuscitate)  Chronic back pain: Chronic back pain  Dyspnea and respiratory abnormalities: Dyspnea and respiratory abnormalities  Goals of care, counseling/discussion: Goals of care, counseling/discussion  Patient has healthcare proxy: Patient has healthcare proxy  Moderate protein-calorie malnutrition: Moderate protein-calorie malnutrition  Impaired mobility and activities of daily living: Impaired mobility and activities of daily living  Full code status: Full code status  Palliative care encounter: Palliative care encounter  Breast cancer: Breast cancer  Diffuse large B cell lymphoma: Diffuse large B cell lymphoma  Bacteremia due to Gram-negative bacteria: Bacteremia due to Gram-negative bacteria  Shortness of breath: Shortness of breath  Hyperkalemia: Hyperkalemia  ROMEO (acute kidney injury): ROMEO (acute kidney injury)          PAST MEDICAL & SURGICAL HISTORY:  Scoliosis  Follicular lymphoma  Neck mass  Afib  Other hyperlipidemia  Breast cancer  CAD (coronary artery disease)  Diabetes  HTN (hypertension)  No pertinent past medical history  H/O abdominal hysterectomy  H/O thyroidectomy  S/P CABG x 3          Consultant NOTE  REVIEWED  ( +++  )    REVIEW OF SYSTEMS:  [x] As per HPI              Vital Signs Last 24 Hrs  T(C): 36.7, Max: 37.8 ( @ 11:41)  T(F): 98.1, Max: 100 ( @ 11:41)  HR: 88 (88 - 106)  BP: 110/52 (92/50 - 149/76)  BP(mean): --  RR: 16 (16 - 24)  SpO2: 97% (91% - 100%)        I & Os for current day (as of  @ 08:05)  =============================================  IN: 100 ml / OUT: 400 ml / NET: -300 ml    PHYSICAL EXAMINATION:                                    (    )  NO CHANGE  Appearance: Normal	  HEENT:   Normal oral mucosa, PERRL, EOMI	  Neck: Supple, + JVD/ - JVD; Carotid Bruit   Cardiovascular: Normal S1 S2, No JVD,   Respiratory:occ rhonchi and rales  Gastrointestinal:  Soft, Non-tender, + BS	  Skin: No rashes, No ecchymoses, No cyanosis  Extremities: Normal range of motion,   Vascular: Peripheral pulses palpable   Neurologic: Psychiatry: A & O, Mood & affect appropriate    amiodarone    Tablet 200milliGRAM(s) Oral daily  insulin lispro (HumaLOG) corrective regimen sliding scale  SubCutaneous Before meals and at bedtime  atorvastatin 40milliGRAM(s) Oral at bedtime  acetaminophen  Suppository 650milliGRAM(s) Rectal every 6 hours PRN  bisacodyl Suppository 10milliGRAM(s) Rectal daily  lidocaine   Patch 1Patch Transdermal daily  metoprolol 12.5milliGRAM(s) Oral two times a day  guaiFENesin    Syrup 200milliGRAM(s) Oral every 6 hours PRN  meperidine     Injectable 25milliGRAM(s) IV Push every 6 hours PRN  piperacillin/tazobactam IVPB. 3.375Gram(s) IV Intermittent every 6 hours  vancomycin  IVPB 750milliGRAM(s) IV Intermittent every 24 hours  apixaban 2.5milliGRAM(s) Oral two times a day  furosemide    Tablet 20milliGRAM(s) Oral daily  aspirin enteric coated 81milliGRAM(s) Oral daily  oxyCODONE Solution 5milliGRAM(s) Oral every 4 hours PRN  morphine  - Injectable 0.5milliGRAM(s) IV Push every 4 hours PRN  morphine  - Injectable 1milliGRAM(s) IV Push every 4 hours PRN  furosemide   Injectable 20milliGRAM(s) IV Push once                                      7.8    1.8   )-----------( 79       ( 2017 06:05 )             24.3     06-21    138  |  99  |  16  ----------------------------<  104<H>  3.4<L>   |  25  |  0.80    Ca    8.0<L>      2017 07:05  Phos  2.3     06-21  Mg     1.6     06-21        CAPILLARY BLOOD GLUCOSE  104 (2017 07:33)  132 (2017 21:32)  129 (2017 17:12)  131 (2017 12:10)  134 (2017 09:00)    Urinalysis Basic - ( 2017 19:05 )    Color: Yellow / Appearance: Clear / S.010 / pH: x  Gluc: x / Ketone: NEGATIVE  / Bili: NEGATIVE / Urobili: 0.2 E.U./dL   Blood: x / Protein: Trace mg/dL / Nitrite: NEGATIVE   Leuk Esterase: NEGATIVE / RBC: 5-10 /HPF / WBC 5-10 /HPF   Sq Epi: x / Non Sq Epi: Few /HPF / Bacteria: Present /HPF

## 2017-06-21 NOTE — PROGRESS NOTE ADULT - PROBLEM SELECTOR PLAN 6
Evidence of skin breakdown. [facility]  Added nutritional supplements to patients diet and encouraged OOB to chair as tolerated.

## 2017-06-21 NOTE — PROGRESS NOTE ADULT - SUBJECTIVE AND OBJECTIVE BOX
CHIEF COMPLAINT:  Patient is a 86y old  Female who presents with a chief complaint of Fever (10 Ramon 2017 19:13)    INTERVAL HX:  Patient was seen and examined at bedside. Feels tired today. No worsening of cough.    REVIEW OF SYSTEMS:  Constitutional: +malaise, no fever  HEENT: no headache/sore throat/rhinorrhea  Respiratory: + cough, unchanged, no SOB/chest pain  Cardiac: no palpitations/chest pain  Gastrointestinal: no nausea/vomiting/diarrhea/constipation  Genitourinary: no dysuria/nocturia/polyuria  Skin: no rashes    OBJECTIVE:    Vital Signs Last 24 Hrs  T(C): 36.7, Max: 37 ( @ 20:40)  T(F): 98, Max: 98.6 ( @ 20:40)  HR: 80 (80 - 106)  BP: 130/69 (92/50 - 149/76)  BP(mean): --  RR: 20 (16 - 24)  SpO2: 98% (91% - 100%)    PHYSICAL EXAM:  Gen:       non-toxic, no acute distress  Skin:       warm, dry, no rash  HEENT:  moist mucous membranes, oropharynx clear  Cardiac: irregular, S1/S2 present, no murmur/gallop/rub  Pulm:     L sided rhonchi, R lung clear, no crackles  Abd:       soft/nontender/nondistended, normoactive bowel sounds      ANTIBIOTICS:  cefTRIAXone   IVPB 1Gram(s) IV Intermittent every 24 hours      Allergies:  IV CONtRAST (Flushing (Skin); Rash)  No Known Drug Allergies      LABS:                        7.8    1.8   )-----------( 79       ( 2017 06:05 )             24.3    Mean Cell Volume: 93.5 fL ( @ 06:05)    -    138  |  99  |  16  ----------------------------<  104<H>  3.4<L>   |  25  |  0.80    Ca    8.0<L>      2017 07:05  Phos  2.3       Mg     1.6             Urinalysis Basic - ( 2017 19:05 )    Color: Yellow / Appearance: Clear / S.010 / pH: x  Gluc: x / Ketone: NEGATIVE  / Bili: NEGATIVE / Urobili: 0.2 E.U./dL   Blood: x / Protein: Trace mg/dL / Nitrite: NEGATIVE   Leuk Esterase: NEGATIVE / RBC: 5-10 /HPF / WBC 5-10 /HPF   Sq Epi: x / Non Sq Epi: Few /HPF / Bacteria: Present /HPF        MICROBIOLOGY: REVIEWED  Culture - Blood (17 @ 20:12)    Specimen Source: .Blood Blood-Peripheral    Culture Results:   No growth at 12 hours    Culture - Blood (17 @ 13:49)    Specimen Source: .Blood Blood-Peripheral    Culture Results:   No growth at 1 day.    Culture - Blood (17 @ 21:11)    Specimen Source: .Blood Blood-Peripheral    Culture Results:   No growth at 2 days.    Culture - Blood (17 @ 21:11)    Specimen Source: .Blood Blood-Peripheral    Culture Results:   No growth at 2 days.    Culture - Blood (17 @ 12:45)    -  Ceftriaxone: S <=1    -  Piperacillin/Tazobactam: S <=16    -  Trimethoprim/Sulfamethoxazole: R >2/38    -  Ampicillin/Sulbactam: R >16/8    -  Ciprofloxacin: R >2    -  Tobramycin: I 8    -  Ampicillin: R >16    -  Cefazolin: S <=8    -  Gentamicin: R >8    Gram Stain:   Aerobic Bottle: Gram Negative Rods  Result called to and read back byJacinto Gomez RN 2017 06:36:29    Specimen Source: .Blood Blood    Organism: Escherichia coli    Culture Results:   Growth in aerobic bottle: Escherichia coli    Organism Identification: Escherichia coli    Method Type: JANEEN          RADIOLOGY: REVIEWED  EXAM:  XR CHEST 1 VIEW PORT IMMEDIATE                        PROCEDURE DATE:  2017      IMPRESSION:  Lung infiltrates improving.

## 2017-06-21 NOTE — PROGRESS NOTE ADULT - PROBLEM SELECTOR PLAN 1
Having increased work of breathing. Had exacerbation last night due to fluid overload. Currently with good O2 sats but continues to complain of air hunger. Has requested several PRN's.   Will change morphine to standing regimen while awake.

## 2017-06-22 DIAGNOSIS — D64.9 ANEMIA, UNSPECIFIED: ICD-10-CM

## 2017-06-22 LAB
ANION GAP SERPL CALC-SCNC: 12 MMOL/L — SIGNIFICANT CHANGE UP (ref 5–17)
BUN SERPL-MCNC: 27 MG/DL — HIGH (ref 7–23)
CALCIUM SERPL-MCNC: 8.6 MG/DL — SIGNIFICANT CHANGE UP (ref 8.4–10.5)
CHLORIDE SERPL-SCNC: 102 MMOL/L — SIGNIFICANT CHANGE UP (ref 96–108)
CO2 SERPL-SCNC: 29 MMOL/L — SIGNIFICANT CHANGE UP (ref 22–31)
CREAT SERPL-MCNC: 0.9 MG/DL — SIGNIFICANT CHANGE UP (ref 0.5–1.3)
CULTURE RESULTS: SIGNIFICANT CHANGE UP
GLUCOSE SERPL-MCNC: 96 MG/DL — SIGNIFICANT CHANGE UP (ref 70–99)
HCT VFR BLD CALC: 27.6 % — LOW (ref 34.5–45)
HGB BLD-MCNC: 8.8 G/DL — LOW (ref 11.5–15.5)
MAGNESIUM SERPL-MCNC: 2.2 MG/DL — SIGNIFICANT CHANGE UP (ref 1.6–2.6)
MCHC RBC-ENTMCNC: 29.3 PG — SIGNIFICANT CHANGE UP (ref 27–34)
MCHC RBC-ENTMCNC: 31.9 G/DL — LOW (ref 32–36)
MCV RBC AUTO: 92 FL — SIGNIFICANT CHANGE UP (ref 80–100)
ORGANISM # SPEC MICROSCOPIC CNT: SIGNIFICANT CHANGE UP
PHOSPHATE SERPL-MCNC: 2.2 MG/DL — LOW (ref 2.5–4.5)
PLATELET # BLD AUTO: 90 K/UL — LOW (ref 150–400)
POTASSIUM SERPL-MCNC: 3.9 MMOL/L — SIGNIFICANT CHANGE UP (ref 3.5–5.3)
POTASSIUM SERPL-SCNC: 3.9 MMOL/L — SIGNIFICANT CHANGE UP (ref 3.5–5.3)
RBC # BLD: 3 M/UL — LOW (ref 3.8–5.2)
RBC # FLD: 17.6 % — HIGH (ref 10.3–16.9)
SODIUM SERPL-SCNC: 143 MMOL/L — SIGNIFICANT CHANGE UP (ref 135–145)
SPECIMEN SOURCE: SIGNIFICANT CHANGE UP
WBC # BLD: 3.3 K/UL — LOW (ref 3.8–10.5)
WBC # FLD AUTO: 3.3 K/UL — LOW (ref 3.8–10.5)

## 2017-06-22 PROCEDURE — 99232 SBSQ HOSP IP/OBS MODERATE 35: CPT

## 2017-06-22 PROCEDURE — 99233 SBSQ HOSP IP/OBS HIGH 50: CPT

## 2017-06-22 PROCEDURE — 99232 SBSQ HOSP IP/OBS MODERATE 35: CPT | Mod: GC

## 2017-06-22 RX ORDER — POTASSIUM PHOSPHATE, MONOBASIC POTASSIUM PHOSPHATE, DIBASIC 236; 224 MG/ML; MG/ML
15 INJECTION, SOLUTION INTRAVENOUS ONCE
Qty: 0 | Refills: 0 | Status: COMPLETED | OUTPATIENT
Start: 2017-06-22 | End: 2017-06-22

## 2017-06-22 RX ORDER — FUROSEMIDE 40 MG
40 TABLET ORAL ONCE
Qty: 0 | Refills: 0 | Status: COMPLETED | OUTPATIENT
Start: 2017-06-22 | End: 2017-06-22

## 2017-06-22 RX ORDER — OXYCODONE HYDROCHLORIDE 5 MG/1
5 TABLET ORAL
Qty: 450 | Refills: 0 | OUTPATIENT
Start: 2017-06-22 | End: 2017-07-07

## 2017-06-22 RX ADMIN — POTASSIUM PHOSPHATE, MONOBASIC POTASSIUM PHOSPHATE, DIBASIC 62.5 MILLIMOLE(S): 236; 224 INJECTION, SOLUTION INTRAVENOUS at 10:30

## 2017-06-22 RX ADMIN — APIXABAN 2.5 MILLIGRAM(S): 2.5 TABLET, FILM COATED ORAL at 09:28

## 2017-06-22 RX ADMIN — Medication 2: at 21:24

## 2017-06-22 RX ADMIN — Medication 12.5 MILLIGRAM(S): at 05:45

## 2017-06-22 RX ADMIN — OXYCODONE HYDROCHLORIDE 5 MILLIGRAM(S): 5 TABLET ORAL at 05:45

## 2017-06-22 RX ADMIN — ATORVASTATIN CALCIUM 40 MILLIGRAM(S): 80 TABLET, FILM COATED ORAL at 21:23

## 2017-06-22 RX ADMIN — OXYCODONE HYDROCHLORIDE 5 MILLIGRAM(S): 5 TABLET ORAL at 13:42

## 2017-06-22 RX ADMIN — OXYCODONE HYDROCHLORIDE 5 MILLIGRAM(S): 5 TABLET ORAL at 17:54

## 2017-06-22 RX ADMIN — Medication 20 MILLIGRAM(S): at 05:45

## 2017-06-22 RX ADMIN — OXYCODONE HYDROCHLORIDE 5 MILLIGRAM(S): 5 TABLET ORAL at 09:35

## 2017-06-22 RX ADMIN — APIXABAN 2.5 MILLIGRAM(S): 2.5 TABLET, FILM COATED ORAL at 21:23

## 2017-06-22 RX ADMIN — OXYCODONE HYDROCHLORIDE 5 MILLIGRAM(S): 5 TABLET ORAL at 22:30

## 2017-06-22 RX ADMIN — OXYCODONE HYDROCHLORIDE 5 MILLIGRAM(S): 5 TABLET ORAL at 13:55

## 2017-06-22 RX ADMIN — OXYCODONE HYDROCHLORIDE 5 MILLIGRAM(S): 5 TABLET ORAL at 21:22

## 2017-06-22 RX ADMIN — MEGESTROL ACETATE 625 MILLIGRAM(S): 40 SUSPENSION ORAL at 11:14

## 2017-06-22 RX ADMIN — OXYCODONE HYDROCHLORIDE 5 MILLIGRAM(S): 5 TABLET ORAL at 18:21

## 2017-06-22 RX ADMIN — AMIODARONE HYDROCHLORIDE 200 MILLIGRAM(S): 400 TABLET ORAL at 05:45

## 2017-06-22 RX ADMIN — OXYCODONE HYDROCHLORIDE 5 MILLIGRAM(S): 5 TABLET ORAL at 06:15

## 2017-06-22 RX ADMIN — LIDOCAINE 1 PATCH: 4 CREAM TOPICAL at 11:03

## 2017-06-22 RX ADMIN — Medication 81 MILLIGRAM(S): at 11:14

## 2017-06-22 RX ADMIN — OXYCODONE HYDROCHLORIDE 5 MILLIGRAM(S): 5 TABLET ORAL at 09:28

## 2017-06-22 RX ADMIN — Medication 12.5 MILLIGRAM(S): at 17:54

## 2017-06-22 RX ADMIN — Medication 10 MILLIGRAM(S): at 11:14

## 2017-06-22 RX ADMIN — Medication 40 MILLIGRAM(S): at 09:44

## 2017-06-22 RX ADMIN — PREGABALIN 1000 MICROGRAM(S): 225 CAPSULE ORAL at 11:14

## 2017-06-22 RX ADMIN — CEFTRIAXONE 100 GRAM(S): 500 INJECTION, POWDER, FOR SOLUTION INTRAMUSCULAR; INTRAVENOUS at 15:31

## 2017-06-22 NOTE — DISCHARGE NOTE ADULT - CARE PROVIDER_API CALL
Neha Galaviz), Medicine  [address] [place] [address]  Phone: [phone]  Fax: [phone]    Cisco Shanks), Internal Medicine  [address] [address]  Phone: [phone]  Fax: [phone] Cisco Shanks), Internal Medicine  [address] [address]  Phone: [phone]  Fax: [phone]    Neha Galaviz  [address]  [address]  [facility]  Phone: [phone]  Fax: (       -

## 2017-06-22 NOTE — PROGRESS NOTE ADULT - SUBJECTIVE AND OBJECTIVE BOX
INTERVAL HPI/OVERNIGHT EVENTS:  - ON:     VITAL SIGNS:  T(F): 98  HR: 100  BP: 117/64  RR: 20  SpO2: 97%  Wt(kg): --    PHYSICAL EXAM:  Constitutional:  Eyes:  ENMT:  Neck:  Respiratory:  Cardiovascular:  Gastrointestinal:  Extremities:  Vascular:  Neurological:  Musculoskeletal:    MEDICATIONS  (STANDING):  amiodarone    Tablet 200milliGRAM(s) Oral daily  insulin lispro (HumaLOG) corrective regimen sliding scale  SubCutaneous Before meals and at bedtime  atorvastatin 40milliGRAM(s) Oral at bedtime  bisacodyl Suppository 10milliGRAM(s) Rectal daily  lidocaine   Patch 1Patch Transdermal daily  metoprolol 12.5milliGRAM(s) Oral two times a day  apixaban 2.5milliGRAM(s) Oral two times a day  furosemide    Tablet 20milliGRAM(s) Oral daily  aspirin enteric coated 81milliGRAM(s) Oral daily  cyanocobalamin Injectable 1000MICROGram(s) SubCutaneous daily  cefTRIAXone   IVPB 1Gram(s) IV Intermittent every 24 hours  megestrol Suspension 625milliGRAM(s) Oral daily  oxyCODONE Solution 5milliGRAM(s) Oral every 4 hours    MEDICATIONS  (PRN):  acetaminophen  Suppository 650milliGRAM(s) Rectal every 6 hours PRN For Temp greater than 38 C (100.4 F)  guaiFENesin    Syrup 200milliGRAM(s) Oral every 6 hours PRN Cough  meperidine     Injectable 25milliGRAM(s) IV Push every 6 hours PRN Rigors  morphine  - Injectable 0.5milliGRAM(s) IV Push every 4 hours PRN Dyspnea / Air hunger / Increased work of breathing / RR>25 / moderate pain (4-6)  morphine  - Injectable 1milliGRAM(s) IV Push every 4 hours PRN Excessive work of breathing / RR>35 / Resp distress / severe pain (7-10)    Allergies  IV CONtRAST (Flushing (Skin); Rash)  No Known Drug Allergies    LABS:                        8.8    3.3   )-----------( 90       ( 2017 06:14 )             27.6     06-22    143  |  102  |  27<H>  ----------------------------<  96  3.9   |  29  |  0.90    Ca    8.6      2017 06:14  Phos  2.2     06-22  Mg     2.2     06-22    Urinalysis Basic - ( 2017 19:05 )    Color: Yellow / Appearance: Clear / S.010 / pH: x  Gluc: x / Ketone: NEGATIVE  / Bili: NEGATIVE / Urobili: 0.2 E.U./dL   Blood: x / Protein: Trace mg/dL / Nitrite: NEGATIVE   Leuk Esterase: NEGATIVE / RBC: 5-10 /HPF / WBC 5-10 /HPF   Sq Epi: x / Non Sq Epi: Few /HPF / Bacteria: Present /HPF INTERVAL HPI/OVERNIGHT EVENTS:  - ON: TEJINDER  - S: patient states WOB is better, has no complaints    VITAL SIGNS:  T(F): 98  HR: 100  BP: 117/64  RR: 20  SpO2: 97%  Wt(kg): --    PHYSICAL EXAM:  Constitutional: ill appearing, in no distress  Eyes: EOMI  ENMT: mildly dry MM  Respiratory: bilateral crackles up to mid lung  Cardiovascular: RRR, no M/R/G appreciated  Gastrointestinal: normoactive bowel sounds, abdomen soft, NTND  Extremities: symmetric, trace bilateral LE edema  Vascular: 2+ DP and radial pulses  Neurological: A&Ox2-3   Musculoskeletal: no joint swelling    MEDICATIONS  (STANDING):  amiodarone    Tablet 200milliGRAM(s) Oral daily  insulin lispro (HumaLOG) corrective regimen sliding scale  SubCutaneous Before meals and at bedtime  atorvastatin 40milliGRAM(s) Oral at bedtime  bisacodyl Suppository 10milliGRAM(s) Rectal daily  lidocaine   Patch 1Patch Transdermal daily  metoprolol 12.5milliGRAM(s) Oral two times a day  apixaban 2.5milliGRAM(s) Oral two times a day  furosemide    Tablet 20milliGRAM(s) Oral daily  aspirin enteric coated 81milliGRAM(s) Oral daily  cyanocobalamin Injectable 1000MICROGram(s) SubCutaneous daily  cefTRIAXone   IVPB 1Gram(s) IV Intermittent every 24 hours  megestrol Suspension 625milliGRAM(s) Oral daily  oxyCODONE Solution 5milliGRAM(s) Oral every 4 hours    MEDICATIONS  (PRN):  acetaminophen  Suppository 650milliGRAM(s) Rectal every 6 hours PRN For Temp greater than 38 C (100.4 F)  guaiFENesin    Syrup 200milliGRAM(s) Oral every 6 hours PRN Cough  meperidine     Injectable 25milliGRAM(s) IV Push every 6 hours PRN Rigors  morphine  - Injectable 0.5milliGRAM(s) IV Push every 4 hours PRN Dyspnea / Air hunger / Increased work of breathing / RR>25 / moderate pain (4-6)  morphine  - Injectable 1milliGRAM(s) IV Push every 4 hours PRN Excessive work of breathing / RR>35 / Resp distress / severe pain (7-10)    Allergies  IV CONtRAST (Flushing (Skin); Rash)  No Known Drug Allergies    LABS:                        8.8    3.3   )-----------( 90       ( 2017 06:14 )             27.6     06-22    143  |  102  |  27<H>  ----------------------------<  96  3.9   |  29  |  0.90    Ca    8.6      2017 06:14  Phos  2.2     -  Mg     2.2     -    Urinalysis Basic - ( 2017 19:05 )    Color: Yellow / Appearance: Clear / S.010 / pH: x  Gluc: x / Ketone: NEGATIVE  / Bili: NEGATIVE / Urobili: 0.2 E.U./dL   Blood: x / Protein: Trace mg/dL / Nitrite: NEGATIVE   Leuk Esterase: NEGATIVE / RBC: 5-10 /HPF / WBC 5-10 /HPF   Sq Epi: x / Non Sq Epi: Few /HPF / Bacteria: Present /HPF

## 2017-06-22 NOTE — PROGRESS NOTE ADULT - SUBJECTIVE AND OBJECTIVE BOX
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 Patients family at bedside states she's been more lethargic with associated lower extremity swelling, SOB, decreased appetite and diarrhea. Patient with previous admissions for symptomatic hyperglycemia in January. Patient currently seeing Dr. Galaviz on a regular basis for chemotherapy treatment. Last treatment on May 23rd for which she received Bendamustine and Rituximab. Patient denies any headaches, chest pain, abdominal pain, N/V, dysuria, or bowel changes.         INTERVAL HPI/OVERNIGHT EVENTS:::no complaints    HEALTH ISSUES - PROBLEM Dx:  Anemia: Anemia  Hyponatremia: Hyponatremia  Acute pulmonary edema: Acute pulmonary edema  Need for prophylactic measure: Need for prophylactic measure  Nutrition, metabolism, and development symptoms: Nutrition, metabolism, and development symptoms  Anemia, unspecified type: Anemia, unspecified type  Malignant neoplasm of female breast, unspecified laterality, unspecified site of breast: Malignant neoplasm of female breast, unspecified laterality, unspecified site of breast  Diffuse large B-cell lymphoma, unspecified body region: Diffuse large B-cell lymphoma, unspecified body region  Coronary artery disease involving native coronary artery of native heart without angina pectoris: Coronary artery disease involving native coronary artery of native heart without angina pectoris  Paroxysmal atrial fibrillation: Paroxysmal atrial fibrillation  Acute respiratory failure with hypoxia: Acute respiratory failure with hypoxia  Sepsis: Sepsis  Lymphoma: Lymphoma  Severe sepsis: Severe sepsis  Central venous line infection, initial encounter: Central venous line infection, initial encounter  E coli bacteremia: E coli bacteremia  Medical orders for life-sustaining treatment (MOLST) form in chart: Medical orders for life-sustaining treatment (MOLST) form in chart  DNR (do not resuscitate): DNR (do not resuscitate)  Chronic back pain: Chronic back pain  Dyspnea and respiratory abnormalities: Dyspnea and respiratory abnormalities  Goals of care, counseling/discussion: Goals of care, counseling/discussion  Patient has healthcare proxy: Patient has healthcare proxy  Moderate protein-calorie malnutrition: Moderate protein-calorie malnutrition  Impaired mobility and activities of daily living: Impaired mobility and activities of daily living  Full code status: Full code status  Palliative care encounter: Palliative care encounter  Breast cancer: Breast cancer  Diffuse large B cell lymphoma: Diffuse large B cell lymphoma  Bacteremia due to Gram-negative bacteria: Bacteremia due to Gram-negative bacteria  Shortness of breath: Shortness of breath  Hyperkalemia: Hyperkalemia  ROMEO (acute kidney injury): ROMEO (acute kidney injury)          PAST MEDICAL & SURGICAL HISTORY:  Scoliosis  Follicular lymphoma  Neck mass  Afib  Other hyperlipidemia  Breast cancer  CAD (coronary artery disease)  Diabetes  HTN (hypertension)  No pertinent past medical history  H/O abdominal hysterectomy  H/O thyroidectomy  S/P CABG x 3          Consultant NOTE  REVIEWED  (  ++ )    REVIEW OF SYSTEMS:  [x] As per HPI  CONSTITUTIONAL: weakness  RESPIRATORY: No cough, wheezing, chills or hemoptysis; No Shortness of Breath  CARDIOVASCULAR: No chest pain, palpitations, dizziness, or leg swelling  GASTROINTESTINAL: No abdominal or epigastric pain. No nausea, vomiting, or hematemesis; No diarrhea or constipation. No melena or hematochezia.  MUSCULOSKELETAL: No joint pain or swelling; No muscle, back, or extremity pain  PSYCH    awake, alert       [x] All others negative	  [ ] Unable to obtain          Vital Signs Last 24 Hrs  T(C): 36.7, Max: 37.1 (06-22 @ 05:38)  T(F): 98, Max: 98.8 (06-22 @ 05:38)  HR: 98 (82 - 101)  BP: 117/64 (112/64 - 141/83)  BP(mean): --  RR: 20 (15 - 21)  SpO2: 100% (92% - 100%)        PHYSICAL EXAMINATION:                                    ( +++   )  NO CHANGE  Appearance: Normal	  HEENT:   Normal oral mucosa, PERRL, EOMI	  Neck: Supple, + JVD/ - JVD; Carotid Bruit   Cardiovascular: Normal S1 S2, No JVD, No murmurs,   Respiratory: Lungs clear to auscultation/Decreased Breath Sounds/No Rales, Rhonchi, Wheezing	  Gastrointestinal:  Soft, Non-tender, + BS	  Skin: No rashes, No ecchymoses, No cyanosis  Extremities: Normal range of motion, No clubbing, cyanosis or edema  Vascular: Peripheral pulses  Neurologic: Non-focal  Psychiatry: Awake    amiodarone    Tablet 200milliGRAM(s) Oral daily  insulin lispro (HumaLOG) corrective regimen sliding scale  SubCutaneous Before meals and at bedtime  atorvastatin 40milliGRAM(s) Oral at bedtime  acetaminophen  Suppository 650milliGRAM(s) Rectal every 6 hours PRN  bisacodyl Suppository 10milliGRAM(s) Rectal daily  lidocaine   Patch 1Patch Transdermal daily  metoprolol 12.5milliGRAM(s) Oral two times a day  guaiFENesin    Syrup 200milliGRAM(s) Oral every 6 hours PRN  meperidine     Injectable 25milliGRAM(s) IV Push every 6 hours PRN  apixaban 2.5milliGRAM(s) Oral two times a day  furosemide    Tablet 20milliGRAM(s) Oral daily  aspirin enteric coated 81milliGRAM(s) Oral daily  morphine  - Injectable 0.5milliGRAM(s) IV Push every 4 hours PRN  morphine  - Injectable 1milliGRAM(s) IV Push every 4 hours PRN  cyanocobalamin Injectable 1000MICROGram(s) SubCutaneous daily  cefTRIAXone   IVPB 1Gram(s) IV Intermittent every 24 hours  megestrol Suspension 625milliGRAM(s) Oral daily  oxyCODONE Solution 5milliGRAM(s) Oral every 4 hours                                      8.8    3.3   )-----------( 90       ( 22 Jun 2017 06:14 )             27.6     06-22    143  |  102  |  27<H>  ----------------------------<  96  3.9   |  29  |  0.90    Ca    8.6      22 Jun 2017 06:14  Phos  2.2     06-22  Mg     2.2     06-22        CAPILLARY BLOOD GLUCOSE  136 (22 Jun 2017 18:00)  150 (22 Jun 2017 11:20)  104 (22 Jun 2017 07:33)  124 (21 Jun 2017 21:14)

## 2017-06-22 NOTE — DISCHARGE NOTE ADULT - SECONDARY DIAGNOSIS.
Paroxysmal atrial fibrillation Diffuse large B-cell lymphoma, unspecified body region Essential hypertension Type 2 diabetes mellitus with complication, without long-term current use of insulin ROMEO (acute kidney injury)

## 2017-06-22 NOTE — DISCHARGE NOTE ADULT - MEDICATION SUMMARY - MEDICATIONS TO TAKE
I will START or STAY ON the medications listed below when I get home from the hospital:    aspirin 81 mg oral tablet  -- 1 tab(s) by mouth once a day  -- Indication: For Anticoaagulation    oxyCODONE 5 mg/5 mL oral solution  -- 5 milliliter(s) by mouth every 4 hours, As Needed -for shortness of breath MDD:30ml  -- Caution federal law prohibits the transfer of this drug to any person other  than the person for whom it was prescribed.  May cause drowsiness.  Alcohol may intensify this effect.  Use care when operating dangerous machinery.  This prescription cannot be refilled.  Using more of this medication than prescribed may cause serious breathing problems.    -- Indication: For Pain    amiodarone 200 mg oral tablet  -- 1 tab(s) by mouth once a day  -- Indication: For Atrial fibrillation    apixaban 2.5 mg oral tablet  -- 1 tab(s) by mouth 2 times a day  -- Indication: For Atrial fibrillation    metFORMIN 500 mg oral tablet  -- 1 tab(s) by mouth 2 times a day  -- Indication: For Diabetes    atorvastatin 80 mg oral tablet  -- 1 tab(s) by mouth once a day (at bedtime)  -- Indication: For Hyperlipidemia    Arimidex 1 mg oral tablet  -- 1 tab(s) by mouth once a day  -- Do not take this drug if you are pregnant.  It is very important that you take or use this exactly as directed.  Do not skip doses or discontinue unless directed by your doctor.    -- Indication: For Antineiplastic    megestrol 40 mg/mL oral suspension  -- 15.63 milliliter(s) by mouth once a day  -- Indication: For Breast cancer    Toprol-XL 50 mg oral tablet, extended release  -- 1 tab(s) by mouth once a day  -- Indication: For Atrial fibrillation    lidocaine 5% topical film  -- Apply on skin to affected area once a day  -- Indication: For Pain    Lasix 20 mg oral tablet  -- 1 tab(s) by mouth once a day  -- Indication: For Diuresis    guaiFENesin 100 mg/5 mL oral liquid  -- 10 milliliter(s) by mouth every 6 hours, As needed, Cough -for cough  -- Indication: For Cough    bisacodyl 10 mg rectal suppository  -- 1 suppository(ies) rectally once a day  -- Indication: For Constipation     potassium chloride 10 mEq oral tablet, extended release  -- 1 tab(s) by mouth once a day  -- It is very important that you take or use this exactly as directed.  Do not skip doses or discontinue unless directed by your doctor.  Medication should be taken with plenty of water.  Take with food or milk.    -- Indication: For Electrolyte repletion when taking diuretics    pantoprazole 40 mg oral delayed release tablet  -- 1 tab(s) by mouth once a day (before a meal)  -- Indication: For Acid reflux    cyanocobalamin 1000 mcg oral tablet  -- 1 tab(s) by mouth once a day  -- Indication: For Vitamin I will START or STAY ON the medications listed below when I get home from the hospital:    aspirin 81 mg oral tablet  -- 1 tab(s) by mouth once a day  -- Indication: For CAD    amiodarone 200 mg oral tablet  -- 1 tab(s) by mouth once a day  -- Indication: For Atrial Fibrillation    apixaban 2.5 mg oral tablet  -- 1 tab(s) by mouth 2 times a day  -- Indication: For Atrial Fibrillation    metFORMIN 500 mg oral tablet  -- 1 tab(s) by mouth 2 times a day  -- Indication: For Diabetes    atorvastatin 80 mg oral tablet  -- 1 tab(s) by mouth once a day (at bedtime)  -- Indication: For Cad    Arimidex 1 mg oral tablet  -- 1 tab(s) by mouth once a day  -- Do not take this drug if you are pregnant.  It is very important that you take or use this exactly as directed.  Do not skip doses or discontinue unless directed by your doctor.    -- Indication: For Diffuse large B cell lymphoma    megestrol 40 mg/mL oral suspension  -- 15.63 milliliter(s) by mouth once a day  -- Indication: For Breast cancer    metoprolol tartrate 25 mg oral tablet  -- 1 tab(s) by mouth 2 times a day  -- Indication: For Coronary artery disease involving native coronary artery of native heart without angina pectoris    lidocaine 5% topical film  -- Apply on skin to affected area once a day  -- Indication: For PaIN    Lasix 20 mg oral tablet  -- 1 tab(s) by mouth once a day  -- Indication: For Acute pulmonary edema    guaiFENesin 100 mg/5 mL oral liquid  -- 10 milliliter(s) by mouth every 6 hours, As needed, Cough -for cough  -- Indication: For Cough    bisacodyl 10 mg rectal suppository  -- 1 suppository(ies) rectally once a day  -- Indication: For Constipation     potassium chloride 10 mEq oral tablet, extended release  -- 1 tab(s) by mouth once a day  -- It is very important that you take or use this exactly as directed.  Do not skip doses or discontinue unless directed by your doctor.  Medication should be taken with plenty of water.  Take with food or milk.    -- Indication: For Nutrition, metabolism, and development symptoms    pantoprazole 40 mg oral delayed release tablet  -- 1 tab(s) by mouth once a day (before a meal)  -- Indication: For Prophylactic measure    cyanocobalamin 1000 mcg oral tablet  -- 1 tab(s) by mouth once a day  -- Indication: For Nutrition, metabolism, and development symptoms

## 2017-06-22 NOTE — PROGRESS NOTE ADULT - SUBJECTIVE AND OBJECTIVE BOX
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 Patients family at bedside states she's been more lethargic with associated lower extremity swelling, SOB, decreased appetite and diarrhea. Patient with previous admissions for symptomatic hyperglycemia in January. Patient currently seeing Dr. Galaviz on a regular basis for chemotherapy treatment. Last treatment on May 23rd for which she received Bendamustine and Rituximab. Patient denies any headaches, chest pain, abdominal pain, N/V, dysuria, or bowel changes.     Upon arrival to the ED, patient in moderate respiratory distress using accessory muscles, breathing w/ a RR of 30, O2 93% on room air. Patient placed on BIPAP with slight improvement in respiratory status. Patient febrile to 101 with a HR of 77. Labs significant for AG metabolic acidosis, K 7.0, Na 127, BUN 58, Cr. 2.0 (baseline < 0.9 January), Lactate 2.8. CXR done showing RLL infiltrate with pulmonary vascular congestion. In the ED, patient received Vancomycin, Zosyn, Tylenol, Calcium Gluconate 2g, Insulin 5u, Albuterol neb 5mg x 2. ICU consulted, will be transferred to MICU for management of severe sepsis 2/2 HAP and pulmonary congestion. (10 Ramon 2017 19:13)    SUBJECTIVE: Saw and evaluated Mrs Carrillo at bedside today. The patients  present at bedside. Reports improvement on air hunger while on standing opioids, now tolerating NC. Patient seems to have increase work of breathing, but states only mild dyspnea.     ROS:  DYSPNEA: Yes	  NAUS/VOM: YNo	  SECRETIONS: No	  AGITATION: No  Pain (Y/N): Yes      -Provocation/Palliation: Changes in position  -Quality/Quantity: Somatic musculoskeletal   -Radiating: No  -Severity: Mild-Moderate  -Timing/Frequency: Occasional  -Impact on ADLs: Prevents from getting out of bed     OTHER REVIEW OF SYSTEMS: Denied    PEx:  T(C): 36.8, Max: 37.1 (06-22 @ 05:38)  HR: 101 (80 - 101)  BP: 125/65 (112/64 - 130/74)  RR: 20 (15 - 21)  SpO2: 98% (96% - 100%)  Wt(kg): 54.6    GENERAL:  AAOx3 [place] speaking NAD  HEENT: NC+ Dry lips and mouth Temporal wasting     	  NECK: Supple           CVS: Tachycardic  PPM+          	  RESP: Tachypneic        	  GI: Soft NT ND BS+            	  : Normal           	  MUSC: Generalized muscle weakness 	  NEURO: No focal deficits, following commands     	  PSYCH: Calm          SKIN: Skin breakdown        	   LYMPH: Normal     	     ALLERGIES: IV CONtRAST (Flushing (Skin); Rash), No Known Drug Allergies    OPIATE NAÏVE (Y/N): Yes    MEDICATIONS: REVIEWED  MEDICATIONS  (STANDING):  amiodarone    Tablet 200milliGRAM(s) Oral daily  insulin lispro (HumaLOG) corrective regimen sliding scale  SubCutaneous Before meals and at bedtime  atorvastatin 40milliGRAM(s) Oral at bedtime  bisacodyl Suppository 10milliGRAM(s) Rectal daily  lidocaine   Patch 1Patch Transdermal daily  metoprolol 12.5milliGRAM(s) Oral two times a day  apixaban 2.5milliGRAM(s) Oral two times a day  furosemide    Tablet 20milliGRAM(s) Oral daily  aspirin enteric coated 81milliGRAM(s) Oral daily  cyanocobalamin Injectable 1000MICROGram(s) SubCutaneous daily  cefTRIAXone   IVPB 1Gram(s) IV Intermittent every 24 hours  megestrol Suspension 625milliGRAM(s) Oral daily  oxyCODONE Solution 5milliGRAM(s) Oral every 4 hours    MEDICATIONS  (PRN):  acetaminophen  Suppository 650milliGRAM(s) Rectal every 6 hours PRN For Temp greater than 38 C (100.4 F)  guaiFENesin    Syrup 200milliGRAM(s) Oral every 6 hours PRN Cough  meperidine     Injectable 25milliGRAM(s) IV Push every 6 hours PRN Rigors  morphine  - Injectable 0.5milliGRAM(s) IV Push every 4 hours PRN Dyspnea / Air hunger / Increased work of breathing / RR>25 / moderate pain (4-6)  morphine  - Injectable 1milliGRAM(s) IV Push every 4 hours PRN Excessive work of breathing / RR>35 / Resp distress / severe pain (7-10)    LABS: REVIEWED                        8.8    3.3   )-----------( 90       ( 22 Jun 2017 06:14 )             27.6   06-22    143  |  102  |  27<H>  ----------------------------<  96  3.9   |  29  |  0.90    Ca    8.6      22 Jun 2017 06:14  Phos  2.2     06-22  Mg     2.2     06-22    IMAGING: REVIEWED    EXAM:  XR CHEST 1 VIEW PORT IMMEDIATE                        PROCEDURE DATE:  06/21/2017    INTERPRETATION:    AP Portable CXR dated 6/21/2017 4:46 AM  CLINICAL INFORMATION: 86 years, Female, sob  PRIOR STUDIES: Portable CXR on 6/20/2017  FINDINGS:   Lines, Catheters & Support Devices: There is a left-sided dual-chamber   cardiac pacemaker.  Heart Size, Mediastinum & Hilar Contours: Heart size is suboptimally   evaluated in this projection. Normal mediastinal and hilar contours.    Sternotomy wires and clips overlie the mediastinum. There is   calcification of the aortic knob.  Lungs: Interval improvement in the prominence of the pulmonary   vasculature. Right lower lobe and retrocardiac opacity. Bilateral pleural   effusions.  No pneumothorax.   Bones/Soft Tissues: No acute abnormalities of the soft tissues and   osseous structures. Degenerative changes noted.  IMPRESSION:  Lung infiltrates improving.    ADVANCED DIRECTIVES:   DNR     DNI       MOLST    DECISION MAKER: The patient is able to participate in medical decisions  LEGAL SURROGATE: HCP in the chart. HCP agents are Seth Carrillo [phone] and Chay Astudillo.    PSYCHOSOCIAL-SPIRITUAL ASSESSMENT:       Reviewed       Care plan adjusted as above	    GOALS OF CARE DISCUSSION       Palliative care info/counseling provided	           Family meetings ongoing       See previous Palliative Medicine Note        Documentation of GOC: HCP, DNR/DNI, MOLST  	      AGENCY CHOICE DISCUSSED:          Patient not a hospice candidate since would consider restarting lymphoma treatments if required in the future        Homecare with Home PT    REFERRALS	        Palliative Med        Unit SW/Case Mgmt         - [facility]       PT/OT

## 2017-06-22 NOTE — PROGRESS NOTE ADULT - PROBLEM SELECTOR PLAN 6
Evidence of skin breakdown. Started on Megace by Hemeonc.  Continue nutritional supplements and encouraged OOB to chair as tolerated.

## 2017-06-22 NOTE — PROGRESS NOTE ADULT - PROBLEM SELECTOR PLAN 5
Pending homecare with home PT via [facility].  Patient requested to be discharged tomorrow.

## 2017-06-22 NOTE — DISCHARGE NOTE ADULT - ADDITIONAL INSTRUCTIONS
FOLLOW UP WITH DR CUNNINGHAM  AND DR DAI WITHIN A WEEK FROM DISCHARGE Dr. Cisco Shanks - Wednesday, July 19, 2017 at 11:00am  Dr. Galaviz - Wednesday, July 12, 2017 at 12:15pm  Please arrive 15 minutes early and bring all insurance, identification cards, list of medications and copay (if required).  Discharge notes will be faxed prior to appointment.

## 2017-06-22 NOTE — DISCHARGE NOTE ADULT - PROVIDER TOKENS
TOKEN:'[id number]',TOKEN:'[id number]' TOKEN:'[id number]',FREE:[LAST:[Anastacia],FIRST:[Neha],PHONE:[phone]:[(   [phone]    -],ADDRESS:[[address] [place] [address] [address] [place] [address]]]

## 2017-06-22 NOTE — PROGRESS NOTE ADULT - PROBLEM SELECTOR PLAN 1
Having increased work of breathing, but denied dyspnea. Currently with good O2 sats.  Continue OxyCODONE Solution 5mg PO q4h standing regimen as outpatient.  -Will send prescription of opioid to the patients pharmacy in anticipation of discharge tomorrow so the  can  prior to patient arriving to the home.

## 2017-06-22 NOTE — DISCHARGE NOTE ADULT - NSFTFSERV1RD_GEN_ALL_CORE
observation and assessment/rehabilitation services medication teaching and assessment/rehabilitation services/observation and assessment

## 2017-06-22 NOTE — PROGRESS NOTE ADULT - SUBJECTIVE AND OBJECTIVE BOX
Patient seen and examined at bedside. Alert, still sob, weak, decreased intake      amiodarone    Tablet 200 daily  insulin lispro (HumaLOG) corrective regimen sliding scale  Before meals and at bedtime  atorvastatin 40 at bedtime  acetaminophen  Suppository 650 every 6 hours PRN  bisacodyl Suppository 10 daily  lidocaine   Patch 1 daily  metoprolol 12.5 two times a day  guaiFENesin    Syrup 200 every 6 hours PRN  meperidine     Injectable 25 every 6 hours PRN  apixaban 2.5 two times a day  furosemide    Tablet 20 daily  aspirin enteric coated 81 daily  morphine  - Injectable 0.5 every 4 hours PRN  morphine  - Injectable 1 every 4 hours PRN  cyanocobalamin Injectable 1000 daily  cefTRIAXone   IVPB 1 every 24 hours  megestrol Suspension 625 daily  oxyCODONE Solution 5 every 4 hours      Allergies    IV CONtRAST (Flushing (Skin); Rash)  No Known Drug Allergies    Intolerances        T(C): , Max: 37.1 ( @ 05:38)  T(F): , Max: 98.8 ( @ 05:38)  HR: 101  BP: 125/65  BP(mean): --  RR: 20  SpO2: 98%  Wt(kg): --          LABS:                        8.8    3.3   )-----------( 90       ( 2017 06:14 )             27.6     06-22    143  |  102  |  27<H>  ----------------------------<  96  3.9   |  29  |  0.90    Ca    8.6      2017 06:14  Phos  2.2     -22  Mg     2.2     -          Urinalysis Basic - ( 2017 19:05 )    Color: Yellow / Appearance: Clear / S.010 / pH: x  Gluc: x / Ketone: NEGATIVE  / Bili: NEGATIVE / Urobili: 0.2 E.U./dL   Blood: x / Protein: Trace mg/dL / Nitrite: NEGATIVE   Leuk Esterase: NEGATIVE / RBC: 5-10 /HPF / WBC 5-10 /HPF   Sq Epi: x / Non Sq Epi: Few /HPF / Bacteria: Present /HPF            RADIOLOGY & ADDITIONAL STUDIES:

## 2017-06-22 NOTE — DISCHARGE NOTE ADULT - PATIENT PORTAL LINK FT
“You can access the [facility] Patient Portal, offered by [facility], by registering with the following website: [email or web address]”

## 2017-06-22 NOTE — DISCHARGE NOTE ADULT - PLAN OF CARE
Follow up with primary doctor You presented with a severe infection in your blood and urine for which you received intravenous antibiotics tailored to treat the microorganism causing the infection. It was noticed that the infection had seeded your chemo port, which had to be removed for source control. You also had a gallium scan to confirm there was no infection of other part of your body or your pacemaker, but it was normal. Now on discharge you have completed the course of antibiotics, receiving the last dose before discharge. We have been monitoring your blood cultures and you have cleared your infection. If there is any recurrence of your symptoms or any new fevers, please do not delay seeking medical attention and go to your primary doctor or the closest emergency department. Likely due to the infection, you went into rapid atrial fibrillation which caused your lungs to fill up with fluids and made it difficult for you to breath. We gave you diuretics in order to help drain your lungs and with medication, your heart rate is back to normal. Please continue your medications as prescribed and if there is any worsening in your breathing, go to your primary care doctor or the closest emergency department. Please follow up with Dr. Galaviz within 1-2 weeks from discharge Please continue taking your medications as prescribed Because of the aggressive diuresis required to help your lungs remove the fluids, your kidney function tests were abnormal, however, it is back to baseline now on discharge. Please follow up with your primary care physician within a week from discharge. You presented with a severe infection in your blood and urine for which you received intravenous antibiotics tailored to treat the microorganism causing the infection. It was noticed that the infection had seeded your chemo port, which had to be removed for source control. You also had a gallium scan to confirm there was no infection of other part of your body or your pacemaker, but it was normal.  We have been monitoring your blood cultures and you have cleared your infection. If there is any recurrence of your symptoms or any new fevers, please do not delay seeking medical attention and go to your primary doctor or the closest emergency department. You presented with a severe infection in your blood and urine for which you received intravenous antibiotics tailored to treat the microorganism causing the infection. It was noticed that the infection had seeded your chemo port, which had to be removed for source control. You also had a gallium scan to confirm there was no infection of other part of your body or your pacemaker, but it was normal.  We have been monitoring your blood cultures. All tests for infection or abscess have been negative. You have cleared your infection. If there is any recurrence of your symptoms or any new fevers, please do not delay seeking medical attention and go to your primary doctor or the closest emergency department.

## 2017-06-22 NOTE — PROGRESS NOTE ADULT - PROBLEM SELECTOR PLAN 1
Source is urosepsis, seeding to chemoport, which was removed on 6/12, persistent bacteremia of GNR, however last two sets of cultures NGTD so far (6/15-6/18). Last fever was 6/20 and was 100.  - ID consulted, patient was on Ceftriaxone 1g with intention to keep her for a total of 10 days post-line removal (starting 6/13), but on 6/18, patient was febrile to 103 and abx were broadened to Vanc/Zosyn, however, considering sensitivities, patient switched back to ceftriaxone today to complete a 10 days course (last day 6/23)  - Blood Cx (6/15-6/18-6/20) NGTD   - NM Gallium Scan (6/16)- negative scan- Pacemaker clean. Consider repeat scan as pt febrile again last night to 103   - c/w Tylenol PRN, Demerol PRN can be ordered for rigors   - Repeat renal US- no changes   - Repeat UA (6/20)- negative

## 2017-06-22 NOTE — DISCHARGE NOTE ADULT - OTHER SIGNIFICANT FINDINGS
< from: CT Abdomen and Pelvis w/ Oral Cont and w/ IV Cont (07.02.17 @ 00:42) >    IMPRESSION:    New groundglass infiltrates in both lungs may be due to congestion or   pneumonia.    Small pleural effusions.    Colonic diverticulosis. No evidence of acute diverticulitis.    Nonobstructive left inguinal hernia.    < end of copied text >    < from: US Abdomen Limited (06.30.17 @ 18:32) >    IMPRESSION:  1.  Intrahepatic and extrahepatic bile ductal dilatation.  2.  Mild gallbladder wall thickening which may be due to nongallbladder   related causes such as hypoproteinemia, cardiac failure, hepatitis,   pancreatitis.   3.  Dilatation of the IVC and hepatic veins, which may indicate elevated   right heartpressure.  4.  Increased echogenicity of the right kidney consistent with medical   renal disease.  5.  Right pleural effusion.    < end of copied text >

## 2017-06-22 NOTE — PROGRESS NOTE ADULT - SUBJECTIVE AND OBJECTIVE BOX
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 Patients family at bedside states she's been more lethargic with associated lower extremity swelling, SOB, decreased appetite and diarrhea. Patient with previous admissions for symptomatic hyperglycemia in January. Patient currently seeing Dr. Galaviz on a regular basis for chemotherapy treatment. Last treatment on May 23rd for which she received Bendamustine and Rituximab. Patient denies any headaches, chest pain, abdominal pain, N/V, dysuria, or bowel changes.     Upon arrival to the ED, patient in moderate respiratory distress using accessory muscles, breathing w/ a RR of 30, O2 93% on room air. Patient placed on BIPAP with slight improvement in respiratory status. Patient febrile to 101 with a HR of 77. Labs significant for AG metabolic acidosis, K 7.0, Na 127, BUN 58, Cr. 2.0 (baseline < 0.9 January), Lactate 2.8. CXR done showing RLL infiltrate with pulmonary vascular congestion. In the ED, patient received Vancomycin, Zosyn, Tylenol, Calcium Gluconate 2g, Insulin 5u, Albuterol neb 5mg x 2. ICU consulted, will be transferred to MICU for management of severe sepsis 2/2 HAP and pulmonary congestion. (10 Ramon 2017 19:13)    FAMILY HISTORY:  No pertinent family history in first degree relatives    MEDICATIONS  (STANDING):  amiodarone    Tablet 200milliGRAM(s) Oral daily  insulin lispro (HumaLOG) corrective regimen sliding scale  SubCutaneous Before meals and at bedtime  atorvastatin 40milliGRAM(s) Oral at bedtime  bisacodyl Suppository 10milliGRAM(s) Rectal daily  lidocaine   Patch 1Patch Transdermal daily  metoprolol 12.5milliGRAM(s) Oral two times a day  apixaban 2.5milliGRAM(s) Oral two times a day  furosemide    Tablet 20milliGRAM(s) Oral daily  aspirin enteric coated 81milliGRAM(s) Oral daily  cyanocobalamin Injectable 1000MICROGram(s) SubCutaneous daily  cefTRIAXone   IVPB 1Gram(s) IV Intermittent every 24 hours  megestrol Suspension 625milliGRAM(s) Oral daily  oxyCODONE Solution 5milliGRAM(s) Oral every 4 hours    MEDICATIONS  (PRN):  acetaminophen  Suppository 650milliGRAM(s) Rectal every 6 hours PRN For Temp greater than 38 C (100.4 F)  guaiFENesin    Syrup 200milliGRAM(s) Oral every 6 hours PRN Cough  meperidine     Injectable 25milliGRAM(s) IV Push every 6 hours PRN Rigors  morphine  - Injectable 0.5milliGRAM(s) IV Push every 4 hours PRN Dyspnea / Air hunger / Increased work of breathing / RR>25 / moderate pain (4-6)  morphine  - Injectable 1milliGRAM(s) IV Push every 4 hours PRN Excessive work of breathing / RR>35 / Resp distress / severe pain (7-10)    Vital Signs Last 24 Hrs  T(C): 36.8, Max: 37.1 (06-22 @ 05:38)  T(F): 98.3, Max: 98.8 (06-22 @ 05:38)  HR: 101 (80 - 101)  BP: 125/65 (112/64 - 130/74)  BP(mean): --  RR: 20 (15 - 21)  SpO2: 98% (96% - 100%)    Physical exam:    Overall impression  Lymphadenopathy  Liver  spleen    Labs:  CBC Full  -  ( 22 Jun 2017 06:14 )  WBC Count : 3.3 K/uL  Hemoglobin : 8.8 g/dL  Hematocrit : 27.6 %  Platelet Count - Automated : 90 K/uL  Mean Cell Volume : 92.0 fL  Mean Cell Hemoglobin : 29.3 pg  Mean Cell Hemoglobin Concentration : 31.9 g/dL  Auto Neutrophil # : x  Auto Lymphocyte # : x  Auto Monocyte # : x  Auto Eosinophil # : x  Auto Basophil # : x  Auto Neutrophil % : x  Auto Lymphocyte % : x  Auto Monocyte % : x  Auto Eosinophil % : x  Auto Basophil % : x    06-22    143  |  102  |  27<H>  ----------------------------<  96  3.9   |  29  |  0.90    Ca    8.6      22 Jun 2017 06:14  Phos  2.2     06-22  Mg     2.2     06-22        Radiology:  HEALTH ISSUES - R/O PROBLEM Dx:      Assessmant / Problems  1) Improved PNA by temp, CXR, negative c& S,   2) Anemia s/p 1 U PRBC Hg > 8    Plan:  Continue current tx    Thank you  Neha Galaviz MD

## 2017-06-22 NOTE — DISCHARGE NOTE ADULT - CARE PLAN
Principal Discharge DX:	Severe sepsis  Goal:	Follow up with primary doctor  Instructions for follow-up, activity and diet:	You presented with a severe infection in your blood and urine for which you received intravenous antibiotics tailored to treat the microorganism causing the infection. It was noticed that the infection had seeded your chemo port, which had to be removed for source control. You also had a gallium scan to confirm there was no infection of other part of your body or your pacemaker, but it was normal. Now on discharge you have completed the course of antibiotics, receiving the last dose before discharge. We have been monitoring your blood cultures and you have cleared your infection. If there is any recurrence of your symptoms or any new fevers, please do not delay seeking medical attention and go to your primary doctor or the closest emergency department.  Secondary Diagnosis:	Paroxysmal atrial fibrillation  Instructions for follow-up, activity and diet:	Likely due to the infection, you went into rapid atrial fibrillation which caused your lungs to fill up with fluids and made it difficult for you to breath. We gave you diuretics in order to help drain your lungs and with medication, your heart rate is back to normal. Please continue your medications as prescribed and if there is any worsening in your breathing, go to your primary care doctor or the closest emergency department.  Secondary Diagnosis:	Diffuse large B-cell lymphoma, unspecified body region  Instructions for follow-up, activity and diet:	Please follow up with Dr. Galaviz within 1-2 weeks from discharge  Secondary Diagnosis:	Essential hypertension  Instructions for follow-up, activity and diet:	Please continue taking your medications as prescribed  Secondary Diagnosis:	Type 2 diabetes mellitus with complication, without long-term current use of insulin  Instructions for follow-up, activity and diet:	Please continue taking your medications as prescribed  Secondary Diagnosis:	ROMEO (acute kidney injury)  Instructions for follow-up, activity and diet:	Because of the aggressive diuresis required to help your lungs remove the fluids, your kidney function tests were abnormal, however, it is back to baseline now on discharge. Please follow up with your primary care physician within a week from discharge. Principal Discharge DX:	Severe sepsis  Goal:	Follow up with primary doctor  Instructions for follow-up, activity and diet:	You presented with a severe infection in your blood and urine for which you received intravenous antibiotics tailored to treat the microorganism causing the infection. It was noticed that the infection had seeded your chemo port, which had to be removed for source control. You also had a gallium scan to confirm there was no infection of other part of your body or your pacemaker, but it was normal.  We have been monitoring your blood cultures and you have cleared your infection. If there is any recurrence of your symptoms or any new fevers, please do not delay seeking medical attention and go to your primary doctor or the closest emergency department.  Secondary Diagnosis:	Paroxysmal atrial fibrillation  Instructions for follow-up, activity and diet:	Likely due to the infection, you went into rapid atrial fibrillation which caused your lungs to fill up with fluids and made it difficult for you to breath. We gave you diuretics in order to help drain your lungs and with medication, your heart rate is back to normal. Please continue your medications as prescribed and if there is any worsening in your breathing, go to your primary care doctor or the closest emergency department.  Secondary Diagnosis:	Diffuse large B-cell lymphoma, unspecified body region  Instructions for follow-up, activity and diet:	Please follow up with Dr. Galaviz within 1-2 weeks from discharge  Secondary Diagnosis:	Essential hypertension  Instructions for follow-up, activity and diet:	Please continue taking your medications as prescribed  Secondary Diagnosis:	Type 2 diabetes mellitus with complication, without long-term current use of insulin  Instructions for follow-up, activity and diet:	Please continue taking your medications as prescribed  Secondary Diagnosis:	ROMEO (acute kidney injury)  Instructions for follow-up, activity and diet:	Because of the aggressive diuresis required to help your lungs remove the fluids, your kidney function tests were abnormal, however, it is back to baseline now on discharge. Please follow up with your primary care physician within a week from discharge. Principal Discharge DX:	Severe sepsis  Goal:	Follow up with primary doctor  Instructions for follow-up, activity and diet:	You presented with a severe infection in your blood and urine for which you received intravenous antibiotics tailored to treat the microorganism causing the infection. It was noticed that the infection had seeded your chemo port, which had to be removed for source control. You also had a gallium scan to confirm there was no infection of other part of your body or your pacemaker, but it was normal.  We have been monitoring your blood cultures. All tests for infection or abscess have been negative. You have cleared your infection. If there is any recurrence of your symptoms or any new fevers, please do not delay seeking medical attention and go to your primary doctor or the closest emergency department.  Secondary Diagnosis:	Paroxysmal atrial fibrillation  Instructions for follow-up, activity and diet:	Likely due to the infection, you went into rapid atrial fibrillation which caused your lungs to fill up with fluids and made it difficult for you to breath. We gave you diuretics in order to help drain your lungs and with medication, your heart rate is back to normal. Please continue your medications as prescribed and if there is any worsening in your breathing, go to your primary care doctor or the closest emergency department.  Secondary Diagnosis:	Diffuse large B-cell lymphoma, unspecified body region  Instructions for follow-up, activity and diet:	Please follow up with Dr. Galaviz within 1-2 weeks from discharge  Secondary Diagnosis:	Essential hypertension  Instructions for follow-up, activity and diet:	Please continue taking your medications as prescribed  Secondary Diagnosis:	Type 2 diabetes mellitus with complication, without long-term current use of insulin  Instructions for follow-up, activity and diet:	Please continue taking your medications as prescribed  Secondary Diagnosis:	ROMEO (acute kidney injury)  Instructions for follow-up, activity and diet:	Because of the aggressive diuresis required to help your lungs remove the fluids, your kidney function tests were abnormal, however, it is back to baseline now on discharge. Please follow up with your primary care physician within a week from discharge.

## 2017-06-22 NOTE — PROGRESS NOTE ADULT - SUBJECTIVE AND OBJECTIVE BOX
Chief Complaint/Reason for Consult: CAD  INTERVAL HPI: sob improved no acute events overnight  	  MEDICATIONS:  amiodarone    Tablet 200milliGRAM(s) Oral daily  metoprolol 12.5milliGRAM(s) Oral two times a day  furosemide    Tablet 20milliGRAM(s) Oral daily    cefTRIAXone   IVPB 1Gram(s) IV Intermittent every 24 hours    guaiFENesin    Syrup 200milliGRAM(s) Oral every 6 hours PRN    acetaminophen  Suppository 650milliGRAM(s) Rectal every 6 hours PRN  meperidine     Injectable 25milliGRAM(s) IV Push every 6 hours PRN  morphine  - Injectable 0.5milliGRAM(s) IV Push every 4 hours PRN  morphine  - Injectable 1milliGRAM(s) IV Push every 4 hours PRN  oxyCODONE Solution 5milliGRAM(s) Oral every 4 hours    bisacodyl Suppository 10milliGRAM(s) Rectal daily    insulin lispro (HumaLOG) corrective regimen sliding scale  SubCutaneous Before meals and at bedtime  atorvastatin 40milliGRAM(s) Oral at bedtime  megestrol Suspension 625milliGRAM(s) Oral daily    lidocaine   Patch 1Patch Transdermal daily  apixaban 2.5milliGRAM(s) Oral two times a day  aspirin enteric coated 81milliGRAM(s) Oral daily  cyanocobalamin Injectable 1000MICROGram(s) SubCutaneous daily  potassium phosphate IVPB 15milliMole(s) IV Intermittent once      REVIEW OF SYSTEMS:  [x] As per HPI  CONSTITUTIONAL: No fever, weight loss, or fatigue  RESPIRATORY: No cough, wheezing, chills or hemoptysis; No Shortness of Breath  CARDIOVASCULAR: No chest pain, palpitations, dizziness, or leg swelling  GASTROINTESTINAL: No abdominal or epigastric pain. No nausea, vomiting, or hematemesis; No diarrhea or constipation. No melena or hematochezia.  MUSCULOSKELETAL: No joint pain or swelling; No muscle, back, or extremity pain  [x] All others negative	  [ ] Unable to obtain    PHYSICAL EXAM:  T(C): 36.8, Max: 37.1 (06-22 @ 05:38)  HR: 101 (80 - 101)  BP: 125/65 (112/64 - 130/74)  RR: 20 (15 - 21)  SpO2: 98% (96% - 100%)  Wt(kg): --  I&O's Summary        Appearance: Normal	  HEENT:   Normal oral mucosa  Cardiovascular: Normal S1 S2, No JVD, No murmurs, No edema  Respiratory: Lungs clear to auscultation	  Gastrointestinal:  Soft, Non-tender, + BS	  Extremities: Normal range of motion, No clubbing, cyanosis or edema  Vascular: Peripheral pulses palpable 2+ bilaterally    TELEMETRY: 	    ECG:    	  RADIOLOGY:   CXR:  CT:  US:    CARDIAC TESTING:  Echocardiogram:  Catheterization:  Stress Test:      LABS:	 	    CARDIAC MARKERS:                                  8.8    3.3   )-----------( 90       ( 22 Jun 2017 06:14 )             27.6     06-22    143  |  102  |  27<H>  ----------------------------<  96  3.9   |  29  |  0.90    Ca    8.6      22 Jun 2017 06:14  Phos  2.2     06-22  Mg     2.2     06-22      proBNP:   Lipid Profile:   HgA1c:   TSH:     ASSESSMENT/PLAN: 	  Problem: Acute pulmonary edema.  Plan:above events noted s/p Lasix for relief  - C/w BIPAP/HF, deescalate to NC as needed   - Lasix prn dyspnea or desaturation    ·  Problem: Paroxysmal atrial fibrillation.  Plan: s/p PPM, on Eliquis at home. Decreased CrCl. Concern for pacemaker involvement, however gallium scan negative   - S/p Lovenox 50mg BID in setting of malignancy   - c/w home Amiodarone 200mg QD   - AC switched from Lovenox to Eliquis (2.5mg since patient age >80yrs and weight <60km).       ·  Problem: Coronary artery disease involving native coronary artery of native heart without angina pectoris.  Plan: Patient s/p CABG. Patient without chest pain. EKG without ischemic changes. EF EF 60-65%.   - c/w home Lipitor 40mg QHS  - Holding ASA   - c/w Metoprolol 12.5 BID, may need to uptitrate   - Medically optimize cardiac function if BP allows   - Consider restarting standing Lasix , currently only getting as PRN if pt is overloaded on am CXR  - Continue to monitor I's and O's.

## 2017-06-22 NOTE — DISCHARGE NOTE ADULT - HOSPITAL COURSE
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 Patient started on heparin gtt for atrial fibrillation, and diuresed for pulmonary edema which improved. Zosyn started for urosepsis, E. Coli bacteremia. Despite being on adequate dosing of abx, patient persistently bacteremic, having temp > 103-104, with severe rigors. Chemoport removed by IR on 6/12 and line positive for gram negative rods. Pacemaker thought to be infected as well, however gallium scan (6/16) done which was negative. Patient still spiking fevers w/rigors, disorientation, but otherwise HD stable with baseline SBP high 80- low 90's. Palliative involved in patients care as she was then made DNR/DNI. Patient was stepped down to [facility] on 6/16 and abx were deescalated to Ceftriaxone 1g q24 per ID. Patient's was transitioned from BiPAP to HFNC and respiratory status was stable. For mild tachypnea and air hunger, patient was started on Oxycodone solution per Palliative. On, 6/18, overnight pt spiked to 103 rectally, blood cx drawn, CXR unchanged from am, UA bland. Per ID, abx broadened to Vanc/Zosyn. Patient stepped down to the [facility] on 6/19, and on 6/20 pt spiked a temp of 100 oral and on 6/21 abx changed back to ceftriaxone as per ID recs, in order to complete a 10days course given sensitivity. Pt went back into some mild pulmonary congestion which was noticed on cxr done during fever workup, and got diuresis with improvement. Today on discharge pt has been afebrile for almost 2 days, has finished her course of antibiotics, and although the recs were for patient to go to [facility], patient is to be discharged home with home care since those were her wished.   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 Patient started on heparin gtt for atrial fibrillation, and diuresed for pulmonary edema which improved. Zosyn started for urosepsis, E. Coli bacteremia. Despite being on adequate dosing of abx, patient persistently bacteremic, having temp > 103-104, with severe rigors. Chemoport removed by IR on 6/12 and line positive for gram negative rods. Pacemaker thought to be infected as well, however gallium scan (6/16) done which was negative. Patient still spiking fevers w/rigors, disorientation, but otherwise HD stable with baseline SBP high 80- low 90's. Palliative involved in patients care as she was then made DNR/DNI. Patient was stepped down to [facility] on 6/16 and abx were deescalated to Ceftriaxone 1g q24 per ID. Patient's was transitioned from BiPAP to HFNC and respiratory status was stable. For mild tachypnea and air hunger, patient was started on Oxycodone solution per Palliative. On, 6/18, overnight pt spiked to 103 rectally, blood cx drawn, CXR unchanged from am, UA bland. Per ID, abx broadened to Vanc/Zosyn. Patient stepped down to the [facility] on 6/19, and on 6/20 pt spiked a temp of 100 oral and on 6/21 abx changed back to ceftriaxone as per ID recs, in order to complete a 10days course given sensitivity. Pt went back into some mild pulmonary congestion which was noticed on cxr done during fever workup, and got diuresis with improvement. Due to fluid overload patient was tachy and with decreased O2Sat (low 90's), however due to concerns for PE, patient had VQ scan (CTA deferred given contrast allergy) which was negative on 6/26. Patient continued to be diuresed and PO lasix increased upon discharge. Patient has been afebrile for almost 4 days upon discharge and is euvolemic and can be discharged to [facility] as per PT recs.     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 Patient started on heparin gtt for atrial fibrillation, and diuresed for pulmonary edema which improved. Zosyn started for urosepsis, E. Coli bacteremia. Despite being on adequate dosing of abx, patient persistently bacteremic, having temp > 103-104, with severe rigors. Chemoport removed by IR on 6/12 and line positive for gram negative rods. Pacemaker thought to be infected as well, however gallium scan (6/16) done which was negative. Patient still spiking fevers w/rigors, disorientation, but otherwise HD stable with baseline SBP high 80- low 90's. Palliative involved in patients care as she was then made DNR/DNI. Patient was stepped down to [facility] on 6/16 and abx were deescalated to Ceftriaxone 1g q24 per ID. Patient's was transitioned from BiPAP to HFNC and respiratory status was stable. For mild tachypnea and air hunger, patient was started on Oxycodone solution per Palliative. On, 6/18, overnight pt spiked to 103 rectally, blood cx drawn, CXR unchanged from am, UA bland. Per ID, abx broadened to Vanc/Zosyn. Patient stepped down to the [facility] on 6/19, and on 6/20 pt spiked a temp of 100 oral and on 6/21 abx changed back to ceftriaxone as per ID recs, in order to complete a 10days course given sensitivity. Pt went back into some mild pulmonary congestion which was noticed on cxr done during fever workup, and got diuresis with improvement. Due to fluid overload patient was tachy and with decreased O2Sat (low 90's), however due to concerns for PE, patient had VQ scan (CTA deferred given contrast allergy) which was negative on 6/26. Patient continued to be diuresed. Patient's WBCs began uptrending and was worked up for infectious etiology. Infectious Disease was consulted on the case. Blood cultures, UA, urine cultures were negative. RUQ US showed no focal infiltrate CT abd/pelvis was not definitive for focal infiltrate. Heme/Onc was consulted as well and recommended gallium scan to look for infectious source, which ID agreed with. MRI cannot be performed, as patient is with pacemaker. ID also ordered for CT lumbosacral spine to r/o spinal abscess. In addition, heme/onc ordered for head CT as patient was intermittently hallucinating at night.     Patient has been afebrile for almost 4 days upon discharge and is euvolemic and can be discharged to [facility] as per PT recs.     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 Patient started on heparin gtt for atrial fibrillation, and diuresed for pulmonary edema which improved. Zosyn started for urosepsis, E. Coli bacteremia. Despite being on adequate dosing of abx, patient persistently bacteremic, having temp > 103-104, with severe rigors. Chemoport removed by IR on 6/12 and line positive for gram negative rods. Pacemaker thought to be infected as well, however gallium scan (6/16) done which was negative. Patient still spiking fevers w/rigors, disorientation, but otherwise HD stable with baseline SBP high 80- low 90's. Palliative involved in patients care as she was then made DNR/DNI. Patient was stepped down to [facility] on 6/16 and abx were deescalated to Ceftriaxone 1g q24 per ID. Patient's was transitioned from BiPAP to HFNC and respiratory status was stable. For mild tachypnea and air hunger, patient was started on Oxycodone solution per Palliative. On, 6/18, overnight pt spiked to 103 rectally, blood cx drawn, CXR unchanged from am, UA bland. Per ID, abx broadened to Vanc/Zosyn. Patient stepped down to the [facility] on 6/19, and on 6/20 pt spiked a temp of 100 oral and on 6/21 abx changed back to ceftriaxone as per ID recs, in order to complete a 10days course given sensitivity. Pt went back into some mild pulmonary congestion which was noticed on cxr done during fever workup, and got diuresis with improvement. Due to fluid overload patient was tachy and with decreased O2Sat (low 90's), however due to concerns for PE, patient had VQ scan (CTA deferred given contrast allergy) which was negative on 6/26. Patient continued to be diuresed. Patient's WBCs began uptrending and was worked up for infectious etiology. Infectious Disease was consulted on the case. Blood cultures, UA, urine cultures were negative. RUQ US showed no focal infiltrate CT abd/pelvis was not definitive for focal infiltrate. Heme/Onc was consulted as well and recommended gallium scan to look for infectious source, which ID agreed with. MRI cannot be performed, as patient is with pacemaker. ID also ordered for CT lumbosacral spine to r/o spinal abscess. In addition, heme/onc ordered for head CT as patient was intermittently hallucinating at night. CT head was negative for acute changes. Psych was consulted and recommended pt     Patient has been afebrile for almost 4 days upon discharge and is euvolemic and can be discharged to [facility] as per PT recs.     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 Patient started on heparin gtt for atrial fibrillation, and diuresed for pulmonary edema which improved. Zosyn started for urosepsis, E. Coli bacteremia. Despite being on adequate dosing of abx, patient persistently bacteremic, having temp > 103-104, with severe rigors. Chemoport removed by IR on 6/12 and line positive for gram negative rods. Pacemaker thought to be infected as well, however gallium scan (6/16) done which was negative. Patient still spiking fevers w/rigors, disorientation, but otherwise HD stable with baseline SBP high 80- low 90's. Palliative involved in patients care as she was then made DNR/DNI. Patient was stepped down to [facility] on 6/16 and abx were deescalated to Ceftriaxone 1g q24 per ID. Patient's was transitioned from BiPAP to HFNC and respiratory status was stable. For mild tachypnea and air hunger, patient was started on Oxycodone solution per Palliative. On, 6/18, overnight pt spiked to 103 rectally, blood cx drawn, CXR unchanged from am, UA bland. Per ID, abx broadened to Vanc/Zosyn. Patient stepped down to the [facility] on 6/19, and on 6/20 pt spiked a temp of 100 oral and on 6/21 abx changed back to ceftriaxone as per ID recs, in order to complete a 10days course given sensitivity. Pt went back into some mild pulmonary congestion which was noticed on cxr done during fever workup, and got diuresis with improvement. Due to fluid overload patient was tachy and with decreased O2Sat (low 90's), however due to concerns for PE, patient had VQ scan (CTA deferred given contrast allergy) which was negative on 6/26. Patient continued to be diuresed. Patient's WBCs began uptrending and was worked up for infectious etiology. Infectious Disease was consulted on the case. Blood cultures, UA, urine cultures were negative. RUQ US showed no focal infiltrate CT abd/pelvis was not definitive for focal infiltrate. Heme/Onc was consulted as well and recommended gallium scan to look for infectious source, which ID agreed with. MRI cannot be performed, as patient is with pacemaker. ID also ordered for CT lumbosacral spine to r/o spinal abscess. In addition, heme/onc ordered for head CT as patient was intermittently hallucinating at night. CT head was negative for acute changes.     Patient has been afebrile for almost 4 days upon discharge and is euvolemic and can be discharged to [facility] as per PT recs.     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 Patient started on heparin gtt for atrial fibrillation, and diuresed for pulmonary edema which improved. Zosyn started for urosepsis, E. Coli bacteremia. Despite being on adequate dosing of abx, patient persistently bacteremic, having temp > 103-104, with severe rigors. Chemoport removed by IR on 6/12 and line positive for gram negative rods. Pacemaker thought to be infected as well, however gallium scan (6/16) done which was negative. Patient still spiking fevers w/rigors, disorientation, but otherwise HD stable with baseline SBP high 80- low 90's. Palliative involved in patients care as she was then made DNR/DNI. Patient was stepped down to [facility] on 6/16 and abx were deescalated to Ceftriaxone 1g q24 per ID. Patient's was transitioned from BiPAP to HFNC and respiratory status was stable. For mild tachypnea and air hunger, patient was started on Oxycodone solution per Palliative. On, 6/18, overnight pt spiked to 103 rectally, blood cx drawn, CXR unchanged from am, UA bland. Per ID, abx broadened to Vanc/Zosyn. Patient stepped down to the [facility] on 6/19, and on 6/20 pt spiked a temp of 100 oral and on 6/21 abx changed back to ceftriaxone as per ID recs, in order to complete a 10days course given sensitivity. Pt went back into some mild pulmonary congestion which was noticed on cxr done during fever workup, and got diuresis with improvement. Due to fluid overload patient was tachy and with decreased O2Sat (low 90's), however due to concerns for PE, patient had VQ scan (CTA deferred given contrast allergy) which was negative on 6/26. Patient continued to be diuresed. Patient's WBCs began uptrending and was worked up for infectious etiology. Infectious Disease was consulted on the case. Blood cultures, UA, urine cultures were negative. RUQ US showed no focal infiltrate CT abd/pelvis was not definitive for focal infiltrate. Heme/Onc was consulted as well and recommended gallium scan to look for infectious source, which ID agreed with. MRI cannot be performed, as patient is with pacemaker. ID also ordered for CT lumbosacral spine to r/o spinal abscess, which was negative. In addition, heme/onc ordered for head CT as patient was intermittently hallucinating at night. CT head was negative for acute changes. Repeat gallium scan was negative for acute infectious etiology.    Patient has been afebrile. WBC are downtrending with no source of infection found. Blood cultures NGTD. Pt asymptomatic since admission to [facility]. Per ID, monitor WBC outpatient 1 week post discharge. Pt medically optimized for discharge.     Pt and family now refusing TRAVIS. Will be DC to home. 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 Patient started on heparin gtt for atrial fibrillation, and diuresed for pulmonary edema which improved. Zosyn started for urosepsis, E. Coli bacteremia. Despite being on adequate dosing of abx, patient persistently bacteremic, having temp > 103-104, with severe rigors. Chemoport removed by IR on 6/12 and line positive for gram negative rods. Pacemaker thought to be infected as well, however gallium scan (6/16) done which was negative. Patient still spiking fevers w/rigors, disorientation, but otherwise HD stable with baseline SBP high 80- low 90's. Palliative involved in patients care as she was then made DNR/DNI. Patient was stepped down to [facility] on 6/16 and abx were deescalated to Ceftriaxone 1g q24 per ID. Patient's was transitioned from BiPAP to HFNC and respiratory status was stable. For mild tachypnea and air hunger, patient was started on Oxycodone solution per Palliative. On, 6/18, overnight pt spiked to 103 rectally, blood cx drawn, CXR unchanged from am, UA bland. Per ID, abx broadened to Vanc/Zosyn. Patient stepped down to the [facility] on 6/19, and on 6/20 pt spiked a temp of 100 oral and on 6/21 abx changed back to ceftriaxone as per ID recs, in order to complete a 10days course given sensitivity. Pt went back into some mild pulmonary congestion which was noticed on cxr done during fever workup, and got diuresis with improvement. Due to fluid overload patient was tachy and with decreased O2Sat (low 90's), however due to concerns for PE, patient had VQ scan (CTA deferred given contrast allergy) which was negative on 6/26. Patient continued to be diuresed. Patient's WBCs began uptrending and was worked up for infectious etiology. Infectious Disease was consulted on the case. Blood cultures, UA, urine cultures were negative. RUQ US showed no focal infiltrate CT abd/pelvis was not definitive for focal infiltrate. Heme/Onc was consulted as well and recommended gallium scan to look for infectious source, which ID agreed with. MRI cannot be performed, as patient is with pacemaker. ID also ordered for CT lumbosacral spine to r/o spinal abscess, which was negative. In addition, heme/onc ordered for head CT as patient was intermittently hallucinating at night. CT head was negative for acute changes. Repeat gallium scan was negative for acute infectious etiology.    Patient has been afebrile. WBC are downtrending with no source of infection found. Blood cultures NGTD. Pt asymptomatic since admission to [facility]. Per ID, monitor WBC outpatient 1 week post discharge with Dr. Shanks and Dr. Galaviz. Pt medically optimized for discharge.     Pt and family now refusing TRAVIS. Will be DC to home with services. Pt  and daughter aware and agreeable.

## 2017-06-22 NOTE — DISCHARGE NOTE ADULT - MEDICATION SUMMARY - MEDICATIONS TO CHANGE
I will SWITCH the dose or number of times a day I take the medications listed below when I get home from the hospital:  None I will SWITCH the dose or number of times a day I take the medications listed below when I get home from the hospital:    Toprol-XL 50 mg oral tablet, extended release  -- 1 tab(s) by mouth once a day

## 2017-06-23 LAB
ANION GAP SERPL CALC-SCNC: 10 MMOL/L — SIGNIFICANT CHANGE UP (ref 5–17)
APPEARANCE UR: (no result)
BACTERIA # UR AUTO: PRESENT /HPF
BILIRUB UR-MCNC: NEGATIVE — SIGNIFICANT CHANGE UP
BUN SERPL-MCNC: 28 MG/DL — HIGH (ref 7–23)
CALCIUM SERPL-MCNC: 8.8 MG/DL — SIGNIFICANT CHANGE UP (ref 8.4–10.5)
CHLORIDE SERPL-SCNC: 96 MMOL/L — SIGNIFICANT CHANGE UP (ref 96–108)
CO2 SERPL-SCNC: 30 MMOL/L — SIGNIFICANT CHANGE UP (ref 22–31)
COLOR SPEC: YELLOW — SIGNIFICANT CHANGE UP
COMMENT - URINE: SIGNIFICANT CHANGE UP
CREAT SERPL-MCNC: 0.9 MG/DL — SIGNIFICANT CHANGE UP (ref 0.5–1.3)
CULTURE RESULTS: SIGNIFICANT CHANGE UP
CULTURE RESULTS: SIGNIFICANT CHANGE UP
DIFF PNL FLD: (no result)
EPI CELLS # UR: (no result) /HPF
GLUCOSE SERPL-MCNC: 98 MG/DL — SIGNIFICANT CHANGE UP (ref 70–99)
GLUCOSE UR QL: NEGATIVE — SIGNIFICANT CHANGE UP
HCT VFR BLD CALC: 28.1 % — LOW (ref 34.5–45)
HGB BLD-MCNC: 9 G/DL — LOW (ref 11.5–15.5)
KETONES UR-MCNC: NEGATIVE — SIGNIFICANT CHANGE UP
LEUKOCYTE ESTERASE UR-ACNC: NEGATIVE — SIGNIFICANT CHANGE UP
MAGNESIUM SERPL-MCNC: 1.6 MG/DL — SIGNIFICANT CHANGE UP (ref 1.6–2.6)
MCHC RBC-ENTMCNC: 29.8 PG — SIGNIFICANT CHANGE UP (ref 27–34)
MCHC RBC-ENTMCNC: 32 G/DL — SIGNIFICANT CHANGE UP (ref 32–36)
MCV RBC AUTO: 93 FL — SIGNIFICANT CHANGE UP (ref 80–100)
NITRITE UR-MCNC: NEGATIVE — SIGNIFICANT CHANGE UP
PH UR: 6 — SIGNIFICANT CHANGE UP (ref 5–8)
PHOSPHATE SERPL-MCNC: 2.5 MG/DL — SIGNIFICANT CHANGE UP (ref 2.5–4.5)
PLATELET # BLD AUTO: 94 K/UL — LOW (ref 150–400)
POTASSIUM SERPL-MCNC: 4.2 MMOL/L — SIGNIFICANT CHANGE UP (ref 3.5–5.3)
POTASSIUM SERPL-SCNC: 4.2 MMOL/L — SIGNIFICANT CHANGE UP (ref 3.5–5.3)
PROT UR-MCNC: 30 MG/DL
RBC # BLD: 3.02 M/UL — LOW (ref 3.8–5.2)
RBC # FLD: 17.7 % — HIGH (ref 10.3–16.9)
RBC CASTS # UR COMP ASSIST: (no result) /HPF
SODIUM SERPL-SCNC: 136 MMOL/L — SIGNIFICANT CHANGE UP (ref 135–145)
SP GR SPEC: 1.02 — SIGNIFICANT CHANGE UP (ref 1–1.03)
SPECIMEN SOURCE: SIGNIFICANT CHANGE UP
SPECIMEN SOURCE: SIGNIFICANT CHANGE UP
UROBILINOGEN FLD QL: 0.2 E.U./DL — SIGNIFICANT CHANGE UP
WBC # BLD: 2.6 K/UL — LOW (ref 3.8–10.5)
WBC # FLD AUTO: 2.6 K/UL — LOW (ref 3.8–10.5)
WBC UR QL: < 5 /HPF — SIGNIFICANT CHANGE UP

## 2017-06-23 PROCEDURE — 71010: CPT | Mod: 26

## 2017-06-23 PROCEDURE — 99232 SBSQ HOSP IP/OBS MODERATE 35: CPT

## 2017-06-23 PROCEDURE — 99232 SBSQ HOSP IP/OBS MODERATE 35: CPT | Mod: GC

## 2017-06-23 RX ORDER — MEPERIDINE HYDROCHLORIDE 50 MG/ML
2.5 INJECTION INTRAMUSCULAR; INTRAVENOUS; SUBCUTANEOUS
Qty: 0 | Refills: 0 | COMMUNITY
Start: 2017-06-23

## 2017-06-23 RX ORDER — MAGNESIUM SULFATE 500 MG/ML
2 VIAL (ML) INJECTION ONCE
Qty: 0 | Refills: 0 | Status: COMPLETED | OUTPATIENT
Start: 2017-06-23 | End: 2017-06-23

## 2017-06-23 RX ORDER — PREGABALIN 225 MG/1
1 CAPSULE ORAL
Qty: 30 | Refills: 0 | OUTPATIENT
Start: 2017-06-23 | End: 2017-07-23

## 2017-06-23 RX ORDER — MEGESTROL ACETATE 40 MG/ML
15.63 SUSPENSION ORAL
Qty: 468.9 | Refills: 0 | OUTPATIENT
Start: 2017-06-23 | End: 2017-07-23

## 2017-06-23 RX ORDER — GENTAMICIN SULFATE 40 MG/ML
200 VIAL (ML) INJECTION ONCE
Qty: 0 | Refills: 0 | Status: COMPLETED | OUTPATIENT
Start: 2017-06-23 | End: 2017-06-23

## 2017-06-23 RX ORDER — CEFTRIAXONE 500 MG/1
1 INJECTION, POWDER, FOR SOLUTION INTRAMUSCULAR; INTRAVENOUS ONCE
Qty: 0 | Refills: 0 | Status: DISCONTINUED | OUTPATIENT
Start: 2017-06-23 | End: 2017-06-23

## 2017-06-23 RX ORDER — LIDOCAINE 4 G/100G
1 CREAM TOPICAL
Qty: 30 | Refills: 0 | OUTPATIENT
Start: 2017-06-23 | End: 2017-07-23

## 2017-06-23 RX ORDER — POTASSIUM CHLORIDE 20 MEQ
1 PACKET (EA) ORAL
Qty: 30 | Refills: 0 | OUTPATIENT
Start: 2017-06-23 | End: 2017-07-23

## 2017-06-23 RX ADMIN — AMIODARONE HYDROCHLORIDE 200 MILLIGRAM(S): 400 TABLET ORAL at 05:07

## 2017-06-23 RX ADMIN — LIDOCAINE 1 PATCH: 4 CREAM TOPICAL at 12:40

## 2017-06-23 RX ADMIN — OXYCODONE HYDROCHLORIDE 5 MILLIGRAM(S): 5 TABLET ORAL at 21:48

## 2017-06-23 RX ADMIN — OXYCODONE HYDROCHLORIDE 5 MILLIGRAM(S): 5 TABLET ORAL at 05:38

## 2017-06-23 RX ADMIN — Medication 81 MILLIGRAM(S): at 12:37

## 2017-06-23 RX ADMIN — LIDOCAINE 1 PATCH: 4 CREAM TOPICAL at 12:37

## 2017-06-23 RX ADMIN — Medication 10 MILLIGRAM(S): at 12:37

## 2017-06-23 RX ADMIN — LIDOCAINE 1 PATCH: 4 CREAM TOPICAL at 23:46

## 2017-06-23 RX ADMIN — APIXABAN 2.5 MILLIGRAM(S): 2.5 TABLET, FILM COATED ORAL at 21:47

## 2017-06-23 RX ADMIN — OXYCODONE HYDROCHLORIDE 5 MILLIGRAM(S): 5 TABLET ORAL at 01:10

## 2017-06-23 RX ADMIN — Medication 12.5 MILLIGRAM(S): at 05:07

## 2017-06-23 RX ADMIN — APIXABAN 2.5 MILLIGRAM(S): 2.5 TABLET, FILM COATED ORAL at 10:29

## 2017-06-23 RX ADMIN — PREGABALIN 1000 MICROGRAM(S): 225 CAPSULE ORAL at 12:37

## 2017-06-23 RX ADMIN — OXYCODONE HYDROCHLORIDE 5 MILLIGRAM(S): 5 TABLET ORAL at 18:02

## 2017-06-23 RX ADMIN — LIDOCAINE 1 PATCH: 4 CREAM TOPICAL at 00:52

## 2017-06-23 RX ADMIN — ATORVASTATIN CALCIUM 40 MILLIGRAM(S): 80 TABLET, FILM COATED ORAL at 21:48

## 2017-06-23 RX ADMIN — MEGESTROL ACETATE 625 MILLIGRAM(S): 40 SUSPENSION ORAL at 21:50

## 2017-06-23 RX ADMIN — Medication 12.5 MILLIGRAM(S): at 18:02

## 2017-06-23 RX ADMIN — OXYCODONE HYDROCHLORIDE 5 MILLIGRAM(S): 5 TABLET ORAL at 10:29

## 2017-06-23 RX ADMIN — Medication 120 MILLIGRAM(S): at 12:36

## 2017-06-23 RX ADMIN — Medication 20 MILLIGRAM(S): at 05:08

## 2017-06-23 RX ADMIN — Medication 2: at 21:51

## 2017-06-23 RX ADMIN — Medication 50 GRAM(S): at 10:29

## 2017-06-23 RX ADMIN — OXYCODONE HYDROCHLORIDE 5 MILLIGRAM(S): 5 TABLET ORAL at 05:08

## 2017-06-23 NOTE — PROGRESS NOTE ADULT - SUBJECTIVE AND OBJECTIVE BOX
Patient seen and examined at bedside. responsive, denies pain now, eats some , still SOB      amiodarone    Tablet 200 daily  insulin lispro (HumaLOG) corrective regimen sliding scale  Before meals and at bedtime  atorvastatin 40 at bedtime  acetaminophen  Suppository 650 every 6 hours PRN  bisacodyl Suppository 10 daily  lidocaine   Patch 1 daily  metoprolol 12.5 two times a day  guaiFENesin    Syrup 200 every 6 hours PRN  meperidine     Injectable 25 every 6 hours PRN  apixaban 2.5 two times a day  furosemide    Tablet 20 daily  aspirin enteric coated 81 daily  morphine  - Injectable 0.5 every 4 hours PRN  morphine  - Injectable 1 every 4 hours PRN  cyanocobalamin Injectable 1000 daily  megestrol Suspension 625 daily  oxyCODONE Solution 5 every 4 hours      Allergies    IV CONtRAST (Flushing (Skin); Rash)  No Known Drug Allergies    Intolerances        T(C): , Max: 37.9 (06-23 @ 09:12)  T(F): , Max: 100.3 (06-23 @ 09:12)  HR: 98  BP: 115/62  BP(mean): --  RR: 16  SpO2: 100%  Wt(kg): --          LABS:                        9.0    2.6   )-----------( 94       ( 23 Jun 2017 06:04 )             28.1     06-23    136  |  96  |  28<H>  ----------------------------<  98  4.2   |  30  |  0.90    Ca    8.8      23 Jun 2017 06:04  Phos  2.5     06-23  Mg     1.6     06-23                  RADIOLOGY & ADDITIONAL STUDIES:

## 2017-06-23 NOTE — PROGRESS NOTE ADULT - PROBLEM SELECTOR PLAN 1
POA, source was urosepsis w/ seeding to chemoport, which was removed on 6/12, persistent bacteremia of GNR, however last two sets of cultures NGTD so far (6/15-6/18). Fever curve: 100 on 6/20, 100.3 on 6/23 orally and 99.9 when repeated rectally.  - ID consulted, patient was on Ceftriaxone 1g with intention to keep her for a total of 10 days post-line removal (starting 6/13), but on 6/18, patient was febrile to 103 and abx were broadened to Vanc/Zosyn, however, considering sensitivities, patient switched back to ceftriaxone to complete a 10 days course (last day 6/23), however [facility] today given possibility of drug fever this AM and will give gent x1 instead  - given fever to 100.3 this AM and leukopenia, will get BCX and UCX per Dr. Galaviz and monitor fever curve for 2 more days in the hospital   - Blood Cx (6/15-6/18-6/20) NGTD   - NM Gallium Scan (6/16)- negative scan- Pacemaker clean.  - c/w Tylenol PRN, Demerol PRN can be ordered for rigors   - Repeat renal US- no changes   - Repeat UA (6/20)- negative

## 2017-06-23 NOTE — PROGRESS NOTE ADULT - SUBJECTIVE AND OBJECTIVE BOX
Chief Complaint/Reason for Consult: CAD  INTERVAL HPI: sob improved no acute events overnight  	  MEDICATIONS:  amiodarone    Tablet 200milliGRAM(s) Oral daily  metoprolol 12.5milliGRAM(s) Oral two times a day  furosemide    Tablet 20milliGRAM(s) Oral daily      guaiFENesin    Syrup 200milliGRAM(s) Oral every 6 hours PRN    acetaminophen  Suppository 650milliGRAM(s) Rectal every 6 hours PRN  meperidine     Injectable 25milliGRAM(s) IV Push every 6 hours PRN  morphine  - Injectable 0.5milliGRAM(s) IV Push every 4 hours PRN  morphine  - Injectable 1milliGRAM(s) IV Push every 4 hours PRN  oxyCODONE Solution 5milliGRAM(s) Oral every 4 hours    bisacodyl Suppository 10milliGRAM(s) Rectal daily    insulin lispro (HumaLOG) corrective regimen sliding scale  SubCutaneous Before meals and at bedtime  atorvastatin 40milliGRAM(s) Oral at bedtime  megestrol Suspension 625milliGRAM(s) Oral daily    lidocaine   Patch 1Patch Transdermal daily  apixaban 2.5milliGRAM(s) Oral two times a day  aspirin enteric coated 81milliGRAM(s) Oral daily  cyanocobalamin Injectable 1000MICROGram(s) SubCutaneous daily      REVIEW OF SYSTEMS:  [x] As per HPI  CONSTITUTIONAL: No fever, weight loss, or fatigue  RESPIRATORY: No cough, wheezing, chills or hemoptysis; No Shortness of Breath  CARDIOVASCULAR: No chest pain, palpitations, dizziness, or leg swelling  GASTROINTESTINAL: No abdominal or epigastric pain. No nausea, vomiting, or hematemesis; No diarrhea or constipation. No melena or hematochezia.  MUSCULOSKELETAL: No joint pain or swelling; No muscle, back, or extremity pain  [x] All others negative	  [ ] Unable to obtain    PHYSICAL EXAM:  T(C): 37.7, Max: 37.9 (06-23 @ 09:12)  HR: 98 (80 - 102)  BP: 115/62 (92/58 - 150/91)  RR: 16 (16 - 20)  SpO2: 100% (92% - 100%)  Wt(kg): --  I&O's Summary        Appearance: Normal	  HEENT:   Normal oral mucosa  Cardiovascular: Normal S1 S2, No JVD, No murmurs, No edema  Respiratory: Lungs clear to auscultation	  Gastrointestinal:  Soft, Non-tender, + BS	  Extremities: Normal range of motion, No clubbing, cyanosis or edema  Vascular: Peripheral pulses palpable 2+ bilaterally    TELEMETRY: 	    ECG:    	  RADIOLOGY:   CXR:  CT:  US:    CARDIAC TESTING:  Echocardiogram:  Catheterization:  Stress Test:      LABS:	 	    CARDIAC MARKERS:                                  9.0    2.6   )-----------( 94       ( 23 Jun 2017 06:04 )             28.1     06-23    136  |  96  |  28<H>  ----------------------------<  98  4.2   |  30  |  0.90    Ca    8.8      23 Jun 2017 06:04  Phos  2.5     06-23  Mg     1.6     06-23      proBNP:   Lipid Profile:   HgA1c:   TSH:     ASSESSMENT/PLAN: 	  SOB persists  can dc amiodarone, HR stable - unlikely pulm tox 2/2 amio but pt no longer requires rhythm control with amidarones.  continue all other current cardiac meds

## 2017-06-23 NOTE — PROGRESS NOTE ADULT - PROBLEM SELECTOR PLAN 1
POA, source was urosepsis w/ seeding to chemoport, which was removed on 6/12, persistent bacteremia of GNR, however last two sets of cultures NGTD so far (6/15-6/18). Fever curve: 100 on 6/20, 100.3 on 6/23 orally and 99.9 when repeated rectally.  - ID consulted, patient was on Ceftriaxone 1g with intention to keep her for a total of 10 days post-line removal (starting 6/13), but on 6/18, patient was febrile to 103 and abx were broadened to Vanc/Zosyn, however, considering sensitivities, patient switched back to ceftriaxone to complete a 10 days course (last day 6/23), however [facility] will not given [facility]X today given possibility of drug fever this AM and will give gent x1 instead  - given fever to 100.3 this AM and leukopenia, will get BCX and UCX per Dr. Galaviz and monitor fever curve for 2 more days in the hospital   - Blood Cx (6/15-6/18-6/20) NGTD   - NM Gallium Scan (6/16)- negative scan- Pacemaker clean.  - c/w Tylenol PRN, Demerol PRN can be ordered for rigors   - Repeat renal US- no changes   - Repeat UA (6/20)- negative

## 2017-06-23 NOTE — PROGRESS NOTE ADULT - PROBLEM SELECTOR PLAN 5
Patient s/p CABG. Patient without chest pain. EKG without ischemic changes. EF EF 60-65%.   - c/w home Lipitor 40mg QHS  - c/w ASA   - c/w Metoprolol 12.5 BID, may need to uptitrate   - Medically optimize cardiac function if BP allows   - Continue to monitor I's and O's

## 2017-06-23 NOTE — PROGRESS NOTE ADULT - SUBJECTIVE AND OBJECTIVE BOX
Overnight Events: TEJINDER    Subjective: Patient seen and examined at bedside. No complaints.    [OBJECTIVE]:    Vital Signs:  T(F): , Max: 100.3 (06-23 @ 09:12)  HR:  (80 - 102)  BP:  (92/58 - 150/91)  BP(mean): --  RR:  (16 - 20)  SpO2:  (92% - 100%)  Wt(kg): --  CVP(cm H2O): --      CAPILLARY BLOOD GLUCOSE  127 (23 Jun 2017 12:01)      Physcial Exam:  T(F): 99.9  HR: 98  BP: 115/62  RR: 16  SpO2: 100%  Wt(kg): --    Constitutional: no distress  Eyes: EOMI  ENMT: mildly dry MM  Respiratory: bilateral crackles up to mid lung  Cardiovascular: RRR, no M/R/G appreciated  Gastrointestinal: normoactive bowel sounds, abdomen soft, NTND  Extremities: symmetric, trace bilateral LE edema  Vascular: 2+ DP and radial pulses  Neurological: A&Ox2-3   Musculoskeletal: no joint swelling    Medications:  MEDICATIONS  (STANDING):  amiodarone    Tablet 200milliGRAM(s) Oral daily  insulin lispro (HumaLOG) corrective regimen sliding scale  SubCutaneous Before meals and at bedtime  atorvastatin 40milliGRAM(s) Oral at bedtime  bisacodyl Suppository 10milliGRAM(s) Rectal daily  lidocaine   Patch 1Patch Transdermal daily  metoprolol 12.5milliGRAM(s) Oral two times a day  apixaban 2.5milliGRAM(s) Oral two times a day  furosemide    Tablet 20milliGRAM(s) Oral daily  aspirin enteric coated 81milliGRAM(s) Oral daily  cyanocobalamin Injectable 1000MICROGram(s) SubCutaneous daily  megestrol Suspension 625milliGRAM(s) Oral daily  oxyCODONE Solution 5milliGRAM(s) Oral every 4 hours  gentamicin   IVPB 200milliGRAM(s) IV Intermittent once    MEDICATIONS  (PRN):  acetaminophen  Suppository 650milliGRAM(s) Rectal every 6 hours PRN For Temp greater than 38 C (100.4 F)  guaiFENesin    Syrup 200milliGRAM(s) Oral every 6 hours PRN Cough  meperidine     Injectable 25milliGRAM(s) IV Push every 6 hours PRN Rigors  morphine  - Injectable 0.5milliGRAM(s) IV Push every 4 hours PRN Dyspnea / Air hunger / Increased work of breathing / RR>25 / moderate pain (4-6)  morphine  - Injectable 1milliGRAM(s) IV Push every 4 hours PRN Excessive work of breathing / RR>35 / Resp distress / severe pain (7-10)      Allergies:  Allergies    IV CONtRAST (Flushing (Skin); Rash)  No Known Drug Allergies    Intolerances        Labs:                        9.0    2.6   )-----------( 94       ( 23 Jun 2017 06:04 )             28.1     06-23    136  |  96  |  28<H>  ----------------------------<  98  4.2   |  30  |  0.90    Ca    8.8      23 Jun 2017 06:04  Phos  2.5     06-23  Mg     1.6     06-23            Radiology and other tests:

## 2017-06-23 NOTE — PROGRESS NOTE ADULT - PROBLEM SELECTOR PLAN 4
s/p PPM, on Eliquis at home. Decreased CrCl. Concern for pacemaker involvement, however gallium scan negative   - c/w home Amiodarone 200mg QD   - c/w Eliquis (2.5mg since patient age >80yrs and weight <60km)

## 2017-06-23 NOTE — PROGRESS NOTE ADULT - PROBLEM SELECTOR PLAN 2
Initially requiring BIPAP and HFNC 2/2 pulmonary congestion/edema likely due to CHF exacerbation in the setting of urosepsis and line sepsis. Now tolerating regular NC.  - regular nasal cannula  - Oxycodone 5mg solution for air hunger, increased work of breathing, comfort  - C/w antibiotics as above

## 2017-06-23 NOTE — PROGRESS NOTE ADULT - ASSESSMENT
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

## 2017-06-23 NOTE — PROGRESS NOTE ADULT - SUBJECTIVE AND OBJECTIVE BOX
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 Patients family at bedside states she's been more lethargic with associated lower extremity swelling, SOB, decreased appetite and diarrhea. Patient with previous admissions for symptomatic hyperglycemia in January. Patient currently seeing Dr. Galaviz on a regular basis for chemotherapy treatment. Last treatment on May 23rd for which she received Bendamustine and Rituximab. Patient denies any headaches, chest pain, abdominal pain, N/V, dysuria, or bowel changes.     Upon arrival to the ED, patient in moderate respiratory distress using accessory muscles, breathing w/ a RR of 30, O2 93% on room air. Patient placed on BIPAP with slight improvement in respiratory status. Patient febrile to 101 with a HR of 77. Labs significant for AG metabolic acidosis, K 7.0, Na 127, BUN 58, Cr. 2.0 (baseline < 0.9 January), Lactate 2.8. CXR done showing RLL infiltrate with pulmonary vascular congestion. In the ED, patient received Vancomycin, Zosyn, Tylenol, Calcium Gluconate 2g, Insulin 5u, Albuterol neb 5mg x 2. ICU consulted, will be transferred to MICU for management of severe sepsis 2/2 HAP and pulmonary congestion. (10 Ramon 2017 19:13)    FAMILY HISTORY:  No pertinent family history in first degree relatives    MEDICATIONS  (STANDING):  amiodarone    Tablet 200milliGRAM(s) Oral daily  insulin lispro (HumaLOG) corrective regimen sliding scale  SubCutaneous Before meals and at bedtime  atorvastatin 40milliGRAM(s) Oral at bedtime  bisacodyl Suppository 10milliGRAM(s) Rectal daily  lidocaine   Patch 1Patch Transdermal daily  metoprolol 12.5milliGRAM(s) Oral two times a day  apixaban 2.5milliGRAM(s) Oral two times a day  furosemide    Tablet 20milliGRAM(s) Oral daily  aspirin enteric coated 81milliGRAM(s) Oral daily  cyanocobalamin Injectable 1000MICROGram(s) SubCutaneous daily  megestrol Suspension 625milliGRAM(s) Oral daily  oxyCODONE Solution 5milliGRAM(s) Oral every 4 hours    MEDICATIONS  (PRN):  acetaminophen  Suppository 650milliGRAM(s) Rectal every 6 hours PRN For Temp greater than 38 C (100.4 F)  guaiFENesin    Syrup 200milliGRAM(s) Oral every 6 hours PRN Cough  meperidine     Injectable 25milliGRAM(s) IV Push every 6 hours PRN Rigors  morphine  - Injectable 0.5milliGRAM(s) IV Push every 4 hours PRN Dyspnea / Air hunger / Increased work of breathing / RR>25 / moderate pain (4-6)  morphine  - Injectable 1milliGRAM(s) IV Push every 4 hours PRN Excessive work of breathing / RR>35 / Resp distress / severe pain (7-10)    Vital Signs Last 24 Hrs  T(C): 37.3, Max: 37.9 (06-23 @ 09:12)  T(F): 99.1, Max: 100.3 (06-23 @ 09:12)  HR: 93 (80 - 102)  BP: 118/66 (92/58 - 150/91)  BP(mean): --  RR: 17 (16 - 19)  SpO2: 98% (95% - 100%)    Physical exam:    Overall impression  Lymphadenopathy  Liver  spleen    Labs:  CBC Full  -  ( 23 Jun 2017 06:04 )  WBC Count : 2.6 K/uL  Hemoglobin : 9.0 g/dL  Hematocrit : 28.1 %  Platelet Count - Automated : 94 K/uL  Mean Cell Volume : 93.0 fL  Mean Cell Hemoglobin : 29.8 pg  Mean Cell Hemoglobin Concentration : 32.0 g/dL  Auto Neutrophil # : x  Auto Lymphocyte # : x  Auto Monocyte # : x  Auto Eosinophil # : x  Auto Basophil # : x  Auto Neutrophil % : x  Auto Lymphocyte % : x  Auto Monocyte % : x  Auto Eosinophil % : x  Auto Basophil % : x    06-23    136  |  96  |  28<H>  ----------------------------<  98  4.2   |  30  |  0.90    Ca    8.8      23 Jun 2017 06:04  Phos  2.5     06-23  Mg     1.6     06-23        Radiology:  HEALTH ISSUES - R/O PROBLEM Dx:      Assessmant / Problems  1)E coli sepsis s/p various antibiotics  Patient looks well, feels improved   Had 100,3 degree temperature this am , possibly drug fever 2/2 antibiotics  All antibiotics are stopped now, will monitor for 2 days temperature curve    2) 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

## 2017-06-23 NOTE — PROGRESS NOTE ADULT - SUBJECTIVE AND OBJECTIVE BOX
INTERVAL HPI/OVERNIGHT EVENTS: Given Neupogen for most likely antibiotic associated neutropenia    CONSTITUTIONAL:  Negative fever or chills, feels well, good appetite  EYES:  Negative  blurry vision or double vision  CARDIOVASCULAR:  Negative for chest pain or palpitations  RESPIRATORY:  Negative for cough, wheezing, or SOB   GASTROINTESTINAL:  Negative for nausea, vomiting, diarrhea, constipation, or abdominal pain  GENITOURINARY:  Negative frequency, urgency or dysuria  NEUROLOGIC:  No headache, confusion, dizziness, lightheadedness      ANTIBIOTICS/RELEVANT:    MEDICATIONS  (STANDING):  amiodarone    Tablet 200milliGRAM(s) Oral daily  insulin lispro (HumaLOG) corrective regimen sliding scale  SubCutaneous Before meals and at bedtime  atorvastatin 40milliGRAM(s) Oral at bedtime  bisacodyl Suppository 10milliGRAM(s) Rectal daily  lidocaine   Patch 1Patch Transdermal daily  metoprolol 12.5milliGRAM(s) Oral two times a day  apixaban 2.5milliGRAM(s) Oral two times a day  furosemide    Tablet 20milliGRAM(s) Oral daily  aspirin enteric coated 81milliGRAM(s) Oral daily  cyanocobalamin Injectable 1000MICROGram(s) SubCutaneous daily  megestrol Suspension 625milliGRAM(s) Oral daily  oxyCODONE Solution 5milliGRAM(s) Oral every 4 hours    MEDICATIONS  (PRN):  acetaminophen  Suppository 650milliGRAM(s) Rectal every 6 hours PRN For Temp greater than 38 C (100.4 F)  guaiFENesin    Syrup 200milliGRAM(s) Oral every 6 hours PRN Cough  meperidine     Injectable 25milliGRAM(s) IV Push every 6 hours PRN Rigors  morphine  - Injectable 0.5milliGRAM(s) IV Push every 4 hours PRN Dyspnea / Air hunger / Increased work of breathing / RR>25 / moderate pain (4-6)  morphine  - Injectable 1milliGRAM(s) IV Push every 4 hours PRN Excessive work of breathing / RR>35 / Resp distress / severe pain (7-10)        Vital Signs Last 24 Hrs  T(C): 37.3, Max: 37.9 (- @ 09:12)  T(F): 99.1, Max: 100.3 (- @ 09:12)  HR: 93 (80 - 102)  BP: 118/66 (92/58 - 150/91)  RR: 17 (16 - 19)  SpO2: 98% (95% - 100%)    PHYSICAL EXAM:  Constitutional:Well-developed, well nourished  Eyes:ESSENCE, EOMI  Ear/Nose/Throat: no oral lesion, no sinus tenderness on percussion	  Neck:no JVD, no lymphadenopathy, supple  Respiratory: bibasal crackles  Cardiovascular: S1S2 RRR, no murmurs  Gastrointestinal:soft, (+) BS, no HSM  Extremities:no e/e/c  Vascular: DP Pulse:	right normal; left normal      LABS:                        9.0    2.6   )-----------( 94       ( 2017 06:04 )             28.1     06-23    136  |  96  |  28<H>  ----------------------------<  98  4.2   |  30  |  0.90    Ca    8.8      2017 06:04  Phos  2.5       Mg     1.6             Urinalysis Basic - ( 2017 18:36 )    Color: Yellow / Appearance: Turbid / S.020 / pH: x  Gluc: x / Ketone: NEGATIVE  / Bili: NEGATIVE / Urobili: 0.2 E.U./dL   Blood: x / Protein: 30 mg/dL / Nitrite: NEGATIVE   Leuk Esterase: NEGATIVE / RBC: 5-10 /HPF / WBC < 5 /HPF   Sq Epi: x / Non Sq Epi: Moderate /HPF / Bacteria: Present /HPF        MICROBIOLOGY:  Culture - Blood (17 @ 20:12)    Specimen Source: .Blood Blood-Peripheral    Culture Results:   No growth at 2 days.    Culture - Blood (17 @ 12:45)    -  Ceftriaxone: S <=1    -  Piperacillin/Tazobactam: S <=16    -  Trimethoprim/Sulfamethoxazole: R >2/38    -  Ampicillin/Sulbactam: R >16/8    -  Ciprofloxacin: R >2    -  Tobramycin: I 8    -  Ampicillin: R >16    -  Cefazolin: S <=8    -  Gentamicin: R >8    Gram Stain:   Aerobic Bottle: Gram Negative Rods  Result called to and read back byJacinto Gomez RN 2017 06:36:29    Specimen Source: .Blood Blood    Organism: Escherichia coli    Culture Results:   Growth in aerobic bottle: Escherichia coli  Anaerobic Bottle: No growth    Organism Identification: Escherichia coli    Method Type: JANEEN        RADIOLOGY & ADDITIONAL STUDIES:

## 2017-06-23 NOTE — PROGRESS NOTE ADULT - SUBJECTIVE AND OBJECTIVE BOX
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 Patients family at bedside states she's been more lethargic with associated lower extremity swelling, SOB, decreased appetite and diarrhea. Patient with previous admissions for symptomatic hyperglycemia in January. Patient currently seeing Dr. Galaviz on a regular basis for chemotherapy treatment. Last treatment on May 23rd for which she received Bendamustine and Rituximab. Patient denies any headaches, chest pain, abdominal pain, N/V, dysuria, or bowel changes.     )    INTERVAL HPI/OVERNIGHT EVENTS:::no complaints    HEALTH ISSUES - PROBLEM Dx:  Anemia: Anemia  Hyponatremia: Hyponatremia  Acute pulmonary edema: Acute pulmonary edema  Need for prophylactic measure: Need for prophylactic measure  Nutrition, metabolism, and development symptoms: Nutrition, metabolism, and development symptoms  Anemia, unspecified type: Anemia, unspecified type  Malignant neoplasm of female breast, unspecified laterality, unspecified site of breast: Malignant neoplasm of female breast, unspecified laterality, unspecified site of breast  Diffuse large B-cell lymphoma, unspecified body region: Diffuse large B-cell lymphoma, unspecified body region  Coronary artery disease involving native coronary artery of native heart without angina pectoris: Coronary artery disease involving native coronary artery of native heart without angina pectoris  Paroxysmal atrial fibrillation: Paroxysmal atrial fibrillation  Acute respiratory failure with hypoxia: Acute respiratory failure with hypoxia  Sepsis: Sepsis  Lymphoma: Lymphoma  Severe sepsis: Severe sepsis  Central venous line infection, initial encounter: Central venous line infection, initial encounter  E coli bacteremia: E coli bacteremia  Medical orders for life-sustaining treatment (MOLST) form in chart: Medical orders for life-sustaining treatment (MOLST) form in chart  DNR (do not resuscitate): DNR (do not resuscitate)  Chronic back pain: Chronic back pain  Dyspnea and respiratory abnormalities: Dyspnea and respiratory abnormalities  Goals of care, counseling/discussion: Goals of care, counseling/discussion  Patient has healthcare proxy: Patient has healthcare proxy  Moderate protein-calorie malnutrition: Moderate protein-calorie malnutrition  Impaired mobility and activities of daily living: Impaired mobility and activities of daily living  Full code status: Full code status  Palliative care encounter: Palliative care encounter  Breast cancer: Breast cancer  Diffuse large B cell lymphoma: Diffuse large B cell lymphoma  Bacteremia due to Gram-negative bacteria: Bacteremia due to Gram-negative bacteria  Shortness of breath: Shortness of breath  Hyperkalemia: Hyperkalemia  ROMEO (acute kidney injury): ROMEO (acute kidney injury)          PAST MEDICAL & SURGICAL HISTORY:  Scoliosis  Follicular lymphoma  Neck mass  Afib  Other hyperlipidemia  Breast cancer  CAD (coronary artery disease)  Diabetes  HTN (hypertension)  No pertinent past medical history  H/O abdominal hysterectomy  H/O thyroidectomy  S/P CABG x 3          Consultant NOTE  REVIEWED  (   )    REVIEW OF SYSTEMS:  [x] As per HPI  CONSTITUTIONAL: No fever, weight loss, or fatigue            Vital Signs Last 24 Hrs  T(C): 37.3, Max: 37.9 ( @ 09:12)  T(F): 99.1, Max: 100.3 ( @ 09:12)  HR: 93 (80 - 102)  BP: 118/66 (92/58 - 150/91)  BP(mean): --  RR: 17 (16 - 19)  SpO2: 98% (95% - 100%)        PHYSICAL EXAMINATION:                                    ( +++   )  NO CHANGE  Appearance: Normal	  HEENT:   Normal oral mucosa, PERRL, EOMI	  Neck: Supple, + JVD/ - Carotid Bruit   Cardiovascular: Normal S1 S2, No JVD, No murmurs,   Respiratory: Lungs clear to auscultation/Decreased Breath Sounds/No Rales, Rhonchi, Wheezing	  Gastrointestinal:  Soft, Non-tender, + BS	  Skin: No rashes, No ecchymoses, No cyanosis  Extremities: Normal range of motion, No clubbing, cyanosis or edema  Vascular: Peripheral pulses palpable 2+ bilaterally  Neurologic:   Psychiatry: A Mood & affect appropriate    amiodarone    Tablet 200milliGRAM(s) Oral daily  insulin lispro (HumaLOG) corrective regimen sliding scale  SubCutaneous Before meals and at bedtime  atorvastatin 40milliGRAM(s) Oral at bedtime  acetaminophen  Suppository 650milliGRAM(s) Rectal every 6 hours PRN  bisacodyl Suppository 10milliGRAM(s) Rectal daily  lidocaine   Patch 1Patch Transdermal daily  metoprolol 12.5milliGRAM(s) Oral two times a day  guaiFENesin    Syrup 200milliGRAM(s) Oral every 6 hours PRN  meperidine     Injectable 25milliGRAM(s) IV Push every 6 hours PRN  apixaban 2.5milliGRAM(s) Oral two times a day  furosemide    Tablet 20milliGRAM(s) Oral daily  aspirin enteric coated 81milliGRAM(s) Oral daily  morphine  - Injectable 0.5milliGRAM(s) IV Push every 4 hours PRN  morphine  - Injectable 1milliGRAM(s) IV Push every 4 hours PRN  cyanocobalamin Injectable 1000MICROGram(s) SubCutaneous daily  megestrol Suspension 625milliGRAM(s) Oral daily  oxyCODONE Solution 5milliGRAM(s) Oral every 4 hours                                      9.0    2.6   )-----------( 94       ( 2017 06:04 )             28.1     06-    136  |  96  |  28<H>  ----------------------------<  98  4.2   |  30  |  0.90    Ca    8.8      2017 06:04  Phos  2.5     06-  Mg     1.6             CAPILLARY BLOOD GLUCOSE  166 (2017 21:00)  149 (2017 17:26)  127 (2017 12:01)  96 (2017 07:51)    Urinalysis Basic - ( 2017 18:36 )    Color: Yellow / Appearance: Turbid / S.020 / pH: x  Gluc: x / Ketone: NEGATIVE  / Bili: NEGATIVE / Urobili: 0.2 E.U./dL   Blood: x / Protein: 30 mg/dL / Nitrite: NEGATIVE   Leuk Esterase: NEGATIVE / RBC: 5-10 /HPF / WBC < 5 /HPF   Sq Epi: x / Non Sq Epi: Moderate /HPF / Bacteria: Present /HPF

## 2017-06-24 DIAGNOSIS — A41.51 SEPSIS DUE TO ESCHERICHIA COLI [E. COLI]: ICD-10-CM

## 2017-06-24 LAB
ANION GAP SERPL CALC-SCNC: 9 MMOL/L — SIGNIFICANT CHANGE UP (ref 5–17)
BLD GP AB SCN SERPL QL: NEGATIVE — SIGNIFICANT CHANGE UP
BUN SERPL-MCNC: 26 MG/DL — HIGH (ref 7–23)
CALCIUM SERPL-MCNC: 8.5 MG/DL — SIGNIFICANT CHANGE UP (ref 8.4–10.5)
CHLORIDE SERPL-SCNC: 98 MMOL/L — SIGNIFICANT CHANGE UP (ref 96–108)
CO2 SERPL-SCNC: 31 MMOL/L — SIGNIFICANT CHANGE UP (ref 22–31)
CREAT SERPL-MCNC: 0.8 MG/DL — SIGNIFICANT CHANGE UP (ref 0.5–1.3)
GLUCOSE SERPL-MCNC: 103 MG/DL — HIGH (ref 70–99)
HCT VFR BLD CALC: 26.2 % — LOW (ref 34.5–45)
HGB BLD-MCNC: 8.4 G/DL — LOW (ref 11.5–15.5)
MAGNESIUM SERPL-MCNC: 1.8 MG/DL — SIGNIFICANT CHANGE UP (ref 1.6–2.6)
MCHC RBC-ENTMCNC: 29.8 PG — SIGNIFICANT CHANGE UP (ref 27–34)
MCHC RBC-ENTMCNC: 32.1 G/DL — SIGNIFICANT CHANGE UP (ref 32–36)
MCV RBC AUTO: 92.9 FL — SIGNIFICANT CHANGE UP (ref 80–100)
PHOSPHATE SERPL-MCNC: 2.7 MG/DL — SIGNIFICANT CHANGE UP (ref 2.5–4.5)
PLATELET # BLD AUTO: 85 K/UL — LOW (ref 150–400)
POTASSIUM SERPL-MCNC: 4 MMOL/L — SIGNIFICANT CHANGE UP (ref 3.5–5.3)
POTASSIUM SERPL-SCNC: 4 MMOL/L — SIGNIFICANT CHANGE UP (ref 3.5–5.3)
RBC # BLD: 2.82 M/UL — LOW (ref 3.8–5.2)
RBC # FLD: 17.9 % — HIGH (ref 10.3–16.9)
RH IG SCN BLD-IMP: POSITIVE — SIGNIFICANT CHANGE UP
SODIUM SERPL-SCNC: 138 MMOL/L — SIGNIFICANT CHANGE UP (ref 135–145)
WBC # BLD: 2.3 K/UL — LOW (ref 3.8–10.5)
WBC # FLD AUTO: 2.3 K/UL — LOW (ref 3.8–10.5)

## 2017-06-24 RX ORDER — MAGNESIUM SULFATE 500 MG/ML
2 VIAL (ML) INJECTION ONCE
Qty: 0 | Refills: 0 | Status: COMPLETED | OUTPATIENT
Start: 2017-06-24 | End: 2017-06-24

## 2017-06-24 RX ADMIN — OXYCODONE HYDROCHLORIDE 5 MILLIGRAM(S): 5 TABLET ORAL at 11:21

## 2017-06-24 RX ADMIN — OXYCODONE HYDROCHLORIDE 5 MILLIGRAM(S): 5 TABLET ORAL at 22:59

## 2017-06-24 RX ADMIN — Medication 50 GRAM(S): at 10:44

## 2017-06-24 RX ADMIN — OXYCODONE HYDROCHLORIDE 5 MILLIGRAM(S): 5 TABLET ORAL at 06:08

## 2017-06-24 RX ADMIN — AMIODARONE HYDROCHLORIDE 200 MILLIGRAM(S): 400 TABLET ORAL at 05:05

## 2017-06-24 RX ADMIN — Medication 12.5 MILLIGRAM(S): at 05:05

## 2017-06-24 RX ADMIN — PREGABALIN 1000 MICROGRAM(S): 225 CAPSULE ORAL at 10:56

## 2017-06-24 RX ADMIN — Medication 12.5 MILLIGRAM(S): at 18:09

## 2017-06-24 RX ADMIN — Medication 10 MILLIGRAM(S): at 10:55

## 2017-06-24 RX ADMIN — Medication 81 MILLIGRAM(S): at 10:54

## 2017-06-24 RX ADMIN — ATORVASTATIN CALCIUM 40 MILLIGRAM(S): 80 TABLET, FILM COATED ORAL at 21:59

## 2017-06-24 RX ADMIN — OXYCODONE HYDROCHLORIDE 5 MILLIGRAM(S): 5 TABLET ORAL at 05:04

## 2017-06-24 RX ADMIN — Medication 2: at 21:59

## 2017-06-24 RX ADMIN — APIXABAN 2.5 MILLIGRAM(S): 2.5 TABLET, FILM COATED ORAL at 10:45

## 2017-06-24 RX ADMIN — LIDOCAINE 1 PATCH: 4 CREAM TOPICAL at 22:00

## 2017-06-24 RX ADMIN — OXYCODONE HYDROCHLORIDE 5 MILLIGRAM(S): 5 TABLET ORAL at 10:51

## 2017-06-24 RX ADMIN — APIXABAN 2.5 MILLIGRAM(S): 2.5 TABLET, FILM COATED ORAL at 21:59

## 2017-06-24 RX ADMIN — Medication 20 MILLIGRAM(S): at 05:04

## 2017-06-24 RX ADMIN — OXYCODONE HYDROCHLORIDE 5 MILLIGRAM(S): 5 TABLET ORAL at 18:39

## 2017-06-24 RX ADMIN — OXYCODONE HYDROCHLORIDE 5 MILLIGRAM(S): 5 TABLET ORAL at 21:59

## 2017-06-24 RX ADMIN — LIDOCAINE 1 PATCH: 4 CREAM TOPICAL at 10:55

## 2017-06-24 RX ADMIN — OXYCODONE HYDROCHLORIDE 5 MILLIGRAM(S): 5 TABLET ORAL at 18:09

## 2017-06-24 RX ADMIN — MEGESTROL ACETATE 625 MILLIGRAM(S): 40 SUSPENSION ORAL at 10:55

## 2017-06-24 NOTE — PROGRESS NOTE ADULT - PROBLEM SELECTOR PLAN 1
1) Completed 10 days if IV antibiotics post catheter removal; now afebrile, Blood culture negative  2) Persistent neutropenia, observe off antibiotics

## 2017-06-24 NOTE — PROGRESS NOTE ADULT - PROBLEM SELECTOR PLAN 1
POA, source was urosepsis w/ seeding to chemoport, which was removed on 6/12, persistent bacteremia of GNR, however last two sets of cultures NGTD so far (6/15-6/18). Fever curve: 100 on 6/20, 100.3 on 6/23 orally and 99.9 when repeated rectally.  - ID consulted, patient was on Ceftriaxone 1g with intention to keep her for a total of 10 days post-line removal (starting 6/13), but on 6/18, patient was febrile to 103 and abx were broadened to Vanc/Zosyn, however, considering sensitivities, patient switched back to ceftriaxone to complete a 10 days course (last day 6/23). However per Dr. Galaviz and PIEDADlajovanny, will not given CTX today given possibility of drug fever and patient now s/p gent x1 instead (yesterday)  - given fever to 100.3 yesterday and leukopenia, will get BCX and UCX per Dr. Galaviz and monitor fever curve for 2 more days in the hospital. Patient today afebrile, but WBC still low.  - Blood Cx (6/15-6/18-6/20) NGTD   - NM Gallium Scan (6/16)- negative scan- Pacemaker clean.  - c/w Tylenol PRN, Demerol PRN can be ordered for rigors   - Repeat renal US- no changes   - Repeat UA (6/20)- negative

## 2017-06-24 NOTE — PROGRESS NOTE ADULT - ASSESSMENT
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 Developed new fever yesterday, but otherwise afebrile since 6/18. Now also with pancytopenia, now off antibiotics

## 2017-06-24 NOTE — PROGRESS NOTE ADULT - SUBJECTIVE AND OBJECTIVE BOX
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 Patients family at bedside states she's been more lethargic with associated lower extremity swelling, SOB, decreased appetite and diarrhea. Patient with previous admissions for symptomatic hyperglycemia in January. Patient currently seeing Dr. Galaviz on a regular basis for chemotherapy treatment. Last treatment on May 23rd for which she received Bendamustine and Rituximab. Patient denies any headaches, chest pain, abdominal pain, N/V, dysuria, or bowel changes.     INTERVAL HPI/OVERNIGHT EVENTS:::no change    HEALTH ISSUES - PROBLEM Dx:  Sepsis due to Escherichia coli: Sepsis due to Escherichia coli  Anemia: Anemia  Hyponatremia: Hyponatremia  Acute pulmonary edema: Acute pulmonary edema  Need for prophylactic measure: Need for prophylactic measure  Nutrition, metabolism, and development symptoms: Nutrition, metabolism, and development symptoms  Anemia, unspecified type: Anemia, unspecified type  Malignant neoplasm of female breast, unspecified laterality, unspecified site of breast: Malignant neoplasm of female breast, unspecified laterality, unspecified site of breast  Diffuse large B-cell lymphoma, unspecified body region: Diffuse large B-cell lymphoma, unspecified body region  Coronary artery disease involving native coronary artery of native heart without angina pectoris: Coronary artery disease involving native coronary artery of native heart without angina pectoris  Paroxysmal atrial fibrillation: Paroxysmal atrial fibrillation  Acute respiratory failure with hypoxia: Acute respiratory failure with hypoxia  Sepsis: Sepsis  Lymphoma: Lymphoma  Severe sepsis: Severe sepsis  Central venous line infection, initial encounter: Central venous line infection, initial encounter  E coli bacteremia: E coli bacteremia  Medical orders for life-sustaining treatment (MOLST) form in chart: Medical orders for life-sustaining treatment (MOLST) form in chart  DNR (do not resuscitate): DNR (do not resuscitate)  Chronic back pain: Chronic back pain  Dyspnea and respiratory abnormalities: Dyspnea and respiratory abnormalities  Goals of care, counseling/discussion: Goals of care, counseling/discussion  Patient has healthcare proxy: Patient has healthcare proxy  Moderate protein-calorie malnutrition: Moderate protein-calorie malnutrition  Impaired mobility and activities of daily living: Impaired mobility and activities of daily living  Full code status: Full code status  Palliative care encounter: Palliative care encounter  Breast cancer: Breast cancer  Diffuse large B cell lymphoma: Diffuse large B cell lymphoma  Bacteremia due to Gram-negative bacteria: Bacteremia due to Gram-negative bacteria  Shortness of breath: Shortness of breath  Hyperkalemia: Hyperkalemia  ROMEO (acute kidney injury): ROMEO (acute kidney injury)          PAST MEDICAL & SURGICAL HISTORY:  Scoliosis  Follicular lymphoma  Neck mass  Afib  Other hyperlipidemia  Breast cancer  CAD (coronary artery disease)  Diabetes  HTN (hypertension)  No pertinent past medical history  H/O abdominal hysterectomy  H/O thyroidectomy  S/P CABG x 3          Consultant NOTE  REVIEWED  (   )    REVIEW OF SYSTEMS:  [x] As per HPI  CONSTITUTIONAL: No fever, weight loss, or fatigue    [ ] Unable to obtain          Vital Signs Last 24 Hrs  T(C): 36.8, Max: 37.4 ( @ 06:14)  T(F): 98.2, Max: 99.3 ( @ 06:14)  HR: 96 (96 - 99)  BP: 103/55 (103/55 - 136/76)  BP(mean): --  RR: 19 (15 - 19)  SpO2: 97% (94% - 97%)        I & Os for current day (as of  @ 21:52)  =============================================  IN: 0 ml / OUT: 250 ml / NET: -250 ml    PHYSICAL EXAMINATION:                                    (    )  NO CHANGE  Appearance: weak	  HEENT:   Normal oral mucosa, PERRL, EOMI	  Neck: Supple, - JVD; Carotid Bruit   Cardiovascular: Normal S1 S2, No JVD,   Respiratory: Lungs clear to auscultation/Decreased Breath Sounds/No Rales, Rhonchi, Wheezing	  Gastrointestinal:  Soft, Non-tender, + BS	  Skin: No rashes, No ecchymoses, No cyanosis  Extremities: Normal range of motion, No clubbing, cyanosis or edema  Vascular: Peripheral pulses  Neurologic:   Psychiatry: A    amiodarone    Tablet 200milliGRAM(s) Oral daily  insulin lispro (HumaLOG) corrective regimen sliding scale  SubCutaneous Before meals and at bedtime  atorvastatin 40milliGRAM(s) Oral at bedtime  acetaminophen  Suppository 650milliGRAM(s) Rectal every 6 hours PRN  bisacodyl Suppository 10milliGRAM(s) Rectal daily  lidocaine   Patch 1Patch Transdermal daily  metoprolol 12.5milliGRAM(s) Oral two times a day  guaiFENesin    Syrup 200milliGRAM(s) Oral every 6 hours PRN  meperidine     Injectable 25milliGRAM(s) IV Push every 6 hours PRN  apixaban 2.5milliGRAM(s) Oral two times a day  furosemide    Tablet 20milliGRAM(s) Oral daily  aspirin enteric coated 81milliGRAM(s) Oral daily  morphine  - Injectable 0.5milliGRAM(s) IV Push every 4 hours PRN  morphine  - Injectable 1milliGRAM(s) IV Push every 4 hours PRN  cyanocobalamin Injectable 1000MICROGram(s) SubCutaneous daily  megestrol Suspension 625milliGRAM(s) Oral daily  oxyCODONE Solution 5milliGRAM(s) Oral every 4 hours                                      8.4    2.3   )-----------( 85       ( 2017 06:08 )             26.2     06-24    138  |  98  |  26<H>  ----------------------------<  103<H>  4.0   |  31  |  0.80    Ca    8.5      2017 06:08  Phos  2.7     06-24  Mg     1.8     06-24        CAPILLARY BLOOD GLUCOSE  158 (2017 21:00)  117 (2017 17:18)  95 (2017 07:40)    Urinalysis Basic - ( 2017 18:36 )    Color: Yellow / Appearance: Turbid / S.020 / pH: x  Gluc: x / Ketone: NEGATIVE  / Bili: NEGATIVE / Urobili: 0.2 E.U./dL   Blood: x / Protein: 30 mg/dL / Nitrite: NEGATIVE   Leuk Esterase: NEGATIVE / RBC: 5-10 /HPF / WBC < 5 /HPF   Sq Epi: x / Non Sq Epi: Moderate /HPF / Bacteria: Present /HPF

## 2017-06-24 NOTE — PROGRESS NOTE ADULT - PROBLEM SELECTOR PLAN 2
Initially requiring BIPAP and HFNC 2/2 pulmonary congestion/edema likely due to CHF exacerbation in the setting of urosepsis and line sepsis. Now tolerating regular NC.  - C/wnasal cannula  - Oxycodone 5mg solution for air hunger, increased work of breathing, comfort

## 2017-06-24 NOTE — PROGRESS NOTE ADULT - SUBJECTIVE AND OBJECTIVE BOX
INTERVAL HPI/OVERNIGHT EVENTS:  - ON: TEJINDER  - S: patient states feeling "about the same", no complaints    VITAL SIGNS:  T(F): 99.1  HR: 99  BP: 136/76  RR: 18  SpO2: 95%  Wt(kg): --    PHYSICAL EXAM:  Constitutional: ill appearing, using NC, in no distress  Eyes: EOMI  ENMT: dry MM  Respiratory: bibasilar crackles, otherwise CTA  Cardiovascular: RRR, no M/R/G appreciated  Gastrointestinal: normoactive bowel sounds, abdomen soft, NTND  Extremities: symmetric, no edema  Vascular: + peripheral pulses  Neurological: A&Ox2 (self and place)  Musculoskeletal: no joint swelling    MEDICATIONS  (STANDING):  amiodarone    Tablet 200milliGRAM(s) Oral daily  insulin lispro (HumaLOG) corrective regimen sliding scale  SubCutaneous Before meals and at bedtime  atorvastatin 40milliGRAM(s) Oral at bedtime  bisacodyl Suppository 10milliGRAM(s) Rectal daily  lidocaine   Patch 1Patch Transdermal daily  metoprolol 12.5milliGRAM(s) Oral two times a day  apixaban 2.5milliGRAM(s) Oral two times a day  furosemide    Tablet 20milliGRAM(s) Oral daily  aspirin enteric coated 81milliGRAM(s) Oral daily  cyanocobalamin Injectable 1000MICROGram(s) SubCutaneous daily  megestrol Suspension 625milliGRAM(s) Oral daily  oxyCODONE Solution 5milliGRAM(s) Oral every 4 hours    MEDICATIONS  (PRN):  acetaminophen  Suppository 650milliGRAM(s) Rectal every 6 hours PRN For Temp greater than 38 C (100.4 F)  guaiFENesin    Syrup 200milliGRAM(s) Oral every 6 hours PRN Cough  meperidine     Injectable 25milliGRAM(s) IV Push every 6 hours PRN Rigors  morphine  - Injectable 0.5milliGRAM(s) IV Push every 4 hours PRN Dyspnea / Air hunger / Increased work of breathing / RR>25 / moderate pain (4-6)  morphine  - Injectable 1milliGRAM(s) IV Push every 4 hours PRN Excessive work of breathing / RR>35 / Resp distress / severe pain (7-10)    Allergies  IV CONtRAST (Flushing (Skin); Rash)  No Known Drug Allergies    LABS:                        8.4    2.3   )-----------( 85       ( 2017 06:08 )             26.2     06-24    138  |  98  |  26<H>  ----------------------------<  103<H>  4.0   |  31  |  0.80    Ca    8.5      2017 06:08  Phos  2.7     -  Mg     1.8         Urinalysis Basic - ( 2017 18:36 )    Color: Yellow / Appearance: Turbid / S.020 / pH: x  Gluc: x / Ketone: NEGATIVE  / Bili: NEGATIVE / Urobili: 0.2 E.U./dL   Blood: x / Protein: 30 mg/dL / Nitrite: NEGATIVE   Leuk Esterase: NEGATIVE / RBC: 5-10 /HPF / WBC < 5 /HPF   Sq Epi: x / Non Sq Epi: Moderate /HPF / Bacteria: Present /HPF        RADIOLOGY & ADDITIONAL TESTS:

## 2017-06-25 LAB
ACANTHOCYTES BLD QL SMEAR: SIGNIFICANT CHANGE UP
ANION GAP SERPL CALC-SCNC: 10 MMOL/L — SIGNIFICANT CHANGE UP (ref 5–17)
ANISOCYTOSIS BLD QL: SLIGHT — SIGNIFICANT CHANGE UP
BUN SERPL-MCNC: 28 MG/DL — HIGH (ref 7–23)
BURR CELLS BLD QL SMEAR: SIGNIFICANT CHANGE UP
CALCIUM SERPL-MCNC: 8.9 MG/DL — SIGNIFICANT CHANGE UP (ref 8.4–10.5)
CHLORIDE SERPL-SCNC: 96 MMOL/L — SIGNIFICANT CHANGE UP (ref 96–108)
CO2 SERPL-SCNC: 32 MMOL/L — HIGH (ref 22–31)
CREAT SERPL-MCNC: 0.8 MG/DL — SIGNIFICANT CHANGE UP (ref 0.5–1.3)
CULTURE RESULTS: NO GROWTH — SIGNIFICANT CHANGE UP
CULTURE RESULTS: SIGNIFICANT CHANGE UP
CULTURE RESULTS: SIGNIFICANT CHANGE UP
DOHLE BOD BLD QL SMEAR: SIGNIFICANT CHANGE UP
ELLIPTOCYTES BLD QL SMEAR: SLIGHT — SIGNIFICANT CHANGE UP
EOSINOPHIL NFR BLD AUTO: 2 % — SIGNIFICANT CHANGE UP (ref 0–6)
GLUCOSE SERPL-MCNC: 87 MG/DL — SIGNIFICANT CHANGE UP (ref 70–99)
HCT VFR BLD CALC: 29 % — LOW (ref 34.5–45)
HGB BLD-MCNC: 9.2 G/DL — LOW (ref 11.5–15.5)
LG PLATELETS BLD QL AUTO: PRESENT — SIGNIFICANT CHANGE UP
LYMPHOCYTES # BLD AUTO: 56 % — HIGH (ref 13–44)
MACROCYTES BLD QL: SLIGHT — SIGNIFICANT CHANGE UP
MAGNESIUM SERPL-MCNC: 2 MG/DL — SIGNIFICANT CHANGE UP (ref 1.6–2.6)
MANUAL DIF COMMENT BLD-IMP: SIGNIFICANT CHANGE UP
MANUAL SMEAR VERIFICATION: SIGNIFICANT CHANGE UP
MCHC RBC-ENTMCNC: 29.5 PG — SIGNIFICANT CHANGE UP (ref 27–34)
MCHC RBC-ENTMCNC: 31.7 G/DL — LOW (ref 32–36)
MCV RBC AUTO: 92.9 FL — SIGNIFICANT CHANGE UP (ref 80–100)
METAMYELOCYTES # FLD: 1 % — HIGH
MICROCYTES BLD QL: SLIGHT — SIGNIFICANT CHANGE UP
MONOCYTES NFR BLD AUTO: 35 % — HIGH (ref 2–14)
NEUTROPHILS NFR BLD AUTO: 3 % — LOW (ref 43–77)
NEUTS BAND # BLD: 3 % — SIGNIFICANT CHANGE UP
OVALOCYTES BLD QL SMEAR: SLIGHT — SIGNIFICANT CHANGE UP
PHOSPHATE SERPL-MCNC: 2.7 MG/DL — SIGNIFICANT CHANGE UP (ref 2.5–4.5)
PLAT MORPH BLD: (no result)
PLATELET # BLD AUTO: 95 K/UL — LOW (ref 150–400)
POIKILOCYTOSIS BLD QL AUTO: SLIGHT — SIGNIFICANT CHANGE UP
POLYCHROMASIA BLD QL SMEAR: SLIGHT — SIGNIFICANT CHANGE UP
POTASSIUM SERPL-MCNC: 4.2 MMOL/L — SIGNIFICANT CHANGE UP (ref 3.5–5.3)
POTASSIUM SERPL-SCNC: 4.2 MMOL/L — SIGNIFICANT CHANGE UP (ref 3.5–5.3)
RBC # BLD: 3.12 M/UL — LOW (ref 3.8–5.2)
RBC # FLD: 18.3 % — HIGH (ref 10.3–16.9)
RBC BLD AUTO: (no result)
SCHISTOCYTES BLD QL AUTO: SIGNIFICANT CHANGE UP
SODIUM SERPL-SCNC: 138 MMOL/L — SIGNIFICANT CHANGE UP (ref 135–145)
SPECIMEN SOURCE: SIGNIFICANT CHANGE UP
SPHEROCYTES BLD QL SMEAR: SLIGHT — SIGNIFICANT CHANGE UP
WBC # BLD: 3.4 K/UL — LOW (ref 3.8–10.5)
WBC # FLD AUTO: 3.4 K/UL — LOW (ref 3.8–10.5)

## 2017-06-25 RX ADMIN — APIXABAN 2.5 MILLIGRAM(S): 2.5 TABLET, FILM COATED ORAL at 10:09

## 2017-06-25 RX ADMIN — OXYCODONE HYDROCHLORIDE 5 MILLIGRAM(S): 5 TABLET ORAL at 14:32

## 2017-06-25 RX ADMIN — OXYCODONE HYDROCHLORIDE 5 MILLIGRAM(S): 5 TABLET ORAL at 05:50

## 2017-06-25 RX ADMIN — OXYCODONE HYDROCHLORIDE 5 MILLIGRAM(S): 5 TABLET ORAL at 10:38

## 2017-06-25 RX ADMIN — LIDOCAINE 1 PATCH: 4 CREAM TOPICAL at 10:15

## 2017-06-25 RX ADMIN — AMIODARONE HYDROCHLORIDE 200 MILLIGRAM(S): 400 TABLET ORAL at 05:50

## 2017-06-25 RX ADMIN — OXYCODONE HYDROCHLORIDE 5 MILLIGRAM(S): 5 TABLET ORAL at 23:21

## 2017-06-25 RX ADMIN — ATORVASTATIN CALCIUM 40 MILLIGRAM(S): 80 TABLET, FILM COATED ORAL at 23:20

## 2017-06-25 RX ADMIN — Medication 2: at 23:27

## 2017-06-25 RX ADMIN — Medication 12.5 MILLIGRAM(S): at 05:49

## 2017-06-25 RX ADMIN — PREGABALIN 1000 MICROGRAM(S): 225 CAPSULE ORAL at 10:15

## 2017-06-25 RX ADMIN — Medication 81 MILLIGRAM(S): at 10:15

## 2017-06-25 RX ADMIN — OXYCODONE HYDROCHLORIDE 5 MILLIGRAM(S): 5 TABLET ORAL at 06:50

## 2017-06-25 RX ADMIN — Medication 200 MILLIGRAM(S): at 10:09

## 2017-06-25 RX ADMIN — OXYCODONE HYDROCHLORIDE 5 MILLIGRAM(S): 5 TABLET ORAL at 10:08

## 2017-06-25 RX ADMIN — LIDOCAINE 1 PATCH: 4 CREAM TOPICAL at 22:00

## 2017-06-25 RX ADMIN — OXYCODONE HYDROCHLORIDE 5 MILLIGRAM(S): 5 TABLET ORAL at 15:10

## 2017-06-25 RX ADMIN — OXYCODONE HYDROCHLORIDE 5 MILLIGRAM(S): 5 TABLET ORAL at 18:11

## 2017-06-25 RX ADMIN — Medication 10 MILLIGRAM(S): at 10:15

## 2017-06-25 RX ADMIN — APIXABAN 2.5 MILLIGRAM(S): 2.5 TABLET, FILM COATED ORAL at 23:20

## 2017-06-25 RX ADMIN — Medication 20 MILLIGRAM(S): at 05:50

## 2017-06-25 RX ADMIN — MEGESTROL ACETATE 625 MILLIGRAM(S): 40 SUSPENSION ORAL at 15:10

## 2017-06-25 RX ADMIN — Medication 12.5 MILLIGRAM(S): at 18:11

## 2017-06-25 NOTE — PROGRESS NOTE ADULT - SUBJECTIVE AND OBJECTIVE BOX
INTERVAL HPI/OVERNIGHT EVENTS: Remains afebrile , off antibiotics    CONSTITUTIONAL:  Negative fever or chills, feels well, good appetite  EYES:  Negative  blurry vision or double vision  CARDIOVASCULAR:  Negative for chest pain or palpitations  RESPIRATORY:  Negative for cough, wheezing, or SOB   GASTROINTESTINAL:  Negative for nausea, vomiting, diarrhea, constipation, or abdominal pain  GENITOURINARY:  Negative frequency, urgency or dysuria  NEUROLOGIC:  No headache, confusion, dizziness, lightheadedness      ANTIBIOTICS/RELEVANT:    MEDICATIONS  (STANDING):  amiodarone    Tablet 200milliGRAM(s) Oral daily  insulin lispro (HumaLOG) corrective regimen sliding scale  SubCutaneous Before meals and at bedtime  atorvastatin 40milliGRAM(s) Oral at bedtime  bisacodyl Suppository 10milliGRAM(s) Rectal daily  lidocaine   Patch 1Patch Transdermal daily  metoprolol 12.5milliGRAM(s) Oral two times a day  apixaban 2.5milliGRAM(s) Oral two times a day  furosemide    Tablet 20milliGRAM(s) Oral daily  aspirin enteric coated 81milliGRAM(s) Oral daily  cyanocobalamin Injectable 1000MICROGram(s) SubCutaneous daily  megestrol Suspension 625milliGRAM(s) Oral daily  oxyCODONE Solution 5milliGRAM(s) Oral every 4 hours    MEDICATIONS  (PRN):  acetaminophen  Suppository 650milliGRAM(s) Rectal every 6 hours PRN For Temp greater than 38 C (100.4 F)  guaiFENesin    Syrup 200milliGRAM(s) Oral every 6 hours PRN Cough  meperidine     Injectable 25milliGRAM(s) IV Push every 6 hours PRN Rigors  morphine  - Injectable 0.5milliGRAM(s) IV Push every 4 hours PRN Dyspnea / Air hunger / Increased work of breathing / RR>25 / moderate pain (4-6)  morphine  - Injectable 1milliGRAM(s) IV Push every 4 hours PRN Excessive work of breathing / RR>35 / Resp distress / severe pain (7-10)        Vital Signs Last 24 Hrs  T(C): 37.7, Max: 37.7 (06-25 @ 16:00)  T(F): 99.8, Max: 99.8 (06-25 @ 16:00)  HR: 86 (68 - 98)  BP: 93/59 (93/59 - 115/56)  RR: 18 (18 - 20)  SpO2: 95% (95% - 97%)    PHYSICAL EXAM:  Constitutional:Well-developed, well nourished  Eyes:ESSENCE, EOMI  Ear/Nose/Throat: no oral lesion, no sinus tenderness on percussion	  Neck:no JVD, no lymphadenopathy, supple  Respiratory: CTA luz elena  Cardiovascular: S1S2 RRR, no murmurs  Gastrointestinal:soft, (+) BS, no HSM  Extremities:no e/e/c  Vascular: DP Pulse:	right normal; left normal      LABS:                        9.2    3.4   )-----------( 95       ( 2017 06:57 )             29.0     06-25    138  |  96  |  28<H>  ----------------------------<  87  4.2   |  32<H>  |  0.80    Ca    8.9      2017 06:57  Phos  2.7       Mg     2.0     -25        Urinalysis Basic - ( 2017 18:36 )    Color: Yellow / Appearance: Turbid / S.020 / pH: x  Gluc: x / Ketone: NEGATIVE  / Bili: NEGATIVE / Urobili: 0.2 E.U./dL   Blood: x / Protein: 30 mg/dL / Nitrite: NEGATIVE   Leuk Esterase: NEGATIVE / RBC: 5-10 /HPF / WBC < 5 /HPF   Sq Epi: x / Non Sq Epi: Moderate /HPF / Bacteria: Present /HPF        MICROBIOLOGY:  Culture - Urine (17 @ 08:40)    Specimen Source: .Urine Clean Catch (Midstream)    Culture Results:   No growth    Culture - Blood (17 @ 14:10)    Specimen Source: .Blood Blood-Venous    Culture Results:   No growth at 2 days.        RADIOLOGY & ADDITIONAL STUDIES:

## 2017-06-25 NOTE — PROGRESS NOTE ADULT - SUBJECTIVE AND OBJECTIVE BOX
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 Patients family at bedside states she's been more lethargic with associated lower extremity swelling, SOB, decreased appetite and diarrhea. Patient with previous admissions for symptomatic hyperglycemia in January. Patient currently seeing Dr. Galaviz on a regular basis for chemotherapy treatment. Last treatment on May 23rd for which she received Bendamustine and Rituximab. Patient denies any headaches, chest pain, abdominal pain, N/V, dysuria, or bowel changes.     Upon arrival to the ED, patient in moderate respiratory distress using accessory muscles, breathing w/ a RR of 30, O2 93% on room air. Patient placed on BIPAP with slight improvement in respiratory status. Patient febrile to 101 with a HR of 77. Labs significant for AG metabolic acidosis, K 7.0, Na 127, BUN 58, Cr. 2.0 (baseline < 0.9 January), Lactate 2.8. CXR done showing RLL infiltrate with pulmonary vascular congestion. In the ED, patient received Vancomycin, Zosyn, Tylenol, Calcium Gluconate 2g, Insulin 5u, Albuterol neb 5mg x 2. ICU consulted, will be transferred to MICU for management of severe sepsis 2/2 HAP and pulmonary congestion. (10 Ramon 2017 19:13)    INTERVAL HPI/OVERNIGHT EVENTS:::comfortable    HEALTH ISSUES - PROBLEM Dx:  Sepsis due to Escherichia coli: Sepsis due to Escherichia coli  Anemia: Anemia  Hyponatremia: Hyponatremia  Acute pulmonary edema: Acute pulmonary edema  Need for prophylactic measure: Need for prophylactic measure  Nutrition, metabolism, and development symptoms: Nutrition, metabolism, and development symptoms  Anemia, unspecified type: Anemia, unspecified type  Malignant neoplasm of female breast, unspecified laterality, unspecified site of breast: Malignant neoplasm of female breast, unspecified laterality, unspecified site of breast  Diffuse large B-cell lymphoma, unspecified body region: Diffuse large B-cell lymphoma, unspecified body region  Coronary artery disease involving native coronary artery of native heart without angina pectoris: Coronary artery disease involving native coronary artery of native heart without angina pectoris  Paroxysmal atrial fibrillation: Paroxysmal atrial fibrillation  Acute respiratory failure with hypoxia: Acute respiratory failure with hypoxia  Sepsis: Sepsis  Lymphoma: Lymphoma  Severe sepsis: Severe sepsis  Central venous line infection, initial encounter: Central venous line infection, initial encounter  E coli bacteremia: E coli bacteremia  Medical orders for life-sustaining treatment (MOLST) form in chart: Medical orders for life-sustaining treatment (MOLST) form in chart  DNR (do not resuscitate): DNR (do not resuscitate)  Chronic back pain: Chronic back pain  Dyspnea and respiratory abnormalities: Dyspnea and respiratory abnormalities  Goals of care, counseling/discussion: Goals of care, counseling/discussion  Patient has healthcare proxy: Patient has healthcare proxy  Moderate protein-calorie malnutrition: Moderate protein-calorie malnutrition  Impaired mobility and activities of daily living: Impaired mobility and activities of daily living  Full code status: Full code status  Palliative care encounter: Palliative care encounter  Breast cancer: Breast cancer  Diffuse large B cell lymphoma: Diffuse large B cell lymphoma  Bacteremia due to Gram-negative bacteria: Bacteremia due to Gram-negative bacteria  Shortness of breath: Shortness of breath  Hyperkalemia: Hyperkalemia  ROMEO (acute kidney injury): ROMEO (acute kidney injury)          PAST MEDICAL & SURGICAL HISTORY:  Scoliosis  Follicular lymphoma  Neck mass  Afib  Other hyperlipidemia  Breast cancer  CAD (coronary artery disease)  Diabetes  HTN (hypertension)  No pertinent past medical history  H/O abdominal hysterectomy  H/O thyroidectomy  S/P CABG x 3          Consultant NOTE  REVIEWED  (   )    REVIEW OF SYSTEMS:  [x] As per HPI    [x] All others negative	  [ ] Unable to obtain          Vital Signs Last 24 Hrs  T(C): 36.6, Max: 37.7 (06-25 @ 16:00)  T(F): 97.8, Max: 99.8 (06-25 @ 16:00)  HR: 97 (68 - 115)  BP: 111/58 (93/59 - 115/56)  BP(mean): --  RR: 20 (18 - 20)  SpO2: 95% (94% - 99%)        I & Os for current day (as of 06-25 @ 23:25)  =============================================  IN: 0 ml / OUT: 250 ml / NET: -250 ml    PHYSICAL EXAMINATION:                                    ( +++  )  NO CHANGE  Appearance: Normal	  HEENT:   Normal oral mucosa, PERRL, EOMI	  Neck: Supple, + JVD/ - JVD; Carotid Bruit   Cardiovascular: Normal S1 S2, No JVD, No murmurs,   Respiratory: Lungs clear to auscultation/Decreased Breath Sounds/No Rales, Rhonchi, Wheezing	  Gastrointestinal:  Soft, Non-tender, + BS	  Skin:   Extremities:   Vascular: Peripheral pulse  Neurologic: Non-focal  Psychiatry: A    amiodarone    Tablet 200milliGRAM(s) Oral daily  insulin lispro (HumaLOG) corrective regimen sliding scale  SubCutaneous Before meals and at bedtime  atorvastatin 40milliGRAM(s) Oral at bedtime  acetaminophen  Suppository 650milliGRAM(s) Rectal every 6 hours PRN  bisacodyl Suppository 10milliGRAM(s) Rectal daily  lidocaine   Patch 1Patch Transdermal daily  metoprolol 12.5milliGRAM(s) Oral two times a day  guaiFENesin    Syrup 200milliGRAM(s) Oral every 6 hours PRN  meperidine     Injectable 25milliGRAM(s) IV Push every 6 hours PRN  apixaban 2.5milliGRAM(s) Oral two times a day  furosemide    Tablet 20milliGRAM(s) Oral daily  aspirin enteric coated 81milliGRAM(s) Oral daily  morphine  - Injectable 0.5milliGRAM(s) IV Push every 4 hours PRN  morphine  - Injectable 1milliGRAM(s) IV Push every 4 hours PRN  cyanocobalamin Injectable 1000MICROGram(s) SubCutaneous daily  megestrol Suspension 625milliGRAM(s) Oral daily  oxyCODONE Solution 5milliGRAM(s) Oral every 4 hours                                      9.2    3.4   )-----------( 95       ( 25 Jun 2017 06:57 )             29.0     06-25    138  |  96  |  28<H>  ----------------------------<  87  4.2   |  32<H>  |  0.80    Ca    8.9      25 Jun 2017 06:57  Phos  2.7     06-25  Mg     2.0     06-25        CAPILLARY BLOOD GLUCOSE  156 (25 Jun 2017 21:00)  149 (25 Jun 2017 17:00)  116 (25 Jun 2017 11:59)  88 (25 Jun 2017 07:28)

## 2017-06-25 NOTE — PROGRESS NOTE ADULT - SUBJECTIVE AND OBJECTIVE BOX
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 Patients family at bedside states she's been more lethargic with associated lower extremity swelling, SOB, decreased appetite and diarrhea. Patient with previous admissions for symptomatic hyperglycemia in January. Patient currently seeing Dr. Galaviz on a regular basis for chemotherapy treatment. Last treatment on May 23rd for which she received Bendamustine and Rituximab. Patient denies any headaches, chest pain, abdominal pain, N/V, dysuria, or bowel changes.     Upon arrival to the ED, patient in moderate respiratory distress using accessory muscles, breathing w/ a RR of 30, O2 93% on room air. Patient placed on BIPAP with slight improvement in respiratory status. Patient febrile to 101 with a HR of 77. Labs significant for AG metabolic acidosis, K 7.0, Na 127, BUN 58, Cr. 2.0 (baseline < 0.9 January), Lactate 2.8. CXR done showing RLL infiltrate with pulmonary vascular congestion. In the ED, patient received Vancomycin, Zosyn, Tylenol, Calcium Gluconate 2g, Insulin 5u, Albuterol neb 5mg x 2. ICU consulted, will be transferred to MICU for management of severe sepsis 2/2 HAP and pulmonary congestion. (10 Ramon 2017 19:13)    FAMILY HISTORY:  No pertinent family history in first degree relatives    MEDICATIONS  (STANDING):  amiodarone    Tablet 200milliGRAM(s) Oral daily  insulin lispro (HumaLOG) corrective regimen sliding scale  SubCutaneous Before meals and at bedtime  atorvastatin 40milliGRAM(s) Oral at bedtime  bisacodyl Suppository 10milliGRAM(s) Rectal daily  lidocaine   Patch 1Patch Transdermal daily  metoprolol 12.5milliGRAM(s) Oral two times a day  apixaban 2.5milliGRAM(s) Oral two times a day  furosemide    Tablet 20milliGRAM(s) Oral daily  aspirin enteric coated 81milliGRAM(s) Oral daily  cyanocobalamin Injectable 1000MICROGram(s) SubCutaneous daily  megestrol Suspension 625milliGRAM(s) Oral daily  oxyCODONE Solution 5milliGRAM(s) Oral every 4 hours    MEDICATIONS  (PRN):  acetaminophen  Suppository 650milliGRAM(s) Rectal every 6 hours PRN For Temp greater than 38 C (100.4 F)  guaiFENesin    Syrup 200milliGRAM(s) Oral every 6 hours PRN Cough  meperidine     Injectable 25milliGRAM(s) IV Push every 6 hours PRN Rigors  morphine  - Injectable 0.5milliGRAM(s) IV Push every 4 hours PRN Dyspnea / Air hunger / Increased work of breathing / RR>25 / moderate pain (4-6)  morphine  - Injectable 1milliGRAM(s) IV Push every 4 hours PRN Excessive work of breathing / RR>35 / Resp distress / severe pain (7-10)    Vital Signs Last 24 Hrs  T(C): 37.2, Max: 37.7 (06-25 @ 16:00)  T(F): 99, Max: 99.8 (06-25 @ 16:00)  HR: 115 (68 - 115)  BP: 97/54 (93/59 - 115/56)  BP(mean): --  RR: 18 (18 - 20)  SpO2: 99% (94% - 99%)    Physical exam:    Overall impression  Lymphadenopathy  Liver  spleen    Labs:  CBC Full  -  ( 25 Jun 2017 06:57 )  WBC Count : 3.4 K/uL  Hemoglobin : 9.2 g/dL  Hematocrit : 29.0 %  Platelet Count - Automated : 95 K/uL  Mean Cell Volume : 92.9 fL  Mean Cell Hemoglobin : 29.5 pg  Mean Cell Hemoglobin Concentration : 31.7 g/dL  Auto Neutrophil # : x  Auto Lymphocyte # : x  Auto Monocyte # : x  Auto Eosinophil # : x  Auto Basophil # : x  Auto Neutrophil % : 3.0 %  Auto Lymphocyte % : 56.0 %  Auto Monocyte % : 35.0 %  Auto Eosinophil % : 2.0 %  Auto Basophil % : x    06-25    138  |  96  |  28<H>  ----------------------------<  87  4.2   |  32<H>  |  0.80    Ca    8.9      25 Jun 2017 06:57  Phos  2.7     06-25  Mg     2.0     06-25        Radiology:  HEALTH ISSUES - R/O PROBLEM Dx:      Assessmant / Problems  1) E coli sepsis .Off antibiotics x 2 days afebrile   2) Non Hodgkin lymphoma remission  3) Breast cancer stable    Plan:    Thank you  Neha Galaviz MD

## 2017-06-25 NOTE — PROGRESS NOTE ADULT - ASSESSMENT
86F with PMHx of CAD s/p CABG, Afib s/p PPM (on Eliquis), breast cancer, large B cell lymphoma on chemo, HFpEF, HTN, HLD, DM presented with E coli sepsis and UTI, also PermCath E coli infection, catheter removed on 6/12/2016, off antibiotics 48 h, improving neutropenia

## 2017-06-25 NOTE — PROGRESS NOTE ADULT - PROBLEM SELECTOR PLAN 1
1) Completed 10 days if IV antibiotics post catheter removal; now afebrile, Blood culture negative  2) Improved neutropenia, observe off antibiotics.

## 2017-06-26 LAB
ANION GAP SERPL CALC-SCNC: 10 MMOL/L — SIGNIFICANT CHANGE UP (ref 5–17)
BUN SERPL-MCNC: 34 MG/DL — HIGH (ref 7–23)
CALCIUM SERPL-MCNC: 9.8 MG/DL — SIGNIFICANT CHANGE UP (ref 8.4–10.5)
CHLORIDE SERPL-SCNC: 92 MMOL/L — LOW (ref 96–108)
CO2 SERPL-SCNC: 33 MMOL/L — HIGH (ref 22–31)
CREAT SERPL-MCNC: 0.9 MG/DL — SIGNIFICANT CHANGE UP (ref 0.5–1.3)
GLUCOSE SERPL-MCNC: 89 MG/DL — SIGNIFICANT CHANGE UP (ref 70–99)
HCT VFR BLD CALC: 31.1 % — LOW (ref 34.5–45)
HGB BLD-MCNC: 9.8 G/DL — LOW (ref 11.5–15.5)
MCHC RBC-ENTMCNC: 29.3 PG — SIGNIFICANT CHANGE UP (ref 27–34)
MCHC RBC-ENTMCNC: 31.5 G/DL — LOW (ref 32–36)
MCV RBC AUTO: 93.1 FL — SIGNIFICANT CHANGE UP (ref 80–100)
PLATELET # BLD AUTO: 102 K/UL — LOW (ref 150–400)
POTASSIUM SERPL-MCNC: 4.5 MMOL/L — SIGNIFICANT CHANGE UP (ref 3.5–5.3)
POTASSIUM SERPL-SCNC: 4.5 MMOL/L — SIGNIFICANT CHANGE UP (ref 3.5–5.3)
RBC # BLD: 3.34 M/UL — LOW (ref 3.8–5.2)
RBC # FLD: 18.1 % — HIGH (ref 10.3–16.9)
SODIUM SERPL-SCNC: 135 MMOL/L — SIGNIFICANT CHANGE UP (ref 135–145)
WBC # BLD: 5 K/UL — SIGNIFICANT CHANGE UP (ref 3.8–10.5)
WBC # FLD AUTO: 5 K/UL — SIGNIFICANT CHANGE UP (ref 3.8–10.5)

## 2017-06-26 PROCEDURE — 71010: CPT | Mod: 26

## 2017-06-26 PROCEDURE — 99232 SBSQ HOSP IP/OBS MODERATE 35: CPT

## 2017-06-26 PROCEDURE — 99232 SBSQ HOSP IP/OBS MODERATE 35: CPT | Mod: GC

## 2017-06-26 PROCEDURE — 78580 LUNG PERFUSION IMAGING: CPT | Mod: 26

## 2017-06-26 RX ORDER — FUROSEMIDE 40 MG
20 TABLET ORAL ONCE
Qty: 0 | Refills: 0 | Status: COMPLETED | OUTPATIENT
Start: 2017-06-26 | End: 2017-06-26

## 2017-06-26 RX ORDER — FUROSEMIDE 40 MG
20 TABLET ORAL
Qty: 0 | Refills: 0 | Status: DISCONTINUED | OUTPATIENT
Start: 2017-06-26 | End: 2017-07-06

## 2017-06-26 RX ORDER — MEPERIDINE HYDROCHLORIDE 50 MG/ML
25 INJECTION INTRAMUSCULAR; INTRAVENOUS; SUBCUTANEOUS EVERY 6 HOURS
Qty: 0 | Refills: 0 | Status: DISCONTINUED | OUTPATIENT
Start: 2017-06-26 | End: 2017-06-26

## 2017-06-26 RX ORDER — METOPROLOL TARTRATE 50 MG
2.5 TABLET ORAL ONCE
Qty: 0 | Refills: 0 | Status: COMPLETED | OUTPATIENT
Start: 2017-06-26 | End: 2017-06-26

## 2017-06-26 RX ORDER — METOPROLOL TARTRATE 50 MG
25 TABLET ORAL
Qty: 0 | Refills: 0 | Status: DISCONTINUED | OUTPATIENT
Start: 2017-06-26 | End: 2017-07-06

## 2017-06-26 RX ORDER — FUROSEMIDE 40 MG
40 TABLET ORAL
Qty: 0 | Refills: 0 | Status: DISCONTINUED | OUTPATIENT
Start: 2017-06-26 | End: 2017-06-26

## 2017-06-26 RX ORDER — METOPROLOL TARTRATE 50 MG
12.5 TABLET ORAL ONCE
Qty: 0 | Refills: 0 | Status: COMPLETED | OUTPATIENT
Start: 2017-06-26 | End: 2017-06-26

## 2017-06-26 RX ORDER — METOPROLOL TARTRATE 50 MG
12.5 TABLET ORAL
Qty: 0 | Refills: 0 | Status: DISCONTINUED | OUTPATIENT
Start: 2017-06-26 | End: 2017-06-26

## 2017-06-26 RX ORDER — DIPHENHYDRAMINE HCL 50 MG
50 CAPSULE ORAL ONCE
Qty: 0 | Refills: 0 | Status: DISCONTINUED | OUTPATIENT
Start: 2017-06-26 | End: 2017-06-26

## 2017-06-26 RX ADMIN — APIXABAN 2.5 MILLIGRAM(S): 2.5 TABLET, FILM COATED ORAL at 10:26

## 2017-06-26 RX ADMIN — Medication 20 MILLIGRAM(S): at 19:03

## 2017-06-26 RX ADMIN — MEGESTROL ACETATE 625 MILLIGRAM(S): 40 SUSPENSION ORAL at 13:58

## 2017-06-26 RX ADMIN — Medication 20 MILLIGRAM(S): at 09:56

## 2017-06-26 RX ADMIN — OXYCODONE HYDROCHLORIDE 5 MILLIGRAM(S): 5 TABLET ORAL at 14:00

## 2017-06-26 RX ADMIN — Medication 2.5 MILLIGRAM(S): at 06:13

## 2017-06-26 RX ADMIN — OXYCODONE HYDROCHLORIDE 5 MILLIGRAM(S): 5 TABLET ORAL at 14:12

## 2017-06-26 RX ADMIN — APIXABAN 2.5 MILLIGRAM(S): 2.5 TABLET, FILM COATED ORAL at 22:44

## 2017-06-26 RX ADMIN — Medication 12.5 MILLIGRAM(S): at 12:44

## 2017-06-26 RX ADMIN — ATORVASTATIN CALCIUM 40 MILLIGRAM(S): 80 TABLET, FILM COATED ORAL at 22:44

## 2017-06-26 RX ADMIN — OXYCODONE HYDROCHLORIDE 5 MILLIGRAM(S): 5 TABLET ORAL at 00:30

## 2017-06-26 RX ADMIN — Medication 10 MILLIGRAM(S): at 11:45

## 2017-06-26 RX ADMIN — OXYCODONE HYDROCHLORIDE 5 MILLIGRAM(S): 5 TABLET ORAL at 10:33

## 2017-06-26 RX ADMIN — Medication 25 MILLIGRAM(S): at 19:03

## 2017-06-26 RX ADMIN — AMIODARONE HYDROCHLORIDE 200 MILLIGRAM(S): 400 TABLET ORAL at 06:17

## 2017-06-26 RX ADMIN — PREGABALIN 1000 MICROGRAM(S): 225 CAPSULE ORAL at 11:45

## 2017-06-26 RX ADMIN — Medication 20 MILLIGRAM(S): at 06:16

## 2017-06-26 RX ADMIN — LIDOCAINE 1 PATCH: 4 CREAM TOPICAL at 11:45

## 2017-06-26 RX ADMIN — Medication 81 MILLIGRAM(S): at 11:45

## 2017-06-26 RX ADMIN — LIDOCAINE 1 PATCH: 4 CREAM TOPICAL at 23:00

## 2017-06-26 RX ADMIN — OXYCODONE HYDROCHLORIDE 5 MILLIGRAM(S): 5 TABLET ORAL at 23:00

## 2017-06-26 RX ADMIN — OXYCODONE HYDROCHLORIDE 5 MILLIGRAM(S): 5 TABLET ORAL at 19:03

## 2017-06-26 RX ADMIN — OXYCODONE HYDROCHLORIDE 5 MILLIGRAM(S): 5 TABLET ORAL at 22:44

## 2017-06-26 RX ADMIN — OXYCODONE HYDROCHLORIDE 5 MILLIGRAM(S): 5 TABLET ORAL at 10:26

## 2017-06-26 RX ADMIN — OXYCODONE HYDROCHLORIDE 5 MILLIGRAM(S): 5 TABLET ORAL at 19:08

## 2017-06-26 NOTE — PROGRESS NOTE ADULT - SUBJECTIVE AND OBJECTIVE BOX
Chief Complaint/Reason for Consult: CAD  INTERVAL HPI: events noted for pulm edema and afib RVR  	  MEDICATIONS:  amiodarone    Tablet 200milliGRAM(s) Oral daily  metoprolol 12.5milliGRAM(s) Oral two times a day  furosemide    Tablet 20milliGRAM(s) Oral two times a day      guaiFENesin    Syrup 200milliGRAM(s) Oral every 6 hours PRN    acetaminophen  Suppository 650milliGRAM(s) Rectal every 6 hours PRN  meperidine     Injectable 25milliGRAM(s) IV Push every 6 hours PRN  morphine  - Injectable 0.5milliGRAM(s) IV Push every 4 hours PRN  morphine  - Injectable 1milliGRAM(s) IV Push every 4 hours PRN  oxyCODONE Solution 5milliGRAM(s) Oral every 4 hours    bisacodyl Suppository 10milliGRAM(s) Rectal daily    insulin lispro (HumaLOG) corrective regimen sliding scale  SubCutaneous Before meals and at bedtime  atorvastatin 40milliGRAM(s) Oral at bedtime  megestrol Suspension 625milliGRAM(s) Oral daily    lidocaine   Patch 1Patch Transdermal daily  apixaban 2.5milliGRAM(s) Oral two times a day  aspirin enteric coated 81milliGRAM(s) Oral daily  cyanocobalamin Injectable 1000MICROGram(s) SubCutaneous daily      REVIEW OF SYSTEMS:  [x] As per HPI  CONSTITUTIONAL: No fever, weight loss, or fatigue  RESPIRATORY: No cough, wheezing, chills or hemoptysis; No Shortness of Breath  CARDIOVASCULAR: No chest pain, palpitations, dizziness, or leg swelling  GASTROINTESTINAL: No abdominal or epigastric pain. No nausea, vomiting, or hematemesis; No diarrhea or constipation. No melena or hematochezia.  MUSCULOSKELETAL: No joint pain or swelling; No muscle, back, or extremity pain  [x] All others negative	  [ ] Unable to obtain    PHYSICAL EXAM:  T(C): 37.1, Max: 37.7 (06-25 @ 16:00)  HR: 107 (86 - 126)  BP: 94/54 (91/57 - 111/58)  RR: 21 (18 - 23)  SpO2: 96% (94% - 100%)  Wt(kg): --  I&O's Summary        Appearance: Normal	  HEENT:   Normal oral mucosa  Cardiovascular: Normal S1 S2, No JVD, No murmurs, No edema  Respiratory: Lungs clear to auscultation	  Gastrointestinal:  Soft, Non-tender, + BS	  Extremities: Normal range of motion, No clubbing, cyanosis or edema  Vascular: Peripheral pulses palpable 2+ bilaterally    TELEMETRY: 	    ECG:    	  RADIOLOGY:   CXR:  CT:  US:    CARDIAC TESTING:  Echocardiogram:  Catheterization:  Stress Test:      LABS:	 	    CARDIAC MARKERS:                                  9.8    5.0   )-----------( 102      ( 26 Jun 2017 05:54 )             31.1     06-26    135  |  92<L>  |  34<H>  ----------------------------<  89  4.5   |  33<H>  |  0.90    Ca    9.8      26 Jun 2017 05:54  Phos  2.7     06-25  Mg     2.0     06-25      proBNP:   Lipid Profile:   HgA1c:   TSH:     ASSESSMENT/PLAN: 	  SOB and RVR noted - increase po BB as tolerated continue amio 200 qd for now.    Keep net euvolemic to net negative, strict I/Os

## 2017-06-26 NOTE — PROGRESS NOTE ADULT - PROBLEM SELECTOR PLAN 2
Initially requiring BIPAP and HFNC 2/2 pulmonary congestion/edema likely due to CHF exacerbation in the setting of urosepsis and line sepsis. Now tolerating regular NC.  - C/w nasal cannula  - Oxycodone 5mg solution for air hunger, increased work of breathing, comfort  - F/u VQ scan to r/o PE

## 2017-06-26 NOTE — PROGRESS NOTE ADULT - PROBLEM SELECTOR PLAN 2
on iv lasix , would make lasix doses bid either po or iv since the drug is short acting and xray quite abnormal.

## 2017-06-26 NOTE — PROGRESS NOTE ADULT - SUBJECTIVE AND OBJECTIVE BOX
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 Patients family at bedside states she's been more lethargic with associated lower extremity swelling, SOB, decreased appetite and diarrhea. Patient with previous admissions for symptomatic hyperglycemia in January. Patient currently seeing Dr. Galaviz on a regular basis for chemotherapy treatment. Last treatment on May 23rd for which she received Bendamustine and Rituximab. Patient denies any headaches, chest pain, abdominal pain, N/V, dysuria, or bowel changes.     Upon arrival to the ED, patient in moderate respiratory distress using accessory muscles, breathing w/ a RR of 30, O2 93% on room air. Patient placed on BIPAP with slight improvement in respiratory status. Patient febrile to 101 with a HR of 77. Labs significant for AG metabolic acidosis, K 7.0, Na 127, BUN 58, Cr. 2.0 (baseline < 0.9 January), Lactate 2.8. CXR done showing RLL infiltrate with pulmonary vascular congestion. In the ED, patient received Vancomycin, Zosyn, Tylenol, Calcium Gluconate 2g, Insulin 5u, Albuterol neb 5mg x 2. ICU consulted, will be transferred to MICU for management of severe sepsis 2/2 HAP and pulmonary congestion. (10 Ramon 2017 19:13)    FAMILY HISTORY:  No pertinent family history in first degree relatives    MEDICATIONS  (STANDING):  amiodarone    Tablet 200milliGRAM(s) Oral daily  insulin lispro (HumaLOG) corrective regimen sliding scale  SubCutaneous Before meals and at bedtime  atorvastatin 40milliGRAM(s) Oral at bedtime  bisacodyl Suppository 10milliGRAM(s) Rectal daily  lidocaine   Patch 1Patch Transdermal daily  apixaban 2.5milliGRAM(s) Oral two times a day  aspirin enteric coated 81milliGRAM(s) Oral daily  cyanocobalamin Injectable 1000MICROGram(s) SubCutaneous daily  megestrol Suspension 625milliGRAM(s) Oral daily  oxyCODONE Solution 5milliGRAM(s) Oral every 4 hours  furosemide    Tablet 20milliGRAM(s) Oral two times a day  metoprolol 25milliGRAM(s) Oral two times a day    MEDICATIONS  (PRN):  acetaminophen  Suppository 650milliGRAM(s) Rectal every 6 hours PRN For Temp greater than 38 C (100.4 F)  guaiFENesin    Syrup 200milliGRAM(s) Oral every 6 hours PRN Cough  morphine  - Injectable 0.5milliGRAM(s) IV Push every 4 hours PRN Dyspnea / Air hunger / Increased work of breathing / RR>25 / moderate pain (4-6)  morphine  - Injectable 1milliGRAM(s) IV Push every 4 hours PRN Excessive work of breathing / RR>35 / Resp distress / severe pain (7-10)  meperidine     Injectable 25milliGRAM(s) IV Push every 6 hours PRN Rigors    Vital Signs Last 24 Hrs  T(C): 36.6, Max: 37.1 (06-26 @ 05:01)  T(F): 97.8, Max: 98.7 (06-26 @ 05:01)  HR: 95 (90 - 126)  BP: 90/45 (90/45 - 114/65)  BP(mean): --  RR: 16 (16 - 23)  SpO2: 97% (94% - 100%)    Physical exam:    Overall impression  Lymphadenopathy  Liver  spleen    Labs:  CBC Full  -  ( 26 Jun 2017 05:54 )  WBC Count : 5.0 K/uL  Hemoglobin : 9.8 g/dL  Hematocrit : 31.1 %  Platelet Count - Automated : 102 K/uL  Mean Cell Volume : 93.1 fL  Mean Cell Hemoglobin : 29.3 pg  Mean Cell Hemoglobin Concentration : 31.5 g/dL  Auto Neutrophil # : x  Auto Lymphocyte # : x  Auto Monocyte # : x  Auto Eosinophil # : x  Auto Basophil # : x  Auto Neutrophil % : x  Auto Lymphocyte % : x  Auto Monocyte % : x  Auto Eosinophil % : x  Auto Basophil % : x    06-26    135  |  92<L>  |  34<H>  ----------------------------<  89  4.5   |  33<H>  |  0.90    Ca    9.8      26 Jun 2017 05:54  Phos  2.7     06-25  Mg     2.0     06-25        Radiology:  HEALTH ISSUES - R/O PROBLEM Dx:      Assessmant / Problems  1) E coli sepsis resolved  2) SOB possible fluid overload vs PE managed by Pulmonary/Cardiology  3)Nonhodgkins lymphoma remission  4)Breast ca stable    Plan:  Await result VQ scadominic    Thank you  Neha Galaviz MD

## 2017-06-26 NOTE — PROGRESS NOTE ADULT - PROBLEM SELECTOR PLAN 1
POA, source was urosepsis w/ seeding to chemoport, which was removed on 6/12, persistent bacteremia of GNR, however last two sets of cultures NGTD so far (6/15-6/18). Fever curve: 100 on 6/20, 100.3 on 6/23 orally and 99.9 when repeated rectally.  - ID consulted, patient was on Ceftriaxone 1g with intention to keep her for a total of 10 days post-line removal (starting 6/13), but on 6/18, patient was febrile to 103 and abx were broadened to Vanc/Zosyn, however, considering sensitivities, patient switched back to ceftriaxone to complete a 10 days course (last day 6/23). Differential includes drug fever. On 6/23 patient got ceftriaxone x1.   - Given fever to 100.3 and leukopenia, will get BCX and UCX per Dr. Galaviz and monitor fever curve for 2 more days in the hospital. Patient today afebrile, and WBC count improving.   - Blood Cx (6/15-6/18-6/20) NGTD   - NM Gallium Scan (6/16)- negative scan- Pacemaker clean.  - c/w Tylenol PRN, Demerol PRN can be ordered for rigors   - Repeat renal US- no changes   - Repeat UA (6/20)- negative

## 2017-06-26 NOTE — PROGRESS NOTE ADULT - GEN GEN HX ROS MEA POS PC
anorexia/weight loss
fatigue/weakness
fatigue/weakness
weight loss/fatigue/weakness
fatigue
weakness

## 2017-06-26 NOTE — PROGRESS NOTE ADULT - ASSESSMENT
86F with PMHx of CAD s/p CABG, Afib s/p PPM (on Eliquis), breast cancer, large B cell lymphoma on chemo, HFpEF, HTN, HLD, DM presented with severe sepsis 2/2 UTI, persistent bacteremia, initially admitted to the MICU s/p chemo port removal (which was infected), steppeddown to 7[place]ach then RMF, with low-grade fever 100.3 on 6/23, afebrile since

## 2017-06-26 NOTE — PROGRESS NOTE ADULT - SUBJECTIVE AND OBJECTIVE BOX
INTERVAL HPI/OVERNIGHT EVENTS:    VITAL SIGNS:  T(F): 98.7  HR: 107  BP: 94/54  RR: 21  SpO2: 96%  Wt(kg): --    PHYSICAL EXAM:  Constitutional:  Eyes:  ENMT:  Neck:  Respiratory:  Cardiovascular:  Gastrointestinal:  Extremities:  Vascular:  Neurological:  Musculoskeletal:    MEDICATIONS  (STANDING):  amiodarone    Tablet 200milliGRAM(s) Oral daily  insulin lispro (HumaLOG) corrective regimen sliding scale  SubCutaneous Before meals and at bedtime  atorvastatin 40milliGRAM(s) Oral at bedtime  bisacodyl Suppository 10milliGRAM(s) Rectal daily  lidocaine   Patch 1Patch Transdermal daily  apixaban 2.5milliGRAM(s) Oral two times a day  aspirin enteric coated 81milliGRAM(s) Oral daily  cyanocobalamin Injectable 1000MICROGram(s) SubCutaneous daily  megestrol Suspension 625milliGRAM(s) Oral daily  oxyCODONE Solution 5milliGRAM(s) Oral every 4 hours  metoprolol 12.5milliGRAM(s) Oral two times a day  furosemide    Tablet 40milliGRAM(s) Oral two times a day    MEDICATIONS  (PRN):  acetaminophen  Suppository 650milliGRAM(s) Rectal every 6 hours PRN For Temp greater than 38 C (100.4 F)  guaiFENesin    Syrup 200milliGRAM(s) Oral every 6 hours PRN Cough  meperidine     Injectable 25milliGRAM(s) IV Push every 6 hours PRN Rigors  morphine  - Injectable 0.5milliGRAM(s) IV Push every 4 hours PRN Dyspnea / Air hunger / Increased work of breathing / RR>25 / moderate pain (4-6)  morphine  - Injectable 1milliGRAM(s) IV Push every 4 hours PRN Excessive work of breathing / RR>35 / Resp distress / severe pain (7-10)    Allergies  IV CONtRAST (Flushing (Skin); Rash)  No Known Drug Allergies    LABS:                        9.8    5.0   )-----------( 102      ( 26 Jun 2017 05:54 )             31.1     06-26    135  |  92<L>  |  34<H>  ----------------------------<  89  4.5   |  33<H>  |  0.90    Ca    9.8      26 Jun 2017 05:54  Phos  2.7     06-25  Mg     2.0     06-25    RADIOLOGY & ADDITIONAL TESTS: INTERVAL HPI/OVERNIGHT EVENTS:  - ON: patient tachycardic, EKG showed afib qwith RVR (124bpm)    VITAL SIGNS:  T(F): 98.7  HR: 107  BP: 94/54  RR: 21  SpO2: 96%  Wt(kg): --    PHYSICAL EXAM:  Constitutional:  Eyes:  ENMT:  Neck:  Respiratory:  Cardiovascular:  Gastrointestinal:  Extremities:  Vascular:  Neurological:  Musculoskeletal:    MEDICATIONS  (STANDING):  amiodarone    Tablet 200milliGRAM(s) Oral daily  insulin lispro (HumaLOG) corrective regimen sliding scale  SubCutaneous Before meals and at bedtime  atorvastatin 40milliGRAM(s) Oral at bedtime  bisacodyl Suppository 10milliGRAM(s) Rectal daily  lidocaine   Patch 1Patch Transdermal daily  apixaban 2.5milliGRAM(s) Oral two times a day  aspirin enteric coated 81milliGRAM(s) Oral daily  cyanocobalamin Injectable 1000MICROGram(s) SubCutaneous daily  megestrol Suspension 625milliGRAM(s) Oral daily  oxyCODONE Solution 5milliGRAM(s) Oral every 4 hours  metoprolol 12.5milliGRAM(s) Oral two times a day  furosemide    Tablet 40milliGRAM(s) Oral two times a day    MEDICATIONS  (PRN):  acetaminophen  Suppository 650milliGRAM(s) Rectal every 6 hours PRN For Temp greater than 38 C (100.4 F)  guaiFENesin    Syrup 200milliGRAM(s) Oral every 6 hours PRN Cough  meperidine     Injectable 25milliGRAM(s) IV Push every 6 hours PRN Rigors  morphine  - Injectable 0.5milliGRAM(s) IV Push every 4 hours PRN Dyspnea / Air hunger / Increased work of breathing / RR>25 / moderate pain (4-6)  morphine  - Injectable 1milliGRAM(s) IV Push every 4 hours PRN Excessive work of breathing / RR>35 / Resp distress / severe pain (7-10)    Allergies  IV CONtRAST (Flushing (Skin); Rash)  No Known Drug Allergies    LABS:                        9.8    5.0   )-----------( 102      ( 26 Jun 2017 05:54 )             31.1     06-26    135  |  92<L>  |  34<H>  ----------------------------<  89  4.5   |  33<H>  |  0.90    Ca    9.8      26 Jun 2017 05:54  Phos  2.7     06-25  Mg     2.0     06-25    RADIOLOGY & ADDITIONAL TESTS: INTERVAL HPI/OVERNIGHT EVENTS:  - ON: patient tachycardic, EKG showed afib qwith RVR (124bpm), got metoprolol with improvement in heart rate to 93  - S: patient states feeling well and has no complaints at this moment  - Interval HPI: in the mid morning patient was tachycardic to 110 and O2Sat 91% or 5L NC, tachypneic to 23, BP unchanged from baseline. Patient asymptomatic, evaluated at bedside, physical exam unchanged from AM exam except for tachycardia. NC increased to 6L with improvement of O2Sat to 96%, reviewed CXR from this AM which showed increased pulmonary vascular congestion. Patient got 20mg IV lasix, and increased PO lasix from QD to BID. Given patient's history of cancer, and new changes in vital signs, patient ordered for V/Q scan. CTA PE protocol not ordered given patient's contrast allergy.    VITAL SIGNS:  T(F): 98.7  HR: 107  BP: 94/54  RR: 21  SpO2: 96%  Wt(kg): --    PHYSICAL EXAM:  Constitutional: patient laying down comfortable in bed in no distress  Eyes: EOMI  ENMT: slightly dry MM  Neck: + JVD, although not able to fully assess since patient's neck's ROM is quite limited due to chronic difficulty to turn as per patient  Respiratory: crackles up to lower 1/3 of both lungs, otherwise clear to auscultation  Cardiovascular: tachycardic, irregularly irregular, no M/R/G appreciated  Gastrointestinal: normoactive bowel sounds, abdomen soft, NTND  Extremities: symmetric, trace LE edema  Vascular: + peripheral pulses  Neurological: A&Ox2  Musculoskeletal: no joint swelling    MEDICATIONS  (STANDING):  amiodarone    Tablet 200milliGRAM(s) Oral daily  insulin lispro (HumaLOG) corrective regimen sliding scale  SubCutaneous Before meals and at bedtime  atorvastatin 40milliGRAM(s) Oral at bedtime  bisacodyl Suppository 10milliGRAM(s) Rectal daily  lidocaine   Patch 1Patch Transdermal daily  apixaban 2.5milliGRAM(s) Oral two times a day  aspirin enteric coated 81milliGRAM(s) Oral daily  cyanocobalamin Injectable 1000MICROGram(s) SubCutaneous daily  megestrol Suspension 625milliGRAM(s) Oral daily  oxyCODONE Solution 5milliGRAM(s) Oral every 4 hours  metoprolol 12.5milliGRAM(s) Oral two times a day  furosemide    Tablet 40milliGRAM(s) Oral two times a day    MEDICATIONS  (PRN):  acetaminophen  Suppository 650milliGRAM(s) Rectal every 6 hours PRN For Temp greater than 38 C (100.4 F)  guaiFENesin    Syrup 200milliGRAM(s) Oral every 6 hours PRN Cough  meperidine     Injectable 25milliGRAM(s) IV Push every 6 hours PRN Rigors  morphine  - Injectable 0.5milliGRAM(s) IV Push every 4 hours PRN Dyspnea / Air hunger / Increased work of breathing / RR>25 / moderate pain (4-6)  morphine  - Injectable 1milliGRAM(s) IV Push every 4 hours PRN Excessive work of breathing / RR>35 / Resp distress / severe pain (7-10)    Allergies  IV CONtRAST (Flushing (Skin); Rash)  No Known Drug Allergies    LABS:                        9.8    5.0   )-----------( 102      ( 26 Jun 2017 05:54 )             31.1     06-26    135  |  92<L>  |  34<H>  ----------------------------<  89  4.5   |  33<H>  |  0.90    Ca    9.8      26 Jun 2017 05:54  Phos  2.7     06-25  Mg     2.0     06-25    RADIOLOGY & ADDITIONAL TESTS:

## 2017-06-26 NOTE — PROGRESS NOTE ADULT - SUBJECTIVE AND OBJECTIVE BOX
Patient seen and examined at bedside. still sob, weak, eats ok      amiodarone    Tablet 200 daily  insulin lispro (HumaLOG) corrective regimen sliding scale  Before meals and at bedtime  atorvastatin 40 at bedtime  acetaminophen  Suppository 650 every 6 hours PRN  bisacodyl Suppository 10 daily  lidocaine   Patch 1 daily  guaiFENesin    Syrup 200 every 6 hours PRN  meperidine     Injectable 25 every 6 hours PRN  apixaban 2.5 two times a day  furosemide    Tablet 20 daily  aspirin enteric coated 81 daily  morphine  - Injectable 0.5 every 4 hours PRN  morphine  - Injectable 1 every 4 hours PRN  cyanocobalamin Injectable 1000 daily  megestrol Suspension 625 daily  oxyCODONE Solution 5 every 4 hours  metoprolol 12.5 two times a day      Allergies    IV CONtRAST (Flushing (Skin); Rash)  No Known Drug Allergies    Intolerances        T(C): , Max: 37.7 (06-25 @ 16:00)  T(F): , Max: 99.8 (06-25 @ 16:00)  HR: 69  BP: 91/57  BP(mean): --  RR: 23  SpO2: 96%  Wt(kg): --          LABS:                        9.8    5.0   )-----------( 102      ( 26 Jun 2017 05:54 )             31.1     06-26    135  |  92<L>  |  34<H>  ----------------------------<  89  4.5   |  33<H>  |  0.90    Ca    9.8      26 Jun 2017 05:54  Phos  2.7     06-25  Mg     2.0     06-25                  RADIOLOGY & ADDITIONAL STUDIES:

## 2017-06-26 NOTE — PROGRESS NOTE ADULT - SUBJECTIVE AND OBJECTIVE BOX
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 Patients family at bedside states she's been more lethargic with associated lower extremity swelling, SOB, decreased appetite and diarrhea. Patient with previous admissions for symptomatic hyperglycemia in January. Patient currently seeing Dr. Galaviz on a regular basis for chemotherapy treatment. Last treatment on May 23rd for which she received Bendamustine and Rituximab. Patient denies any headaches, chest pain, abdominal pain, N/V, dysuria, or bowel changes.     Upon arrival to the ED, patient in moderate respiratory distress using accessory muscles, breathing w/ a RR of 30, O2 93% on room air. Patient placed on BIPAP with slight improvement in respiratory status. Patient febrile to 101 with a HR of 77. Labs significant for AG metabolic acidosis, K 7.0, Na 127, BUN 58, Cr. 2.0 (baseline < 0.9 January), Lactate 2.8. CXR done showing RLL infiltrate with pulmonary vascular congestion. In the ED, patient received Vancomycin, Zosyn, Tylenol, Calcium Gluconate 2g, Insulin 5u, Albuterol neb 5mg x 2. ICU consulted, will be transferred to MICU for management of severe sepsis 2/2 HAP and pulmonary congestion. (10 Ramon 2017 19:13)    INTERVAL HPI/OVERNIGHT EVENTS:::    HEALTH ISSUES - PROBLEM Dx:  Sepsis due to Escherichia coli: Sepsis due to Escherichia coli  Anemia: Anemia  Hyponatremia: Hyponatremia  Acute pulmonary edema: Acute pulmonary edema  Need for prophylactic measure: Need for prophylactic measure  Nutrition, metabolism, and development symptoms: Nutrition, metabolism, and development symptoms  Anemia, unspecified type: Anemia, unspecified type  Malignant neoplasm of female breast, unspecified laterality, unspecified site of breast: Malignant neoplasm of female breast, unspecified laterality, unspecified site of breast  Diffuse large B-cell lymphoma, unspecified body region: Diffuse large B-cell lymphoma, unspecified body region  Coronary artery disease involving native coronary artery of native heart without angina pectoris: Coronary artery disease involving native coronary artery of native heart without angina pectoris  Paroxysmal atrial fibrillation: Paroxysmal atrial fibrillation  Acute respiratory failure with hypoxia: Acute respiratory failure with hypoxia  Sepsis: Sepsis  Lymphoma: Lymphoma  Severe sepsis: Severe sepsis  Central venous line infection, initial encounter: Central venous line infection, initial encounter  E coli bacteremia: E coli bacteremia  Medical orders for life-sustaining treatment (MOLST) form in chart: Medical orders for life-sustaining treatment (MOLST) form in chart  DNR (do not resuscitate): DNR (do not resuscitate)  Chronic back pain: Chronic back pain  Dyspnea and respiratory abnormalities: Dyspnea and respiratory abnormalities  Goals of care, counseling/discussion: Goals of care, counseling/discussion  Patient has healthcare proxy: Patient has healthcare proxy  Moderate protein-calorie malnutrition: Moderate protein-calorie malnutrition  Impaired mobility and activities of daily living: Impaired mobility and activities of daily living  Full code status: Full code status  Palliative care encounter: Palliative care encounter  Breast cancer: Breast cancer  Diffuse large B cell lymphoma: Diffuse large B cell lymphoma  Bacteremia due to Gram-negative bacteria: Bacteremia due to Gram-negative bacteria  Shortness of breath: Shortness of breath  Hyperkalemia: Hyperkalemia  ROMEO (acute kidney injury): ROMEO (acute kidney injury)          PAST MEDICAL & SURGICAL HISTORY:  Scoliosis  Follicular lymphoma  Neck mass  Afib  Other hyperlipidemia  Breast cancer  CAD (coronary artery disease)  Diabetes  HTN (hypertension)  No pertinent past medical history  H/O abdominal hysterectomy  H/O thyroidectomy  S/P CABG x 3          Consultant NOTE  REVIEWED  (   )    REVIEW OF SYSTEMS:  [x] As per HPI  CONSTITUTIONAL: No fever, weight loss, or fatigue  RESPIRATORY: No cough, wheezing, chills or hemoptysis; No Shortness of Breath  CARDIOVASCULAR: No chest pain, palpitations, dizziness, or leg swelling  GASTROINTESTINAL: No abdominal or epigastric pain. No nausea, vomiting, or hematemesis; No diarrhea or constipation. No melena or hematochezia.  MUSCULOSKELETAL: No joint pain or swelling; No muscle, back, or extremity pain  PSYCH    awake, alert       [x] All others negative	  [ ] Unable to obtain          Vital Signs Last 24 Hrs  T(C): 36.9, Max: 37.7 (06-25 @ 16:00)  T(F): 98.4, Max: 99.8 (06-25 @ 16:00)  HR: 97 (86 - 126)  BP: 103/47 (93/59 - 115/56)  BP(mean): --  RR: 20 (18 - 22)  SpO2: 100% (94% - 100%)        PHYSICAL EXAMINATION:                                    (    )  NO CHANGE  Appearance: Normal	  HEENT:   Normal oral mucosa, PERRL, EOMI	  Neck: Supple, + JVD/ - JVD; Carotid Bruit   Cardiovascular: Normal S1 S2, No JVD, No murmurs,   Respiratory: Lungs clear to auscultation/Decreased Breath Sounds/No Rales, Rhonchi, Wheezing	  Gastrointestinal:  Soft, Non-tender, + BS	  Skin: No rashes, No ecchymoses, No cyanosis  Extremities: Normal range of motion, No clubbing, cyanosis or edema  Vascular: Peripheral pulses palpable 2+ bilaterally  Neurologic: Non-focal  Psychiatry: A & O x 3, Mood & affect appropriate    amiodarone    Tablet 200milliGRAM(s) Oral daily  insulin lispro (HumaLOG) corrective regimen sliding scale  SubCutaneous Before meals and at bedtime  atorvastatin 40milliGRAM(s) Oral at bedtime  acetaminophen  Suppository 650milliGRAM(s) Rectal every 6 hours PRN  bisacodyl Suppository 10milliGRAM(s) Rectal daily  lidocaine   Patch 1Patch Transdermal daily  guaiFENesin    Syrup 200milliGRAM(s) Oral every 6 hours PRN  meperidine     Injectable 25milliGRAM(s) IV Push every 6 hours PRN  apixaban 2.5milliGRAM(s) Oral two times a day  furosemide    Tablet 20milliGRAM(s) Oral daily  aspirin enteric coated 81milliGRAM(s) Oral daily  morphine  - Injectable 0.5milliGRAM(s) IV Push every 4 hours PRN  morphine  - Injectable 1milliGRAM(s) IV Push every 4 hours PRN  cyanocobalamin Injectable 1000MICROGram(s) SubCutaneous daily  megestrol Suspension 625milliGRAM(s) Oral daily  oxyCODONE Solution 5milliGRAM(s) Oral every 4 hours  metoprolol 12.5milliGRAM(s) Oral two times a day                                      9.8    5.0   )-----------( 102      ( 26 Jun 2017 05:54 )             31.1     06-26    135  |  92<L>  |  34<H>  ----------------------------<  89  4.5   |  33<H>  |  0.90    Ca    9.8      26 Jun 2017 05:54  Phos  2.7     06-25  Mg     2.0     06-25        CAPILLARY BLOOD GLUCOSE  88 (26 Jun 2017 07:35)  156 (25 Jun 2017 21:00)  149 (25 Jun 2017 17:00)  116 (25 Jun 2017 11:59) Patient is a 86y old  Female who presents with a chief complaint of Severe sepsis (22 Jun 2017 16:06)      HPI:  87 y/o F w/ pmhx of CAD s/p CABG, atrial fibrillation s/p PPM (on Eliquis), Breast CA (on Arimidex), 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 Patients family at bedside states she's been more lethargic with associated lower extremity swelling, SOB, decreased appetite and diarrhea. Patient with previous admissions for symptomatic hyperglycemia in January. Patient currently seeing Dr. Galaviz on a regular basis for chemotherapy treatment. Last treatment on May 23rd for which she received Bendamustine and Rituximab. Patient denies any headaches, chest pain, abdominal pain, N/V, dysuria, or bowel changes.          INTERVAL HPI/OVERNIGHT EVENTS:::    HEALTH ISSUES - PROBLEM Dx:  Sepsis due to Escherichia coli: Sepsis due to Escherichia coli  Anemia: Anemia  Hyponatremia: Hyponatremia  Acute pulmonary edema: Acute pulmonary edema  Need for prophylactic measure: Need for prophylactic measure  Nutrition, metabolism, and development symptoms: Nutrition, metabolism, and development symptoms  Anemia, unspecified type: Anemia, unspecified type  Malignant neoplasm of female breast, unspecified laterality, unspecified site of breast: Malignant neoplasm of female breast, unspecified laterality, unspecified site of breast  Diffuse large B-cell lymphoma, unspecified body region: Diffuse large B-cell lymphoma, unspecified body region  Coronary artery disease involving native coronary artery of native heart without angina pectoris: Coronary artery disease involving native coronary artery of native heart without angina pectoris  Paroxysmal atrial fibrillation: Paroxysmal atrial fibrillation  Acute respiratory failure with hypoxia: Acute respiratory failure with hypoxia  Sepsis: Sepsis  Lymphoma: Lymphoma  Severe sepsis: Severe sepsis  Central venous line infection, initial encounter: Central venous line infection, initial encounter  E coli bacteremia: E coli bacteremia  Medical orders for life-sustaining treatment (MOLST) form in chart: Medical orders for life-sustaining treatment (MOLST) form in chart  DNR (do not resuscitate): DNR (do not resuscitate)  Chronic back pain: Chronic back pain  Dyspnea and respiratory abnormalities: Dyspnea and respiratory abnormalities  Goals of care, counseling/discussion: Goals of care, counseling/discussion  Patient has healthcare proxy: Patient has healthcare proxy  Moderate protein-calorie malnutrition: Moderate protein-calorie malnutrition  Impaired mobility and activities of daily living: Impaired mobility and activities of daily living  Full code status: Full code status  Palliative care encounter: Palliative care encounter  Breast cancer: Breast cancer  Diffuse large B cell lymphoma: Diffuse large B cell lymphoma  Bacteremia due to Gram-negative bacteria: Bacteremia due to Gram-negative bacteria  Shortness of breath: Shortness of breath  Hyperkalemia: Hyperkalemia  ROMEO (acute kidney injury): ROMEO (acute kidney injury)          PAST MEDICAL & SURGICAL HISTORY:  Scoliosis  Follicular lymphoma  Neck mass  Afib  Other hyperlipidemia  Breast cancer  CAD (coronary artery disease)  Diabetes  HTN (hypertension)  No pertinent past medical history  H/O abdominal hysterectomy  H/O thyroidectomy  S/P CABG x 3          Consultant NOTE  REVIEWED  (   )    REVIEW OF SYSTEMS:  [x] As per HPI  CONSTITUTIONAL: No fever, weight loss, or fatigue    [ ] Unable to obtain          Vital Signs Last 24 Hrs  T(C): 36.9, Max: 37.7 (06-25 @ 16:00)  T(F): 98.4, Max: 99.8 (06-25 @ 16:00)  HR: 97 (86 - 126)  BP: 103/47 (93/59 - 115/56)  BP(mean): --  RR: 20 (18 - 22)  SpO2: 100% (94% - 100%)        PHYSICAL EXAMINATION:                                    (    )  NO CHANGE  Appearance: Normal	  HEENT:   Normal oral mucosa, PERRL, EOMI	  Neck: Supple, + JVD/ - JVD; Carotid Bruit   Cardiovascular: S!S@  ireg  Respiratory: Lungs clear to auscultation/Decreased Breath Sounds/No Rales, Rhonchi, Wheezing	  Gastrointestinal:  Soft, Non-tender, + BS	  Skin: No rashes, No ecchymoses, No cyanosis  Extremities: edema  Vascular:   Neurologic: Non-focal  Psychiatry: A & O x  amiodarone    Tablet 200milliGRAM(s) Oral daily  insulin lispro (HumaLOG) corrective regimen sliding scale  SubCutaneous Before meals and at bedtime  atorvastatin 40milliGRAM(s) Oral at bedtime  acetaminophen  Suppository 650milliGRAM(s) Rectal every 6 hours PRN  bisacodyl Suppository 10milliGRAM(s) Rectal daily  lidocaine   Patch 1Patch Transdermal daily  guaiFENesin    Syrup 200milliGRAM(s) Oral every 6 hours PRN  meperidine     Injectable 25milliGRAM(s) IV Push every 6 hours PRN  apixaban 2.5milliGRAM(s) Oral two times a day  furosemide    Tablet 20milliGRAM(s) Oral daily  aspirin enteric coated 81milliGRAM(s) Oral daily  morphine  - Injectable 0.5milliGRAM(s) IV Push every 4 hours PRN  morphine  - Injectable 1milliGRAM(s) IV Push every 4 hours PRN  cyanocobalamin Injectable 1000MICROGram(s) SubCutaneous daily  megestrol Suspension 625milliGRAM(s) Oral daily  oxyCODONE Solution 5milliGRAM(s) Oral every 4 hours  metoprolol 12.5milliGRAM(s) Oral two times a day                                      9.8    5.0   )-----------( 102      ( 26 Jun 2017 05:54 )             31.1     06-26    135  |  92<L>  |  34<H>  ----------------------------<  89  4.5   |  33<H>  |  0.90    Ca    9.8      26 Jun 2017 05:54  Phos  2.7     06-25  Mg     2.0     06-25        CAPILLARY BLOOD GLUCOSE  88 (26 Jun 2017 07:35)  156 (25 Jun 2017 21:00)  149 (25 Jun 2017 17:00)  116 (25 Jun 2017 11:59)

## 2017-06-26 NOTE — CHART NOTE - NSCHARTNOTEFT_GEN_A_CORE
MD called by RN to bedside for -130. Pt seen/examined. Pt denies chest pain, sob, palpitations.    Vital Signs Last 24 Hrs  T(C): 36.6, Max: 37.7 (06-25 @ 16:00)  T(F): 97.8, Max: 99.8 (06-25 @ 16:00)  HR: 97 (86 - 115)  BP: 111/58 (93/59 - 115/56)  BP(mean): --  RR: 20 (18 - 20)  SpO2: 95% (94% - 99%)    On exam, pt appears mildly tachypneic (RR 24 by manual count) but otherwise NAD on 5L NC. Dry MM. On CV exam, tachycardic to about 115-130 bpm (on manual count) with irregular rhythm, lungs with no wheezes but bibasilar crackles, abdomen soft/NTND, extremities WWP (no edema), mentating well A&Ox2 (unchanged).    EKG performed at bedside showing a-fib with RVR ().    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    #A-fib with RVR: as seen on EKG as stated above. Unclear trigger for RVR, possibly dehydration 2/2 daily PO lasix.  -ordered stat metoprolol 2.5mg IV x 1, will consider additional 2.5mg if HR does not improve  -hold AM dose of PO metoprolol for now, re-ordered PO metoprolol 12.5mg BID with hold parameters (BP<90/60 or HR<60)  -c/w Eliquis 2.5mg BID    #Tachypnea: lung exam unchanged from prior, no wheezes heard, no smoking or COPD history.  -ordered urgent chest XR for comparison to prior, to evaluate whether pt is tachypneic 2/2 overload from a-fib w/ RVR  -holding off on trial of duonebs due to tachycardia MD called by RN to bedside for -130. Pt seen/examined. Pt denies chest pain, sob, palpitations.    Vital Signs Last 24 Hrs  T(C): 36.6, Max: 37.7 (06-25 @ 16:00)  T(F): 97.8, Max: 99.8 (06-25 @ 16:00)  HR: 97 (86 - 115)  BP: 111/58 (93/59 - 115/56)  BP(mean): --  RR: 20 (18 - 20)  SpO2: 95% (94% - 99%)    On exam, pt appears mildly tachypneic (RR 24 by manual count) but otherwise NAD on 5L NC. Dry MM. On CV exam, tachycardic to about 115-130 bpm (on manual count) with irregular rhythm, lungs with no wheezes but bibasilar crackles, abdomen soft/NTND, extremities WWP (no edema), mentating well A&Ox2 (unchanged).    EKG performed at bedside showing a-fib with RVR ().  Urgent CXR ordered - suggests increased pulmonary congestion compared to 6/23.    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    #A-fib with RVR: as seen on EKG as stated above. Trigger for RVR likely 2/2 pulmonary congestion as seen on chest XR.  -ordered stat metoprolol 2.5mg IV x 1, will consider additional 2.5mg if HR does not improve  -hold AM dose of PO metoprolol for now, re-ordered PO metoprolol 12.5mg BID with hold parameters (BP<90/60 or HR<60)  -c/w Eliquis 2.5mg BID    #Tachypnea: lung exam unchanged from prior, no wheezes heard, no smoking or COPD history. However, chest XR showing increased pulmonary congestion compared to 6/23.  -holding off on trial of duonebs due to tachycardia  -ordered lasix 20mg IV x 1 MD called by RN to bedside for -130. Pt seen/examined. Pt denies chest pain, sob, palpitations.    Vital Signs Last 24 Hrs  T(C): 36.6, Max: 37.7 (06-25 @ 16:00)  T(F): 97.8, Max: 99.8 (06-25 @ 16:00)  HR: 97 (86 - 115)  BP: 111/58 (93/59 - 115/56)  BP(mean): --  RR: 20 (18 - 20)  SpO2: 95% (94% - 99%)    On exam, pt appears mildly tachypneic (RR 24 by manual count) but otherwise NAD on 5L NC. Dry MM. On CV exam, tachycardic to about 115-130 bpm (on manual count) with irregular rhythm, lungs with no wheezes but bibasilar crackles, abdomen soft/NTND, extremities WWP (no edema), mentating well A&Ox2 (unchanged).    EKG performed at bedside showing a-fib with RVR ().  Urgent CXR ordered - suggests increased pulmonary congestion compared to 6/23.    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    #A-fib with RVR: as seen on EKG as stated above. Trigger for RVR likely 2/2 pulmonary congestion as seen on chest XR.  -ordered stat metoprolol 2.5mg IV x 1, will consider additional 2.5mg if HR does not improve  -hold AM dose of PO metoprolol for now, hold AM PO dose of metoprolol for now, and re-ordered PO metoprolol 12.5mg BID with hold parameters (BP<90/60 or HR<60)  -c/w Eliquis 2.5mg BID    #Tachypnea: lung exam unchanged from prior, no wheezes heard, no smoking or COPD history. However, chest XR showing increased pulmonary congestion compared to 6/23.  -holding off on trial of duonebs due to tachycardia  -ordered lasix 20mg IV x 1, hold PO lasix 20mg dose for now MD called by RN to bedside for -130. Pt seen/examined. Pt denies chest pain, sob, palpitations.    Vital Signs Last 24 Hrs  T(C): 36.6, Max: 37.7 (06-25 @ 16:00)  T(F): 97.8, Max: 99.8 (06-25 @ 16:00)  HR: 97 (86 - 115)  BP: 111/58 (93/59 - 115/56)  BP(mean): --  RR: 20 (18 - 20)  SpO2: 95% (94% - 99%)    On exam, pt appears mildly tachypneic (RR 24 by manual count) but otherwise NAD on 5L NC. Dry MM. On CV exam, tachycardic to about 115-130 bpm (on manual count) with irregular rhythm, lungs with no wheezes but bibasilar crackles, abdomen soft/NTND, extremities WWP (no edema), mentating well A&Ox2 (unchanged).    EKG performed at bedside showing a-fib with RVR ().  Urgent CXR ordered - suggests increased pulmonary congestion compared to 6/23.        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 Pt has been receiving standing lasix 20mg PO once per day for the last few days for HFpEF and standing Eliquis/metoprolol for a-fib.    #A-fib with RVR: as seen on EKG as stated above. Trigger for RVR likely 2/2 pulmonary congestion as seen on chest XR.  -ordered stat metoprolol 2.5mg IV x 1, will consider additional 2.5mg if HR does not improve  -hold AM dose of PO metoprolol for now, hold AM PO dose of metoprolol for now, and re-ordered PO metoprolol 12.5mg BID with hold parameters (BP<90/60 or HR<60)  -c/w Eliquis 2.5mg BID    #Tachypnea: lung exam unchanged from prior, no wheezes heard, no smoking or COPD history. However, chest XR showing increased pulmonary congestion compared to 6/23.  -holding off on trial of duonebs due to tachycardia  -ordered lasix 20mg IV x 1, hold PO lasix 20mg dose for now MD called by RN to bedside for -130. Pt seen/examined. Pt denies chest pain, sob, palpitations.    Vital Signs Last 24 Hrs  T(C): 36.6, Max: 37.7 (06-25 @ 16:00)  T(F): 97.8, Max: 99.8 (06-25 @ 16:00)  HR: 97 (86 - 115)  BP: 111/58 (93/59 - 115/56)  BP(mean): --  RR: 20 (18 - 20)  SpO2: 95% (94% - 99%)    On exam, pt appears mildly tachypneic (RR 24 by manual count) but otherwise NAD on 5L NC. Dry MM. On CV exam, tachycardic to about 115-130 bpm (on manual count) with irregular rhythm, lungs with no wheezes but bibasilar crackles, abdomen soft/NTND, extremities WWP (no edema), mentating well A&Ox2 (unchanged).    EKG performed at bedside showing a-fib with RVR ().  Urgent CXR ordered - suggests increased pulmonary congestion compared to 6/23.        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 Pt has been receiving standing lasix 20mg PO once per day for the last few days for HFpEF and standing Eliquis/metoprolol/amiodarone for a-fib.    #A-fib with RVR: as seen on EKG as stated above. Trigger for RVR likely 2/2 pulmonary congestion as seen on chest XR.  -ordered stat metoprolol 2.5mg IV x 1, HR improved to 100 by 6:30am  -hold AM dose of PO metoprolol for now, hold AM PO dose of metoprolol for now, and re-ordered PO metoprolol 12.5mg BID with hold parameters (BP<90/60 or HR<60)  -c/w Eliquis 2.5mg BID  -c/w amiodarone 200mg daily    #Tachypnea: lung exam unchanged from prior, no wheezes heard, no smoking or COPD history. However, chest XR showing increased pulmonary congestion compared to 6/23. Patient urinating adequately as per RN.  -holding off on trial of duonebs due to tachycardia  -ordered lasix 20mg IV x 1, hold PO lasix 20mg dose for now  -O2 sat occasionally dipping to 91% on 5L NC, will f/up vitals after IV lasix given MD called by RN to bedside for -130. Pt seen/examined. Pt denies chest pain, sob, palpitations.    Vital Signs Last 24 Hrs  T(C): 36.6, Max: 37.7 (06-25 @ 16:00)  T(F): 97.8, Max: 99.8 (06-25 @ 16:00)  HR: 97 (86 - 115)  BP: 111/58 (93/59 - 115/56)  BP(mean): --  RR: 20 (18 - 20)  SpO2: 95% (94% - 99%)    On exam, pt appears mildly tachypneic (RR 24 by manual count) but otherwise NAD on 5L NC. Dry MM. On CV exam, tachycardic to about 115-130 bpm (on manual count) with irregular rhythm, lungs with no wheezes but bibasilar crackles, abdomen soft/NTND, extremities WWP (no edema), mentating well A&Ox2 (unchanged).    EKG performed at bedside showing a-fib with RVR ().  Urgent CXR ordered - suggests increased pulmonary congestion compared to 6/23.        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 Pt has been receiving standing lasix 20mg PO once per day for the last few days for HFpEF and standing Eliquis/metoprolol/amiodarone for a-fib.    #A-fib with RVR: as seen on EKG as stated above. Trigger for RVR likely 2/2 pulmonary congestion as seen on chest XR.  -given stat metoprolol 2.5mg IV x 1, HR improved to 93 by 6:30am  -hold AM dose of PO metoprolol for now, hold AM PO dose of metoprolol for now, and re-ordered PO metoprolol 12.5mg BID with hold parameters (BP<90/60 or HR<60)  -c/w Eliquis 2.5mg BID  -c/w amiodarone 200mg daily    #Tachypnea: lung exam unchanged from prior, no wheezes heard, no smoking or COPD history. However, chest XR showing increased pulmonary congestion compared to 6/23. Patient urinating adequately as per RN.  -holding off on trial of duonebs due to tachycardia  -given lasix 20mg IV x 1, hold PO lasix 20mg dose for now  -O2 sat occasionally dipping to 91% on 5L NC, now 100% on 5L NC as of 6:30am MD called by RN to bedside for -130. Pt seen/examined. Pt denies chest pain, sob, palpitations.    Vital Signs Last 24 Hrs  T(C): 36.6, Max: 37.7 (06-25 @ 16:00)  T(F): 97.8, Max: 99.8 (06-25 @ 16:00)  HR: 97 (86 - 115)  BP: 111/58 (93/59 - 115/56)  BP(mean): --  RR: 20 (18 - 20)  SpO2: 95% (94% - 99%)    On exam, pt appears mildly tachypneic (RR 24 by manual count) but otherwise NAD on 5L NC. Dry MM. On CV exam, tachycardic to about 115-130 bpm (on manual count) with irregular rhythm, lungs with no wheezes but bibasilar crackles, abdomen soft/NTND, extremities WWP (no edema), mentating well A&Ox2 (unchanged).    EKG performed at bedside showing a-fib with RVR ().  Urgent CXR ordered - suggests increased pulmonary congestion compared to 6/23.        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 Pt has been receiving standing lasix 20mg PO once per day for the last few days for HFpEF and standing Eliquis/metoprolol/amiodarone for a-fib.    #A-fib with RVR: as seen on EKG as stated above. Trigger for RVR likely 2/2 pulmonary congestion as seen on chest XR.  -given stat metoprolol 2.5mg IV x 1, HR improved to 93 by 6:30am  -hold AM dose of PO metoprolol for now, and re-ordered PO metoprolol 12.5mg BID with hold parameters (BP<90/60 or HR<60)  -c/w Eliquis 2.5mg BID  -c/w amiodarone 200mg daily    #Tachypnea: lung exam unchanged from prior, no wheezes heard, no smoking or COPD history. However, chest XR showing increased pulmonary congestion compared to 6/23. Patient urinating adequately as per RN.  -holding off on trial of duonebs due to tachycardia  -given lasix 20mg IV x 1, hold PO lasix 20mg dose for now  -O2 sat occasionally dipping to 91% on 5L NC, now 100% on 5L NC as of 6:30am

## 2017-06-27 LAB
ANION GAP SERPL CALC-SCNC: 10 MMOL/L — SIGNIFICANT CHANGE UP (ref 5–17)
BLD GP AB SCN SERPL QL: NEGATIVE — SIGNIFICANT CHANGE UP
BUN SERPL-MCNC: 30 MG/DL — HIGH (ref 7–23)
CALCIUM SERPL-MCNC: 8.5 MG/DL — SIGNIFICANT CHANGE UP (ref 8.4–10.5)
CHLORIDE SERPL-SCNC: 94 MMOL/L — LOW (ref 96–108)
CO2 SERPL-SCNC: 32 MMOL/L — HIGH (ref 22–31)
CREAT SERPL-MCNC: 1 MG/DL — SIGNIFICANT CHANGE UP (ref 0.5–1.3)
GLUCOSE SERPL-MCNC: 88 MG/DL — SIGNIFICANT CHANGE UP (ref 70–99)
HCT VFR BLD CALC: 25.9 % — LOW (ref 34.5–45)
HGB BLD-MCNC: 8.3 G/DL — LOW (ref 11.5–15.5)
MAGNESIUM SERPL-MCNC: 1.8 MG/DL — SIGNIFICANT CHANGE UP (ref 1.6–2.6)
MCHC RBC-ENTMCNC: 29.3 PG — SIGNIFICANT CHANGE UP (ref 27–34)
MCHC RBC-ENTMCNC: 32 G/DL — SIGNIFICANT CHANGE UP (ref 32–36)
MCV RBC AUTO: 91.5 FL — SIGNIFICANT CHANGE UP (ref 80–100)
PHOSPHATE SERPL-MCNC: 3.2 MG/DL — SIGNIFICANT CHANGE UP (ref 2.5–4.5)
PLATELET # BLD AUTO: 86 K/UL — LOW (ref 150–400)
POTASSIUM SERPL-MCNC: 4.5 MMOL/L — SIGNIFICANT CHANGE UP (ref 3.5–5.3)
POTASSIUM SERPL-SCNC: 4.5 MMOL/L — SIGNIFICANT CHANGE UP (ref 3.5–5.3)
RBC # BLD: 2.83 M/UL — LOW (ref 3.8–5.2)
RBC # FLD: 18.9 % — HIGH (ref 10.3–16.9)
RH IG SCN BLD-IMP: POSITIVE — SIGNIFICANT CHANGE UP
SODIUM SERPL-SCNC: 136 MMOL/L — SIGNIFICANT CHANGE UP (ref 135–145)
WBC # BLD: 7.6 K/UL — SIGNIFICANT CHANGE UP (ref 3.8–10.5)
WBC # FLD AUTO: 7.6 K/UL — SIGNIFICANT CHANGE UP (ref 3.8–10.5)

## 2017-06-27 PROCEDURE — 99232 SBSQ HOSP IP/OBS MODERATE 35: CPT

## 2017-06-27 RX ORDER — OXYCODONE HYDROCHLORIDE 5 MG/1
5 TABLET ORAL EVERY 4 HOURS
Qty: 0 | Refills: 0 | Status: DISCONTINUED | OUTPATIENT
Start: 2017-06-27 | End: 2017-07-04

## 2017-06-27 RX ORDER — MEGESTROL ACETATE 40 MG/ML
800 SUSPENSION ORAL DAILY
Qty: 0 | Refills: 0 | Status: DISCONTINUED | OUTPATIENT
Start: 2017-06-28 | End: 2017-07-06

## 2017-06-27 RX ORDER — MORPHINE SULFATE 50 MG/1
0.5 CAPSULE, EXTENDED RELEASE ORAL EVERY 4 HOURS
Qty: 0 | Refills: 0 | Status: DISCONTINUED | OUTPATIENT
Start: 2017-06-27 | End: 2017-07-04

## 2017-06-27 RX ORDER — MAGNESIUM SULFATE 500 MG/ML
2 VIAL (ML) INJECTION ONCE
Qty: 0 | Refills: 0 | Status: COMPLETED | OUTPATIENT
Start: 2017-06-27 | End: 2017-06-27

## 2017-06-27 RX ORDER — MORPHINE SULFATE 50 MG/1
1 CAPSULE, EXTENDED RELEASE ORAL EVERY 4 HOURS
Qty: 0 | Refills: 0 | Status: DISCONTINUED | OUTPATIENT
Start: 2017-06-27 | End: 2017-07-04

## 2017-06-27 RX ADMIN — OXYCODONE HYDROCHLORIDE 5 MILLIGRAM(S): 5 TABLET ORAL at 19:26

## 2017-06-27 RX ADMIN — PREGABALIN 1000 MICROGRAM(S): 225 CAPSULE ORAL at 13:26

## 2017-06-27 RX ADMIN — MEGESTROL ACETATE 625 MILLIGRAM(S): 40 SUSPENSION ORAL at 14:03

## 2017-06-27 RX ADMIN — OXYCODONE HYDROCHLORIDE 5 MILLIGRAM(S): 5 TABLET ORAL at 06:30

## 2017-06-27 RX ADMIN — OXYCODONE HYDROCHLORIDE 5 MILLIGRAM(S): 5 TABLET ORAL at 10:45

## 2017-06-27 RX ADMIN — OXYCODONE HYDROCHLORIDE 5 MILLIGRAM(S): 5 TABLET ORAL at 19:11

## 2017-06-27 RX ADMIN — Medication 20 MILLIGRAM(S): at 19:11

## 2017-06-27 RX ADMIN — OXYCODONE HYDROCHLORIDE 5 MILLIGRAM(S): 5 TABLET ORAL at 07:06

## 2017-06-27 RX ADMIN — APIXABAN 2.5 MILLIGRAM(S): 2.5 TABLET, FILM COATED ORAL at 21:54

## 2017-06-27 RX ADMIN — Medication 50 GRAM(S): at 09:16

## 2017-06-27 RX ADMIN — Medication 25 MILLIGRAM(S): at 07:05

## 2017-06-27 RX ADMIN — ATORVASTATIN CALCIUM 40 MILLIGRAM(S): 80 TABLET, FILM COATED ORAL at 21:54

## 2017-06-27 RX ADMIN — Medication 25 MILLIGRAM(S): at 19:11

## 2017-06-27 RX ADMIN — OXYCODONE HYDROCHLORIDE 5 MILLIGRAM(S): 5 TABLET ORAL at 02:50

## 2017-06-27 RX ADMIN — Medication 20 MILLIGRAM(S): at 07:05

## 2017-06-27 RX ADMIN — LIDOCAINE 1 PATCH: 4 CREAM TOPICAL at 12:00

## 2017-06-27 RX ADMIN — OXYCODONE HYDROCHLORIDE 5 MILLIGRAM(S): 5 TABLET ORAL at 09:16

## 2017-06-27 RX ADMIN — OXYCODONE HYDROCHLORIDE 5 MILLIGRAM(S): 5 TABLET ORAL at 03:16

## 2017-06-27 RX ADMIN — AMIODARONE HYDROCHLORIDE 200 MILLIGRAM(S): 400 TABLET ORAL at 07:05

## 2017-06-27 RX ADMIN — OXYCODONE HYDROCHLORIDE 5 MILLIGRAM(S): 5 TABLET ORAL at 14:04

## 2017-06-27 RX ADMIN — Medication 10 MILLIGRAM(S): at 12:00

## 2017-06-27 RX ADMIN — OXYCODONE HYDROCHLORIDE 5 MILLIGRAM(S): 5 TABLET ORAL at 14:19

## 2017-06-27 RX ADMIN — Medication 81 MILLIGRAM(S): at 13:25

## 2017-06-27 RX ADMIN — APIXABAN 2.5 MILLIGRAM(S): 2.5 TABLET, FILM COATED ORAL at 09:18

## 2017-06-27 NOTE — PROGRESS NOTE ADULT - PROBLEM SELECTOR PLAN 5
Patient s/p CABG. Patient without chest pain. EKG without ischemic changes. EF EF 60-65%.   - c/w home Lipitor 40mg QHS  - c/w ASA   - c/w Metoprolol 12.5 BID, may need to uptitrate   - Medically optimize cardiac function if BP allows   - Continue to monitor I's and O's Patient s/p CABG. Patient without chest pain. EKG without ischemic changes. EF EF 60-65%.   - c/w home Lipitor 40mg QHS  - c/w ASA   - c/w Metoprolol increased to 25mg BID  - Medically optimize cardiac function if BP allows   - Continue to monitor I's and O's

## 2017-06-27 NOTE — PROGRESS NOTE ADULT - SUBJECTIVE AND OBJECTIVE BOX
Chief Complaint/Reason for Consult: CAD  INTERVAL HPI: oob to chair comfortable nad no dyspnea  	  MEDICATIONS:  amiodarone    Tablet 200 milliGRAM(s) Oral daily  furosemide    Tablet 20 milliGRAM(s) Oral two times a day  metoprolol 25 milliGRAM(s) Oral two times a day      guaiFENesin    Syrup 200 milliGRAM(s) Oral every 6 hours PRN    acetaminophen  Suppository 650 milliGRAM(s) Rectal every 6 hours PRN  morphine  - Injectable 0.5 milliGRAM(s) IV Push every 4 hours PRN  morphine  - Injectable 1 milliGRAM(s) IV Push every 4 hours PRN  oxyCODONE Solution 5 milliGRAM(s) Oral every 4 hours  meperidine     Injectable 25 milliGRAM(s) IV Push every 6 hours PRN    bisacodyl Suppository 10 milliGRAM(s) Rectal daily    insulin lispro (HumaLOG) corrective regimen sliding scale   SubCutaneous Before meals and at bedtime  atorvastatin 40 milliGRAM(s) Oral at bedtime  megestrol Suspension 625 milliGRAM(s) Oral daily    lidocaine   Patch 1 Patch Transdermal daily  apixaban 2.5 milliGRAM(s) Oral two times a day  aspirin enteric coated 81 milliGRAM(s) Oral daily  cyanocobalamin Injectable 1000 MICROGram(s) SubCutaneous daily      REVIEW OF SYSTEMS:  [x] As per HPI  CONSTITUTIONAL: No fever, weight loss, or fatigue  RESPIRATORY: No cough, wheezing, chills or hemoptysis; No Shortness of Breath  CARDIOVASCULAR: No chest pain, palpitations, dizziness, or leg swelling  GASTROINTESTINAL: No abdominal or epigastric pain. No nausea, vomiting, or hematemesis; No diarrhea or constipation. No melena or hematochezia.  MUSCULOSKELETAL: No joint pain or swelling; No muscle, back, or extremity pain  [x] All others negative	  [ ] Unable to obtain    PHYSICAL EXAM:  T(C): 36.6 (06-27-17 @ 05:00), Max: 37.1 (06-26-17 @ 12:08)  HR: 96 (06-27-17 @ 05:00) (95 - 107)  BP: 98/51 (06-27-17 @ 05:00) (90/45 - 114/65)  RR: 19 (06-27-17 @ 05:00) (16 - 21)  SpO2: 95% (06-27-17 @ 05:00) (95% - 97%)  Wt(kg): --  I&O's Summary        Appearance: Normal	  HEENT:   Normal oral mucosa  Cardiovascular: Normal S1 S2, No JVD, No murmurs, No edema  Respiratory: Lungs clear to auscultation	  Gastrointestinal:  Soft, Non-tender, + BS	  Extremities: Normal range of motion, No clubbing, cyanosis or edema  Vascular: Peripheral pulses palpable 2+ bilaterally    TELEMETRY: 	    ECG:    	  RADIOLOGY:   CXR:  CT:  US:    CARDIAC TESTING:  Echocardiogram:  Catheterization:  Stress Test:      LABS:	 	    CARDIAC MARKERS:                                  8.3    7.6   )-----------( 86       ( 27 Jun 2017 06:04 )             25.9     06-27    136  |  94<L>  |  30<H>  ----------------------------<  88  4.5   |  32<H>  |  1.00    Ca    8.5      27 Jun 2017 06:04  Phos  3.2     06-27  Mg     1.8     06-27      proBNP:   Lipid Profile:   HgA1c:   TSH:     ASSESSMENT/PLAN: 	    SOB and RVR noted - increase po BB as tolerated continue amio 200 qd for now.    Keep net euvolemic to net negative, strict I/Os

## 2017-06-27 NOTE — CHART NOTE - NSCHARTNOTEFT_GEN_A_CORE
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 Patient started on heparin gtt for atrial fibrillation, and diuresed for pulmonary edema which improved. Zosyn started for urosepsis, E. Coli bacteremia. Despite being on adequate dosing of abx, patient persistently bacteremic, having temp > 103-104, with severe rigors. Chemoport removed by IR on 6/12 and line positive for gram negative rods. Pacemaker thought to be infected as well, however gallium scan (6/16) done which was negative. Patient still spiking fevers w/rigors, disorientation, but otherwise HD stable with baseline SBP high 80- low 90's. Palliative involved in patients care as she was then made DNR/DNI. Patient was stepped down to [facility] on 6/16 and abx were deescalated to Ceftriaxone 1g q24 per ID. Patient's was transitioned from BiPAP to HFNC and respiratory status was stable. For mild tachypnea and air hunger, patient was started on Oxycodone solution per Palliative. On, 6/18, overnight pt spiked to 103 rectally, blood cx drawn, CXR unchanged from am, UA bland. Per ID, abx broadened to Vanc/Zosyn. Patient stepped down to the [facility] on 6/19, and on 6/20 pt spiked a temp of 100 oral and on 6/21 abx changed back to ceftriaxone as per ID recs, in order to complete a 10days course given sensitivity. Pt went back into some mild pulmonary congestion which was noticed on cxr done during fever workup, and got diuresis with improvement. Due to fluid overload patient was tachy and with decreased O2Sat (low 90's), however due to concerns for PE, patient had VQ scan (CTA deferred given contrast allergy) which was negative on 6/26. Patient continued to be diuresed and PO lasix increased to continue upon discharge. Patient has been afebrile for almost 4 days and is euvolemic.    Blood cx (6/15) NGTD  Blood cx (6/18) NGTD   Blood cx (6/20) NGTD

## 2017-06-27 NOTE — PROGRESS NOTE ADULT - SUBJECTIVE AND OBJECTIVE BOX
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 Patients family at bedside states she's been more lethargic with associated lower extremity swelling, SOB, decreased appetite and diarrhea. Patient with previous admissions for symptomatic hyperglycemia in January. Patient currently seeing Dr. Galaviz on a regular basis for chemotherapy treatment. Last treatment on May 23rd for which she received Bendamustine and Rituximab. Patient denies any headaches, chest pain, abdominal pain, N/V, dysuria, or bowel changes.     Upon arrival to the ED, patient in moderate respiratory distress using accessory muscles, breathing w/ a RR of 30, O2 93% on room air. Patient placed on BIPAP with slight improvement in respiratory status. Patient febrile to 101 with a HR of 77. Labs significant for AG metabolic acidosis, K 7.0, Na 127, BUN 58, Cr. 2.0 (baseline < 0.9 January), Lactate 2.8. CXR done showing RLL infiltrate with pulmonary vascular congestion. In the ED, patient received Vancomycin, Zosyn, Tylenol, Calcium Gluconate 2g, Insulin 5u, Albuterol neb 5mg x 2. ICU consulted, will be transferred to MICU for management of severe sepsis 2/2 HAP and pulmonary congestion. (10 Ramon 2017 19:13)    FAMILY HISTORY:  No pertinent family history in first degree relatives    MEDICATIONS  (STANDING):  amiodarone    Tablet 200 milliGRAM(s) Oral daily  insulin lispro (HumaLOG) corrective regimen sliding scale   SubCutaneous Before meals and at bedtime  atorvastatin 40 milliGRAM(s) Oral at bedtime  bisacodyl Suppository 10 milliGRAM(s) Rectal daily  lidocaine   Patch 1 Patch Transdermal daily  apixaban 2.5 milliGRAM(s) Oral two times a day  aspirin enteric coated 81 milliGRAM(s) Oral daily  cyanocobalamin Injectable 1000 MICROGram(s) SubCutaneous daily  furosemide    Tablet 20 milliGRAM(s) Oral two times a day  metoprolol 25 milliGRAM(s) Oral two times a day  oxyCODONE Solution 5 milliGRAM(s) Oral every 4 hours    MEDICATIONS  (PRN):  acetaminophen  Suppository 650 milliGRAM(s) Rectal every 6 hours PRN For Temp greater than 38 C (100.4 F)  guaiFENesin    Syrup 200 milliGRAM(s) Oral every 6 hours PRN Cough  meperidine     Injectable 25 milliGRAM(s) IV Push every 6 hours PRN Rigors  morphine  - Injectable 1 milliGRAM(s) IV Push every 4 hours PRN Excessive work of breathing / RR>35 / Resp distress / severe pain (7-10)  morphine  - Injectable 0.5 milliGRAM(s) IV Push every 4 hours PRN Dyspnea / Air hunger / Increased work of breathing / RR>25 / moderate pain (4-6)    Vital Signs Last 24 Hrs  T(C): 37.2 (27 Jun 2017 20:50), Max: 37.2 (27 Jun 2017 20:50)  T(F): 98.9 (27 Jun 2017 20:50), Max: 98.9 (27 Jun 2017 20:50)  HR: 96 (27 Jun 2017 20:50) (96 - 98)  BP: 110/63 (27 Jun 2017 20:50) (98/51 - 111/57)  BP(mean): --  RR: 18 (27 Jun 2017 20:50) (18 - 20)  SpO2: 96% (27 Jun 2017 20:50) (95% - 96%)    Physical exam:    Overall impression  Lymphadenopathy  Liver  spleen    Labs:  CBC Full  -  ( 27 Jun 2017 06:04 )  WBC Count : 7.6 K/uL  Hemoglobin : 8.3 g/dL  Hematocrit : 25.9 %  Platelet Count - Automated : 86 K/uL  Mean Cell Volume : 91.5 fL  Mean Cell Hemoglobin : 29.3 pg  Mean Cell Hemoglobin Concentration : 32.0 g/dL  Auto Neutrophil # : x  Auto Lymphocyte # : x  Auto Monocyte # : x  Auto Eosinophil # : x  Auto Basophil # : x  Auto Neutrophil % : x  Auto Lymphocyte % : x  Auto Monocyte % : x  Auto Eosinophil % : x  Auto Basophil % : x    06-27    136  |  94<L>  |  30<H>  ----------------------------<  88  4.5   |  32<H>  |  1.00    Ca    8.5      27 Jun 2017 06:04  Phos  3.2     06-27  Mg     1.8     06-27        Radiology:  HEALTH ISSUES - R/O PROBLEM Dx:      Assessmant / Problems  1) Sepsis under control  2) NHL remission  3)Breast ca stable  4) patient is being prepared for Rehab nursing home    Plan:    Thank you  Neha Galaviz MD

## 2017-06-27 NOTE — PROGRESS NOTE ADULT - SUBJECTIVE AND OBJECTIVE BOX
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 Patients family at bedside states she's been more lethargic with associated lower extremity swelling, SOB, decreased appetite and diarrhea. Patient with previous admissions for symptomatic hyperglycemia in January. Patient currently seeing Dr. Galaviz on a regular basis for chemotherapy treatment. Last treatment on May 23rd for which she received Bendamustine and Rituximab. Patient denies any headaches, chest pain, abdominal pain, N/V, dysuria, or bowel changes.         INTERVAL HPI/OVERNIGHT EVENTS:::comfortable.    HEALTH ISSUES - PROBLEM Dx:  Sepsis due to Escherichia coli: Sepsis due to Escherichia coli  Anemia: Anemia  Hyponatremia: Hyponatremia  Acute pulmonary edema: Acute pulmonary edema  Need for prophylactic measure: Need for prophylactic measure  Nutrition, metabolism, and development symptoms: Nutrition, metabolism, and development symptoms  Anemia, unspecified type: Anemia, unspecified type  Malignant neoplasm of female breast, unspecified laterality, unspecified site of breast: Malignant neoplasm of female breast, unspecified laterality, unspecified site of breast  Diffuse large B-cell lymphoma, unspecified body region: Diffuse large B-cell lymphoma, unspecified body region  Coronary artery disease involving native coronary artery of native heart without angina pectoris: Coronary artery disease involving native coronary artery of native heart without angina pectoris  Paroxysmal atrial fibrillation: Paroxysmal atrial fibrillation  Acute respiratory failure with hypoxia: Acute respiratory failure with hypoxia  Sepsis: Sepsis  Lymphoma: Lymphoma  Severe sepsis: Severe sepsis  Central venous line infection, initial encounter: Central venous line infection, initial encounter  E coli bacteremia: E coli bacteremia  Medical orders for life-sustaining treatment (MOLST) form in chart: Medical orders for life-sustaining treatment (MOLST) form in chart  DNR (do not resuscitate): DNR (do not resuscitate)  Chronic back pain: Chronic back pain  Dyspnea and respiratory abnormalities: Dyspnea and respiratory abnormalities  Goals of care, counseling/discussion: Goals of care, counseling/discussion  Patient has healthcare proxy: Patient has healthcare proxy  Moderate protein-calorie malnutrition: Moderate protein-calorie malnutrition  Impaired mobility and activities of daily living: Impaired mobility and activities of daily living  Full code status: Full code status  Palliative care encounter: Palliative care encounter  Breast cancer: Breast cancer  Diffuse large B cell lymphoma: Diffuse large B cell lymphoma  Bacteremia due to Gram-negative bacteria: Bacteremia due to Gram-negative bacteria  Shortness of breath: Shortness of breath  Hyperkalemia: Hyperkalemia  ROMEO (acute kidney injury): ROMEO (acute kidney injury)          PAST MEDICAL & SURGICAL HISTORY:  Scoliosis  Follicular lymphoma  Neck mass  Afib  Other hyperlipidemia  Breast cancer  CAD (coronary artery disease)  Diabetes  HTN (hypertension)  H/O abdominal hysterectomy  H/O thyroidectomy  S/P CABG x 3          Consultant NOTE  REVIEWED  ( ++  )    REVIEW OF SYSTEMS:  [x] As per HPI  CONSTITUTIONAL: weakness  RESPIRATORY: no cough  CARDIOVASCULAR: No chest pain, palpitations, dizziness, or leg swelling  GASTROINTESTINAL: No abdominal or epigastric pain. No nausea, vomiting, or hematemesis; No diarrhea or constipation. No melena or hematochezia.  MUSCULOSKELETAL:weakness  PSYCH    awake, alert       [x] All others negative	  [ ] Unable to obtain          Vital Signs Last 24 Hrs  T(C): 36.6 (27 Jun 2017 05:00), Max: 37.1 (26 Jun 2017 12:08)  T(F): 97.9 (27 Jun 2017 05:00), Max: 98.7 (26 Jun 2017 12:08)  HR: 96 (27 Jun 2017 05:00) (95 - 110)  BP: 98/51 (27 Jun 2017 05:00) (90/45 - 114/65)  BP(mean): --  RR: 19 (27 Jun 2017 05:00) (16 - 23)  SpO2: 95% (27 Jun 2017 05:00) (95% - 97%)        PHYSICAL EXAMINATION:                                    (    )  NO CHANGE  Appearance: Normal	  HEENT:   Normal oral mucosa, PERRL, EOMI	  Neck: Supple, + JVD/ - JVD; Carotid Bruit   Cardiovascular: Normal S1 S2, No JVD,   Respiratory: Lungs clear to auscultation/Decreased Breath Sounds/No Rales, Rhonchi, Wheezing	  Gastrointestinal:  Soft, Non-tender, + BS	  Skin: No rashes, No ecchymoses, No cyanosis  Extremities: Normal range of motion, No clubbing, cyanosis or edema  Neurologic: Non-focal  Psychiatry: A    amiodarone    Tablet 200 milliGRAM(s) Oral daily  insulin lispro (HumaLOG) corrective regimen sliding scale   SubCutaneous Before meals and at bedtime  atorvastatin 40 milliGRAM(s) Oral at bedtime  acetaminophen  Suppository 650 milliGRAM(s) Rectal every 6 hours PRN  bisacodyl Suppository 10 milliGRAM(s) Rectal daily  lidocaine   Patch 1 Patch Transdermal daily  guaiFENesin    Syrup 200 milliGRAM(s) Oral every 6 hours PRN  apixaban 2.5 milliGRAM(s) Oral two times a day  aspirin enteric coated 81 milliGRAM(s) Oral daily  morphine  - Injectable 0.5 milliGRAM(s) IV Push every 4 hours PRN  morphine  - Injectable 1 milliGRAM(s) IV Push every 4 hours PRN  cyanocobalamin Injectable 1000 MICROGram(s) SubCutaneous daily  megestrol Suspension 625 milliGRAM(s) Oral daily  oxyCODONE Solution 5 milliGRAM(s) Oral every 4 hours  furosemide    Tablet 20 milliGRAM(s) Oral two times a day  meperidine     Injectable 25 milliGRAM(s) IV Push every 6 hours PRN  metoprolol 25 milliGRAM(s) Oral two times a day                                      8.3    7.6   )-----------( 86       ( 27 Jun 2017 06:04 )             25.9     06-27    136  |  94<L>  |  30<H>  ----------------------------<  88  4.5   |  32<H>  |  1.00    Ca    8.5      27 Jun 2017 06:04  Phos  3.2     06-27  Mg     1.8     06-27        CAPILLARY BLOOD GLUCOSE  107 (27 Jun 2017 09:07)  189 (26 Jun 2017 23:00)  110 (26 Jun 2017 19:08)  112 (26 Jun 2017 13:00)    < from: Xray Chest 1 View AP- PORTABLE-Urgent (06.26.17 @ 05:27) >  DINGS: Left chest wall cardiac pacer with the lead overlying the   region of right atrium and right ventricle. Interval increase in   pulmonary congestion and reticular pulmonary opacities. No pneumothorax.   Again right hemidiaphragm is elevated. Stable cardiomediastinal   silhouette. Sternotomy wires and surgical clips overlying mediastinum.   Generalized osteopenia.     IMPRESSION:  Worsening pulmonary congestion.      < end of copied text >  < from: Xray Chest 1 View AP- PORTABLE-Urgent (06.26.17 @ 05:27) >  DINGS: Left chest wall cardiac pacer with the lead overlying the   region of right atrium and right ventricle. Interval increase in   pulmonary congestion and reticular pulmonary opacities. No pneumothorax.   Again right hemidiaphragm is elevated. Stable cardiomediastinal   silhouette. Sternotomy wires and surgical clips overlying mediastinum.   Generalized osteopenia.     IMPRESSION:  Worsening pulmonary congestion.      < end of copied text >

## 2017-06-27 NOTE — PROGRESS NOTE ADULT - PROBLEM SELECTOR PLAN 10
- SCD's  - Eliquis
- SCD's  - Heparin SubQ and Eliquis
Patient symptoms improved and clarified plan of care. Palliative medicine will sign off for now. Please reconsult if the patients symptoms change and need to be reassessed, the patients goals of care need to be readdressed based on clinical changes, or if patient is readmitted in the future.
Support provided to patient and family. Patient to have access to supportive services during rest of hospital stay as the patient/family deemed necessary ie. Chaplaincy, Massage therapy, Music therapy, Patient and family supportive services, Palliative SW, etc. Chaplaincy contacted and will visit patient later today. Plan for communion once patient is able to participate.
Support provided to patient and family. Patient to have access to supportive services during rest of hospital stay as the patient/family deemed necessary ie. Chaplaincy, Massage therapy, Music therapy, Patient and family supportive services, Palliative SW, etc. Chaplaincy contacted and will visit patient later today. Plan for communion once patient is able to participate.
Support provided to patient and family. Patient to have access to supportive services during rest of hospital stay as the patient/family deemed necessary ie. Chaplaincy, Massage therapy, Music therapy, Patient and family supportive services, Palliative SW, etc. Chaplaincy has visited with patient.
- SCD's  - Eliquis
Support provided to patient and family. Patient to have access to supportive services during rest of hospital stay as the patient/family deemed necessary ie. Chaplaincy, Massage therapy, Music therapy, Patient and family supportive services, Palliative SW, etc. Chaplaincy contacted and will visit patient later today. Plan for communion once patient is able to participate.
- SCD's  - Heparin SubQ and Eliquis

## 2017-06-27 NOTE — PROGRESS NOTE ADULT - SUBJECTIVE AND OBJECTIVE BOX
INTERVAL HPI/OVERNIGHT EVENTS:  - ON: TEJINDER  - S: patient states feeling much better, has no complaints    VITAL SIGNS:  T(F): 97.4 (06-27-17 @ 15:27)  HR: 98 (06-27-17 @ 15:27)  BP: 111/57 (06-27-17 @ 15:27)  RR: 20 (06-27-17 @ 15:27)  SpO2: 96% (06-27-17 @ 15:27)  Wt(kg): --    PHYSICAL EXAM:  Constitutional: ill appearing laying down in no distress  Eyes: EOMI  ENMT: dry MM  Respiratory: mild bibasilar crackles, otherwise clear to auscultation  Cardiovascular: irregularly irregular, no M/R/G appreciated  Gastrointestinal: normoactive bowel sounds, abdomen soft, NTND  Extremities:  Vascular:  Neurological:  Musculoskeletal:    MEDICATIONS  (STANDING):  amiodarone    Tablet 200 milliGRAM(s) Oral daily  insulin lispro (HumaLOG) corrective regimen sliding scale   SubCutaneous Before meals and at bedtime  atorvastatin 40 milliGRAM(s) Oral at bedtime  bisacodyl Suppository 10 milliGRAM(s) Rectal daily  lidocaine   Patch 1 Patch Transdermal daily  apixaban 2.5 milliGRAM(s) Oral two times a day  aspirin enteric coated 81 milliGRAM(s) Oral daily  cyanocobalamin Injectable 1000 MICROGram(s) SubCutaneous daily  furosemide    Tablet 20 milliGRAM(s) Oral two times a day  metoprolol 25 milliGRAM(s) Oral two times a day  oxyCODONE Solution 5 milliGRAM(s) Oral every 4 hours    MEDICATIONS  (PRN):  acetaminophen  Suppository 650 milliGRAM(s) Rectal every 6 hours PRN For Temp greater than 38 C (100.4 F)  guaiFENesin    Syrup 200 milliGRAM(s) Oral every 6 hours PRN Cough  meperidine     Injectable 25 milliGRAM(s) IV Push every 6 hours PRN Rigors  morphine  - Injectable 1 milliGRAM(s) IV Push every 4 hours PRN Excessive work of breathing / RR>35 / Resp distress / severe pain (7-10)  morphine  - Injectable 0.5 milliGRAM(s) IV Push every 4 hours PRN Dyspnea / Air hunger / Increased work of breathing / RR>25 / moderate pain (4-6)    Allergies  IV CONtRAST (Flushing (Skin); Rash)  No Known Drug Allergies    LABS:                        8.3    7.6   )-----------( 86       ( 27 Jun 2017 06:04 )             25.9     06-27    136  |  94<L>  |  30<H>  ----------------------------<  88  4.5   |  32<H>  |  1.00    Ca    8.5      27 Jun 2017 06:04  Phos  3.2     06-27  Mg     1.8     06-27 INTERVAL HPI/OVERNIGHT EVENTS:  - ON: TEJINDER  - S: patient states feeling much better, has no complaints    VITAL SIGNS:  T(F): 97.4 (06-27-17 @ 15:27)  HR: 98 (06-27-17 @ 15:27)  BP: 111/57 (06-27-17 @ 15:27)  RR: 20 (06-27-17 @ 15:27)  SpO2: 96% (06-27-17 @ 15:27)  Wt(kg): --    PHYSICAL EXAM:  Constitutional: ill appearing laying down in no distress  Eyes: EOMI  ENMT: dry MM  Respiratory: mild bibasilar crackles, otherwise clear to auscultation  Cardiovascular: irregularly irregular, no M/R/G appreciated  Gastrointestinal: normoactive bowel sounds, abdomen soft, NTND  Extremities: symmetric, trace LE edema  Vascular: + peripheral pulses  Neurological: A&Ox2  Musculoskeletal: no joint swelling    MEDICATIONS  (STANDING):  amiodarone    Tablet 200 milliGRAM(s) Oral daily  insulin lispro (HumaLOG) corrective regimen sliding scale   SubCutaneous Before meals and at bedtime  atorvastatin 40 milliGRAM(s) Oral at bedtime  bisacodyl Suppository 10 milliGRAM(s) Rectal daily  lidocaine   Patch 1 Patch Transdermal daily  apixaban 2.5 milliGRAM(s) Oral two times a day  aspirin enteric coated 81 milliGRAM(s) Oral daily  cyanocobalamin Injectable 1000 MICROGram(s) SubCutaneous daily  furosemide    Tablet 20 milliGRAM(s) Oral two times a day  metoprolol 25 milliGRAM(s) Oral two times a day  oxyCODONE Solution 5 milliGRAM(s) Oral every 4 hours    MEDICATIONS  (PRN):  acetaminophen  Suppository 650 milliGRAM(s) Rectal every 6 hours PRN For Temp greater than 38 C (100.4 F)  guaiFENesin    Syrup 200 milliGRAM(s) Oral every 6 hours PRN Cough  meperidine     Injectable 25 milliGRAM(s) IV Push every 6 hours PRN Rigors  morphine  - Injectable 1 milliGRAM(s) IV Push every 4 hours PRN Excessive work of breathing / RR>35 / Resp distress / severe pain (7-10)  morphine  - Injectable 0.5 milliGRAM(s) IV Push every 4 hours PRN Dyspnea / Air hunger / Increased work of breathing / RR>25 / moderate pain (4-6)    Allergies  IV CONtRAST (Flushing (Skin); Rash)  No Known Drug Allergies    LABS:                        8.3    7.6   )-----------( 86       ( 27 Jun 2017 06:04 )             25.9     06-27    136  |  94<L>  |  30<H>  ----------------------------<  88  4.5   |  32<H>  |  1.00    Ca    8.5      27 Jun 2017 06:04  Phos  3.2     06-27  Mg     1.8     06-27

## 2017-06-27 NOTE — PROGRESS NOTE ADULT - PROBLEM SELECTOR PLAN 9
F: no IVF  E: replete lytes prn  N: dysphagia diet    DNR/DNI, limited medical interventions per MOLST  Palliative following: Currently patient's family is accepting of TRAVIS near their [place] home when she is fully weaned off HFNC. Oxycodone solution has been prescribed to help with tachypnea and air hunger.
Completed MOLST after discussion with the patients  and primary team. Original in the chart, to leave with patient once discharged.
Completed MOLST after discussion with the patients  and primary team. Original in the chart, to leave with patient once discharged.
Completed MOLST yesterday after discussion with the patients  and primary team. Original in the chart, to leave with patient once discharged.
Completed MOLST, original in the chart, to leave with patient once discharged.
F: no IVF  E: replete lytes prn  N: dysphagia diet    DNR/DNI, limited medical interventions per MOLST  Palliative following: Currently patient's family is accepting of TRAVIS near their [place] home when she is fully weaned off HFNC. Oxycodone solution has been prescribed to help with tachypnea and air hunger.
F: no IVF  E: replete lytes prn  N: dysphagia diet    DNR/DNI, limited medical interventions per MOLST  Palliative following: Currently patient's family is accepting of TRAVIS near their [place] home when she is fully weaned off HFNC. Oxycodone solution has been prescribed to help with tachypnea and air hunger.
F: no IVF  E: replete lytes prn  N: dysphagia diet    DNR/DNI, limited medical interventions per MOLST  Palliative following: Currently patient's family is accepting of TRAVIS near their [place] home when she is fully weaned off HFNC. Oxycodone solution has been prescribed to help with tachypnea and air hunger.
F: no IVF  E: replete lytes prn  N: dysphagia diet    DNR/DNI, limited medical interventions per MOLST  Palliative following: Currently patient's family is accepting of TRAVIS near their [place] home when she is fully weaned off HFNC. Oxycodone solution has been prescribed to help with tachypnea and air hunger.
F: no IVF  E: replete lytes prn  N: dysphagia diet    DNR/DNI, limited medical interventions per MOLST  Palliative following: Currently patient's family is accepting of TRAVIS near their [place] home when she is fully weaned off HFNC. Oxycodone solution has been prescribed to help with tachypnea and air hunger.
F: no IVF  E: replete lytes prn  N: dysphagia diet    DNR/DNI, limited medical interventions per MOLST  Palliative following: Currently patient's family is accepting of TRAVIS near their [place] home when she is fully weaned off HFNC. Oxycodone solution has been prescribed to help with tachypnea and air hunger.
F: no IVF  E: replete lytes prn  N: dysphagia diet    DNR/DNI, limited medical interventions per MOLST  Palliative following: Currently patient's family is accepting of TRAVIS near their [place] home when she is fully weaned off HFNC. Oxycodone solution has been prescribed to help with tachypnea and air hunger.
F: no IVF  E: replete lytes prn  N: dysphagia diet    DNR/DNI, limited medical interventions per MOLST  Palliative following: Currently patient's family is accepting of TRAVIS near their [place] home when she is weaned off HFNC. Oxycodone solution has been prescribed to help with tachypnea and air hunger.
F: no IVF  E: replete lytes prn  N: dysphagia diet    DNR/DNI, limited medical interventions per MOLST  Palliative following: Currently patient's family is accepting of TRAVIS near their [place] home when she is fully weaned off HFNC. Oxycodone solution has been prescribed to help with tachypnea and air hunger.
F: no IVF  E: replete lytes prn  N: dysphagia diet    DNR/DNI, limited medical interventions per MOLST  Palliative following: Currently patient's family is accepting of TRAVIS near their [place] home when she is fully weaned off HFNC. Oxycodone solution has been prescribed to help with tachypnea and air hunger.
F: no IVF  E: replete lytes prn  N: dysphagia diet    DNR/DNI, limited medical interventions per MOLST  Palliative following: Currently patient's family is accepting of TRAVIS near their [place] home when she is fully weaned off HFNC. Oxycodone solution has been prescribed to help with tachypnea and air hunger.
F: no IVF  E: replete lytes prn  N: dysphagia diet    DNR/DNI, limited medical interventions per MOLST  Palliative following: Currently patient's family is accepting of TRAVIS near their [place] home when she is fully weaned off HFNC. Oxycodone solution has been prescribed to help with tachypnea and air hunger.
Completed MOLST yesterday after discussion with the patients  and primary team. Original in the chart, to leave with patient once discharged.
F: no IVF  E: replete lytes prn  N: dysphagia diet    DNR/DNI, limited medical interventions per MOLST  Palliative following: Currently patient's family is accepting of TRAVIS near their [place] home when she is weaned off HFNC. Oxycodone solution has been prescribed to help with tachypnea and air hunger.
F: no IVF  E: replete lytes prn  N: dysphagia diet    DNR/DNI, limited medical interventions per MOLST  Palliative following: Currently patient's family is accepting of TRAVIS near their [place] home when she is weaned off HFNC. Oxycodone solution has been prescribed to help with tachypnea and air hunger.

## 2017-06-27 NOTE — PROGRESS NOTE ADULT - PROBLEM SELECTOR PROBLEM 10
Need for prophylactic measure
Palliative care encounter
Need for prophylactic measure
Palliative care encounter
Need for prophylactic measure

## 2017-06-27 NOTE — PROGRESS NOTE ADULT - PROBLEM SELECTOR PLAN 2
Improved. Initially requiring BIPAP and HFNC 2/2 pulmonary congestion/edema likely due to CHF exacerbation in the setting of urosepsis and line sepsis. Now tolerating regular NC. VQ done which was negative for PE.  - C/w nasal cannula  - Oxycodone 5mg solution for air hunger, increased work of breathing, comfort

## 2017-06-27 NOTE — PROGRESS NOTE ADULT - PROBLEM SELECTOR PLAN 8
Drop in Hg by 2 points on 6/11, s/p 1 unit of PRBC. Uric acid wnl (does not suggest tumor lysis). CT A/P negative for RP bleed. Hgb stable now.  - Will continue to monitor  - Transfuse for Hg <8.   - Of note, patient received 2 units of platelets on 6/12 for warfarin reversal for INR of 1.5-1.8 on 6/12.
Confirmed with patients  and patients.  Also DNI.
Drop in Hg by 2 points on 6/11, s/p 1 unit of PRBC. Uric acid wnl (does not suggest tumor lysis). CT A/P negative for RP bleed.   - Will continue to monitor  - Transfuse for Hg <8.   - Gave one unit of PRBC today.    Of note, patient received 2 units of platelets on 6/12 for warfarin reversal for INR of 1.5-1.8 on 6/12.
Drop in Hg by 2 points on 6/11, s/p 1 unit of PRBC. Uric acid wnl (does not suggest tumor lysis). CT A/P negative for RP bleed.   - Will continue to monitor  - Transfuse for Hg <8.   - Gave one unit of PRBC yesterday.    Of note, patient received 2 units of platelets on 6/12 for warfarin reversal for INR of 1.5-1.8 on 6/12.
Drop in Hg by 2 points on 6/11, s/p 1 unit of PRBC. Uric acid wnl (does not suggest tumor lysis). CT A/P negative for RP bleed.   - Will continue to monitor  - Transfuse for Hg <8.   - Gave one unit of PRBC yesterday.    Of note, patient received 2 units of platelets on 6/12 for warfarin reversal for INR of 1.5-1.8 on 6/12.
Drop in Hg by 2 points on 6/11, s/p 1 unit of PRBC. Uric acid wnl (does not suggest tumor lysis). CT A/P negative for RP bleed.   - Will continue to monitor  - Transfuse for Hg <8. However, since patient febrile, holding off transfusion today.    Of note, patient received 2 units of platelets on 6/12 for warfarin reversal for INR of 1.5-1.8 on 6/12.
Drop in Hg by 2 points on 6/11, s/p 1 unit of PRBC. Uric acid wnl (does not suggest tumor lysis). CT A/P negative for RP bleed. Hgb stable now.  - Will continue to monitor  - Transfuse for Hg <8.   - Of note, patient received 2 units of platelets on 6/12 for warfarin reversal for INR of 1.5-1.8 on 6/12.
stable
Drop in Hg by 2 points on 6/11, s/p 1 unit of PRBC. Uric acid wnl (does not suggest tumor lysis). CT A/P negative for RP bleed. Hgb stable now.  - Will continue to monitor  - Transfuse for Hg <8.   - Of note, patient received 2 units of platelets on 6/12 for warfarin reversal for INR of 1.5-1.8 on 6/12.
Drop in Hg by 2 points on 6/11, s/p 1 unit of PRBC. Uric acid wnl (does not suggest tumor lysis). CT A/P negative for RP bleed.   - Will continue to monitor  - Transfuse for Hg <8.   - Gave one unit of PRBC today.    Of note, patient received 2 units of platelets on 6/12 for warfarin reversal for INR of 1.5-1.8 on 6/12.
Drop in Hg by 2 points on 6/11, s/p 1 unit of PRBC. Uric acid wnl (does not suggest tumor lysis). CT A/P negative for RP bleed.   - Will continue to monitor  - Transfuse for Hg <8. However, since patient febrile, holding off transfusion today.    Of note, patient received 2 units of platelets on 6/12 for warfarin reversal for INR of 1.5-1.8 on 6/12.
Confirmed yesterday with patients  and patients adult children.  Also DNI.
Drop in Hg by 2 points on 6/11, s/p 1 unit of PRBC. Uric acid wnl (does not suggest tumor lysis). CT A/P negative for RP bleed.   - Will continue to monitor   Transfuse for Hg <8    Also, patient received 2 units of platelets on 6/12 for warfarin reversal for INR of 1.5-1.8 on 6/12.

## 2017-06-28 LAB
ANION GAP SERPL CALC-SCNC: 10 MMOL/L — SIGNIFICANT CHANGE UP (ref 5–17)
ANISOCYTOSIS BLD QL: SLIGHT — SIGNIFICANT CHANGE UP
BUN SERPL-MCNC: 29 MG/DL — HIGH (ref 7–23)
CALCIUM SERPL-MCNC: 8.8 MG/DL — SIGNIFICANT CHANGE UP (ref 8.4–10.5)
CHLORIDE SERPL-SCNC: 93 MMOL/L — LOW (ref 96–108)
CO2 SERPL-SCNC: 33 MMOL/L — HIGH (ref 22–31)
CREAT SERPL-MCNC: 1 MG/DL — SIGNIFICANT CHANGE UP (ref 0.5–1.3)
CULTURE RESULTS: SIGNIFICANT CHANGE UP
CULTURE RESULTS: SIGNIFICANT CHANGE UP
DOHLE BOD BLD QL SMEAR: SIGNIFICANT CHANGE UP
EOSINOPHIL NFR BLD AUTO: 2 % — SIGNIFICANT CHANGE UP (ref 0–6)
GIANT PLATELETS BLD QL SMEAR: PRESENT — SIGNIFICANT CHANGE UP
GLUCOSE SERPL-MCNC: 93 MG/DL — SIGNIFICANT CHANGE UP (ref 70–99)
HCT VFR BLD CALC: 29.5 % — LOW (ref 34.5–45)
HGB BLD-MCNC: 9.3 G/DL — LOW (ref 11.5–15.5)
HYPOCHROMIA BLD QL: SLIGHT — SIGNIFICANT CHANGE UP
LG PLATELETS BLD QL AUTO: PRESENT — SIGNIFICANT CHANGE UP
LYMPHOCYTES # BLD AUTO: 22 % — SIGNIFICANT CHANGE UP (ref 13–44)
MACROCYTES BLD QL: SLIGHT — SIGNIFICANT CHANGE UP
MAGNESIUM SERPL-MCNC: 2 MG/DL — SIGNIFICANT CHANGE UP (ref 1.6–2.6)
MANUAL DIF COMMENT BLD-IMP: SIGNIFICANT CHANGE UP
MANUAL SMEAR VERIFICATION: SIGNIFICANT CHANGE UP
MCHC RBC-ENTMCNC: 29.6 PG — SIGNIFICANT CHANGE UP (ref 27–34)
MCHC RBC-ENTMCNC: 31.5 G/DL — LOW (ref 32–36)
MCV RBC AUTO: 93.9 FL — SIGNIFICANT CHANGE UP (ref 80–100)
METAMYELOCYTES # FLD: 4 % — HIGH
MICROCYTES BLD QL: SLIGHT — SIGNIFICANT CHANGE UP
MONOCYTES NFR BLD AUTO: 14 % — SIGNIFICANT CHANGE UP (ref 2–14)
NEUTROPHILS NFR BLD AUTO: 31 % — LOW (ref 43–77)
NEUTS BAND # BLD: 27 % — HIGH
NRBC # BLD: 1 /100 WBCS — HIGH
OVALOCYTES BLD QL SMEAR: SLIGHT — SIGNIFICANT CHANGE UP
PHOSPHATE SERPL-MCNC: 3 MG/DL — SIGNIFICANT CHANGE UP (ref 2.5–4.5)
PLAT MORPH BLD: (no result)
PLATELET # BLD AUTO: 107 K/UL — LOW (ref 150–400)
POIKILOCYTOSIS BLD QL AUTO: SLIGHT — SIGNIFICANT CHANGE UP
POLYCHROMASIA BLD QL SMEAR: SLIGHT — SIGNIFICANT CHANGE UP
POTASSIUM SERPL-MCNC: 4.4 MMOL/L — SIGNIFICANT CHANGE UP (ref 3.5–5.3)
POTASSIUM SERPL-SCNC: 4.4 MMOL/L — SIGNIFICANT CHANGE UP (ref 3.5–5.3)
RBC # BLD: 3.14 M/UL — LOW (ref 3.8–5.2)
RBC # FLD: 19.2 % — HIGH (ref 10.3–16.9)
RBC BLD AUTO: (no result)
SCHISTOCYTES BLD QL AUTO: SIGNIFICANT CHANGE UP
SODIUM SERPL-SCNC: 136 MMOL/L — SIGNIFICANT CHANGE UP (ref 135–145)
SPECIMEN SOURCE: SIGNIFICANT CHANGE UP
SPECIMEN SOURCE: SIGNIFICANT CHANGE UP
SPHEROCYTES BLD QL SMEAR: SLIGHT — SIGNIFICANT CHANGE UP
STOMATOCYTES BLD QL SMEAR: SLIGHT — SIGNIFICANT CHANGE UP
TOXIC GRANULES BLD QL SMEAR: SLIGHT — SIGNIFICANT CHANGE UP
WBC # BLD: 14.8 K/UL — HIGH (ref 3.8–10.5)
WBC # FLD AUTO: 14.8 K/UL — HIGH (ref 3.8–10.5)

## 2017-06-28 PROCEDURE — 71010: CPT | Mod: 26

## 2017-06-28 PROCEDURE — 99232 SBSQ HOSP IP/OBS MODERATE 35: CPT

## 2017-06-28 PROCEDURE — 99232 SBSQ HOSP IP/OBS MODERATE 35: CPT | Mod: GC

## 2017-06-28 RX ORDER — HALOPERIDOL DECANOATE 100 MG/ML
1 INJECTION INTRAMUSCULAR ONCE
Qty: 0 | Refills: 0 | Status: COMPLETED | OUTPATIENT
Start: 2017-06-28 | End: 2017-06-28

## 2017-06-28 RX ORDER — SODIUM CHLORIDE 9 MG/ML
250 INJECTION INTRAMUSCULAR; INTRAVENOUS; SUBCUTANEOUS ONCE
Qty: 0 | Refills: 0 | Status: COMPLETED | OUTPATIENT
Start: 2017-06-28 | End: 2017-06-28

## 2017-06-28 RX ADMIN — AMIODARONE HYDROCHLORIDE 200 MILLIGRAM(S): 400 TABLET ORAL at 06:42

## 2017-06-28 RX ADMIN — Medication 10 MILLIGRAM(S): at 12:22

## 2017-06-28 RX ADMIN — APIXABAN 2.5 MILLIGRAM(S): 2.5 TABLET, FILM COATED ORAL at 21:58

## 2017-06-28 RX ADMIN — HALOPERIDOL DECANOATE 1 MILLIGRAM(S): 100 INJECTION INTRAMUSCULAR at 23:02

## 2017-06-28 RX ADMIN — OXYCODONE HYDROCHLORIDE 5 MILLIGRAM(S): 5 TABLET ORAL at 21:58

## 2017-06-28 RX ADMIN — OXYCODONE HYDROCHLORIDE 5 MILLIGRAM(S): 5 TABLET ORAL at 06:41

## 2017-06-28 RX ADMIN — Medication 25 MILLIGRAM(S): at 06:51

## 2017-06-28 RX ADMIN — APIXABAN 2.5 MILLIGRAM(S): 2.5 TABLET, FILM COATED ORAL at 09:58

## 2017-06-28 RX ADMIN — ATORVASTATIN CALCIUM 40 MILLIGRAM(S): 80 TABLET, FILM COATED ORAL at 21:57

## 2017-06-28 RX ADMIN — OXYCODONE HYDROCHLORIDE 5 MILLIGRAM(S): 5 TABLET ORAL at 17:58

## 2017-06-28 RX ADMIN — Medication 20 MILLIGRAM(S): at 06:41

## 2017-06-28 RX ADMIN — MEGESTROL ACETATE 800 MILLIGRAM(S): 40 SUSPENSION ORAL at 12:23

## 2017-06-28 RX ADMIN — Medication 81 MILLIGRAM(S): at 12:22

## 2017-06-28 RX ADMIN — LIDOCAINE 1 PATCH: 4 CREAM TOPICAL at 00:00

## 2017-06-28 RX ADMIN — PREGABALIN 1000 MICROGRAM(S): 225 CAPSULE ORAL at 12:23

## 2017-06-28 RX ADMIN — LIDOCAINE 1 PATCH: 4 CREAM TOPICAL at 12:22

## 2017-06-28 RX ADMIN — Medication 20 MILLIGRAM(S): at 17:58

## 2017-06-28 RX ADMIN — OXYCODONE HYDROCHLORIDE 5 MILLIGRAM(S): 5 TABLET ORAL at 02:40

## 2017-06-28 RX ADMIN — SODIUM CHLORIDE 1000 MILLILITER(S): 9 INJECTION INTRAMUSCULAR; INTRAVENOUS; SUBCUTANEOUS at 14:34

## 2017-06-28 RX ADMIN — Medication 25 MILLIGRAM(S): at 17:58

## 2017-06-28 RX ADMIN — OXYCODONE HYDROCHLORIDE 5 MILLIGRAM(S): 5 TABLET ORAL at 06:56

## 2017-06-28 RX ADMIN — OXYCODONE HYDROCHLORIDE 5 MILLIGRAM(S): 5 TABLET ORAL at 02:25

## 2017-06-28 NOTE — PROGRESS NOTE ADULT - SUBJECTIVE AND OBJECTIVE BOX
Patient seen and examined at bedside. still weak, confused. cooperative      amiodarone    Tablet 200 daily  insulin lispro (HumaLOG) corrective regimen sliding scale  Before meals and at bedtime  atorvastatin 40 at bedtime  acetaminophen  Suppository 650 every 6 hours PRN  bisacodyl Suppository 10 daily  lidocaine   Patch 1 daily  guaiFENesin    Syrup 200 every 6 hours PRN  apixaban 2.5 two times a day  aspirin enteric coated 81 daily  cyanocobalamin Injectable 1000 daily  furosemide    Tablet 20 two times a day  meperidine     Injectable 25 every 6 hours PRN  metoprolol 25 two times a day  oxyCODONE Solution 5 every 4 hours  morphine  - Injectable 1 every 4 hours PRN  morphine  - Injectable 0.5 every 4 hours PRN  megestrol Suspension 800 daily      Allergies    IV CONtRAST (Flushing (Skin); Rash)  No Known Drug Allergies    Intolerances        T(C): , Max: 37.2 (06-27-17 @ 20:50)  T(F): , Max: 98.9 (06-27-17 @ 20:50)  HR: 110 (06-28-17 @ 13:00)  BP: 92/53 (06-28-17 @ 13:00)  BP(mean): --  RR: 18 (06-28-17 @ 09:55)  SpO2: 97% (06-28-17 @ 09:55)  Wt(kg): --    ROS: weak, poor appetite. short of breath, muscles weak , confused, not clon urine output re diuretic. afebrile, some swelling      LABS:                        9.3    14.8  )-----------( 107      ( 28 Jun 2017 06:18 )             29.5     06-28    136  |  93<L>  |  29<H>  ----------------------------<  93  4.4   |  33<H>  |  1.00    Ca    8.8      28 Jun 2017 06:18  Phos  3.0     06-28  Mg     2.0     06-28                  RADIOLOGY & ADDITIONAL STUDIES:

## 2017-06-28 NOTE — PROGRESS NOTE ADULT - SUBJECTIVE AND OBJECTIVE BOX
OVERNIGHT EVENTS:  No acute events overnight.    SUBJECTIVE / INTERVAL HPI:   Patient seen and examined at bedside, laying comfortably. Pt had complaints of cough last night productive of brown-colored phlegm. Otherwise, denies chest pain, palpitations, SOB, subjective fever, dizziness, abdominal pain, N/V. Her blood pressure was stable overnight but decreased to SBP 90s in the AM after administration of medications at 6 am. She was examined and appeared in no acute distress and asymptomatic.     VITAL SIGNS:  Vital Signs Last 24 Hrs  T(C): 37.1 (28 Jun 2017 09:19), Max: 37.2 (27 Jun 2017 20:50)  T(F): 98.7 (28 Jun 2017 09:19), Max: 98.9 (27 Jun 2017 20:50)  HR: 93 (28 Jun 2017 09:55) (91 - 103)  BP: 96/58 (28 Jun 2017 09:55) (91/47 - 111/61)  BP(mean): --  RR: 18 (28 Jun 2017 09:55) (17 - 20)  SpO2: 97% (28 Jun 2017 09:55) (96% - 97%)    PHYSICAL EXAM:    General: WDWN, NAD, laying comfortably  HEENT: NC/AT; PERRL, anicteric sclera; MMM  Neck: supple  Cardiovascular: irregularly irregular rhythm, no murmurs, rubs, gallops  Respiratory: b/l crackles, posteriorly, no wheezing, rhonchi  Gastrointestinal: soft, NT/ND; +BSx4  Extremities: mild pitting edema of b/l LE  Vascular: 1+ pedal pulses b/l  Neurological: AAOx3; no focal deficits    MEDICATIONS:  MEDICATIONS  (STANDING):  amiodarone    Tablet 200 milliGRAM(s) Oral daily  insulin lispro (HumaLOG) corrective regimen sliding scale   SubCutaneous Before meals and at bedtime  atorvastatin 40 milliGRAM(s) Oral at bedtime  bisacodyl Suppository 10 milliGRAM(s) Rectal daily  lidocaine   Patch 1 Patch Transdermal daily  apixaban 2.5 milliGRAM(s) Oral two times a day  aspirin enteric coated 81 milliGRAM(s) Oral daily  cyanocobalamin Injectable 1000 MICROGram(s) SubCutaneous daily  furosemide    Tablet 20 milliGRAM(s) Oral two times a day  metoprolol 25 milliGRAM(s) Oral two times a day  oxyCODONE Solution 5 milliGRAM(s) Oral every 4 hours  megestrol Suspension 800 milliGRAM(s) Oral daily    MEDICATIONS  (PRN):  acetaminophen  Suppository 650 milliGRAM(s) Rectal every 6 hours PRN For Temp greater than 38 C (100.4 F)  guaiFENesin    Syrup 200 milliGRAM(s) Oral every 6 hours PRN Cough  meperidine     Injectable 25 milliGRAM(s) IV Push every 6 hours PRN Rigors  morphine  - Injectable 1 milliGRAM(s) IV Push every 4 hours PRN Excessive work of breathing / RR>35 / Resp distress / severe pain (7-10)  morphine  - Injectable 0.5 milliGRAM(s) IV Push every 4 hours PRN Dyspnea / Air hunger / Increased work of breathing / RR>25 / moderate pain (4-6)      ALLERGIES:  Allergies    IV CONtRAST (Flushing (Skin); Rash)  No Known Drug Allergies    Intolerances        LABS:                        9.3    14.8  )-----------( 107      ( 28 Jun 2017 06:18 )             29.5     06-28    136  |  93<L>  |  29<H>  ----------------------------<  93  4.4   |  33<H>  |  1.00    Ca    8.8      28 Jun 2017 06:18  Phos  3.0     06-28  Mg     2.0     06-28          CAPILLARY BLOOD GLUCOSE  84 (28 Jun 2017 07:48)          RADIOLOGY & ADDITIONAL TESTS: Reviewed.    ASSESSMENT:    PLAN: OVERNIGHT EVENTS:  No acute events overnight.    SUBJECTIVE / INTERVAL HPI:   Patient seen and examined at bedside, laying comfortably. Pt had complaints of cough last night productive of brown-colored phlegm. Otherwise, denies chest pain, palpitations, SOB, subjective fever, dizziness, abdominal pain, N/V. Her blood pressure was stable overnight but decreased to SBP 90s in the AM after administration of medications at 6 am. She was re-examined and appeared in no acute distress and asymptomatic with family at bedside.     VITAL SIGNS:  Vital Signs Last 24 Hrs  T(C): 37.1 (28 Jun 2017 09:19), Max: 37.2 (27 Jun 2017 20:50)  T(F): 98.7 (28 Jun 2017 09:19), Max: 98.9 (27 Jun 2017 20:50)  HR: 93 (28 Jun 2017 09:55) (91 - 103)  BP: 96/58 (28 Jun 2017 09:55) (91/47 - 111/61)  BP(mean): --  RR: 18 (28 Jun 2017 09:55) (17 - 20)  SpO2: 97% (28 Jun 2017 09:55) (96% - 97%)    PHYSICAL EXAM:    General: WDWN, NAD, laying comfortably  HEENT: NC/AT; PERRL, anicteric sclera; mucous membranes dry  Neck: supple  Cardiovascular: irregularly irregular rhythm, no murmurs, rubs, gallops  Respiratory: b/l crackles posteriorly, no wheezing, rhonchi  Gastrointestinal: soft, NT/ND; +BSx4  Extremities: mild pitting edema of b/l LE  Vascular: 1+ pedal pulses b/l  Neurological: AAOx3; no focal deficits    MEDICATIONS:  MEDICATIONS  (STANDING):  amiodarone    Tablet 200 milliGRAM(s) Oral daily  insulin lispro (HumaLOG) corrective regimen sliding scale   SubCutaneous Before meals and at bedtime  atorvastatin 40 milliGRAM(s) Oral at bedtime  bisacodyl Suppository 10 milliGRAM(s) Rectal daily  lidocaine   Patch 1 Patch Transdermal daily  apixaban 2.5 milliGRAM(s) Oral two times a day  aspirin enteric coated 81 milliGRAM(s) Oral daily  cyanocobalamin Injectable 1000 MICROGram(s) SubCutaneous daily  furosemide    Tablet 20 milliGRAM(s) Oral two times a day  metoprolol 25 milliGRAM(s) Oral two times a day  oxyCODONE Solution 5 milliGRAM(s) Oral every 4 hours  megestrol Suspension 800 milliGRAM(s) Oral daily    MEDICATIONS  (PRN):  acetaminophen  Suppository 650 milliGRAM(s) Rectal every 6 hours PRN For Temp greater than 38 C (100.4 F)  guaiFENesin    Syrup 200 milliGRAM(s) Oral every 6 hours PRN Cough  meperidine     Injectable 25 milliGRAM(s) IV Push every 6 hours PRN Rigors  morphine  - Injectable 1 milliGRAM(s) IV Push every 4 hours PRN Excessive work of breathing / RR>35 / Resp distress / severe pain (7-10)  morphine  - Injectable 0.5 milliGRAM(s) IV Push every 4 hours PRN Dyspnea / Air hunger / Increased work of breathing / RR>25 / moderate pain (4-6)      ALLERGIES:  Allergies    IV CONtRAST (Flushing (Skin); Rash)  No Known Drug Allergies    Intolerances        LABS:                        9.3    14.8  )-----------( 107      ( 28 Jun 2017 06:18 )             29.5     06-28    136  |  93<L>  |  29<H>  ----------------------------<  93  4.4   |  33<H>  |  1.00    Ca    8.8      28 Jun 2017 06:18  Phos  3.0     06-28  Mg     2.0     06-28          CAPILLARY BLOOD GLUCOSE  84 (28 Jun 2017 07:48)          RADIOLOGY & ADDITIONAL TESTS: Reviewed.    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    Problem/Plan - 1:  ·  Problem: Severe sepsis - resolved      Plan: POA, source was urosepsis w/ seeding to chemoport, which was removed on 6/12  -reschedule medication administration 2/2 BP fluctuations  -Pt to be D/C'd today to [facility] pending stable BP   -follow up w/ SW      Problem/Plan - 2:  ·  Problem: Acute respiratory failure with hypoxia.      Plan: Improved. Initially requiring BIPAP and HFNC 2/2 pulmonary congestion/edema likely due to CHF exacerbation in the setting of urosepsis and line sepsis. Now tolerating regular NC. VQ done which was negative for PE.  - C/w nasal cannula  - Oxycodone 5mg solution for air hunger, increased work of breathing, comfort     Problem/Plan - 3:  ·  Problem: Acute pulmonary edema.      Plan: Resolving. plan as above, c/w HF  - Lasix 20mg po BID.     Problem/Plan - 4:  ·  Problem: Paroxysmal atrial fibrillation.      Plan: s/p PPM, on Eliquis at home. Decreased CrCl. Concern for pacemaker involvement, however gallium scan negative   - c/w home Amiodarone 200mg QD   - c/w Eliquis (2.5mg since patient age >80yrs and weight <60km).     Problem/Plan - 5:  ·  Problem: Coronary artery disease involving native coronary artery of native heart without angina pectoris.      Plan: Patient s/p CABG. Patient without chest pain. EKG without ischemic changes. EF EF 60-65%.   - c/w home Lipitor 40mg QHS  - c/w ASA   - c/w Metoprolol increased to 25mg BID  - Medically optimize cardiac function if BP allows   - Continue to monitor I's and O'Patient s/p CABG. Patient without chest pain. EKG without ischemic changes. EF EF 60-65%.   - c/w home Lipitor 40mg QHS  - c/w ASA   - c/w Metoprolol 12.5 BID, may need to uptitrate   - Medically optimize cardiac function if BP allows   - Continue to monitor I's and O's

## 2017-06-28 NOTE — PROGRESS NOTE ADULT - SUBJECTIVE AND OBJECTIVE BOX
Chief Complaint/Reason for Consult: CAD  INTERVAL HPI: oob to chair comfortable nad no dyspnea  	  MEDICATIONS:  amiodarone    Tablet 200 milliGRAM(s) Oral daily  furosemide    Tablet 20 milliGRAM(s) Oral two times a day  metoprolol 25 milliGRAM(s) Oral two times a day      guaiFENesin    Syrup 200 milliGRAM(s) Oral every 6 hours PRN    acetaminophen  Suppository 650 milliGRAM(s) Rectal every 6 hours PRN  meperidine     Injectable 25 milliGRAM(s) IV Push every 6 hours PRN  oxyCODONE Solution 5 milliGRAM(s) Oral every 4 hours  morphine  - Injectable 1 milliGRAM(s) IV Push every 4 hours PRN  morphine  - Injectable 0.5 milliGRAM(s) IV Push every 4 hours PRN    bisacodyl Suppository 10 milliGRAM(s) Rectal daily    insulin lispro (HumaLOG) corrective regimen sliding scale   SubCutaneous Before meals and at bedtime  atorvastatin 40 milliGRAM(s) Oral at bedtime  megestrol Suspension 800 milliGRAM(s) Oral daily    lidocaine   Patch 1 Patch Transdermal daily  apixaban 2.5 milliGRAM(s) Oral two times a day  aspirin enteric coated 81 milliGRAM(s) Oral daily  cyanocobalamin Injectable 1000 MICROGram(s) SubCutaneous daily      REVIEW OF SYSTEMS:  [x] As per HPI  CONSTITUTIONAL: No fever, weight loss, or fatigue  RESPIRATORY: No cough, wheezing, chills or hemoptysis; No Shortness of Breath  CARDIOVASCULAR: No chest pain, palpitations, dizziness, or leg swelling  GASTROINTESTINAL: No abdominal or epigastric pain. No nausea, vomiting, or hematemesis; No diarrhea or constipation. No melena or hematochezia.  MUSCULOSKELETAL: No joint pain or swelling; No muscle, back, or extremity pain  [x] All others negative	  [ ] Unable to obtain    PHYSICAL EXAM:  T(C): 37.1 (06-28-17 @ 09:19), Max: 37.2 (06-27-17 @ 20:50)  HR: 93 (06-28-17 @ 09:55) (91 - 103)  BP: 96/58 (06-28-17 @ 09:55) (91/47 - 111/61)  RR: 18 (06-28-17 @ 09:55) (17 - 20)  SpO2: 97% (06-28-17 @ 09:55) (96% - 97%)  Wt(kg): --  I&O's Summary        Appearance: Normal	  HEENT:   Normal oral mucosa  Cardiovascular: Normal S1 S2, No JVD, No murmurs, No edema  Respiratory: Lungs clear to auscultation	  Gastrointestinal:  Soft, Non-tender, + BS	  Extremities: Normal range of motion, No clubbing, cyanosis or edema  Vascular: Peripheral pulses palpable 2+ bilaterally    TELEMETRY: 	    ECG:    	  RADIOLOGY:   CXR:  CT:  US:    CARDIAC TESTING:  Echocardiogram:  Catheterization:  Stress Test:      LABS:	 	    CARDIAC MARKERS:                                  9.3    14.8  )-----------( 107      ( 28 Jun 2017 06:18 )             29.5     06-28    136  |  93<L>  |  29<H>  ----------------------------<  93  4.4   |  33<H>  |  1.00    Ca    8.8      28 Jun 2017 06:18  Phos  3.0     06-28  Mg     2.0     06-28      proBNP:   Lipid Profile:   HgA1c:   TSH:     ASSESSMENT/PLAN: 	  Afib  - increase po BB as tolerated continue amio 200 qd for now.    Keep net euvolemic to net negative, strict I/Os

## 2017-06-28 NOTE — CHART NOTE - NSCHARTNOTEFT_GEN_A_CORE
Patient agitated and trying to get out of bed, most likely sun downing. Patient refused  phone despite multiple attempts. Patient denied any pain. Patient continued to be agitated and combative, made multiple attempts to hit/grab hospital staff. She appeared diaphoretic and was breathing rapidly, but was not in respiratory distress. She was uncooperative with physical exam and refused to wear nasal cannula despite multiple attempts. Given 1mg Haldol IV push and put on enhanced supervision. Patient agitated and trying to get out of bed, most likely sun downing. Patient refused  phone despite multiple attempts. Patient denied any pain. Patient continued to be agitated and combative, made multiple attempts to hit/grab hospital staff. She appeared diaphoretic and was breathing rapidly, but was not in respiratory distress. She was uncooperative with physical exam and refused to wear nasal cannula despite multiple attempts. Given 1mg Haldol IV push and put on enhanced supervision. Patient calmed down and not in distress when left bedside.

## 2017-06-28 NOTE — PROGRESS NOTE ADULT - SUBJECTIVE AND OBJECTIVE BOX
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 Patients family at bedside states she's been more lethargic with associated lower extremity swelling, SOB, decreased appetite and diarrhea. Patient with previous admissions for symptomatic hyperglycemia in January. Patient currently seeing Dr. Galaviz on a regular basis for chemotherapy treatment. Last treatment on May 23rd for which she received Bendamustine and Rituximab. Patient denies any headaches, chest pain, abdominal pain, N/V, dysuria, or bowel changes.     Upon arrival to the ED, patient in moderate respiratory distress using accessory muscles, breathing w/ a RR of 30, O2 93% on room air. Patient placed on BIPAP with slight improvement in respiratory status. Patient febrile to 101 with a HR of 77. Labs significant for AG metabolic acidosis, K 7.0, Na 127, BUN 58, Cr. 2.0 (baseline < 0.9 January), Lactate 2.8. CXR done showing RLL infiltrate with pulmonary vascular congestion. In the ED, patient received Vancomycin, Zosyn, Tylenol, Calcium Gluconate 2g, Insulin 5u, Albuterol neb 5mg x 2. ICU consulted, will be transferred to MICU for management of severe sepsis 2/2 HAP and pulmonary congestion. (10 Ramon 2017 19:13)    FAMILY HISTORY:  No pertinent family history in first degree relatives    MEDICATIONS  (STANDING):  amiodarone    Tablet 200 milliGRAM(s) Oral daily  insulin lispro (HumaLOG) corrective regimen sliding scale   SubCutaneous Before meals and at bedtime  atorvastatin 40 milliGRAM(s) Oral at bedtime  bisacodyl Suppository 10 milliGRAM(s) Rectal daily  lidocaine   Patch 1 Patch Transdermal daily  apixaban 2.5 milliGRAM(s) Oral two times a day  aspirin enteric coated 81 milliGRAM(s) Oral daily  cyanocobalamin Injectable 1000 MICROGram(s) SubCutaneous daily  furosemide    Tablet 20 milliGRAM(s) Oral two times a day  metoprolol 25 milliGRAM(s) Oral two times a day  oxyCODONE Solution 5 milliGRAM(s) Oral every 4 hours  megestrol Suspension 800 milliGRAM(s) Oral daily    MEDICATIONS  (PRN):  acetaminophen  Suppository 650 milliGRAM(s) Rectal every 6 hours PRN For Temp greater than 38 C (100.4 F)  guaiFENesin    Syrup 200 milliGRAM(s) Oral every 6 hours PRN Cough  meperidine     Injectable 25 milliGRAM(s) IV Push every 6 hours PRN Rigors  morphine  - Injectable 1 milliGRAM(s) IV Push every 4 hours PRN Excessive work of breathing / RR>35 / Resp distress / severe pain (7-10)  morphine  - Injectable 0.5 milliGRAM(s) IV Push every 4 hours PRN Dyspnea / Air hunger / Increased work of breathing / RR>25 / moderate pain (4-6)    Vital Signs Last 24 Hrs  T(C): 37.1 (28 Jun 2017 21:31), Max: 37.1 (28 Jun 2017 09:19)  T(F): 98.8 (28 Jun 2017 21:31), Max: 98.8 (28 Jun 2017 21:31)  HR: 97 (28 Jun 2017 21:31) (91 - 110)  BP: 107/60 (28 Jun 2017 21:31) (91/47 - 111/61)  BP(mean): --  RR: 17 (28 Jun 2017 21:31) (17 - 20)  SpO2: 97% (28 Jun 2017 21:31) (97% - 98%)    Physical exam:    Overall impression  Lymphadenopathy  Liver  spleen    Labs:  CBC Full  -  ( 28 Jun 2017 06:18 )  WBC Count : 14.8 K/uL  Hemoglobin : 9.3 g/dL  Hematocrit : 29.5 %  Platelet Count - Automated : 107 K/uL  Mean Cell Volume : 93.9 fL  Mean Cell Hemoglobin : 29.6 pg  Mean Cell Hemoglobin Concentration : 31.5 g/dL  Auto Neutrophil # : x  Auto Lymphocyte # : x  Auto Monocyte # : x  Auto Eosinophil # : x  Auto Basophil # : x  Auto Neutrophil % : 31.0 %  Auto Lymphocyte % : 22.0 %  Auto Monocyte % : 14.0 %  Auto Eosinophil % : 2.0 %  Auto Basophil % : x    06-28    136  |  93<L>  |  29<H>  ----------------------------<  93  4.4   |  33<H>  |  1.00    Ca    8.8      28 Jun 2017 06:18  Phos  3.0     06-28  Mg     2.0     06-28        Radiology:  HEALTH ISSUES - R/O PROBLEM Dx:      Assessmant / Problems    Plan:    Thank you  Neha Galaviz MD HPI:  87 y/o F w/ pmhx of CAD s/p CABG, atrial fibrillation s/p PPM (on Eliquis), Breast CA (on Arimidex), 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 Patients family at bedside states she's been more lethargic with associated lower extremity swelling, SOB, decreased appetite and diarrhea. Patient with previous admissions for symptomatic hyperglycemia in January. Patient currently seeing Dr. Galaviz on a regular basis for chemotherapy treatment. Last treatment on May 23rd for which she received Bendamustine and Rituximab. Patient denies any headaches, chest pain, abdominal pain, N/V, dysuria, or bowel changes.     Upon arrival to the ED, patient in moderate respiratory distress using accessory muscles, breathing w/ a RR of 30, O2 93% on room air. Patient placed on BIPAP with slight improvement in respiratory status. Patient febrile to 101 with a HR of 77. Labs significant for AG metabolic acidosis, K 7.0, Na 127, BUN 58, Cr. 2.0 (baseline < 0.9 January), Lactate 2.8. CXR done showing RLL infiltrate with pulmonary vascular congestion. In the ED, patient received Vancomycin, Zosyn, Tylenol, Calcium Gluconate 2g, Insulin 5u, Albuterol neb 5mg x 2. ICU consulted, will be transferred to MICU for management of severe sepsis 2/2 HAP and pulmonary congestion. (10 Ramon 2017 19:13)    FAMILY HISTORY:  No pertinent family history in first degree relatives    MEDICATIONS  (STANDING):  amiodarone    Tablet 200 milliGRAM(s) Oral daily  insulin lispro (HumaLOG) corrective regimen sliding scale   SubCutaneous Before meals and at bedtime  atorvastatin 40 milliGRAM(s) Oral at bedtime  bisacodyl Suppository 10 milliGRAM(s) Rectal daily  lidocaine   Patch 1 Patch Transdermal daily  apixaban 2.5 milliGRAM(s) Oral two times a day  aspirin enteric coated 81 milliGRAM(s) Oral daily  cyanocobalamin Injectable 1000 MICROGram(s) SubCutaneous daily  furosemide    Tablet 20 milliGRAM(s) Oral two times a day  metoprolol 25 milliGRAM(s) Oral two times a day  oxyCODONE Solution 5 milliGRAM(s) Oral every 4 hours  megestrol Suspension 800 milliGRAM(s) Oral daily    MEDICATIONS  (PRN):  acetaminophen  Suppository 650 milliGRAM(s) Rectal every 6 hours PRN For Temp greater than 38 C (100.4 F)  guaiFENesin    Syrup 200 milliGRAM(s) Oral every 6 hours PRN Cough  meperidine     Injectable 25 milliGRAM(s) IV Push every 6 hours PRN Rigors  morphine  - Injectable 1 milliGRAM(s) IV Push every 4 hours PRN Excessive work of breathing / RR>35 / Resp distress / severe pain (7-10)  morphine  - Injectable 0.5 milliGRAM(s) IV Push every 4 hours PRN Dyspnea / Air hunger / Increased work of breathing / RR>25 / moderate pain (4-6)    Vital Signs Last 24 Hrs  T(C): 37.1 (28 Jun 2017 21:31), Max: 37.1 (28 Jun 2017 09:19)  T(F): 98.8 (28 Jun 2017 21:31), Max: 98.8 (28 Jun 2017 21:31)  HR: 97 (28 Jun 2017 21:31) (91 - 110)  BP: 107/60 (28 Jun 2017 21:31) (91/47 - 111/61)  BP(mean): --  RR: 17 (28 Jun 2017 21:31) (17 - 20)  SpO2: 97% (28 Jun 2017 21:31) (97% - 98%)    Physical exam:    Overall impression  Lymphadenopathy  Liver  spleen    Labs:  CBC Full  -  ( 28 Jun 2017 06:18 )  WBC Count : 14.8 K/uL  Hemoglobin : 9.3 g/dL  Hematocrit : 29.5 %  Platelet Count - Automated : 107 K/uL  Mean Cell Volume : 93.9 fL  Mean Cell Hemoglobin : 29.6 pg  Mean Cell Hemoglobin Concentration : 31.5 g/dL  Auto Neutrophil # : x  Auto Lymphocyte # : x  Auto Monocyte # : x  Auto Eosinophil # : x  Auto Basophil # : x  Auto Neutrophil % : 31.0 %  Auto Lymphocyte % : 22.0 %  Auto Monocyte % : 14.0 %  Auto Eosinophil % : 2.0 %  Auto Basophil % : x    06-28    136  |  93<L>  |  29<H>  ----------------------------<  93  4.4   |  33<H>  |  1.00    Ca    8.8      28 Jun 2017 06:18  Phos  3.0     06-28  Mg     2.0     06-28        Radiology:  HEALTH ISSUES - R/O PROBLEM Dx:      Assessmant / Problems  1) Septic process controlled  2) NHL clinically in remission  3) Breast ca stable    Plan:  Awaiting Rehab placement    Thank you  Neha Galaviz MD

## 2017-06-29 LAB
ALBUMIN SERPL ELPH-MCNC: 2.7 G/DL — LOW (ref 3.3–5)
ALP SERPL-CCNC: 212 U/L — HIGH (ref 40–120)
ALT FLD-CCNC: 16 U/L — SIGNIFICANT CHANGE UP (ref 10–45)
ANION GAP SERPL CALC-SCNC: 10 MMOL/L — SIGNIFICANT CHANGE UP (ref 5–17)
ANION GAP SERPL CALC-SCNC: 10 MMOL/L — SIGNIFICANT CHANGE UP (ref 5–17)
ANISOCYTOSIS BLD QL: SLIGHT — SIGNIFICANT CHANGE UP
AST SERPL-CCNC: 30 U/L — SIGNIFICANT CHANGE UP (ref 10–40)
BILIRUB SERPL-MCNC: 0.6 MG/DL — SIGNIFICANT CHANGE UP (ref 0.2–1.2)
BUN SERPL-MCNC: 26 MG/DL — HIGH (ref 7–23)
BUN SERPL-MCNC: 27 MG/DL — HIGH (ref 7–23)
CALCIUM SERPL-MCNC: 8.7 MG/DL — SIGNIFICANT CHANGE UP (ref 8.4–10.5)
CALCIUM SERPL-MCNC: 8.7 MG/DL — SIGNIFICANT CHANGE UP (ref 8.4–10.5)
CHLORIDE SERPL-SCNC: 94 MMOL/L — LOW (ref 96–108)
CHLORIDE SERPL-SCNC: 94 MMOL/L — LOW (ref 96–108)
CHOLEST SERPL-MCNC: 61 MG/DL — SIGNIFICANT CHANGE UP (ref 10–199)
CO2 SERPL-SCNC: 27 MMOL/L — SIGNIFICANT CHANGE UP (ref 22–31)
CO2 SERPL-SCNC: 32 MMOL/L — HIGH (ref 22–31)
CREAT SERPL-MCNC: 1.1 MG/DL — SIGNIFICANT CHANGE UP (ref 0.5–1.3)
CREAT SERPL-MCNC: 1.1 MG/DL — SIGNIFICANT CHANGE UP (ref 0.5–1.3)
GIANT PLATELETS BLD QL SMEAR: PRESENT — SIGNIFICANT CHANGE UP
GLUCOSE SERPL-MCNC: 106 MG/DL — HIGH (ref 70–99)
GLUCOSE SERPL-MCNC: 112 MG/DL — HIGH (ref 70–99)
HCT VFR BLD CALC: 28.1 % — LOW (ref 34.5–45)
HGB BLD-MCNC: 9.2 G/DL — LOW (ref 11.5–15.5)
LG PLATELETS BLD QL AUTO: PRESENT — SIGNIFICANT CHANGE UP
LYMPHOCYTES # BLD AUTO: 19 % — SIGNIFICANT CHANGE UP (ref 13–44)
MACROCYTES BLD QL: SLIGHT — SIGNIFICANT CHANGE UP
MAGNESIUM SERPL-MCNC: 1.9 MG/DL — SIGNIFICANT CHANGE UP (ref 1.6–2.6)
MANUAL DIF COMMENT BLD-IMP: SIGNIFICANT CHANGE UP
MANUAL SMEAR VERIFICATION: SIGNIFICANT CHANGE UP
MCHC RBC-ENTMCNC: 30.5 PG — SIGNIFICANT CHANGE UP (ref 27–34)
MCHC RBC-ENTMCNC: 32.7 G/DL — SIGNIFICANT CHANGE UP (ref 32–36)
MCV RBC AUTO: 93 FL — SIGNIFICANT CHANGE UP (ref 80–100)
METAMYELOCYTES # FLD: 2 % — HIGH
MICROCYTES BLD QL: SLIGHT — SIGNIFICANT CHANGE UP
MONOCYTES NFR BLD AUTO: 11 % — SIGNIFICANT CHANGE UP (ref 2–14)
MYELOCYTES NFR BLD: 1 % — HIGH
NEUTROPHILS NFR BLD AUTO: 56 % — SIGNIFICANT CHANGE UP (ref 43–77)
NEUTS BAND # BLD: 9 % — SIGNIFICANT CHANGE UP
OVALOCYTES BLD QL SMEAR: SLIGHT — SIGNIFICANT CHANGE UP
PHOSPHATE SERPL-MCNC: 2.9 MG/DL — SIGNIFICANT CHANGE UP (ref 2.5–4.5)
PLAT MORPH BLD: (no result)
PLATELET # BLD AUTO: 113 K/UL — LOW (ref 150–400)
POIKILOCYTOSIS BLD QL AUTO: SLIGHT — SIGNIFICANT CHANGE UP
POLYCHROMASIA BLD QL SMEAR: SLIGHT — SIGNIFICANT CHANGE UP
POTASSIUM SERPL-MCNC: 4.1 MMOL/L — SIGNIFICANT CHANGE UP (ref 3.5–5.3)
POTASSIUM SERPL-MCNC: 4.2 MMOL/L — SIGNIFICANT CHANGE UP (ref 3.5–5.3)
POTASSIUM SERPL-SCNC: 4.1 MMOL/L — SIGNIFICANT CHANGE UP (ref 3.5–5.3)
POTASSIUM SERPL-SCNC: 4.2 MMOL/L — SIGNIFICANT CHANGE UP (ref 3.5–5.3)
PROMYELOCYTES # FLD: 2 % — HIGH
PROT SERPL-MCNC: 5.1 G/DL — LOW (ref 6–8.3)
RBC # BLD: 3.02 M/UL — LOW (ref 3.8–5.2)
RBC # FLD: 20 % — HIGH (ref 10.3–16.9)
RBC BLD AUTO: (no result)
SCHISTOCYTES BLD QL AUTO: SIGNIFICANT CHANGE UP
SODIUM SERPL-SCNC: 136 MMOL/L — SIGNIFICANT CHANGE UP (ref 135–145)
SODIUM SERPL-SCNC: 136 MMOL/L — SIGNIFICANT CHANGE UP (ref 135–145)
SPHEROCYTES BLD QL SMEAR: SLIGHT — SIGNIFICANT CHANGE UP
TOXIC GRANULES BLD QL SMEAR: SLIGHT — SIGNIFICANT CHANGE UP
WBC # BLD: 22.5 K/UL — HIGH (ref 3.8–10.5)
WBC # FLD AUTO: 22.5 K/UL — HIGH (ref 3.8–10.5)

## 2017-06-29 PROCEDURE — 93010 ELECTROCARDIOGRAM REPORT: CPT

## 2017-06-29 PROCEDURE — 99232 SBSQ HOSP IP/OBS MODERATE 35: CPT | Mod: GC

## 2017-06-29 PROCEDURE — 99232 SBSQ HOSP IP/OBS MODERATE 35: CPT

## 2017-06-29 RX ORDER — CEFTRIAXONE 500 MG/1
INJECTION, POWDER, FOR SOLUTION INTRAMUSCULAR; INTRAVENOUS
Qty: 0 | Refills: 0 | Status: DISCONTINUED | OUTPATIENT
Start: 2017-06-29 | End: 2017-07-02

## 2017-06-29 RX ORDER — CEFTRIAXONE 500 MG/1
2 INJECTION, POWDER, FOR SOLUTION INTRAMUSCULAR; INTRAVENOUS ONCE
Qty: 0 | Refills: 0 | Status: COMPLETED | OUTPATIENT
Start: 2017-06-29 | End: 2017-06-29

## 2017-06-29 RX ORDER — CEFTRIAXONE 500 MG/1
2 INJECTION, POWDER, FOR SOLUTION INTRAMUSCULAR; INTRAVENOUS EVERY 24 HOURS
Qty: 0 | Refills: 0 | Status: DISCONTINUED | OUTPATIENT
Start: 2017-06-30 | End: 2017-07-02

## 2017-06-29 RX ADMIN — LIDOCAINE 1 PATCH: 4 CREAM TOPICAL at 00:16

## 2017-06-29 RX ADMIN — LIDOCAINE 1 PATCH: 4 CREAM TOPICAL at 13:21

## 2017-06-29 RX ADMIN — Medication 20 MILLIGRAM(S): at 17:27

## 2017-06-29 RX ADMIN — AMIODARONE HYDROCHLORIDE 200 MILLIGRAM(S): 400 TABLET ORAL at 11:08

## 2017-06-29 RX ADMIN — Medication 25 MILLIGRAM(S): at 17:27

## 2017-06-29 RX ADMIN — Medication 25 MILLIGRAM(S): at 11:08

## 2017-06-29 RX ADMIN — Medication 10 MILLIGRAM(S): at 13:20

## 2017-06-29 RX ADMIN — Medication 20 MILLIGRAM(S): at 11:08

## 2017-06-29 RX ADMIN — APIXABAN 2.5 MILLIGRAM(S): 2.5 TABLET, FILM COATED ORAL at 11:07

## 2017-06-29 RX ADMIN — Medication 2: at 17:24

## 2017-06-29 RX ADMIN — PREGABALIN 1000 MICROGRAM(S): 225 CAPSULE ORAL at 13:20

## 2017-06-29 RX ADMIN — OXYCODONE HYDROCHLORIDE 5 MILLIGRAM(S): 5 TABLET ORAL at 00:16

## 2017-06-29 RX ADMIN — MEGESTROL ACETATE 800 MILLIGRAM(S): 40 SUSPENSION ORAL at 13:22

## 2017-06-29 RX ADMIN — CEFTRIAXONE 100 GRAM(S): 500 INJECTION, POWDER, FOR SOLUTION INTRAMUSCULAR; INTRAVENOUS at 18:10

## 2017-06-29 RX ADMIN — Medication 81 MILLIGRAM(S): at 13:20

## 2017-06-29 NOTE — PROGRESS NOTE ADULT - SUBJECTIVE AND OBJECTIVE BOX
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 Patients family at bedside states she's been more lethargic with associated lower extremity swelling, SOB, decreased appetite and diarrhea. Patient with previous admissions for symptomatic hyperglycemia in January. Patient currently seeing Dr. Galaviz on a regular basis for chemotherapy treatment. Last treatment on May 23rd for which she received Bendamustine and Rituximab. Patient denies any headaches, chest pain, abdominal pain, N/V, dysuria, or bowel changes.     Upon arrival to the ED, patient in moderate respiratory distress using accessory muscles, breathing w/ a RR of 30, O2 93% on room air. Patient placed on BIPAP with slight improvement in respiratory status. Patient febrile to 101 with a HR of 77. Labs significant for AG metabolic acidosis, K 7.0, Na 127, BUN 58, Cr. 2.0     INTERVAL HPI/OVERNIGHT EVENTS:::elev WBC --no s/s of infection, refused medication occ agitation    HEALTH ISSUES - PROBLEM Dx:  Sepsis due to Escherichia coli: Sepsis due to Escherichia coli  Anemia: Anemia  Hyponatremia: Hyponatremia  Acute pulmonary edema: Acute pulmonary edema  Need for prophylactic measure: Need for prophylactic measure  Nutrition, metabolism, and development symptoms: Nutrition, metabolism, and development symptoms  Anemia, unspecified type: Anemia, unspecified type  Malignant neoplasm of female breast, unspecified laterality, unspecified site of breast: Malignant neoplasm of female breast, unspecified laterality, unspecified site of breast  Diffuse large B-cell lymphoma, unspecified body region: Diffuse large B-cell lymphoma, unspecified body region  Coronary artery disease involving native coronary artery of native heart without angina pectoris: Coronary artery disease involving native coronary artery of native heart without angina pectoris  Paroxysmal atrial fibrillation: Paroxysmal atrial fibrillation  Acute respiratory failure with hypoxia: Acute respiratory failure with hypoxia  Sepsis: Sepsis  Lymphoma: Lymphoma  Severe sepsis: Severe sepsis  Central venous line infection, initial encounter: Central venous line infection, initial encounter  E coli bacteremia: E coli bacteremia  Medical orders for life-sustaining treatment (MOLST) form in chart: Medical orders for life-sustaining treatment (MOLST) form in chart  DNR (do not resuscitate): DNR (do not resuscitate)  Chronic back pain: Chronic back pain  Dyspnea and respiratory abnormalities: Dyspnea and respiratory abnormalities  Goals of care, counseling/discussion: Goals of care, counseling/discussion  Patient has healthcare proxy: Patient has healthcare proxy  Moderate protein-calorie malnutrition: Moderate protein-calorie malnutrition  Impaired mobility and activities of daily living: Impaired mobility and activities of daily living  Full code status: Full code status  Palliative care encounter: Palliative care encounter  Breast cancer: Breast cancer  Diffuse large B cell lymphoma: Diffuse large B cell lymphoma  Bacteremia due to Gram-negative bacteria: Bacteremia due to Gram-negative bacteria  Shortness of breath: Shortness of breath  Hyperkalemia: Hyperkalemia  ROMEO (acute kidney injury): ROMEO (acute kidney injury)          PAST MEDICAL & SURGICAL HISTORY:  Scoliosis  Follicular lymphoma  Neck mass  Afib  Other hyperlipidemia  Breast cancer  CAD (coronary artery disease)  Diabetes  HTN (hypertension)  H/O abdominal hysterectomy  H/O thyroidectomy  S/P CABG x 3          Consultant NOTE  REVIEWED  (   )    REVIEW OF SYSTEMS:  [x] As per HPI      [ ] Unable to obtain          Vital Signs Last 24 Hrs  T(C): 36.3 (29 Jun 2017 21:00), Max: 36.8 (29 Jun 2017 09:00)  T(F): 97.3 (29 Jun 2017 21:00), Max: 98.2 (29 Jun 2017 09:00)  HR: 105 (29 Jun 2017 21:00) (80 - 110)  BP: 108/63 (29 Jun 2017 21:00) (108/63 - 124/60)  BP(mean): --  RR: 18 (29 Jun 2017 21:00) (17 - 20)  SpO2: 97% (29 Jun 2017 21:00) (92% - 98%)        PHYSICAL EXAMINATION:                                    (    )  NO CHANGE  Appearance: Normal	  HEENT:   Normal oral mucosa, PERRL, EOMI	  Neck: Supple, + JVD/ - JVD; Carotid Bruit   Cardiovascular: Normal S1 S2, No JVD, s/p CABG  Respiratory: occ rhonchi	  Gastrointestinal:  Soft, Non-tender, + BS	  Skin: No rashes, No ecchymoses, No cyanosis  Extremities: Normal range of motion,   Vascular: Peripheral pulses   Neurologic: Non-focal  Psychiatry: A    amiodarone    Tablet 200 milliGRAM(s) Oral daily  insulin lispro (HumaLOG) corrective regimen sliding scale   SubCutaneous Before meals and at bedtime  atorvastatin 40 milliGRAM(s) Oral at bedtime  acetaminophen  Suppository 650 milliGRAM(s) Rectal every 6 hours PRN  bisacodyl Suppository 10 milliGRAM(s) Rectal daily  lidocaine   Patch 1 Patch Transdermal daily  guaiFENesin    Syrup 200 milliGRAM(s) Oral every 6 hours PRN  apixaban 2.5 milliGRAM(s) Oral two times a day  aspirin enteric coated 81 milliGRAM(s) Oral daily  cyanocobalamin Injectable 1000 MICROGram(s) SubCutaneous daily  furosemide    Tablet 20 milliGRAM(s) Oral two times a day  meperidine     Injectable 25 milliGRAM(s) IV Push every 6 hours PRN  metoprolol 25 milliGRAM(s) Oral two times a day  oxyCODONE Solution 5 milliGRAM(s) Oral every 4 hours  morphine  - Injectable 1 milliGRAM(s) IV Push every 4 hours PRN  morphine  - Injectable 0.5 milliGRAM(s) IV Push every 4 hours PRN  megestrol Suspension 800 milliGRAM(s) Oral daily  cefTRIAXone   IVPB   IV Intermittent                                       9.2    22.5  )-----------( 113      ( 29 Jun 2017 06:38 )             28.1     06-29    136  |  94<L>  |  27<H>  ----------------------------<  112<H>  4.1   |  32<H>  |  1.10    Ca    8.7      29 Jun 2017 06:38  Phos  2.9     06-29  Mg     1.9     06-29    TPro  5.1<L>  /  Alb  2.7<L>  /  TBili  0.6  /  DBili  x   /  AST  30  /  ALT  16  /  AlkPhos  212<H>  06-29      CAPILLARY BLOOD GLUCOSE  113 (29 Jun 2017 21:17)  151 (29 Jun 2017 17:13)  141 (29 Jun 2017 11:21)  110 (29 Jun 2017 08:18)

## 2017-06-29 NOTE — PROGRESS NOTE ADULT - PROBLEM SELECTOR PLAN 5
Patient s/p CABG. Patient without chest pain. EKG without ischemic changes. EF EF 60-65%.   - c/w home Lipitor 40mg QHS  - c/w ASA   - c/w Metoprolol increased to 25mg BID  - Medically optimize cardiac function if BP allows   - Continue to monitor I's and O's

## 2017-06-29 NOTE — PROGRESS NOTE ADULT - SUBJECTIVE AND OBJECTIVE BOX
Chief Complaint/Reason for Consult: CAD  INTERVAL HPI: events noted for sundowning  	  MEDICATIONS:  amiodarone    Tablet 200 milliGRAM(s) Oral daily  furosemide    Tablet 20 milliGRAM(s) Oral two times a day  metoprolol 25 milliGRAM(s) Oral two times a day    cefTRIAXone   IVPB   IV Intermittent     guaiFENesin    Syrup 200 milliGRAM(s) Oral every 6 hours PRN    acetaminophen  Suppository 650 milliGRAM(s) Rectal every 6 hours PRN  meperidine     Injectable 25 milliGRAM(s) IV Push every 6 hours PRN  oxyCODONE Solution 5 milliGRAM(s) Oral every 4 hours  morphine  - Injectable 1 milliGRAM(s) IV Push every 4 hours PRN  morphine  - Injectable 0.5 milliGRAM(s) IV Push every 4 hours PRN    bisacodyl Suppository 10 milliGRAM(s) Rectal daily    insulin lispro (HumaLOG) corrective regimen sliding scale   SubCutaneous Before meals and at bedtime  atorvastatin 40 milliGRAM(s) Oral at bedtime  megestrol Suspension 800 milliGRAM(s) Oral daily    lidocaine   Patch 1 Patch Transdermal daily  apixaban 2.5 milliGRAM(s) Oral two times a day  aspirin enteric coated 81 milliGRAM(s) Oral daily  cyanocobalamin Injectable 1000 MICROGram(s) SubCutaneous daily      REVIEW OF SYSTEMS:  [x] As per HPI  CONSTITUTIONAL: No fever, weight loss, or fatigue  RESPIRATORY: No cough, wheezing, chills or hemoptysis; No Shortness of Breath  CARDIOVASCULAR: No chest pain, palpitations, dizziness, or leg swelling  GASTROINTESTINAL: No abdominal or epigastric pain. No nausea, vomiting, or hematemesis; No diarrhea or constipation. No melena or hematochezia.  MUSCULOSKELETAL: No joint pain or swelling; No muscle, back, or extremity pain  [x] All others negative	  [ ] Unable to obtain    PHYSICAL EXAM:  T(C): 36.8 (06-29-17 @ 16:01), Max: 37.1 (06-28-17 @ 21:31)  HR: 80 (06-29-17 @ 16:01) (80 - 110)  BP: 108/67 (06-29-17 @ 16:01) (107/60 - 124/60)  RR: 18 (06-29-17 @ 16:01) (17 - 20)  SpO2: 97% (06-29-17 @ 16:01) (92% - 98%)  Wt(kg): --  I&O's Summary        Appearance: Normal	  HEENT:   Normal oral mucosa  Cardiovascular: Normal S1 S2, No JVD, No murmurs, No edema  Respiratory: Lungs clear to auscultation	  Gastrointestinal:  Soft, Non-tender, + BS	  Extremities: Normal range of motion, No clubbing, cyanosis or edema  Vascular: Peripheral pulses palpable 2+ bilaterally    TELEMETRY: 	    ECG:    	  RADIOLOGY:   CXR:  CT:  US:    CARDIAC TESTING:  Echocardiogram:  Catheterization:  Stress Test:      LABS:	 	    CARDIAC MARKERS:                                  9.2    22.5  )-----------( 113      ( 29 Jun 2017 06:38 )             28.1     06-29    136  |  94<L>  |  27<H>  ----------------------------<  112<H>  4.1   |  32<H>  |  1.10    Ca    8.7      29 Jun 2017 06:38  Phos  2.9     06-29  Mg     1.9     06-29      proBNP:   Lipid Profile:   HgA1c:   TSH:     ASSESSMENT/PLAN: 	  Afib  - increase po BB as tolerated continue amio 200 qd for now.    Keep net euvolemic to net negative, strict I/Os

## 2017-06-29 NOTE — PROVIDER CONTACT NOTE (OTHER) - RECOMMENDATIONS
Spoke with Dr. Major. Hold medications until  evaluates
Dr. Ibarra need to evaluate patient for sepsis protocol. will continue monitor
MD assessment, EKG, labs.
MD assessment, increase O2
Monitor for signs of distress, MD assesment
will continue monitor.

## 2017-06-29 NOTE — PROGRESS NOTE ADULT - [facility] HE PE MLT D E PC
no rub
regular rate and rhythm/no rub
regular rate and rhythm
no rub
regular rate and rhythm

## 2017-06-29 NOTE — PROGRESS NOTE ADULT - SUBJECTIVE AND OBJECTIVE BOX
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 Patients family at bedside states she's been more lethargic with associated lower extremity swelling, SOB, decreased appetite and diarrhea. Patient with previous admissions for symptomatic hyperglycemia in January. Patient currently seeing Dr. Galaviz on a regular basis for chemotherapy treatment. Last treatment on May 23rd for which she received Bendamustine and Rituximab. Patient denies any headaches, chest pain, abdominal pain, N/V, dysuria, or bowel changes.     Upon arrival to the ED, patient in moderate respiratory distress using accessory muscles, breathing w/ a RR of 30, O2 93% on room air. Patient placed on BIPAP with slight improvement in respiratory status. Patient febrile to 101 with a HR of 77. Labs significant for AG metabolic acidosis, K 7.0, Na 127, BUN 58, Cr. 2.0 (baseline < 0.9 January), Lactate 2.8. CXR done showing RLL infiltrate with pulmonary vascular congestion. In the ED, patient received Vancomycin, Zosyn, Tylenol, Calcium Gluconate 2g, Insulin 5u, Albuterol neb 5mg x 2. ICU consulted, will be transferred to MICU for management of severe sepsis 2/2 HAP and pulmonary congestion. (10 Ramon 2017 19:13)    FAMILY HISTORY:  No pertinent family history in first degree relatives    MEDICATIONS  (STANDING):  amiodarone    Tablet 200 milliGRAM(s) Oral daily  insulin lispro (HumaLOG) corrective regimen sliding scale   SubCutaneous Before meals and at bedtime  atorvastatin 40 milliGRAM(s) Oral at bedtime  bisacodyl Suppository 10 milliGRAM(s) Rectal daily  lidocaine   Patch 1 Patch Transdermal daily  apixaban 2.5 milliGRAM(s) Oral two times a day  aspirin enteric coated 81 milliGRAM(s) Oral daily  cyanocobalamin Injectable 1000 MICROGram(s) SubCutaneous daily  furosemide    Tablet 20 milliGRAM(s) Oral two times a day  metoprolol 25 milliGRAM(s) Oral two times a day  oxyCODONE Solution 5 milliGRAM(s) Oral every 4 hours  megestrol Suspension 800 milliGRAM(s) Oral daily  cefTRIAXone   IVPB   IV Intermittent     MEDICATIONS  (PRN):  acetaminophen  Suppository 650 milliGRAM(s) Rectal every 6 hours PRN For Temp greater than 38 C (100.4 F)  guaiFENesin    Syrup 200 milliGRAM(s) Oral every 6 hours PRN Cough  meperidine     Injectable 25 milliGRAM(s) IV Push every 6 hours PRN Rigors  morphine  - Injectable 1 milliGRAM(s) IV Push every 4 hours PRN Excessive work of breathing / RR>35 / Resp distress / severe pain (7-10)  morphine  - Injectable 0.5 milliGRAM(s) IV Push every 4 hours PRN Dyspnea / Air hunger / Increased work of breathing / RR>25 / moderate pain (4-6)    Vital Signs Last 24 Hrs  T(C): 36.3 (29 Jun 2017 21:00), Max: 36.8 (29 Jun 2017 09:00)  T(F): 97.3 (29 Jun 2017 21:00), Max: 98.2 (29 Jun 2017 09:00)  HR: 105 (29 Jun 2017 21:00) (80 - 110)  BP: 108/63 (29 Jun 2017 21:00) (108/63 - 124/60)  BP(mean): --  RR: 18 (29 Jun 2017 21:00) (17 - 20)  SpO2: 97% (29 Jun 2017 21:00) (92% - 98%)    Physical exam:    Overall impression  Lymphadenopathy  Liver  spleen    Labs:  CBC Full  -  ( 29 Jun 2017 06:38 )  WBC Count : 22.5 K/uL  Hemoglobin : 9.2 g/dL  Hematocrit : 28.1 %  Platelet Count - Automated : 113 K/uL  Mean Cell Volume : 93.0 fL  Mean Cell Hemoglobin : 30.5 pg  Mean Cell Hemoglobin Concentration : 32.7 g/dL  Auto Neutrophil # : x  Auto Lymphocyte # : x  Auto Monocyte # : x  Auto Eosinophil # : x  Auto Basophil # : x  Auto Neutrophil % : 56.0 %  Auto Lymphocyte % : 19.0 %  Auto Monocyte % : 11.0 %  Auto Eosinophil % : x  Auto Basophil % : x    06-29    136  |  94<L>  |  27<H>  ----------------------------<  112<H>  4.1   |  32<H>  |  1.10    Ca    8.7      29 Jun 2017 06:38  Phos  2.9     06-29  Mg     1.9     06-29    TPro  5.1<L>  /  Alb  2.7<L>  /  TBili  0.6  /  DBili  x   /  AST  30  /  ALT  16  /  AlkPhos  212<H>  06-29      Radiology:  HEALTH ISSUES - R/O PROBLEM Dx:      Assessmant / Problems  Patient has an elevation of her WBC without associaterd fever  Ceftriaxone was restarted  Some halucinations  Ativan 0,5 mg ordered    Plan:    Thank you  Neha Galaviz MD

## 2017-06-29 NOTE — PROGRESS NOTE ADULT - PROBLEM SELECTOR PLAN 2
Improved. Initially requiring BIPAP and HFNC 2/2 pulmonary congestion/edema likely due to CHF exacerbation in the setting of urosepsis and line sepsis. Now tolerating regular NC. VQ done which was negative for PE.  - C/w nasal cannula  - Oxycodone 5mg solution for air hunger, increased work of breathing, comfort   - C/w nasal cannula  - Oxycodone 5mg solution for air hunger, increased work of breathing, comfort

## 2017-06-29 NOTE — PROGRESS NOTE ADULT - PROBLEM SELECTOR PLAN 1
POA,   -source was urosepsis w/ seeding to chemoport, which was removed on 6/12  -reschedule medication administration 2/2 BP fluctuations  -pt's WBC increased to ~22 today since admission to [facility]  -spoke to Dr. Shanks over the phone about plan of care--was advised to consult with Dr. Medina (ID) about next actions  -Spoke with Dr. Medina in person who does not feel comfortable discharging the patient with an elevated and uptrending white count--recs as follows.   -f/u RUQ U/S  -f/u urine culture  - Ceftriaxone 2 gm daily beginning at 7 pm tonight  -monitor  -left a message on the phone with Dr. Shanks making him aware of Dr. Medina's recs

## 2017-06-29 NOTE — PROGRESS NOTE ADULT - PROBLEM SELECTOR PLAN 1
POA,   -source was urosepsis w/ seeding to chemoport, which was removed on 6/12  -reschedule medication administration 2/2 BP fluctuations  -pt's WBC increased to ~22 today since admission to [facility]  -spoke to Dr. Shanks over the phone about plan of care--was advised to consult with Dr. Medina (ID) about next actions  -Spoke with Dr. Medina in person who does not feel comfortable discharging the patient with an elevated and uptrending white count--recs as follows.   -f/u RUQ U/S  -f/u urine culture  - Ceftriaxone 2 gm daily beginning at 7 pm tonight  -monitor  -left a message on the phone with Dr. Shanks making him aware of Dr. Medina's recs

## 2017-06-29 NOTE — PROVIDER CONTACT NOTE (OTHER) - ACTION/TREATMENT ORDERED:
If agitations increases, will administer Haldol or ativan IV.
Dr. Ibarra need to evaluate patient for sepsis protocol. will continue monitor
EKG, labs.
O2 increased to 6L NC
VS q30, notify MD of new onset changes
will continue monitor.

## 2017-06-29 NOTE — PROGRESS NOTE ADULT - SUBJECTIVE AND OBJECTIVE BOX
OVERNIGHT EVENTS:  Patient was very agitated overnight and refused medications. She was given 1 mg haldol and EKG was ordered to assess QT interval.    SUBJECTIVE / INTERVAL HPI: Patient seen and examined at bedside. She was resting comfortably    VITAL SIGNS:  Vital Signs Last 24 Hrs  T(C): 36.8 (29 Jun 2017 16:01), Max: 37.1 (28 Jun 2017 21:31)  T(F): 98.2 (29 Jun 2017 16:01), Max: 98.8 (28 Jun 2017 21:31)  HR: 80 (29 Jun 2017 16:01) (80 - 110)  BP: 108/67 (29 Jun 2017 16:01) (107/60 - 124/60)  BP(mean): --  RR: 18 (29 Jun 2017 16:01) (17 - 20)  SpO2: 97% (29 Jun 2017 16:01) (92% - 98%)    PHYSICAL EXAM:    General: WDWN  HEENT: NC/AT; PERRL, anicteric sclera; MMM  Neck: supple  Cardiovascular: +S1/S2; RRR  Respiratory: CTA B/L; no W/R/R  Gastrointestinal: soft, NT/ND; +BSx4  Extremities: WWP; no edema, clubbing or cyanosis  Vascular: 2+ radial, DP/PT pulses B/L  Neurological: AAOx3; no focal deficits    MEDICATIONS:  MEDICATIONS  (STANDING):  amiodarone    Tablet 200 milliGRAM(s) Oral daily  insulin lispro (HumaLOG) corrective regimen sliding scale   SubCutaneous Before meals and at bedtime  atorvastatin 40 milliGRAM(s) Oral at bedtime  bisacodyl Suppository 10 milliGRAM(s) Rectal daily  lidocaine   Patch 1 Patch Transdermal daily  apixaban 2.5 milliGRAM(s) Oral two times a day  aspirin enteric coated 81 milliGRAM(s) Oral daily  cyanocobalamin Injectable 1000 MICROGram(s) SubCutaneous daily  furosemide    Tablet 20 milliGRAM(s) Oral two times a day  metoprolol 25 milliGRAM(s) Oral two times a day  oxyCODONE Solution 5 milliGRAM(s) Oral every 4 hours  megestrol Suspension 800 milliGRAM(s) Oral daily  cefTRIAXone   IVPB   IV Intermittent     MEDICATIONS  (PRN):  acetaminophen  Suppository 650 milliGRAM(s) Rectal every 6 hours PRN For Temp greater than 38 C (100.4 F)  guaiFENesin    Syrup 200 milliGRAM(s) Oral every 6 hours PRN Cough  meperidine     Injectable 25 milliGRAM(s) IV Push every 6 hours PRN Rigors  morphine  - Injectable 1 milliGRAM(s) IV Push every 4 hours PRN Excessive work of breathing / RR>35 / Resp distress / severe pain (7-10)  morphine  - Injectable 0.5 milliGRAM(s) IV Push every 4 hours PRN Dyspnea / Air hunger / Increased work of breathing / RR>25 / moderate pain (4-6)      ALLERGIES:  Allergies    IV CONtRAST (Flushing (Skin); Rash)  No Known Drug Allergies    Intolerances        LABS:                        9.2    22.5  )-----------( 113      ( 29 Jun 2017 06:38 )             28.1     06-29    136  |  94<L>  |  27<H>  ----------------------------<  112<H>  4.1   |  32<H>  |  1.10    Ca    8.7      29 Jun 2017 06:38  Phos  2.9     06-29  Mg     1.9     06-29          CAPILLARY BLOOD GLUCOSE  151 (29 Jun 2017 17:13)          RADIOLOGY & ADDITIONAL TESTS: Reviewed.    ASSESSMENT:    PLAN: OVERNIGHT EVENTS:  Patient was very agitated overnight and refused medications. She was given 1 mg haldol to calm her agitation. EKG was ordered to assess QT interval.    SUBJECTIVE / INTERVAL HPI: Patient seen and examined at bedside, laying comfortably in bed. Pt was irritated and reluctant to respond to interview regarding her agitation the night before. Denies chest pain, fevers, chills, headache, SOB.    During the day, it was noted her WBC count was increased, but patient remained asymptomatic with no new complaints.    VITAL SIGNS:  Vital Signs Last 24 Hrs  T(C): 36.8 (29 Jun 2017 16:01), Max: 37.1 (28 Jun 2017 21:31)  T(F): 98.2 (29 Jun 2017 16:01), Max: 98.8 (28 Jun 2017 21:31)  HR: 80 (29 Jun 2017 16:01) (80 - 110)  BP: 108/67 (29 Jun 2017 16:01) (107/60 - 124/60)  BP(mean): --  RR: 18 (29 Jun 2017 16:01) (17 - 20)  SpO2: 97% (29 Jun 2017 16:01) (92% - 98%)    PHYSICAL EXAM:    General: frail, NAD, resting comfortably  HEENT: NC/AT; PERRL, anicteric sclera; mucous membranes dry  Neck: supple  Cardiovascular: ireggularly irregular, no murmurs, rubs, gallops  Respiratory: bibasilar crackles, no wheezes, rhonchi, rales  Gastrointestinal: soft, NT/ND; +BSx4  Extremities: mild pitting edema b/l LE, +1 pedal pulses  Neurological: AAOx2; no focal deficits    MEDICATIONS:  MEDICATIONS  (STANDING):  amiodarone    Tablet 200 milliGRAM(s) Oral daily  insulin lispro (HumaLOG) corrective regimen sliding scale   SubCutaneous Before meals and at bedtime  atorvastatin 40 milliGRAM(s) Oral at bedtime  bisacodyl Suppository 10 milliGRAM(s) Rectal daily  lidocaine   Patch 1 Patch Transdermal daily  apixaban 2.5 milliGRAM(s) Oral two times a day  aspirin enteric coated 81 milliGRAM(s) Oral daily  cyanocobalamin Injectable 1000 MICROGram(s) SubCutaneous daily  furosemide    Tablet 20 milliGRAM(s) Oral two times a day  metoprolol 25 milliGRAM(s) Oral two times a day  oxyCODONE Solution 5 milliGRAM(s) Oral every 4 hours  megestrol Suspension 800 milliGRAM(s) Oral daily  cefTRIAXone   IVPB   IV Intermittent     MEDICATIONS  (PRN):  acetaminophen  Suppository 650 milliGRAM(s) Rectal every 6 hours PRN For Temp greater than 38 C (100.4 F)  guaiFENesin    Syrup 200 milliGRAM(s) Oral every 6 hours PRN Cough  meperidine     Injectable 25 milliGRAM(s) IV Push every 6 hours PRN Rigors  morphine  - Injectable 1 milliGRAM(s) IV Push every 4 hours PRN Excessive work of breathing / RR>35 / Resp distress / severe pain (7-10)  morphine  - Injectable 0.5 milliGRAM(s) IV Push every 4 hours PRN Dyspnea / Air hunger / Increased work of breathing / RR>25 / moderate pain (4-6)      ALLERGIES:  Allergies    IV CONtRAST (Flushing (Skin); Rash)  No Known Drug Allergies    Intolerances        LABS:                        9.2    22.5  )-----------( 113      ( 29 Jun 2017 06:38 )             28.1     06-29    136  |  94<L>  |  27<H>  ----------------------------<  112<H>  4.1   |  32<H>  |  1.10    Ca    8.7      29 Jun 2017 06:38  Phos  2.9     06-29  Mg     1.9     06-29        CAPILLARY BLOOD GLUCOSE  151 (29 Jun 2017 17:13)        RADIOLOGY & ADDITIONAL TESTS: Reviewed.

## 2017-06-29 NOTE — PROVIDER CONTACT NOTE (OTHER) - BACKGROUND
hx of agitation at night, also when  not around
O2 saturation at 91% on 5L NC, HR elevated at 110
PMH CAD, HTN, A-fib, on heparin drip at 15/cc hr. Admitted for hyperkalemia and urosepsis.
Patient admitted with hyperkalemia. past medical history: breast cancer, HTN, CAD, Afib.
Pt admitted for sepsis, abx completed and afebrile, currently being treated for acute pulmonary edema, on lasix
Patient came with Hyperkalemia. Past medical history Afib, HTN, breast cancer

## 2017-06-29 NOTE — PROGRESS NOTE ADULT - ASSESSMENT
86F with PMHx of CAD s/p CABG, Afib s/p PPM (on Eliquis), breast cancer, large B cell lymphoma on chemo, HFpEF, HTN, HLD, DM presented with severe sepsis 2/2 UTI, persistent bacteremia, initially admitted to the MICU s/p chemo port removal (which was infected), stepped down to [facility] then RMF.

## 2017-06-29 NOTE — PROVIDER CONTACT NOTE (OTHER) - SITUATION
Pt very agitated when vitals being taken
Change in VS
MEWs score of 7
Mews score 7. Temp. 99.0 oral,  irregular, BP 97/54, RR 18, SpO2 99 via NC.
Patient expressing anxiety about  leaving and complaining of chest pain. VSS.
Patient mews score 7. /56, RR22, , SpO2 94 via NC
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
## 2017-06-29 NOTE — PROGRESS NOTE ADULT - SUBJECTIVE AND OBJECTIVE BOX
Patient seen and examined at bedside. Eats ok, weak, slow to respond, less sob      amiodarone    Tablet 200 daily  insulin lispro (HumaLOG) corrective regimen sliding scale  Before meals and at bedtime  atorvastatin 40 at bedtime  acetaminophen  Suppository 650 every 6 hours PRN  bisacodyl Suppository 10 daily  lidocaine   Patch 1 daily  guaiFENesin    Syrup 200 every 6 hours PRN  apixaban 2.5 two times a day  aspirin enteric coated 81 daily  cyanocobalamin Injectable 1000 daily  furosemide    Tablet 20 two times a day  meperidine     Injectable 25 every 6 hours PRN  metoprolol 25 two times a day  oxyCODONE Solution 5 every 4 hours  morphine  - Injectable 1 every 4 hours PRN  morphine  - Injectable 0.5 every 4 hours PRN  megestrol Suspension 800 daily      Allergies    IV CONtRAST (Flushing (Skin); Rash)  No Known Drug Allergies    Intolerances    ROS: weak, sob, decreased appetite, BM ok, urine comes  out ok, no chest pain, poorly oriented, distant, responds to     T(C): , Max: 37.1 (06-28-17 @ 21:31)  T(F): , Max: 98.8 (06-28-17 @ 21:31)  HR: 98 (06-29-17 @ 09:00)  BP: 112/55 (06-29-17 @ 09:00)  BP(mean): --  RR: 17 (06-29-17 @ 09:00)  SpO2: 98% (06-29-17 @ 09:00)  Wt(kg):           LABS:                        9.2    22.5  )-----------( 113      ( 29 Jun 2017 06:38 )             28.1     06-29    136  |  94<L>  |  27<H>  ----------------------------<  112<H>  4.1   |  32<H>  |  1.10    Ca    8.7      29 Jun 2017 06:38  Phos  2.9     06-29  Mg     1.9     06-29                  RADIOLOGY & ADDITIONAL STUDIES:

## 2017-06-29 NOTE — PROVIDER CONTACT NOTE (OTHER) - ASSESSMENT
Mews score 7. Temp. 99.0 oral,  irregular, BP 97/54, RR 18, SpO2 99 via NC.
Pt tachy, other VSS
, RR 21
Patient anxious, c/o of midsternal chest pain, VSS, satting above 90% on 4L.
Patient mews score 7. /56, RR22, , SpO2 94 via NC
Pt asymptomatic but with diaphragmatic breathing

## 2017-06-30 DIAGNOSIS — D72.829 ELEVATED WHITE BLOOD CELL COUNT, UNSPECIFIED: ICD-10-CM

## 2017-06-30 LAB
ANION GAP SERPL CALC-SCNC: 12 MMOL/L — SIGNIFICANT CHANGE UP (ref 5–17)
BUN SERPL-MCNC: 26 MG/DL — HIGH (ref 7–23)
CALCIUM SERPL-MCNC: 8.6 MG/DL — SIGNIFICANT CHANGE UP (ref 8.4–10.5)
CHLORIDE SERPL-SCNC: 99 MMOL/L — SIGNIFICANT CHANGE UP (ref 96–108)
CO2 SERPL-SCNC: 28 MMOL/L — SIGNIFICANT CHANGE UP (ref 22–31)
CREAT SERPL-MCNC: 0.9 MG/DL — SIGNIFICANT CHANGE UP (ref 0.5–1.3)
GLUCOSE SERPL-MCNC: 124 MG/DL — HIGH (ref 70–99)
HCT VFR BLD CALC: 26.7 % — LOW (ref 34.5–45)
HCT VFR BLD CALC: 28.5 % — LOW (ref 34.5–45)
HGB BLD-MCNC: 8.3 G/DL — LOW (ref 11.5–15.5)
HGB BLD-MCNC: 9 G/DL — LOW (ref 11.5–15.5)
MAGNESIUM SERPL-MCNC: 1.9 MG/DL — SIGNIFICANT CHANGE UP (ref 1.6–2.6)
MAGNESIUM SERPL-MCNC: 1.9 MG/DL — SIGNIFICANT CHANGE UP (ref 1.6–2.6)
MCHC RBC-ENTMCNC: 29.1 PG — SIGNIFICANT CHANGE UP (ref 27–34)
MCHC RBC-ENTMCNC: 29.8 PG — SIGNIFICANT CHANGE UP (ref 27–34)
MCHC RBC-ENTMCNC: 31.1 G/DL — LOW (ref 32–36)
MCHC RBC-ENTMCNC: 31.6 G/DL — LOW (ref 32–36)
MCV RBC AUTO: 93.7 FL — SIGNIFICANT CHANGE UP (ref 80–100)
MCV RBC AUTO: 94.4 FL — SIGNIFICANT CHANGE UP (ref 80–100)
PLATELET # BLD AUTO: 119 K/UL — LOW (ref 150–400)
PLATELET # BLD AUTO: 122 K/UL — LOW (ref 150–400)
POTASSIUM SERPL-MCNC: 4.2 MMOL/L — SIGNIFICANT CHANGE UP (ref 3.5–5.3)
POTASSIUM SERPL-SCNC: 4.2 MMOL/L — SIGNIFICANT CHANGE UP (ref 3.5–5.3)
RBC # BLD: 2.85 M/UL — LOW (ref 3.8–5.2)
RBC # BLD: 3.02 M/UL — LOW (ref 3.8–5.2)
RBC # FLD: 20.2 % — HIGH (ref 10.3–16.9)
RBC # FLD: 20.5 % — HIGH (ref 10.3–16.9)
SODIUM SERPL-SCNC: 139 MMOL/L — SIGNIFICANT CHANGE UP (ref 135–145)
WBC # BLD: 23.6 K/UL — HIGH (ref 3.8–10.5)
WBC # BLD: 24.4 K/UL — HIGH (ref 3.8–10.5)
WBC # FLD AUTO: 23.6 K/UL — HIGH (ref 3.8–10.5)
WBC # FLD AUTO: 24.4 K/UL — HIGH (ref 3.8–10.5)

## 2017-06-30 PROCEDURE — 93306 TTE W/DOPPLER COMPLETE: CPT | Mod: 26

## 2017-06-30 PROCEDURE — 99232 SBSQ HOSP IP/OBS MODERATE 35: CPT | Mod: GC

## 2017-06-30 PROCEDURE — 76705 ECHO EXAM OF ABDOMEN: CPT | Mod: 26

## 2017-06-30 RX ORDER — SODIUM CHLORIDE 9 MG/ML
500 INJECTION INTRAMUSCULAR; INTRAVENOUS; SUBCUTANEOUS ONCE
Qty: 0 | Refills: 0 | Status: COMPLETED | OUTPATIENT
Start: 2017-06-30 | End: 2017-06-30

## 2017-06-30 RX ADMIN — SODIUM CHLORIDE 1000 MILLILITER(S): 9 INJECTION INTRAMUSCULAR; INTRAVENOUS; SUBCUTANEOUS at 11:21

## 2017-06-30 RX ADMIN — LIDOCAINE 1 PATCH: 4 CREAM TOPICAL at 15:55

## 2017-06-30 RX ADMIN — Medication 81 MILLIGRAM(S): at 15:55

## 2017-06-30 RX ADMIN — OXYCODONE HYDROCHLORIDE 5 MILLIGRAM(S): 5 TABLET ORAL at 01:21

## 2017-06-30 RX ADMIN — Medication 20 MILLIGRAM(S): at 06:10

## 2017-06-30 RX ADMIN — OXYCODONE HYDROCHLORIDE 5 MILLIGRAM(S): 5 TABLET ORAL at 07:33

## 2017-06-30 RX ADMIN — OXYCODONE HYDROCHLORIDE 5 MILLIGRAM(S): 5 TABLET ORAL at 16:07

## 2017-06-30 RX ADMIN — Medication 10 MILLIGRAM(S): at 16:08

## 2017-06-30 RX ADMIN — OXYCODONE HYDROCHLORIDE 5 MILLIGRAM(S): 5 TABLET ORAL at 06:11

## 2017-06-30 RX ADMIN — APIXABAN 2.5 MILLIGRAM(S): 2.5 TABLET, FILM COATED ORAL at 15:54

## 2017-06-30 RX ADMIN — AMIODARONE HYDROCHLORIDE 200 MILLIGRAM(S): 400 TABLET ORAL at 06:11

## 2017-06-30 RX ADMIN — PREGABALIN 1000 MICROGRAM(S): 225 CAPSULE ORAL at 15:55

## 2017-06-30 RX ADMIN — APIXABAN 2.5 MILLIGRAM(S): 2.5 TABLET, FILM COATED ORAL at 00:35

## 2017-06-30 RX ADMIN — OXYCODONE HYDROCHLORIDE 5 MILLIGRAM(S): 5 TABLET ORAL at 00:36

## 2017-06-30 RX ADMIN — ATORVASTATIN CALCIUM 40 MILLIGRAM(S): 80 TABLET, FILM COATED ORAL at 00:36

## 2017-06-30 RX ADMIN — Medication 25 MILLIGRAM(S): at 06:11

## 2017-06-30 RX ADMIN — OXYCODONE HYDROCHLORIDE 5 MILLIGRAM(S): 5 TABLET ORAL at 19:13

## 2017-06-30 RX ADMIN — CEFTRIAXONE 100 GRAM(S): 500 INJECTION, POWDER, FOR SOLUTION INTRAMUSCULAR; INTRAVENOUS at 15:55

## 2017-06-30 NOTE — PROGRESS NOTE ADULT - SUBJECTIVE AND OBJECTIVE BOX
Patient seen and examined at bedside. cough, weak, decreased appetite      amiodarone    Tablet 200 daily  insulin lispro (HumaLOG) corrective regimen sliding scale  Before meals and at bedtime  atorvastatin 40 at bedtime  acetaminophen  Suppository 650 every 6 hours PRN  bisacodyl Suppository 10 daily  lidocaine   Patch 1 daily  guaiFENesin    Syrup 200 every 6 hours PRN  apixaban 2.5 two times a day  aspirin enteric coated 81 daily  cyanocobalamin Injectable 1000 daily  furosemide    Tablet 20 two times a day  meperidine     Injectable 25 every 6 hours PRN  metoprolol 25 two times a day  oxyCODONE Solution 5 every 4 hours  morphine  - Injectable 1 every 4 hours PRN  morphine  - Injectable 0.5 every 4 hours PRN  megestrol Suspension 800 daily  cefTRIAXone   IVPB 2 every 24 hours  cefTRIAXone   IVPB        Allergies    IV CONtRAST (Flushing (Skin); Rash)  No Known Drug Allergies    Intolerances        T(C): , Max: 36.8 (06-29-17 @ 16:01)  T(F): , Max: 98.2 (06-29-17 @ 16:01)  HR: 89 (06-30-17 @ 11:28)  BP: 98/62 (06-30-17 @ 11:28)  BP(mean): --  RR: 18 (06-30-17 @ 10:07)  SpO2: 96% (06-30-17 @ 10:07)  Wt(kg): --    ROS: still weak, no appetite, cough, brings up phelgm, BM ok, urine ok, wants expectorant, mostly in chair      LABS:                        9.0    23.6  )-----------( 122      ( 30 Jun 2017 12:33 )             28.5     06-30    139  |  99  |  26<H>  ----------------------------<  124<H>  4.2   |  28  |  0.90    Ca    8.6      30 Jun 2017 12:33  Phos  2.9     06-29  Mg     1.9     06-30    TPro  5.1<L>  /  Alb  2.7<L>  /  TBili  0.6  /  DBili  x   /  AST  30  /  ALT  16  /  AlkPhos  212<H>  06-29                RADIOLOGY & ADDITIONAL STUDIES:

## 2017-06-30 NOTE — CONSULT NOTE ADULT - SUBJECTIVE AND OBJECTIVE BOX
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 Patients family at bedside states she's been more lethargic with associated lower extremity swelling, SOB, decreased appetite and diarrhea. Patient with previous admissions for symptomatic hyperglycemia in January. Patient currently seeing Dr. Galaviz on a regular basis for chemotherapy treatment. Last treatment on May 23rd for which she received Bendamustine and Rituximab. Patient denies any headaches, chest pain, abdominal pain, N/V, dysuria, or bowel changes.     Upon arrival to the ED, patient in moderate respiratory distress using accessory muscles, breathing w/ a RR of 30, O2 93% on room air. Patient placed on BIPAP with slight improvement in respiratory status. Patient febrile to 101 with a HR of 77. Labs significant for AG metabolic acidosis, K 7.0, Na 127, BUN 58, Cr. 2.0 (baseline < 0.9 January), Lactate 2.8. CXR done showing RLL infiltrate with pulmonary vascular congestion. In the ED, patient received Vancomycin, Zosyn, Tylenol, Calcium Gluconate 2g, Insulin 5u, Albuterol neb 5mg x 2. ICU consulted, will be transferred to MICU for management of severe sepsis 2/2 HAP and pulmonary congestion. (10 Ramon 2017 19:13)     Called by Dr Chin due to hallucinations and confusion- no obvious seizure activity      Allergies  IV CONtRAST (Flushing (Skin); Rash)  No Known Drug Allergies      Health Issues  HYPERKALEMIA  H/o or current diagnosis of HF- Contraindication to ACEI/ARBs  H/o or current diagnosis of HF- ACEI/ARB contraindication unknown  H/o or current diagnosis of HF- Contraindication to ACEI/ARBs  H/o or current diagnosis of HF- ACEI/ARB contraindication unknown  H/o or current diagnosis of HF- Contraindication to ACEI/ARBs  H/o or current diagnosis of HF- ACEI/ARB contraindication unknown  H/o or current diagnosis of HF- Contraindication to ACEI/ARBs  H/o or current diagnosis of HF- ACEI/ARB contraindication unknown  H/o or current diagnosis of HF- Contraindication to ACEI/ARBs  H/o or current diagnosis of HF- ACEI/ARB contraindication unknown  H/o or current diagnosis of HF- Contraindication to ACEI/ARBs  H/o or current diagnosis of HF- ACEI/ARB contraindication unknown  Yes  No pertinent family history in first degree relatives  Handoff  MEWS Score  Scoliosis  Follicular lymphoma  Neck mass  Afib  Other hyperlipidemia  Breast cancer  CAD (coronary artery disease)  Diabetes  HTN (hypertension)  No pertinent past medical history  Afib  Severe sepsis  Hyperkalemia  Sepsis due to Escherichia coli  Anemia  Hyponatremia  Acute pulmonary edema  Need for prophylactic measure  Nutrition, metabolism, and development symptoms  Anemia, unspecified type  Malignant neoplasm of female breast, unspecified laterality, unspecified site of breast  Diffuse large B-cell lymphoma, unspecified body region  Coronary artery disease involving native coronary artery of native heart without angina pectoris  Paroxysmal atrial fibrillation  Acute respiratory failure with hypoxia  Sepsis  Lymphoma  Severe sepsis  Central venous line infection, initial encounter  E coli bacteremia  Medical orders for life-sustaining treatment (MOLST) form in chart  DNR (do not resuscitate)  Chronic back pain  Dyspnea and respiratory abnormalities  Goals of care, counseling/discussion  Patient has healthcare proxy  Moderate protein-calorie malnutrition  Impaired mobility and activities of daily living  Full code status  Palliative care encounter  Breast cancer  Diffuse large B cell lymphoma  Bacteremia due to Gram-negative bacteria  Shortness of breath  Hyperkalemia  ROMEO (acute kidney injury)  H/O abdominal hysterectomy  H/O thyroidectomy  S/P CABG x 3  WEAKNESS  90+  ROMEO (acute kidney injury)  Type 2 diabetes mellitus with complication, without long-term current use of insulin  Essential hypertension  Diffuse large B-cell lymphoma, unspecified body region  Paroxysmal atrial fibrillation  Hyponatremia  Pneumonia of right lower lobe due to infectious organism  Acute renal failure, unspecified acute renal failure type        FAMILY HISTORY:  No pertinent family history in first degree relatives      MEDICATIONS  (STANDING):  amiodarone    Tablet 200 milliGRAM(s) Oral daily  insulin lispro (HumaLOG) corrective regimen sliding scale   SubCutaneous Before meals and at bedtime  atorvastatin 40 milliGRAM(s) Oral at bedtime  bisacodyl Suppository 10 milliGRAM(s) Rectal daily  lidocaine   Patch 1 Patch Transdermal daily  apixaban 2.5 milliGRAM(s) Oral two times a day  aspirin enteric coated 81 milliGRAM(s) Oral daily  cyanocobalamin Injectable 1000 MICROGram(s) SubCutaneous daily  furosemide    Tablet 20 milliGRAM(s) Oral two times a day  metoprolol 25 milliGRAM(s) Oral two times a day  oxyCODONE Solution 5 milliGRAM(s) Oral every 4 hours  megestrol Suspension 800 milliGRAM(s) Oral daily  cefTRIAXone   IVPB 2 Gram(s) IV Intermittent every 24 hours  cefTRIAXone   IVPB   IV Intermittent     MEDICATIONS  (PRN):  acetaminophen  Suppository 650 milliGRAM(s) Rectal every 6 hours PRN For Temp greater than 38 C (100.4 F)  guaiFENesin    Syrup 200 milliGRAM(s) Oral every 6 hours PRN Cough  meperidine     Injectable 25 milliGRAM(s) IV Push every 6 hours PRN Rigors  morphine  - Injectable 1 milliGRAM(s) IV Push every 4 hours PRN Excessive work of breathing / RR>35 / Resp distress / severe pain (7-10)  morphine  - Injectable 0.5 milliGRAM(s) IV Push every 4 hours PRN Dyspnea / Air hunger / Increased work of breathing / RR>25 / moderate pain (4-6)      PAST MEDICAL & SURGICAL HISTORY:  Scoliosis  Follicular lymphoma  Neck mass  Afib  Other hyperlipidemia  Breast cancer  CAD (coronary artery disease)  Diabetes  HTN (hypertension)  H/O abdominal hysterectomy  H/O thyroidectomy  S/P CABG x 3      Labs                          8.3    24.4  )-----------( 119      ( 30 Jun 2017 03:19 )             26.7     06-29    136  |  94<L>  |  27<H>  ----------------------------<  112<H>  4.1   |  32<H>  |  1.10    Ca    8.7      29 Jun 2017 06:38  Phos  2.9     06-29  Mg     1.9     06-30    TPro  5.1<L>  /  Alb  2.7<L>  /  TBili  0.6  /  DBili  x   /  AST  30  /  ALT  16  /  AlkPhos  212<H>  06-29      Radiology:    Physical Exam    MENTAL STATUS  -Level of Consciousness- awake      Cranial Nerve 1- 12  Pupils- equal and reactive  Eye movements-full  Facial - no asymmetry   Lower CN-nl    Gait and Station-n/a    MOTOR  Upper- no focal weakness  Lower-no focal weakness    Reflexes- decreased    Sensation- no sensory level    Cerebellar-no tremors    vascular -no bruits    Assessment- More likely toxic metabolic- no focal structural deficits likely    Plan CT head- no contrast   EEG if recurrent episodes

## 2017-06-30 NOTE — PROGRESS NOTE ADULT - NEUROLOGICAL DETAILS
responds to verbal commands/responds to pain
disoriented
responds to verbal commands
responds to verbal commands/responds to pain
alert and oriented x 3

## 2017-06-30 NOTE — CONSULT NOTE ADULT - PROBLEM SELECTOR RECOMMENDATION 9
Right upper quadrant ultrasound,if this is negative obtain CAT scan of the abdomen  Continue Ceftriaxone Right upper quadrant ultrasound,if this is negative obtain CAT scan of the abdomen  Continue Ceftriaxone  TTE

## 2017-06-30 NOTE — PROGRESS NOTE ADULT - CONSTITUTIONAL
detailed exam

## 2017-06-30 NOTE — CONSULT NOTE ADULT - SUBJECTIVE AND OBJECTIVE BOX
Consultation note    ANTIBIOTICS    MEDICATIONS  (STANDING):  amiodarone    Tablet 200 milliGRAM(s) Oral daily  insulin lispro (HumaLOG) corrective regimen sliding scale   SubCutaneous Before meals and at bedtime  atorvastatin 40 milliGRAM(s) Oral at bedtime  bisacodyl Suppository 10 milliGRAM(s) Rectal daily  lidocaine   Patch 1 Patch Transdermal daily  apixaban 2.5 milliGRAM(s) Oral two times a day  aspirin enteric coated 81 milliGRAM(s) Oral daily  cyanocobalamin Injectable 1000 MICROGram(s) SubCutaneous daily  furosemide    Tablet 20 milliGRAM(s) Oral two times a day  metoprolol 25 milliGRAM(s) Oral two times a day  oxyCODONE Solution 5 milliGRAM(s) Oral every 4 hours  megestrol Suspension 800 milliGRAM(s) Oral daily  cefTRIAXone   IVPB 2 Gram(s) IV Intermittent every 24 hours  cefTRIAXone   IVPB   IV Intermittent     MEDICATIONS  (PRN):  acetaminophen  Suppository 650 milliGRAM(s) Rectal every 6 hours PRN For Temp greater than 38 C (100.4 F)  guaiFENesin    Syrup 200 milliGRAM(s) Oral every 6 hours PRN Cough  meperidine     Injectable 25 milliGRAM(s) IV Push every 6 hours PRN Rigors  morphine  - Injectable 1 milliGRAM(s) IV Push every 4 hours PRN Excessive work of breathing / RR>35 / Resp distress / severe pain (7-10)  morphine  - Injectable 0.5 milliGRAM(s) IV Push every 4 hours PRN Dyspnea / Air hunger / Increased work of breathing / RR>25 / moderate pain (4-6)      Allergies    IV CONtRAST (Flushing (Skin); Rash)  No Known Drug Allergies    Intolerances        REVIEW OF SYSTEMS:    Constitutional: No fever, weight loss or fatigue  Eyes: No eye pain, visual disturbances, or discharge  ENMT:  No difficulty hearing, tinnitus, vertigo; No sinus or throat pain  Neck: No pain or stiffness  Respiratory: No cough, wheezing, chills or hemoptysis  Cardiovascular: No chest pain, palpitations, shortness of breath, dizziness or leg swelling  Gastrointestinal: No abdominal or epigastric pain. No nausea, vomiting or hematemesis; No diarrhea or constipation. No melena or hematochezia.  Genitourinary: No dysuria, frequency, hematuria or incontinence    Vital Signs Last 24 Hrs  T(C): 36.8 (30 Jun 2017 10:07), Max: 36.8 (29 Jun 2017 16:01)  T(F): 98.2 (30 Jun 2017 10:07), Max: 98.2 (29 Jun 2017 16:01)  HR: 89 (30 Jun 2017 11:28) (80 - 105)  BP: 98/62 (30 Jun 2017 11:28) (92/53 - 117/70)  BP(mean): --  RR: 18 (30 Jun 2017 10:07) (18 - 19)  SpO2: 96% (30 Jun 2017 10:07) (93% - 97%)    PHYSICAL EXAM:    General:  in no acute distress  Eyes: PERRL, EOM intact; conjunctiva and sclera clear  Head: Normocephalic; atraumatic  ENMT: No nasal discharge; airway clear  Neck: Supple; non tender; no masses  Respiratory: No wheezes, rales or rhonchi  Cardiovascular: Regular rate and rhythm. S1 and S2 Normal; No murmurs, gallops or rubs  Gastrointestinal: Soft non-tender non-distended; Normal bowel sounds; No hepatosplenomegaly  Genitourinary: No costovertebral angle tenderness  Extremities: Normal range of motion, No clubbing, cyanosis or edema  Vascular: Peripheral pulses palpable 2+ bilaterally  Neurological: Alert and oriented x3  Skin: Warm and dry. No acute rash  Lymph Nodes: No acute cervical adenopathy  Musculoskeletal: Normal gait, tone, without deformities    LABS:                        9.0    23.6  )-----------( 122      ( 30 Jun 2017 12:33 )             28.5     06-30    139  |  99  |  26<H>  ----------------------------<  124<H>  4.2   |  28  |  0.90    Ca    8.6      30 Jun 2017 12:33  Phos  2.9     06-29  Mg     1.9     06-30    TPro  5.1<L>  /  Alb  2.7<L>  /  TBili  0.6  /  DBili  x   /  AST  30  /  ALT  16  /  AlkPhos  212<H>  06-29            MICROBIOLOGY:      RADIOLOGY & ADDITIONAL STUDIES:

## 2017-06-30 NOTE — CONSULT NOTE ADULT - PROBLEM SELECTOR PROBLEM 2
Leukocytosis, unspecified type
Bacteremia due to Gram-negative bacteria
Central venous line infection, initial encounter
Hyperkalemia

## 2017-06-30 NOTE — PROGRESS NOTE ADULT - CONSTITUTIONAL DETAILS
distress due to pain
respiratory distress

## 2017-06-30 NOTE — CONSULT NOTE ADULT - ASSESSMENT
86y Female with a Hx of CAD s/p CABG, Afib s/p PPM (on Eliquis), breast cancer, large B cell lymphoma on chemo but in remission, HFpEF, HTN, HLD, DM, and chronic low back pain presented with severe sepsis 2/2 UTI, and respiratory failure 2/2 pulmonary vascular congestion found to have E coli bacteremia and infected chemo-port.
87 YO F h/o CAD s/p CABG, atrial fibrillation s/p PPM (on Eliquis), Breast CA dx in 1997 with possible recurrence 5 months ago (now on Arimidex x 5 months), DLBCL dx 12/2016 on chemotherapy (non-cardiotoxic Bendamustin plus Rituximab - last tx 5/23), HFpEF (EF 60%), HTN, HLD, and DM admitted for severe sepsis 2/2 UTI vs colitis with GNR bacteremia and acute CHF exacerbation
87 y/o female presenting with shortness of breath and ROMEO and hyperkalemia.
increasing WBC    R/o occult infection

## 2017-06-30 NOTE — PROGRESS NOTE ADULT - SUBJECTIVE AND OBJECTIVE BOX
OVERNIGHT EVENTS:  Per nursing, patient was agitated and confused overnight.     SUBJECTIVE / INTERVAL HPI: Patient seen and examined at bedside, laying comfortably in bed. Multiple attempts were made to speak with patient but she was in and out of sleep and could not effectively respond to questions. She was in no acute distress. No coughing, discomfort, shortness of breath was noted.      VITAL SIGNS:  Vital Signs Last 24 Hrs  T(C): 36.8 (29 Jun 2017 16:01), Max: 37.1 (28 Jun 2017 21:31)  T(F): 98.2 (29 Jun 2017 16:01), Max: 98.8 (28 Jun 2017 21:31)  HR: 80 (29 Jun 2017 16:01) (80 - 110)  BP: 108/67 (29 Jun 2017 16:01) (107/60 - 124/60)  BP(mean): --  RR: 18 (29 Jun 2017 16:01) (17 - 20)  SpO2: 97% (29 Jun 2017 16:01) (92% - 98%)    PHYSICAL EXAM:    General: frail, NAD, resting comfortably  HEENT: NC/AT; PERRL, anicteric sclera;  Neck: supple  Cardiovascular: ireggularly irregular, no rubs, gallops  Respiratory: bibasilar crackles, no wheezes, rhonchi, rales  Gastrointestinal: soft, NT/ND; +BSx4  Extremities: +1 pitting edema b/l LE, pedal pulses not appreciated  Neurological: moving all four extremities, no focal defecits    MEDICATIONS:  MEDICATIONS  (STANDING):  amiodarone    Tablet 200 milliGRAM(s) Oral daily  insulin lispro (HumaLOG) corrective regimen sliding scale   SubCutaneous Before meals and at bedtime  atorvastatin 40 milliGRAM(s) Oral at bedtime  bisacodyl Suppository 10 milliGRAM(s) Rectal daily  lidocaine   Patch 1 Patch Transdermal daily  apixaban 2.5 milliGRAM(s) Oral two times a day  aspirin enteric coated 81 milliGRAM(s) Oral daily  cyanocobalamin Injectable 1000 MICROGram(s) SubCutaneous daily  furosemide    Tablet 20 milliGRAM(s) Oral two times a day  metoprolol 25 milliGRAM(s) Oral two times a day  oxyCODONE Solution 5 milliGRAM(s) Oral every 4 hours  megestrol Suspension 800 milliGRAM(s) Oral daily  cefTRIAXone   IVPB   IV Intermittent     MEDICATIONS  (PRN):  acetaminophen  Suppository 650 milliGRAM(s) Rectal every 6 hours PRN For Temp greater than 38 C (100.4 F)  guaiFENesin    Syrup 200 milliGRAM(s) Oral every 6 hours PRN Cough  meperidine     Injectable 25 milliGRAM(s) IV Push every 6 hours PRN Rigors  morphine  - Injectable 1 milliGRAM(s) IV Push every 4 hours PRN Excessive work of breathing / RR>35 / Resp distress / severe pain (7-10)  morphine  - Injectable 0.5 milliGRAM(s) IV Push every 4 hours PRN Dyspnea / Air hunger / Increased work of breathing / RR>25 / moderate pain (4-6)      ALLERGIES:  Allergies    IV CONtRAST (Flushing (Skin); Rash)  No Known Drug Allergies    Intolerances        LABS:                        9.2    22.5  )-----------( 113      ( 29 Jun 2017 06:38 )             28.1     06-29    136  |  94<L>  |  27<H>  ----------------------------<  112<H>  4.1   |  32<H>  |  1.10    Ca    8.7      29 Jun 2017 06:38  Phos  2.9     06-29  Mg     1.9     06-29        CAPILLARY BLOOD GLUCOSE  151 (29 Jun 2017 17:13)    Culture - Blood (06.29.17 @ 17:32)    Specimen Source: .Blood Blood-Peripheral    Culture Results:   No growth at 12 hours        RADIOLOGY & ADDITIONAL TESTS: Reviewed. OVERNIGHT EVENTS:  Per nursing, patient was agitated and confused overnight.     SUBJECTIVE / INTERVAL HPI: Patient seen and examined at bedside, laying comfortably in bed. Multiple attempts were made to speak with patient but she was in and out of sleep and could not effectively respond to questions. She was in no acute distress. No coughing, discomfort, shortness of breath was noted.    Later in the day, patient was sitting up comfortably in chair, eating with family at bedside. NAD. No new symptoms.    VITAL SIGNS:  Vital Signs Last 24 Hrs  T(C): 36.8 (29 Jun 2017 16:01), Max: 37.1 (28 Jun 2017 21:31)  T(F): 98.2 (29 Jun 2017 16:01), Max: 98.8 (28 Jun 2017 21:31)  HR: 80 (29 Jun 2017 16:01) (80 - 110)  BP: 108/67 (29 Jun 2017 16:01) (107/60 - 124/60)  BP(mean): --  RR: 18 (29 Jun 2017 16:01) (17 - 20)  SpO2: 97% (29 Jun 2017 16:01) (92% - 98%)    PHYSICAL EXAM:    General: frail, NAD, resting comfortably  HEENT: NC/AT; PERRL, anicteric sclera;  Neck: supple  Cardiovascular: ireggularly irregular, no rubs, gallops  Respiratory: bibasilar crackles, no wheezes, rhonchi, rales  Gastrointestinal: soft, NT/ND; +BSx4  Extremities: +1 pitting edema b/l LE, pedal pulses not appreciated  Neurological: moving all four extremities, no focal defecits    MEDICATIONS:  MEDICATIONS  (STANDING):  amiodarone    Tablet 200 milliGRAM(s) Oral daily  insulin lispro (HumaLOG) corrective regimen sliding scale   SubCutaneous Before meals and at bedtime  atorvastatin 40 milliGRAM(s) Oral at bedtime  bisacodyl Suppository 10 milliGRAM(s) Rectal daily  lidocaine   Patch 1 Patch Transdermal daily  apixaban 2.5 milliGRAM(s) Oral two times a day  aspirin enteric coated 81 milliGRAM(s) Oral daily  cyanocobalamin Injectable 1000 MICROGram(s) SubCutaneous daily  furosemide    Tablet 20 milliGRAM(s) Oral two times a day  metoprolol 25 milliGRAM(s) Oral two times a day  oxyCODONE Solution 5 milliGRAM(s) Oral every 4 hours  megestrol Suspension 800 milliGRAM(s) Oral daily  cefTRIAXone   IVPB   IV Intermittent     MEDICATIONS  (PRN):  acetaminophen  Suppository 650 milliGRAM(s) Rectal every 6 hours PRN For Temp greater than 38 C (100.4 F)  guaiFENesin    Syrup 200 milliGRAM(s) Oral every 6 hours PRN Cough  meperidine     Injectable 25 milliGRAM(s) IV Push every 6 hours PRN Rigors  morphine  - Injectable 1 milliGRAM(s) IV Push every 4 hours PRN Excessive work of breathing / RR>35 / Resp distress / severe pain (7-10)  morphine  - Injectable 0.5 milliGRAM(s) IV Push every 4 hours PRN Dyspnea / Air hunger / Increased work of breathing / RR>25 / moderate pain (4-6)      ALLERGIES:  Allergies    IV CONtRAST (Flushing (Skin); Rash)  No Known Drug Allergies    Intolerances        LABS:                        9.2    22.5  )-----------( 113      ( 29 Jun 2017 06:38 )             28.1     06-29    136  |  94<L>  |  27<H>  ----------------------------<  112<H>  4.1   |  32<H>  |  1.10    Ca    8.7      29 Jun 2017 06:38  Phos  2.9     06-29  Mg     1.9     06-29        CAPILLARY BLOOD GLUCOSE  151 (29 Jun 2017 17:13)    Culture - Blood (06.29.17 @ 17:32)    Specimen Source: .Blood Blood-Peripheral    Culture Results:   No growth at 12 hours        RADIOLOGY & ADDITIONAL TESTS: Reviewed.

## 2017-06-30 NOTE — PROGRESS NOTE ADULT - RS GEN PE MLT RESP DETAILS PC
breath sounds equal/airway patent
breath sounds equal/airway patent
breath sounds equal/wheezes
airway patent/breath sounds equal
airway patent/breath sounds equal
breath sounds equal/airway patent
breath sounds equal/good air movement/respirations non-labored
respirations labored/breath sounds equal
breath sounds equal/airway patent

## 2017-06-30 NOTE — PROGRESS NOTE ADULT - PROBLEM SELECTOR PLAN 1
-POA   -source was urosepsis w/ seeding to chemoport, which was removed on 6/12  -reschedule medication administration 2/2 BP fluctuations  -pt's WBC increased to ~25 in early morning today since admission to [facility]  -spoke to Dr. Shanks over the phone about plan of care--was advised to consult with Dr. Mednia (ID) about next actions  -Spoke with Dr. Medina in person who does not feel comfortable discharging the patient with an elevated and uptrending white count--recs as follows.   -blood culture negative at 12 hrs  -f/u RUQ U/S  -f/u urine culture  - Ceftriaxone 2 gm daily   -monitor  -left a message on the phone with Dr. Shanks making him aware of Dr. Medina's recs -POA   -source was urosepsis w/ seeding to chemoport, which was removed on 6/12  -Per Dr. Medina, trend WBC and follow urine and blood cultures; RUQ scan; CT abdomen, echo--f/u  -blood culture negative at 12 hrs  -CBC shows WBC of 23, decreased from previous 25  -RUQ US completed- f/u final report  -f/u urine culture  - continue Ceftriaxone 2 gm daily   -monitor

## 2017-06-30 NOTE — PROGRESS NOTE ADULT - MS EXT PE MLT D E PC
no cyanosis
cyanosis/pedal edema
pedal edema
no pedal edema
no clubbing/no pedal edema
no pedal edema
no clubbing/no pedal edema
no pedal edema

## 2017-07-01 LAB
ANION GAP SERPL CALC-SCNC: 9 MMOL/L — SIGNIFICANT CHANGE UP (ref 5–17)
BUN SERPL-MCNC: 31 MG/DL — HIGH (ref 7–23)
CALCIUM SERPL-MCNC: 8.8 MG/DL — SIGNIFICANT CHANGE UP (ref 8.4–10.5)
CHLORIDE SERPL-SCNC: 99 MMOL/L — SIGNIFICANT CHANGE UP (ref 96–108)
CO2 SERPL-SCNC: 31 MMOL/L — SIGNIFICANT CHANGE UP (ref 22–31)
CREAT SERPL-MCNC: 1 MG/DL — SIGNIFICANT CHANGE UP (ref 0.5–1.3)
GLUCOSE SERPL-MCNC: 92 MG/DL — SIGNIFICANT CHANGE UP (ref 70–99)
HCT VFR BLD CALC: 25.7 % — LOW (ref 34.5–45)
HGB BLD-MCNC: 8.3 G/DL — LOW (ref 11.5–15.5)
MAGNESIUM SERPL-MCNC: 1.9 MG/DL — SIGNIFICANT CHANGE UP (ref 1.6–2.6)
MCHC RBC-ENTMCNC: 30.4 PG — SIGNIFICANT CHANGE UP (ref 27–34)
MCHC RBC-ENTMCNC: 32.3 G/DL — SIGNIFICANT CHANGE UP (ref 32–36)
MCV RBC AUTO: 94.1 FL — SIGNIFICANT CHANGE UP (ref 80–100)
PLATELET # BLD AUTO: 108 K/UL — LOW (ref 150–400)
POTASSIUM SERPL-MCNC: 4.2 MMOL/L — SIGNIFICANT CHANGE UP (ref 3.5–5.3)
POTASSIUM SERPL-SCNC: 4.2 MMOL/L — SIGNIFICANT CHANGE UP (ref 3.5–5.3)
RBC # BLD: 2.73 M/UL — LOW (ref 3.8–5.2)
RBC # FLD: 20.6 % — HIGH (ref 10.3–16.9)
SODIUM SERPL-SCNC: 139 MMOL/L — SIGNIFICANT CHANGE UP (ref 135–145)
WBC # BLD: 22 K/UL — HIGH (ref 3.8–10.5)
WBC # FLD AUTO: 22 K/UL — HIGH (ref 3.8–10.5)

## 2017-07-01 RX ORDER — DIATRIZOATE MEGLUMINE 180 MG/ML
30 INJECTION, SOLUTION INTRAVESICAL ONCE
Qty: 0 | Refills: 0 | Status: DISCONTINUED | OUTPATIENT
Start: 2017-07-01 | End: 2017-07-01

## 2017-07-01 RX ORDER — DIPHENHYDRAMINE HCL 50 MG
50 CAPSULE ORAL ONCE
Qty: 0 | Refills: 0 | Status: COMPLETED | OUTPATIENT
Start: 2017-07-01 | End: 2017-07-01

## 2017-07-01 RX ORDER — DIATRIZOATE MEGLUMINE 180 MG/ML
30 INJECTION, SOLUTION INTRAVESICAL ONCE
Qty: 0 | Refills: 0 | Status: COMPLETED | OUTPATIENT
Start: 2017-07-01 | End: 2017-07-01

## 2017-07-01 RX ADMIN — Medication 50 MILLIGRAM(S): at 22:04

## 2017-07-01 RX ADMIN — Medication 50 MILLIGRAM(S): at 22:05

## 2017-07-01 RX ADMIN — OXYCODONE HYDROCHLORIDE 5 MILLIGRAM(S): 5 TABLET ORAL at 09:24

## 2017-07-01 RX ADMIN — OXYCODONE HYDROCHLORIDE 5 MILLIGRAM(S): 5 TABLET ORAL at 22:07

## 2017-07-01 RX ADMIN — Medication 20 MILLIGRAM(S): at 05:23

## 2017-07-01 RX ADMIN — LIDOCAINE 1 PATCH: 4 CREAM TOPICAL at 11:31

## 2017-07-01 RX ADMIN — APIXABAN 2.5 MILLIGRAM(S): 2.5 TABLET, FILM COATED ORAL at 22:06

## 2017-07-01 RX ADMIN — AMIODARONE HYDROCHLORIDE 200 MILLIGRAM(S): 400 TABLET ORAL at 05:23

## 2017-07-01 RX ADMIN — OXYCODONE HYDROCHLORIDE 5 MILLIGRAM(S): 5 TABLET ORAL at 05:24

## 2017-07-01 RX ADMIN — Medication 25 MILLIGRAM(S): at 05:23

## 2017-07-01 RX ADMIN — APIXABAN 2.5 MILLIGRAM(S): 2.5 TABLET, FILM COATED ORAL at 09:25

## 2017-07-01 RX ADMIN — Medication 50 MILLIGRAM(S): at 16:01

## 2017-07-01 RX ADMIN — Medication 50 MILLIGRAM(S): at 10:27

## 2017-07-01 RX ADMIN — APIXABAN 2.5 MILLIGRAM(S): 2.5 TABLET, FILM COATED ORAL at 01:15

## 2017-07-01 RX ADMIN — ATORVASTATIN CALCIUM 40 MILLIGRAM(S): 80 TABLET, FILM COATED ORAL at 01:14

## 2017-07-01 RX ADMIN — MEGESTROL ACETATE 800 MILLIGRAM(S): 40 SUSPENSION ORAL at 11:37

## 2017-07-01 RX ADMIN — OXYCODONE HYDROCHLORIDE 5 MILLIGRAM(S): 5 TABLET ORAL at 01:27

## 2017-07-01 RX ADMIN — Medication 20 MILLIGRAM(S): at 17:53

## 2017-07-01 RX ADMIN — PREGABALIN 1000 MICROGRAM(S): 225 CAPSULE ORAL at 11:35

## 2017-07-01 RX ADMIN — OXYCODONE HYDROCHLORIDE 5 MILLIGRAM(S): 5 TABLET ORAL at 01:14

## 2017-07-01 RX ADMIN — OXYCODONE HYDROCHLORIDE 5 MILLIGRAM(S): 5 TABLET ORAL at 14:10

## 2017-07-01 RX ADMIN — LIDOCAINE 1 PATCH: 4 CREAM TOPICAL at 11:35

## 2017-07-01 RX ADMIN — OXYCODONE HYDROCHLORIDE 5 MILLIGRAM(S): 5 TABLET ORAL at 13:41

## 2017-07-01 RX ADMIN — OXYCODONE HYDROCHLORIDE 5 MILLIGRAM(S): 5 TABLET ORAL at 17:53

## 2017-07-01 RX ADMIN — MEGESTROL ACETATE 800 MILLIGRAM(S): 40 SUSPENSION ORAL at 01:14

## 2017-07-01 RX ADMIN — Medication 81 MILLIGRAM(S): at 11:31

## 2017-07-01 RX ADMIN — ATORVASTATIN CALCIUM 40 MILLIGRAM(S): 80 TABLET, FILM COATED ORAL at 22:06

## 2017-07-01 RX ADMIN — DIATRIZOATE MEGLUMINE 30 MILLILITER(S): 180 INJECTION, SOLUTION INTRAVESICAL at 22:04

## 2017-07-01 RX ADMIN — CEFTRIAXONE 100 GRAM(S): 500 INJECTION, POWDER, FOR SOLUTION INTRAMUSCULAR; INTRAVENOUS at 16:02

## 2017-07-01 RX ADMIN — OXYCODONE HYDROCHLORIDE 5 MILLIGRAM(S): 5 TABLET ORAL at 10:37

## 2017-07-01 RX ADMIN — Medication 25 MILLIGRAM(S): at 17:53

## 2017-07-01 RX ADMIN — Medication 2: at 17:53

## 2017-07-01 RX ADMIN — Medication 2: at 11:43

## 2017-07-01 NOTE — PROGRESS NOTE ADULT - SUBJECTIVE AND OBJECTIVE BOX
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 Patients family at bedside states she's been more lethargic with associated lower extremity swelling, SOB, decreased appetite and diarrhea. Patient with previous admissions for symptomatic hyperglycemia in January. Patient currently seeing Dr. Galaviz on a regular basis for chemotherapy treatment. Last treatment on May 23rd for which she received Bendamustine and Rituximab. Patient denies any headaches, chest pain, abdominal pain, N/V, dysuria, or bowel changes.     Upon arrival to the ED, patient in moderate respiratory distress using accessory muscles, breathing w/ a RR of 30, O2 93% on room air. Patient placed on BIPAP with slight improvement in respiratory status. Patient febrile to 101 with a HR of 77. Labs significant for AG metabolic acidosis, K 7.0, Na 127, BUN 58, Cr. 2.0 (baseline < 0.9 January), Lactate 2.8. CXR done showing RLL infiltrate with pulmonary vascular congestion. In the ED, patient received Vancomycin, Zosyn, Tylenol, Calcium Gluconate 2g, Insulin 5u, Albuterol neb 5mg x 2. ICU consulted, will be transferred to MICU for management of severe sepsis 2/2 HAP and pulmonary congestion. (10 Ramon 2017 19:13)      Interval History - intermittent confusion and agitation        REVIEW OF SYSTEMS    Vital Signs Last 24 Hrs  T(C): 36.8 (01 Jul 2017 08:00), Max: 36.8 (30 Jun 2017 10:07)  T(F): 98.3 (01 Jul 2017 08:00), Max: 98.3 (01 Jul 2017 08:00)  HR: 107 (01 Jul 2017 08:00) (80 - 108)  BP: 121/68 (01 Jul 2017 08:00) (92/53 - 121/68)  BP(mean): --  RR: 18 (01 Jul 2017 08:00) (16 - 20)  SpO2: 95% (01 Jul 2017 08:00) (95% - 97%)    Physical Exam-     Mental Status- confusion and agitation    Cranial Nerves- no diplopia    Gait and station-n/a    Motor- moves all 4 extremities    Reflexes- decreased    Sensation-    Coordination- no abnormal movements    Vascular -    Medications  amiodarone    Tablet 200 milliGRAM(s) Oral daily  insulin lispro (HumaLOG) corrective regimen sliding scale   SubCutaneous Before meals and at bedtime  atorvastatin 40 milliGRAM(s) Oral at bedtime  acetaminophen  Suppository 650 milliGRAM(s) Rectal every 6 hours PRN  bisacodyl Suppository 10 milliGRAM(s) Rectal daily  lidocaine   Patch 1 Patch Transdermal daily  guaiFENesin    Syrup 200 milliGRAM(s) Oral every 6 hours PRN  apixaban 2.5 milliGRAM(s) Oral two times a day  aspirin enteric coated 81 milliGRAM(s) Oral daily  cyanocobalamin Injectable 1000 MICROGram(s) SubCutaneous daily  furosemide    Tablet 20 milliGRAM(s) Oral two times a day  meperidine     Injectable 25 milliGRAM(s) IV Push every 6 hours PRN  metoprolol 25 milliGRAM(s) Oral two times a day  oxyCODONE Solution 5 milliGRAM(s) Oral every 4 hours  morphine  - Injectable 1 milliGRAM(s) IV Push every 4 hours PRN  morphine  - Injectable 0.5 milliGRAM(s) IV Push every 4 hours PRN  megestrol Suspension 800 milliGRAM(s) Oral daily  cefTRIAXone   IVPB 2 Gram(s) IV Intermittent every 24 hours  cefTRIAXone   IVPB   IV Intermittent       Lab      Radiology    Assessment- Delirium-      Plan- Psychiatric evaluation

## 2017-07-01 NOTE — PROGRESS NOTE ADULT - ASSESSMENT
Elevated WBC   Gallium scan did not reveal any focus of infection  Ultrasound shows dilated Gallbladder  Repeat blood cultures no growth  to date

## 2017-07-01 NOTE — PROGRESS NOTE ADULT - PROBLEM SELECTOR PLAN 1
-POA   -source was urosepsis w/ seeding to chemoport, which was removed on 6/12  -Per Dr. Medina, trend WBC and follow urine and blood cultures; RUQ scan; CT abdomen  -echo showed EF 60-65%  -blood culture negative at 2 days  -CBC shows WBC of 22, decreased from previous 23  -RUQ US completed-- showing no focal infection source  -urine culture negative  -continue Ceftriaxone 2 gm daily   -monitor  -pt for abd ct with oral contrast--pt reports rash and intense pruritis w/ contrast but no breathing problems; pt needs contrast to assess for abscess given persistently elevated leukocytosis; spoke w/ pt and family w/  to discuss risks and benefits; pt agrees w/ plan; will medicate with prednisone and benedryl per protocol nurse aware

## 2017-07-01 NOTE — PROGRESS NOTE ADULT - SUBJECTIVE AND OBJECTIVE BOX
OVERNIGHT EVENTS: NAEO    SUBJECTIVE / INTERVAL HPI: Patient seen and examined at bedside. Pt resting comfortably in bed at time of exam. In and out of sleep. Reports no new complaints. Denies any new symptoms.      VITAL SIGNS:  Vital Signs Last 24 Hrs  T(C): 36.8 (01 Jul 2017 08:00), Max: 36.8 (30 Jun 2017 20:45)  T(F): 98.3 (01 Jul 2017 08:00), Max: 98.3 (01 Jul 2017 08:00)  HR: 107 (01 Jul 2017 08:00) (93 - 108)  BP: 121/68 (01 Jul 2017 08:00) (100/60 - 121/68)  BP(mean): --  RR: 18 (01 Jul 2017 08:00) (17 - 20)  SpO2: 95% (01 Jul 2017 08:00) (95% - 97%)    PHYSICAL EXAM:    General: frail, NAD  HEENT: NC/AT; PERRL, anicteric sclera; MMM  Neck: supple  Cardiovascular: irregularly irrregular, no rubs, gallops  Respiratory: CTA B/L; no W/R/R  Gastrointestinal: soft, NT/ND; +BSx4  Extremities: +1 pitting edema b/l LE, no cyanosis, no clubbing  Vascular: pulses unappreciated  Neurological: AAOx3; no focal deficits    MEDICATIONS:  MEDICATIONS  (STANDING):  amiodarone    Tablet 200 milliGRAM(s) Oral daily  insulin lispro (HumaLOG) corrective regimen sliding scale   SubCutaneous Before meals and at bedtime  atorvastatin 40 milliGRAM(s) Oral at bedtime  bisacodyl Suppository 10 milliGRAM(s) Rectal daily  lidocaine   Patch 1 Patch Transdermal daily  apixaban 2.5 milliGRAM(s) Oral two times a day  aspirin enteric coated 81 milliGRAM(s) Oral daily  cyanocobalamin Injectable 1000 MICROGram(s) SubCutaneous daily  furosemide    Tablet 20 milliGRAM(s) Oral two times a day  metoprolol 25 milliGRAM(s) Oral two times a day  oxyCODONE Solution 5 milliGRAM(s) Oral every 4 hours  megestrol Suspension 800 milliGRAM(s) Oral daily  cefTRIAXone   IVPB 2 Gram(s) IV Intermittent every 24 hours  cefTRIAXone   IVPB   IV Intermittent   predniSONE   Tablet 50 milliGRAM(s) Oral once  diphenhydrAMINE   Capsule 50 milliGRAM(s) Oral once  diatrizoate meglumine/diatrizoate sodium. 30 milliLiter(s) Oral once    MEDICATIONS  (PRN):  acetaminophen  Suppository 650 milliGRAM(s) Rectal every 6 hours PRN For Temp greater than 38 C (100.4 F)  guaiFENesin    Syrup 200 milliGRAM(s) Oral every 6 hours PRN Cough  meperidine     Injectable 25 milliGRAM(s) IV Push every 6 hours PRN Rigors  morphine  - Injectable 1 milliGRAM(s) IV Push every 4 hours PRN Excessive work of breathing / RR>35 / Resp distress / severe pain (7-10)  morphine  - Injectable 0.5 milliGRAM(s) IV Push every 4 hours PRN Dyspnea / Air hunger / Increased work of breathing / RR>25 / moderate pain (4-6)      ALLERGIES:  Allergies    IV CONtRAST (Flushing (Skin); Rash)  No Known Drug Allergies    Intolerances        LABS:                        8.3    22.0  )-----------( 108      ( 01 Jul 2017 06:49 )             25.7     07-01    139  |  99  |  31<H>  ----------------------------<  92  4.2   |  31  |  1.00    Ca    8.8      01 Jul 2017 06:49  Mg     1.9     07-01          CAPILLARY BLOOD GLUCOSE  165 (01 Jul 2017 17:00)    Culture - Blood (06.29.17 @ 17:32)    Specimen Source: .Blood Blood-Peripheral    Culture Results:   No growth at 2 days.    Culture Results:   No growth (06.24.17 @ 08:40)              RADIOLOGY & ADDITIONAL TESTS: Reviewed.      < from: Echocardiogram (06.30.17 @ 16:45) >  Interpretation Summary  Left ventricular hypertrophy presentThe left ventricular wall motion is   normal.The left ventricular ejection fraction is estimated to be   60-65%The   left atrium is dilated.Right atrial size is normal.There is a pacemaker   wire   in the right heart.There is mild aortic valve thickening.No aortic   regurgitation noted.    < end of copied text >

## 2017-07-01 NOTE — PROGRESS NOTE ADULT - SUBJECTIVE AND OBJECTIVE BOX
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 Patients family at bedside states she's been more lethargic with associated lower extremity swelling, SOB, decreased appetite and diarrhea. Patient with previous admissions for symptomatic hyperglycemia in January. Patient currently seeing Dr. Galaviz on a regular basis for chemotherapy treatment. Last treatment on May 23rd for which she received Bendamustine and Rituximab. Patient denies any headaches, chest pain, abdominal pain, N/V, dysuria, or bowel changes.     Upon arrival to the ED, patient in moderate respiratory distress using accessory muscles, breathing w/ a RR of 30, O2 93% on room air. Patient placed on BIPAP with slight improvement in respiratory status. Patient febrile to 101 with a HR of 77. Labs significant for AG metabolic acidosis, K 7.0, Na 127, BUN 58, Cr. 2.0 (baseline < 0.9 January), Lactate 2.8. CXR done showing RLL infiltrate with pulmonary vascular congestion. In the ED, patient received Vancomycin, Zosyn, Tylenol, Calcium Gluconate 2g, Insulin 5u, Albuterol neb 5mg x 2. ICU consulted, will be transferred to MICU for management of severe sepsis 2/2 HAP and pulmonary congestion. (10 Ramon 2017 19:13)    FAMILY HISTORY:  No pertinent family history in first degree relatives    MEDICATIONS  (STANDING):  amiodarone    Tablet 200 milliGRAM(s) Oral daily  insulin lispro (HumaLOG) corrective regimen sliding scale   SubCutaneous Before meals and at bedtime  atorvastatin 40 milliGRAM(s) Oral at bedtime  bisacodyl Suppository 10 milliGRAM(s) Rectal daily  lidocaine   Patch 1 Patch Transdermal daily  apixaban 2.5 milliGRAM(s) Oral two times a day  aspirin enteric coated 81 milliGRAM(s) Oral daily  cyanocobalamin Injectable 1000 MICROGram(s) SubCutaneous daily  furosemide    Tablet 20 milliGRAM(s) Oral two times a day  metoprolol 25 milliGRAM(s) Oral two times a day  oxyCODONE Solution 5 milliGRAM(s) Oral every 4 hours  megestrol Suspension 800 milliGRAM(s) Oral daily  cefTRIAXone   IVPB 2 Gram(s) IV Intermittent every 24 hours  cefTRIAXone   IVPB   IV Intermittent     MEDICATIONS  (PRN):  acetaminophen  Suppository 650 milliGRAM(s) Rectal every 6 hours PRN For Temp greater than 38 C (100.4 F)  guaiFENesin    Syrup 200 milliGRAM(s) Oral every 6 hours PRN Cough  meperidine     Injectable 25 milliGRAM(s) IV Push every 6 hours PRN Rigors  morphine  - Injectable 1 milliGRAM(s) IV Push every 4 hours PRN Excessive work of breathing / RR>35 / Resp distress / severe pain (7-10)  morphine  - Injectable 0.5 milliGRAM(s) IV Push every 4 hours PRN Dyspnea / Air hunger / Increased work of breathing / RR>25 / moderate pain (4-6)    Vital Signs Last 24 Hrs  T(C): 36.7 (01 Jul 2017 20:40), Max: 36.8 (01 Jul 2017 08:00)  T(F): 98.1 (01 Jul 2017 20:40), Max: 98.3 (01 Jul 2017 08:00)  HR: 94 (01 Jul 2017 20:40) (94 - 108)  BP: 100/63 (01 Jul 2017 20:40) (100/60 - 121/68)  BP(mean): --  RR: 20 (01 Jul 2017 20:40) (18 - 20)  SpO2: 97% (01 Jul 2017 20:40) (95% - 97%)    Physical exam:    Overall impression  Lymphadenopathy  Liver  spleen    Labs:  CBC Full  -  ( 01 Jul 2017 06:49 )  WBC Count : 22.0 K/uL  Hemoglobin : 8.3 g/dL  Hematocrit : 25.7 %  Platelet Count - Automated : 108 K/uL  Mean Cell Volume : 94.1 fL  Mean Cell Hemoglobin : 30.4 pg  Mean Cell Hemoglobin Concentration : 32.3 g/dL  Auto Neutrophil # : x  Auto Lymphocyte # : x  Auto Monocyte # : x  Auto Eosinophil # : x  Auto Basophil # : x  Auto Neutrophil % : x  Auto Lymphocyte % : x  Auto Monocyte % : x  Auto Eosinophil % : x  Auto Basophil % : x    07-01    139  |  99  |  31<H>  ----------------------------<  92  4.2   |  31  |  1.00    Ca    8.8      01 Jul 2017 06:49  Mg     1.9     07-01        Radiology:  HEALTH ISSUES - R/O PROBLEM Dx:      Assessmant / Problems  1) Despite elevated WBC , the patient is doing well on iv Ceftriaxone  eating well, no n/v  RUQ no pain on palpation  US dilated CBD, no stone, thickened GB wall, no sign of inflammation  Blood c& s negative  2) Anemia of chronic disease and bone marrow suppression by antibiotics  3) Thrombocytopenia 2/2 medications  Plan as ordered by ID DR Freeman CT abdomen will be done      Thank you  Neha Galaviz MD

## 2017-07-01 NOTE — PROGRESS NOTE ADULT - SUBJECTIVE AND OBJECTIVE BOX
INTERVAL HPI/OVERNIGHT EVENTS:    ANTIBIOTICS    MEDICATIONS  (STANDING):  amiodarone    Tablet 200 milliGRAM(s) Oral daily  insulin lispro (HumaLOG) corrective regimen sliding scale   SubCutaneous Before meals and at bedtime  atorvastatin 40 milliGRAM(s) Oral at bedtime  bisacodyl Suppository 10 milliGRAM(s) Rectal daily  lidocaine   Patch 1 Patch Transdermal daily  apixaban 2.5 milliGRAM(s) Oral two times a day  aspirin enteric coated 81 milliGRAM(s) Oral daily  cyanocobalamin Injectable 1000 MICROGram(s) SubCutaneous daily  furosemide    Tablet 20 milliGRAM(s) Oral two times a day  metoprolol 25 milliGRAM(s) Oral two times a day  oxyCODONE Solution 5 milliGRAM(s) Oral every 4 hours  megestrol Suspension 800 milliGRAM(s) Oral daily  cefTRIAXone   IVPB 2 Gram(s) IV Intermittent every 24 hours  cefTRIAXone   IVPB   IV Intermittent     MEDICATIONS  (PRN):  acetaminophen  Suppository 650 milliGRAM(s) Rectal every 6 hours PRN For Temp greater than 38 C (100.4 F)  guaiFENesin    Syrup 200 milliGRAM(s) Oral every 6 hours PRN Cough  meperidine     Injectable 25 milliGRAM(s) IV Push every 6 hours PRN Rigors  morphine  - Injectable 1 milliGRAM(s) IV Push every 4 hours PRN Excessive work of breathing / RR>35 / Resp distress / severe pain (7-10)  morphine  - Injectable 0.5 milliGRAM(s) IV Push every 4 hours PRN Dyspnea / Air hunger / Increased work of breathing / RR>25 / moderate pain (4-6)      Allergies    IV CONtRAST (Flushing (Skin); Rash)  No Known Drug Allergies    Intolerances        REVIEW OF SYSTEMS:      Eyes: No eye pain, visual disturbances, or discharge  ENMT:  No difficulty hearing, tinnitus, vertigo; No sinus or throat pain  Neck: No pain or stiffness  Respiratory: No cough, wheezing, chills or hemoptysis  Cardiovascular: No chest pain, palpitations, shortness of breath, dizziness or leg swelling  Gastrointestinal: No abdominal or epigastric pain. No nausea, vomiting or hematemesis; No diarrhea or constipation. No melena or hematochezia.  Genitourinary: No dysuria, frequency, hematuria or incontinence  Rectal: No pain, hemorrhoids or incontinence  Neurological: No headaches, memory loss, loss of strength, numbness or tremors    Vital Signs Last 24 Hrs  T(C): 36.8 (01 Jul 2017 08:00), Max: 36.8 (30 Jun 2017 10:07)  T(F): 98.3 (01 Jul 2017 08:00), Max: 98.3 (01 Jul 2017 08:00)  HR: 107 (01 Jul 2017 08:00) (80 - 108)  BP: 121/68 (01 Jul 2017 08:00) (92/53 - 121/68)  BP(mean): --  RR: 18 (01 Jul 2017 08:00) (16 - 20)  SpO2: 95% (01 Jul 2017 08:00) (95% - 97%)    PHYSICAL EXAM:      Eyes: PERRL, EOM intact; conjunctiva and sclera clear  Head: Normocephalic; atraumatic  ENMT: No nasal discharge; airway clear  Neck: Supple; non tender; no masses  Respiratory: No wheezes, rales or rhonchi  Cardiovascular: Regular rate and rhythm. S1 and S2 Normal; No murmurs, gallops or rubs  Gastrointestinal: Soft non-tender non-distended; Normal bowel sounds; No hepatosplenomegaly  Genitourinary: No costovertebral angle tenderness  Extremities: Normal range of motion, No clubbing, cyanosis or edema  Vascular: Peripheral pulses palpable 2+ bilaterally  Neurological: Alert and oriented x3  Skin: Warm and dry. No acute rash  Lymph Nodes: No acute cervical adenopathy  Musculoskeletal: Normal gait, tone, without deformities    LABS:                        8.3    22.0  )-----------( 108      ( 01 Jul 2017 06:49 )             25.7     07-01    139  |  99  |  31<H>  ----------------------------<  92  4.2   |  31  |  1.00    Ca    8.8      01 Jul 2017 06:49  Mg     1.9     07-01              MICROBIOLOGY:      RADIOLOGY & ADDITIONAL STUDIES:

## 2017-07-02 LAB
ANION GAP SERPL CALC-SCNC: 14 MMOL/L — SIGNIFICANT CHANGE UP (ref 5–17)
BUN SERPL-MCNC: 27 MG/DL — HIGH (ref 7–23)
CALCIUM SERPL-MCNC: 9 MG/DL — SIGNIFICANT CHANGE UP (ref 8.4–10.5)
CHLORIDE SERPL-SCNC: 95 MMOL/L — LOW (ref 96–108)
CO2 SERPL-SCNC: 28 MMOL/L — SIGNIFICANT CHANGE UP (ref 22–31)
CREAT SERPL-MCNC: 0.9 MG/DL — SIGNIFICANT CHANGE UP (ref 0.5–1.3)
GLUCOSE SERPL-MCNC: 125 MG/DL — HIGH (ref 70–99)
HCT VFR BLD CALC: 29.3 % — LOW (ref 34.5–45)
HGB BLD-MCNC: 9.6 G/DL — LOW (ref 11.5–15.5)
MAGNESIUM SERPL-MCNC: 2.1 MG/DL — SIGNIFICANT CHANGE UP (ref 1.6–2.6)
MCHC RBC-ENTMCNC: 31.2 PG — SIGNIFICANT CHANGE UP (ref 27–34)
MCHC RBC-ENTMCNC: 32.8 G/DL — SIGNIFICANT CHANGE UP (ref 32–36)
MCV RBC AUTO: 95.1 FL — SIGNIFICANT CHANGE UP (ref 80–100)
PLATELET # BLD AUTO: 117 K/UL — LOW (ref 150–400)
POTASSIUM SERPL-MCNC: 4.5 MMOL/L — SIGNIFICANT CHANGE UP (ref 3.5–5.3)
POTASSIUM SERPL-SCNC: 4.5 MMOL/L — SIGNIFICANT CHANGE UP (ref 3.5–5.3)
RBC # BLD: 3.08 M/UL — LOW (ref 3.8–5.2)
RBC # FLD: 20.7 % — HIGH (ref 10.3–16.9)
SODIUM SERPL-SCNC: 137 MMOL/L — SIGNIFICANT CHANGE UP (ref 135–145)
WBC # BLD: 31.6 K/UL — HIGH (ref 3.8–10.5)
WBC # FLD AUTO: 31.6 K/UL — HIGH (ref 3.8–10.5)

## 2017-07-02 PROCEDURE — 74177 CT ABD & PELVIS W/CONTRAST: CPT | Mod: 26

## 2017-07-02 RX ORDER — HALOPERIDOL DECANOATE 100 MG/ML
1 INJECTION INTRAMUSCULAR ONCE
Qty: 0 | Refills: 0 | Status: DISCONTINUED | OUTPATIENT
Start: 2017-07-02 | End: 2017-07-06

## 2017-07-02 RX ORDER — VANCOMYCIN HCL 1 G
1000 VIAL (EA) INTRAVENOUS EVERY 24 HOURS
Qty: 0 | Refills: 0 | Status: DISCONTINUED | OUTPATIENT
Start: 2017-07-02 | End: 2017-07-03

## 2017-07-02 RX ORDER — PIPERACILLIN AND TAZOBACTAM 4; .5 G/20ML; G/20ML
4.5 INJECTION, POWDER, LYOPHILIZED, FOR SOLUTION INTRAVENOUS EVERY 8 HOURS
Qty: 0 | Refills: 0 | Status: DISCONTINUED | OUTPATIENT
Start: 2017-07-02 | End: 2017-07-03

## 2017-07-02 RX ADMIN — OXYCODONE HYDROCHLORIDE 5 MILLIGRAM(S): 5 TABLET ORAL at 15:32

## 2017-07-02 RX ADMIN — OXYCODONE HYDROCHLORIDE 5 MILLIGRAM(S): 5 TABLET ORAL at 07:18

## 2017-07-02 RX ADMIN — OXYCODONE HYDROCHLORIDE 5 MILLIGRAM(S): 5 TABLET ORAL at 19:30

## 2017-07-02 RX ADMIN — OXYCODONE HYDROCHLORIDE 5 MILLIGRAM(S): 5 TABLET ORAL at 01:36

## 2017-07-02 RX ADMIN — Medication 4: at 12:05

## 2017-07-02 RX ADMIN — OXYCODONE HYDROCHLORIDE 5 MILLIGRAM(S): 5 TABLET ORAL at 23:47

## 2017-07-02 RX ADMIN — Medication 20 MILLIGRAM(S): at 17:23

## 2017-07-02 RX ADMIN — Medication 250 MILLIGRAM(S): at 12:58

## 2017-07-02 RX ADMIN — APIXABAN 2.5 MILLIGRAM(S): 2.5 TABLET, FILM COATED ORAL at 22:45

## 2017-07-02 RX ADMIN — Medication 20 MILLIGRAM(S): at 07:16

## 2017-07-02 RX ADMIN — ATORVASTATIN CALCIUM 40 MILLIGRAM(S): 80 TABLET, FILM COATED ORAL at 22:45

## 2017-07-02 RX ADMIN — OXYCODONE HYDROCHLORIDE 5 MILLIGRAM(S): 5 TABLET ORAL at 02:33

## 2017-07-02 RX ADMIN — OXYCODONE HYDROCHLORIDE 5 MILLIGRAM(S): 5 TABLET ORAL at 10:18

## 2017-07-02 RX ADMIN — PREGABALIN 1000 MICROGRAM(S): 225 CAPSULE ORAL at 12:04

## 2017-07-02 RX ADMIN — Medication 4: at 17:47

## 2017-07-02 RX ADMIN — APIXABAN 2.5 MILLIGRAM(S): 2.5 TABLET, FILM COATED ORAL at 10:19

## 2017-07-02 RX ADMIN — PIPERACILLIN AND TAZOBACTAM 200 GRAM(S): 4; .5 INJECTION, POWDER, LYOPHILIZED, FOR SOLUTION INTRAVENOUS at 22:45

## 2017-07-02 RX ADMIN — AMIODARONE HYDROCHLORIDE 200 MILLIGRAM(S): 400 TABLET ORAL at 07:16

## 2017-07-02 RX ADMIN — MEGESTROL ACETATE 800 MILLIGRAM(S): 40 SUSPENSION ORAL at 12:01

## 2017-07-02 RX ADMIN — Medication 25 MILLIGRAM(S): at 07:16

## 2017-07-02 RX ADMIN — PIPERACILLIN AND TAZOBACTAM 200 GRAM(S): 4; .5 INJECTION, POWDER, LYOPHILIZED, FOR SOLUTION INTRAVENOUS at 14:44

## 2017-07-02 RX ADMIN — Medication 81 MILLIGRAM(S): at 12:01

## 2017-07-02 RX ADMIN — LIDOCAINE 1 PATCH: 4 CREAM TOPICAL at 12:02

## 2017-07-02 RX ADMIN — Medication 25 MILLIGRAM(S): at 17:23

## 2017-07-02 NOTE — PROGRESS NOTE ADULT - SUBJECTIVE AND OBJECTIVE BOX
Some back pain for the last two months        ANTIBIOTICS    MEDICATIONS  (STANDING):  amiodarone    Tablet 200 milliGRAM(s) Oral daily  insulin lispro (HumaLOG) corrective regimen sliding scale   SubCutaneous Before meals and at bedtime  atorvastatin 40 milliGRAM(s) Oral at bedtime  bisacodyl Suppository 10 milliGRAM(s) Rectal daily  lidocaine   Patch 1 Patch Transdermal daily  apixaban 2.5 milliGRAM(s) Oral two times a day  aspirin enteric coated 81 milliGRAM(s) Oral daily  cyanocobalamin Injectable 1000 MICROGram(s) SubCutaneous daily  furosemide    Tablet 20 milliGRAM(s) Oral two times a day  metoprolol 25 milliGRAM(s) Oral two times a day  oxyCODONE Solution 5 milliGRAM(s) Oral every 4 hours  megestrol Suspension 800 milliGRAM(s) Oral daily  cefTRIAXone   IVPB 2 Gram(s) IV Intermittent every 24 hours  cefTRIAXone   IVPB   IV Intermittent   haloperidol    Injectable 1 milliGRAM(s) IntraMuscular once    MEDICATIONS  (PRN):  acetaminophen  Suppository 650 milliGRAM(s) Rectal every 6 hours PRN For Temp greater than 38 C (100.4 F)  guaiFENesin    Syrup 200 milliGRAM(s) Oral every 6 hours PRN Cough  meperidine     Injectable 25 milliGRAM(s) IV Push every 6 hours PRN Rigors  morphine  - Injectable 1 milliGRAM(s) IV Push every 4 hours PRN Excessive work of breathing / RR>35 / Resp distress / severe pain (7-10)  morphine  - Injectable 0.5 milliGRAM(s) IV Push every 4 hours PRN Dyspnea / Air hunger / Increased work of breathing / RR>25 / moderate pain (4-6)      Allergies    IV CONtRAST (Flushing (Skin); Rash)  No Known Drug Allergies    Intolerances        REVIEW OF SYSTEMS:    Constitutional: No fever, weight loss or fatigue  Eyes: No eye pain, visual disturbances, or discharge  ENMT:  No difficulty hearing, tinnitus, vertigo; No sinus or throat pain  Neck: No pain or stiffness  Respiratory: No cough, wheezing, chills or hemoptysis  Cardiovascular: No chest pain, palpitations, shortness of breath, dizziness or leg swelling  Gastrointestinal: No abdominal or epigastric pain. No nausea, vomiting or hematemesis; No diarrhea or constipation. No melena or hematochezia.  Genitourinary: No dysuria, frequency, hematuria or incontinence  Vital Signs Last 24 Hrs  T(C): 36.3 (02 Jul 2017 09:35), Max: 36.8 (02 Jul 2017 06:16)  T(F): 97.4 (02 Jul 2017 09:35), Max: 98.2 (02 Jul 2017 06:16)  HR: 108 (02 Jul 2017 09:35) (94 - 108)  BP: 113/76 (02 Jul 2017 09:35) (100/63 - 113/76)  BP(mean): --  RR: 17 (02 Jul 2017 09:35) (17 - 20)  SpO2: 94% (02 Jul 2017 09:35) (94% - 97%)    PHYSICAL EXAM:      Eyes: PERRL, EOM intact; conjunctiva and sclera clear  Head: Normocephalic; atraumatic  ENMT: No nasal discharge; airway clear  Neck: Supple; non tender; no masses  Respiratory: No wheezes, rales or rhonchi  Cardiovascular: Regular rate and rhythm. S1 and S2 Normal; No murmurs, gallops or rubs  Gastrointestinal: Soft non-tender non-distended; Normal bowel sounds; No hepatosplenomegaly  Genitourinary: No costovertebral angle tenderness  Extremities: Normal range of motion, No clubbing, cyanosis or edema  Vascular: Peripheral pulses palpable 2+ bilaterally    LABS:                        9.6    31.6  )-----------( 117      ( 02 Jul 2017 07:38 )             29.3     07-02    137  |  95<L>  |  27<H>  ----------------------------<  125<H>  4.5   |  28  |  0.90    Ca    9.0      02 Jul 2017 07:38  Mg     2.1     07-02              MICROBIOLOGY:      RADIOLOGY & ADDITIONAL STUDIES:

## 2017-07-02 NOTE — CONSULT NOTE ADULT - SUBJECTIVE AND OBJECTIVE BOX
Chart reviewed; discussed with staff and patient's ; and patient seen. 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 Patient recently reported to be more lethargic at home with associated swelling of the lower extremities, SOB, decreased appetite, and diarrhea. On admission patient found to be septic, have UTI and Right LL infiltrate and was started on antibiotics. Patient reported to have intermittent confusion and agitation at night but sensorium is improving. Patient reports shortness of breath at times and is receiving Morphine 0.5mg IV q 4h prn. Patient also reports feeling increasingly discouraged saying her cough persists and she is feeling worse. Patient has been able to be up in a chair and is able to walk to the bathroom with a walker and assistence.               Mental Status: Patient is a slender appearing 86 year old  female who is alert and oriented to person, place, and time and is laying in bed. Is easily engaged. Speech is clear and of normal rate and rhythm. Affect is consrticted. Mood is anxious and depressed. Thought processes are linear. There are no auditory or visual hallucinations. There is no suicidal or homicidal ideation. Judgement is good.               Impression: Adjustment reaction with anxiety and depression.               Recommend: Supportive treatmen; will discuss possible use of anti depressant medication.

## 2017-07-02 NOTE — CHART NOTE - NSCHARTNOTEFT_GEN_A_CORE
Patient became very agitated around 1:40am, repeatedly trying to get out of bed, yelling/grabbing staff and her spouse, and appearing paranoid/confused. She was not responsive to verbal redirection and therefore was given 1mg haldol IV push.

## 2017-07-02 NOTE — PROGRESS NOTE ADULT - SUBJECTIVE AND OBJECTIVE BOX
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 Patients family at bedside states she's been more lethargic with associated lower extremity swelling, SOB, decreased appetite and diarrhea. Patient with previous admissions for symptomatic hyperglycemia in January. Patient currently seeing Dr. Galaviz on a regular basis for chemotherapy treatment. Last treatment on May 23rd for which she received Bendamustine and Rituximab. Patient denies any headaches, chest pain, abdominal pain, N/V, dysuria, or bowel changes.     Upon arrival to the ED, patient in moderate respiratory distress using accessory muscles, breathing w/ a RR of 30, O2 93% on room air. Patient placed on BIPAP with slight improvement in respiratory status. Patient febrile to 101 with a HR of 77. Labs significant for AG metabolic acidosis, K 7.0, Na 127, BUN 58, Cr. 2.0 (baseline < 0.9 January), Lactate 2.8. CXR done showing RLL infiltrate with pulmonary vascular congestion. In the ED, patient received Vancomycin, Zosyn, Tylenol, Calcium Gluconate 2g, Insulin 5u, Albuterol neb 5mg x 2. ICU consulted, will be transferred to MICU for management of severe sepsis 2/2 HAP and pulmonary congestion. (10 Ramon 2017 19:13)    FAMILY HISTORY:  No pertinent family history in first degree relatives    MEDICATIONS  (STANDING):  amiodarone    Tablet 200 milliGRAM(s) Oral daily  insulin lispro (HumaLOG) corrective regimen sliding scale   SubCutaneous Before meals and at bedtime  atorvastatin 40 milliGRAM(s) Oral at bedtime  bisacodyl Suppository 10 milliGRAM(s) Rectal daily  lidocaine   Patch 1 Patch Transdermal daily  apixaban 2.5 milliGRAM(s) Oral two times a day  aspirin enteric coated 81 milliGRAM(s) Oral daily  cyanocobalamin Injectable 1000 MICROGram(s) SubCutaneous daily  furosemide    Tablet 20 milliGRAM(s) Oral two times a day  metoprolol 25 milliGRAM(s) Oral two times a day  oxyCODONE Solution 5 milliGRAM(s) Oral every 4 hours  megestrol Suspension 800 milliGRAM(s) Oral daily  haloperidol    Injectable 1 milliGRAM(s) IntraMuscular once  piperacillin/tazobactam IVPB. 4.5 Gram(s) IV Intermittent every 8 hours  vancomycin  IVPB 1000 milliGRAM(s) IV Intermittent every 24 hours    MEDICATIONS  (PRN):  acetaminophen  Suppository 650 milliGRAM(s) Rectal every 6 hours PRN For Temp greater than 38 C (100.4 F)  guaiFENesin    Syrup 200 milliGRAM(s) Oral every 6 hours PRN Cough  meperidine     Injectable 25 milliGRAM(s) IV Push every 6 hours PRN Rigors  morphine  - Injectable 1 milliGRAM(s) IV Push every 4 hours PRN Excessive work of breathing / RR>35 / Resp distress / severe pain (7-10)  morphine  - Injectable 0.5 milliGRAM(s) IV Push every 4 hours PRN Dyspnea / Air hunger / Increased work of breathing / RR>25 / moderate pain (4-6)    Vital Signs Last 24 Hrs  T(C): 36.7 (02 Jul 2017 20:45), Max: 36.8 (02 Jul 2017 06:16)  T(F): 98 (02 Jul 2017 20:45), Max: 98.2 (02 Jul 2017 06:16)  HR: 93 (02 Jul 2017 20:45) (90 - 108)  BP: 121/74 (02 Jul 2017 20:45) (113/69 - 135/75)  BP(mean): --  RR: 24 (02 Jul 2017 20:45) (16 - 24)  SpO2: 95% (02 Jul 2017 20:45) (92% - 96%)    Physical exam:    Overall impression  Lymphadenopathy  Liver  spleen    Labs:  CBC Full  -  ( 02 Jul 2017 07:38 )  WBC Count : 31.6 K/uL  Hemoglobin : 9.6 g/dL  Hematocrit : 29.3 %  Platelet Count - Automated : 117 K/uL  Mean Cell Volume : 95.1 fL  Mean Cell Hemoglobin : 31.2 pg  Mean Cell Hemoglobin Concentration : 32.8 g/dL  Auto Neutrophil # : x  Auto Lymphocyte # : x  Auto Monocyte # : x  Auto Eosinophil # : x  Auto Basophil # : x  Auto Neutrophil % : x  Auto Lymphocyte % : x  Auto Monocyte % : x  Auto Eosinophil % : x  Auto Basophil % : x    07-02    137  |  95<L>  |  27<H>  ----------------------------<  125<H>  4.5   |  28  |  0.90    Ca    9.0      02 Jul 2017 07:38  Mg     2.1     07-02        Radiology:  HEALTH ISSUES - R/O PROBLEM Dx:      Assessmant / Problems  1) WBC continues to move upwards  blood c&s negative  CT abdomen and pelvis nondiagnostic  Antibiotics were changed to Vancomicin plus Zosin  Plan: Gallium scan  Urine analysis, Urine C&S    Plan:    Thank you  Neha Galaviz MD

## 2017-07-02 NOTE — PROGRESS NOTE ADULT - ASSESSMENT
increasing WBC    I note results of the CAT scan it is not clear to me that the Pulmonary findings would account for the krysten WBC

## 2017-07-02 NOTE — CONSULT NOTE ADULT - CONSULT REQUESTED DATE/TIME
10-Ramon-2017 19:35
16-Jun-2017 15:44
30-Jun-2017 14:36
02-Jul-2017
10-Ramon-2017 20:40
11-Jun-2017 13:04
12-Jun-2017 11:00
30-Jun-2017 09:23

## 2017-07-03 LAB
ANION GAP SERPL CALC-SCNC: 11 MMOL/L — SIGNIFICANT CHANGE UP (ref 5–17)
APPEARANCE UR: CLEAR — SIGNIFICANT CHANGE UP
BILIRUB UR-MCNC: NEGATIVE — SIGNIFICANT CHANGE UP
BUN SERPL-MCNC: 27 MG/DL — HIGH (ref 7–23)
CALCIUM SERPL-MCNC: 9 MG/DL — SIGNIFICANT CHANGE UP (ref 8.4–10.5)
CHLORIDE SERPL-SCNC: 97 MMOL/L — SIGNIFICANT CHANGE UP (ref 96–108)
CO2 SERPL-SCNC: 28 MMOL/L — SIGNIFICANT CHANGE UP (ref 22–31)
COLOR SPEC: YELLOW — SIGNIFICANT CHANGE UP
CREAT SERPL-MCNC: 1 MG/DL — SIGNIFICANT CHANGE UP (ref 0.5–1.3)
DIFF PNL FLD: NEGATIVE — SIGNIFICANT CHANGE UP
GLUCOSE SERPL-MCNC: 124 MG/DL — HIGH (ref 70–99)
GLUCOSE UR QL: NEGATIVE — SIGNIFICANT CHANGE UP
HCT VFR BLD CALC: 29.8 % — LOW (ref 34.5–45)
HGB BLD-MCNC: 9.3 G/DL — LOW (ref 11.5–15.5)
KETONES UR-MCNC: NEGATIVE — SIGNIFICANT CHANGE UP
LEUKOCYTE ESTERASE UR-ACNC: NEGATIVE — SIGNIFICANT CHANGE UP
MAGNESIUM SERPL-MCNC: 2.1 MG/DL — SIGNIFICANT CHANGE UP (ref 1.6–2.6)
MCHC RBC-ENTMCNC: 29.9 PG — SIGNIFICANT CHANGE UP (ref 27–34)
MCHC RBC-ENTMCNC: 31.2 G/DL — LOW (ref 32–36)
MCV RBC AUTO: 95.8 FL — SIGNIFICANT CHANGE UP (ref 80–100)
NITRITE UR-MCNC: NEGATIVE — SIGNIFICANT CHANGE UP
PH UR: 5.5 — SIGNIFICANT CHANGE UP (ref 5–8)
PLATELET # BLD AUTO: 156 K/UL — SIGNIFICANT CHANGE UP (ref 150–400)
POTASSIUM SERPL-MCNC: 4.1 MMOL/L — SIGNIFICANT CHANGE UP (ref 3.5–5.3)
POTASSIUM SERPL-SCNC: 4.1 MMOL/L — SIGNIFICANT CHANGE UP (ref 3.5–5.3)
PROT UR-MCNC: NEGATIVE MG/DL — SIGNIFICANT CHANGE UP
RBC # BLD: 3.11 M/UL — LOW (ref 3.8–5.2)
RBC # FLD: 21.2 % — HIGH (ref 10.3–16.9)
SODIUM SERPL-SCNC: 136 MMOL/L — SIGNIFICANT CHANGE UP (ref 135–145)
SP GR SPEC: <=1.005 — SIGNIFICANT CHANGE UP (ref 1–1.03)
UROBILINOGEN FLD QL: 0.2 E.U./DL — SIGNIFICANT CHANGE UP
WBC # BLD: 32 K/UL — HIGH (ref 3.8–10.5)
WBC # FLD AUTO: 32 K/UL — HIGH (ref 3.8–10.5)

## 2017-07-03 PROCEDURE — 99233 SBSQ HOSP IP/OBS HIGH 50: CPT

## 2017-07-03 PROCEDURE — 70450 CT HEAD/BRAIN W/O DYE: CPT | Mod: 26

## 2017-07-03 RX ORDER — PIPERACILLIN AND TAZOBACTAM 4; .5 G/20ML; G/20ML
INJECTION, POWDER, LYOPHILIZED, FOR SOLUTION INTRAVENOUS
Qty: 0 | Refills: 0 | Status: DISCONTINUED | OUTPATIENT
Start: 2017-07-03 | End: 2017-07-06

## 2017-07-03 RX ORDER — PIPERACILLIN AND TAZOBACTAM 4; .5 G/20ML; G/20ML
2.25 INJECTION, POWDER, LYOPHILIZED, FOR SOLUTION INTRAVENOUS EVERY 6 HOURS
Qty: 0 | Refills: 0 | Status: DISCONTINUED | OUTPATIENT
Start: 2017-07-03 | End: 2017-07-06

## 2017-07-03 RX ORDER — VANCOMYCIN HCL 1 G
1000 VIAL (EA) INTRAVENOUS EVERY 24 HOURS
Qty: 0 | Refills: 0 | Status: COMPLETED | OUTPATIENT
Start: 2017-07-03 | End: 2017-07-04

## 2017-07-03 RX ORDER — DIPHENHYDRAMINE HCL 50 MG
25 CAPSULE ORAL ONCE
Qty: 0 | Refills: 0 | Status: COMPLETED | OUTPATIENT
Start: 2017-07-03 | End: 2017-07-03

## 2017-07-03 RX ORDER — PIPERACILLIN AND TAZOBACTAM 4; .5 G/20ML; G/20ML
2.25 INJECTION, POWDER, LYOPHILIZED, FOR SOLUTION INTRAVENOUS ONCE
Qty: 0 | Refills: 0 | Status: COMPLETED | OUTPATIENT
Start: 2017-07-03 | End: 2017-07-03

## 2017-07-03 RX ADMIN — OXYCODONE HYDROCHLORIDE 5 MILLIGRAM(S): 5 TABLET ORAL at 18:31

## 2017-07-03 RX ADMIN — Medication 50 MILLIGRAM(S): at 12:20

## 2017-07-03 RX ADMIN — PIPERACILLIN AND TAZOBACTAM 200 GRAM(S): 4; .5 INJECTION, POWDER, LYOPHILIZED, FOR SOLUTION INTRAVENOUS at 07:04

## 2017-07-03 RX ADMIN — Medication 20 MILLIGRAM(S): at 18:32

## 2017-07-03 RX ADMIN — OXYCODONE HYDROCHLORIDE 5 MILLIGRAM(S): 5 TABLET ORAL at 05:12

## 2017-07-03 RX ADMIN — PREGABALIN 1000 MICROGRAM(S): 225 CAPSULE ORAL at 12:20

## 2017-07-03 RX ADMIN — ATORVASTATIN CALCIUM 40 MILLIGRAM(S): 80 TABLET, FILM COATED ORAL at 22:07

## 2017-07-03 RX ADMIN — APIXABAN 2.5 MILLIGRAM(S): 2.5 TABLET, FILM COATED ORAL at 22:07

## 2017-07-03 RX ADMIN — Medication 50 MILLIGRAM(S): at 16:06

## 2017-07-03 RX ADMIN — Medication 2: at 22:11

## 2017-07-03 RX ADMIN — OXYCODONE HYDROCHLORIDE 5 MILLIGRAM(S): 5 TABLET ORAL at 23:29

## 2017-07-03 RX ADMIN — PIPERACILLIN AND TAZOBACTAM 200 GRAM(S): 4; .5 INJECTION, POWDER, LYOPHILIZED, FOR SOLUTION INTRAVENOUS at 18:31

## 2017-07-03 RX ADMIN — MEGESTROL ACETATE 800 MILLIGRAM(S): 40 SUSPENSION ORAL at 13:31

## 2017-07-03 RX ADMIN — Medication 50 MILLIGRAM(S): at 22:06

## 2017-07-03 RX ADMIN — Medication 250 MILLIGRAM(S): at 12:21

## 2017-07-03 RX ADMIN — Medication 2: at 18:32

## 2017-07-03 RX ADMIN — OXYCODONE HYDROCHLORIDE 5 MILLIGRAM(S): 5 TABLET ORAL at 14:30

## 2017-07-03 RX ADMIN — OXYCODONE HYDROCHLORIDE 5 MILLIGRAM(S): 5 TABLET ORAL at 22:06

## 2017-07-03 RX ADMIN — Medication 20 MILLIGRAM(S): at 07:03

## 2017-07-03 RX ADMIN — APIXABAN 2.5 MILLIGRAM(S): 2.5 TABLET, FILM COATED ORAL at 09:14

## 2017-07-03 RX ADMIN — Medication 25 MILLIGRAM(S): at 07:04

## 2017-07-03 RX ADMIN — PIPERACILLIN AND TAZOBACTAM 200 GRAM(S): 4; .5 INJECTION, POWDER, LYOPHILIZED, FOR SOLUTION INTRAVENOUS at 15:06

## 2017-07-03 RX ADMIN — Medication 81 MILLIGRAM(S): at 12:20

## 2017-07-03 RX ADMIN — Medication 25 MILLIGRAM(S): at 22:07

## 2017-07-03 RX ADMIN — OXYCODONE HYDROCHLORIDE 5 MILLIGRAM(S): 5 TABLET ORAL at 19:30

## 2017-07-03 RX ADMIN — Medication 10 MILLIGRAM(S): at 12:21

## 2017-07-03 RX ADMIN — OXYCODONE HYDROCHLORIDE 5 MILLIGRAM(S): 5 TABLET ORAL at 22:42

## 2017-07-03 RX ADMIN — AMIODARONE HYDROCHLORIDE 200 MILLIGRAM(S): 400 TABLET ORAL at 07:03

## 2017-07-03 RX ADMIN — OXYCODONE HYDROCHLORIDE 5 MILLIGRAM(S): 5 TABLET ORAL at 13:32

## 2017-07-03 RX ADMIN — Medication 25 MILLIGRAM(S): at 18:32

## 2017-07-03 RX ADMIN — OXYCODONE HYDROCHLORIDE 5 MILLIGRAM(S): 5 TABLET ORAL at 09:14

## 2017-07-03 RX ADMIN — LIDOCAINE 1 PATCH: 4 CREAM TOPICAL at 12:22

## 2017-07-03 RX ADMIN — PIPERACILLIN AND TAZOBACTAM 200 GRAM(S): 4; .5 INJECTION, POWDER, LYOPHILIZED, FOR SOLUTION INTRAVENOUS at 23:29

## 2017-07-03 RX ADMIN — OXYCODONE HYDROCHLORIDE 5 MILLIGRAM(S): 5 TABLET ORAL at 10:14

## 2017-07-03 NOTE — PROGRESS NOTE ADULT - SUBJECTIVE AND OBJECTIVE BOX
Chief Complaint/Reason for Consult: CAD  INTERVAL HPI: events noted for sundowning  	  MEDICATIONS:  amiodarone    Tablet 200 milliGRAM(s) Oral daily  furosemide    Tablet 20 milliGRAM(s) Oral two times a day  metoprolol 25 milliGRAM(s) Oral two times a day    piperacillin/tazobactam IVPB. 4.5 Gram(s) IV Intermittent every 8 hours  vancomycin  IVPB 1000 milliGRAM(s) IV Intermittent every 24 hours    guaiFENesin    Syrup 200 milliGRAM(s) Oral every 6 hours PRN    acetaminophen  Suppository 650 milliGRAM(s) Rectal every 6 hours PRN  meperidine     Injectable 25 milliGRAM(s) IV Push every 6 hours PRN  oxyCODONE Solution 5 milliGRAM(s) Oral every 4 hours  morphine  - Injectable 1 milliGRAM(s) IV Push every 4 hours PRN  morphine  - Injectable 0.5 milliGRAM(s) IV Push every 4 hours PRN  haloperidol    Injectable 1 milliGRAM(s) IntraMuscular once  diphenhydrAMINE   Capsule 25 milliGRAM(s) Oral once PRN    bisacodyl Suppository 10 milliGRAM(s) Rectal daily    insulin lispro (HumaLOG) corrective regimen sliding scale   SubCutaneous Before meals and at bedtime  atorvastatin 40 milliGRAM(s) Oral at bedtime  megestrol Suspension 800 milliGRAM(s) Oral daily  predniSONE   Tablet 50 milliGRAM(s) Oral once  predniSONE   Tablet 50 milliGRAM(s) Oral once    lidocaine   Patch 1 Patch Transdermal daily  apixaban 2.5 milliGRAM(s) Oral two times a day  aspirin enteric coated 81 milliGRAM(s) Oral daily  cyanocobalamin Injectable 1000 MICROGram(s) SubCutaneous daily      REVIEW OF SYSTEMS:  [x] As per HPI  CONSTITUTIONAL: No fever, weight loss, or fatigue  RESPIRATORY: No cough, wheezing, chills or hemoptysis; No Shortness of Breath  CARDIOVASCULAR: No chest pain, palpitations, dizziness, or leg swelling  GASTROINTESTINAL: No abdominal or epigastric pain. No nausea, vomiting, or hematemesis; No diarrhea or constipation. No melena or hematochezia.  MUSCULOSKELETAL: No joint pain or swelling; No muscle, back, or extremity pain  [x] All others negative	  [ ] Unable to obtain    PHYSICAL EXAM:  T(C): 37.1 (07-03-17 @ 08:50), Max: 37.1 (07-03-17 @ 08:50)  HR: 89 (07-03-17 @ 08:50) (89 - 95)  BP: 112/67 (07-03-17 @ 08:50) (112/67 - 135/75)  RR: 20 (07-03-17 @ 08:50) (16 - 24)  SpO2: 95% (07-03-17 @ 08:50) (92% - 96%)  Wt(kg): --  I&O's Summary    02 Jul 2017 07:01  -  03 Jul 2017 07:00  --------------------------------------------------------  IN: 100 mL / OUT: 0 mL / NET: 100 mL          Appearance: Normal	  HEENT:   Normal oral mucosa  Cardiovascular: Normal S1 S2, No JVD, No murmurs, No edema  Respiratory: Lungs clear to auscultation	  Gastrointestinal:  Soft, Non-tender, + BS	  Extremities: Normal range of motion, No clubbing, cyanosis or edema  Vascular: Peripheral pulses palpable 2+ bilaterally    TELEMETRY: 	    ECG:    	  RADIOLOGY:   CXR:  CT:  US:    CARDIAC TESTING:  Echocardiogram:  Catheterization:  Stress Test:      LABS:	 	    CARDIAC MARKERS:                                  9.3    32.0  )-----------( 156      ( 03 Jul 2017 10:54 )             29.8     07-03    136  |  97  |  27<H>  ----------------------------<  124<H>  4.1   |  28  |  1.00    Ca    9.0      03 Jul 2017 10:54  Mg     2.1     07-03      proBNP:   Lipid Profile:   HgA1c:   TSH:     ASSESSMENT/PLAN: 	  Afib  - increase po BB as tolerated continue amio 200 qd for now.    Keep net euvolemic to net negative, strict I/Os

## 2017-07-03 NOTE — PROGRESS NOTE ADULT - SUBJECTIVE AND OBJECTIVE BOX
ANTIBIOTICS    MEDICATIONS  (STANDING):  amiodarone    Tablet 200 milliGRAM(s) Oral daily  insulin lispro (HumaLOG) corrective regimen sliding scale   SubCutaneous Before meals and at bedtime  atorvastatin 40 milliGRAM(s) Oral at bedtime  bisacodyl Suppository 10 milliGRAM(s) Rectal daily  lidocaine   Patch 1 Patch Transdermal daily  apixaban 2.5 milliGRAM(s) Oral two times a day  aspirin enteric coated 81 milliGRAM(s) Oral daily  cyanocobalamin Injectable 1000 MICROGram(s) SubCutaneous daily  furosemide    Tablet 20 milliGRAM(s) Oral two times a day  metoprolol 25 milliGRAM(s) Oral two times a day  oxyCODONE Solution 5 milliGRAM(s) Oral every 4 hours  megestrol Suspension 800 milliGRAM(s) Oral daily  haloperidol    Injectable 1 milliGRAM(s) IntraMuscular once  piperacillin/tazobactam IVPB. 4.5 Gram(s) IV Intermittent every 8 hours  vancomycin  IVPB 1000 milliGRAM(s) IV Intermittent every 24 hours  predniSONE   Tablet 50 milliGRAM(s) Oral once  predniSONE   Tablet 50 milliGRAM(s) Oral once  predniSONE   Tablet 50 milliGRAM(s) Oral once    MEDICATIONS  (PRN):  acetaminophen  Suppository 650 milliGRAM(s) Rectal every 6 hours PRN For Temp greater than 38 C (100.4 F)  guaiFENesin    Syrup 200 milliGRAM(s) Oral every 6 hours PRN Cough  meperidine     Injectable 25 milliGRAM(s) IV Push every 6 hours PRN Rigors  morphine  - Injectable 1 milliGRAM(s) IV Push every 4 hours PRN Excessive work of breathing / RR>35 / Resp distress / severe pain (7-10)  morphine  - Injectable 0.5 milliGRAM(s) IV Push every 4 hours PRN Dyspnea / Air hunger / Increased work of breathing / RR>25 / moderate pain (4-6)  diphenhydrAMINE   Capsule 25 milliGRAM(s) Oral once PRN Rash and/or Itching      Allergies    IV CONtRAST (Flushing (Skin); Rash)  No Known Drug Allergies    Intolerances        REVIEW OF SYSTEMS:    Constitutional: No fever, weight loss or fatigue  Eyes: No eye pain, visual disturbances, or discharge  ENMT:  No difficulty hearing, tinnitus, vertigo; No sinus or throat pain  Neck: No pain or stiffness  Respiratory: No cough, wheezing, chills or hemoptysis  Cardiovascular: No chest pain, palpitations, shortness of breath, dizziness or leg swelling  Gastrointestinal: No abdominal or epigastric pain. No nausea, vomiting or hematemesis; No diarrhea or constipation. No melena or hematochezia.  Genitourinary: No dysuria, frequency, hematuria or incontinence  Rectal: No pain, hemorrhoids or incontinence  Neurological: No headaches, memory loss, loss of strength, numbness or tremors  Skin: No itching, burning, rashes or lesions   Lymph Nodes: No enlarged glands  Endocrine: No heat or cold intolerance; No hair loss  Musculoskeletal:some lower back pain for several months  Heme/Lymph: No easy bruising or bleeding gums  Allergy and Immunologic: No hives or eczema    Vital Signs Last 24 Hrs  T(C): 37.1 (03 Jul 2017 08:50), Max: 37.1 (03 Jul 2017 08:50)  T(F): 98.8 (03 Jul 2017 08:50), Max: 98.8 (03 Jul 2017 08:50)  HR: 89 (03 Jul 2017 08:50) (89 - 95)  BP: 112/67 (03 Jul 2017 08:50) (112/67 - 135/75)  BP(mean): --  RR: 20 (03 Jul 2017 08:50) (16 - 24)  SpO2: 95% (03 Jul 2017 08:50) (92% - 96%)    PHYSICAL EXAM:    General: Well developed; well nourished; in no acute distress  Eyes: PERRL, EOM intact; conjunctiva and sclera clear  Head: Normocephalic; atraumatic  ENMT: No nasal discharge; airway clear  Neck: Supple; non tender; no masses  Respiratory: No wheezes, rales or rhonchi  Cardiovascular: Regular rate and rhythm. S1 and S2 Normal; No murmurs, gallops or rubs  Gastrointestinal: Soft non-tender non-distended; Normal bowel sounds; No hepatosplenomegaly  Genitourinary: No costovertebral angle tenderness  Extremities: Normal range of motion, No clubbing, cyanosis or edema  Vascular: Peripheral pulses palpable 2+ bilaterally  Neurological: Alert and oriented x3  Skin: Warm and dry. No acute rash  Lymph Nodes: No acute cervical adenopathy  Musculoskeletal: Normal gait, tone, without deformities    LABS:                        9.6    31.6  )-----------( 117      ( 02 Jul 2017 07:38 )             29.3     07-02    137  |  95<L>  |  27<H>  ----------------------------<  125<H>  4.5   |  28  |  0.90    Ca    9.0      02 Jul 2017 07:38  Mg     2.1     07-02              MICROBIOLOGY:      RADIOLOGY & ADDITIONAL STUDIES:

## 2017-07-03 NOTE — PROGRESS NOTE ADULT - SUBJECTIVE AND OBJECTIVE BOX
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 Patients family at bedside states she's been more lethargic with associated lower extremity swelling, SOB, decreased appetite and diarrhea. Patient with previous admissions for symptomatic hyperglycemia in January. Patient currently seeing Dr. Galaviz on a regular basis for chemotherapy treatment. Last treatment on May 23rd for which she received Bendamustine and Rituximab. Patient denies any headaches, chest pain, abdominal pain, N/V, dysuria, or bowel changes.     Upon arrival to the ED, patient in moderate respiratory distress using accessory muscles, breathing w/ a RR of 30, O2 93% on room air. Patient placed on BIPAP with slight improvement in respiratory status. Patient febrile to 101 with a HR of 77. Labs significant for AG metabolic acidosis, K 7.0, Na 127, BUN 58, Cr. 2.0 (baseline < 0.9 January), Lactate 2.8. CXR done showing RLL infiltrate with pulmonary vascular congestion. In the ED, patient received Vancomycin, Zosyn, Tylenol, Calcium Gluconate 2g, Insulin 5u, Albuterol neb 5mg x 2. ICU consulted, will be transferred to MICU for management of severe sepsis 2/2 HAP and pulmonary congestion. (10 Ramon 2017 19:13)      Interval History - less agitation- no abnormal movements        REVIEW OF SYSTEMS    Vital Signs Last 24 Hrs  T(C): 37.1 (03 Jul 2017 08:50), Max: 37.1 (03 Jul 2017 08:50)  T(F): 98.8 (03 Jul 2017 08:50), Max: 98.8 (03 Jul 2017 08:50)  HR: 89 (03 Jul 2017 08:50) (89 - 108)  BP: 112/67 (03 Jul 2017 08:50) (112/67 - 135/75)  BP(mean): --  RR: 20 (03 Jul 2017 08:50) (16 - 24)  SpO2: 95% (03 Jul 2017 08:50) (92% - 96%)    Physical Exam-     Mental Status- follows simple commands    Cranial Nerves- no diplopia    Gait and station-n/a    Motor- weakness generalized    Reflexes- decreased    Sensation- no sensory level    Coordination- no gross dysmetria    Vascular -    Medications  amiodarone    Tablet 200 milliGRAM(s) Oral daily  insulin lispro (HumaLOG) corrective regimen sliding scale   SubCutaneous Before meals and at bedtime  atorvastatin 40 milliGRAM(s) Oral at bedtime  acetaminophen  Suppository 650 milliGRAM(s) Rectal every 6 hours PRN  bisacodyl Suppository 10 milliGRAM(s) Rectal daily  lidocaine   Patch 1 Patch Transdermal daily  guaiFENesin    Syrup 200 milliGRAM(s) Oral every 6 hours PRN  apixaban 2.5 milliGRAM(s) Oral two times a day  aspirin enteric coated 81 milliGRAM(s) Oral daily  cyanocobalamin Injectable 1000 MICROGram(s) SubCutaneous daily  furosemide    Tablet 20 milliGRAM(s) Oral two times a day  meperidine     Injectable 25 milliGRAM(s) IV Push every 6 hours PRN  metoprolol 25 milliGRAM(s) Oral two times a day  oxyCODONE Solution 5 milliGRAM(s) Oral every 4 hours  morphine  - Injectable 1 milliGRAM(s) IV Push every 4 hours PRN  morphine  - Injectable 0.5 milliGRAM(s) IV Push every 4 hours PRN  megestrol Suspension 800 milliGRAM(s) Oral daily  haloperidol    Injectable 1 milliGRAM(s) IntraMuscular once  piperacillin/tazobactam IVPB. 4.5 Gram(s) IV Intermittent every 8 hours  vancomycin  IVPB 1000 milliGRAM(s) IV Intermittent every 24 hours      Lab      Radiology    Assessment- Although no evidence of acute CNS pathology- rec-non contrast CT head   TSH, B12, rehab    Plan

## 2017-07-03 NOTE — PROGRESS NOTE ADULT - PROBLEM SELECTOR PLAN 4
s/p PPM, on Eliquis at home. Decreased CrCl. Concern for pacemaker involvement, however gallium scan negative   - c/w home Amiodarone 200mg QD   - c/w Eliquis (2.5mg since patient age >80yrs and weight <60km) s/p PPM, on Eliquis at home. Decreased CrCl. Concern for pacemaker involvement, however gallium scan negative   - c/w home Amiodarone 200mg QD   - c/w Eliquis (2.5mg since patient age >80yrs and weight <60km)  -per Cardio-- increase BB as tolerated

## 2017-07-03 NOTE — PROGRESS NOTE ADULT - SUBJECTIVE AND OBJECTIVE BOX
OVERNIGHT EVENTS: Per nursing note, pt became agitated at night. In order to keep her from endangering herself, she was given 1 mg haldol IVP.    SUBJECTIVE / INTERVAL HPI: Patient seen and examined at bedside. Pt resting in bed comfortably. She notes having frequent urination. Denies fevers, chills, chest pain, SOB, abdominal pain, headache, dizziness, N/V. Pt also complains of pain in her left hand 2/2 to placement of her IV. Otherwise, no other complaints.    VITAL SIGNS:  Vital Signs Last 24 Hrs  T(C): 37.1 (03 Jul 2017 08:50), Max: 37.1 (03 Jul 2017 08:50)  T(F): 98.8 (03 Jul 2017 08:50), Max: 98.8 (03 Jul 2017 08:50)  HR: 89 (03 Jul 2017 08:50) (89 - 95)  BP: 112/67 (03 Jul 2017 08:50) (112/67 - 135/75)  BP(mean): --  RR: 20 (03 Jul 2017 08:50) (16 - 24)  SpO2: 95% (03 Jul 2017 08:50) (92% - 96%)    PHYSICAL EXAM:    General: frail, cachectic, responds appropriately to questions and commands, NAD  HEENT: NC/AT; PERRL, anicteric sclera; MMM  Neck: supple  Cardiovascular: irregularly irregular, no rubs, gallops  Respiratory: CTA B/L; no W/R/R  Gastrointestinal: soft, NT/ND; +BSx4  Extremities: WWP; +1 pitting edema b/l LE, no clubbing or cyanosis  Neurological: AAOx3; no focal deficits    MEDICATIONS:  MEDICATIONS  (STANDING):  amiodarone    Tablet 200 milliGRAM(s) Oral daily  insulin lispro (HumaLOG) corrective regimen sliding scale   SubCutaneous Before meals and at bedtime  atorvastatin 40 milliGRAM(s) Oral at bedtime  bisacodyl Suppository 10 milliGRAM(s) Rectal daily  lidocaine   Patch 1 Patch Transdermal daily  apixaban 2.5 milliGRAM(s) Oral two times a day  aspirin enteric coated 81 milliGRAM(s) Oral daily  cyanocobalamin Injectable 1000 MICROGram(s) SubCutaneous daily  furosemide    Tablet 20 milliGRAM(s) Oral two times a day  metoprolol 25 milliGRAM(s) Oral two times a day  oxyCODONE Solution 5 milliGRAM(s) Oral every 4 hours  megestrol Suspension 800 milliGRAM(s) Oral daily  haloperidol    Injectable 1 milliGRAM(s) IntraMuscular once  piperacillin/tazobactam IVPB. 4.5 Gram(s) IV Intermittent every 8 hours  vancomycin  IVPB 1000 milliGRAM(s) IV Intermittent every 24 hours  predniSONE   Tablet 50 milliGRAM(s) Oral once  predniSONE   Tablet 50 milliGRAM(s) Oral once  predniSONE   Tablet 50 milliGRAM(s) Oral once    MEDICATIONS  (PRN):  acetaminophen  Suppository 650 milliGRAM(s) Rectal every 6 hours PRN For Temp greater than 38 C (100.4 F)  guaiFENesin    Syrup 200 milliGRAM(s) Oral every 6 hours PRN Cough  meperidine     Injectable 25 milliGRAM(s) IV Push every 6 hours PRN Rigors  morphine  - Injectable 1 milliGRAM(s) IV Push every 4 hours PRN Excessive work of breathing / RR>35 / Resp distress / severe pain (7-10)  morphine  - Injectable 0.5 milliGRAM(s) IV Push every 4 hours PRN Dyspnea / Air hunger / Increased work of breathing / RR>25 / moderate pain (4-6)  diphenhydrAMINE   Capsule 25 milliGRAM(s) Oral once PRN Rash and/or Itching      ALLERGIES:  Allergies    IV CONtRAST (Flushing (Skin); Rash)  No Known Drug Allergies    Intolerances        LABS:                        9.3    32.0  )-----------( 156      ( 03 Jul 2017 10:54 )             29.8     07-03    136  |  97  |  27<H>  ----------------------------<  124<H>  4.1   |  28  |  1.00    Ca    9.0      03 Jul 2017 10:54  Mg     2.1     07-03          CAPILLARY BLOOD GLUCOSE  108 (03 Jul 2017 11:33)          RADIOLOGY & ADDITIONAL TESTS: Reviewed.

## 2017-07-03 NOTE — PROGRESS NOTE ADULT - PROBLEM SELECTOR PLAN 1
-POA   -source was urosepsis w/ seeding to chemoport, which was removed on 6/12  -Per Dr. Medina, trend WBC and follow urine and blood cultures; RUQ scan; CT abdomen  -echo showed EF 60-65%  -blood culture negative at 2 days  -CBC shows WBC of 22, decreased from previous 23  -RUQ US completed-- showing no focal infection source  -urine culture negative  -continue Ceftriaxone 2 gm daily   -monitor  -pt for abd ct with oral contrast--pt reports rash and intense pruritis w/ contrast but no breathing problems; pt needs contrast to assess for abscess given persistently elevated leukocytosis; spoke w/ pt and family w/  to discuss risks and benefits; pt agrees w/ plan; will medicate with prednisone and benedryl per protocol nurse aware -POA   -source was urosepsis w/ seeding to chemoport, which was removed on 6/12  -Per Dr. Medina, trend WBC and follow urine and blood cultures  -echo showed EF 60-65%  -blood culture negative at 2 days  -WBCs 31 today in setting of prednisone administration for CT abd/pelvis per allergy to contrast protocol  -RUQ US completed-- showing no focal infection source  -urine culture negative  -continue Ceftriaxone 2 gm daily   -monitor  -pt for abd ct with oral contrast--pt reports rash and intense pruritis w/ contrast but no breathing problems; pt needs contrast to assess for abscess given persistently elevated leukocytosis; spoke w/ pt and family w/  to discuss risks and benefits; pt agrees w/ plan; will medicate with prednisone and benedryl per protocol nurse aware  -CT abd/pelvis showed "New groundglass infiltrates in both lungs may be due to congestion or pneumonia" -POA   -source was urosepsis w/ seeding to chemoport, which was removed on 6/12  -Per Dr. Medina, trend WBC and follow urine and blood cultures  -echo showed EF 60-65%  -blood culture negative at 3 days  -WBCs 31 today in setting of prednisone administration for CT abd/pelvis per allergy to contrast protocol  -RUQ US completed-- showing no focal infection source  -Urine cx negative  -repeat UA negative, pending repeat urine Cx  -CT abd/pelvis showed "New groundglass infiltrates in both lungs may be due to congestion or pneumonia"-- per ID-- not likely source of persisent leukocytosis  -Per ID, CT scan lumbosacral spine to r/o abscess  -pt now for CT Lumbar w/ IV contrast--pt reports rash and intense pruritis w/ contrast but no breathing problems; pt needs contrast to assess for abscess given persistently elevated leukocytosis; spoke w/ pt and family w/  to discuss risks and benefits; pt agrees w/ plan; will medicate with prednisone and benedryl per protocol nurse aware  -Per Heme/onc, gallium scan for leukocytosis and head CT (will be done at same time as CT lumbar spine) 2/2 intermittent hallucinations  -Heme/onc consulted psych--will f/u recs  -No MRI bc pt w/ pacemaker  -per ID, if above tests negative, rec BERNADETTE  -c/w vanc/zosyn -POA   -source was urosepsis w/ seeding to chemoport, which was removed on 6/12  -Per Dr. Medina, trend WBC and follow urine and blood cultures  -echo showed EF 60-65%  -blood culture negative at 3 days  -WBCs 31 today in setting of prednisone administration for CT abd/pelvis per allergy to contrast protocol  -RUQ US completed-- showing no focal infection source  -Urine cx negative  -repeat UA negative, pending repeat urine Cx  -CT abd/pelvis showed "New groundglass infiltrates in both lungs may be due to congestion or pneumonia"-- per ID-- not likely source of persisent leukocytosis  -Per ID, CT scan lumbosacral spine to r/o abscess  -pt now for CT Lumbar w/ IV contrast--pt reports rash and intense pruritis w/ contrast but no breathing problems; pt needs contrast to assess for abscess given persistently elevated leukocytosis; spoke w/ pt and family w/  to discuss risks and benefits; pt agrees w/ plan; will medicate with prednisone and benedryl per protocol nurse aware  -Per Heme/onc, gallium scan for leukocytosis and head CT (will be done at same time as CT lumbar spine) 2/2 intermittent hallucinations--f/u  -Heme/onc consulted psych--will f/u recs  -No MRI bc pt w/ pacemaker  -per ID, if above tests negative, rec BERNADETTE  -c/w vanc/zosyn

## 2017-07-03 NOTE — PROGRESS NOTE ADULT - PROBLEM SELECTOR PLAN 1
Continue IV antibiotics. Need to clarify source of leukocytosis  Agree with repeat Gallium Scan  CAT scan of Lumbo Sacral spine  No MRI in view of patients pacemaker  If the above diagnostic tests do not clarify source of infection suggest a   Trans Esophageal Echo

## 2017-07-03 NOTE — PROGRESS NOTE ADULT - SUBJECTIVE AND OBJECTIVE BOX
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 Patients family at bedside states she's been more lethargic with associated lower extremity swelling, SOB, decreased appetite and diarrhea. Patient with previous admissions for symptomatic hyperglycemia in January. Patient currently seeing Dr. Galaviz on a regular basis for chemotherapy treatment. Last treatment on May 23rd for which she received Bendamustine and Rituximab. Patient denies any headaches, chest pain, abdominal pain, N/V, dysuria, or bowel changes.     Upon arrival to the ED, patient in moderate respiratory distress using accessory muscles, breathing w/ a RR of 30, O2 93% on room air. Patient placed on BIPAP with slight improvement in respiratory status. Patient febrile to 101 with a HR of 77. Labs significant for AG metabolic acidosis, K 7.0, Na 127, BUN 58, Cr. 2.0 (baseline < 0.9 January), Lactate 2.8. CXR done showing RLL infiltrate with pulmonary vascular congestion. In the ED, patient received Vancomycin, Zosyn, Tylenol, Calcium Gluconate 2g, Insulin 5u, Albuterol neb 5mg x 2. ICU consulted, will be transferred to MICU for management of severe sepsis 2/2 HAP and pulmonary congestion. (10 Ramon 2017 19:13)    FAMILY HISTORY:  No pertinent family history in first degree relatives    MEDICATIONS  (STANDING):  amiodarone    Tablet 200 milliGRAM(s) Oral daily  insulin lispro (HumaLOG) corrective regimen sliding scale   SubCutaneous Before meals and at bedtime  atorvastatin 40 milliGRAM(s) Oral at bedtime  bisacodyl Suppository 10 milliGRAM(s) Rectal daily  lidocaine   Patch 1 Patch Transdermal daily  apixaban 2.5 milliGRAM(s) Oral two times a day  aspirin enteric coated 81 milliGRAM(s) Oral daily  cyanocobalamin Injectable 1000 MICROGram(s) SubCutaneous daily  furosemide    Tablet 20 milliGRAM(s) Oral two times a day  metoprolol 25 milliGRAM(s) Oral two times a day  oxyCODONE Solution 5 milliGRAM(s) Oral every 4 hours  megestrol Suspension 800 milliGRAM(s) Oral daily  haloperidol    Injectable 1 milliGRAM(s) IntraMuscular once  vancomycin  IVPB 1000 milliGRAM(s) IV Intermittent every 24 hours  piperacillin/tazobactam IVPB. 2.25 Gram(s) IV Intermittent every 6 hours  piperacillin/tazobactam IVPB.   IV Intermittent     MEDICATIONS  (PRN):  acetaminophen  Suppository 650 milliGRAM(s) Rectal every 6 hours PRN For Temp greater than 38 C (100.4 F)  guaiFENesin    Syrup 200 milliGRAM(s) Oral every 6 hours PRN Cough  meperidine     Injectable 25 milliGRAM(s) IV Push every 6 hours PRN Rigors  morphine  - Injectable 1 milliGRAM(s) IV Push every 4 hours PRN Excessive work of breathing / RR>35 / Resp distress / severe pain (7-10)  morphine  - Injectable 0.5 milliGRAM(s) IV Push every 4 hours PRN Dyspnea / Air hunger / Increased work of breathing / RR>25 / moderate pain (4-6)    Vital Signs Last 24 Hrs  T(C): 37.1 (03 Jul 2017 21:19), Max: 37.1 (03 Jul 2017 08:50)  T(F): 98.7 (03 Jul 2017 21:19), Max: 98.8 (03 Jul 2017 08:50)  HR: 99 (03 Jul 2017 21:19) (89 - 99)  BP: 119/63 (03 Jul 2017 21:19) (112/67 - 119/63)  BP(mean): --  RR: 19 (03 Jul 2017 21:19) (19 - 20)  SpO2: 95% (03 Jul 2017 21:19) (95% - 98%)    Physical exam:    Overall impression  Lymphadenopathy  Liver  spleen    Labs:  CBC Full  -  ( 03 Jul 2017 10:54 )  WBC Count : 32.0 K/uL  Hemoglobin : 9.3 g/dL  Hematocrit : 29.8 %  Platelet Count - Automated : 156 K/uL  Mean Cell Volume : 95.8 fL  Mean Cell Hemoglobin : 29.9 pg  Mean Cell Hemoglobin Concentration : 31.2 g/dL  Auto Neutrophil # : x  Auto Lymphocyte # : x  Auto Monocyte # : x  Auto Eosinophil # : x  Auto Basophil # : x  Auto Neutrophil % : x  Auto Lymphocyte % : x  Auto Monocyte % : x  Auto Eosinophil % : x  Auto Basophil % : x    07-03    136  |  97  |  27<H>  ----------------------------<  124<H>  4.1   |  28  |  1.00    Ca    9.0      03 Jul 2017 10:54  Mg     2.1     07-03        Radiology:  HEALTH ISSUES - R/O PROBLEM Dx:      Assessmant / Problems  1) Leukocyrtosis , upward movement of WBC continues while on Vancomicin plus Zosyn  No fever, no localizing signs  Plan: Gallium scan ordered  Agree with CT head in view of intermittent hallucination   2)NHL remission  3) Breast ca stable      Thank you  Neha Galaviz MD

## 2017-07-03 NOTE — PROGRESS NOTE ADULT - PROBLEM SELECTOR PLAN 2
Improved. Initially requiring BIPAP and HFNC 2/2 pulmonary congestion/edema likely due to CHF exacerbation in the setting of urosepsis and line sepsis. Now tolerating regular NC. VQ done which was negative for PE.  - C/w nasal cannula  - Oxycodone 5mg solution for air hunger, increased work of breathing, comfort Improved. Initially requiring BIPAP and HFNC 2/2 pulmonary congestion/edema likely due to CHF exacerbation in the setting of urosepsis and line sepsis. Now tolerating regular NC. VQ done which was negative for PE.  - C/w nasal cannula  - Oxycodone 5mg solution for air hunger, increased work of breathing, comfort  -c/w current management

## 2017-07-03 NOTE — PROGRESS NOTE ADULT - PROBLEM SELECTOR PLAN 5
Patient s/p CABG. Patient without chest pain. EKG without ischemic changes. EF EF 60-65%.   - c/w home Lipitor 40mg QHS  - c/w ASA   - c/w Metoprolol increased to 25mg BID  - Medically optimize cardiac function if BP allows   - Continue to monitor I's and O's Patient s/p CABG. Patient without chest pain. EKG without ischemic changes. EF EF 60-65%.   - c/w home Lipitor 40mg QHS  - c/w ASA   - c/w Metoprolol increased to 25mg BID  - Medically optimize cardiac function if BP allows   - Continue to monitor I's and O's  -c/w current management

## 2017-07-04 DIAGNOSIS — Z29.9 ENCOUNTER FOR PROPHYLACTIC MEASURES, UNSPECIFIED: ICD-10-CM

## 2017-07-04 LAB
CULTURE RESULTS: SIGNIFICANT CHANGE UP
SPECIMEN SOURCE: SIGNIFICANT CHANGE UP

## 2017-07-04 PROCEDURE — 99233 SBSQ HOSP IP/OBS HIGH 50: CPT

## 2017-07-04 RX ADMIN — PREGABALIN 1000 MICROGRAM(S): 225 CAPSULE ORAL at 11:32

## 2017-07-04 RX ADMIN — Medication 81 MILLIGRAM(S): at 11:30

## 2017-07-04 RX ADMIN — OXYCODONE HYDROCHLORIDE 5 MILLIGRAM(S): 5 TABLET ORAL at 17:25

## 2017-07-04 RX ADMIN — PIPERACILLIN AND TAZOBACTAM 200 GRAM(S): 4; .5 INJECTION, POWDER, LYOPHILIZED, FOR SOLUTION INTRAVENOUS at 23:10

## 2017-07-04 RX ADMIN — APIXABAN 2.5 MILLIGRAM(S): 2.5 TABLET, FILM COATED ORAL at 10:23

## 2017-07-04 RX ADMIN — OXYCODONE HYDROCHLORIDE 5 MILLIGRAM(S): 5 TABLET ORAL at 11:31

## 2017-07-04 RX ADMIN — Medication 2: at 11:56

## 2017-07-04 RX ADMIN — PIPERACILLIN AND TAZOBACTAM 200 GRAM(S): 4; .5 INJECTION, POWDER, LYOPHILIZED, FOR SOLUTION INTRAVENOUS at 11:31

## 2017-07-04 RX ADMIN — OXYCODONE HYDROCHLORIDE 5 MILLIGRAM(S): 5 TABLET ORAL at 07:23

## 2017-07-04 RX ADMIN — OXYCODONE HYDROCHLORIDE 5 MILLIGRAM(S): 5 TABLET ORAL at 16:25

## 2017-07-04 RX ADMIN — APIXABAN 2.5 MILLIGRAM(S): 2.5 TABLET, FILM COATED ORAL at 21:27

## 2017-07-04 RX ADMIN — PIPERACILLIN AND TAZOBACTAM 200 GRAM(S): 4; .5 INJECTION, POWDER, LYOPHILIZED, FOR SOLUTION INTRAVENOUS at 17:48

## 2017-07-04 RX ADMIN — ATORVASTATIN CALCIUM 40 MILLIGRAM(S): 80 TABLET, FILM COATED ORAL at 21:26

## 2017-07-04 RX ADMIN — AMIODARONE HYDROCHLORIDE 200 MILLIGRAM(S): 400 TABLET ORAL at 06:39

## 2017-07-04 RX ADMIN — PIPERACILLIN AND TAZOBACTAM 200 GRAM(S): 4; .5 INJECTION, POWDER, LYOPHILIZED, FOR SOLUTION INTRAVENOUS at 06:38

## 2017-07-04 RX ADMIN — Medication 25 MILLIGRAM(S): at 06:37

## 2017-07-04 RX ADMIN — MEGESTROL ACETATE 800 MILLIGRAM(S): 40 SUSPENSION ORAL at 13:17

## 2017-07-04 RX ADMIN — Medication 25 MILLIGRAM(S): at 17:48

## 2017-07-04 RX ADMIN — Medication 20 MILLIGRAM(S): at 06:38

## 2017-07-04 RX ADMIN — OXYCODONE HYDROCHLORIDE 5 MILLIGRAM(S): 5 TABLET ORAL at 12:31

## 2017-07-04 RX ADMIN — OXYCODONE HYDROCHLORIDE 5 MILLIGRAM(S): 5 TABLET ORAL at 05:33

## 2017-07-04 RX ADMIN — Medication 20 MILLIGRAM(S): at 17:48

## 2017-07-04 RX ADMIN — OXYCODONE HYDROCHLORIDE 5 MILLIGRAM(S): 5 TABLET ORAL at 06:37

## 2017-07-04 RX ADMIN — Medication 250 MILLIGRAM(S): at 11:30

## 2017-07-04 RX ADMIN — OXYCODONE HYDROCHLORIDE 5 MILLIGRAM(S): 5 TABLET ORAL at 21:26

## 2017-07-04 RX ADMIN — LIDOCAINE 1 PATCH: 4 CREAM TOPICAL at 11:30

## 2017-07-04 NOTE — PROGRESS NOTE ADULT - PROBLEM SELECTOR PLAN 4
s/p PPM, on Eliquis at home. Decreased CrCl. Concern for pacemaker involvement, however gallium scan negative   - c/w home Amiodarone 200mg QD   - c/w Eliquis (2.5mg since patient age >80yrs and weight <60km)  -per Cardio-- increase BB as tolerated s/p PPM, on Eliquis at home. Decreased CrCl. Concern for pacemaker involvement, however gallium scan negative   -c/w home Amiodarone 200mg QD   -c/w Eliquis (2.5mg since patient age >80yrs and weight <60km)  -per Cardio-- increase BB as tolerated s/p PPM, stable  -c/w home Amiodarone 200mg QD   -c/w Eliquis (2.5mg since patient age >80yrs and weight <60km)  -per Cardio-- increase BB as tolerated

## 2017-07-04 NOTE — PROGRESS NOTE ADULT - ASSESSMENT
86F with PMHx of CAD s/p CABG, Afib s/p PPM (on Eliquis), breast cancer, large B cell lymphoma on chemo, HFpEF, HTN, HLD, DM presented with severe sepsis 2/2 UTI, persistent bacteremia, initially admitted to the MICU s/p chemo port removal (which was infected), stepped down to [facility] then [facility]. 86F with PMHx of CAD s/p CABG, Afib s/p PPM (on Eliquis), breast cancer, large B cell lymphoma on chemo, HFpEF, HTN, HLD, DM presented with severe sepsis 2/2 UTI, initially admitted to the MICU s/p removal of infected chemo port[facility]

## 2017-07-04 NOTE — PROGRESS NOTE ADULT - PROBLEM SELECTOR PLAN 2
Improved. Initially requiring BIPAP and HFNC 2/2 pulmonary congestion/edema likely due to CHF exacerbation in the setting of urosepsis and line sepsis. Now tolerating regular NC. VQ done which was negative for PE.  - C/w nasal cannula  - Oxycodone 5mg solution for air hunger, increased work of breathing, comfort  -c/w current management Resolved. Initially requiring BIPAP and HFNC 2/2 pulmonary congestion/edema likely due to CHF exacerbation in the setting of urosepsis and line sepsis. Now tolerating regular NC. VQ done which was negative for PE.  - C/w nasal cannula  - Oxycodone 5mg solution for air hunger, increased work of breathing, comfort  -c/w current management

## 2017-07-04 NOTE — PROGRESS NOTE ADULT - SUBJECTIVE AND OBJECTIVE BOX
Chief Complaint/Reason for Consult: CAD  INTERVAL HPI: no acute events noted   	  MEDICATIONS:  amiodarone    Tablet 200 milliGRAM(s) Oral daily  furosemide    Tablet 20 milliGRAM(s) Oral two times a day  metoprolol 25 milliGRAM(s) Oral two times a day    vancomycin  IVPB 1000 milliGRAM(s) IV Intermittent every 24 hours  piperacillin/tazobactam IVPB. 2.25 Gram(s) IV Intermittent every 6 hours  piperacillin/tazobactam IVPB.   IV Intermittent     guaiFENesin    Syrup 200 milliGRAM(s) Oral every 6 hours PRN    acetaminophen  Suppository 650 milliGRAM(s) Rectal every 6 hours PRN  oxyCODONE Solution 5 milliGRAM(s) Oral every 4 hours  haloperidol    Injectable 1 milliGRAM(s) IntraMuscular once    bisacodyl Suppository 10 milliGRAM(s) Rectal daily    insulin lispro (HumaLOG) corrective regimen sliding scale   SubCutaneous Before meals and at bedtime  atorvastatin 40 milliGRAM(s) Oral at bedtime  megestrol Suspension 800 milliGRAM(s) Oral daily    lidocaine   Patch 1 Patch Transdermal daily  apixaban 2.5 milliGRAM(s) Oral two times a day  aspirin enteric coated 81 milliGRAM(s) Oral daily  cyanocobalamin Injectable 1000 MICROGram(s) SubCutaneous daily      REVIEW OF SYSTEMS:  [x] As per HPI  CONSTITUTIONAL: No fever, weight loss, or fatigue  RESPIRATORY: No cough, wheezing, chills or hemoptysis; No Shortness of Breath  CARDIOVASCULAR: No chest pain, palpitations, dizziness, or leg swelling  GASTROINTESTINAL: No abdominal or epigastric pain. No nausea, vomiting, or hematemesis; No diarrhea or constipation. No melena or hematochezia.  MUSCULOSKELETAL: No joint pain or swelling; No muscle, back, or extremity pain  [x] All others negative	  [ ] Unable to obtain    PHYSICAL EXAM:  T(C): 36.3 (07-04-17 @ 09:02), Max: 37.1 (07-03-17 @ 21:19)  HR: 90 (07-04-17 @ 09:02) (89 - 99)  BP: 110/57 (07-04-17 @ 09:02) (108/60 - 119/63)  RR: 19 (07-04-17 @ 09:02) (17 - 20)  SpO2: 98% (07-04-17 @ 09:02) (95% - 100%)  Wt(kg): --  I&O's Summary    03 Jul 2017 07:01  -  04 Jul 2017 07:00  --------------------------------------------------------  IN: 200 mL / OUT: 0 mL / NET: 200 mL          Appearance: Normal	  HEENT:   Normal oral mucosa  Cardiovascular: Normal S1 S2, No JVD, No murmurs, No edema  Respiratory: Lungs clear to auscultation	  Gastrointestinal:  Soft, Non-tender, + BS	  Extremities: Normal range of motion, No clubbing, cyanosis or edema  Vascular: Peripheral pulses palpable 2+ bilaterally    TELEMETRY: 	    ECG:    	  RADIOLOGY:   CXR:  CT:  US:    CARDIAC TESTING:  Echocardiogram:  Catheterization:  Stress Test:      LABS:	 	    CARDIAC MARKERS:                                  9.3    32.0  )-----------( 156      ( 03 Jul 2017 10:54 )             29.8     07-03    136  |  97  |  27<H>  ----------------------------<  124<H>  4.1   |  28  |  1.00    Ca    9.0      03 Jul 2017 10:54  Mg     2.1     07-03      proBNP:   Lipid Profile:   HgA1c:   TSH:     ASSESSMENT/PLAN: 	  Afib  - increase po BB as tolerated continue amio 200 qd for now.    Keep net euvolemic to net negative, strict I/Os

## 2017-07-04 NOTE — PROGRESS NOTE ADULT - PROBLEM SELECTOR PLAN 5
Patient s/p CABG. Patient without chest pain. EKG without ischemic changes. EF EF 60-65%.   - c/w home Lipitor 40mg QHS  - c/w ASA   - c/w Metoprolol increased to 25mg BID  - Medically optimize cardiac function if BP allows   - Continue to monitor I's and O's  -c/w current management Patient s/p CABG. Patient without chest pain. EKG without ischemic changes. EF EF 60-65%.   -c/w home Lipitor 40mg QHS  - c/w ASA   - c/w Metoprolol increased to 25mg BID  - Medically optimize cardiac function if BP allows   - Continue to monitor I's and O's  -c/w current management

## 2017-07-04 NOTE — PROGRESS NOTE ADULT - SUBJECTIVE AND OBJECTIVE BOX
OVERNIGHT EVENTS: NAEO. CT lumbar spine and CT head performed.    SUBJECTIVE / INTERVAL HPI: Patient seen and examined at bedside. Afebrile. Pt resting comfortably in bed. Denies any new symptoms. Denies pain, shortness of beath, fevers/chills, headache. Otherwise, comfortable.    VITAL SIGNS:  Vital Signs Last 24 Hrs  T(C): 36.9 (04 Jul 2017 05:38), Max: 37.1 (03 Jul 2017 08:50)  T(F): 98.5 (04 Jul 2017 05:38), Max: 98.8 (03 Jul 2017 08:50)  HR: 96 (04 Jul 2017 05:38) (89 - 99)  BP: 108/60 (04 Jul 2017 05:38) (108/60 - 119/63)  BP(mean): --  RR: 17 (04 Jul 2017 05:38) (17 - 20)  SpO2: 100% (04 Jul 2017 05:38) (95% - 100%)    PHYSICAL EXAM:    General: WDWN  HEENT: NC/AT; PERRL, anicteric sclera; MMM  Neck: supple  Cardiovascular: +S1/S2; RRR  Respiratory: CTA B/L; no W/R/R  Gastrointestinal: soft, NT/ND; +BSx4  Extremities: WWP; no edema, clubbing or cyanosis  Vascular: 2+ radial, DP/PT pulses B/L  Neurological: AAOx3; no focal deficits    MEDICATIONS:  MEDICATIONS  (STANDING):  amiodarone    Tablet 200 milliGRAM(s) Oral daily  insulin lispro (HumaLOG) corrective regimen sliding scale   SubCutaneous Before meals and at bedtime  atorvastatin 40 milliGRAM(s) Oral at bedtime  bisacodyl Suppository 10 milliGRAM(s) Rectal daily  lidocaine   Patch 1 Patch Transdermal daily  apixaban 2.5 milliGRAM(s) Oral two times a day  aspirin enteric coated 81 milliGRAM(s) Oral daily  cyanocobalamin Injectable 1000 MICROGram(s) SubCutaneous daily  furosemide    Tablet 20 milliGRAM(s) Oral two times a day  metoprolol 25 milliGRAM(s) Oral two times a day  oxyCODONE Solution 5 milliGRAM(s) Oral every 4 hours  megestrol Suspension 800 milliGRAM(s) Oral daily  haloperidol    Injectable 1 milliGRAM(s) IntraMuscular once  vancomycin  IVPB 1000 milliGRAM(s) IV Intermittent every 24 hours  piperacillin/tazobactam IVPB. 2.25 Gram(s) IV Intermittent every 6 hours  piperacillin/tazobactam IVPB.   IV Intermittent     MEDICATIONS  (PRN):  acetaminophen  Suppository 650 milliGRAM(s) Rectal every 6 hours PRN For Temp greater than 38 C (100.4 F)  guaiFENesin    Syrup 200 milliGRAM(s) Oral every 6 hours PRN Cough  morphine  - Injectable 1 milliGRAM(s) IV Push every 4 hours PRN Excessive work of breathing / RR>35 / Resp distress / severe pain (7-10)  morphine  - Injectable 0.5 milliGRAM(s) IV Push every 4 hours PRN Dyspnea / Air hunger / Increased work of breathing / RR>25 / moderate pain (4-6)      ALLERGIES:  Allergies    IV CONtRAST (Flushing (Skin); Rash)  No Known Drug Allergies    Intolerances        LABS:                        9.3    32.0  )-----------( 156      ( 03 Jul 2017 10:54 )             29.8     07-03    136  |  97  |  27<H>  ----------------------------<  124<H>  4.1   |  28  |  1.00    Ca    9.0      03 Jul 2017 10:54  Mg     2.1     07-03        Urinalysis Basic - ( 03 Jul 2017 12:38 )    Color: Yellow / Appearance: Clear / SG: <=1.005 / pH: x  Gluc: x / Ketone: NEGATIVE  / Bili: NEGATIVE / Urobili: 0.2 E.U./dL   Blood: x / Protein: NEGATIVE mg/dL / Nitrite: NEGATIVE   Leuk Esterase: NEGATIVE / RBC: x / WBC x   Sq Epi: x / Non Sq Epi: x / Bacteria: x      CAPILLARY BLOOD GLUCOSE  147 (04 Jul 2017 07:42)        RADIOLOGY & ADDITIONAL TESTS: Reviewed. OVERNIGHT EVENTS: NAEO. CT lumbar spine and CT head performed.    SUBJECTIVE / INTERVAL HPI: Patient seen and examined at bedside. Afebrile. Pt resting comfortably in bed. Denies any new symptoms. Denies pain, shortness of beath, fevers/chills, headache. Otherwise, comfortable.    VITAL SIGNS:  Vital Signs Last 24 Hrs  T(C): 36.3 (04 Jul 2017 09:02), Max: 37.1 (03 Jul 2017 21:19)  T(F): 97.4 (04 Jul 2017 09:02), Max: 98.7 (03 Jul 2017 21:19)  HR: 90 (04 Jul 2017 09:02) (89 - 99)  BP: 110/57 (04 Jul 2017 09:02) (108/60 - 119/63)  BP(mean): --  RR: 19 (04 Jul 2017 09:02) (17 - 20)  SpO2: 98% (04 Jul 2017 09:02) (95% - 100%)      PHYSICAL EXAM:    General: WDWN  HEENT: NC/AT; PERRL, anicteric sclera; MMM  Neck: supple  Cardiovascular: +S1/S2; RRR  Respiratory: CTA B/L; no W/R/R  Gastrointestinal: soft, NT/ND; +BSx4  Extremities: WWP; no edema, clubbing or cyanosis  Vascular: 2+ radial, DP/PT pulses B/L  Neurological: AAOx3; no focal deficits    MEDICATIONS:  MEDICATIONS  (STANDING):  amiodarone    Tablet 200 milliGRAM(s) Oral daily  insulin lispro (HumaLOG) corrective regimen sliding scale   SubCutaneous Before meals and at bedtime  atorvastatin 40 milliGRAM(s) Oral at bedtime  bisacodyl Suppository 10 milliGRAM(s) Rectal daily  lidocaine   Patch 1 Patch Transdermal daily  apixaban 2.5 milliGRAM(s) Oral two times a day  aspirin enteric coated 81 milliGRAM(s) Oral daily  cyanocobalamin Injectable 1000 MICROGram(s) SubCutaneous daily  furosemide    Tablet 20 milliGRAM(s) Oral two times a day  metoprolol 25 milliGRAM(s) Oral two times a day  oxyCODONE Solution 5 milliGRAM(s) Oral every 4 hours  megestrol Suspension 800 milliGRAM(s) Oral daily  haloperidol    Injectable 1 milliGRAM(s) IntraMuscular once  vancomycin  IVPB 1000 milliGRAM(s) IV Intermittent every 24 hours  piperacillin/tazobactam IVPB. 2.25 Gram(s) IV Intermittent every 6 hours  piperacillin/tazobactam IVPB.   IV Intermittent     MEDICATIONS  (PRN):  acetaminophen  Suppository 650 milliGRAM(s) Rectal every 6 hours PRN For Temp greater than 38 C (100.4 F)  guaiFENesin    Syrup 200 milliGRAM(s) Oral every 6 hours PRN Cough  morphine  - Injectable 1 milliGRAM(s) IV Push every 4 hours PRN Excessive work of breathing / RR>35 / Resp distress / severe pain (7-10)  morphine  - Injectable 0.5 milliGRAM(s) IV Push every 4 hours PRN Dyspnea / Air hunger / Increased work of breathing / RR>25 / moderate pain (4-6)      ALLERGIES:  Allergies    IV CONtRAST (Flushing (Skin); Rash)  No Known Drug Allergies    Intolerances        LABS:                        9.3    32.0  )-----------( 156      ( 03 Jul 2017 10:54 )             29.8     07-03    136  |  97  |  27<H>  ----------------------------<  124<H>  4.1   |  28  |  1.00    Ca    9.0      03 Jul 2017 10:54  Mg     2.1     07-03    Labs from AM july 4 not yet resulted    Urinalysis Basic - ( 03 Jul 2017 12:38 )    Color: Yellow / Appearance: Clear / SG: <=1.005 / pH: x  Gluc: x / Ketone: NEGATIVE  / Bili: NEGATIVE / Urobili: 0.2 E.U./dL   Blood: x / Protein: NEGATIVE mg/dL / Nitrite: NEGATIVE   Leuk Esterase: NEGATIVE / RBC: x / WBC x   Sq Epi: x / Non Sq Epi: x / Bacteria: x      CAPILLARY BLOOD GLUCOSE  147 (04 Jul 2017 07:42)        RADIOLOGY & ADDITIONAL TESTS: Reviewed. OVERNIGHT EVENTS: NAEO. CT lumbar spine and CT head performed.    SUBJECTIVE / INTERVAL HPI: Patient seen and examined at bedside. Afebrile. Pt resting comfortably in bed. Denies any new symptoms. Denies pain, shortness of beath, fevers/chills, headache. Otherwise, comfortable.    VITAL SIGNS:  Vital Signs Last 24 Hrs  T(C): 36.3 (04 Jul 2017 09:02), Max: 37.1 (03 Jul 2017 21:19)  T(F): 97.4 (04 Jul 2017 09:02), Max: 98.7 (03 Jul 2017 21:19)  HR: 90 (04 Jul 2017 09:02) (89 - 99)  BP: 110/57 (04 Jul 2017 09:02) (108/60 - 119/63)  BP(mean): --  RR: 19 (04 Jul 2017 09:02) (17 - 20)  SpO2: 98% (04 Jul 2017 09:02) (95% - 100%)      PHYSICAL EXAM:    General: WDWN  HEENT: NC/AT; PERRL, anicteric sclera; MMM  Neck: supple  Cardiovascular: +S1/S2; RRR  Respiratory: CTA B/L; no W/R/R  Gastrointestinal: soft, NT/ND; +BSx4  Extremities: WWP; no edema, clubbing or cyanosis  Vascular: 2+ radial, DP/PT pulses B/L  Neurological: AAOx3; no focal deficits    MEDICATIONS:  MEDICATIONS  (STANDING):  amiodarone    Tablet 200 milliGRAM(s) Oral daily  insulin lispro (HumaLOG) corrective regimen sliding scale   SubCutaneous Before meals and at bedtime  atorvastatin 40 milliGRAM(s) Oral at bedtime  bisacodyl Suppository 10 milliGRAM(s) Rectal daily  lidocaine   Patch 1 Patch Transdermal daily  apixaban 2.5 milliGRAM(s) Oral two times a day  aspirin enteric coated 81 milliGRAM(s) Oral daily  cyanocobalamin Injectable 1000 MICROGram(s) SubCutaneous daily  furosemide    Tablet 20 milliGRAM(s) Oral two times a day  metoprolol 25 milliGRAM(s) Oral two times a day  oxyCODONE Solution 5 milliGRAM(s) Oral every 4 hours  megestrol Suspension 800 milliGRAM(s) Oral daily  haloperidol    Injectable 1 milliGRAM(s) IntraMuscular once  vancomycin  IVPB 1000 milliGRAM(s) IV Intermittent every 24 hours  piperacillin/tazobactam IVPB. 2.25 Gram(s) IV Intermittent every 6 hours  piperacillin/tazobactam IVPB.   IV Intermittent     MEDICATIONS  (PRN):  acetaminophen  Suppository 650 milliGRAM(s) Rectal every 6 hours PRN For Temp greater than 38 C (100.4 F)  guaiFENesin    Syrup 200 milliGRAM(s) Oral every 6 hours PRN Cough  morphine  - Injectable 1 milliGRAM(s) IV Push every 4 hours PRN Excessive work of breathing / RR>35 / Resp distress / severe pain (7-10)  morphine  - Injectable 0.5 milliGRAM(s) IV Push every 4 hours PRN Dyspnea / Air hunger / Increased work of breathing / RR>25 / moderate pain (4-6)      ALLERGIES:  Allergies    IV CONtRAST (Flushing (Skin); Rash)  No Known Drug Allergies    Intolerances        LABS:                        9.3    32.0  )-----------( 156      ( 03 Jul 2017 10:54 )             29.8     07-03    136  |  97  |  27<H>  ----------------------------<  124<H>  4.1   |  28  |  1.00    Ca    9.0      03 Jul 2017 10:54  Mg     2.1     07-03    Pt refused labs    Urinalysis Basic - ( 03 Jul 2017 12:38 )    Color: Yellow / Appearance: Clear / SG: <=1.005 / pH: x  Gluc: x / Ketone: NEGATIVE  / Bili: NEGATIVE / Urobili: 0.2 E.U./dL   Blood: x / Protein: NEGATIVE mg/dL / Nitrite: NEGATIVE   Leuk Esterase: NEGATIVE / RBC: x / WBC x   Sq Epi: x / Non Sq Epi: x / Bacteria: x      CAPILLARY BLOOD GLUCOSE  147 (04 Jul 2017 07:42)        RADIOLOGY & ADDITIONAL TESTS: Reviewed.

## 2017-07-04 NOTE — PROGRESS NOTE ADULT - PROBLEM SELECTOR PLAN 1
-POA   -source was urosepsis w/ seeding to chemoport, which was removed on 6/12  -Per Dr. Medina, trend WBC and follow urine and blood cultures  -echo showed EF 60-65%  -blood culture negative at 5 days  -Per CT scan lumbosacral spine to r/o abscess done--f/u  -pt now for CT Lumbar w/ IV contrast--pt reports rash and intense pruritis w/ contrast but no breathing problems; pt needs contrast to assess for abscess given persistently elevated leukocytosis; spoke w/ pt and family w/  to discuss risks and benefits; pt agrees w/ plan; will medicate with prednisone and benedryl per protocol nurse aware  -Per Heme/onc, gallium scan for leukocytosis-f/u  -Head CT negative for acute changes  -Heme/onc consulted psych--will f/u recs  -No MRI bc pt w/ pacemaker  -per ID, if above tests negative, rec BERNADETTE  -c/w vanc/zosyn POA. Source was urosepsis w/ seeding to chemoport, which was removed on 6/12, initially concerning for endocarditis due to persistent bacteremia but TTE was negative and blood cultures NGTD. Despite no fever, pt's leukocytosis increased with no clear source. CT abdomen/pelvis with contrast did not reveal abscess/collection; infiltrates on CT chest unlikely to be infectious etiology per ID. Heme/onc following, also believe that leukocytosis is from infectious process vs. hematologic. Initially started on ceftriaxone on 6/29 but escalated to vanc/zosyn on 7/2 for persistent leukocytosis.   -CT lumbar spine done to evaluate for abscess; will follow up results.  -per ID, if above tests negative, rec BERNADETTE  -will also consider to repeat gallium scan  -c/w vanc/zosyn (day 3) POA. Source was urosepsis w/ seeding to chemoport, which was removed on 6/12, initially concerning for endocarditis due to persistent bacteremia but TTE was negative and blood cultures NGTD. Initial gallium scan done concerning for infected PPM was negative. Despite no fever, pt's leukocytosis increased with no clear source. CT abdomen/pelvis with contrast did not reveal abscess/collection; infiltrates on CT chest unlikely to be infectious etiology per ID. Heme/onc following, also believe that leukocytosis is from infectious process vs. hematologic. Initially started on ceftriaxone on 6/29 but escalated to vanc/zosyn on 7/2 for persistent leukocytosis.   -CT lumbar spine done to evaluate for abscess; will follow up results.  -per ID, if above tests negative, rec BERNADETTE  -will also consider to repeat gallium scan  -c/w vanc/zosyn (day 3)

## 2017-07-05 LAB
HCT VFR BLD CALC: 31.7 % — LOW (ref 34.5–45)
HGB BLD-MCNC: 9.9 G/DL — LOW (ref 11.5–15.5)
MCHC RBC-ENTMCNC: 29.8 PG — SIGNIFICANT CHANGE UP (ref 27–34)
MCHC RBC-ENTMCNC: 31.2 G/DL — LOW (ref 32–36)
MCV RBC AUTO: 95.5 FL — SIGNIFICANT CHANGE UP (ref 80–100)
PLATELET # BLD AUTO: 186 K/UL — SIGNIFICANT CHANGE UP (ref 150–400)
RBC # BLD: 3.32 M/UL — LOW (ref 3.8–5.2)
RBC # FLD: 21 % — HIGH (ref 10.3–16.9)
WBC # BLD: 21 K/UL — HIGH (ref 3.8–10.5)
WBC # FLD AUTO: 21 K/UL — HIGH (ref 3.8–10.5)

## 2017-07-05 PROCEDURE — 99233 SBSQ HOSP IP/OBS HIGH 50: CPT

## 2017-07-05 PROCEDURE — 72132 CT LUMBAR SPINE W/DYE: CPT | Mod: 26

## 2017-07-05 RX ADMIN — PIPERACILLIN AND TAZOBACTAM 200 GRAM(S): 4; .5 INJECTION, POWDER, LYOPHILIZED, FOR SOLUTION INTRAVENOUS at 23:54

## 2017-07-05 RX ADMIN — PIPERACILLIN AND TAZOBACTAM 200 GRAM(S): 4; .5 INJECTION, POWDER, LYOPHILIZED, FOR SOLUTION INTRAVENOUS at 07:11

## 2017-07-05 RX ADMIN — PREGABALIN 1000 MICROGRAM(S): 225 CAPSULE ORAL at 11:47

## 2017-07-05 RX ADMIN — Medication 20 MILLIGRAM(S): at 07:10

## 2017-07-05 RX ADMIN — PIPERACILLIN AND TAZOBACTAM 200 GRAM(S): 4; .5 INJECTION, POWDER, LYOPHILIZED, FOR SOLUTION INTRAVENOUS at 11:48

## 2017-07-05 RX ADMIN — MEGESTROL ACETATE 800 MILLIGRAM(S): 40 SUSPENSION ORAL at 11:47

## 2017-07-05 RX ADMIN — LIDOCAINE 1 PATCH: 4 CREAM TOPICAL at 11:47

## 2017-07-05 RX ADMIN — Medication 200 MILLIGRAM(S): at 09:26

## 2017-07-05 RX ADMIN — Medication 20 MILLIGRAM(S): at 17:53

## 2017-07-05 RX ADMIN — AMIODARONE HYDROCHLORIDE 200 MILLIGRAM(S): 400 TABLET ORAL at 07:10

## 2017-07-05 RX ADMIN — APIXABAN 2.5 MILLIGRAM(S): 2.5 TABLET, FILM COATED ORAL at 22:12

## 2017-07-05 RX ADMIN — PIPERACILLIN AND TAZOBACTAM 200 GRAM(S): 4; .5 INJECTION, POWDER, LYOPHILIZED, FOR SOLUTION INTRAVENOUS at 17:53

## 2017-07-05 RX ADMIN — APIXABAN 2.5 MILLIGRAM(S): 2.5 TABLET, FILM COATED ORAL at 09:25

## 2017-07-05 RX ADMIN — Medication 81 MILLIGRAM(S): at 11:48

## 2017-07-05 RX ADMIN — OXYCODONE HYDROCHLORIDE 5 MILLIGRAM(S): 5 TABLET ORAL at 01:10

## 2017-07-05 RX ADMIN — Medication 25 MILLIGRAM(S): at 07:10

## 2017-07-05 RX ADMIN — ATORVASTATIN CALCIUM 40 MILLIGRAM(S): 80 TABLET, FILM COATED ORAL at 22:12

## 2017-07-05 NOTE — PROGRESS NOTE ADULT - I WAS PHYSICALLY PRESENT FOR THE KEY PORTIONS OF THE EVALUATION AND MANAGEMENT (E/M) SERVICE PROVIDED.  I AGREE WITH THE ABOVE HISTORY, PHYSICAL, AND PLAN WHICH I HAVE REVIEWED AND EDITED WHERE APPROPRIATE

## 2017-07-05 NOTE — PROGRESS NOTE ADULT - SUBJECTIVE AND OBJECTIVE BOX
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 Patients family at bedside states she's been more lethargic with associated lower extremity swelling, SOB, decreased appetite and diarrhea. Patient with previous admissions for symptomatic hyperglycemia in January. Patient currently seeing Dr. Galaviz on a regular basis for chemotherapy treatment. Last treatment on May 23rd for which she received Bendamustine and Rituximab. Patient denies any headaches, chest pain, abdominal pain, N/V, dysuria, or bowel changes.   Chart reviewed. CV stable. Elevated WBC-- on iv V/Z, no infection found.        INTERVAL HPI/OVERNIGHT EVENTS:::comfortable    HEALTH ISSUES - PROBLEM Dx:  Prophylactic measure: Prophylactic measure  Leukocytosis, unspecified type: Leukocytosis, unspecified type  Sepsis due to Escherichia coli: Sepsis due to Escherichia coli  Anemia: Anemia  Hyponatremia: Hyponatremia  Acute pulmonary edema: Acute pulmonary edema  Need for prophylactic measure: Need for prophylactic measure  Nutrition, metabolism, and development symptoms: Nutrition, metabolism, and development symptoms  Anemia, unspecified type: Anemia, unspecified type  Malignant neoplasm of female breast, unspecified laterality, unspecified site of breast: Malignant neoplasm of female breast, unspecified laterality, unspecified site of breast  Diffuse large B-cell lymphoma, unspecified body region: Diffuse large B-cell lymphoma, unspecified body region  Coronary artery disease involving native coronary artery of native heart without angina pectoris: Coronary artery disease involving native coronary artery of native heart without angina pectoris  Paroxysmal atrial fibrillation: Paroxysmal atrial fibrillation  Acute respiratory failure with hypoxia: Acute respiratory failure with hypoxia  Sepsis: Sepsis  Lymphoma: Lymphoma  Severe sepsis: Severe sepsis  Central venous line infection, initial encounter: Central venous line infection, initial encounter  E coli bacteremia: E coli bacteremia  Medical orders for life-sustaining treatment (MOLST) form in chart: Medical orders for life-sustaining treatment (MOLST) form in chart  DNR (do not resuscitate): DNR (do not resuscitate)  Chronic back pain: Chronic back pain  Dyspnea and respiratory abnormalities: Dyspnea and respiratory abnormalities  Goals of care, counseling/discussion: Goals of care, counseling/discussion  Patient has healthcare proxy: Patient has healthcare proxy  Moderate protein-calorie malnutrition: Moderate protein-calorie malnutrition  Impaired mobility and activities of daily living: Impaired mobility and activities of daily living  Full code status: Full code status  Palliative care encounter: Palliative care encounter  Breast cancer: Breast cancer  Diffuse large B cell lymphoma: Diffuse large B cell lymphoma  Bacteremia due to Gram-negative bacteria: Bacteremia due to Gram-negative bacteria  Shortness of breath: Shortness of breath  Hyperkalemia: Hyperkalemia  ROMEO (acute kidney injury): ROMEO (acute kidney injury)          PAST MEDICAL & SURGICAL HISTORY:  Scoliosis  Follicular lymphoma  Neck mass  Afib  Other hyperlipidemia  Breast cancer  CAD (coronary artery disease)  Diabetes  HTN (hypertension)  H/O abdominal hysterectomy  H/O thyroidectomy  S/P CABG x 3          Consultant NOTE  REVIEWED  (   )    REVIEW OF SYSTEMS:  [x] As per HPI  CONSTITUTIONAL: weakness  RESPIRATORY: No cough, wheezing, chills or hemoptysis; No Shortness of Breath  CARDIOVASCULAR: No chest pain, palpitations, dizziness, or leg swelling  GASTROINTESTINAL: No abdominal or epigastric pain. No nausea, vomiting, or hematemesis; No diarrhea or constipation. No melena or hematochezia.  MUSCULOSKELETAL: weakness  PSYCH    awake, alert       [x] All others negative	  [ ] Unable to obtain          Vital Signs Last 24 Hrs  T(C): 37.2 (05 Jul 2017 08:54), Max: 37.2 (05 Jul 2017 08:54)  T(F): 98.9 (05 Jul 2017 08:54), Max: 98.9 (05 Jul 2017 08:54)  HR: 86 (05 Jul 2017 08:54) (80 - 99)  BP: 104/62 (05 Jul 2017 08:54) (104/62 - 115/72)  BP(mean): --  RR: 20 (05 Jul 2017 08:54) (18 - 20)  SpO2: 95% (05 Jul 2017 08:54) (94% - 98%)        PHYSICAL EXAMINATION:                                    (    )  NO CHANGE  Appearance: frail	  HEENT:   Normal oral mucosa, PERRL, EOMI	  Neck: Supple,  Cardiovascular: Normal S1 S2, mur,   Respiratory: Lungs clear to auscultation/Decreased Breath Sounds/No Rales, Rhonchi, Wheezing	  Gastrointestinal:  Soft, Non-tender, + BS	  Skin: No rashes, No ecchymoses, No cyanosis  Extremities: decr ROM  Vascular: Peripheral pulses palpable 2+ bilaterally  Neurologic: Non-focal  Psychiatry: A & O x 3, Mood & affect appropriate    amiodarone    Tablet 200 milliGRAM(s) Oral daily  insulin lispro (HumaLOG) corrective regimen sliding scale   SubCutaneous Before meals and at bedtime  atorvastatin 40 milliGRAM(s) Oral at bedtime  acetaminophen  Suppository 650 milliGRAM(s) Rectal every 6 hours PRN  bisacodyl Suppository 10 milliGRAM(s) Rectal daily  lidocaine   Patch 1 Patch Transdermal daily  guaiFENesin    Syrup 200 milliGRAM(s) Oral every 6 hours PRN  apixaban 2.5 milliGRAM(s) Oral two times a day  aspirin enteric coated 81 milliGRAM(s) Oral daily  cyanocobalamin Injectable 1000 MICROGram(s) SubCutaneous daily  furosemide    Tablet 20 milliGRAM(s) Oral two times a day  metoprolol 25 milliGRAM(s) Oral two times a day  megestrol Suspension 800 milliGRAM(s) Oral daily  haloperidol    Injectable 1 milliGRAM(s) IntraMuscular once  piperacillin/tazobactam IVPB. 2.25 Gram(s) IV Intermittent every 6 hours  piperacillin/tazobactam IVPB.   IV Intermittent                                       9.9    21.0  )-----------( 186      ( 05 Jul 2017 12:18 )             31.7             CAPILLARY BLOOD GLUCOSE  111 (05 Jul 2017 11:44)  90 (05 Jul 2017 07:33)  133 (04 Jul 2017 21:23)  112 (04 Jul 2017 17:33)    < from: CT Lumbar Spine w/ IV Cont (07.05.17 @ 08:32) >  IMPRESSION: Severe dextroscoliosis with lumbar spondylosis as described   above. No bony destructive changes, fluid collection or abscess.     < end of copied text >      < from: CT Head No Cont (07.03.17 @ 23:21) >  o acute intracranial abnormality. No substantial change since November 2016.    < end of copied text >

## 2017-07-05 NOTE — PROGRESS NOTE ADULT - ATTENDING COMMENTS
SEPSIS  Leukocytosis  ? etiology  ASHD  Ca breast  Lymphoma  Decond state    P  a/o    cont ab  f/u cult

## 2017-07-05 NOTE — PROGRESS NOTE ADULT - ASSESSMENT
86F with PMHx of CAD s/p CABG, Afib s/p PPM (on Eliquis), breast cancer, large B cell lymphoma on chemo, HFpEF, HTN, HLD, DM presented with severe sepsis 2/2 UTI, initially admitted to the MICU s/p removal of infected chemo port, [facility]

## 2017-07-05 NOTE — PROGRESS NOTE ADULT - SUBJECTIVE AND OBJECTIVE BOX
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, HFpEF, CAD s/p CABG, Afib s/p PPM, admitted with severe sepsis complicated by respiratory failure, removal of infected chemo port, today sitting in chair without acute respiratory complaint    PAST MEDICAL & SURGICAL HISTORY:  Scoliosis  Follicular lymphoma  Neck mass  Afib  Other hyperlipidemia  Breast cancer  CAD (coronary artery disease)  Diabetes  HTN (hypertension)  H/O abdominal hysterectomy  H/O thyroidectomy  S/P CABG x 3    SOCHX:  - tobacco,  -  alcohol    FMHX: FA/MO  - contributory     ROS reviewed below with positive findings marked (+) :  GEN:  fever, chills ENT: tracheostomy,   epistaxis,  sinusitis COR: +CAD, +CHF,  +HTN, +dysrhythmia PUL: COPD, ILD, asthma, pneumonia GI: PEG, dysphagia, hemorrhage, other ESTRELLITA: kidney disease, electrolyte disorder HEM:  anemia, thrombus, coagulopathy, +cancer ENDO:  thyroid disease, diabetes mellitus CNS:  dementia, stroke, seizure, PSY:  depression, anxiety, other      MEDICATIONS  (STANDING):  amiodarone    Tablet 200 milliGRAM(s) Oral daily  insulin lispro (HumaLOG) corrective regimen sliding scale   SubCutaneous Before meals and at bedtime  atorvastatin 40 milliGRAM(s) Oral at bedtime  bisacodyl Suppository 10 milliGRAM(s) Rectal daily  lidocaine   Patch 1 Patch Transdermal daily  apixaban 2.5 milliGRAM(s) Oral two times a day  aspirin enteric coated 81 milliGRAM(s) Oral daily  cyanocobalamin Injectable 1000 MICROGram(s) SubCutaneous daily  furosemide    Tablet 20 milliGRAM(s) Oral two times a day  metoprolol 25 milliGRAM(s) Oral two times a day  megestrol Suspension 800 milliGRAM(s) Oral daily  haloperidol    Injectable 1 milliGRAM(s) IntraMuscular once  piperacillin/tazobactam IVPB. 2.25 Gram(s) IV Intermittent every 6 hours  piperacillin/tazobactam IVPB.   IV Intermittent     MEDICATIONS  (PRN):  acetaminophen  Suppository 650 milliGRAM(s) Rectal every 6 hours PRN For Temp greater than 38 C (100.4 F)  guaiFENesin    Syrup 200 milliGRAM(s) Oral every 6 hours PRN Cough      Vital Signs Last 24 Hrs  T(C): 37.2 (05 Jul 2017 08:54), Max: 37.2 (05 Jul 2017 08:54)  T(F): 98.9 (05 Jul 2017 08:54), Max: 98.9 (05 Jul 2017 08:54)  HR: 86 (05 Jul 2017 08:54) (80 - 99)  BP: 104/62 (05 Jul 2017 08:54) (104/62 - 115/72)  BP(mean): --  RR: 20 (05 Jul 2017 08:54) (18 - 20)  SpO2: 95% (05 Jul 2017 08:54) (94% - 98%)    GENERAL:         comfortable,  - distress.  HEENT:            - trauma,  - icterus,  - injection,  - nasal discharge.  NECK:              - jugular venous distention, - thyromegaly.  LYMPH:           - lymphadenopathy, - masses.  RESP:              + crackles,  - rhonchi,   - wheezes.   COR:                S1S2  - gallops,  - rubs.  ABD:                bowel sounds,   soft, - tender, - distended, - organomegaly.  EXT/MSC:         - cyanosis,  - clubbing,  - edema.    NEURO:             alert,   responds to stimuli.                          9.3    32.0  )-----------( 156      ( 03 Jul 2017 10:54 )             29.8       Xray Chest (06.28.17) Improvement of bilateral lung infiltrates.    Echocardiogram (06.30.17) Left ventricular hypertrophy present.  The left ventricular wall motion is  normal.  The left ventricular ejection fraction is estimated to be   60-65%The  left atrium is dilated.  Right atrial size is normal.  There is a pacemaker wire in the right heart.  There is mild aortic valve thickening.  No aortic  regurgitation noted.  There is mild to moderate mitral annular calcification.  There is moderate mitral regurgitation.  Structurally normal tricuspid valve.  There is moderate pulmonary hypertension.  The pulmonary artery systolic pressure is estimated to be 50 mmHg. There is moderate to severe tricuspid regurgitation.  Structurally normal pulmonic valve.        ASSESSMENT/PLAN    1) Leukocytosis s/p sepsis  2) Cardiomyopathy   3) Pulmonary hypertension  4) Atelectasis/pulmonary edema      Oxygen as needed  Repeat gallium pending  ID/Antibiotics: Zosyn  Cardiac/HTN: diuresis as needed  GI: Rx/ prophylaxis c PPI/H2B  Heme: Rx/VT on AC  Discuss with medical team

## 2017-07-05 NOTE — PROGRESS NOTE ADULT - PROBLEM SELECTOR PLAN 5
Patient s/p CABG. Patient without chest pain. EKG without ischemic changes. EF EF 60-65%.   -c/w home Lipitor 40mg QHS  - c/w ASA   - c/w Metoprolol increased to 25mg BID  - Medically optimize cardiac function if BP allows   - Continue to monitor I's and O's  -c/w current management

## 2017-07-05 NOTE — PROGRESS NOTE ADULT - PROBLEM SELECTOR PLAN 1
POA. Source was urosepsis w/ seeding to chemoport, which was removed on 6/12, initially concerning for endocarditis due to persistent bacteremia but TTE was negative and blood cultures NGTD. Initial gallium scan done concerning for infected PPM was negative. Despite no fever, pt's leukocytosis increased with no clear source. CT abdomen/pelvis with contrast did not reveal abscess/collection; infiltrates on CT chest unlikely to be infectious etiology per ID. Heme/onc following, also believe that leukocytosis is from infectious process vs. hematologic. Initially started on ceftriaxone on 6/29 but escalated to vanc/zosyn on 7/2 for persistent leukocytosis.   -CT lumbar spine done to evaluate for abscess; will follow up results.  -per ID, f/u gallium scan, if negative can d/c ABX  -c/w vanc/zosyn (day 4)

## 2017-07-05 NOTE — PROGRESS NOTE ADULT - SUBJECTIVE AND OBJECTIVE BOX
patient without complaints    ANTIBIOTICS    MEDICATIONS  (STANDING):  amiodarone    Tablet 200 milliGRAM(s) Oral daily  insulin lispro (HumaLOG) corrective regimen sliding scale   SubCutaneous Before meals and at bedtime  atorvastatin 40 milliGRAM(s) Oral at bedtime  bisacodyl Suppository 10 milliGRAM(s) Rectal daily  lidocaine   Patch 1 Patch Transdermal daily  apixaban 2.5 milliGRAM(s) Oral two times a day  aspirin enteric coated 81 milliGRAM(s) Oral daily  cyanocobalamin Injectable 1000 MICROGram(s) SubCutaneous daily  furosemide    Tablet 20 milliGRAM(s) Oral two times a day  metoprolol 25 milliGRAM(s) Oral two times a day  megestrol Suspension 800 milliGRAM(s) Oral daily  haloperidol    Injectable 1 milliGRAM(s) IntraMuscular once  piperacillin/tazobactam IVPB. 2.25 Gram(s) IV Intermittent every 6 hours  piperacillin/tazobactam IVPB.   IV Intermittent     MEDICATIONS  (PRN):  acetaminophen  Suppository 650 milliGRAM(s) Rectal every 6 hours PRN For Temp greater than 38 C (100.4 F)  guaiFENesin    Syrup 200 milliGRAM(s) Oral every 6 hours PRN Cough      Allergies    IV CONtRAST (Flushing (Skin); Rash)  No Known Drug Allergies    Intolerances        REVIEW OF SYSTEMS:    Constitutional: No fever, weight loss or fatigue  Eyes: No eye pain, visual disturbances, or discharge  ENMT:  No difficulty hearing, tinnitus, vertigo; No sinus or throat pain  Neck: No pain or stiffness  Respiratory: No cough, wheezing, chills or hemoptysis  Cardiovascular: No chest pain, palpitations, shortness of breath, dizziness or leg swelling  Gastrointestinal: No abdominal or epigastric pain. No nausea, vomiting or hematemesis; No diarrhea or constipation. No melena or hematochezia.  Genitourinary: No dysuria, frequency, hematuria or incontinence    Vital Signs Last 24 Hrs  T(C): 37.2 (05 Jul 2017 08:54), Max: 37.2 (05 Jul 2017 08:54)  T(F): 98.9 (05 Jul 2017 08:54), Max: 98.9 (05 Jul 2017 08:54)  HR: 86 (05 Jul 2017 08:54) (80 - 99)  BP: 104/62 (05 Jul 2017 08:54) (104/62 - 115/72)  BP(mean): --  RR: 20 (05 Jul 2017 08:54) (18 - 20)  SpO2: 95% (05 Jul 2017 08:54) (94% - 98%)    PHYSICAL EXAM:    General: in no acute distress  Eyes: PERRL, EOM intact; conjunctiva and sclera clear  Head: Normocephalic; atraumatic  ENMT: No nasal discharge; airway clear  Neck: Supple; non tender; no masses  Respiratory: No wheezes, rales or rhonchi  Cardiovascular: Regular rate and rhythm. S1 and S2 Normal; No murmurs, gallops or rubs  Gastrointestinal: Soft non-tender non-distended; Normal bowel sounds; No hepatosplenomegaly    LABS:                        9.9    21.0  )-----------( 186      ( 05 Jul 2017 12:18 )             31.7                   MICROBIOLOGY:      RADIOLOGY & ADDITIONAL STUDIES:

## 2017-07-05 NOTE — PROGRESS NOTE ADULT - PROBLEM SELECTOR PROBLEM 2
Acute respiratory failure with hypoxia
Diffuse large B cell lymphoma
Acute respiratory failure with hypoxia
Chronic back pain
Acute respiratory failure with hypoxia
Chronic back pain
Lymphoma
Acute respiratory failure with hypoxia
Acute respiratory failure with hypoxia
ROMEO (acute kidney injury)
Sepsis due to Escherichia coli
Acute respiratory failure with hypoxia
Sepsis
Lymphoma
Hyponatremia
Acute pulmonary edema

## 2017-07-05 NOTE — PROGRESS NOTE ADULT - SUBJECTIVE AND OBJECTIVE BOX
Chief Complaint/Reason for Consult: CAD  INTERVAL HPI: no sob oob to chair no acute evens overnight  	  MEDICATIONS:  amiodarone    Tablet 200 milliGRAM(s) Oral daily  furosemide    Tablet 20 milliGRAM(s) Oral two times a day  metoprolol 25 milliGRAM(s) Oral two times a day    piperacillin/tazobactam IVPB. 2.25 Gram(s) IV Intermittent every 6 hours  piperacillin/tazobactam IVPB.   IV Intermittent     guaiFENesin    Syrup 200 milliGRAM(s) Oral every 6 hours PRN    acetaminophen  Suppository 650 milliGRAM(s) Rectal every 6 hours PRN  haloperidol    Injectable 1 milliGRAM(s) IntraMuscular once    bisacodyl Suppository 10 milliGRAM(s) Rectal daily    insulin lispro (HumaLOG) corrective regimen sliding scale   SubCutaneous Before meals and at bedtime  atorvastatin 40 milliGRAM(s) Oral at bedtime  megestrol Suspension 800 milliGRAM(s) Oral daily    lidocaine   Patch 1 Patch Transdermal daily  apixaban 2.5 milliGRAM(s) Oral two times a day  aspirin enteric coated 81 milliGRAM(s) Oral daily  cyanocobalamin Injectable 1000 MICROGram(s) SubCutaneous daily      REVIEW OF SYSTEMS:  [x] As per HPI  CONSTITUTIONAL: No fever, weight loss, or fatigue  RESPIRATORY: No cough, wheezing, chills or hemoptysis; No Shortness of Breath  CARDIOVASCULAR: No chest pain, palpitations, dizziness, or leg swelling  GASTROINTESTINAL: No abdominal or epigastric pain. No nausea, vomiting, or hematemesis; No diarrhea or constipation. No melena or hematochezia.  MUSCULOSKELETAL: No joint pain or swelling; No muscle, back, or extremity pain  [x] All others negative	  [ ] Unable to obtain    PHYSICAL EXAM:  T(C): 37.2 (07-05-17 @ 08:54), Max: 37.2 (07-05-17 @ 08:54)  HR: 86 (07-05-17 @ 08:54) (80 - 99)  BP: 104/62 (07-05-17 @ 08:54) (104/62 - 115/72)  RR: 20 (07-05-17 @ 08:54) (18 - 20)  SpO2: 95% (07-05-17 @ 08:54) (94% - 98%)  Wt(kg): --  I&O's Summary        Appearance: Normal	  HEENT:   Normal oral mucosa  Cardiovascular: Normal S1 S2, No JVD, No murmurs, No edema  Respiratory: Lungs clear to auscultation	  Gastrointestinal:  Soft, Non-tender, + BS	  Extremities: Normal range of motion, No clubbing, cyanosis or edema  Vascular: Peripheral pulses palpable 2+ bilaterally    TELEMETRY: 	    ECG:    	  RADIOLOGY:   CXR:  CT:  US:    CARDIAC TESTING:  Echocardiogram:  Catheterization:  Stress Test:      LABS:	 	    CARDIAC MARKERS:                                  9.3    32.0  )-----------( 156      ( 03 Jul 2017 10:54 )             29.8     07-03    136  |  97  |  27<H>  ----------------------------<  124<H>  4.1   |  28  |  1.00    Ca    9.0      03 Jul 2017 10:54  Mg     2.1     07-03      proBNP:   Lipid Profile:   HgA1c:   TSH:     ASSESSMENT/PLAN: 	  Afib  - increase po BB as tolerated continue amio 200 qd for now.    Keep net euvolemic to net negative, strict I/Os

## 2017-07-05 NOTE — PROGRESS NOTE ADULT - SUBJECTIVE AND OBJECTIVE BOX
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 Patients family at bedside states she's been more lethargic with associated lower extremity swelling, SOB, decreased appetite and diarrhea. Patient with previous admissions for symptomatic hyperglycemia in January. Patient currently seeing Dr. Galaviz on a regular basis for chemotherapy treatment. Last treatment on May 23rd for which she received Bendamustine and Rituximab. Patient denies any headaches, chest pain, abdominal pain, N/V, dysuria, or bowel changes.     Upon arrival to the ED, patient in moderate respiratory distress using accessory muscles, breathing w/ a RR of 30, O2 93% on room air. Patient placed on BIPAP with slight improvement in respiratory status. Patient febrile to 101 with a HR of 77. Labs significant for AG metabolic acidosis, K 7.0, Na 127, BUN 58, Cr. 2.0 (baseline < 0.9 January), Lactate 2.8. CXR done showing RLL infiltrate with pulmonary vascular congestion. In the ED, patient received Vancomycin, Zosyn, Tylenol, Calcium Gluconate 2g, Insulin 5u, Albuterol neb 5mg x 2. ICU consulted, will be transferred to MICU for management of severe sepsis 2/2 HAP and pulmonary congestion. (10 Ramon 2017 19:13)    FAMILY HISTORY:  No pertinent family history in first degree relatives    MEDICATIONS  (STANDING):  amiodarone    Tablet 200 milliGRAM(s) Oral daily  insulin lispro (HumaLOG) corrective regimen sliding scale   SubCutaneous Before meals and at bedtime  atorvastatin 40 milliGRAM(s) Oral at bedtime  bisacodyl Suppository 10 milliGRAM(s) Rectal daily  lidocaine   Patch 1 Patch Transdermal daily  apixaban 2.5 milliGRAM(s) Oral two times a day  aspirin enteric coated 81 milliGRAM(s) Oral daily  cyanocobalamin Injectable 1000 MICROGram(s) SubCutaneous daily  furosemide    Tablet 20 milliGRAM(s) Oral two times a day  metoprolol 25 milliGRAM(s) Oral two times a day  megestrol Suspension 800 milliGRAM(s) Oral daily  haloperidol    Injectable 1 milliGRAM(s) IntraMuscular once  piperacillin/tazobactam IVPB. 2.25 Gram(s) IV Intermittent every 6 hours  piperacillin/tazobactam IVPB.   IV Intermittent     MEDICATIONS  (PRN):  acetaminophen  Suppository 650 milliGRAM(s) Rectal every 6 hours PRN For Temp greater than 38 C (100.4 F)  guaiFENesin    Syrup 200 milliGRAM(s) Oral every 6 hours PRN Cough    Vital Signs Last 24 Hrs  T(C): 37.2 (05 Jul 2017 08:54), Max: 37.2 (05 Jul 2017 08:54)  T(F): 98.9 (05 Jul 2017 08:54), Max: 98.9 (05 Jul 2017 08:54)  HR: 86 (05 Jul 2017 08:54) (80 - 99)  BP: 104/62 (05 Jul 2017 08:54) (104/62 - 115/72)  BP(mean): --  RR: 20 (05 Jul 2017 08:54) (18 - 20)  SpO2: 95% (05 Jul 2017 08:54) (94% - 98%)    Physical exam:    Overall impression  Lymphadenopathy  Liver  spleen    Labs:            Radiology:  HEALTH ISSUES - R/O PROBLEM Dx:      Assessmant / Problems  Leukocytosis, on Vanco plus Zosyn, afebrile  Clinically doing well  No localising signc of infection  No diarrhea  Ct head , CT spine nondiagnostic    Plan:  Gallium scan being done today  C dif screeningordered    Thank you  Neha Galaviz MD

## 2017-07-05 NOTE — PROGRESS NOTE ADULT - PROBLEM/PLAN-2
DISPLAY PLAN FREE TEXT

## 2017-07-05 NOTE — PROGRESS NOTE ADULT - SUBJECTIVE AND OBJECTIVE BOX
OVERNIGHT EVENTS: NAEO    SUBJECTIVE / INTERVAL HPI: Patient seen and examined at bedside. Resting comfortably in bed.  at bedside. Denies SOB, CP, palpitations, abdominal pain, headache, dizziness, fevers/chills. No new complaints.     VITAL SIGNS:  Vital Signs Last 24 Hrs  T(C): 36.4 (05 Jul 2017 16:20), Max: 37.2 (05 Jul 2017 08:54)  T(F): 97.5 (05 Jul 2017 16:20), Max: 98.9 (05 Jul 2017 08:54)  HR: 97 (05 Jul 2017 16:20) (80 - 97)  BP: 101/54 (05 Jul 2017 16:20) (101/54 - 111/65)  BP(mean): --  RR: 18 (05 Jul 2017 16:20) (18 - 20)  SpO2: 93% (05 Jul 2017 16:20) (93% - 97%)    PHYSICAL EXAM:    General: WDWN  HEENT: NC/AT; PERRL, anicteric sclera; MMM  Neck: supple  Cardiovascular: +S1/S2; RRR  Respiratory: CTA B/L; no W/R/R  Gastrointestinal: soft, NT/ND; +BSx4  Extremities: WWP; no edema, clubbing or cyanosis  Vascular: 2+ radial, DP/PT pulses B/L  Neurological: AAOx3; no focal deficits    MEDICATIONS:  MEDICATIONS  (STANDING):  amiodarone    Tablet 200 milliGRAM(s) Oral daily  insulin lispro (HumaLOG) corrective regimen sliding scale   SubCutaneous Before meals and at bedtime  atorvastatin 40 milliGRAM(s) Oral at bedtime  bisacodyl Suppository 10 milliGRAM(s) Rectal daily  lidocaine   Patch 1 Patch Transdermal daily  apixaban 2.5 milliGRAM(s) Oral two times a day  aspirin enteric coated 81 milliGRAM(s) Oral daily  cyanocobalamin Injectable 1000 MICROGram(s) SubCutaneous daily  furosemide    Tablet 20 milliGRAM(s) Oral two times a day  metoprolol 25 milliGRAM(s) Oral two times a day  megestrol Suspension 800 milliGRAM(s) Oral daily  haloperidol    Injectable 1 milliGRAM(s) IntraMuscular once  piperacillin/tazobactam IVPB. 2.25 Gram(s) IV Intermittent every 6 hours  piperacillin/tazobactam IVPB.   IV Intermittent     MEDICATIONS  (PRN):  acetaminophen  Suppository 650 milliGRAM(s) Rectal every 6 hours PRN For Temp greater than 38 C (100.4 F)  guaiFENesin    Syrup 200 milliGRAM(s) Oral every 6 hours PRN Cough      ALLERGIES:  Allergies    IV CONtRAST (Flushing (Skin); Rash)  No Known Drug Allergies    Intolerances        LABS:                        9.9    21.0  )-----------( 186      ( 05 Jul 2017 12:18 )             31.7               CAPILLARY BLOOD GLUCOSE  141 (05 Jul 2017 17:14)          RADIOLOGY & ADDITIONAL TESTS: Reviewed.

## 2017-07-05 NOTE — PROGRESS NOTE ADULT - SUBJECTIVE AND OBJECTIVE BOX
Neurology Follow up note    Name  TANYA FRAIRE    HPI:  85 y/o F w/ pmhx of CAD s/p CABG, atrial fibrillation s/p PPM (on Eliquis), Breast CA (on Arimidex), 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 Patients family at bedside states she's been more lethargic with associated lower extremity swelling, SOB, decreased appetite and diarrhea. Patient with previous admissions for symptomatic hyperglycemia in January. Patient currently seeing Dr. Galaviz on a regular basis for chemotherapy treatment. Last treatment on May 23rd for which she received Bendamustine and Rituximab. Patient denies any headaches, chest pain, abdominal pain, N/V, dysuria, or bowel changes.     Upon arrival to the ED, patient in moderate respiratory distress using accessory muscles, breathing w/ a RR of 30, O2 93% on room air. Patient placed on BIPAP with slight improvement in respiratory status. Patient febrile to 101 with a HR of 77. Labs significant for AG metabolic acidosis, K 7.0, Na 127, BUN 58, Cr. 2.0 (baseline < 0.9 January), Lactate 2.8. CXR done showing RLL infiltrate with pulmonary vascular congestion. In the ED, patient received Vancomycin, Zosyn, Tylenol, Calcium Gluconate 2g, Insulin 5u, Albuterol neb 5mg x 2. ICU consulted, will be transferred to MICU for management of severe sepsis 2/2 HAP and pulmonary congestion. (10 Ramon 2017 19:13)      Interval History - no change in the neuro status        REVIEW OF SYSTEMS    Vital Signs Last 24 Hrs  T(C): 36.5 (05 Jul 2017 07:02), Max: 37 (04 Jul 2017 20:54)  T(F): 97.7 (05 Jul 2017 07:02), Max: 98.6 (04 Jul 2017 20:54)  HR: 88 (05 Jul 2017 07:02) (80 - 99)  BP: 111/65 (05 Jul 2017 07:02) (104/68 - 115/72)  BP(mean): --  RR: 18 (05 Jul 2017 07:02) (18 - 19)  SpO2: 94% (05 Jul 2017 07:02) (94% - 98%)    Physical Exam-     Mental Status- awake- follows simple commands    Cranial Nerves- no diplopia    Gait and station- no foot drop    Motor- moves all 4 extremities    Reflexes- decreased    Sensation- no sensory level    Coordination- no tremors    Vascular -    Medications  amiodarone    Tablet 200 milliGRAM(s) Oral daily  insulin lispro (HumaLOG) corrective regimen sliding scale   SubCutaneous Before meals and at bedtime  atorvastatin 40 milliGRAM(s) Oral at bedtime  acetaminophen  Suppository 650 milliGRAM(s) Rectal every 6 hours PRN  bisacodyl Suppository 10 milliGRAM(s) Rectal daily  lidocaine   Patch 1 Patch Transdermal daily  guaiFENesin    Syrup 200 milliGRAM(s) Oral every 6 hours PRN  apixaban 2.5 milliGRAM(s) Oral two times a day  aspirin enteric coated 81 milliGRAM(s) Oral daily  cyanocobalamin Injectable 1000 MICROGram(s) SubCutaneous daily  furosemide    Tablet 20 milliGRAM(s) Oral two times a day  metoprolol 25 milliGRAM(s) Oral two times a day  megestrol Suspension 800 milliGRAM(s) Oral daily  haloperidol    Injectable 1 milliGRAM(s) IntraMuscular once  piperacillin/tazobactam IVPB. 2.25 Gram(s) IV Intermittent every 6 hours  piperacillin/tazobactam IVPB.   IV Intermittent       Lab      Radiology    Assessment- Although no evidence of focal neuro deficit- recommend CT head- with a history of dementia    Plan- spoke to Dr Shanks

## 2017-07-05 NOTE — PROGRESS NOTE ADULT - PROBLEM SELECTOR PLAN 2
Resolved. Initially requiring BIPAP and HFNC 2/2 pulmonary congestion/edema likely due to CHF exacerbation in the setting of urosepsis and line sepsis. Now tolerating regular NC. VQ done which was negative for PE.  - C/w nasal cannula  - Oxycodone 5mg solution for air hunger, increased work of breathing, comfort  -c/w current management

## 2017-07-06 VITALS — SYSTOLIC BLOOD PRESSURE: 115 MMHG | HEART RATE: 78 BPM | DIASTOLIC BLOOD PRESSURE: 65 MMHG

## 2017-07-06 LAB
ANION GAP SERPL CALC-SCNC: 9 MMOL/L — SIGNIFICANT CHANGE UP (ref 5–17)
BUN SERPL-MCNC: 19 MG/DL — SIGNIFICANT CHANGE UP (ref 7–23)
CALCIUM SERPL-MCNC: 8.4 MG/DL — SIGNIFICANT CHANGE UP (ref 8.4–10.5)
CHLORIDE SERPL-SCNC: 99 MMOL/L — SIGNIFICANT CHANGE UP (ref 96–108)
CO2 SERPL-SCNC: 31 MMOL/L — SIGNIFICANT CHANGE UP (ref 22–31)
CREAT SERPL-MCNC: 0.9 MG/DL — SIGNIFICANT CHANGE UP (ref 0.5–1.3)
GLUCOSE SERPL-MCNC: 83 MG/DL — SIGNIFICANT CHANGE UP (ref 70–99)
HCT VFR BLD CALC: 27.2 % — LOW (ref 34.5–45)
HGB BLD-MCNC: 8.7 G/DL — LOW (ref 11.5–15.5)
MAGNESIUM SERPL-MCNC: 2.1 MG/DL — SIGNIFICANT CHANGE UP (ref 1.6–2.6)
MCHC RBC-ENTMCNC: 30.3 PG — SIGNIFICANT CHANGE UP (ref 27–34)
MCHC RBC-ENTMCNC: 32 G/DL — SIGNIFICANT CHANGE UP (ref 32–36)
MCV RBC AUTO: 94.8 FL — SIGNIFICANT CHANGE UP (ref 80–100)
PLATELET # BLD AUTO: 127 K/UL — LOW (ref 150–400)
POTASSIUM SERPL-MCNC: 3.3 MMOL/L — LOW (ref 3.5–5.3)
POTASSIUM SERPL-SCNC: 3.3 MMOL/L — LOW (ref 3.5–5.3)
RBC # BLD: 2.87 M/UL — LOW (ref 3.8–5.2)
RBC # FLD: 21.3 % — HIGH (ref 10.3–16.9)
SODIUM SERPL-SCNC: 139 MMOL/L — SIGNIFICANT CHANGE UP (ref 135–145)
WBC # BLD: 19.7 K/UL — HIGH (ref 3.8–10.5)
WBC # FLD AUTO: 19.7 K/UL — HIGH (ref 3.8–10.5)

## 2017-07-06 PROCEDURE — 80048 BASIC METABOLIC PNL TOTAL CA: CPT

## 2017-07-06 PROCEDURE — 81001 URINALYSIS AUTO W/SCOPE: CPT

## 2017-07-06 PROCEDURE — 87186 SC STD MICRODIL/AGAR DIL: CPT

## 2017-07-06 PROCEDURE — P9017: CPT

## 2017-07-06 PROCEDURE — 78802 RP LOCLZJ TUM WHBDY 1 D IMG: CPT

## 2017-07-06 PROCEDURE — 84133 ASSAY OF URINE POTASSIUM: CPT

## 2017-07-06 PROCEDURE — 99291 CRITICAL CARE FIRST HOUR: CPT | Mod: 25

## 2017-07-06 PROCEDURE — 51702 INSERT TEMP BLADDER CATH: CPT

## 2017-07-06 PROCEDURE — 87086 URINE CULTURE/COLONY COUNT: CPT

## 2017-07-06 PROCEDURE — 83880 ASSAY OF NATRIURETIC PEPTIDE: CPT

## 2017-07-06 PROCEDURE — 99233 SBSQ HOSP IP/OBS HIGH 50: CPT

## 2017-07-06 PROCEDURE — 94660 CPAP INITIATION&MGMT: CPT

## 2017-07-06 PROCEDURE — 80053 COMPREHEN METABOLIC PANEL: CPT

## 2017-07-06 PROCEDURE — 84560 ASSAY OF URINE/URIC ACID: CPT

## 2017-07-06 PROCEDURE — 83935 ASSAY OF URINE OSMOLALITY: CPT

## 2017-07-06 PROCEDURE — 83605 ASSAY OF LACTIC ACID: CPT

## 2017-07-06 PROCEDURE — P9016: CPT

## 2017-07-06 PROCEDURE — 83010 ASSAY OF HAPTOGLOBIN QUANT: CPT

## 2017-07-06 PROCEDURE — 85732 THROMBOPLASTIN TIME PARTIAL: CPT

## 2017-07-06 PROCEDURE — 72132 CT LUMBAR SPINE W/DYE: CPT

## 2017-07-06 PROCEDURE — 83615 LACTATE (LD) (LDH) ENZYME: CPT

## 2017-07-06 PROCEDURE — 87633 RESP VIRUS 12-25 TARGETS: CPT

## 2017-07-06 PROCEDURE — 97162 PT EVAL MOD COMPLEX 30 MIN: CPT

## 2017-07-06 PROCEDURE — P9035: CPT

## 2017-07-06 PROCEDURE — 82570 ASSAY OF URINE CREATININE: CPT

## 2017-07-06 PROCEDURE — 84300 ASSAY OF URINE SODIUM: CPT

## 2017-07-06 PROCEDURE — 36590 REMOVAL TUNNELED CV CATH: CPT

## 2017-07-06 PROCEDURE — 82465 ASSAY BLD/SERUM CHOLESTEROL: CPT

## 2017-07-06 PROCEDURE — 84100 ASSAY OF PHOSPHORUS: CPT

## 2017-07-06 PROCEDURE — P9021: CPT

## 2017-07-06 PROCEDURE — 87581 M.PNEUMON DNA AMP PROBE: CPT

## 2017-07-06 PROCEDURE — 85670 THROMBIN TIME PLASMA: CPT

## 2017-07-06 PROCEDURE — 80202 ASSAY OF VANCOMYCIN: CPT

## 2017-07-06 PROCEDURE — 82550 ASSAY OF CK (CPK): CPT

## 2017-07-06 PROCEDURE — 94640 AIRWAY INHALATION TREATMENT: CPT

## 2017-07-06 PROCEDURE — 85230 CLOT FACTOR VII PROCONVERTIN: CPT

## 2017-07-06 PROCEDURE — 83735 ASSAY OF MAGNESIUM: CPT

## 2017-07-06 PROCEDURE — 82330 ASSAY OF CALCIUM: CPT

## 2017-07-06 PROCEDURE — 96374 THER/PROPH/DIAG INJ IV PUSH: CPT | Mod: XU

## 2017-07-06 PROCEDURE — 81003 URINALYSIS AUTO W/O SCOPE: CPT

## 2017-07-06 PROCEDURE — 86850 RBC ANTIBODY SCREEN: CPT

## 2017-07-06 PROCEDURE — 82040 ASSAY OF SERUM ALBUMIN: CPT

## 2017-07-06 PROCEDURE — 87798 DETECT AGENT NOS DNA AMP: CPT

## 2017-07-06 PROCEDURE — 87486 CHLMYD PNEUM DNA AMP PROBE: CPT

## 2017-07-06 PROCEDURE — 96375 TX/PRO/DX INJ NEW DRUG ADDON: CPT | Mod: XU

## 2017-07-06 PROCEDURE — 80076 HEPATIC FUNCTION PANEL: CPT

## 2017-07-06 PROCEDURE — 82436 ASSAY OF URINE CHLORIDE: CPT

## 2017-07-06 PROCEDURE — 87449 NOS EACH ORGANISM AG IA: CPT

## 2017-07-06 PROCEDURE — 36415 COLL VENOUS BLD VENIPUNCTURE: CPT

## 2017-07-06 PROCEDURE — 86901 BLOOD TYPING SEROLOGIC RH(D): CPT

## 2017-07-06 PROCEDURE — 82803 BLOOD GASES ANY COMBINATION: CPT

## 2017-07-06 PROCEDURE — 74176 CT ABD & PELVIS W/O CONTRAST: CPT

## 2017-07-06 PROCEDURE — 86923 COMPATIBILITY TEST ELECTRIC: CPT

## 2017-07-06 PROCEDURE — 93005 ELECTROCARDIOGRAM TRACING: CPT | Mod: XU

## 2017-07-06 PROCEDURE — A9540: CPT

## 2017-07-06 PROCEDURE — 93306 TTE W/DOPPLER COMPLETE: CPT

## 2017-07-06 PROCEDURE — 78580 LUNG PERFUSION IMAGING: CPT

## 2017-07-06 PROCEDURE — P9047: CPT

## 2017-07-06 PROCEDURE — 82378 CARCINOEMBRYONIC ANTIGEN: CPT

## 2017-07-06 PROCEDURE — 85611 PROTHROMBIN TEST: CPT

## 2017-07-06 PROCEDURE — 86300 IMMUNOASSAY TUMOR CA 15-3: CPT

## 2017-07-06 PROCEDURE — 36430 TRANSFUSION BLD/BLD COMPNT: CPT

## 2017-07-06 PROCEDURE — 82553 CREATINE MB FRACTION: CPT

## 2017-07-06 PROCEDURE — 84132 ASSAY OF SERUM POTASSIUM: CPT

## 2017-07-06 PROCEDURE — 71045 X-RAY EXAM CHEST 1 VIEW: CPT

## 2017-07-06 PROCEDURE — 85610 PROTHROMBIN TIME: CPT

## 2017-07-06 PROCEDURE — 84295 ASSAY OF SERUM SODIUM: CPT

## 2017-07-06 PROCEDURE — 78806: CPT | Mod: 26

## 2017-07-06 PROCEDURE — 84484 ASSAY OF TROPONIN QUANT: CPT

## 2017-07-06 PROCEDURE — 76705 ECHO EXAM OF ABDOMEN: CPT

## 2017-07-06 PROCEDURE — A9556: CPT

## 2017-07-06 PROCEDURE — 97116 GAIT TRAINING THERAPY: CPT

## 2017-07-06 PROCEDURE — 84105 ASSAY OF URINE PHOSPHORUS: CPT

## 2017-07-06 PROCEDURE — 85025 COMPLETE CBC W/AUTO DIFF WBC: CPT

## 2017-07-06 PROCEDURE — 87040 BLOOD CULTURE FOR BACTERIA: CPT

## 2017-07-06 PROCEDURE — 71250 CT THORAX DX C-: CPT

## 2017-07-06 PROCEDURE — 86900 BLOOD TYPING SEROLOGIC ABO: CPT

## 2017-07-06 PROCEDURE — 85027 COMPLETE CBC AUTOMATED: CPT

## 2017-07-06 PROCEDURE — 70450 CT HEAD/BRAIN W/O DYE: CPT

## 2017-07-06 PROCEDURE — 84550 ASSAY OF BLOOD/URIC ACID: CPT

## 2017-07-06 PROCEDURE — 74177 CT ABD & PELVIS W/CONTRAST: CPT

## 2017-07-06 PROCEDURE — 85730 THROMBOPLASTIN TIME PARTIAL: CPT

## 2017-07-06 PROCEDURE — 87070 CULTURE OTHR SPECIMN AEROBIC: CPT

## 2017-07-06 PROCEDURE — 76775 US EXAM ABDO BACK WALL LIM: CPT

## 2017-07-06 RX ORDER — METOPROLOL TARTRATE 50 MG
1 TABLET ORAL
Qty: 60 | Refills: 0 | OUTPATIENT
Start: 2017-07-06 | End: 2017-08-05

## 2017-07-06 RX ORDER — ASPIRIN/CALCIUM CARB/MAGNESIUM 324 MG
1 TABLET ORAL
Qty: 30 | Refills: 0 | OUTPATIENT
Start: 2017-07-06 | End: 2017-08-05

## 2017-07-06 RX ORDER — PREGABALIN 225 MG/1
1 CAPSULE ORAL
Qty: 30 | Refills: 0 | OUTPATIENT
Start: 2017-07-06 | End: 2017-08-05

## 2017-07-06 RX ORDER — ANASTROZOLE 1 MG/1
1 TABLET ORAL
Qty: 30 | Refills: 0 | OUTPATIENT
Start: 2017-07-06 | End: 2017-08-05

## 2017-07-06 RX ORDER — FUROSEMIDE 40 MG
1 TABLET ORAL
Qty: 30 | Refills: 0 | OUTPATIENT
Start: 2017-07-06 | End: 2017-08-05

## 2017-07-06 RX ORDER — AMIODARONE HYDROCHLORIDE 400 MG/1
1 TABLET ORAL
Qty: 30 | Refills: 0 | OUTPATIENT
Start: 2017-07-06 | End: 2017-08-05

## 2017-07-06 RX ORDER — METOPROLOL TARTRATE 50 MG
1 TABLET ORAL
Qty: 0 | Refills: 0 | COMMUNITY
Start: 2017-07-06

## 2017-07-06 RX ORDER — POTASSIUM CHLORIDE 20 MEQ
10 PACKET (EA) ORAL ONCE
Qty: 0 | Refills: 0 | Status: DISCONTINUED | OUTPATIENT
Start: 2017-07-06 | End: 2017-07-06

## 2017-07-06 RX ORDER — APIXABAN 2.5 MG/1
1 TABLET, FILM COATED ORAL
Qty: 60 | Refills: 0 | OUTPATIENT
Start: 2017-07-06 | End: 2017-08-05

## 2017-07-06 RX ORDER — POTASSIUM CHLORIDE 20 MEQ
20 PACKET (EA) ORAL ONCE
Qty: 0 | Refills: 0 | Status: COMPLETED | OUTPATIENT
Start: 2017-07-06 | End: 2017-07-06

## 2017-07-06 RX ORDER — POTASSIUM CHLORIDE 20 MEQ
1 PACKET (EA) ORAL
Qty: 30 | Refills: 0 | OUTPATIENT
Start: 2017-07-06 | End: 2017-08-05

## 2017-07-06 RX ORDER — LIDOCAINE 4 G/100G
1 CREAM TOPICAL
Qty: 30 | Refills: 0 | OUTPATIENT
Start: 2017-07-06 | End: 2017-08-05

## 2017-07-06 RX ORDER — POTASSIUM CHLORIDE 20 MEQ
40 PACKET (EA) ORAL ONCE
Qty: 0 | Refills: 0 | Status: COMPLETED | OUTPATIENT
Start: 2017-07-06 | End: 2017-07-06

## 2017-07-06 RX ORDER — MEGESTROL ACETATE 40 MG/ML
15.63 SUSPENSION ORAL
Qty: 468.9 | Refills: 0 | OUTPATIENT
Start: 2017-07-06 | End: 2017-08-05

## 2017-07-06 RX ORDER — PANTOPRAZOLE SODIUM 20 MG/1
1 TABLET, DELAYED RELEASE ORAL
Qty: 30 | Refills: 0 | OUTPATIENT
Start: 2017-07-06 | End: 2017-08-05

## 2017-07-06 RX ORDER — ATORVASTATIN CALCIUM 80 MG/1
1 TABLET, FILM COATED ORAL
Qty: 30 | Refills: 0 | OUTPATIENT
Start: 2017-07-06 | End: 2017-08-05

## 2017-07-06 RX ADMIN — AMIODARONE HYDROCHLORIDE 200 MILLIGRAM(S): 400 TABLET ORAL at 06:10

## 2017-07-06 RX ADMIN — PREGABALIN 1000 MICROGRAM(S): 225 CAPSULE ORAL at 13:29

## 2017-07-06 RX ADMIN — PIPERACILLIN AND TAZOBACTAM 200 GRAM(S): 4; .5 INJECTION, POWDER, LYOPHILIZED, FOR SOLUTION INTRAVENOUS at 06:11

## 2017-07-06 RX ADMIN — LIDOCAINE 1 PATCH: 4 CREAM TOPICAL at 13:29

## 2017-07-06 RX ADMIN — Medication 20 MILLIGRAM(S): at 06:10

## 2017-07-06 RX ADMIN — Medication 20 MILLIEQUIVALENT(S): at 09:43

## 2017-07-06 RX ADMIN — Medication 40 MILLIEQUIVALENT(S): at 09:44

## 2017-07-06 RX ADMIN — Medication 81 MILLIGRAM(S): at 13:30

## 2017-07-06 RX ADMIN — Medication 25 MILLIGRAM(S): at 06:11

## 2017-07-06 RX ADMIN — MEGESTROL ACETATE 800 MILLIGRAM(S): 40 SUSPENSION ORAL at 13:27

## 2017-07-06 RX ADMIN — APIXABAN 2.5 MILLIGRAM(S): 2.5 TABLET, FILM COATED ORAL at 09:43

## 2017-07-06 NOTE — PROGRESS NOTE ADULT - SUBJECTIVE AND OBJECTIVE BOX
INTERVAL HPI/OVERNIGHT EVENTS:      ANTIBIOTICS    MEDICATIONS  (STANDING):  amiodarone    Tablet 200 milliGRAM(s) Oral daily  insulin lispro (HumaLOG) corrective regimen sliding scale   SubCutaneous Before meals and at bedtime  atorvastatin 40 milliGRAM(s) Oral at bedtime  bisacodyl Suppository 10 milliGRAM(s) Rectal daily  lidocaine   Patch 1 Patch Transdermal daily  apixaban 2.5 milliGRAM(s) Oral two times a day  aspirin enteric coated 81 milliGRAM(s) Oral daily  cyanocobalamin Injectable 1000 MICROGram(s) SubCutaneous daily  furosemide    Tablet 20 milliGRAM(s) Oral two times a day  metoprolol 25 milliGRAM(s) Oral two times a day  megestrol Suspension 800 milliGRAM(s) Oral daily  haloperidol    Injectable 1 milliGRAM(s) IntraMuscular once  piperacillin/tazobactam IVPB. 2.25 Gram(s) IV Intermittent every 6 hours  piperacillin/tazobactam IVPB.   IV Intermittent   potassium chloride   Powder 10 milliEquivalent(s) Oral once    MEDICATIONS  (PRN):  acetaminophen  Suppository 650 milliGRAM(s) Rectal every 6 hours PRN For Temp greater than 38 C (100.4 F)  guaiFENesin    Syrup 200 milliGRAM(s) Oral every 6 hours PRN Cough      Allergies    IV CONtRAST (Flushing (Skin); Rash)  No Known Drug Allergies    Intolerances        REVIEW OF SYSTEMS:    Constitutional: No fever, weight loss or fatigue  Eyes: No eye pain, visual disturbances, or discharge  ENMT:  No difficulty hearing, tinnitus, vertigo; No sinus or throat pain  Neck: No pain or stiffness  Respiratory: No cough, wheezing, chills or hemoptysis  Cardiovascular: No chest pain, palpitations, shortness of breath, dizziness or leg swelling  Gastrointestinal: No abdominal or epigastric pain. No nausea, vomiting or hematemesis; No diarrhea or constipation. No melena or hematochezia.  Genitourinary: No dysuria, frequency, hematuria or incontinence    Vital Signs Last 24 Hrs  T(C): 36.7 (06 Jul 2017 09:38), Max: 36.9 (05 Jul 2017 20:54)  T(F): 98 (06 Jul 2017 09:38), Max: 98.5 (05 Jul 2017 20:54)  HR: 78 (06 Jul 2017 09:59) (78 - 110)  BP: 115/65 (06 Jul 2017 09:59) (96/55 - 125/68)  BP(mean): --  RR: 20 (06 Jul 2017 09:38) (18 - 24)  SpO2: 95% (06 Jul 2017 09:38) (93% - 99%)    PHYSICAL EXAM:    General: ; in no acute distress  Eyes: PERRL, EOM intact; conjunctiva and sclera clear  Head: Normocephalic; atraumatic  ENMT: No nasal discharge; airway clear  Neck: Supple; non tender; no masses  Respiratory: No wheezes, rales or rhonchi  Cardiovascular: Regular rate and rhythm. S1 and S2 Normal; No murmurs, gallops or rubs  Gastrointestinal: Soft non-tender non-distended; Normal bowel sounds; No hepatosplenomegaly  Genitourinary: No costovertebral angle tenderness  LABS:                        8.7    19.7  )-----------( 127      ( 06 Jul 2017 05:40 )             27.2     07-06    139  |  99  |  19  ----------------------------<  83  3.3<L>   |  31  |  0.90    Ca    8.4      06 Jul 2017 05:41  Mg     2.1     07-06              MICROBIOLOGY:      RADIOLOGY & ADDITIONAL STUDIES:

## 2017-07-06 NOTE — PROGRESS NOTE ADULT - SUBJECTIVE AND OBJECTIVE BOX
Chief Complaint/Reason for Consult: CAD  INTERVAL HPI: no sob oob to chair no acute evens overnight  	  MEDICATIONS:  amiodarone    Tablet 200 milliGRAM(s) Oral daily  furosemide    Tablet 20 milliGRAM(s) Oral two times a day  metoprolol 25 milliGRAM(s) Oral two times a day    piperacillin/tazobactam IVPB. 2.25 Gram(s) IV Intermittent every 6 hours  piperacillin/tazobactam IVPB.   IV Intermittent     guaiFENesin    Syrup 200 milliGRAM(s) Oral every 6 hours PRN    acetaminophen  Suppository 650 milliGRAM(s) Rectal every 6 hours PRN  haloperidol    Injectable 1 milliGRAM(s) IntraMuscular once    bisacodyl Suppository 10 milliGRAM(s) Rectal daily    insulin lispro (HumaLOG) corrective regimen sliding scale   SubCutaneous Before meals and at bedtime  atorvastatin 40 milliGRAM(s) Oral at bedtime  megestrol Suspension 800 milliGRAM(s) Oral daily    lidocaine   Patch 1 Patch Transdermal daily  apixaban 2.5 milliGRAM(s) Oral two times a day  aspirin enteric coated 81 milliGRAM(s) Oral daily  cyanocobalamin Injectable 1000 MICROGram(s) SubCutaneous daily  potassium chloride   Powder 10 milliEquivalent(s) Oral once      REVIEW OF SYSTEMS:  [x] As per HPI  CONSTITUTIONAL: No fever, weight loss, or fatigue  RESPIRATORY: No cough, wheezing, chills or hemoptysis; No Shortness of Breath  CARDIOVASCULAR: No chest pain, palpitations, dizziness, or leg swelling  GASTROINTESTINAL: No abdominal or epigastric pain. No nausea, vomiting, or hematemesis; No diarrhea or constipation. No melena or hematochezia.  MUSCULOSKELETAL: No joint pain or swelling; No muscle, back, or extremity pain  [x] All others negative	  [ ] Unable to obtain    PHYSICAL EXAM:  T(C): 36.7 (07-06-17 @ 09:38), Max: 36.9 (07-05-17 @ 20:54)  HR: 78 (07-06-17 @ 09:59) (78 - 110)  BP: 115/65 (07-06-17 @ 09:59) (96/55 - 125/68)  RR: 20 (07-06-17 @ 09:38) (18 - 24)  SpO2: 95% (07-06-17 @ 09:38) (93% - 99%)  Wt(kg): --  I&O's Summary    05 Jul 2017 07:01  -  06 Jul 2017 07:00  --------------------------------------------------------  IN: 200 mL / OUT: 0 mL / NET: 200 mL          Appearance: Normal	  HEENT:   Normal oral mucosa  Cardiovascular: Normal S1 S2, No JVD, No murmurs, No edema  Respiratory: Lungs clear to auscultation	  Gastrointestinal:  Soft, Non-tender, + BS	  Extremities: Normal range of motion, No clubbing, cyanosis or edema  Vascular: Peripheral pulses palpable 2+ bilaterally    TELEMETRY: 	    ECG:    	  RADIOLOGY:   CXR:  CT:  US:    CARDIAC TESTING:  Echocardiogram:  Catheterization:  Stress Test:      LABS:	 	    CARDIAC MARKERS:                                  8.7    19.7  )-----------( 127      ( 06 Jul 2017 05:40 )             27.2     07-06    139  |  99  |  19  ----------------------------<  83  3.3<L>   |  31  |  0.90    Ca    8.4      06 Jul 2017 05:41  Mg     2.1     07-06      proBNP:   Lipid Profile:   HgA1c:   TSH:     ASSESSMENT/PLAN: 	  Afib  - increase po BB as tolerated continue amio 200 qd for now.    Keep net euvolemic to net negative, strict I/Os

## 2017-07-06 NOTE — PROGRESS NOTE ADULT - SUBJECTIVE AND OBJECTIVE BOX
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, HFpEF, CAD s/p CABG, Afib s/p PPM, admitted with severe sepsis complicated by respiratory failure, s/p removal of infected chemo port, seen this morning resting flat in bed without acute respiratory complaint    PAST MEDICAL & SURGICAL HISTORY:  Scoliosis  Follicular lymphoma  Neck mass  Afib  Other hyperlipidemia  Breast cancer  CAD (coronary artery disease)  Diabetes  HTN (hypertension)  H/O abdominal hysterectomy  H/O thyroidectomy  S/P CABG x 3    SOCHX:  - tobacco,  -  alcohol    FMHX: FA/MO  - contributory     ROS reviewed below with positive findings marked (+) :  GEN:  fever, chills ENT: tracheostomy,   epistaxis,  sinusitis COR: +CAD, +CHF,  +HTN, +dysrhythmia PUL: COPD, ILD, asthma, pneumonia GI: PEG, dysphagia, hemorrhage, other ESTRELLITA: kidney disease, electrolyte disorder HEM:  anemia, thrombus, coagulopathy, +cancer ENDO:  thyroid disease, diabetes mellitus CNS:  dementia, stroke, seizure, PSY:  depression, anxiety, other        MEDICATIONS  (STANDING):  amiodarone    Tablet 200 milliGRAM(s) Oral daily  insulin lispro (HumaLOG) corrective regimen sliding scale   SubCutaneous Before meals and at bedtime  atorvastatin 40 milliGRAM(s) Oral at bedtime  bisacodyl Suppository 10 milliGRAM(s) Rectal daily  lidocaine   Patch 1 Patch Transdermal daily  apixaban 2.5 milliGRAM(s) Oral two times a day  aspirin enteric coated 81 milliGRAM(s) Oral daily  cyanocobalamin Injectable 1000 MICROGram(s) SubCutaneous daily  furosemide    Tablet 20 milliGRAM(s) Oral two times a day  metoprolol 25 milliGRAM(s) Oral two times a day  megestrol Suspension 800 milliGRAM(s) Oral daily  haloperidol    Injectable 1 milliGRAM(s) IntraMuscular once  potassium chloride   Powder 10 milliEquivalent(s) Oral once    MEDICATIONS  (PRN):  acetaminophen  Suppository 650 milliGRAM(s) Rectal every 6 hours PRN For Temp greater than 38 C (100.4 F)  guaiFENesin    Syrup 200 milliGRAM(s) Oral every 6 hours PRN Cough      Vital Signs Last 24 Hrs  T(C): 36.7 (06 Jul 2017 09:38), Max: 36.9 (05 Jul 2017 20:54)  T(F): 98 (06 Jul 2017 09:38), Max: 98.5 (05 Jul 2017 20:54)  HR: 78 (06 Jul 2017 09:59) (78 - 110)  BP: 115/65 (06 Jul 2017 09:59) (96/55 - 125/68)  BP(mean): --  RR: 20 (06 Jul 2017 09:38) (18 - 24)  SpO2: 95% (06 Jul 2017 09:38) (93% - 99%)    GENERAL:         comfortable,  - distress.  HEENT:            - trauma,  - icterus,  - injection,  - nasal discharge.  NECK:              - jugular venous distention, - thyromegaly.  LYMPH:           - lymphadenopathy, - masses.  RESP:              + crackles,   - rhonchi,   - wheezes.   COR:                S1S2 RRR  - murmurs,  - gallops,  - rubs.  ABD:                bowel sounds,   soft, - tender, - distended, - organomegaly.  EXT/MSC:         - cyanosis,  - clubbing,  - edema.    NEURO:             alert,   responds to stimuli.                          8.7    19.7  )-----------( 127      ( 06 Jul 2017 05:40 )             27.2     07-06    139  |  99  |  19  ----------------------------<  83  3.3<L>   |  31  |  0.90    Ca    8.4      06 Jul 2017 05:41  Mg     2.1     07-06          Xray Chest (06.28.17) Improvement of bilateral lung infiltrates.    Echocardiogram (06.30.17) Left ventricular hypertrophy present.  The left ventricular wall motion is  normal.  The left ventricular ejection fraction is estimated to be 60-65%The  left atrium is dilated.  Right atrial size is normal.  There is a pacemaker wire in the right heart.  There is mild aortic valve thickening.  No aorticregurgitation noted.  There is mild to moderate mitral annular calcification.  There is moderate mitral regurgitation.  Structurally normal tricuspid valve.  There is moderate pulmonary hypertension.  The pulmonary artery systolic pressure is estimated to be 50 mmHg. There is moderate to severe tricuspid regurgitation.  Structurally normal pulmonic valve.        ASSESSMENT/PLAN    1) Leukocytosis improving  2) Cardiomyopathy   3) Pulmonary hypertension  4) Atelectasis/pulmonary edema      Oxygen as needed  ID/Antibiotics: status post Zosyn  Cardiac/HTN: diuresis as needed  GI: Rx/ prophylaxis c PPI/H2B  Heme: Rx/VT on AC  Discuss with medical team

## 2017-07-06 NOTE — PROGRESS NOTE ADULT - ASSESSMENT
Gallium scan  non specific uptake in the bowel   Discussed with nuclear medicine    Leukocytosis slowly decreasing  No clear cut infection

## 2017-07-06 NOTE — PROGRESS NOTE ADULT - SUBJECTIVE AND OBJECTIVE BOX
Neurology Follow up note    Name  TANYA FRAIRE    HPI:  85 y/o F w/ pmhx of CAD s/p CABG, atrial fibrillation s/p PPM (on Eliquis), Breast CA (on Arimidex), 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 Patients family at bedside states she's been more lethargic with associated lower extremity swelling, SOB, decreased appetite and diarrhea. Patient with previous admissions for symptomatic hyperglycemia in January. Patient currently seeing Dr. Galaviz on a regular basis for chemotherapy treatment. Last treatment on May 23rd for which she received Bendamustine and Rituximab. Patient denies any headaches, chest pain, abdominal pain, N/V, dysuria, or bowel changes.     Upon arrival to the ED, patient in moderate respiratory distress using accessory muscles, breathing w/ a RR of 30, O2 93% on room air. Patient placed on BIPAP with slight improvement in respiratory status. Patient febrile to 101 with a HR of 77. Labs significant for AG metabolic acidosis, K 7.0, Na 127, BUN 58, Cr. 2.0 (baseline < 0.9 January), Lactate 2.8. CXR done showing RLL infiltrate with pulmonary vascular congestion. In the ED, patient received Vancomycin, Zosyn, Tylenol, Calcium Gluconate 2g, Insulin 5u, Albuterol neb 5mg x 2. ICU consulted, will be transferred to MICU for management of severe sepsis 2/2 HAP and pulmonary congestion. (10 Ramon 2017 19:13)      Interval History - no change in mental status- no new weakness in UE/LE        REVIEW OF SYSTEMS    Vital Signs Last 24 Hrs  T(C): 36.7 (06 Jul 2017 06:03), Max: 36.9 (05 Jul 2017 20:54)  T(F): 98 (06 Jul 2017 06:03), Max: 98.5 (05 Jul 2017 20:54)  HR: 110 (06 Jul 2017 06:03) (95 - 110)  BP: 116/69 (06 Jul 2017 06:03) (101/54 - 125/68)  BP(mean): --  RR: 20 (06 Jul 2017 06:03) (18 - 24)  SpO2: 99% (05 Jul 2017 20:54) (93% - 99%)    Physical Exam-     Mental Status- awaker    Cranial Nerves- no diplopia    Gait and station-nl food drop    Motor- no new focal deficits    Reflexes- decreased    Sensation-no new sensory loss    Coordination-no tremors    Vascular -    Medications  amiodarone    Tablet 200 milliGRAM(s) Oral daily  insulin lispro (HumaLOG) corrective regimen sliding scale   SubCutaneous Before meals and at bedtime  atorvastatin 40 milliGRAM(s) Oral at bedtime  acetaminophen  Suppository 650 milliGRAM(s) Rectal every 6 hours PRN  bisacodyl Suppository 10 milliGRAM(s) Rectal daily  lidocaine   Patch 1 Patch Transdermal daily  guaiFENesin    Syrup 200 milliGRAM(s) Oral every 6 hours PRN  apixaban 2.5 milliGRAM(s) Oral two times a day  aspirin enteric coated 81 milliGRAM(s) Oral daily  cyanocobalamin Injectable 1000 MICROGram(s) SubCutaneous daily  furosemide    Tablet 20 milliGRAM(s) Oral two times a day  metoprolol 25 milliGRAM(s) Oral two times a day  megestrol Suspension 800 milliGRAM(s) Oral daily  haloperidol    Injectable 1 milliGRAM(s) IntraMuscular once  piperacillin/tazobactam IVPB. 2.25 Gram(s) IV Intermittent every 6 hours  piperacillin/tazobactam IVPB.   IV Intermittent   potassium chloride   Powder 20 milliEquivalent(s) Oral once  potassium chloride   Powder 40 milliEquivalent(s) Oral once  potassium chloride   Powder 10 milliEquivalent(s) Oral once      Lab      Radiology    Assessment- [facility]    Plan- CT head

## 2017-07-06 NOTE — PROGRESS NOTE ADULT - NSHPATTENDINGPLANDISCUSS_GEN_ALL_CORE
as above
pulm, neuro
team
team notes reviewed
as above
Medicine team
medical house staff
patient   patients family and medical staff
patient and medical staff
patient patients family  and medical staff
patient patients family and medical staff
Patient, family, and palliative SW.
Primary team, patient, and family.
7 L team
Medicine
Medicine Team
Resident
Anastacia
Medicine team
team

## 2017-07-06 NOTE — PROGRESS NOTE ADULT - PROBLEM SELECTOR PROBLEM 1
Severe sepsis
Severe sepsis
Dyspnea and respiratory abnormalities
E coli bacteremia
E coli bacteremia
ROMEO (acute kidney injury)
Severe sepsis
Bacteremia due to Gram-negative bacteria
Dyspnea and respiratory abnormalities
ROMEO (acute kidney injury)
Severe sepsis
E coli bacteremia
Leukocytosis, unspecified type
ROMEO (acute kidney injury)
Sepsis due to Escherichia coli
Sepsis due to Escherichia coli
Severe sepsis
Lymphoma
Hyponatremia
Lymphoma

## 2017-07-06 NOTE — PROGRESS NOTE ADULT - PROVIDER SPECIALTY LIST ADULT
Cardiology
Critical Care
Critical Care
Infectious Disease
Internal Medicine
MICU
Nephrology
Neurology
Palliative Care
Psychiatry
Pulmonology
Heme/Onc
Internal Medicine
Pulmonology
Internal Medicine

## 2017-07-07 RX ORDER — METFORMIN HYDROCHLORIDE 850 MG/1
1 TABLET ORAL
Qty: 60 | Refills: 0 | OUTPATIENT
Start: 2017-07-07 | End: 2017-08-06

## 2017-07-11 DIAGNOSIS — Z79.82 LONG TERM (CURRENT) USE OF ASPIRIN: ICD-10-CM

## 2017-07-11 DIAGNOSIS — E86.1 HYPOVOLEMIA: ICD-10-CM

## 2017-07-11 DIAGNOSIS — E44.0 MODERATE PROTEIN-CALORIE MALNUTRITION: ICD-10-CM

## 2017-07-11 DIAGNOSIS — I27.2 OTHER SECONDARY PULMONARY HYPERTENSION: ICD-10-CM

## 2017-07-11 DIAGNOSIS — A41.51 SEPSIS DUE TO ESCHERICHIA COLI [E. COLI]: ICD-10-CM

## 2017-07-11 DIAGNOSIS — Z79.84 LONG TERM (CURRENT) USE OF ORAL HYPOGLYCEMIC DRUGS: ICD-10-CM
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
## 2017-07-11 DIAGNOSIS — Z66 DO NOT RESUSCITATE: ICD-10-CM

## 2017-07-11 DIAGNOSIS — C82.90 FOLLICULAR LYMPHOMA, UNSPECIFIED, UNSPECIFIED SITE: ICD-10-CM

## 2017-07-11 DIAGNOSIS — H81.10 BENIGN PAROXYSMAL VERTIGO, UNSPECIFIED EAR: ICD-10-CM

## 2017-07-11 DIAGNOSIS — F43.23 ADJUSTMENT DISORDER WITH MIXED ANXIETY AND DEPRESSED MOOD: ICD-10-CM

## 2017-07-11 DIAGNOSIS — J98.11 ATELECTASIS: ICD-10-CM

## 2017-07-11 DIAGNOSIS — Z95.0 PRESENCE OF CARDIAC PACEMAKER: ICD-10-CM

## 2017-07-11 DIAGNOSIS — I48.0 PAROXYSMAL ATRIAL FIBRILLATION: ICD-10-CM

## 2017-07-11 DIAGNOSIS — N17.0 ACUTE KIDNEY FAILURE WITH TUBULAR NECROSIS: ICD-10-CM

## 2017-07-11 DIAGNOSIS — R53.1 WEAKNESS: ICD-10-CM

## 2017-07-11 DIAGNOSIS — T80.219A UNSPECIFIED INFECTION DUE TO CENTRAL VENOUS CATHETER, INITIAL ENCOUNTER: ICD-10-CM

## 2017-07-11 DIAGNOSIS — E87.2 ACIDOSIS: ICD-10-CM

## 2017-07-11 DIAGNOSIS — I08.1 RHEUMATIC DISORDERS OF BOTH MITRAL AND TRICUSPID VALVES: ICD-10-CM

## 2017-07-11 DIAGNOSIS — D69.59 OTHER SECONDARY THROMBOCYTOPENIA: ICD-10-CM

## 2017-07-11 DIAGNOSIS — I42.9 CARDIOMYOPATHY, UNSPECIFIED: ICD-10-CM

## 2017-07-11 DIAGNOSIS — J18.9 PNEUMONIA, UNSPECIFIED ORGANISM: ICD-10-CM

## 2017-07-11 DIAGNOSIS — C50.911 MALIGNANT NEOPLASM OF UNSPECIFIED SITE OF RIGHT FEMALE BREAST: ICD-10-CM

## 2017-07-11 DIAGNOSIS — Z90.710 ACQUIRED ABSENCE OF BOTH CERVIX AND UTERUS: ICD-10-CM

## 2017-07-11 DIAGNOSIS — Z79.01 LONG TERM (CURRENT) USE OF ANTICOAGULANTS: ICD-10-CM

## 2017-07-11 DIAGNOSIS — Z51.5 ENCOUNTER FOR PALLIATIVE CARE: ICD-10-CM

## 2017-07-11 DIAGNOSIS — D61.811 OTHER DRUG-INDUCED PANCYTOPENIA: ICD-10-CM
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
## 2017-07-11 DIAGNOSIS — I50.33 ACUTE ON CHRONIC DIASTOLIC (CONGESTIVE) HEART FAILURE: ICD-10-CM

## 2017-07-11 DIAGNOSIS — R41.0 DISORIENTATION, UNSPECIFIED: ICD-10-CM

## 2017-07-11 DIAGNOSIS — E87.1 HYPO-OSMOLALITY AND HYPONATREMIA: ICD-10-CM

## 2017-07-11 DIAGNOSIS — I11.0 HYPERTENSIVE HEART DISEASE WITH HEART FAILURE: ICD-10-CM

## 2017-07-11 DIAGNOSIS — R65.20 SEVERE SEPSIS WITHOUT SEPTIC SHOCK: ICD-10-CM

## 2017-07-11 DIAGNOSIS — J96.01 ACUTE RESPIRATORY FAILURE WITH HYPOXIA: ICD-10-CM

## 2017-07-11 DIAGNOSIS — Z95.1 PRESENCE OF AORTOCORONARY BYPASS GRAFT: ICD-10-CM

## 2017-07-11 DIAGNOSIS — E87.5 HYPERKALEMIA: ICD-10-CM

## 2017-07-11 DIAGNOSIS — E78.5 HYPERLIPIDEMIA, UNSPECIFIED: ICD-10-CM

## 2017-07-11 DIAGNOSIS — T36.95XA ADVERSE EFFECT OF UNSPECIFIED SYSTEMIC ANTIBIOTIC, INITIAL ENCOUNTER: ICD-10-CM

## 2017-07-11 DIAGNOSIS — E11.9 TYPE 2 DIABETES MELLITUS WITHOUT COMPLICATIONS: ICD-10-CM

## 2017-07-11 DIAGNOSIS — M41.9 SCOLIOSIS, UNSPECIFIED: ICD-10-CM

## 2017-07-11 DIAGNOSIS — I24.8 OTHER FORMS OF ACUTE ISCHEMIC HEART DISEASE: ICD-10-CM

## 2017-07-11 DIAGNOSIS — N39.0 URINARY TRACT INFECTION, SITE NOT SPECIFIED: ICD-10-CM

## 2017-07-11 DIAGNOSIS — I25.10 ATHEROSCLEROTIC HEART DISEASE OF NATIVE CORONARY ARTERY WITHOUT ANGINA PECTORIS: ICD-10-CM

## 2017-07-11 DIAGNOSIS — T45.1X5A ADVERSE EFFECT OF ANTINEOPLASTIC AND IMMUNOSUPPRESSIVE DRUGS, INITIAL ENCOUNTER: ICD-10-CM

## 2017-07-11 DIAGNOSIS — G89.29 OTHER CHRONIC PAIN: ICD-10-CM

## 2017-10-05 ENCOUNTER — APPOINTMENT (OUTPATIENT)
Dept: HEART AND VASCULAR | Facility: CLINIC | Age: 82
End: 2017-10-05

## 2017-11-30 ENCOUNTER — APPOINTMENT (OUTPATIENT)
Dept: HEART AND VASCULAR | Facility: CLINIC | Age: 82
End: 2017-11-30

## 2018-01-21 NOTE — ED ADULT TRIAGE NOTE - HEART RATE (BEATS/MIN)
Get an over-the-counter cream for yeast for your vulva lips.  Patient Education     Urinary Tract Infections in Women  Urinary tract infections (UTIs) are most often caused by bacteria (germs). These bacteria enter the urinary tract. The bacteria may come from outside the body. Or they may travel from the skin outside the rectum or vagina into the urethra. Female anatomy makes it easier for bacteria from the bowel to enter a woman’s urinary tract, which is the most common source of UTI. This means women develop UTIs more often than men. Pain in or around the urinary tract is a common UTI symptom. But the only way to know for sure if you have a UTI for the health care provider to test your urine. The two tests that may be done are the urinalysis and urine culture.  Types of UTIs  · Cystitis: A bladder infection (cystitis) is the most common UTI in women. You may have urgent or frequent urination. You may also have pain, burning when you urinate, and bloody urine.  · Urethritis: This is an inflamed urethra, which is the tube that carries urine from the bladder to outside the body. You may have lower stomach or back pain. You may also have urgent or frequent urination.  · Pyelonephritis: This is a kidney infection. If not treated, it can be serious and damage your kidneys. In severe cases, you may be hospitalized. You may have a fever and lower back pain.  Medications to treat a UTI  Most UTIs are treated with antibiotics. These kill the bacteria. The length of time you need to take them depends on the type of infection. It may be as short as 3 days. If you have repeated UTIs, a low-dose antibiotic may be needed for several months. Take antibiotics exactly as directed. Don’t stop taking them until all of the medication is gone. If you stop taking the antibiotic too soon, the infection may not go away, and you may develop a resistance to the antibiotic. This can make it much harder to treat.  Lifestyle changes to treat  and prevent UTIs  The lifestyle changes below will help get rid of your UTI. They may also help prevent future UTIs.  · Drink plenty of fluids. This includes water, juice, or other caffeine-free drinks. Fluids help flush bacteria out of your body.  · Empty your bladder. Always empty your bladder when you feel the urge to urinate. And always urinate before going to sleep. Urine that stays in your bladder can lead to infection. Try to urinate before and after sex as well.  · Practice good personal hygiene. Wipe yourself from front to back after using the toilet. This helps keep bacteria from getting into the urethra.  · Use condoms during sex. These help prevent UTIs caused by sexually transmitted bacteria. Also, avoid using spermicides during sex. These can increase the risk of UTIs. Choose other forms of birth control instead. For women who tend to get UTIs after sex, a low-dose of a preventive antibiotic may be used. Be sure to discuss this option with your health care provider.  · Follow up with your health care provider as directed. He or she may test to make sure the infection has cleared. If necessary, additional treatment may be started.  © 4177-7942 [employer]. [address] [place] [address], [place] [address]. All rights reserved. This information is not intended as a substitute for professional medical care. Always follow your healthcare professional's instructions.            90

## 2018-02-03 NOTE — PROGRESS NOTE ADULT - PROVIDER SPECIALTY LIST ADULT
Cardiology
ENT
Heme/Onc
Heme/Onc
Internal Medicine
Nephrology
Rehab Medicine
ENT
Heme/Onc
Heme/Onc
Internal Medicine
No

## 2018-02-06 NOTE — ED PROVIDER NOTE - CARDIAC, MLM
Normal rate, regular rhythm.    No murmurs, rubs or gallops.
sacrum unable to assess,patient refusing staff to assess the pressure ulcer,MD aware

## 2018-04-24 NOTE — PROGRESS NOTE ADULT - SUBJECTIVE AND OBJECTIVE BOX
Patient seen and examined at bedside. Patient states that she still feels fatigued and tired but denies any shortness of breath, nausea, or vomiting.     atorvastatin 80milliGRAM(s) at bedtime  aspirin  chewable 81milliGRAM(s) daily  amiodarone    Tablet 200milliGRAM(s) daily  pantoprazole    Tablet 40milliGRAM(s) before breakfast  insulin lispro (HumaLOG) corrective regimen sliding scale  Before meals and at bedtime  metoprolol succinate ER 50milliGRAM(s) daily  acetaminophen   Tablet. 650milliGRAM(s) every 6 hours PRN  bisacodyl Suppository 10milliGRAM(s) daily PRN  apixaban 2.5milliGRAM(s) two times a day  polyethylene glycol 3350 17Gram(s) daily  furosemide    Tablet 20milliGRAM(s) daily    Allergies    IV CONtRAST (Flushing (Skin); Rash)  No Known Drug Allergies    Intolerances    T(C): , Max: 37.1 (01-02 @ 22:35)  T(F): , Max: 98.7 (01-02 @ 22:35)  HR: 75  BP: 106/62  RR: 18  SpO2: 94%    LABS:                        11.0   2.4   )-----------( 161      ( 03 Jan 2017 06:58 )             34.3     03 Jan 2017 06:58    134    |  102    |  20     ----------------------------<  106    3.8     |  24     |  1.08     Ca    10.8       03 Jan 2017 06:58  Phos  1.9       03 Jan 2017 06:58  Mg     1.6       03 Jan 2017 06:58    TPro  6.0    /  Alb  2.7    /  TBili  0.5    /  DBili  x      /  AST  80     /  ALT  50     /  AlkPhos  81     02 Jan 2017 06:35 stable, home today

## 2018-06-05 NOTE — PROGRESS NOTE ADULT - PROBLEM SELECTOR PLAN 1
Ok for refill? Patient uses [facility] in urgent care in [place].   Suspect like due to lymphoma overproduction of 1,25 vit D-- patient has received calcitonin, zometa, IVIF and lasix with some improvement but gradually rising again--     - Calcium corrected level is lower today but still elevated once corrected  - Anticipate further correction as Zometa continues to work and patient's chemotherapy treats underlying lymphoma     No indication for IVF or Lasix at present

## 2018-06-28 NOTE — CONSULT NOTE ADULT - PROBLEM SELECTOR RECOMMENDATION 2
No Patient technically meets AKIN criteria for ROMEO from her previous known baseline.   Hypercalcemia will cause renal afferent vasoconstriction, nephrogenic diabetes insipidus, and she is currently slightly lower BP than her outpatient measurements.   Therefore, the patient likely is experiencing a prerenal state.  With IV fluids, this will be expected to resolve.     Check urinalysis, urine sodium / potassium / chloride / urea nitrogen / creatinine / osmolality, P/C ratio.     If P/C ratio is out of proportion to UA dipstick protein, then may suggest UPEP and UIFE later. Patient technically meets AKIN criteria for ROMEO from her previous known baseline.   Hypercalcemia will cause renal afferent vasoconstriction, nephrogenic diabetes insipidus, and inactivation of the CaSR thus blocking the NKCC2 transport. She is currently slightly lower BP than her outpatient measurements.   Therefore, the patient likely is experiencing a prerenal state.  With IV fluids, this will be expected to resolve.     Check urinalysis, urine sodium / potassium / chloride / urea nitrogen / creatinine / osmolality, P/C ratio.     If P/C ratio is out of proportion to UA dipstick protein, then may suggest UPEP and UIFE later. Patient technically meets AKIN criteria for ROMEO from her previous known baseline.   Hypercalcemia will cause renal afferent vasoconstriction, nephrogenic diabetes insipidus, and activation of the CaSR thus blocking the NKCC2 transportv and volume depletion. She is currently slightly lower BP than her outpatient measurements.   Therefore, the patient likely is experiencing a prerenal state.  With IV fluids, this will be expected to resolve.     Check urinalysis, urine sodium / potassium / chloride / urea nitrogen / creatinine / osmolality, P/C ratio.     If P/C ratio is out of proportion to UA dipstick protein, then may suggest UPEP and UIFE later.

## 2018-07-27 NOTE — DISCHARGE NOTE ADULT - CARE PLAN
Anemia: Care Instructions  Your Care Instructions    Anemia is a low level of red blood cells, which carry oxygen throughout your body. Many things can cause anemia. Lack of iron is one of the most common causes. Your body needs iron to make hemoglobin, a substance in red blood cells that carries oxygen from the lungs to your body's cells. Without enough iron, the body produces fewer and smaller red blood cells. As a result, your body's cells do not get enough oxygen, and you feel tired and weak. And you may have trouble concentrating. Bleeding is the most common cause of a lack of iron. You may have heavy menstrual bleeding or bleeding caused by conditions such as ulcers, hemorrhoids, or cancer. Regular use of aspirin or other anti-inflammatory medicines (such as ibuprofen) also can cause bleeding in some people. A lack of iron in your diet also can cause anemia, especially at times when the body needs more iron, such as during pregnancy, infancy, and the teen years. Your doctor may have prescribed iron pills. It may take several months of treatment for your iron levels to return to normal. Your doctor also may suggest that you eat foods that are rich in iron, such as meat and beans. There are many other causes of anemia. It is not always due to a lack of iron. Finding the specific cause of your anemia will help your doctor find the right treatment for you. Follow-up care is a key part of your treatment and safety. Be sure to make and go to all appointments, and call your doctor if you are having problems. It's also a good idea to know your test results and keep a list of the medicines you take. How can you care for yourself at home? · Take your medicines exactly as prescribed. Call your doctor if you think you are having a problem with your medicine. · If your doctor recommends iron pills, take them as directed:  ¨ Try to take the pills on an empty stomach about 1 hour before or 2 hours after meals. But you may need to take iron with food to avoid an upset stomach. ¨ Do not take antacids or drink milk or caffeine drinks (such as coffee, tea, or cola) at the same time or within 2 hours of the time that you take your iron. They can make it hard for your body to absorb the iron. ¨ Vitamin C (from food or supplements) helps your body absorb iron. Try taking iron pills with a glass of orange juice or some other food that is high in vitamin C, such as citrus fruits. ¨ Iron pills may cause stomach problems, such as heartburn, nausea, diarrhea, constipation, and cramps. Be sure to drink plenty of fluids, and include fruits, vegetables, and fiber in your diet each day. Iron pills often make your bowel movements dark or green. ¨ If you forget to take an iron pill, do not take a double dose of iron the next time you take a pill. ¨ Keep iron pills out of the reach of small children. An overdose of iron can be very dangerous. · Follow your doctor's advice about eating iron-rich foods. These include red meat, shellfish, poultry, eggs, beans, raisins, whole-grain bread, and leafy green vegetables. · Steam vegetables to help them keep their iron content. When should you call for help? Call 911 anytime you think you may need emergency care. For example, call if:    · You have symptoms of a heart attack. These may include:  ¨ Chest pain or pressure, or a strange feeling in the chest.  ¨ Sweating. ¨ Shortness of breath. ¨ Nausea or vomiting. ¨ Pain, pressure, or a strange feeling in the back, neck, jaw, or upper belly or in one or both shoulders or arms. ¨ Lightheadedness or sudden weakness. ¨ A fast or irregular heartbeat. After you call 911, the  may tell you to chew 1 adult-strength or 2 to 4 low-dose aspirin. Wait for an ambulance.  Do not try to drive yourself.     · You passed out (lost consciousness).    Call your doctor now or seek immediate medical care if:    · You have new or increased shortness of breath.     · You are dizzy or lightheaded, or you feel like you may faint.     · Your fatigue and weakness continue or get worse.     · You have any abnormal bleeding, such as:  ¨ Nosebleeds. ¨ Vaginal bleeding that is different (heavier, more frequent, at a different time of the month) than what you are used to. ¨ Bloody or black stools, or rectal bleeding. ¨ Bloody or pink urine.    Watch closely for changes in your health, and be sure to contact your doctor if:    · You do not get better as expected. Where can you learn more? Go to http://barrie-solo.info/. Enter R301 in the search box to learn more about \"Anemia: Care Instructions. \"  Current as of: October 9, 2017  Content Version: 11.7  © 1282-9407 [employer]. Care instructions adapted under license by [employer] (which disclaims liability or warranty for this information). If you have questions about a medical condition or this instruction, always ask your healthcare professional. [address] any warranty or liability for your use of this information. Principal Discharge DX:	Diffuse large B-cell lymphoma, unspecified body region  Goal:	Treatment with appropriate chemotherapy  Instructions for follow-up, activity and diet:	You have been found to have a cancer of the immune system known as diffuse large-Bcell lymphoma. You were started on a chemotherapy regimen by Dr. Galaviz which you tolerated well. You had a chemoport placed to help administer your chemotherapy. You will continue to receive chemotherapy as an outpatient under the care of Dr. Galaviz. You will have phlebotomists come to your home every Monday & Thursday to draw blood which Dr. Galaviz will use to monitor your response to treatment. You will follow up with Dr. Galaviz in her [place] clinic on February 8, 2017.  Secondary Diagnosis:	Malignant neoplasm of female breast, unspecified laterality, unspecified site of breast  Instructions for follow-up, activity and diet:	You have previously been diagnosed with breast cancer, which has relapsed and is active again. Dr. Galaviz is managing your chemotherapy for this cancer. Continue to follow with her in her clinic for continued chemotherapy.  Secondary Diagnosis:	Atrial fibrillation, unspecified type  Instructions for follow-up, activity and diet:	Continue to take your metoprolol and xarelto as prescribed. Continue to follow up with your cardiologist for continued management of this condition.  Secondary Diagnosis:	Essential hypertension  Instructions for follow-up, activity and diet:	Continue to take your  Secondary Diagnosis:	Type 2 diabetes mellitus without complication, without long-term current use of insulin  Instructions for follow-up, activity and diet:	Continuue to take your metformin as prescribed. Continue to follow up with your PMD for continued monitoring and management of this condition.  Secondary Diagnosis:	Hypokalemia  Instructions for follow-up, activity and diet:	You were found to have low potassium during this admission. Dr. Galaviz is prescribing you a potassium chloride pill to help replenish your potassium. Take one pill every day as directed. Follow up with Dr. Galaviz in her clinic for continued management of this condition.  Secondary Diagnosis:	Heart failure with preserved ejection fraction  Instructions for follow-up, activity and diet:	You have previously been diagnosed with heart failure with preserved ejection fraction. Please continue to follow with Dr. Hartmann for monitoring and management of this condition. Principal Discharge DX:	Diffuse large B-cell lymphoma, unspecified body region  Goal:	Treatment with appropriate chemotherapy  Instructions for follow-up, activity and diet:	You have been found to have a cancer of the immune system known as diffuse large-Bcell lymphoma. You were started on a chemotherapy regimen by Dr. Galaviz which you tolerated well. You had a chemoport placed to help administer your chemotherapy. You will continue to receive chemotherapy as an outpatient under the care of Dr. Galaviz. You will have phlebotomists come to your home every Monday & Thursday to draw blood which Dr. Galaviz will use to monitor your response to treatment. You will follow up with Dr. Galaviz in her [place] clinic on February 1, 2017.  Secondary Diagnosis:	Malignant neoplasm of female breast, unspecified laterality, unspecified site of breast  Instructions for follow-up, activity and diet:	You have previously been diagnosed with breast cancer, which has relapsed and is active again. Dr. Galaviz is managing your chemotherapy for this cancer. Continue to follow with her in her clinic for continued chemotherapy.  Secondary Diagnosis:	Atrial fibrillation, unspecified type  Instructions for follow-up, activity and diet:	Continue to take your metoprolol and xarelto as prescribed. Continue to follow up with your cardiologist for continued management of this condition.  Secondary Diagnosis:	Essential hypertension  Instructions for follow-up, activity and diet:	You have been prescribed with hypertension. Continue to follow with your cardiologist, Dr. Mckinney, for continued management of this condition.  Secondary Diagnosis:	Type 2 diabetes mellitus without complication, without long-term current use of insulin  Instructions for follow-up, activity and diet:	Continue to take your metformin as prescribed. Continue to follow up with your PMD for continued monitoring and management of this condition.  Secondary Diagnosis:	Hypokalemia  Instructions for follow-up, activity and diet:	You were found to have low potassium during this admission. Dr. Galaviz is prescribing you a potassium chloride pill to help replenish your potassium. Take one pill every day as directed. Follow up with Dr. Galaviz in her clinic for continued management of this condition.  Secondary Diagnosis:	Heart failure with preserved ejection fraction  Instructions for follow-up, activity and diet:	You have previously been diagnosed with heart failure with preserved ejection fraction. Please continue to follow with Dr. Mckinney for monitoring and management of this condition. Do not restart your furosemide until Dr. Mckinney says to do so. Principal Discharge DX:	Diffuse large B-cell lymphoma, unspecified body region  Goal:	Treatment with appropriate chemotherapy  Instructions for follow-up, activity and diet:	You have been found to have a cancer of the immune system known as diffuse large-Bcell lymphoma. You were started on a chemotherapy regimen by Dr. Galaviz which you tolerated well. You had a chemoport placed to help administer your chemotherapy. You will continue to receive chemotherapy as an outpatient under the care of Dr. Galaviz. You will have phlebotomists come to your home every Monday & Thursday to draw blood which Dr. Galaviz will use to monitor your response to treatment. You will follow up with Dr. Galaviz in her [place] clinic on February 1, 2017.  Secondary Diagnosis:	Malignant neoplasm of female breast, unspecified laterality, unspecified site of breast  Instructions for follow-up, activity and diet:	You have previously been diagnosed with breast cancer, which has relapsed and is active again. Dr. Galaviz is managing your chemotherapy for this cancer. Continue to follow with her in her clinic for continued chemotherapy.  Secondary Diagnosis:	Atrial fibrillation, unspecified type  Instructions for follow-up, activity and diet:	Continue to take your metoprolol and xarelto as prescribed. Continue to follow up with your cardiologist for continued management of this condition.  Secondary Diagnosis:	Essential hypertension  Instructions for follow-up, activity and diet:	You have been prescribed with hypertension. Continue to follow with your cardiologist, Dr. Mckinney, for continued management of this condition.  Secondary Diagnosis:	Type 2 diabetes mellitus without complication, without long-term current use of insulin  Instructions for follow-up, activity and diet:	Continue to take your metformin as prescribed. Continue to follow up with your PMD for continued monitoring and management of this condition.  Secondary Diagnosis:	Hypokalemia  Instructions for follow-up, activity and diet:	You were found to have low potassium during this admission. Dr. Galaviz is prescribing you a potassium chloride pill to help replenish your potassium. Take one pill every day as directed. Follow up with Dr. Galaviz in her clinic for continued management of this condition.  Secondary Diagnosis:	Heart failure with preserved ejection fraction  Instructions for follow-up, activity and diet:	You have previously been diagnosed with heart failure with preserved ejection fraction. Please continue to follow with Dr. Mckinney for monitoring and management of this condition. Do not restart your furosemide until Dr. Mckinney says to do so. Principal Discharge DX:	Diffuse large B-cell lymphoma, unspecified body region  Goal:	Treatment with appropriate chemotherapy  Instructions for follow-up, activity and diet:	You have been found to have a cancer of the immune system known as diffuse large-Bcell lymphoma. You were started on a chemotherapy regimen by Dr. Galaviz which you tolerated well. You had a chemoport placed to help administer your chemotherapy. You will continue to receive chemotherapy as an outpatient under the care of Dr. Galaviz. You will have phlebotomists come to your home every Monday & Thursday to draw blood which Dr. Galaviz will use to monitor your response to treatment. You will follow up with Dr. Galaviz in her [place] clinic on February 1, 2017.  Secondary Diagnosis:	Malignant neoplasm of female breast, unspecified laterality, unspecified site of breast  Instructions for follow-up, activity and diet:	You have previously been diagnosed with breast cancer, which has relapsed and is active again. Dr. Galaviz is managing your chemotherapy for this cancer. Continue to follow with her in her clinic for continued chemotherapy.  Secondary Diagnosis:	Atrial fibrillation, unspecified type  Instructions for follow-up, activity and diet:	Continue to take your metoprolol and xarelto as prescribed. Continue to follow up with your cardiologist for continued management of this condition.  Secondary Diagnosis:	Essential hypertension  Instructions for follow-up, activity and diet:	You have been prescribed with hypertension. Continue to follow with your cardiologist, Dr. Mckinney, for continued management of this condition.  Secondary Diagnosis:	Type 2 diabetes mellitus without complication, without long-term current use of insulin  Instructions for follow-up, activity and diet:	Continue to take your metformin as prescribed. Continue to follow up with your PMD for continued monitoring and management of this condition.  Secondary Diagnosis:	Hypokalemia  Instructions for follow-up, activity and diet:	You were found to have low potassium during this admission. Dr. Galaviz is prescribing you a potassium chloride pill to help replenish your potassium. Take one pill every day as directed. Follow up with Dr. Galaviz in her clinic for continued management of this condition.  Secondary Diagnosis:	Heart failure with preserved ejection fraction  Instructions for follow-up, activity and diet:	You have previously been diagnosed with heart failure with preserved ejection fraction. Please continue to follow with Dr. Mckinney for monitoring and management of this condition. Do not restart your furosemide until Dr. Mckinney says to do so. Principal Discharge DX:	Diffuse large B-cell lymphoma, unspecified body region  Goal:	Treatment with appropriate chemotherapy  Instructions for follow-up, activity and diet:	You have been found to have a cancer of the immune system known as diffuse large-Bcell lymphoma. You were started on a chemotherapy regimen by Dr. Galaviz which you tolerated well. You had a chemoport placed to help administer your chemotherapy. You will continue to receive chemotherapy as an outpatient under the care of Dr. Galaviz. You will have phlebotomists come to your home every Monday & Thursday to draw blood which Dr. Galaviz will use to monitor your response to treatment. You will follow up with Dr. Galaviz in her [place] clinic on February 1, 2017.  Secondary Diagnosis:	Malignant neoplasm of female breast, unspecified laterality, unspecified site of breast  Instructions for follow-up, activity and diet:	You have previously been diagnosed with breast cancer, which has relapsed and is active again. Dr. Galaviz is managing your chemotherapy for this cancer. Continue to follow with her in her clinic for continued chemotherapy. Please continue your arimidex daily until instructed to stop by Dr Galaviz  Secondary Diagnosis:	Atrial fibrillation, unspecified type  Instructions for follow-up, activity and diet:	Continue to take your metoprolol and xarelto as prescribed. Continue to follow up with your cardiologist for continued management of this condition.  Secondary Diagnosis:	Essential hypertension  Instructions for follow-up, activity and diet:	You have been prescribed with hypertension. Continue to follow with your cardiologist, Dr. Mckinney, for continued management of this condition.  Secondary Diagnosis:	Type 2 diabetes mellitus without complication, without long-term current use of insulin  Instructions for follow-up, activity and diet:	Continue to take your metformin as prescribed. Continue to follow up with your PMD for continued monitoring and management of this condition.  Secondary Diagnosis:	Hypokalemia  Instructions for follow-up, activity and diet:	You were found to have low potassium during this admission. Dr. Galaviz is prescribing you a potassium chloride pill to help replenish your potassium. Take one pill every day as directed. Follow up with Dr. Galaviz in her clinic for continued management of this condition.  Secondary Diagnosis:	Heart failure with preserved ejection fraction  Instructions for follow-up, activity and diet:	You have previously been diagnosed with heart failure with preserved ejection fraction. Please continue to follow with Dr. Mckinney for monitoring and management of this condition. Do not restart your furosemide until Dr. Mckinney says to do so.

## 2018-10-20 NOTE — PROGRESS NOTE ADULT - ASSESSMENT
85 YO F h/o CAD s/p CABG, atrial fibrillation s/p PPM (on Eliquis), Breast CA dx in 1997 with possible recurrence 5 months ago (now on Arimidex x 5 months), DLBCL dx 12/2016 on chemotherapy (non-cardiotoxic Bendamustin plus Rituximab - last tx 5/23), HFpEF (EF 60%), HTN, HLD, and DM admitted for severe sepsis 2/2 UTI vs colitis with GNR bacteremia and acute CHF exacerbation decreased strength

## 2019-01-22 NOTE — PROGRESS NOTE ADULT - PROBLEM SELECTOR PROBLEM 1
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 Per chart - Pt is scheduled for L CEA sx on 1/23/2019. Will re-assess post surgery.     Results of the AM-PAC \"6 clicks\" Inpatient Daily Mobility Short F Hypercalcemia

## 2019-02-27 NOTE — PATIENT PROFILE ADULT. - NS PRO PT RIGHT SUPPORT PERSON
SCHEDULED TYLENOL AND PRN OXYCODONE ADMINISTERED FOR 3/10 PAIN IN LEFT KNEE.
(SEE EMAR). CYRO ICE CUFF IN PLACE. BILATERAL SCD'S AND ANGELA HOSE IN PLACE. PT
DENIES FURTHER NEEDS, CALL LIGHT IN PLACE. Yes

## 2019-05-07 NOTE — PROGRESS NOTE ADULT - PROBLEM SELECTOR PLAN 9
No, this is okay to wait, her kidney function has stabilized, may be she can get on the waiting list for an earlier appointment.   Let her know that I will message the physician.    -Cody Stahl, do you think you can get her in a bit earlier or put her on a can Has been normotensive in the hospital  - c/w cardiac medications as above, no addition antiHTN at this time    #MILD TROPONINEMIA. detected mild troponinemia following procedure; patient asymptomatic without SOB, diaphoresis, palpitations, and/or chest pain. in setting of tachypnea/difficult extubation yesterday, likely demand ischemia.  - trend to peak

## 2019-10-22 NOTE — ED PROVIDER NOTE - CROS ED GI ALL NEG
Detail Level: Detailed
Duration Of Freeze Thaw-Cycle (Seconds): 0
- - -
Consent: The patient's consent was obtained including but not limited to risks of crusting, scabbing, blistering, scarring, darker or lighter pigmentary change, recurrence, incomplete removal and infection.
Render Post-Care Instructions In Note?: no
Post-Care Instructions: I reviewed with the patient in detail post-care instructions. Patient is to wear sunprotection, and avoid picking at any of the treated lesions. Pt may apply Vaseline to crusted or scabbing areas.

## 2020-08-12 NOTE — ED ADULT NURSE NOTE - NS ED NURSE LEVEL OF CONSCIOUSNESS AFFECT
Detail Level: Detailed Body Location Override (Optional - Billing Will Still Be Based On Selected Body Map Location If Applicable): right postauricular crease Size Of Lesion: 2.2 X Size Of Lesion In Cm (Optional): 1.5 Incorporate Mauc In Note: Yes Location Indication Override (Is Already Calculated Based On Selected Body Location): Area M Appropriate/Calm

## 2020-12-02 NOTE — PATIENT PROFILE ADULT. - PRO MENTAL HEALTH SX RECENT
Attempted to see pt for OT evaluation, however pt to go for surgery today per SOF[facility] [place]. Will await new therapy orders and when pt appropriate to be seen. none

## 2021-02-28 NOTE — PATIENT PROFILE ADULT. - PHONE #
[facility] CONSULT NOTE    Reason for Consult: Gram-positive cocci on blood culture   Requested by: Dr. Laurie Jenkins    Chief complaint: Confusion    History Obtained From: EMR and patient     HISTORY OFPRESENT ILLNESS              The patient is a 80 y.o. female nursing home resident with history of hypertension, peripheral vascular disease, presented on 02/27 with confusion. On admission, she was afebrile and hemodynamically stable with no leukocytosis. Chest x-ray was unremarkable. CT head without contrast showed no acute intracranial hemorrhage or edema. Urinalysis showed pyuria of 10-20 WBCs. Blood cultures showed gram-positive cocci in clusters (methicillin-resistant Staphylococcus species not aureus by PCR). Ceftriaxone was started on admission. ID service was subsequently consulted for further recommendations.     Past Medical History  Past Medical History:   Diagnosis Date    LETHA (acute kidney injury) ([facility] [address] 8/18/2020    Bleeding ulcer 2008    Blood circulation, collateral pain to legs    GERD (gastroesophageal reflux disease)     History of bleeding ulcers     Hypertension     Hyperuricemia 4/77/0613    Metabolic acidosis 9/93/7478    [facility]peripheral vascular disease[facility] ([facility])        Current Facility-Administered Medications   Medication Dose Route Frequency Provider Last Rate Last Admin    haloperidol lactate (HALDOL) injection 5 mg  5 mg Intramuscular Once Elian Ray, DO   Stopped at 02/27/21 1827    metoprolol succinate (TOPROL XL) extended release tablet 50 mg  50 mg Oral Daily Laurie Jenkins MD   50 mg at 02/28/21 1113    pantoprazole (PROTONIX) tablet 40 mg  40 mg Oral QAM AC Jhonny Maldonado MD   40 mg at 02/28/21 1113    risperiDONE (RISPERDAL) tablet 0.5 mg  0.5 mg Oral BID Laurie Jenkins MD   0.5 mg at 02/28/21 1113    rivastigmine (EXELON) 4.6 MG/24HR 1 patch  1 patch Transdermal Daily Laurie Jenkins MD   1 patch at 02/28/21 1114    [START ON 3/3/2021] vitamin D (ERGOCALCIFEROL) capsule 50,000 Units  50,000 Units Oral Q14 Days Paddy Nieto MD        sodium chloride flush 0.9 % injection 10 mL  10 mL Intravenous 2 times per day Paddy Nieto MD   10 mL at 02/27/21 2253    sodium chloride flush 0.9 % injection 10 mL  10 mL Intravenous PRN Paddy Nieto MD        enoxaparin (LOVENOX) injection 40 mg  40 mg Subcutaneous Daily Paddy Nieto MD   40 mg at 02/28/21 1114    promethazine (PHENERGAN) tablet 12.5 mg  12.5 mg Oral Q6H PRN Paddy Nieto MD        Or    ondansetron (ZOFRAN) injection 4 mg  4 mg Intravenous Q6H PRN Paddy Nieto MD        acetaminophen (TYLENOL) tablet 650 mg  650 mg Oral Q6H PRN Paddy Nieto MD        Or    acetaminophen (TYLENOL) suppository 650 mg  650 mg Rectal Q6H PRN Paddy Nieto MD        polyethylene glycol (GLYCOLAX) packet 17 g  17 g Oral Daily PRN Paddy Nieto MD        0.9 % sodium chloride infusion   Intravenous Continuous Paddy Nieto MD 75 mL/hr at 02/28/21 1138 New Bag at 02/28/21 1138    ferrous sulfate 300 (60 Fe) MG/5ML syrup 300 mg  300 mg Oral BID Paddy Nieto MD   300 mg at 02/28/21 1113       No Known Allergies    Surgical History  Past Surgical History:   Procedure Laterality Date    ENDOSCOPY, COLON, DIAGNOSTIC  colonoscopy    FRACTURE SURGERY Right 12/7/2013    ORIF RIGHT FEMUR    HYSTERECTOMY  early 80's    TONSILLECTOMY  as a child    UPPER GASTROINTESTINAL ENDOSCOPY N/A 8/25/2020    EGD PEG TUBE INSERTION performed by Claribel Abdi MD at [facility]  8/25/2020    EGD BIOPSY performed by Claribel Abdi MD at [address] [place] [address] as a child        Social History  Social History     Socioeconomic History    Marital status: Single   Tobacco Use    Smoking status: Never Smoker    Smokeless tobacco: Never Used   Substance and Sexual Activity    Alcohol use: No    Drug use: No       Family Medical History  History reviewed.  No pertinent family history. Review of Systems:  Constitutional: No fever, no chills  Eyes: No vision changes, no retroorbital pain  ENT: No hearing changes, no ear pain  Respiratory: No cough, no dyspnea  Cardiovascular: No chest pain, no palpitations  Gastrointestinal: No abdominal pain, no diarrhea  Genitourinary: No dysuria, no hematuria  Integumentary: No rash, no itching  Musculoskeletal: Left arm pain, no joint pain  Neurologic: No headache, no numbness in extremities    Physical Examination:  Vitals:    02/27/21 1829 02/27/21 2100 02/28/21 0044 02/28/21 1109   BP: (!) 142/70 134/60 130/66 121/60   Pulse: 69 86 86 87   Resp: 16 16 16 16   Temp: 97.9 °F (36.6 °C) 97 °F (36.1 °C) 97 °F (36.1 °C) 96.9 °F (36.1 °C)   TempSrc:  Temporal Temporal Temporal   SpO2: 100% 99%  94%   Weight:   169 lb (76.7 kg)    Height:   5' 3\" (1.6 m)      Constitutional: Alert, not in distress  Eyes: Sclerae anicteric, no conjunctival erythema  ENT: No buccal lesion, no pharyngeal exudates  Neck: No nuchal rigidity, no cervical adenopathy  Lungs: Clear breath sounds, no crackles, no wheezes  Heart: Regular rate and rhythm, no murmurs  Abdomen: Bowel sounds present, soft, nontender. PEG tube in place  Skin: Warm and dry, no active dermatoses  Musculoskeletal: No joint erythema, no joint swelling    Labs, imaging, and medical records/notes were personally reviewed. Assessment:  Asymptomatic bacteriuria  Gram-positive cocci in clusters (methicillin-resistant Staphylococcus species not aureus by PCR) on blood cultures, probably contaminant    Plan:  Stop ceftriaxone. Monitor clinically off antibiotics for now. Repeat blood cultures today. Follow up blood cultures from 02/27. Thank you for involving me in the care of Maranda Pluido. I will continue to follow. Please do not hesitate to call for any questions or concerns.     Electronically signed by Omi Maguire MD on 2/28/2021 at 1:12 PM [phone]

## 2021-03-13 NOTE — PROGRESS NOTE ADULT - PROBLEM SELECTOR PLAN 5
Patient s/p PPM. HR controlled atrial pacing  -c/w metoprolol + amiodarone 200mg QD  -Eliquis restarted - - -

## 2021-03-23 NOTE — PROGRESS NOTE ADULT - PROBLEM/PLAN-7
DISPLAY PLAN FREE TEXT
Detail Level: Detailed
Detail Level: Simple

## 2021-05-03 NOTE — PROGRESS NOTE ADULT - PROBLEM SELECTOR PLAN 4
s/p PPM, stable  -c/w home Amiodarone 200mg QD   -c/w Eliquis (2.5mg since patient age >80yrs and weight <60km)  -per Cardio-- increase BB as tolerated 9

## 2021-06-09 NOTE — PHYSICAL THERAPY INITIAL EVALUATION ADULT - NAME OF CLINICIAN
Shade RN

## 2021-10-28 NOTE — PHYSICAL THERAPY INITIAL EVALUATION ADULT - SOCIAL CONCERNS
Personalized Preventive Plan for [place] - 10/28/2021  Medicare offers a range of preventive health benefits. Some of the tests and screenings are paid in full while other may be subject to a deductible, co-insurance, and/or copay. Some of these benefits include a comprehensive review of your medical history including lifestyle, illnesses that may run in your family, and various assessments and screenings as appropriate. After reviewing your medical record and screening and assessments performed today your provider may have ordered immunizations, labs, imaging, and/or referrals for you. A list of these orders (if applicable) as well as your Preventive Care list are included within your After Visit Summary for your review. Other Preventive Recommendations:    · A preventive eye exam performed by an eye specialist is recommended every 1-2 years to screen for glaucoma; cataracts, macular degeneration, and other eye disorders. · A preventive dental visit is recommended every 6 months. · Try to get at least 150 minutes of exercise per week or 10,000 steps per day on a pedometer . · Order or download the FREE \"Exercise & Physical Activity: Your Everyday Guide\" from [employer]. Call 2-348.789.1791 or search [employer] online. · You need 4412-6118 mg of calcium and 3127-7876 IU of vitamin D per day. It is possible to meet your calcium requirement with diet alone, but a vitamin D supplement is usually necessary to meet this goal.  · When exposed to the sun, use a sunscreen that protects against both UVA and UVB radiation with an SPF of 30 or greater. Reapply every 2 to 3 hours or after sweating, drying off with a towel, or swimming. · Always wear a seat belt when traveling in a car. Always wear a helmet when riding a bicycle or motorcycle.
None

## 2021-11-18 NOTE — PROGRESS NOTE ADULT - MS EXT PE MLT D E PC
no pedal edema/normal
Aspirin Enteric Coated 81 mg oral delayed release tablet: 1 tab(s) orally once a day  atorvastatin 40 mg oral tablet: 1 tab(s) orally once a day  Lexapro 10 mg oral tablet: 1 tab(s) orally once a day    NOTE: PATIENT TAKES HALF A TAB OF 20MG.  meclizine 25 mg oral tablet: 1 tab(s) orally 3 times a day, As Needed  ocular lubricant ophthalmic solution: 1 drop(s) to each affected eye 3 times a day  Xcopri 150 mg oral tablet: 1 tab(s) orally once a day  NOTE: UNABLE TO VERIFY PT GETS FROM COMPANY.,

## 2022-03-25 NOTE — PROGRESS NOTE ADULT - PROBLEM SELECTOR PLAN 8
likely 2/2 hypercalcemia + decreased PO intake  -c/w bowel regimen Attending Attestation (For Attendings USE Only)...

## 2022-06-23 NOTE — ED PROVIDER NOTE - CROS ED CONS ALL NEG
- - - Render Post-Care Instructions In Note?: no Show Applicator Variable?: Yes Consent: The patient's consent was obtained including but not limited to risks of pain, swelling, and crusting. Detail Level: Detailed Number Of Freeze-Thaw Cycles: 2 freeze-thaw cycles Post-Care Instructions: Pt advised to call if not healed with normal skin in 1 month. Duration Of Freeze Thaw-Cycle (Seconds): 0

## 2022-08-02 NOTE — ED PROVIDER NOTE - CROS ED CONS ALL NEG
Discussed results with patient. Patient states understanding. They had no futher questions at this time.    PCP please clarify medication order and send to [facility] on [address]. Writer does not see Fluconazole 800 mg.      - - -

## 2022-08-19 NOTE — PROGRESS NOTE ADULT - PSYCHIATRIC DETAILS
anxious
flat affect/depressed
anxious
depressed/flat affect
depressed/anxious
anxious
flat affect
none
normal affect

## 2022-11-26 NOTE — DIETITIAN INITIAL EVALUATION ADULT. - PROBLEM/PLAN-7
[FreeTextEntry1] : Referring Physician: Gus Green MD\par  \par Dear Dr. Green:\par  \par Mr. Call was seen in the [facility] today. For our records, please allow me to summarize the history and my findings.\par  \par This pleasant 63 year old man has a cardiovascular history significant for Stage C/Class II nonischemic HFrEF (presumed to be ETOH), nonobstructive CAD, and long standing persistent AF. He presents to [facility] with acute decompensated HF. He has been first diagnosed about 1 year prior in [place], and was noted to be in AF at the time. He was started on medical therapy, but he discontinued it about 1 month later. He had been ok until his admission back to [facility] on 10/22. He was diuresed at the time, and restarted on medical therapy. He was discharged home, and has been complaint with his medications. He is complaint with his Eliquis. \par  \par Mr. Call denies any recent history of chest pain, shortness of breath, palpitations, dizziness, or syncope.\par 
DISPLAY PLAN FREE TEXT

## 2022-12-29 NOTE — PROGRESS NOTE ADULT - SUBJECTIVE AND OBJECTIVE BOX
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 Patients family at bedside states she's been more lethargic with associated lower extremity swelling, SOB, decreased appetite and diarrhea. Patient with previous admissions for symptomatic hyperglycemia in January. Patient currently seeing Dr. Galaviz on a regular basis for chemotherapy treatment. Last treatment on May 23rd for which she received Bendamustine and Rituximab. Patient denies any headaches, chest pain, abdominal pain, N/V, dysuria, or bowel changes.     Upon arrival to the ED, patient in moderate respiratory distress using accessory muscles, breathing w/ a RR of 30, O2 93% on room air. Patient placed on BIPAP with slight improvement in respiratory status. Patient febrile to 101 with a HR of 77. Labs significant for AG metabolic acidosis, K 7.0, Na 127, BUN 58, Cr. 2.0 (baseline < 0.9 January), Lactate 2.8. CXR done showing RLL infiltrate with pulmonary vascular congestion. In the ED, patient received Vancomycin, Zosyn, Tylenol, Calcium Gluconate 2g, Insulin 5u, Albuterol neb 5mg x 2. ICU consulted, will be transferred to MICU for management of severe sepsis 2/2 HAP and pulmonary congestion. (10 Ramon 2017 19:13)    FAMILY HISTORY:  No pertinent family history in first degree relatives    MEDICATIONS  (STANDING):  amiodarone    Tablet 200milliGRAM(s) Oral daily  piperacillin/tazobactam IVPB. 2.25Gram(s) IV Intermittent every 6 hours  insulin lispro (HumaLOG) corrective regimen sliding scale  SubCutaneous Before meals and at bedtime  dextrose 5%. 1000milliLiter(s) IV Continuous <Continuous>  dextrose 50% Injectable 12.5Gram(s) IV Push once  dextrose 50% Injectable 25Gram(s) IV Push once  dextrose 50% Injectable 25Gram(s) IV Push once  heparin  Infusion 600Unit(s)/Hr IV Continuous <Continuous>  atorvastatin 40milliGRAM(s) Oral at bedtime  bisacodyl Suppository 10milliGRAM(s) Rectal daily    MEDICATIONS  (PRN):  dextrose Gel 1Dose(s) Oral once PRN Blood Glucose LESS THAN 70 milliGRAM(s)/deciliter  glucagon  Injectable 1milliGRAM(s) IntraMuscular once PRN Glucose LESS THAN 70 milligrams/deciliter  acetaminophen  Suppository 650milliGRAM(s) Rectal every 6 hours PRN For Temp greater than 38 C (100.4 F)    Vital Signs Last 24 Hrs  T(C): 39.6, Max: 39.6 (06-12 @ 10:00)  T(F): 103.2, Max: 103.2 (06-12 @ 10:00)  HR: 82 (74 - 121)  BP: 119/46 (79/48 - 149/78)  BP(mean): 72 (54 - 102)  RR: 24 (15 - 33)  SpO2: 99% (91% - 100%)    Physical exam:    Overall impression  Lymphadenopathy  Liver  spleen    Labs:  CBC Full  -  ( 12 Jun 2017 05:39 )  WBC Count : 6.5 K/uL  Hemoglobin : 9.2 g/dL  Hematocrit : 26.4 %  Platelet Count - Automated : 71 K/uL  Mean Cell Volume : 87.4 fL  Mean Cell Hemoglobin : 30.5 pg  Mean Cell Hemoglobin Concentration : 34.8 g/dL  Auto Neutrophil # : x  Auto Lymphocyte # : x  Auto Monocyte # : x  Auto Eosinophil # : x  Auto Basophil # : x  Auto Neutrophil % : x  Auto Lymphocyte % : x  Auto Monocyte % : x  Auto Eosinophil % : x  Auto Basophil % : x    06-12    135  |  99  |  44<H>  ----------------------------<  82  4.3   |  21<L>  |  1.70<H>    Ca    9.7      12 Jun 2017 05:41  Phos  5.0     06-12  Mg     1.8     06-12    TPro  5.1<L>  /  Alb  2.4<L>  /  TBili  0.4  /  DBili  x   /  AST  27  /  ALT  23  /  AlkPhos  194<H>  06-10      Radiology:  HEALTH ISSUES - R/O PROBLEM Dx:      Assessmant / Problems  1) 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

## 2023-01-25 NOTE — PROGRESS NOTE ADULT - PROBLEM SELECTOR PLAN 6
Next appointment 3/10 Known atrial fibrillation on apixaban 2.5mg BID at home. CHADSVASC of 7  - c/w home Eliquis  - continue amiodarone 200mg qd

## 2023-10-23 NOTE — PROGRESS NOTE ADULT - SUBJECTIVE AND OBJECTIVE BOX
Patient received the HD flu and Prevnar 20 vaccines in the left deltoid. Pt tolerated well. Pt asked to wait in the clinic 15 minutes after injection in the event of an allergic reaction. Pt verbalized understanding. Pt left in NAD.       Interventional Cardiology PA Step Over Acceptance Note    Patient is an 84yo F with PMHx of MGUS, HFpEF, CAD s/p CABG (1994 @ [facility]), HTN, HLD, severe MR/TR, a-fib s/p PPM on Eliquis, DM2, right-sided breast CA (s/p mastectomy/chemotx/radiation 1997) who was recently admitted 12/29/16 to 1/4/17 for hypercalcemia treated with IV fluids/Lasix/bisphosphonates, and presents again today 1/10/17 for hypercalcemia on outpatient labs. She had an outpatient Ca level of 13 at Dr. Hartmann’s office yesterday. Since discharge last week, she reports generalized weakness and difficulty walking due to lack of balance which worsened gradually over the past week. She also experienced a fall (says she lost her balance) on outstretched right arm 4 days ago, with no LOC/head impact. Since then she complains of right shoulder pain when moving her arm but denies pain at rest. Also endorses chronic constipation and lack of appetite, but denies abdominal/kidney pain, nausea, vomiting, fever/chills, chest pain/sob, recent change in meds, dysuria, headache, or vision change. On last admission, she had excisional biopsy of lymph nodes (with ENT) with prelim reports concerning for lymphoma but final diagnosis pending.  Initial vitals in ED: 98.1F, HR 98, 133/52, RR 16, 98% O2 on RA.   ED administration: 250cc bolus of NS followed by NS at 80cc/hr and Lasix 20mg IV x 1.  	  MEDICATIONS:  furosemide   IVPB 20milliGRAM(s) IV Intermittent once  furosemide   Injectable 60milliGRAM(s) IV Push two times a day  insulin lispro (HumaLOG) corrective regimen sliding scale  SubCutaneous Before meals and at bedtime  atorvastatin 80milliGRAM(s) Oral at bedtime  allopurinol 300milliGRAM(s) Oral daily  apixaban 2.5milliGRAM(s) Oral two times a day  aspirin enteric coated 81milliGRAM(s) Oral daily  sodium chloride 0.45%. 1000milliLiter(s) IV Continuous <Continuous>    [PHYSICAL EXAM:  TELEMETRY:  T(C): 36.5, Max: 37.2 (01-10 @ 12:25)  HR: 85 (77 - 98)  BP: 132/57 (120/57 - 133/52)  RR: 16 (16 - 16)  SpO2: 94% (92% - 98%)  Wt(kg): --  I&O's Summary    Height (cm): 152.4 (01-10 @ 09:58)  Weight (kg): 43.1 (01-10 @ 09:58)  BMI (kg/m2): 18.6 (01-10 @ 09:58)  BSA (m2): 1.36 (01-10 @ 09:58)  Dennis:  Central/PICC/Mid Line:                                         Appearance: Normal	  HEENT:   Normal oral mucosa, PERRL, EOMI	  Neck: Supple, + JVD; No Carotid Bruit   Cardiovascular: Normal S1 S2, No JVD, No murmurs,   Respiratory: Crackles in middle and lower lobes b/l  Gastrointestinal:  Soft, Non-tender, + BS	  Skin: No rashes, No ecchymoses, No cyanosis  Extremities: Normal range of motion, No clubbing, cyanosis or edema  Vascular: Peripheral pulses palpable 2+ bilaterally  Neurologic: Non-focal  Psychiatry: A & O x 3, Mood & affect appropriate                          10.4   3.1   )-----------( 158      ( 10 Ronny 2017 10:26 )             31.9     10 Ronny 2017 10:26    137    |  100    |  28     ----------------------------<  91     3.5     |  27     |  1.09     Ca    12.1       10 Ronny 2017 10:26  Phos  2.9       10 Ronny 2017 10:26  Mg     1.5       10 Ronny 2017 10:26    TPro  6.4    /  Alb  2.7    /  TBili  0.5    /  DBili  x      /  AST  93     /  ALT  63     /  AlkPhos  96     10 Ronny 2017 10:26    PT/INR - ( 10 Ronny 2017 10:26 )   PT: 13.8 sec;   INR: 1.24          PTT - ( 10 Ronny 2017 10:26 )  PTT:26.6 sec

## 2024-01-26 NOTE — PATIENT PROFILE ADULT. - SOCIAL CONCERNS
Problem: Occupational Therapy  Goal: Occupational Therapy Goal  Description: Pt will perform LB dressing min assist by d/c.  Pt will perform toileting SBA by d/c.  Pt will perform toilet t/f SBA by d/c.  Pt will perform shower t/f contact guard assist by d/c.  Pt will perform car t/f stand by assist in adherence to pxns by d/c.   Outcome: Ongoing, Progressing      None

## 2024-05-23 NOTE — H&P ADULT. - MOUTH
Initial Clinical Review    TRANSFER FROM [facility]    Admission: Date/Time/Statement:   Admission Orders (From admission, onward)       Ordered        05/22/24 0124  Inpatient Admission  Once                          Orders Placed This Encounter   Procedures    Inpatient Admission     Standing Status:   Standing     Number of Occurrences:   1     Order Specific Question:   Level of Care     Answer:   Critical Care [15]     Order Specific Question:   Estimated length of stay     Answer:   More than 2 Midnights     Order Specific Question:   Certification     Answer:   I certify that inpatient services are medically necessary for this patient for a duration of greater than two midnights. See H&P and MD Progress Notes for additional information about the patient's course of treatment.         Initial Presentation: 40 y.o. male   initially  presented to ED at  [facility] after waking  from a  nap with R facial droop, dysarthria and weakness  in RLE  and  RUE.  Imaging at [facility]  unremarkable.  Administered  TNK  at [facility] and transferred to [place] for further care.  PMH  is  nicotine/alcohol abuse, chronic  tremors and cervical dystonia.  Admit  Ip ICU LOC  with  Stroke like symptoms and plan is  neuro checks, PT/OT/spech eval, tele, fall precautions, neuro consult,  2 DE, MRI brain and continue home meds.     Neuro consult  Wait  MRI  brain.   Multiple vascular risk factors.  Needs  PT/OT/speech.  Possible acute stroke  vs  migraines.    Per most recent Neurology note from January 2024, patient's left DBS lead is not MRI compatible due to impedance.  No acute stroke noted on repeat head CT; however, small stroke may not be apparent on CT and there is also significant artifact from his DBS leads which could obscure ischemic change.  Will treat as TIA/stroke.    Date:   5/23   Day 2:   Remains on tele.  Continue  PT/OT/neuro checks.   Needs  stroke teaching.  Plan is to F/U  at  [place],  his regular   neurologist.  Speech  now at  baseline.  Feels  strength returned.  Continue current meds.          Wt Readings from Last 1 Encounters:   05/23/24 85.9 kg (189 lb 6 oz)     Additional Vital Signs:   5/22/24 1230 98.7 °F (37.1 °C) 84 24 Abnormal  130/83 96 96 % -- --   05/22/24 1200 -- 90 16 125/86 101 95 % -- --   05/22/24 1130 -- 80 24 Abnormal  135/81 98 97 % -- --   05/22/24 1100 -- 88 26 Abnormal  137/86 102 97 % -- --   05/22/24 1030 -- 76 -- 137/89 -- -- -- --   05/22/24 1000 -- 82 24 Abnormal  131/81 100 96 % -- --   05/22/24 0945 -- 78 22 145/81 102 96 % -- --   05/22/24 0930 -- 74 30 Abnormal  123/76 92 96 % -- --   05/22/24 0900 -- 74 20 115/72 84 95 % -- --   05/22/24 0830 99 °F (37.2 °C) 74 18 115/69 82 96 % -- --   05/22/24 0800 -- 74 25 Abnormal  114/70 94 96 % -- --   05/22/24 0730 -- 76 20 112/67 81 96 % None (Room air) Lying   05/22/24 0707 -- 77 22 134/81 85 -- -- --   05/22/24 0700 -- 72 22 114/69 80 -- -- --   05/22/24 0630 -- 74 16 116/71 86 96 % -- --   05/22/24 0600 -- 76 20 124/65 80 96 % -- --   05/22/24 0549 -- 76 22 114/70 80 95 % -- --   05/22/24 0530 -- 74 22 107/65 79 96 % -- --   05/22/24 0500 -- 76 16 117/71 88 96 % -- --   05/22/24 0430 -- 76 16 125/71 89 96 % -- --   05/22/24 0400 -- 84 24 Abnormal  109/60 81 97 % -- --   05/22/24 0330 -- 74 23 Abnormal  102/63 78 95 % -- --   05/22/24 0300 98.1 °F (36.7 °C) 78 23 Abnormal  106/66 79 96 % -- --   05/22/24 0231 -- -- -- -- -- 94 % None (Room air) --   05/22/24 0230 -- 72 22 111/68 83 95 % -- --   05/22/24 0200 -- 86 25 Abnormal  112/64 81 97 % -- --   05/22/24 0130 -- 74 33 Abnormal  104/63 77 95 % -- --   05/22/24 0100 -- 74 24 Abnormal  110/67 80 -- -- --   05/22/24 0030 -- 82 29 Abnormal  115/76 93 97 % None (Room air) Lying     Pertinent Labs/Diagnostic Test Results:   EKG  NSR  CT head wo contrast   Final Result by Moo Gordon DO (05/22 2141)      No acute intracranial abnormality.                  Workstation performed:  [id number]         MRI Inpatient Order    (Results Pending)     Results from last 7 days   Lab Units 05/21/24 2137   SARS-COV-2  Negative     Results from last 7 days   Lab Units 05/23/24 0519 05/22/24 0449 05/21/24 1952   WBC Thousand/uL 7.62 8.17 7.69   HEMOGLOBIN g/dL 14.5 14.7 15.6   HEMATOCRIT % 42.7 43.8 47.0   PLATELETS Thousands/uL 218 249 259   TOTAL NEUT ABS Thousands/µL  --  4.92  --          Results from last 7 days   Lab Units 05/23/24 0519 05/22/24 0449 05/21/24 1952   SODIUM mmol/L 139 141 136   POTASSIUM mmol/L 3.9 4.3 3.9   CHLORIDE mmol/L 104 105 99   CO2 mmol/L 28 25 28   ANION GAP mmol/L 7 11 9   BUN mg/dL 10 6 6   CREATININE mg/dL 0.79 0.71 0.84   EGFR ml/min/1.73sq m 112 117 109   CALCIUM mg/dL 9.1 8.8 9.2   CALCIUM, IONIZED mmol/L  --  1.25  --    MAGNESIUM mg/dL  --  2.2  --    PHOSPHORUS mg/dL  --  4.1  --      Results from last 7 days   Lab Units 05/22/24 0449   AST U/L 15   ALT U/L 25   ALK PHOS U/L 71   TOTAL PROTEIN g/dL 6.7   ALBUMIN g/dL 4.2   TOTAL BILIRUBIN mg/dL 0.43     Results from last 7 days   Lab Units 05/21/24 1949   POC GLUCOSE mg/dl 111     Results from last 7 days   Lab Units 05/23/24 0519 05/22/24 0449 05/21/24 1952   GLUCOSE RANDOM mg/dL 95 78 91         Results from last 7 days   Lab Units 05/22/24 0449   HEMOGLOBIN A1C % 5.1   EAG mg/dl 100       Results from last 7 days   Lab Units 05/21/24 2231 05/21/24 1952   HS TNI 0HR ng/L  --  <2   HS TNI 2HR ng/L <2  --          Results from last 7 days   Lab Units 05/21/24 1952   PROTIME seconds 12.7   INR  0.96   PTT seconds 30     Results from last 7 days   Lab Units 05/22/24 0449   TSH 3RD GENERATON uIU/mL 0.965               Results from last 7 days   Lab Units 05/21/24  2137   INFLUENZA A PCR  Negative   INFLUENZA B PCR  Negative   RSV PCR  Negative         Results from last 7 days   Lab Units 05/23/24  0553   AMPH/METH  Negative   BARBITURATE UR  Positive*   BENZODIAZEPINE UR  Negative   COCAINE UR   Negative   METHADONE URINE  Negative   OPIATE UR  Negative   PCP UR  Negative   THC UR  Negative     Results from last 7 days   Lab Units 05/22/24  1246   ETHANOL LVL mg/dL <10                   Present on Admission:   Stroke-like symptoms   Tobacco user   Cervical dystonia   Tremor   Generalized anxiety disorder      Admitting Diagnosis: Stroke-like symptoms [R29.90]  Age/Sex: 40 y.o. male  Admission Orders:  Scheduled Medications:  atorvastatin, 40 mg, Oral, QPM  escitalopram, 5 mg, Oral, Daily  folic acid, 1 mg, Oral, Daily  multivitamin-minerals, 1 tablet, Oral, Daily  primidone, 200 mg, Oral, Daily  propranolol, 120 mg, Oral, Daily  thiamine, 100 mg, Oral, Daily      Continuous IV Infusions:     PRN Meds:  acetaminophen, 975 mg, Oral, Q6H PRN  calcium carbonate, 500 mg, Oral, TID PRN  hydrALAZINE, 5 mg, Intravenous, Q6H PRN  polyethylene glycol, 17 g, Oral, Daily PRN    24 hr tele  Fall precautions  CIWA  scores  Neuro checks    Dysphagia eval    IP CONSULT TO CASE MANAGEMENT  IP CONSULT TO PHYSICAL MEDICINE REHAB  IP CONSULT TO NEUROLOGY  IP CONSULT TO CASE MANAGEMENT  IP CONSULT TO NUTRITION SERVICES    Network Utilization Review Department  ATTENTION: Please call with any questions or concerns to [phone] and carefully listen to the prompts so that you are directed to the right person. All voicemails are confidential.   For Discharge needs, contact Care Management DC Support Team at [phone] opt. [phone]  Send all requests for admission clinical reviews, approved or denied determinations and any other requests to dedicated fax number below belonging to the campus where the patient is receiving treatment. List of dedicated fax numbers for the Facilities:  FACILITY NAME UR FAX NUMBER   ADMISSION DENIALS (Administrative/Medical Necessity) [phone]   DISCHARGE SUPPORT TEAM (NETWORK) [phone]   PARENT CHILD HEALTH (Maternity/NICU/Pediatrics) [phone]   [facility]  [phone]   [facility] [phone]   [facility] [phone]   [facility] [phone]   [facility] [phone]   [facility] [phone]   [facility] [phone]   [facility] [phone]   [facility] [phone]   [facility] [phone]   [facility] [phone]   [facility] [phone]         moist

## 2024-09-19 NOTE — PROGRESS NOTE ADULT - PROBLEM SELECTOR PLAN 4
[facility]   Faxed received from [facility] with [employer].      Claim number: [id number]  Policy number: [id number]      [facility] is requesting records from 2/7/2023.   Faxed:    Cardiology office visit progress note, BMP, EKG and CXR to Attn: Ortiz at [phone].             Future Appointments   Date Time Provider Department Center   9/30/2024  1:30 PM Alex Dawson MD [facility]   12/3/2024  1:30 PM Tyler Castro MD [facility]         SCOTT Tavarez, RN 10:15 AM 09/19/24     - recommend PT evaluation

## 2024-09-24 NOTE — CONSULT NOTE ADULT - PROVIDER SPECIALTY LIST ADULT
ENT [General Appearance - Alert] : alert [General Appearance - In No Acute Distress] : in no acute distress [Neck Appearance] : the appearance of the neck was normal [Neck Cervical Mass (___cm)] : no neck mass was observed [Thyroid Diffuse Enlargement] : the thyroid was not enlarged [Jugular Venous Distention Increased] : there was no jugular-venous distention [Thyroid Nodule] : there were no palpable thyroid nodules [Auscultation Breath Sounds / Voice Sounds] : lungs were clear to auscultation bilaterally [Heart Rate And Rhythm] : heart rate was normal and rhythm regular [Heart Sounds] : normal S1 and S2 [Heart Sounds Gallop] : no gallops [Murmurs] : no murmurs [Heart Sounds Pericardial Friction Rub] : no pericardial rub [Full Pulse] : the pedal pulses are present [Edema] : there was no peripheral edema [Bowel Sounds] : normal bowel sounds [Abdomen Soft] : soft [Abdomen Tenderness] : non-tender [Abdomen Mass (___ Cm)] : no abdominal mass palpated [Cervical Lymph Nodes Enlarged Posterior Bilaterally] : posterior cervical [Cervical Lymph Nodes Enlarged Anterior Bilaterally] : anterior cervical [Supraclavicular Lymph Nodes Enlarged Bilaterally] : supraclavicular [No CVA Tenderness] : no ~M costovertebral angle tenderness [Abnormal Walk] : normal gait [Nail Clubbing] : no clubbing  or cyanosis of the fingernails [Musculoskeletal - Swelling] : no joint swelling seen [Motor Tone] : muscle strength and tone were normal [Skin Color & Pigmentation] : normal skin color and pigmentation [Skin Turgor] : normal skin turgor [] : no rash [Oriented To Time, Place, And Person] : oriented to person, place, and time [Impaired Insight] : insight and judgment were intact [Affect] : the affect was normal [FreeTextEntry1] : decreased ROM over the shoulders - right worse than left, bilateral knee with crepitus and bilateral hand pain over the wrists and MCP

## 2024-10-02 NOTE — PROGRESS NOTE ADULT - PROBLEM SELECTOR PROBLEM 5
Problem: Adult Inpatient Plan of Care  Goal: Plan of Care Review  Outcome: Progressing  Goal: Patient-Specific Goal (Individualized)  Outcome: Progressing  Goal: Absence of Hospital-Acquired Illness or Injury  Outcome: Progressing  Goal: Optimal Comfort and Wellbeing  Outcome: Progressing  Goal: Readiness for Transition of Care  Outcome: Progressing     Problem: Stroke, Subarachnoid Hemorrhage  Goal: Optimal Coping  Outcome: Progressing  Goal: Effective Bowel Elimination  Outcome: Progressing  Goal: Optimal Cerebral Tissue Perfusion  Outcome: Progressing  Goal: Optimal Cognitive Function  Outcome: Progressing  Goal: Effective Communication Skills  Outcome: Progressing  Goal: Optimal Functional Ability  Outcome: Progressing  Goal: Optimal Nutrition Intake  Outcome: Progressing  Goal: Optimal Pain Control and Function  Outcome: Progressing  Goal: Effective Oxygenation and Ventilation  Outcome: Progressing  Goal: Improved Sensorimotor Function  Outcome: Progressing  Goal: Safe and Effective Swallow  Outcome: Progressing  Goal: Effective Urinary Elimination  Outcome: Progressing     Problem: Skin Injury Risk Increased  Goal: Skin Health and Integrity  Outcome: Progressing     Problem: Wound  Goal: Optimal Coping  Outcome: Progressing  Goal: Optimal Functional Ability  Outcome: Progressing  Goal: Absence of Infection Signs and Symptoms  Outcome: Progressing  Goal: Improved Oral Intake  Outcome: Progressing  Goal: Optimal Pain Control and Function  Outcome: Progressing  Goal: Skin Health and Integrity  Outcome: Progressing  Goal: Optimal Wound Healing  Outcome: Progressing     Problem: Infection  Goal: Absence of Infection Signs and Symptoms  Outcome: Progressing     Problem: Fall Injury Risk  Goal: Absence of Fall and Fall-Related Injury  Outcome: Progressing      Coronary artery disease involving native coronary artery of native heart without angina pectoris

## 2024-10-22 NOTE — PHYSICAL THERAPY INITIAL EVALUATION ADULT - BALANCE DISTURBANCE, SYSTEM IMPAIRMENT CONTRIBUTE, REHAB EVAL
[Disease: _____________________] : Disease: [unfilled] [T: ___] : T[unfilled] [N: ___] : N[unfilled] [M: ___] : M[unfilled] [AJCC Stage: ____] : AJCC Stage: [unfilled] [3] : 3, Severe [D] : probable [FreeTextEntry2] : Started oral  prednisone  10/1/2024 [90: Able to carry normal activity; minor signs or symptoms of disease.] : 90: Able to carry normal activity; minor signs or symptoms of disease.  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  Daughter: Mildred Fairchild   Onc hx: 12/2022: started having chest congestion, went to her PCP, who ordered CXR which showed a lung mass. She then had CT chest which showed a LLL mass, and a PET scan which showed only LLL mass. CT chest (Nov 2022): Mixed density consolidation in the left lower lobe is again seen.  There is mild interval progression of the peribronchial changes extending to the posterior medial lung base.  The more solid component seen anteriorly, bulging the major fissure is more significantly progressed measuring up to 5.4 x 3.1 cm in axial cross-section, previously 2.7 x 1.4 cm. PET (Dec 2022): Mild heterogeneous hypermetabolic activity in large parenchymal consolidation associated with volume loss and bronchiectasis in the left lower lobe of the lung.  The most intense activity is in the periphery of this consolidation.  This area of slightly more intense hypermetabolic activity may represent active infection/inflammation superimposed upon chronic lung disease.  Status post thyroidectomy.  Diverticular disease.  Fibroid uterus. 2/14/2022: Was sent to Dr. Acosta by Dr. Dc 4/21/2023: Final Diagnosis Left lower lobe, lung biopsy: -   Bronchial wall and adjoining parenchyma with atypical micropapillary cell clusters, most consistent with mucinous adenocarcinoma (serial sections examined). Note: The micropapillary clusters are composed of eosinophilic columnar cells, some with intracytoplasmic mucin vacuoles. On IHC they stain strongly for CK7, weakly for GATA3, and are negative for ER/DE, PAX8, TTF1, Napsin, p40, CD68 and CD31. Overall findings are compatible with mucinous adenocarcinoma of lung. Upper GI/pancreaticobiliary cancer, is also in the differential. PD-L1 3%  5/2/23 CT CAP 1. Since 12/9/2022, no significant change in persistent consolidation in left lower lobe, consistent with the reported mucinous adenocarcinoma. No lymphadenopathy or metastatic disease. 2. A 2.0 cm left renal mass is suspicious for renal cell carcinoma, probably clear cell type. 3. There are a few small hypodensities in the pancreatic head, some of which may be cystic. MRI of pancreas recommended for further evaluation.  5/3/23 MR BRAIN No evidence of intracranial metastatic disease.  Chronic small vessel ischemic findings, as above.  5/4/2023: TTB - surgery upfront given node negativity  5/23/2023: Pathology: Final Diagnosis  1.  Left level 9 lymph node, excision: -   Negative for tumor, 2 lymph nodes  (0/2)  2.  Level 8 lymph node, excision: -   Negative for tumor, 4 lymph nodes  (0/4)  3.  Level 7 lymph node, excision: -   Negative for tumor, 2 lymph nodes  (0/2)  4.  Left level 11 lymph nodes, excision: -   Negative for tumor, 1 lymph node  (0/1)  5.  Left level 12 lymph node, excision: -   Negative for tumor, 1 lymph node  (0/1)  6.  Left level 11 lymph nodes #2, excision: -   Negative for tumor, 1 lymph nodes  (0/1)  7.  Level 5 lymph nodes, excision: -   Negative for tumor, 2 lymph nodes  (0/2)  8.  Left lower lobe of lung, lobectomy: -   Mucinous adenocarcinoma, multifocal, less than 9 cm, invasive acinar and lipidic -   Negative for pleural invasion -   No evidence of lymphovascular invasion -   Bronchial and vascular margins negative for tumor -   Seven lymph nodes, negative for tumor  (0/7) -   Desquamative interstitial pneumonitis, prominent -   Additional findings: bronchiolization of airspaces, multifocal, minute focus of pneumonia  Comment: The tumor is multifocal within the mass with multiple separate foci of invasion, the two largest measuring 1 cm each (8I, 8K).  The mass grossly measures 9 cm, however, a few sections described as mass are negative for tumor, showing only desquamative interstitial pneumonitis.  Verified by: Sissy Bae M.D (Electronic Signature) Reported on: 05/30/23 09:34 EDT, [facility], [address] [place] [address], [place] [address] Phone: [phone]   Fax: [phone] _________________________________________________________________  Synoptic Summary 8: Lung - Resection Specimen Procedure:  Lobectomy Specimen Laterality:   Left Tumor Tumor Focality:   Multifocal tumor nodules of similar histology  type not considered intrapulmonary metastases or too numerous for separate synoptic reports Tumor Site:  Lower lobe of lung Tumor Size Total Tumor Size:   9 Centimeters (cm) Histologic Type:   Invasive mucinous adenocarcinoma Visceral Pleura Invasion:   Not identified Direct Invasion of Adjacent Structures:    Not applicable (no adjacent structures present) Treatment Effect:   No known presurgical therapy Lymphovascular Invasion:   Not identified Margins Margin Status for Invasive Carcinoma:    All margins negative for invasive carcinoma Closest Margin(s) to Invasive Carcinoma:    Bronchial - 0.5; Vascular - 0.5 Distance from Invasive Carcinoma to Closest Margin:    At least 0.5 cm Margin Status for Non-Invasive Tumor:    All margins negative for non-invasive tumor Regional Lymph Nodes Lymph Node(s) from Prior Procedures:    No known prior lymph node sampling performed Regional Lymph Node Status:   All regional lymph nodes negative for tumor Number of Lymph Nodes Examined:   20 Sujey Site(s) Examined:   7: Subcarinal;  5: Subaortic / aortopulmonary (AP) / AP window;  8L: Para-esophageal;  9L: Pulmonary ligament;  10L: Hilar;  11L: Interlobar;  12L: Lobar Pathologic Stage Classification (pTNM, AJCC 8th Edition) pT Category:   pT4 pN Category:   pN0 10/16/23: CT Chest: Since May 3, 2023, no recurrence.  12/29/23: MRI Lumbar:  1.  No evidence of osseous metastasis. 2.  L3-4 severe spinal canal stenosis with effacement of the thecal sac and severe bilateral neural foraminal narrowing. There is mass effect on the exiting and descending nerve roots at this level. 3.  L5-S1 disc herniation contacts the descending right S1 nerve root.  12/29/23: MRI abdomen:  1.  As noted on previous studies enhancing mass upper pole left kidney; stable when compared to the most recent study from 9/20/2023, however, increased when compared to CT from 5/3/2022. It is suspicious for neoplasm. 2.  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 Recommend continued annual follow-up MRI for these lesions.  3/15/24: CT Chest:  1. Since 10/16/2023, there is no evidence of recurrent or metastatic lung cancer in the chest. 2. 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  6/17/2024: CT chest: 1. Post left lower lobe lobectomy. No evidence of local disease recurrence or metastasis within the field-of-view. 2. 2.4 cm stable exophytic heterogeneously enhancing renal mass. Renal neoplasm cannot be excluded and correlation with prior MRI suggested.  10/17/2024: CT chest: 1. Left lower lobectomy. 3 mm groundglass micronodule at the posterior, peripheral left midlung, of questionable clinical significance. 2. 2.7 cm enhancing exophytic mass at the upper left kidney, possibly malignancy. Correlate with prior MRI findings.  [de-identified] : Mucinous adenocarcinoma - PD-L1 3%, GNAS R201C, KRAS G12V, VUS in MARIO [de-identified] : Pulm:  CTSx:   [FreeTextEntry1] : 7/7/2023: C1 carboplatin (pemetrexed held due to worsening Cr and GFR<45) 7/28/2023: C2D1 carboplatin/gem 8/04/2023  C2D8 [place] 8/25/2023: C3D1 carboplatin/[place] 9/1/2023:   C3D8 [place]  9/15/2023  C4D1  carbo/gem  9/21/23   C4D8 gem hold, neutropenic  9/28/23   C4D8 [place] 11/3/2023: C1 pembrolizumab 12/8/2023: C2 pembrolizumab  1/18/2024  C3 [place] 2/8/2024    C4 [place]bro  3/1/24.  C5 [place] 3/22/2024: C6 pembrolizumab 4/12/2024: C7 pembrolizumab 5/3/2024:  C8 [place]  5/24/24    C9  pembro  6/14/24    C10  [place]  7/2/24      C11 [place]  7/26/24    C12 pembrolizumab 8/23/24    C13 pushed a week back due to travel  9/13/2024: C14 pembrolizumab [de-identified] : The patient saw a rheumatology recently for her arthritis and was started on a high-dose steroid taper.  She is currently taking prednisone 20 mg, and is supposed to decrease it to 10 mg as of next week. She reports significant improvement in her ADLs and is now able to do everything [1] : 1, Mild [FreeTextEntry3] : b/l hand stiffness [FreeTextEntry5] : andre  [FreeTextEntry7] : lung cancer  [FreeTextEntry8] : scalp psoriasis flare up  [FreeTextEntry9] : 2/29/24  [de-identified] : andre  [de-identified] : lung cancer  [de-identified] : Topical steroid ointment , improved 6/11/24  musculoskeletal

## 2025-05-09 NOTE — PROGRESS NOTE ADULT - PROBLEM SELECTOR PLAN 4
s/p PPM, on Eliquis at home. Decreased CrCl. Concern for pacemaker involvement, however gallium scan negative   - S/p Lovenox 50mg BID in setting of malignancy   - c/w home Amiodarone 200mg QD   - AC switched from Lovenox to Eliquis (2.5mg since patient age >80yrs and weight <60km) no